# Patient Record
Sex: FEMALE | Race: WHITE | Employment: OTHER | URBAN - METROPOLITAN AREA
[De-identification: names, ages, dates, MRNs, and addresses within clinical notes are randomized per-mention and may not be internally consistent; named-entity substitution may affect disease eponyms.]

---

## 2017-01-16 ENCOUNTER — TELEPHONE (OUTPATIENT)
Dept: FAMILY MEDICINE CLINIC | Age: 42
End: 2017-01-16

## 2017-03-09 ENCOUNTER — TELEPHONE (OUTPATIENT)
Dept: ORTHOPEDIC SURGERY | Age: 42
End: 2017-03-09

## 2017-10-05 DIAGNOSIS — M25.473 ANKLE SWELLING, UNSPECIFIED LATERALITY: ICD-10-CM

## 2017-10-05 DIAGNOSIS — J44.0 CHRONIC OBSTRUCTIVE PULMONARY DISEASE WITH ACUTE LOWER RESPIRATORY INFECTION (HCC): ICD-10-CM

## 2017-10-05 DIAGNOSIS — M25.50 MULTIPLE JOINT PAIN: ICD-10-CM

## 2017-10-05 DIAGNOSIS — I10 ESSENTIAL HYPERTENSION: ICD-10-CM

## 2017-10-05 DIAGNOSIS — E78.5 DYSLIPIDEMIA: ICD-10-CM

## 2017-10-05 DIAGNOSIS — R53.82 CHRONIC FATIGUE: ICD-10-CM

## 2017-10-05 DIAGNOSIS — R12 HEARTBURN: ICD-10-CM

## 2017-10-05 DIAGNOSIS — E28.2 PCOS (POLYCYSTIC OVARIAN SYNDROME): ICD-10-CM

## 2017-10-05 DIAGNOSIS — R73.03 PREDIABETES: ICD-10-CM

## 2017-10-05 DIAGNOSIS — K21.9 GASTROESOPHAGEAL REFLUX DISEASE, ESOPHAGITIS PRESENCE NOT SPECIFIED: ICD-10-CM

## 2017-10-05 DIAGNOSIS — G47.33 OSA (OBSTRUCTIVE SLEEP APNEA): ICD-10-CM

## 2017-10-05 PROBLEM — F41.8 DEPRESSION WITH ANXIETY: Status: ACTIVE | Noted: 2017-10-05

## 2017-10-05 PROBLEM — E03.9 HYPOTHYROID: Status: ACTIVE | Noted: 2017-10-05

## 2017-10-05 PROBLEM — M19.90 OSTEOARTHRITIS: Status: ACTIVE | Noted: 2017-10-05

## 2017-10-05 PROBLEM — J44.9 COPD (CHRONIC OBSTRUCTIVE PULMONARY DISEASE) (HCC): Status: ACTIVE | Noted: 2017-10-05

## 2017-10-05 PROBLEM — K44.9 HIATAL HERNIA: Status: ACTIVE | Noted: 2017-10-05

## 2017-10-05 PROBLEM — J38.3 PARADOXICAL VOCAL CORD MOTION: Status: ACTIVE | Noted: 2017-10-05

## 2017-10-05 PROBLEM — L67.8 ABNORMAL FACIAL HAIR: Status: ACTIVE | Noted: 2017-10-05

## 2017-10-05 PROBLEM — R26.89 POOR BALANCE: Status: ACTIVE | Noted: 2017-10-05

## 2017-10-10 ENCOUNTER — APPOINTMENT (OUTPATIENT)
Dept: LAB | Facility: HOSPITAL | Age: 42
End: 2017-10-10

## 2017-10-11 ENCOUNTER — TELEPHONE (OUTPATIENT)
Dept: ORTHOPEDIC SURGERY | Age: 42
End: 2017-10-11

## 2017-11-21 ENCOUNTER — APPOINTMENT (OUTPATIENT)
Dept: LAB | Facility: HOSPITAL | Age: 42
End: 2017-11-21

## 2017-12-19 ENCOUNTER — CONSULT (OUTPATIENT)
Dept: BARIATRICS/WEIGHT MGMT | Facility: CLINIC | Age: 42
End: 2017-12-19

## 2017-12-19 ENCOUNTER — LAB (OUTPATIENT)
Dept: LAB | Facility: HOSPITAL | Age: 42
End: 2017-12-19

## 2017-12-19 VITALS
HEIGHT: 59 IN | DIASTOLIC BLOOD PRESSURE: 69 MMHG | SYSTOLIC BLOOD PRESSURE: 132 MMHG | BODY MASS INDEX: 59.07 KG/M2 | WEIGHT: 293 LBS | HEART RATE: 108 BPM | TEMPERATURE: 98.3 F | RESPIRATION RATE: 16 BRPM

## 2017-12-19 DIAGNOSIS — G47.33 OSA (OBSTRUCTIVE SLEEP APNEA): ICD-10-CM

## 2017-12-19 DIAGNOSIS — R73.03 PREDIABETES: ICD-10-CM

## 2017-12-19 DIAGNOSIS — E03.8 HYPOTHYROIDISM DUE TO HASHIMOTO'S THYROIDITIS: ICD-10-CM

## 2017-12-19 DIAGNOSIS — E66.01 MORBID OBESITY (HCC): ICD-10-CM

## 2017-12-19 DIAGNOSIS — E06.3 HYPOTHYROIDISM DUE TO HASHIMOTO'S THYROIDITIS: ICD-10-CM

## 2017-12-19 DIAGNOSIS — K44.9 HIATAL HERNIA: ICD-10-CM

## 2017-12-19 DIAGNOSIS — E78.5 DYSLIPIDEMIA: ICD-10-CM

## 2017-12-19 DIAGNOSIS — I10 ESSENTIAL HYPERTENSION: ICD-10-CM

## 2017-12-19 DIAGNOSIS — E66.01 MORBID OBESITY (HCC): Primary | ICD-10-CM

## 2017-12-19 DIAGNOSIS — K21.9 GASTROESOPHAGEAL REFLUX DISEASE, ESOPHAGITIS PRESENCE NOT SPECIFIED: ICD-10-CM

## 2017-12-19 DIAGNOSIS — M25.50 MULTIPLE JOINT PAIN: ICD-10-CM

## 2017-12-19 PROBLEM — D80.2 IGA DEFICIENCY (HCC): Status: ACTIVE | Noted: 2017-12-19

## 2017-12-19 PROBLEM — Q60.2: Status: ACTIVE | Noted: 2017-12-19

## 2017-12-19 PROBLEM — L03.90 CELLULITIS: Status: ACTIVE | Noted: 2017-12-19

## 2017-12-19 LAB
ALBUMIN SERPL-MCNC: 3.9 G/DL (ref 3.5–5.2)
ALBUMIN/GLOB SERPL: 0.9 G/DL
ALP SERPL-CCNC: 65 U/L (ref 39–117)
ALT SERPL W P-5'-P-CCNC: 25 U/L (ref 1–33)
ANION GAP SERPL CALCULATED.3IONS-SCNC: 15.3 MMOL/L
AST SERPL-CCNC: 18 U/L (ref 1–32)
BASOPHILS # BLD AUTO: 0.03 10*3/MM3 (ref 0–0.2)
BASOPHILS NFR BLD AUTO: 0.4 % (ref 0–1.5)
BILIRUB SERPL-MCNC: 0.2 MG/DL (ref 0.1–1.2)
BUN BLD-MCNC: 15 MG/DL (ref 6–20)
BUN/CREAT SERPL: 12.7 (ref 7–25)
CALCIUM SPEC-SCNC: 9.2 MG/DL (ref 8.6–10.5)
CHLORIDE SERPL-SCNC: 98 MMOL/L (ref 98–107)
CHOLEST SERPL-MCNC: 135 MG/DL (ref 0–200)
CO2 SERPL-SCNC: 24.7 MMOL/L (ref 22–29)
CREAT BLD-MCNC: 1.18 MG/DL (ref 0.57–1)
DEPRECATED RDW RBC AUTO: 54.5 FL (ref 37–54)
EOSINOPHIL # BLD AUTO: 0 10*3/MM3 (ref 0–0.7)
EOSINOPHIL NFR BLD AUTO: 0 % (ref 0.3–6.2)
ERYTHROCYTE [DISTWIDTH] IN BLOOD BY AUTOMATED COUNT: 16.4 % (ref 11.7–13)
GFR SERPL CREATININE-BSD FRML MDRD: 51 ML/MIN/1.73
GFR SERPL CREATININE-BSD FRML MDRD: 61 ML/MIN/1.73
GLOBULIN UR ELPH-MCNC: 4.2 GM/DL
GLUCOSE BLD-MCNC: 91 MG/DL (ref 65–99)
HBA1C MFR BLD: 5.8 % (ref 4.8–5.6)
HCT VFR BLD AUTO: 39.9 % (ref 35.6–45.5)
HDLC SERPL-MCNC: 35 MG/DL (ref 40–60)
HGB BLD-MCNC: 12.4 G/DL (ref 11.9–15.5)
IMM GRANULOCYTES # BLD: 0.02 10*3/MM3 (ref 0–0.03)
IMM GRANULOCYTES NFR BLD: 0.3 % (ref 0–0.5)
LDLC SERPL CALC-MCNC: 72 MG/DL (ref 0–100)
LDLC/HDLC SERPL: 2.05 {RATIO}
LYMPHOCYTES # BLD AUTO: 1.71 10*3/MM3 (ref 0.9–4.8)
LYMPHOCYTES NFR BLD AUTO: 22.8 % (ref 19.6–45.3)
MCH RBC QN AUTO: 28.1 PG (ref 26.9–32)
MCHC RBC AUTO-ENTMCNC: 31.1 G/DL (ref 32.4–36.3)
MCV RBC AUTO: 90.3 FL (ref 80.5–98.2)
MONOCYTES # BLD AUTO: 0.43 10*3/MM3 (ref 0.2–1.2)
MONOCYTES NFR BLD AUTO: 5.7 % (ref 5–12)
NEUTROPHILS # BLD AUTO: 5.31 10*3/MM3 (ref 1.9–8.1)
NEUTROPHILS NFR BLD AUTO: 70.8 % (ref 42.7–76)
PLATELET # BLD AUTO: 308 10*3/MM3 (ref 140–500)
PMV BLD AUTO: 10.4 FL (ref 6–12)
POTASSIUM BLD-SCNC: 4.2 MMOL/L (ref 3.5–5.2)
PROT SERPL-MCNC: 8.1 G/DL (ref 6–8.5)
RBC # BLD AUTO: 4.42 10*6/MM3 (ref 3.9–5.2)
SODIUM BLD-SCNC: 138 MMOL/L (ref 136–145)
TRIGL SERPL-MCNC: 141 MG/DL (ref 0–150)
TSH SERPL DL<=0.05 MIU/L-ACNC: 2.51 MIU/ML (ref 0.27–4.2)
VLDLC SERPL-MCNC: 28.2 MG/DL (ref 5–40)
WBC NRBC COR # BLD: 7.5 10*3/MM3 (ref 4.5–10.7)

## 2017-12-19 PROCEDURE — 99205 OFFICE O/P NEW HI 60 MIN: CPT | Performed by: NURSE PRACTITIONER

## 2017-12-19 PROCEDURE — 80061 LIPID PANEL: CPT

## 2017-12-19 PROCEDURE — 80053 COMPREHEN METABOLIC PANEL: CPT

## 2017-12-19 PROCEDURE — 36415 COLL VENOUS BLD VENIPUNCTURE: CPT

## 2017-12-19 PROCEDURE — 84443 ASSAY THYROID STIM HORMONE: CPT

## 2017-12-19 PROCEDURE — 85025 COMPLETE CBC W/AUTO DIFF WBC: CPT

## 2017-12-19 PROCEDURE — 94690 O2 UPTK REST INDIRECT: CPT | Performed by: NURSE PRACTITIONER

## 2017-12-19 PROCEDURE — 83036 HEMOGLOBIN GLYCOSYLATED A1C: CPT

## 2017-12-19 RX ORDER — DULOXETIN HYDROCHLORIDE 30 MG/1
30 CAPSULE, DELAYED RELEASE ORAL 2 TIMES DAILY
COMMUNITY

## 2017-12-19 NOTE — PROGRESS NOTES
[facility]ATR[facility] BARIATRIC SURGERY  [address]  [facility] [id number] [phone]  [address]  [facility] [address] [phone]  Dept: [phone]  12/19/2017      Kristin Jorge.  [id number]  [id number]  1975  female      Chief Complaint of weight gain; unable to maintain weight loss    History of Present Illness:   Kristin is a 42 y.o. female who presents today for evaluation, education and consultation regarding weight loss surgery. The patient is interested in the sleeve gastrectomy.      Diet History:Kristin has been overweight for at least 29 years, has been 35 pounds or more overweight for at least 20 years, has been 100 pounds or more overweight for 20 or more years and started dieting at age 22.  The most weight Kristin lost was 100 pounds on atkins and maintained the weight loss for 1 year. Kristin describes her eating habits as volume eater and snacker. Kristin Jorge has tried Atkins, [place], Weight Watchers and reduced calorie among others with success of losing up to 100 pounds, but in each instance regained the weight.   See dietician documentation for complete history.    Bariatric Surgery Evaluation: The patient is being seen for an initial visit for bariatric surgery evaluation.     Bariatric Co-morbidities:  sleep apnea, hypertension, dyslipidemia and GERD    Patient Active Problem List   Diagnosis   • Body mass index (BMI) of 70 or greater in adult   • Chronic fatigue   • Ankle swelling   • Essential hypertension   • Dyslipidemia   • COPD (chronic obstructive pulmonary disease)   • CHONG (obstructive sleep apnea)   • Heartburn   • Hiatal hernia   • GERD (gastroesophageal reflux disease)   • PCOS (polycystic ovarian syndrome)   • Multiple joint pain   • Osteoarthritis   • Poor balance   • Depression with anxiety   • Prediabetes   • Abnormal facial hair   • Hypothyroid   • Paradoxical vocal cord motion   • Kidney, aplastic   • IgA  deficiency   • Cellulitis       Past Medical History:   Diagnosis Date   • Anxiety and depression    • Asthma    • Blind     blind in right eye   • COPD (chronic obstructive pulmonary disease)    • GERD (gastroesophageal reflux disease)    • Hashimoto's thyroiditis    • Head injury    • Heart burn    • Hiatal hernia    • HTN (hypertension)    • Hyperlipidemia    • Hypertriglyceridemia    • Insomnia    • Joint pain    • OA (osteoarthritis)    • CHONG (obstructive sleep apnea)    • PCOS (polycystic ovarian syndrome)    • Prediabetes    • Umbilical hernia        Past Surgical History:   Procedure Laterality Date   • CARPAL TUNNEL RELEASE     • DENTAL PROCEDURE     • LAPAROSCOPIC APPENDECTOMY     • LAPAROSCOPIC CHOLECYSTECTOMY         Allergies   Allergen Reactions   • Bee Venom    • Ibuprofen    • Nsaids      Pt only has one kidney   • Other      All pepper         Current Outpatient Prescriptions:   •  albuterol (PROVENTIL HFA;VENTOLIN HFA) 108 (90 Base) MCG/ACT inhaler, Inhale 2 puffs Every 4 (Four) Hours As Needed for Wheezing., Disp: , Rfl:   •  amitriptyline (ELAVIL) 150 MG tablet, Take 150 mg by mouth Every Night., Disp: , Rfl:   •  amLODIPine (NORVASC) 5 MG tablet, Take 5 mg by mouth Daily., Disp: , Rfl:   •  ARIPiprazole (ABILIFY) 5 MG tablet, Take 5 mg by mouth Daily., Disp: , Rfl:   •  atorvastatin (LIPITOR) 10 MG tablet, Take 10 mg by mouth Daily., Disp: , Rfl:   •  azelastine (ASTELIN) 0.1 % nasal spray, 2 sprays into each nostril 2 (Two) Times a Day. Use in each nostril as directed, Disp: , Rfl:   •  busPIRone (BUSPAR) 10 MG tablet, Take 10 mg by mouth 3 (Three) Times a Day., Disp: , Rfl:   •  diclofenac (VOLTAREN) 1 % gel gel, Apply 4 g topically 4 (Four) Times a Day As Needed., Disp: , Rfl:   •  diphenhydrAMINE (BANOPHEN) 25 mg capsule, Take 25 mg by mouth Every 6 (Six) Hours As Needed for Itching., Disp: , Rfl:   •  docusate sodium (COLACE) 100 MG capsule, Take 100 mg by mouth 2 (Two) Times a Day., Disp:  , Rfl:   •  DULoxetine (CYMBALTA) 20 MG capsule, Take 20 mg by mouth Daily., Disp: , Rfl:   •  EPINEPHrine (EPIPEN 2-JOEY) 0.3 MG/0.3ML solution auto-injector injection, , Disp: , Rfl:   •  Ergocalciferol (VITAMIN D2 PO), Take  by mouth., Disp: , Rfl:   •  ferrous sulfate 325 (65 FE) MG tablet, Take 325 mg by mouth Daily With Breakfast., Disp: , Rfl:   •  fluticasone (VERAMYST) 27.5 MCG/SPRAY nasal spray, 2 sprays into each nostril Daily., Disp: , Rfl:   •  Fluticasone Furoate-Vilanterol (BREO ELLIPTA) 200-25 MCG/INH aerosol powder , Inhale., Disp: , Rfl:   •  furosemide (LASIX) 40 MG tablet, Take 40 mg by mouth 2 (Two) Times a Day., Disp: , Rfl:   •  gabapentin (NEURONTIN) 300 MG capsule, Take 300 mg by mouth 3 (Three) Times a Day., Disp: , Rfl:   •  hydrochlorothiazide (HYDRODIURIL) 25 MG tablet, Take 25 mg by mouth Daily., Disp: , Rfl:   •  ipratropium-albuterol (DUO-NEB) 0.5-2.5 mg/mL nebulizer, Take 3 mL by nebulization Every 4 (Four) Hours As Needed for Wheezing., Disp: , Rfl:   •  levothyroxine (SYNTHROID, LEVOTHROID) 300 MCG tablet, Take 300 mcg by mouth Daily., Disp: , Rfl:   •  lisinopril (PRINIVIL,ZESTRIL) 20 MG tablet, Take 20 mg by mouth Daily., Disp: , Rfl:   •  Loratadine (CLARITIN) 10 MG capsule, Take  by mouth., Disp: , Rfl:   •  medroxyPROGESTERone (PROVERA) 10 MG tablet, Take 10 mg by mouth Daily., Disp: , Rfl:   •  melatonin 5 MG tablet tablet, Take 5 mg by mouth., Disp: , Rfl:   •  metFORMIN (GLUCOPHAGE) 500 MG tablet, Take 500 mg by mouth 2 (Two) Times a Day With Meals., Disp: , Rfl:   •  montelukast (SINGULAIR) 10 MG tablet, Take 10 mg by mouth Every Night., Disp: , Rfl:   •  nadolol (CORGARD) 20 MG tablet, Take 20 mg by mouth Daily., Disp: , Rfl:   •  nystatin-triamcinolone (MYCOLOG II) 843037-9.1 UNIT/GM-% cream, Apply  topically 4 (Four) Times a Day., Disp: , Rfl:   •  Omega-3 Fatty Acids (FISH OIL) 1000 MG capsule capsule, Take  by mouth Daily With Breakfast., Disp: , Rfl:   •  omeprazole  (priLOSEC) 20 MG capsule, Take 20 mg by mouth Daily., Disp: , Rfl:   •  ondansetron (ZOFRAN) 4 MG tablet, Take 4 mg by mouth Every 8 (Eight) Hours As Needed for Nausea or Vomiting., Disp: , Rfl:   •  oxybutynin XL (DITROPAN-XL) 10 MG 24 hr tablet, Take 10 mg by mouth Daily., Disp: , Rfl:   •  polyethylene glycol (MIRALAX) packet, Take 17 g by mouth Daily., Disp: , Rfl:   •  potassium chloride (K-DUR,KLOR-CON) 20 MEQ CR tablet, Take 20 mEq by mouth 2 (Two) Times a Day., Disp: , Rfl:   •  Prenatal Vit-Fe Fumarate-FA (PRENATAL VITAMIN PO), Take  by mouth., Disp: , Rfl:   •  Probiotic Product (FLORAJEN3 PO), Take  by mouth., Disp: , Rfl:   •  raNITIdine (ZANTAC) 150 MG tablet, Take 150 mg by mouth 2 (Two) Times a Day., Disp: , Rfl:   •  rOPINIRole (REQUIP) 0.5 MG tablet, Take 0.5 mg by mouth Every Night. Take 1 hour before bedtime., Disp: , Rfl:   •  sodium chloride 0.9 % nebulizer solution 0.88 mL with albuterol (5 MG/ML) 0.5% nebulizer solution 0.6 mg, Take 10 mg/hr by nebulization Continuous., Disp: , Rfl:   •  topiramate (TOPAMAX) 100 MG tablet, Take 100 mg by mouth 2 (Two) Times a Day., Disp: , Rfl:   •  VITAMIN E PO, Take  by mouth., Disp: , Rfl:     Social History     Social History   • Marital status:      Spouse name: N/A   • Number of children: 0   • Years of education: N/A     Occupational History   • applied for disability      Social History Main Topics   • Smoking status: Never Smoker   • Smokeless tobacco: Never Used   • Alcohol use No   • Drug use: No   • Sexual activity: Defer     Other Topics Concern   • Not on file     Social History Narrative       Family History   Problem Relation Age of Onset   • Obesity Mother    • Hypertension Mother    • Stroke Mother    • Obesity Father    • Diabetes Father    • Hypertension Father    • Stroke Father    • Heart attack Father    • Coronary artery disease Father    • Hypertension Sister    • Obesity Maternal Grandmother    • Hypertension Maternal  Grandmother    • Heart attack Maternal Grandmother    • Coronary artery disease Maternal Grandmother    • Obesity Maternal Grandfather    • Hypertension Maternal Grandfather    • Heart attack Maternal Grandfather    • Coronary artery disease Maternal Grandfather    • Obesity Paternal Grandmother    • Hypertension Paternal Grandmother    • Heart attack Paternal Grandmother    • Coronary artery disease Paternal Grandmother          Review of Systems:  Review of Systems   All other systems reviewed and are negative.      Physical Exam:  Vital Signs:  Weight: (!) 182 kg (402 lb)   Body mass index is 81.19 kg/(m^2).  Temp: 98.3 °F (36.8 °C)   Heart Rate: 108   BP: 132/69     Physical Exam   Constitutional: She is oriented to person, place, and time. She appears well-developed and well-nourished.   HENT:   Head: Normocephalic and atraumatic.   Eyes: EOM are normal.   Cardiovascular: Normal rate, regular rhythm and normal heart sounds.    Pulmonary/Chest: Effort normal and breath sounds normal.   Abdominal: Soft. Bowel sounds are normal. She exhibits no distension. There is no tenderness.   Musculoskeletal: Normal range of motion.   Neurological: She is alert and oriented to person, place, and time.   Skin: Skin is warm and dry.     cellulitis   Psychiatric: She has a normal mood and affect. Her behavior is normal. Judgment and thought content normal.   Vitals reviewed.         Assessment:         Kristin Jorge is a 42 y.o. year old female with medically complicated severe obesity. Weight: (!) 182 kg (402 lb), Body mass index is 81.19 kg/(m^2). and weight related problems including sleep apnea, hypertension and dyslipidemia.    I explained in detail the procedures that we are performing.  All of those procedures can be performed laparoscopically but there is a chance to convert to open if any technical challenges or complications do occur.  Bariatric surgery is not cosmetic surgery but rather a tool to help a patient make  a life-long commitment lifestyle changes including diet, exercise, behavior changes, and taking supplemental vitamins and minerals.    Due to the patient's BMI and co-morbidities they are at a high risk for surgery and will obtain the following:  The patient has been advised that a letter of medical support and a history and physical must be obtained from her primary care physician. A psychological evaluation will be arranged for this patient. CBC, CMP, FLP, TSH and HgbA1C will be drawn. Kristin Jorge will obtain a pre-operative CXR and EKG. Kristin Jorge will obtain clearance from a cardiologist, pulmonary and nephrologist prior to surgery.     Kristin Jorge will be set up for a pre-operative diagnostic esophagogastroduodenoscopy with biopsy for evaluation. The risks and benefits of the procedure were discussed with the patient in detail and all questions were answered.  Possibility of perforation, bleeding, aspiration, anoxic brain injury, respiratory and/or cardiac arrest and death were discussed.   She received handouts regarding, all questions were answered and informed consent was obtained.     The risks, benefits, alternatives, and potential complications of all of the procedures were explained in detail including, but not limited to death, anesthesia and medication adverse effect/DVT, pulmonary embolism, trocar site/incisional hernia, wound infection, abdominal infection, bleeding, failure to lose weight or gain weight and change in body image, metabolic complications with calcium, thiamine, vitamin B12, folate, iron, and anemia.    The patient was advised to start a high protein, low fat and low carbohydrate diet. The patient was given individualized information by our dietician along with general group information and handouts.     The patient had the Basal Metabolic Rate test performed in our office today then I reviewed the results with them including changes to help increase metabolism and a recommended  daily caloric intake range- see scanned results.     The patient was given information regarding the YANI educational video. YANI is an internet based educational video which explains the surgical procedure and answers basic questions regarding the procedure. The patient was provided with instructions and a password to watch the video.    The patient was encouraged to start routine exercise including but not limited to 150 minutes per week. The patient received a resistance band along with a handout of exercises.     The consultation plan was reviewed with the patient.    The patient understands the surgical procedures and the different surgical options that are available.  She understands the lifestyle changes that would be required after surgery and has agreed to participate in a pre-operative and postoperative weight management program.  She also expressed understanding of possible risks, had several questions answered and desires to proceed.    I think she is a good candidate for this surgery, and is interested in a sleeve gastrectomy.    Encounter Diagnoses   Name Primary?   • Body mass index (BMI) of 70 or greater in adult Yes   • Essential hypertension    • Hiatal hernia    • CHONG (obstructive sleep apnea)    • Gastroesophageal reflux disease, esophagitis presence not specified    • Hypothyroidism due to Hashimoto's thyroiditis    • Multiple joint pain    • Dyslipidemia    • Prediabetes        Plan:    Patient will have evaluations and follow up with bariatric dieticians and a psychologist before undergoing a multidisciplinary review of her candidacy.  We also discussed the weight loss requirement and rationale, and other program requirements.      Sivan Ambrose, APRN  12/19/2017

## 2017-12-19 NOTE — PROGRESS NOTES
"Bariatric Nutrition Counseling Interview    Patient Name:  Kristin Jorge  YOB: 1975  Age:  42 y.o.  Sex:  female  MRN: [id number]  Date:  12/19/17    Procedure Considering:  Sleeve    Last Documented Height:    Ht Readings from Last 1 Encounters:   12/19/17 149.9 cm (59\")     Last Documented Weight:   Wt Readings from Last 1 Encounters:   12/19/17 (!) 182 kg (402 lb)      Body mass index is 81.19 kg/(m^2).    Highest Weight:  402 lb.  Goal Weight: 160 lb.    History:  Past Medical History:   Diagnosis Date   • Anxiety and depression    • Asthma    • Blind     blind in right eye   • COPD (chronic obstructive pulmonary disease)    • GERD (gastroesophageal reflux disease)    • Hashimoto's thyroiditis    • Head injury    • Heart burn    • Hiatal hernia    • HTN (hypertension)    • Hyperlipidemia    • Hypertriglyceridemia    • Insomnia    • Joint pain    • OA (osteoarthritis)    • CHONG (obstructive sleep apnea)    • PCOS (polycystic ovarian syndrome)    • Prediabetes    • Umbilical hernia      Past Surgical History:   Procedure Laterality Date   • CARPAL TUNNEL RELEASE     • DENTAL PROCEDURE     • LAPAROSCOPIC APPENDECTOMY     • LAPAROSCOPIC CHOLECYSTECTOMY       Family History   Problem Relation Age of Onset   • Obesity Mother    • Hypertension Mother    • Stroke Mother    • Obesity Father    • Diabetes Father    • Hypertension Father    • Stroke Father    • Heart attack Father    • Coronary artery disease Father    • Hypertension Sister    • Obesity Maternal Grandmother    • Hypertension Maternal Grandmother    • Heart attack Maternal Grandmother    • Coronary artery disease Maternal Grandmother    • Obesity Maternal Grandfather    • Hypertension Maternal Grandfather    • Heart attack Maternal Grandfather    • Coronary artery disease Maternal Grandfather    • Obesity Paternal Grandmother    • Hypertension Paternal Grandmother    • Heart attack Paternal Grandmother    • Coronary artery disease Paternal " Grandmother      Social History     Social History   • Marital status:      Spouse name: N/A   • Number of children: 0   • Years of education: N/A     Occupational History   • applied for disability      Social History Main Topics   • Smoking status: Never Smoker   • Smokeless tobacco: Never Used   • Alcohol use No   • Drug use: No   • Sexual activity: Defer     Other Topics Concern   • None     Social History Narrative     Additional Health Issues to Consider:  Hyperlipidemia, Hashimoto's Thyroiditis,HTN,COPD,Pre Diabetes oint pan, Asthma    Weight History:  Always been overweight    Previous Weight Loss Efforts:  Atkins, weight Watchers, Mindful eating with low calorie foods  Most Successful Weight Loss Effort:   Atkins  Eat three meals on most days?  No  Worst eating habit?  Snacking on high calorie foods    How often do you eat fast food? monthly    Do you exercise regularly? (at least 3 times each week)  No    Occupation:  Disabled    Personal Goal After Procedure:  Function normally, resolve health issues and reduce medications   Personal Support:      Assessment:  We reviewed program requirements for weight loss success following surgery. We discussed personal habits and lifestyle behaviors that may influence diet efforts.Progam materials, including a reduced calorie diet were provided, discussed and recommenced as the regimen to follow for pre and post diet planning. Kristin presents with significant health issues and apprehensions. She will need continued support and education. Kristin will make a fair candidate for weight loss surgery.    Electronically signed by:  Maria D Almanza RD  12/19/17 1:38 PM

## 2017-12-20 ENCOUNTER — TELEPHONE (OUTPATIENT)
Dept: BARIATRICS/WEIGHT MGMT | Facility: CLINIC | Age: 42
End: 2017-12-20

## 2018-01-11 DIAGNOSIS — E03.8 HYPOTHYROIDISM DUE TO HASHIMOTO'S THYROIDITIS: ICD-10-CM

## 2018-01-11 DIAGNOSIS — E78.5 DYSLIPIDEMIA: ICD-10-CM

## 2018-01-11 DIAGNOSIS — M25.50 MULTIPLE JOINT PAIN: ICD-10-CM

## 2018-01-11 DIAGNOSIS — I10 ESSENTIAL HYPERTENSION: ICD-10-CM

## 2018-01-11 DIAGNOSIS — R73.03 PREDIABETES: ICD-10-CM

## 2018-01-11 DIAGNOSIS — E06.3 HYPOTHYROIDISM DUE TO HASHIMOTO'S THYROIDITIS: ICD-10-CM

## 2018-01-11 DIAGNOSIS — G47.33 OSA (OBSTRUCTIVE SLEEP APNEA): ICD-10-CM

## 2018-01-11 DIAGNOSIS — K21.9 GASTROESOPHAGEAL REFLUX DISEASE, ESOPHAGITIS PRESENCE NOT SPECIFIED: ICD-10-CM

## 2018-01-11 DIAGNOSIS — K44.9 HIATAL HERNIA: ICD-10-CM

## 2018-01-23 ENCOUNTER — TELEPHONE (OUTPATIENT)
Dept: BARIATRICS/WEIGHT MGMT | Facility: CLINIC | Age: 43
End: 2018-01-23

## 2018-01-23 NOTE — TELEPHONE ENCOUNTER
Spoke with the patient.  She notes that she is going to have cardiac clearance next week with Dr. Wagner in [facility] and on 2/8/18 with Dr. Bryant (pulmonology) in [facility].

## 2018-04-17 ENCOUNTER — CONVERSION ENCOUNTER (OUTPATIENT)
Dept: MAMMOGRAPHY | Facility: HOSPITAL | Age: 43
End: 2018-04-17

## 2018-05-04 ENCOUNTER — PREP FOR SURGERY (OUTPATIENT)
Dept: OTHER | Facility: HOSPITAL | Age: 43
End: 2018-05-04

## 2018-05-04 RX ORDER — FAMOTIDINE 10 MG/ML
20 INJECTION, SOLUTION INTRAVENOUS ONCE
Status: CANCELLED | OUTPATIENT
Start: 2018-05-04 | End: 2018-05-04

## 2018-05-04 RX ORDER — CHLORHEXIDINE GLUCONATE 0.12 MG/ML
15 RINSE ORAL SEE ADMIN INSTRUCTIONS
Status: CANCELLED | OUTPATIENT
Start: 2018-05-04

## 2018-05-04 RX ORDER — CEFAZOLIN SODIUM IN 0.9 % NACL 3 G/100 ML
3 INTRAVENOUS SOLUTION, PIGGYBACK (ML) INTRAVENOUS
Status: CANCELLED | OUTPATIENT
Start: 2018-05-04

## 2018-05-04 RX ORDER — SCOLOPAMINE TRANSDERMAL SYSTEM 1 MG/1
1 PATCH, EXTENDED RELEASE TRANSDERMAL ONCE
Status: CANCELLED | OUTPATIENT
Start: 2018-05-04 | End: 2018-05-04

## 2018-05-04 RX ORDER — METOCLOPRAMIDE HYDROCHLORIDE 5 MG/ML
10 INJECTION INTRAMUSCULAR; INTRAVENOUS ONCE
Status: CANCELLED | OUTPATIENT
Start: 2018-05-04 | End: 2018-05-04

## 2018-05-04 RX ORDER — SODIUM CHLORIDE 0.9 % (FLUSH) 0.9 %
1-10 SYRINGE (ML) INJECTION AS NEEDED
Status: CANCELLED | OUTPATIENT
Start: 2018-05-04

## 2018-05-04 RX ORDER — ACETAMINOPHEN 160 MG/5ML
975 SOLUTION ORAL ONCE
Status: CANCELLED | OUTPATIENT
Start: 2018-05-04 | End: 2018-05-04

## 2018-05-04 RX ORDER — SODIUM CHLORIDE, SODIUM LACTATE, POTASSIUM CHLORIDE, CALCIUM CHLORIDE 600; 310; 30; 20 MG/100ML; MG/100ML; MG/100ML; MG/100ML
100 INJECTION, SOLUTION INTRAVENOUS CONTINUOUS
Status: CANCELLED | OUTPATIENT
Start: 2018-05-04

## 2018-07-18 ENCOUNTER — CONSULT (OUTPATIENT)
Dept: BARIATRICS/WEIGHT MGMT | Facility: CLINIC | Age: 43
End: 2018-07-18

## 2018-07-18 VITALS
DIASTOLIC BLOOD PRESSURE: 71 MMHG | TEMPERATURE: 97.3 F | HEART RATE: 92 BPM | WEIGHT: 293 LBS | BODY MASS INDEX: 59.07 KG/M2 | SYSTOLIC BLOOD PRESSURE: 149 MMHG | RESPIRATION RATE: 16 BRPM | HEIGHT: 59 IN

## 2018-07-18 DIAGNOSIS — I10 ESSENTIAL HYPERTENSION: ICD-10-CM

## 2018-07-18 DIAGNOSIS — K44.9 HIATAL HERNIA: ICD-10-CM

## 2018-07-18 DIAGNOSIS — J44.0 CHRONIC OBSTRUCTIVE PULMONARY DISEASE WITH ACUTE LOWER RESPIRATORY INFECTION (HCC): ICD-10-CM

## 2018-07-18 DIAGNOSIS — E28.2 PCOS (POLYCYSTIC OVARIAN SYNDROME): ICD-10-CM

## 2018-07-18 DIAGNOSIS — M19.90 OSTEOARTHRITIS, UNSPECIFIED OSTEOARTHRITIS TYPE, UNSPECIFIED SITE: ICD-10-CM

## 2018-07-18 DIAGNOSIS — M25.50 MULTIPLE JOINT PAIN: ICD-10-CM

## 2018-07-18 DIAGNOSIS — F41.8 DEPRESSION WITH ANXIETY: ICD-10-CM

## 2018-07-18 DIAGNOSIS — E78.5 DYSLIPIDEMIA: ICD-10-CM

## 2018-07-18 DIAGNOSIS — R53.82 CHRONIC FATIGUE: ICD-10-CM

## 2018-07-18 DIAGNOSIS — G47.33 OSA (OBSTRUCTIVE SLEEP APNEA): ICD-10-CM

## 2018-07-18 DIAGNOSIS — R73.03 PREDIABETES: ICD-10-CM

## 2018-07-18 DIAGNOSIS — K21.9 GASTROESOPHAGEAL REFLUX DISEASE WITHOUT ESOPHAGITIS: ICD-10-CM

## 2018-07-18 PROBLEM — R12 HEARTBURN: Status: RESOLVED | Noted: 2017-10-05 | Resolved: 2018-07-18

## 2018-07-18 PROCEDURE — 99215 OFFICE O/P EST HI 40 MIN: CPT | Performed by: SURGERY

## 2018-07-18 RX ORDER — FERROUS ASPARTO GLYCINATE, FERROUS FUMARATE, ASCORBIC ACID, FOLIC ACID, CYANOCOBALAMIN, ZINC, AND INTRINSIC FACTOR 25; 50; 60; 750; 250; 10; 12; 100 MG/1; MG/1; MG/1; UG/1; UG/1; UG/1; MG/1; MG/1
1 CAPSULE ORAL DAILY
COMMUNITY
End: 2019-05-03 | Stop reason: ALTCHOICE

## 2018-07-18 NOTE — PATIENT INSTRUCTIONS
Bariatric Manual    You were provided a manual specific to the procedure that you have chosen.  Please refer to that with any questions or call the office at [phone]

## 2018-07-18 NOTE — H&P
Bariatric Consult:  Referred by FRANCOIS Vazquez    Kristin Jorge is here today for consult on Consult (Gastric Sleeve consultation )      History of Present Illness:     Kristin Jorge is a 42 y.o. female with morbid obesity with co-morbidities including sleep apnea, diabetes, hypertension, osteoarthritis, back pain, knee pain, GERD, depression and urinary stress incontinence who presents for surgical consultation for the above procedure. Kristin has completed the initial intake visit and has been examined by our nurse practitioner, dietician, psychologist and underwent the extensive educational teaching process under the guidance of our bariatric coordinator and myself. Kristin also has seen the educational video YANI on the surgical procedure if available. Kristin attended today more educational teaching from our bariatric coordinator and myself. Kristin has had an extensive medical workup including a visit with their primary care physician, EKG, chest radiograph, blood work, EGD or UGI and possibly further testing. These have been reviewed by me and discussed with the patient. Kristin is now ready to proceed with surgery. Kristin presently denies nausea, vomiting, fever, chills, chest pain, shortness of air, melena, hematochezia, hemetemesis, dysuria, frequency, hematuria, jaundice or abdominal pain.       Past Medical History:   Diagnosis Date   • Anxiety and depression    • Asthma    • Blind     blind in right eye   • COPD (chronic obstructive pulmonary disease) (CMS/[facility])    • GERD (gastroesophageal reflux disease)    • Hashimoto's thyroiditis    • Head injury    • Heart burn    • Hiatal hernia    • HTN (hypertension)    • Hyperlipidemia    • Hypertriglyceridemia    • Insomnia    • Joint pain    • OA (osteoarthritis)    • CHONG (obstructive sleep apnea)    • PCOS (polycystic ovarian syndrome)    • Prediabetes    • Umbilical hernia        Encounter Diagnoses   Name Primary?   • Body mass index (BMI) of 70 or greater  in adult (CMS/[facility]) Yes   • Chronic fatigue    • Essential hypertension    • Dyslipidemia    • Chronic obstructive pulmonary disease with acute lower respiratory infection (CMS/HCC)    • CHONG (obstructive sleep apnea)    • Hiatal hernia    • Gastroesophageal reflux disease without esophagitis    • PCOS (polycystic ovarian syndrome)    • Multiple joint pain    • Osteoarthritis, unspecified osteoarthritis type, unspecified site    • Depression with anxiety    • Prediabetes        Past Surgical History:   Procedure Laterality Date   • CARPAL TUNNEL RELEASE     • DENTAL PROCEDURE     • LAPAROSCOPIC APPENDECTOMY     • LAPAROSCOPIC CHOLECYSTECTOMY         Patient Active Problem List   Diagnosis   • Body mass index (BMI) of 70 or greater in adult (CMS/[facility])   • Chronic fatigue   • Ankle swelling   • Essential hypertension   • Dyslipidemia   • COPD (chronic obstructive pulmonary disease) (CMS/[facility])   • CHONG (obstructive sleep apnea)   • Hiatal hernia   • GERD (gastroesophageal reflux disease)   • PCOS (polycystic ovarian syndrome)   • Multiple joint pain   • Osteoarthritis   • Poor balance   • Depression with anxiety   • Prediabetes   • Abnormal facial hair   • Hypothyroid   • Paradoxical vocal cord motion   • Kidney, aplastic   • IgA deficiency (CMS/[facility])   • Cellulitis       Allergies   Allergen Reactions   • Bee Venom    • Ibuprofen    • Nsaids      Pt only has one kidney   • Other      All pepper         Current Outpatient Prescriptions:   •  albuterol (PROVENTIL HFA;VENTOLIN HFA) 108 (90 Base) MCG/ACT inhaler, Inhale 2 puffs Every 4 (Four) Hours As Needed for Wheezing., Disp: , Rfl:   •  amitriptyline (ELAVIL) 150 MG tablet, Take 150 mg by mouth Every Night., Disp: , Rfl:   •  amLODIPine (NORVASC) 5 MG tablet, Take 5 mg by mouth Daily., Disp: , Rfl:   •  ARIPiprazole (ABILIFY) 5 MG tablet, Take 5 mg by mouth Daily., Disp: , Rfl:   •  atorvastatin (LIPITOR) 10 MG tablet, Take 10 mg by mouth Daily., Disp: , Rfl:   •   azelastine (ASTELIN) 0.1 % nasal spray, 2 sprays into each nostril 2 (Two) Times a Day. Use in each nostril as directed, Disp: , Rfl:   •  busPIRone (BUSPAR) 10 MG tablet, Take 15 mg by mouth 3 (Three) Times a Day., Disp: , Rfl:   •  diclofenac (VOLTAREN) 1 % gel gel, Apply 4 g topically 4 (Four) Times a Day As Needed., Disp: , Rfl:   •  diphenhydrAMINE (BANOPHEN) 25 mg capsule, Take 25 mg by mouth Every 6 (Six) Hours As Needed for Itching., Disp: , Rfl:   •  docusate sodium (COLACE) 100 MG capsule, Take 100 mg by mouth 2 (Two) Times a Day., Disp: , Rfl:   •  DULoxetine (CYMBALTA) 20 MG capsule, Take 30 mg by mouth Daily., Disp: , Rfl:   •  EPINEPHrine (EPIPEN 2-JOEY) 0.3 MG/0.3ML solution auto-injector injection, , Disp: , Rfl:   •  Ergocalciferol (VITAMIN D2 PO), Take  by mouth., Disp: , Rfl:   •  fluticasone (VERAMYST) 27.5 MCG/SPRAY nasal spray, 2 sprays into each nostril Daily., Disp: , Rfl:   •  Fluticasone Furoate-Vilanterol (BREO ELLIPTA) 200-25 MCG/INH aerosol powder , Inhale., Disp: , Rfl:   •  furosemide (LASIX) 40 MG tablet, Take 40 mg by mouth 2 (Two) Times a Day., Disp: , Rfl:   •  gabapentin (NEURONTIN) 300 MG capsule, Take 600 mg by mouth 3 (Three) Times a Day., Disp: , Rfl:   •  hydrochlorothiazide (HYDRODIURIL) 25 MG tablet, Take 25 mg by mouth Daily., Disp: , Rfl:   •  ipratropium-albuterol (DUO-NEB) 0.5-2.5 mg/mL nebulizer, Take 3 mL by nebulization Every 4 (Four) Hours As Needed for Wheezing., Disp: , Rfl:   •  Iron Combinations (CHROMAGEN) capsule, Take 1 capsule by mouth Daily., Disp: , Rfl:   •  levothyroxine (SYNTHROID, LEVOTHROID) 300 MCG tablet, Take 350 mcg by mouth Daily., Disp: , Rfl:   •  lisinopril (PRINIVIL,ZESTRIL) 20 MG tablet, Take 20 mg by mouth Daily., Disp: , Rfl:   •  Loratadine (CLARITIN) 10 MG capsule, Take  by mouth., Disp: , Rfl:   •  medroxyPROGESTERone (PROVERA) 10 MG tablet, Take 10 mg by mouth Daily., Disp: , Rfl:   •  melatonin 5 MG tablet tablet, Take 5 mg by mouth.,  Disp: , Rfl:   •  metFORMIN (GLUCOPHAGE) 500 MG tablet, Take 500 mg by mouth 2 (Two) Times a Day With Meals., Disp: , Rfl:   •  montelukast (SINGULAIR) 10 MG tablet, Take 10 mg by mouth Every Night., Disp: , Rfl:   •  nadolol (CORGARD) 20 MG tablet, Take 20 mg by mouth Daily., Disp: , Rfl:   •  nystatin-triamcinolone (MYCOLOG II) 505127-7.1 UNIT/GM-% cream, Apply  topically 4 (Four) Times a Day., Disp: , Rfl:   •  Omega-3 Fatty Acids (FISH OIL) 1000 MG capsule capsule, Take  by mouth Daily With Breakfast., Disp: , Rfl:   •  omeprazole (priLOSEC) 20 MG capsule, Take 20 mg by mouth Daily., Disp: , Rfl:   •  ondansetron (ZOFRAN) 4 MG tablet, Take 4 mg by mouth Every 8 (Eight) Hours As Needed for Nausea or Vomiting., Disp: , Rfl:   •  oxybutynin XL (DITROPAN-XL) 10 MG 24 hr tablet, Take 10 mg by mouth Daily., Disp: , Rfl:   •  polyethylene glycol (MIRALAX) packet, Take 17 g by mouth Daily., Disp: , Rfl:   •  potassium chloride (K-DUR,KLOR-CON) 20 MEQ CR tablet, Take 20 mEq by mouth 2 (Two) Times a Day., Disp: , Rfl:   •  Prenatal Vit-Fe Fumarate-FA (PRENATAL VITAMIN PO), Take  by mouth., Disp: , Rfl:   •  Probiotic Product (FLORAJEN3 PO), Take  by mouth., Disp: , Rfl:   •  raNITIdine (ZANTAC) 150 MG tablet, Take 150 mg by mouth 2 (Two) Times a Day., Disp: , Rfl:   •  rOPINIRole (REQUIP) 0.5 MG tablet, Take 0.5 mg by mouth Every Night. Take 1 hour before bedtime., Disp: , Rfl:   •  sodium chloride 0.9 % nebulizer solution 0.88 mL with albuterol (5 MG/ML) 0.5% nebulizer solution 0.6 mg, Take 10 mg/hr by nebulization Continuous., Disp: , Rfl:   •  topiramate (TOPAMAX) 100 MG tablet, Take 200 mg by mouth 2 (Two) Times a Day., Disp: , Rfl:   •  VITAMIN E PO, Take  by mouth., Disp: , Rfl:   •  folic acid-pyridoxine-cyanocobalamin (FOLBIC) 2.5-25-2 MG tablet tablet, Take 1 tablet by mouth Daily., Disp: 40 each, Rfl: 0    Social History     Social History   • Marital status:      Spouse name: N/A   • Number of children:  0   • Years of education: N/A     Occupational History   • applied for disability      Social History Main Topics   • Smoking status: Never Smoker   • Smokeless tobacco: Never Used   • Alcohol use No   • Drug use: No   • Sexual activity: Defer     Other Topics Concern   • Not on file     Social History Narrative   • No narrative on file       Family History   Problem Relation Age of Onset   • Obesity Mother    • Hypertension Mother    • Stroke Mother    • Obesity Father    • Diabetes Father    • Hypertension Father    • Stroke Father    • Heart attack Father    • Coronary artery disease Father    • Hypertension Sister    • Obesity Maternal Grandmother    • Hypertension Maternal Grandmother    • Heart attack Maternal Grandmother    • Coronary artery disease Maternal Grandmother    • Obesity Maternal Grandfather    • Hypertension Maternal Grandfather    • Heart attack Maternal Grandfather    • Coronary artery disease Maternal Grandfather    • Obesity Paternal Grandmother    • Hypertension Paternal Grandmother    • Heart attack Paternal Grandmother    • Coronary artery disease Paternal Grandmother        Review of Systems:  Review of Systems   Constitutional: Positive for fatigue.   Musculoskeletal: Positive for arthralgias, back pain and gait problem.   All other systems reviewed and are negative.        Physical Exam:    Vital Signs:  Weight: (!) 170 kg (375 lb 8 oz)   Body mass index is 75.8 kg/m².  Temp: 97.3 °F (36.3 °C)   Heart Rate: 92   BP: 149/71       Physical Exam   Constitutional: She is oriented to person, place, and time. She appears well-nourished.   HENT:   Head: Normocephalic and atraumatic.   Mouth/Throat: Oropharynx is clear and moist.   Eyes: Pupils are equal, round, and reactive to light. Conjunctivae and EOM are normal. No scleral icterus.   Neck: Normal range of motion. Neck supple. No thyromegaly present.   Cardiovascular: Normal rate and regular rhythm.    Pulmonary/Chest: Effort normal and  breath sounds normal.   Abdominal: Soft. Bowel sounds are normal. She exhibits no distension and no mass. There is no tenderness. There is no rebound and no guarding.   Musculoskeletal: Normal range of motion. She exhibits edema.   Lymphadenopathy:     She has no cervical adenopathy.   Neurological: She is alert and oriented to person, place, and time. No cranial nerve deficit. Coordination normal.   Skin: Skin is warm and dry. No erythema.   Psychiatric: She has a normal mood and affect. Her behavior is normal.   Vitals reviewed.        Assessment:    Kristin Jorge is a 42 y.o. year old female with medically complicated severe obesity with a BMI of Body mass index is 75.8 kg/m². and multiple co-morbidities listed in the encounter diagnosis.    I think she is an appropriate candidate for this surgery, and is ready to proceed.      Plan/Discussion/Summary:  Small to medium-sized hernia per outside EGD.  Patient does take PPI.  Helicobacter pylori negative    The patient has returned to the office for a surgical consultation and has requested to proceed with a laparoscopic gastric sleeve.  I have had the opportunity to obtain a history, examine the patient and review the patient's chart.    The patient understands that surgery is a tool and that weight loss is not guaranteed but only seen in the context of appropriate use, regular follow up, exercise and making appropriate food choices.     I personally discussed the potential complications of the laparoscopic gastric sleeve with this patient.  The patient is well aware of potential complications of the surgery that include but not limited to bleeding, infections, deep vein thrombosis, pulmonary embolism, pulmonary complications such as pneumonia, cardiac event, hernias, small bowel obstruction, damage to the spleen or other organs, bowel injury, disfiguring scars, failure to lose weight, need for additional surgery, conversion to an open procedure and death.  The  patient is also aware of complications which apply in particular to the gastric sleeve and can include but not limited to the leakage of gastric contents at the staple line, the development of an intra-abdominal abscess, gastroesophageal reflux disease, Moura's esophagus, ulcers, vitamin/mineral deficiencies, strictures, and the possibility of converting this procedure to a Tirso-en-Y gastric bypass. The patient also understands the possibility of requiring an acid reducer medication for the rest of their life.    The risks, benefits, potential complications and alternative therapies were discussed at great length as outlined in our extensive consent forms, online consent and educational teaching processes.    The patient has confirmed the participation in the programs extensive educational activities.    All questions and concerns were answered to patient's satisfaction.  The patient now wishes to proceed with surgery.    Patient has declined the pre-operative insertion of an IVC filter.     The patient has agreed to a postoperative course of anitcoagulant therapy.        I instructed patient to start on a H2 blocker or proton pump inhibitor if not already on one of these medications.    I explained in detail the procedures that we perform.  All of these procedures have a chance to convert to open if any technical challenges or complications do occur.  Bariatric surgery is not cosmetic surgery but rather a tool to help a patient make a life-long commitment lifestyle change including diet, exercise, behavior changes, and taking supplemental vitamins and minerals.    Problems after surgery may require more operations to correct them.    The risks, benefits, alternatives, and potential complications of all of the procedures were explained in detail including, but not limited to death, anesthesia and medication adverse effect, deep venous thrombosis, pulmonary embolism, trocar site/incisional hernia, wound infection,  abdominal infection, bleeding, failure to lose weight, gain weight, a change in body image, metabolic complications with vitamin deficiences and anemia.    Weight loss expectations were discussed with the patient in detail. The weight loss operations most commonly performed are the sleeve gastrectomy and the Tirso-en-Y gastric bypass. These operations result in weight losses up to approximately 25-35% of initial body weight 12 to 24 months after surgery with the gastric bypass usually the higher percent of weight loss but depends on patient using the tool.    For the gastric bypass and loop duodenal switch (FRANCISCO-S) the risks include but not limited to the following early complications:  Anastomotic leak/peritonitis, Tirso/Alimentary/biliopancreatic limb obstruction, severe & minor wound infection/seroma, and nausea/vomiting.  Late complications can include but are not limited to malnutrition, vitamin deficiencies, frequent loose stools,  stomal stenosis, marginal ulcer, bowel obstruction, intussusception, internal, and incisional hernia.    Regarding the gastric sleeve, there is less long-term outcome data and higher risk of dysphagia and reflux compared to a gastric bypass, as well as risk of internal visceral/organ injury, splenectomy, bleeding, infection, leak (which could require further intervention possible conversion to Tirso-en-Y gastric bypass), stenosis and possibility of regaining weight.    Kristin was counseled regarding diagnostic results, instructions for management, risk factor reductions, prognosis, patient and family education, impressions, risks and benefits of treatment options and importance of compliance with treatment. Total face to face time of the encounter was over 45 minutes and over 30 minutes was spent counseling.     Walt Report   As part of this patient's treatment plan I am prescribing controlled substances. The patient has been made aware of appropriate use of such medications, including  potential risk of somnolence, limited ability to drive and /or work safely, and potential for dependence or overdose. It has also been made clear that these medications are for use by this patient only, without concomitant use of alcohol or other substances unless prescribed.    Kristin has completed prescribing agreement detailing terms of continued prescribing of controlled substances, including monitoring BART reports, urine drug screening, and pill counts if necessary. Kristin is aware that inappropriate use will result in cessation of prescribing such medications.    BART report has been reviewed      History and physical exam exhibit continued safe and appropriate use of controlled substances.      Kristin understands the surgical procedures and the different surgical options that are available.  She understands the lifestyle changes that are required after surgery and has agreed to follow the guidelines outlined in the weight management program.  She also expressed understanding of the risks involved and had all of female questions answered and desires to proceed.      Daniel Neil MD  7/18/2018

## 2018-07-20 ENCOUNTER — APPOINTMENT (OUTPATIENT)
Dept: PREADMISSION TESTING | Facility: HOSPITAL | Age: 43
End: 2018-07-20

## 2018-07-20 VITALS
SYSTOLIC BLOOD PRESSURE: 122 MMHG | TEMPERATURE: 98.3 F | HEIGHT: 60 IN | RESPIRATION RATE: 20 BRPM | OXYGEN SATURATION: 100 % | HEART RATE: 72 BPM | DIASTOLIC BLOOD PRESSURE: 77 MMHG

## 2018-07-20 LAB
ALBUMIN SERPL-MCNC: 4.1 G/DL (ref 3.5–5.2)
ALBUMIN/GLOB SERPL: 1.1 G/DL
ALP SERPL-CCNC: 65 U/L (ref 39–117)
ALT SERPL W P-5'-P-CCNC: 40 U/L (ref 1–33)
ANION GAP SERPL CALCULATED.3IONS-SCNC: 11.8 MMOL/L
AST SERPL-CCNC: 16 U/L (ref 1–32)
BILIRUB SERPL-MCNC: 0.2 MG/DL (ref 0.1–1.2)
BUN BLD-MCNC: 12 MG/DL (ref 6–20)
BUN/CREAT SERPL: 14.3 (ref 7–25)
CALCIUM SPEC-SCNC: 9.3 MG/DL (ref 8.6–10.5)
CHLORIDE SERPL-SCNC: 104 MMOL/L (ref 98–107)
CO2 SERPL-SCNC: 24.2 MMOL/L (ref 22–29)
CREAT BLD-MCNC: 0.84 MG/DL (ref 0.57–1)
DEPRECATED RDW RBC AUTO: 51.9 FL (ref 37–54)
ERYTHROCYTE [DISTWIDTH] IN BLOOD BY AUTOMATED COUNT: 15.9 % (ref 11.7–13)
GFR SERPL CREATININE-BSD FRML MDRD: 75 ML/MIN/1.73
GFR SERPL CREATININE-BSD FRML MDRD: 91 ML/MIN/1.73
GLOBULIN UR ELPH-MCNC: 3.8 GM/DL
GLUCOSE BLD-MCNC: 102 MG/DL (ref 65–99)
HCT VFR BLD AUTO: 41.8 % (ref 35.6–45.5)
HGB BLD-MCNC: 12.9 G/DL (ref 11.9–15.5)
MCH RBC QN AUTO: 27.9 PG (ref 26.9–32)
MCHC RBC AUTO-ENTMCNC: 30.9 G/DL (ref 32.4–36.3)
MCV RBC AUTO: 90.5 FL (ref 80.5–98.2)
PLATELET # BLD AUTO: 272 10*3/MM3 (ref 140–500)
PMV BLD AUTO: 11 FL (ref 6–12)
POTASSIUM BLD-SCNC: 4.3 MMOL/L (ref 3.5–5.2)
PROT SERPL-MCNC: 7.9 G/DL (ref 6–8.5)
RBC # BLD AUTO: 4.62 10*6/MM3 (ref 3.9–5.2)
SODIUM BLD-SCNC: 140 MMOL/L (ref 136–145)
WBC NRBC COR # BLD: 7.52 10*3/MM3 (ref 4.5–10.7)

## 2018-07-20 PROCEDURE — 36415 COLL VENOUS BLD VENIPUNCTURE: CPT

## 2018-07-20 PROCEDURE — 80053 COMPREHEN METABOLIC PANEL: CPT | Performed by: SURGERY

## 2018-07-20 PROCEDURE — 85027 COMPLETE CBC AUTOMATED: CPT | Performed by: SURGERY

## 2018-07-20 RX ORDER — ALBUTEROL SULFATE 2.5 MG/3ML
2.5 SOLUTION RESPIRATORY (INHALATION) EVERY 4 HOURS PRN
COMMUNITY
End: 2018-08-09 | Stop reason: HOSPADM

## 2018-07-20 RX ORDER — CETIRIZINE HYDROCHLORIDE 10 MG/1
10 TABLET ORAL DAILY
COMMUNITY
End: 2021-06-12

## 2018-07-20 RX ORDER — CALCIUM CARBONATE 200(500)MG
1 TABLET,CHEWABLE ORAL DAILY
COMMUNITY
End: 2018-08-09 | Stop reason: HOSPADM

## 2018-07-20 RX ORDER — LOPERAMIDE HYDROCHLORIDE 2 MG/1
2 CAPSULE ORAL 4 TIMES DAILY PRN
COMMUNITY
End: 2018-12-11

## 2018-07-20 NOTE — DISCHARGE INSTRUCTIONS
Take the following medications the morning of surgery with a small sip of water:    NADOLOL    ARRIVE TO  OUTPT SURGERY AT 5:30 AM  General Instructions:   • You may have up to 20 oz of clear-artificially sweetened liquid (to include Powerade Zero, Water, Tea/Coffee with no cream or milk added).  Nothing red in color.  Drink must be completed 4 hours before the start of your surgery- nothing to drink within 4 hours of surgery.    • Patients who avoid smoking, chewing tobacco and alcohol for 4 weeks prior to surgery have a reduced risk of post-operative complications.  Quit smoking as many days before surgery as you can.  • Do not smoke, use chewing tobacco or drink alcohol the day of surgery.   • If applicable bring your C-PAP/ BI-PAP machine.  • Bring any papers given to you in the doctor’s office.  • Wear clean comfortable clothes and socks.  • Do not wear contact lenses or make-up.  Bring a case for your glasses.   • Bring crutches or walker if applicable.  • Remove all piercings.  Leave jewelry and any other valuables at home.  • Hair extensions with metal clips must be removed prior to surgery.  • The Pre-Admission Testing nurse will instruct you to bring medications if unable to obtain an accurate list in Pre-Admission Testing.          Preventing a Surgical Site Infection:  • For 2 to 3 days before surgery, avoid shaving with a razor because the razor can irritate skin and make it easier to develop an infection. Take a regular shower using a fresh bar of anti-bacterial soap (such as Dial) and clean washcloth followed by Hibiclens.  • The day before surgery take a regular shower using a fresh bar of anti-bacterial soap (such as Dial) and clean washcloth followed by Hibiclens.  Then utilize one of the cloths given to you in PAT.    • Any areas of open skin can increase the risk of a post-operative wound infection by allowing bacteria to enter and travel throughout the body.  Notify your surgeon if you have any  skin wounds / rashes even if it is not near the expected surgical site.  The area will need assessed to determine if surgery should be delayed until it is healed.  • The night prior to surgery sleep in a clean bed with clean clothing.  Do not allow pets to sleep with you.  • The morning of surgery take a shower, rinse with Hibiclens and then utilize the 2nd cloth given to you in PAT.  Dry with a clean towel and dress in clean clothing.  • Do not use any cologne, deodorant or powder morning of surgery.    • Ask your surgeon if you will be receiving antibiotics prior to surgery.  • Make sure you, your family, and all healthcare providers clean their hands with soap and water or an alcohol based hand  before caring for you or your wound.    Day of surgery:  Upon arrival, a Pre-op nurse and Anesthesiologist will review your health history, obtain vital signs, and answer questions you may have.  The only belongings needed at this time will be your home medications and if applicable your C-PAP/BI-PAP machine.  If you are staying overnight your family can leave the rest of your belongings in the car and bring them to your room later.  A Pre-op nurse will start an IV and you may receive medication in preparation for surgery, including something to help you relax.  Your family will be able to see you in the Pre-op area.  While you are in surgery your family should notify the waiting room  if they leave the waiting room area and provide a contact phone number.    Please be aware that surgery does come with discomfort.  We want to make every effort to control your discomfort so please discuss any uncontrolled symptoms with your nurse.   Your doctor will most likely have prescribed pain medications.      If you are going home after surgery you will receive individualized written care instructions before being discharged.  A responsible adult must drive you to and from the hospital on the day of your surgery  and stay with you for 24 hours.    If you are staying overnight following surgery, you will be transported to your hospital room following the recovery period.  [facility] has all private rooms.    You have received a list of surgical assistants for your reference.  If you have any questions please call Pre-Admission Testing at [phone].  Deductibles and co-payments are collected on the day of service. Please be prepared to pay the required co-pay, deductible or deposit on the day of service as defined by your plan.

## 2018-08-08 ENCOUNTER — ANESTHESIA EVENT (OUTPATIENT)
Dept: PERIOP | Facility: HOSPITAL | Age: 43
End: 2018-08-08

## 2018-08-08 ENCOUNTER — HOSPITAL ENCOUNTER (INPATIENT)
Facility: HOSPITAL | Age: 43
LOS: 1 days | Discharge: HOME | End: 2018-08-09
Attending: SURGERY | Admitting: SURGERY

## 2018-08-08 ENCOUNTER — ANESTHESIA (OUTPATIENT)
Dept: PERIOP | Facility: HOSPITAL | Age: 43
End: 2018-08-08

## 2018-08-08 DIAGNOSIS — G47.33 OSA (OBSTRUCTIVE SLEEP APNEA): ICD-10-CM

## 2018-08-08 DIAGNOSIS — J44.0 CHRONIC OBSTRUCTIVE PULMONARY DISEASE WITH ACUTE LOWER RESPIRATORY INFECTION (HCC): ICD-10-CM

## 2018-08-08 DIAGNOSIS — I10 ESSENTIAL HYPERTENSION: ICD-10-CM

## 2018-08-08 LAB
B-HCG UR QL: NEGATIVE
GLUCOSE BLDC GLUCOMTR-MCNC: 115 MG/DL (ref 70–130)
GLUCOSE BLDC GLUCOMTR-MCNC: 131 MG/DL (ref 70–130)
GLUCOSE BLDC GLUCOMTR-MCNC: 97 MG/DL (ref 70–130)
GLUCOSE BLDC GLUCOMTR-MCNC: 99 MG/DL (ref 70–130)
INTERNAL NEGATIVE CONTROL: NEGATIVE
INTERNAL POSITIVE CONTROL: POSITIVE
Lab: NORMAL

## 2018-08-08 PROCEDURE — 25010000002 HYDROMORPHONE PER 4 MG: Performed by: SURGERY

## 2018-08-08 PROCEDURE — 81025 URINE PREGNANCY TEST: CPT | Performed by: ANESTHESIOLOGY

## 2018-08-08 PROCEDURE — 43775 LAP SLEEVE GASTRECTOMY: CPT | Performed by: SURGERY

## 2018-08-08 PROCEDURE — 43775 LAP SLEEVE GASTRECTOMY: CPT | Performed by: NURSE PRACTITIONER

## 2018-08-08 PROCEDURE — 25010000002 FENTANYL CITRATE (PF) 100 MCG/2ML SOLUTION: Performed by: NURSE ANESTHETIST, CERTIFIED REGISTERED

## 2018-08-08 PROCEDURE — 94799 UNLISTED PULMONARY SVC/PX: CPT

## 2018-08-08 PROCEDURE — 25010000002 ONDANSETRON PER 1 MG: Performed by: SURGERY

## 2018-08-08 PROCEDURE — 25010000002 METOCLOPRAMIDE PER 10 MG: Performed by: SURGERY

## 2018-08-08 PROCEDURE — 82962 GLUCOSE BLOOD TEST: CPT

## 2018-08-08 PROCEDURE — 25010000002 HYDROMORPHONE PER 4 MG: Performed by: NURSE ANESTHETIST, CERTIFIED REGISTERED

## 2018-08-08 PROCEDURE — 25010000002 MIDAZOLAM PER 1 MG: Performed by: ANESTHESIOLOGY

## 2018-08-08 PROCEDURE — 25010000002 CEFAZOLIN PER 500 MG: Performed by: SURGERY

## 2018-08-08 PROCEDURE — 0BQT4ZZ REPAIR DIAPHRAGM, PERCUTANEOUS ENDOSCOPIC APPROACH: ICD-10-PCS | Performed by: SURGERY

## 2018-08-08 PROCEDURE — 25010000002 PROPOFOL 10 MG/ML EMULSION: Performed by: NURSE ANESTHETIST, CERTIFIED REGISTERED

## 2018-08-08 PROCEDURE — 88307 TISSUE EXAM BY PATHOLOGIST: CPT | Performed by: SURGERY

## 2018-08-08 PROCEDURE — 25010000002 ONDANSETRON PER 1 MG: Performed by: NURSE ANESTHETIST, CERTIFIED REGISTERED

## 2018-08-08 PROCEDURE — 25010000002 ENOXAPARIN PER 10 MG: Performed by: SURGERY

## 2018-08-08 PROCEDURE — 94640 AIRWAY INHALATION TREATMENT: CPT

## 2018-08-08 PROCEDURE — 0DB64Z3 EXCISION OF STOMACH, PERCUTANEOUS ENDOSCOPIC APPROACH, VERTICAL: ICD-10-PCS | Performed by: SURGERY

## 2018-08-08 DEVICE — PERI-STRIPS DRY WITH VERITAS COLLAGEN MATRIX (PSD-V) IS PREPARED FROM DEHYDRATED BOVINE PERICARDIUM PROCURED FROM CATTLE UNDER 30 MONTHS OF AGE IN THE UNITED STATES. ONE (1) TUBE OF PSD GEL (GEL) IS PROVIDED FOR EVERY TWO (2) POUCHES OF PSD-V. THE GEL IS USED TO CREATE A TEMPORARY BOND BETWEEN THE PSD-V BUTTRESS AND THE SURGICAL STAPLER JAWS UNTIL THE STAPLER IS POSITIONED AND FIRED.
Type: IMPLANTABLE DEVICE | Site: ABDOMEN | Status: FUNCTIONAL
Brand: PERI-STRIPS DRY WITH VERITAS COLLAGEN MATRIX

## 2018-08-08 DEVICE — SEALANT FIBRIN TISSEEL FZ 4ML: Type: IMPLANTABLE DEVICE | Site: ABDOMEN | Status: FUNCTIONAL

## 2018-08-08 RX ORDER — SODIUM CHLORIDE, SODIUM LACTATE, POTASSIUM CHLORIDE, CALCIUM CHLORIDE 600; 310; 30; 20 MG/100ML; MG/100ML; MG/100ML; MG/100ML
9 INJECTION, SOLUTION INTRAVENOUS CONTINUOUS
Status: DISCONTINUED | OUTPATIENT
Start: 2018-08-08 | End: 2018-08-08

## 2018-08-08 RX ORDER — ONDANSETRON 2 MG/ML
4 INJECTION INTRAMUSCULAR; INTRAVENOUS EVERY 4 HOURS PRN
Status: DISCONTINUED | OUTPATIENT
Start: 2018-08-08 | End: 2018-08-09 | Stop reason: HOSPADM

## 2018-08-08 RX ORDER — PROMETHAZINE HYDROCHLORIDE 25 MG/ML
12.5 INJECTION, SOLUTION INTRAMUSCULAR; INTRAVENOUS ONCE AS NEEDED
Status: DISCONTINUED | OUTPATIENT
Start: 2018-08-08 | End: 2018-08-08 | Stop reason: HOSPADM

## 2018-08-08 RX ORDER — MIDAZOLAM HYDROCHLORIDE 1 MG/ML
2 INJECTION INTRAMUSCULAR; INTRAVENOUS
Status: DISCONTINUED | OUTPATIENT
Start: 2018-08-08 | End: 2018-08-08 | Stop reason: HOSPADM

## 2018-08-08 RX ORDER — MORPHINE SULFATE 2 MG/ML
2 INJECTION, SOLUTION INTRAMUSCULAR; INTRAVENOUS
Status: DISCONTINUED | OUTPATIENT
Start: 2018-08-08 | End: 2018-08-09 | Stop reason: HOSPADM

## 2018-08-08 RX ORDER — AMLODIPINE BESYLATE 5 MG/1
5 TABLET ORAL DAILY
Status: DISCONTINUED | OUTPATIENT
Start: 2018-08-08 | End: 2018-08-09 | Stop reason: HOSPADM

## 2018-08-08 RX ORDER — DIPHENHYDRAMINE HYDROCHLORIDE 50 MG/ML
25 INJECTION INTRAMUSCULAR; INTRAVENOUS EVERY 4 HOURS PRN
Status: DISCONTINUED | OUTPATIENT
Start: 2018-08-08 | End: 2018-08-09 | Stop reason: HOSPADM

## 2018-08-08 RX ORDER — MAGNESIUM HYDROXIDE 1200 MG/15ML
LIQUID ORAL AS NEEDED
Status: DISCONTINUED | OUTPATIENT
Start: 2018-08-08 | End: 2018-08-08 | Stop reason: HOSPADM

## 2018-08-08 RX ORDER — CHLORHEXIDINE GLUCONATE 0.12 MG/ML
15 RINSE ORAL SEE ADMIN INSTRUCTIONS
Status: COMPLETED | OUTPATIENT
Start: 2018-08-08 | End: 2018-08-08

## 2018-08-08 RX ORDER — SCOLOPAMINE TRANSDERMAL SYSTEM 1 MG/1
1 PATCH, EXTENDED RELEASE TRANSDERMAL ONCE
Status: DISCONTINUED | OUTPATIENT
Start: 2018-08-08 | End: 2018-08-08

## 2018-08-08 RX ORDER — LIDOCAINE HYDROCHLORIDE 10 MG/ML
0.5 INJECTION, SOLUTION EPIDURAL; INFILTRATION; INTRACAUDAL; PERINEURAL ONCE AS NEEDED
Status: DISCONTINUED | OUTPATIENT
Start: 2018-08-08 | End: 2018-08-08 | Stop reason: HOSPADM

## 2018-08-08 RX ORDER — FAMOTIDINE 10 MG/ML
20 INJECTION, SOLUTION INTRAVENOUS EVERY 12 HOURS SCHEDULED
Status: DISCONTINUED | OUTPATIENT
Start: 2018-08-08 | End: 2018-08-09 | Stop reason: HOSPADM

## 2018-08-08 RX ORDER — SODIUM CHLORIDE 9 MG/ML
INJECTION, SOLUTION INTRAVENOUS AS NEEDED
Status: DISCONTINUED | OUTPATIENT
Start: 2018-08-08 | End: 2018-08-08 | Stop reason: HOSPADM

## 2018-08-08 RX ORDER — OXYCODONE AND ACETAMINOPHEN 7.5; 325 MG/1; MG/1
1 TABLET ORAL ONCE AS NEEDED
Status: DISCONTINUED | OUTPATIENT
Start: 2018-08-08 | End: 2018-08-08 | Stop reason: HOSPADM

## 2018-08-08 RX ORDER — HYDROMORPHONE HYDROCHLORIDE 1 MG/ML
0.5 INJECTION, SOLUTION INTRAMUSCULAR; INTRAVENOUS; SUBCUTANEOUS
Status: DISCONTINUED | OUTPATIENT
Start: 2018-08-08 | End: 2018-08-08 | Stop reason: HOSPADM

## 2018-08-08 RX ORDER — SODIUM CHLORIDE, SODIUM LACTATE, POTASSIUM CHLORIDE, CALCIUM CHLORIDE 600; 310; 30; 20 MG/100ML; MG/100ML; MG/100ML; MG/100ML
100 INJECTION, SOLUTION INTRAVENOUS CONTINUOUS
Status: DISCONTINUED | OUTPATIENT
Start: 2018-08-08 | End: 2018-08-08 | Stop reason: HOSPADM

## 2018-08-08 RX ORDER — ONDANSETRON 2 MG/ML
INJECTION INTRAMUSCULAR; INTRAVENOUS AS NEEDED
Status: DISCONTINUED | OUTPATIENT
Start: 2018-08-08 | End: 2018-08-08 | Stop reason: SURG

## 2018-08-08 RX ORDER — ROCURONIUM BROMIDE 10 MG/ML
INJECTION, SOLUTION INTRAVENOUS AS NEEDED
Status: DISCONTINUED | OUTPATIENT
Start: 2018-08-08 | End: 2018-08-08 | Stop reason: SURG

## 2018-08-08 RX ORDER — NITROGLYCERIN 0.4 MG/1
0.4 TABLET SUBLINGUAL
Status: DISCONTINUED | OUTPATIENT
Start: 2018-08-08 | End: 2018-08-09 | Stop reason: HOSPADM

## 2018-08-08 RX ORDER — ACETAMINOPHEN 650 MG/1
650 SUPPOSITORY RECTAL EVERY 4 HOURS PRN
Status: DISCONTINUED | OUTPATIENT
Start: 2018-08-08 | End: 2018-08-09 | Stop reason: HOSPADM

## 2018-08-08 RX ORDER — SODIUM CHLORIDE, SODIUM LACTATE, POTASSIUM CHLORIDE, CALCIUM CHLORIDE 600; 310; 30; 20 MG/100ML; MG/100ML; MG/100ML; MG/100ML
150 INJECTION, SOLUTION INTRAVENOUS CONTINUOUS
Status: DISCONTINUED | OUTPATIENT
Start: 2018-08-08 | End: 2018-08-09 | Stop reason: HOSPADM

## 2018-08-08 RX ORDER — CYANOCOBALAMIN 1000 UG/ML
1000 INJECTION, SOLUTION INTRAMUSCULAR; SUBCUTANEOUS ONCE
Status: COMPLETED | OUTPATIENT
Start: 2018-08-09 | End: 2018-08-09

## 2018-08-08 RX ORDER — FUROSEMIDE 40 MG/1
40 TABLET ORAL DAILY
Status: DISCONTINUED | OUTPATIENT
Start: 2018-08-08 | End: 2018-08-09 | Stop reason: HOSPADM

## 2018-08-08 RX ORDER — HYDROCODONE BITARTRATE AND ACETAMINOPHEN 7.5; 325 MG/1; MG/1
1 TABLET ORAL ONCE AS NEEDED
Status: DISCONTINUED | OUTPATIENT
Start: 2018-08-08 | End: 2018-08-08 | Stop reason: HOSPADM

## 2018-08-08 RX ORDER — NYSTATIN 100000 [USP'U]/G
POWDER TOPICAL EVERY 12 HOURS SCHEDULED
Status: DISCONTINUED | OUTPATIENT
Start: 2018-08-08 | End: 2018-08-09 | Stop reason: HOSPADM

## 2018-08-08 RX ORDER — EPHEDRINE SULFATE 50 MG/ML
5 INJECTION, SOLUTION INTRAVENOUS ONCE AS NEEDED
Status: DISCONTINUED | OUTPATIENT
Start: 2018-08-08 | End: 2018-08-08 | Stop reason: HOSPADM

## 2018-08-08 RX ORDER — LABETALOL HYDROCHLORIDE 5 MG/ML
5 INJECTION, SOLUTION INTRAVENOUS
Status: DISCONTINUED | OUTPATIENT
Start: 2018-08-08 | End: 2018-08-08 | Stop reason: HOSPADM

## 2018-08-08 RX ORDER — HYDROCHLOROTHIAZIDE 25 MG/1
25 TABLET ORAL DAILY
Status: DISCONTINUED | OUTPATIENT
Start: 2018-08-08 | End: 2018-08-09 | Stop reason: HOSPADM

## 2018-08-08 RX ORDER — SODIUM CHLORIDE 0.9 % (FLUSH) 0.9 %
1-10 SYRINGE (ML) INJECTION AS NEEDED
Status: DISCONTINUED | OUTPATIENT
Start: 2018-08-08 | End: 2018-08-08 | Stop reason: HOSPADM

## 2018-08-08 RX ORDER — FAMOTIDINE 10 MG/ML
20 INJECTION, SOLUTION INTRAVENOUS ONCE
Status: COMPLETED | OUTPATIENT
Start: 2018-08-08 | End: 2018-08-08

## 2018-08-08 RX ORDER — MIDAZOLAM HYDROCHLORIDE 1 MG/ML
1 INJECTION INTRAMUSCULAR; INTRAVENOUS
Status: DISCONTINUED | OUTPATIENT
Start: 2018-08-08 | End: 2018-08-08 | Stop reason: HOSPADM

## 2018-08-08 RX ORDER — DIPHENHYDRAMINE HYDROCHLORIDE 50 MG/ML
12.5 INJECTION INTRAMUSCULAR; INTRAVENOUS
Status: DISCONTINUED | OUTPATIENT
Start: 2018-08-08 | End: 2018-08-08 | Stop reason: HOSPADM

## 2018-08-08 RX ORDER — ONDANSETRON 4 MG/1
4 TABLET, FILM COATED ORAL EVERY 4 HOURS PRN
Status: DISCONTINUED | OUTPATIENT
Start: 2018-08-08 | End: 2018-08-09 | Stop reason: HOSPADM

## 2018-08-08 RX ORDER — ONDANSETRON 2 MG/ML
4 INJECTION INTRAMUSCULAR; INTRAVENOUS ONCE AS NEEDED
Status: DISCONTINUED | OUTPATIENT
Start: 2018-08-08 | End: 2018-08-08 | Stop reason: HOSPADM

## 2018-08-08 RX ORDER — ONDANSETRON 4 MG/1
4 TABLET, ORALLY DISINTEGRATING ORAL EVERY 4 HOURS PRN
Status: DISCONTINUED | OUTPATIENT
Start: 2018-08-08 | End: 2018-08-09 | Stop reason: HOSPADM

## 2018-08-08 RX ORDER — HYDROMORPHONE HYDROCHLORIDE 2 MG/1
2 TABLET ORAL EVERY 4 HOURS PRN
Status: DISCONTINUED | OUTPATIENT
Start: 2018-08-08 | End: 2018-08-09 | Stop reason: HOSPADM

## 2018-08-08 RX ORDER — PROPOFOL 10 MG/ML
VIAL (ML) INTRAVENOUS AS NEEDED
Status: DISCONTINUED | OUTPATIENT
Start: 2018-08-08 | End: 2018-08-08 | Stop reason: SURG

## 2018-08-08 RX ORDER — NALOXONE HCL 0.4 MG/ML
0.4 VIAL (ML) INJECTION
Status: DISCONTINUED | OUTPATIENT
Start: 2018-08-08 | End: 2018-08-09 | Stop reason: HOSPADM

## 2018-08-08 RX ORDER — PROMETHAZINE HYDROCHLORIDE 25 MG/1
12.5 TABLET ORAL ONCE AS NEEDED
Status: DISCONTINUED | OUTPATIENT
Start: 2018-08-08 | End: 2018-08-08 | Stop reason: HOSPADM

## 2018-08-08 RX ORDER — LISINOPRIL 20 MG/1
20 TABLET ORAL DAILY
Status: DISCONTINUED | OUTPATIENT
Start: 2018-08-08 | End: 2018-08-09 | Stop reason: HOSPADM

## 2018-08-08 RX ORDER — PROMETHAZINE HYDROCHLORIDE 25 MG/1
25 SUPPOSITORY RECTAL ONCE AS NEEDED
Status: DISCONTINUED | OUTPATIENT
Start: 2018-08-08 | End: 2018-08-08 | Stop reason: HOSPADM

## 2018-08-08 RX ORDER — ALBUTEROL SULFATE 2.5 MG/3ML
2.5 SOLUTION RESPIRATORY (INHALATION)
Status: DISCONTINUED | OUTPATIENT
Start: 2018-08-08 | End: 2018-08-09 | Stop reason: HOSPADM

## 2018-08-08 RX ORDER — FENTANYL CITRATE 50 UG/ML
50 INJECTION, SOLUTION INTRAMUSCULAR; INTRAVENOUS
Status: DISCONTINUED | OUTPATIENT
Start: 2018-08-08 | End: 2018-08-08 | Stop reason: HOSPADM

## 2018-08-08 RX ORDER — CLONIDINE HYDROCHLORIDE 0.1 MG/1
0.1 TABLET ORAL EVERY 6 HOURS PRN
Status: DISCONTINUED | OUTPATIENT
Start: 2018-08-08 | End: 2018-08-09 | Stop reason: HOSPADM

## 2018-08-08 RX ORDER — FENTANYL CITRATE 50 UG/ML
INJECTION, SOLUTION INTRAMUSCULAR; INTRAVENOUS AS NEEDED
Status: DISCONTINUED | OUTPATIENT
Start: 2018-08-08 | End: 2018-08-08 | Stop reason: SURG

## 2018-08-08 RX ORDER — LORAZEPAM 1 MG/1
1 TABLET ORAL EVERY 12 HOURS PRN
Status: DISCONTINUED | OUTPATIENT
Start: 2018-08-08 | End: 2018-08-09 | Stop reason: HOSPADM

## 2018-08-08 RX ORDER — METOCLOPRAMIDE HYDROCHLORIDE 5 MG/ML
10 INJECTION INTRAMUSCULAR; INTRAVENOUS ONCE
Status: COMPLETED | OUTPATIENT
Start: 2018-08-08 | End: 2018-08-08

## 2018-08-08 RX ORDER — HYDROMORPHONE HYDROCHLORIDE 1 MG/ML
0.5 INJECTION, SOLUTION INTRAMUSCULAR; INTRAVENOUS; SUBCUTANEOUS
Status: DISCONTINUED | OUTPATIENT
Start: 2018-08-08 | End: 2018-08-09 | Stop reason: HOSPADM

## 2018-08-08 RX ORDER — LEVOTHYROXINE SODIUM 0.15 MG/1
300 TABLET ORAL DAILY
Status: DISCONTINUED | OUTPATIENT
Start: 2018-08-08 | End: 2018-08-09 | Stop reason: HOSPADM

## 2018-08-08 RX ORDER — PROMETHAZINE HYDROCHLORIDE 25 MG/ML
12.5 INJECTION, SOLUTION INTRAMUSCULAR; INTRAVENOUS EVERY 4 HOURS PRN
Status: DISCONTINUED | OUTPATIENT
Start: 2018-08-08 | End: 2018-08-09 | Stop reason: HOSPADM

## 2018-08-08 RX ORDER — LABETALOL HYDROCHLORIDE 5 MG/ML
10 INJECTION, SOLUTION INTRAVENOUS
Status: DISCONTINUED | OUTPATIENT
Start: 2018-08-08 | End: 2018-08-09 | Stop reason: HOSPADM

## 2018-08-08 RX ORDER — ACETAMINOPHEN 160 MG/5ML
975 SOLUTION ORAL ONCE
Status: COMPLETED | OUTPATIENT
Start: 2018-08-08 | End: 2018-08-08

## 2018-08-08 RX ORDER — PROMETHAZINE HYDROCHLORIDE 25 MG/1
25 TABLET ORAL ONCE AS NEEDED
Status: DISCONTINUED | OUTPATIENT
Start: 2018-08-08 | End: 2018-08-08 | Stop reason: HOSPADM

## 2018-08-08 RX ORDER — ACETAMINOPHEN 160 MG/5ML
650 SOLUTION ORAL EVERY 4 HOURS PRN
Status: DISCONTINUED | OUTPATIENT
Start: 2018-08-08 | End: 2018-08-09 | Stop reason: HOSPADM

## 2018-08-08 RX ORDER — NALOXONE HCL 0.4 MG/ML
0.1 VIAL (ML) INJECTION
Status: DISCONTINUED | OUTPATIENT
Start: 2018-08-08 | End: 2018-08-09 | Stop reason: HOSPADM

## 2018-08-08 RX ORDER — ACETAMINOPHEN 325 MG/1
650 TABLET ORAL EVERY 4 HOURS PRN
Status: DISCONTINUED | OUTPATIENT
Start: 2018-08-08 | End: 2018-08-09 | Stop reason: HOSPADM

## 2018-08-08 RX ORDER — FLUMAZENIL 0.1 MG/ML
0.2 INJECTION INTRAVENOUS AS NEEDED
Status: DISCONTINUED | OUTPATIENT
Start: 2018-08-08 | End: 2018-08-08 | Stop reason: HOSPADM

## 2018-08-08 RX ORDER — METOCLOPRAMIDE HYDROCHLORIDE 5 MG/ML
10 INJECTION INTRAMUSCULAR; INTRAVENOUS EVERY 6 HOURS
Status: DISCONTINUED | OUTPATIENT
Start: 2018-08-08 | End: 2018-08-09 | Stop reason: HOSPADM

## 2018-08-08 RX ORDER — BUPIVACAINE HYDROCHLORIDE AND EPINEPHRINE 5; 5 MG/ML; UG/ML
INJECTION, SOLUTION PERINEURAL AS NEEDED
Status: DISCONTINUED | OUTPATIENT
Start: 2018-08-08 | End: 2018-08-08 | Stop reason: HOSPADM

## 2018-08-08 RX ORDER — NALOXONE HCL 0.4 MG/ML
0.2 VIAL (ML) INJECTION AS NEEDED
Status: DISCONTINUED | OUTPATIENT
Start: 2018-08-08 | End: 2018-08-08 | Stop reason: HOSPADM

## 2018-08-08 RX ORDER — LIDOCAINE HYDROCHLORIDE 20 MG/ML
INJECTION, SOLUTION INFILTRATION; PERINEURAL AS NEEDED
Status: DISCONTINUED | OUTPATIENT
Start: 2018-08-08 | End: 2018-08-08 | Stop reason: SURG

## 2018-08-08 RX ORDER — CEFAZOLIN SODIUM IN 0.9 % NACL 3 G/100 ML
3 INTRAVENOUS SOLUTION, PIGGYBACK (ML) INTRAVENOUS
Status: COMPLETED | OUTPATIENT
Start: 2018-08-08 | End: 2018-08-08

## 2018-08-08 RX ORDER — SCOLOPAMINE TRANSDERMAL SYSTEM 1 MG/1
1 PATCH, EXTENDED RELEASE TRANSDERMAL ONCE
Status: DISCONTINUED | OUTPATIENT
Start: 2018-08-08 | End: 2018-08-09

## 2018-08-08 RX ADMIN — FENTANYL CITRATE 50 MCG: 50 INJECTION INTRAMUSCULAR; INTRAVENOUS at 08:37

## 2018-08-08 RX ADMIN — FENTANYL CITRATE 50 MCG: 50 INJECTION, SOLUTION INTRAMUSCULAR; INTRAVENOUS at 08:11

## 2018-08-08 RX ADMIN — FAMOTIDINE 20 MG: 10 INJECTION, SOLUTION INTRAVENOUS at 16:55

## 2018-08-08 RX ADMIN — METOCLOPRAMIDE 10 MG: 5 INJECTION, SOLUTION INTRAMUSCULAR; INTRAVENOUS at 22:29

## 2018-08-08 RX ADMIN — NYSTATIN: 100000 POWDER TOPICAL at 13:48

## 2018-08-08 RX ADMIN — PROPOFOL 200 MG: 10 INJECTION, EMULSION INTRAVENOUS at 07:03

## 2018-08-08 RX ADMIN — ACETAMINOPHEN 975 MG: 160 SOLUTION ORAL at 06:27

## 2018-08-08 RX ADMIN — HYOSCYAMINE SULFATE 125 MCG: 0.12 TABLET ORAL at 22:29

## 2018-08-08 RX ADMIN — HYDROMORPHONE HYDROCHLORIDE 0.5 MG: 1 INJECTION, SOLUTION INTRAMUSCULAR; INTRAVENOUS; SUBCUTANEOUS at 16:55

## 2018-08-08 RX ADMIN — SUGAMMADEX 340 MG: 100 INJECTION, SOLUTION INTRAVENOUS at 07:54

## 2018-08-08 RX ADMIN — ONDANSETRON 4 MG: 2 INJECTION INTRAMUSCULAR; INTRAVENOUS at 12:19

## 2018-08-08 RX ADMIN — ROCURONIUM BROMIDE 40 MG: 10 INJECTION INTRAVENOUS at 07:03

## 2018-08-08 RX ADMIN — METOCLOPRAMIDE 10 MG: 5 INJECTION, SOLUTION INTRAMUSCULAR; INTRAVENOUS at 16:55

## 2018-08-08 RX ADMIN — SODIUM CHLORIDE, POTASSIUM CHLORIDE, SODIUM LACTATE AND CALCIUM CHLORIDE 500 ML: 600; 310; 30; 20 INJECTION, SOLUTION INTRAVENOUS at 06:26

## 2018-08-08 RX ADMIN — ONDANSETRON 4 MG: 2 INJECTION INTRAMUSCULAR; INTRAVENOUS at 07:51

## 2018-08-08 RX ADMIN — CHLORHEXIDINE GLUCONATE 15 ML: 1.2 RINSE ORAL at 06:30

## 2018-08-08 RX ADMIN — HYDROCODONE BITARTRATE AND ACETAMINOPHEN 15 ML: 7.5; 325 SOLUTION ORAL at 20:17

## 2018-08-08 RX ADMIN — FENTANYL CITRATE 50 MCG: 50 INJECTION, SOLUTION INTRAMUSCULAR; INTRAVENOUS at 08:00

## 2018-08-08 RX ADMIN — FENTANYL CITRATE 50 MCG: 50 INJECTION, SOLUTION INTRAMUSCULAR; INTRAVENOUS at 07:03

## 2018-08-08 RX ADMIN — SODIUM CHLORIDE, POTASSIUM CHLORIDE, SODIUM LACTATE AND CALCIUM CHLORIDE: 600; 310; 30; 20 INJECTION, SOLUTION INTRAVENOUS at 06:55

## 2018-08-08 RX ADMIN — FENTANYL CITRATE 50 MCG: 50 INJECTION, SOLUTION INTRAMUSCULAR; INTRAVENOUS at 07:56

## 2018-08-08 RX ADMIN — CEFAZOLIN SODIUM 3 G: 10 INJECTION, POWDER, FOR SOLUTION INTRAVENOUS at 06:43

## 2018-08-08 RX ADMIN — LIDOCAINE HYDROCHLORIDE 100 MG: 20 INJECTION, SOLUTION INFILTRATION; PERINEURAL at 07:03

## 2018-08-08 RX ADMIN — HYOSCYAMINE SULFATE 125 MCG: 0.12 TABLET ORAL at 19:09

## 2018-08-08 RX ADMIN — FAMOTIDINE 20 MG: 10 INJECTION, SOLUTION INTRAVENOUS at 20:01

## 2018-08-08 RX ADMIN — MIDAZOLAM 1 MG: 1 INJECTION INTRAMUSCULAR; INTRAVENOUS at 06:38

## 2018-08-08 RX ADMIN — SODIUM CHLORIDE, POTASSIUM CHLORIDE, SODIUM LACTATE AND CALCIUM CHLORIDE 150 ML/HR: 600; 310; 30; 20 INJECTION, SOLUTION INTRAVENOUS at 19:59

## 2018-08-08 RX ADMIN — ENOXAPARIN SODIUM 40 MG: 40 INJECTION SUBCUTANEOUS at 06:42

## 2018-08-08 RX ADMIN — SCOPALAMINE 1 PATCH: 1 PATCH, EXTENDED RELEASE TRANSDERMAL at 06:33

## 2018-08-08 RX ADMIN — ONDANSETRON 4 MG: 2 INJECTION INTRAMUSCULAR; INTRAVENOUS at 22:42

## 2018-08-08 RX ADMIN — ALBUTEROL SULFATE 2.5 MG: 2.5 SOLUTION RESPIRATORY (INHALATION) at 19:24

## 2018-08-08 RX ADMIN — ALBUTEROL SULFATE 2.5 MG: 2.5 SOLUTION RESPIRATORY (INHALATION) at 11:45

## 2018-08-08 RX ADMIN — FAMOTIDINE 20 MG: 10 INJECTION INTRAVENOUS at 06:38

## 2018-08-08 RX ADMIN — HYDROCODONE BITARTRATE AND ACETAMINOPHEN 15 ML: 7.5; 325 SOLUTION ORAL at 12:19

## 2018-08-08 RX ADMIN — NYSTATIN: 100000 POWDER TOPICAL at 20:01

## 2018-08-08 RX ADMIN — LEVOTHYROXINE SODIUM 300 MCG: 150 TABLET ORAL at 10:36

## 2018-08-08 RX ADMIN — FENTANYL CITRATE 50 MCG: 50 INJECTION, SOLUTION INTRAMUSCULAR; INTRAVENOUS at 08:05

## 2018-08-08 RX ADMIN — ONDANSETRON 4 MG: 2 INJECTION INTRAMUSCULAR; INTRAVENOUS at 16:55

## 2018-08-08 RX ADMIN — FENTANYL CITRATE 50 MCG: 50 INJECTION INTRAMUSCULAR; INTRAVENOUS at 09:01

## 2018-08-08 RX ADMIN — SODIUM CHLORIDE, POTASSIUM CHLORIDE, SODIUM LACTATE AND CALCIUM CHLORIDE: 600; 310; 30; 20 INJECTION, SOLUTION INTRAVENOUS at 07:51

## 2018-08-08 RX ADMIN — SCOPALAMINE 1 PATCH: 1 PATCH, EXTENDED RELEASE TRANSDERMAL at 06:20

## 2018-08-08 RX ADMIN — METOCLOPRAMIDE 10 MG: 5 INJECTION, SOLUTION INTRAMUSCULAR; INTRAVENOUS at 06:44

## 2018-08-08 RX ADMIN — METOCLOPRAMIDE 10 MG: 5 INJECTION, SOLUTION INTRAMUSCULAR; INTRAVENOUS at 10:36

## 2018-08-08 RX ADMIN — HYDROMORPHONE HYDROCHLORIDE 0.5 MG: 1 INJECTION, SOLUTION INTRAMUSCULAR; INTRAVENOUS; SUBCUTANEOUS at 08:39

## 2018-08-08 RX ADMIN — HYOSCYAMINE SULFATE 125 MCG: 0.12 TABLET ORAL at 13:47

## 2018-08-08 NOTE — ANESTHESIA PROCEDURE NOTES
Airway  Urgency: elective    Date/Time: 8/8/2018 7:05 AM  Airway not difficult    General Information and Staff    Patient location during procedure: OR  CRNA: ELHAM BERNSTEIN    Indications and Patient Condition  Indications for airway management: airway protection    Preoxygenated: yes  Mask difficulty assessment: 1 - vent by mask    Final Airway Details  Final airway type: endotracheal airway      Successful airway: ETT  Cuffed: yes   Successful intubation technique: direct laryngoscopy  Facilitating devices/methods: intubating stylet  Endotracheal tube insertion site: oral  Blade: Andrea  Blade size: #3  ETT size: 7.0 mm  Cormack-Lehane Classification: grade IIa - partial view of glottis  Placement verified by: chest auscultation and capnometry   Measured from: lips  ETT to lips (cm): 21  Number of attempts at approach: 1    Additional Comments  Pre 02, sivi,, easy bvm, dlx1, grade 2a view, intubation with Eschmann  x 1 attempt, +etc02, +bebs, appears atraumatic, teeth unchanged

## 2018-08-08 NOTE — ANESTHESIA POSTPROCEDURE EVALUATION
Patient: Kristin Jorge    Procedure Summary     Date:  08/08/18 Room / Location:   [facility]  /  [facility]    Anesthesia Start:  0655 Anesthesia Stop:  0811    Procedure:  GASTRIC SLEEVE LAPAROSCOPIC WITH LYSIS OF ADHESIONS AND HIATAL HERNIA REPAIR (N/A Abdomen) Diagnosis:       BMI 70 and over, adult (CMS/HCC)      (BMI 70 and over, adult [Z68.45])    Surgeon:  Daniel Neil Jr., MD Provider:  Lianna Smyth MD    Anesthesia Type:  general ASA Status:  3          Anesthesia Type: general  Last vitals  BP   93/45 (08/08/18 0845)   Temp   36.2 °C (97.2 °F) (08/08/18 0809)   Pulse   80 (08/08/18 0845)   Resp   20 (08/08/18 0845)     SpO2   95 % (08/08/18 0845)     Post Anesthesia Care and Evaluation    Patient location during evaluation: bedside  Patient participation: complete - patient participated  Level of consciousness: awake and alert  Pain management: adequate  Airway patency: patent  Anesthetic complications: No anesthetic complications    Cardiovascular status: acceptable  Respiratory status: acceptable  Hydration status: acceptable    Comments: BP 93/45 (BP Location: Right arm, Patient Position: Lying)   Pulse 80   Temp 36.2 °C (97.2 °F) (Temporal Artery )   Resp 20   LMP 07/02/2018   SpO2 95%

## 2018-08-08 NOTE — ANESTHESIA PREPROCEDURE EVALUATION
Anesthesia Evaluation     Patient summary reviewed and Nursing notes reviewed   history of anesthetic complications: PONV               Airway   Mallampati: I  TM distance: >3 FB  Neck ROM: full  Large neck circumference and Possible difficult intubation  Dental          Pulmonary - normal exam   (+) COPD, asthma, sleep apnea,   Cardiovascular - normal exam    (+) hypertension, hyperlipidemia,       Neuro/Psych  (+) headaches, psychiatric history (PTSD) Anxiety and Depression,     GI/Hepatic/Renal/Endo    (+) morbid obesity (super morbid obesity), hiatal hernia, GERD,  renal disease (one kidney), hypothyroidism,     Musculoskeletal     Abdominal   (+) obese,    Substance History      OB/GYN          Other   (+) arthritis     ROS/Med Hx Other: H/o paradoxical vocal cord motion                  Anesthesia Plan    ASA 3     general     intravenous induction   Anesthetic plan and risks discussed with patient.

## 2018-08-09 VITALS
TEMPERATURE: 98.5 F | HEART RATE: 99 BPM | DIASTOLIC BLOOD PRESSURE: 74 MMHG | SYSTOLIC BLOOD PRESSURE: 146 MMHG | HEIGHT: 60 IN | RESPIRATION RATE: 16 BRPM | OXYGEN SATURATION: 95 %

## 2018-08-09 LAB
ALBUMIN SERPL-MCNC: 3.3 G/DL (ref 3.5–5.2)
ALBUMIN/GLOB SERPL: 0.8 G/DL
ALP SERPL-CCNC: 80 U/L (ref 39–117)
ALT SERPL W P-5'-P-CCNC: 211 U/L (ref 1–33)
ANION GAP SERPL CALCULATED.3IONS-SCNC: 16.3 MMOL/L
AST SERPL-CCNC: 91 U/L (ref 1–32)
BASOPHILS # BLD AUTO: 0.02 10*3/MM3 (ref 0–0.2)
BASOPHILS NFR BLD AUTO: 0.2 % (ref 0–1.5)
BILIRUB SERPL-MCNC: 0.4 MG/DL (ref 0.1–1.2)
BUN BLD-MCNC: 11 MG/DL (ref 6–20)
BUN/CREAT SERPL: 13.1 (ref 7–25)
CALCIUM SPEC-SCNC: 8.5 MG/DL (ref 8.6–10.5)
CHLORIDE SERPL-SCNC: 102 MMOL/L (ref 98–107)
CO2 SERPL-SCNC: 16.7 MMOL/L (ref 22–29)
CREAT BLD-MCNC: 0.84 MG/DL (ref 0.57–1)
CYTO UR: NORMAL
DEPRECATED RDW RBC AUTO: 53 FL (ref 37–54)
EOSINOPHIL # BLD AUTO: 0 10*3/MM3 (ref 0–0.7)
EOSINOPHIL NFR BLD AUTO: 0 % (ref 0.3–6.2)
ERYTHROCYTE [DISTWIDTH] IN BLOOD BY AUTOMATED COUNT: 15.9 % (ref 11.7–13)
GFR SERPL CREATININE-BSD FRML MDRD: 75 ML/MIN/1.73
GFR SERPL CREATININE-BSD FRML MDRD: 91 ML/MIN/1.73
GLOBULIN UR ELPH-MCNC: 4 GM/DL
GLUCOSE BLD-MCNC: 120 MG/DL (ref 65–99)
GLUCOSE BLDC GLUCOMTR-MCNC: 101 MG/DL (ref 70–130)
GLUCOSE BLDC GLUCOMTR-MCNC: 110 MG/DL (ref 70–130)
HCT VFR BLD AUTO: 39.6 % (ref 35.6–45.5)
HGB BLD-MCNC: 12.4 G/DL (ref 11.9–15.5)
IMM GRANULOCYTES # BLD: 0.05 10*3/MM3 (ref 0–0.03)
IMM GRANULOCYTES NFR BLD: 0.5 % (ref 0–0.5)
LAB AP CASE REPORT: NORMAL
LYMPHOCYTES # BLD AUTO: 0.98 10*3/MM3 (ref 0.9–4.8)
LYMPHOCYTES NFR BLD AUTO: 9.3 % (ref 19.6–45.3)
MAGNESIUM SERPL-MCNC: 2.4 MG/DL (ref 1.6–2.6)
MCH RBC QN AUTO: 28.6 PG (ref 26.9–32)
MCHC RBC AUTO-ENTMCNC: 31.3 G/DL (ref 32.4–36.3)
MCV RBC AUTO: 91.5 FL (ref 80.5–98.2)
MONOCYTES # BLD AUTO: 0.79 10*3/MM3 (ref 0.2–1.2)
MONOCYTES NFR BLD AUTO: 7.5 % (ref 5–12)
NEUTROPHILS # BLD AUTO: 8.74 10*3/MM3 (ref 1.9–8.1)
NEUTROPHILS NFR BLD AUTO: 83 % (ref 42.7–76)
PATH REPORT.FINAL DX SPEC: NORMAL
PATH REPORT.GROSS SPEC: NORMAL
PHOSPHATE SERPL-MCNC: 1.9 MG/DL (ref 2.5–4.5)
PLATELET # BLD AUTO: 201 10*3/MM3 (ref 140–500)
PMV BLD AUTO: 10.5 FL (ref 6–12)
POTASSIUM BLD-SCNC: 4.6 MMOL/L (ref 3.5–5.2)
PROT SERPL-MCNC: 7.3 G/DL (ref 6–8.5)
RBC # BLD AUTO: 4.33 10*6/MM3 (ref 3.9–5.2)
SODIUM BLD-SCNC: 135 MMOL/L (ref 136–145)
WBC NRBC COR # BLD: 10.53 10*3/MM3 (ref 4.5–10.7)

## 2018-08-09 PROCEDURE — 80053 COMPREHEN METABOLIC PANEL: CPT | Performed by: SURGERY

## 2018-08-09 PROCEDURE — 85025 COMPLETE CBC W/AUTO DIFF WBC: CPT | Performed by: SURGERY

## 2018-08-09 PROCEDURE — 25010000002 CYANOCOBALAMIN PER 1000 MCG: Performed by: SURGERY

## 2018-08-09 PROCEDURE — 82962 GLUCOSE BLOOD TEST: CPT

## 2018-08-09 PROCEDURE — 94799 UNLISTED PULMONARY SVC/PX: CPT

## 2018-08-09 PROCEDURE — 84100 ASSAY OF PHOSPHORUS: CPT | Performed by: SURGERY

## 2018-08-09 PROCEDURE — 83735 ASSAY OF MAGNESIUM: CPT | Performed by: SURGERY

## 2018-08-09 PROCEDURE — 25010000002 METOCLOPRAMIDE PER 10 MG: Performed by: SURGERY

## 2018-08-09 PROCEDURE — 25010000002 ENOXAPARIN PER 10 MG: Performed by: SURGERY

## 2018-08-09 PROCEDURE — 25010000002 THIAMINE PER 100 MG: Performed by: SURGERY

## 2018-08-09 PROCEDURE — 25010000002 ONDANSETRON PER 1 MG: Performed by: SURGERY

## 2018-08-09 RX ADMIN — LISINOPRIL 20 MG: 20 TABLET ORAL at 08:31

## 2018-08-09 RX ADMIN — ALBUTEROL SULFATE 2.5 MG: 2.5 SOLUTION RESPIRATORY (INHALATION) at 07:37

## 2018-08-09 RX ADMIN — AMLODIPINE BESYLATE 5 MG: 5 TABLET ORAL at 08:31

## 2018-08-09 RX ADMIN — FUROSEMIDE 40 MG: 40 TABLET ORAL at 08:32

## 2018-08-09 RX ADMIN — NYSTATIN: 100000 POWDER TOPICAL at 08:31

## 2018-08-09 RX ADMIN — HYDROCODONE BITARTRATE AND ACETAMINOPHEN 15 ML: 7.5; 325 SOLUTION ORAL at 00:38

## 2018-08-09 RX ADMIN — ONDANSETRON 4 MG: 2 INJECTION INTRAMUSCULAR; INTRAVENOUS at 07:21

## 2018-08-09 RX ADMIN — HYOSCYAMINE SULFATE 125 MCG: 0.12 TABLET ORAL at 08:31

## 2018-08-09 RX ADMIN — ALBUTEROL SULFATE 2.5 MG: 2.5 SOLUTION RESPIRATORY (INHALATION) at 12:04

## 2018-08-09 RX ADMIN — LEVOTHYROXINE SODIUM 300 MCG: 150 TABLET ORAL at 08:31

## 2018-08-09 RX ADMIN — HYDROCHLOROTHIAZIDE 25 MG: 25 TABLET ORAL at 08:31

## 2018-08-09 RX ADMIN — FOLIC ACID 250 ML/HR: 5 INJECTION, SOLUTION INTRAMUSCULAR; INTRAVENOUS; SUBCUTANEOUS at 00:38

## 2018-08-09 RX ADMIN — SODIUM CHLORIDE, POTASSIUM CHLORIDE, SODIUM LACTATE AND CALCIUM CHLORIDE 150 ML/HR: 600; 310; 30; 20 INJECTION, SOLUTION INTRAVENOUS at 08:32

## 2018-08-09 RX ADMIN — ENOXAPARIN SODIUM 40 MG: 40 INJECTION SUBCUTANEOUS at 08:31

## 2018-08-09 RX ADMIN — ONDANSETRON 4 MG: 2 INJECTION INTRAMUSCULAR; INTRAVENOUS at 03:20

## 2018-08-09 RX ADMIN — METOCLOPRAMIDE 10 MG: 5 INJECTION, SOLUTION INTRAMUSCULAR; INTRAVENOUS at 05:22

## 2018-08-09 RX ADMIN — FAMOTIDINE 20 MG: 10 INJECTION, SOLUTION INTRAVENOUS at 08:32

## 2018-08-09 RX ADMIN — HYOSCYAMINE SULFATE 125 MCG: 0.12 TABLET ORAL at 11:25

## 2018-08-09 RX ADMIN — HYDROCODONE BITARTRATE AND ACETAMINOPHEN 15 ML: 7.5; 325 SOLUTION ORAL at 11:25

## 2018-08-09 RX ADMIN — CYANOCOBALAMIN 1000 MCG: 1000 INJECTION, SOLUTION INTRAMUSCULAR; SUBCUTANEOUS at 08:31

## 2018-08-09 RX ADMIN — HYDROCODONE BITARTRATE AND ACETAMINOPHEN 15 ML: 7.5; 325 SOLUTION ORAL at 07:21

## 2018-08-09 RX ADMIN — METOCLOPRAMIDE 10 MG: 5 INJECTION, SOLUTION INTRAMUSCULAR; INTRAVENOUS at 11:54

## 2018-08-10 ENCOUNTER — TELEPHONE (OUTPATIENT)
Dept: BARIATRICS/WEIGHT MGMT | Facility: CLINIC | Age: 43
End: 2018-08-10

## 2018-08-10 RX ORDER — ONDANSETRON 4 MG/1
4 TABLET, FILM COATED ORAL EVERY 8 HOURS PRN
Qty: 25 TABLET | Refills: 0 | Status: SHIPPED | OUTPATIENT
Start: 2018-08-10 | End: 2018-09-13

## 2018-08-10 RX ORDER — METOCLOPRAMIDE 10 MG/1
10 TABLET ORAL
Qty: 120 TABLET | Refills: 0 | Status: SHIPPED | OUTPATIENT
Start: 2018-08-10 | End: 2018-09-13

## 2018-08-10 NOTE — TELEPHONE ENCOUNTER
THANH for patient to let her know [facility] Told her to call us back if she needed anything else.         Patient called in today asking if we could sent anti nausea medication to her pharmacy.     She had GS surgery 8/8/18.    Please advise.

## 2018-08-16 ENCOUNTER — OFFICE VISIT (OUTPATIENT)
Dept: BARIATRICS/WEIGHT MGMT | Facility: CLINIC | Age: 43
End: 2018-08-16

## 2018-08-16 VITALS
HEIGHT: 60 IN | TEMPERATURE: 98.2 F | WEIGHT: 293 LBS | RESPIRATION RATE: 16 BRPM | BODY MASS INDEX: 57.52 KG/M2 | SYSTOLIC BLOOD PRESSURE: 138 MMHG | DIASTOLIC BLOOD PRESSURE: 86 MMHG

## 2018-08-16 DIAGNOSIS — R11.0 NAUSEA: ICD-10-CM

## 2018-08-16 DIAGNOSIS — I10 ESSENTIAL HYPERTENSION: ICD-10-CM

## 2018-08-16 DIAGNOSIS — E66.01 MORBID OBESITY WITH BMI OF 60.0-69.9, ADULT (HCC): Primary | ICD-10-CM

## 2018-08-16 DIAGNOSIS — E03.8 HYPOTHYROIDISM DUE TO HASHIMOTO'S THYROIDITIS: ICD-10-CM

## 2018-08-16 DIAGNOSIS — R73.03 PREDIABETES: ICD-10-CM

## 2018-08-16 DIAGNOSIS — G47.33 OSA (OBSTRUCTIVE SLEEP APNEA): ICD-10-CM

## 2018-08-16 DIAGNOSIS — K21.9 GASTROESOPHAGEAL REFLUX DISEASE WITHOUT ESOPHAGITIS: ICD-10-CM

## 2018-08-16 DIAGNOSIS — Z71.3 DIETARY COUNSELING: ICD-10-CM

## 2018-08-16 DIAGNOSIS — E06.3 HYPOTHYROIDISM DUE TO HASHIMOTO'S THYROIDITIS: ICD-10-CM

## 2018-08-16 PROBLEM — K44.9 HIATAL HERNIA: Status: RESOLVED | Noted: 2017-10-05 | Resolved: 2018-08-16

## 2018-08-16 PROCEDURE — 99024 POSTOP FOLLOW-UP VISIT: CPT | Performed by: NURSE PRACTITIONER

## 2018-08-16 NOTE — PROGRESS NOTES
[facility] BARIATRIC SURGERY  [address]  [facility] [id number] [phone]  [address]  [facility] [id number] [phone]  Dept: [phone]  8/16/2018      Kristin Jorge.  [id number]  [id number]  1975  female      Chief Complaint   Patient presents with   • Post-op     1 week post op sleeve       BH Post-Op Bariatric Surgery:   Kristin Jorge is status post laparopscopic Laparoscopic Sleeve/HH procedure, performed on 8/8/18.     HPI:   Today's weight is (!) 159 kg (351 lb 8 oz) pounds, today's BMI is Body mass index is 68.65 kg/m²., she has a  loss of 24 pounds since the last visit and her weight loss since surgery is 24 pounds. The patient reports a decreased portion size and loss of appetite.  Kristin Jorge denies nausea (improved w/ meds), vomiting, dysphagia, or heartburn. The patient c/o post-op pain that is improving. she is doing well with protein and water intake so far. Taking their vitamins, walking and using IS. Denies fevers, chills, chest pain or shortness of air.      Diet and Exercise: Diet history reviewed and discussed with the patient. Weight loss/gains to date discussed with the patient. No carbonated beverage consumption and exercising regularly- walking frequently.   Supplements: multivitamins, B-12, calcium, iron, B-1 and Vitamin D.     Review of Systems   Constitutional: Positive for fatigue.   Gastrointestinal: Positive for nausea (improved).   All other systems reviewed and are negative.      Patient Active Problem List   Diagnosis   • Chronic fatigue   • Ankle swelling   • Essential hypertension   • Dyslipidemia   • COPD (chronic obstructive pulmonary disease) (CMS/HCC)   • CHONG (obstructive sleep apnea)   • GERD (gastroesophageal reflux disease)   • PCOS (polycystic ovarian syndrome)   • Multiple joint pain   • Osteoarthritis   • Poor balance   • Depression with anxiety   • Prediabetes   • Abnormal facial  hair   • Hypothyroid   • Paradoxical vocal cord motion   • Kidney, aplastic   • IgA deficiency (CMS/HCC)   • Cellulitis   • Vitamin D deficiency   • Morbid obesity with BMI of 60.0-69.9, adult (CMS/HCC)   • Dietary counseling   • Nausea       The following portions of the patient's history were reviewed and updated as appropriate: allergies, current medications, past family history, past medical history, past social history, past surgical history and problem list.    Vitals:    08/16/18 1022   BP: 138/86   Resp: 16   Temp: 98.2 °F (36.8 °C)       Physical Exam   Constitutional: She is oriented to person, place, and time. She appears well-developed and well-nourished.   HENT:   Head: Normocephalic and atraumatic.   Eyes: EOM are normal.   Cardiovascular: Normal rate, regular rhythm and normal heart sounds.    Pulmonary/Chest: Effort normal and breath sounds normal.   Abdominal: Soft. Bowel sounds are normal. She exhibits no distension. There is tenderness (post op).   Musculoskeletal: Normal range of motion.   Neurological: She is alert and oriented to person, place, and time.   Skin: Skin is warm and dry.   Incisions healing well   Psychiatric: She has a normal mood and affect. Her behavior is normal. Judgment and thought content normal.   Vitals reviewed.      Assessment:   Post-op, the patient is doing well.     Encounter Diagnoses   Name Primary?   • Morbid obesity with BMI of 60.0-69.9, adult (CMS/HCC) Yes   • Dietary counseling    • Nausea    • Essential hypertension    • CHONG (obstructive sleep apnea)    • Gastroesophageal reflux disease without esophagitis    • Hypothyroidism due to Hashimoto's thyroiditis    • Prediabetes        Plan:   Reviewed with patient the importance of following the manual for diet progression. Increase activity as tolerated. Continue increasing daily intake of protein and water. Discussed other protein options- continue with zofran PRN and reglan. Discussed spreading her medications  out throughout the day since she hasn't been taking everything very well- she is doing lovenox.  Return to work: the patient is to return to 3 weeks from their surgery date with no restrictions unless they develop medical problems in which we will see them back in the office. They received a note in our office today with their return to work date.  Activity restrictions: no lifting, pushing or pulling over 25lbs for 3 weeks.   Recommended patient be sure to get at least 70 grams of protein per day. Discussed with the patient the recommended amount of water per day to intake. Reviewed vitamin requirements. Be sure to do routine exercise and increase activity as tolerated. No asa, nsaids or steroids for 8 weeks. Patient may use miralax as needed if necessary.     Instructions / Recommendations: dietary counseling recommended, recommended a daily protein intake of  grams, vitamin supplement(s) recommended, recommended exercising at least 150 minutes per week, behavior modifications recommended and instructed to call the office for concerns, questions, or problems.     The patient was instructed to follow up at one month follow up appt.     The patient was counseled regarding post op bariatric manual

## 2018-08-24 ENCOUNTER — TELEPHONE (OUTPATIENT)
Dept: BARIATRICS/WEIGHT MGMT | Facility: CLINIC | Age: 43
End: 2018-08-24

## 2018-08-24 NOTE — TELEPHONE ENCOUNTER
Patient called regarding concern about her large incision scab coming off and having fluids come from the wound.       Hyacinth advised her that she can place a bandage over it if she would like to and to wash it carefully. Patient was advised to call us back if she has a fever or the fluids have a foul odor.

## 2018-08-30 ENCOUNTER — OFFICE VISIT CONVERTED (OUTPATIENT)
Dept: GASTROENTEROLOGY | Facility: CLINIC | Age: 43
End: 2018-08-30
Attending: NURSE PRACTITIONER

## 2018-09-13 ENCOUNTER — OFFICE VISIT (OUTPATIENT)
Dept: BARIATRICS/WEIGHT MGMT | Facility: CLINIC | Age: 43
End: 2018-09-13

## 2018-09-13 VITALS
RESPIRATION RATE: 16 BRPM | HEIGHT: 60 IN | HEART RATE: 67 BPM | WEIGHT: 293 LBS | TEMPERATURE: 98.4 F | BODY MASS INDEX: 57.52 KG/M2 | SYSTOLIC BLOOD PRESSURE: 130 MMHG | DIASTOLIC BLOOD PRESSURE: 82 MMHG

## 2018-09-13 DIAGNOSIS — M25.50 MULTIPLE JOINT PAIN: ICD-10-CM

## 2018-09-13 DIAGNOSIS — G47.33 OSA (OBSTRUCTIVE SLEEP APNEA): ICD-10-CM

## 2018-09-13 DIAGNOSIS — E06.3 HYPOTHYROIDISM DUE TO HASHIMOTO'S THYROIDITIS: ICD-10-CM

## 2018-09-13 DIAGNOSIS — K21.9 GASTROESOPHAGEAL REFLUX DISEASE WITHOUT ESOPHAGITIS: ICD-10-CM

## 2018-09-13 DIAGNOSIS — E03.8 HYPOTHYROIDISM DUE TO HASHIMOTO'S THYROIDITIS: ICD-10-CM

## 2018-09-13 DIAGNOSIS — E28.2 PCOS (POLYCYSTIC OVARIAN SYNDROME): ICD-10-CM

## 2018-09-13 DIAGNOSIS — R73.03 PREDIABETES: ICD-10-CM

## 2018-09-13 DIAGNOSIS — E66.01 MORBID OBESITY WITH BMI OF 60.0-69.9, ADULT (HCC): Primary | ICD-10-CM

## 2018-09-13 DIAGNOSIS — Z71.3 DIETARY COUNSELING: ICD-10-CM

## 2018-09-13 DIAGNOSIS — I10 ESSENTIAL HYPERTENSION: ICD-10-CM

## 2018-09-13 DIAGNOSIS — E78.5 DYSLIPIDEMIA: ICD-10-CM

## 2018-09-13 DIAGNOSIS — E55.9 VITAMIN D DEFICIENCY: ICD-10-CM

## 2018-09-13 PROBLEM — R11.0 NAUSEA: Status: RESOLVED | Noted: 2018-08-16 | Resolved: 2018-09-13

## 2018-09-13 PROCEDURE — 99024 POSTOP FOLLOW-UP VISIT: CPT | Performed by: NURSE PRACTITIONER

## 2018-09-13 NOTE — PROGRESS NOTES
[facility] BARIATRIC SURGERY  [address]  [facility] [address] [phone]  [address]  [facility] [address] [phone]  Dept: [phone]  9/13/2018      Kristin Jorge.  [id number]  [id number]  1975  female      Chief Complaint   Patient presents with   • Post-op     1 month sleeve follow up       BH Post-Op Bariatric Surgery:   Kristin Jorge is status post Laparoscopic Sleeve/HH procedure, performed on 8/8/18     HPI:   Today's weight is (!) 155 kg (341 lb) pounds, today's BMI is Body mass index is 66.6 kg/m²., she has a  loss of 10.5 pounds since the last visit and her weight loss since surgery is 34 pounds. The patient reports a decreased portion size and loss of appetite.      Kristin Jorge denies nausea, vomiting, dysphagia, abdominal pain or heartburn.     Diet and Exercise: Diet history reviewed and discussed with the patient. Weight loss/gains to date discussed with the patient. The patient states they are eating 60+ grams of protein per day. She reports eating 3 meals per day, a typical portion size of 1/2 cup, eating 2 snacks per day, drinking 5+ or more 8-oz. glasses of water per day, no carbonated beverage consumption and exercising regularly- walking daily.     Supplements: Bariat[employer] MVI with iron (says sometimes they come back up because she is taking so many meds).     Review of Systems   All other systems reviewed and are negative.      Patient Active Problem List   Diagnosis   • Chronic fatigue   • Ankle swelling   • Essential hypertension   • Dyslipidemia   • COPD (chronic obstructive pulmonary disease) (CMS/HCC)   • CHONG (obstructive sleep apnea)   • GERD (gastroesophageal reflux disease)   • PCOS (polycystic ovarian syndrome)   • Multiple joint pain   • Osteoarthritis   • Poor balance   • Depression with anxiety   • Prediabetes   • Abnormal facial hair   • Hypothyroid   • Paradoxical vocal cord  motion   • Kidney, aplastic   • IgA deficiency (CMS/[facility])   • Cellulitis   • Vitamin D deficiency   • Morbid obesity with BMI of 60.0-69.9, adult (CMS/[facility])   • Dietary counseling       Past Medical History:   Diagnosis Date   • Abnormal exam of both ears     STATES EARS DON'T DRAIN ,  TOO MUCH EAR WAX   • ADHD    • Anxiety and depression    • Asthma    • Blind     blind in right eye   • Carpal tunnel syndrome     LT   • CFS (chronic fatigue syndrome)    • Chronic bronchitis (CMS/[facility])    • COPD (chronic obstructive pulmonary disease) (CMS/[facility])    • Edema     LEGS AND FEET   • GERD (gastroesophageal reflux disease)    • Hashimoto's thyroiditis    • Head injury    • Heart burn    • Hiatal hernia    • History of Clostridium difficile colitis 2017   • History of concussion 10/19/2014     WITH HEAD INJURY   • History of MRSA infection 2017    IN [facility]    • HTN (hypertension)    • Hyperlipidemia    • Hypertriglyceridemia    • Hypoglycemia    • IBS (irritable bowel syndrome)    • IgA deficiency (CMS/[facility])    • Incontinence of urine    • Insomnia    • Insulin resistance    • Joint pain    • Kidney disease     ONLY HAS ONE KIDNEY RIGHT   • Laryngopharyngeal reflux (LPR)    • Lymphedema     LEGS AND FEET   • Migraine    • OA (osteoarthritis)     WEAK ANKLES   • OCD (obsessive compulsive disorder)    • CHONG (obstructive sleep apnea)     has bi-pap   • Paradoxical vocal cord motion disorder    • PCOS (polycystic ovarian syndrome)    • Photophobia of both eyes    • Polydipsia    • Polyuria    • PONV (postoperative nausea and vomiting)    • Prediabetes    • PTSD (post-traumatic stress disorder)    • Restless legs syndrome (RLS)    • Seasonal allergies    • Umbilical hernia    • Vitiligo     HANDS ,  LEGS, GROIN AREA   • Water retention        The following portions of the patient's history were reviewed and updated as appropriate: allergies, current medications, past family history, past medical history, past social  history, past surgical history and problem list.    Vitals:    09/13/18 1329   BP: 130/82   Pulse: 67   Resp: 16   Temp: 98.4 °F (36.9 °C)       Physical Exam   Constitutional: She is oriented to person, place, and time. She appears well-developed and well-nourished.   HENT:   Head: Normocephalic and atraumatic.   Eyes: EOM are normal.   Cardiovascular: Normal rate, regular rhythm and normal heart sounds.    Pulmonary/Chest: Effort normal and breath sounds normal.   Abdominal: Soft. Bowel sounds are normal. She exhibits no distension. There is no tenderness.   Musculoskeletal: Normal range of motion.   Neurological: She is alert and oriented to person, place, and time.   Skin: Skin is warm and dry.   Psychiatric: She has a normal mood and affect. Her behavior is normal. Judgment and thought content normal.   Vitals reviewed.      Assessment:   Post-op, the patient is doing well.     Encounter Diagnoses   Name Primary?   • Morbid obesity with BMI of 60.0-69.9, adult (CMS/[facility]) Yes   • Dietary counseling    • Essential hypertension    • CHONG (obstructive sleep apnea)    • Gastroesophageal reflux disease without esophagitis    • Hypothyroidism due to Hashimoto's thyroiditis    • PCOS (polycystic ovarian syndrome)    • Multiple joint pain    • Prediabetes    • Dyslipidemia    • Vitamin D deficiency         Plan:     Encouraged patient to be sure to get plenty of lean protein per day through small frequent meals all with a protein source. Continue with diet advancement per the manual. Increase activity as tolerated. Reviewed goal weight and time frame- 12 mo goal 250#  Activity restrictions: none.   Recommended patient be sure to get at least 70 grams of protein per day by eating small, frequent meals all with high lean protein choices. Be sure to limit/cut back on daily carbohydrate intake. Discussed with the patient the recommended amount of water per day to intake- half of body weight in ounces. Reviewed vitamin  requirements. Be sure to do routine exercise, 150 minutes per week minimum, including both cardio and strength training.     Instructions / Recommendations: dietary counseling recommended, recommended a daily protein intake of  grams, vitamin supplement(s) recommended, recommended exercising at least 150 minutes per week, behavior modifications recommended and instructed to call the office for concerns, questions, or problems.     The patient was instructed to follow up in 2 months.     The patient was counseled regarding.

## 2018-12-11 ENCOUNTER — OFFICE VISIT (OUTPATIENT)
Dept: BARIATRICS/WEIGHT MGMT | Facility: CLINIC | Age: 43
End: 2018-12-11

## 2018-12-11 VITALS
DIASTOLIC BLOOD PRESSURE: 95 MMHG | BODY MASS INDEX: 57.52 KG/M2 | TEMPERATURE: 97.8 F | HEART RATE: 65 BPM | WEIGHT: 293 LBS | RESPIRATION RATE: 18 BRPM | HEIGHT: 60 IN | SYSTOLIC BLOOD PRESSURE: 152 MMHG

## 2018-12-11 DIAGNOSIS — R73.03 PREDIABETES: ICD-10-CM

## 2018-12-11 DIAGNOSIS — R53.82 CHRONIC FATIGUE: ICD-10-CM

## 2018-12-11 DIAGNOSIS — Z71.3 DIETARY COUNSELING: ICD-10-CM

## 2018-12-11 DIAGNOSIS — E66.01 MORBID OBESITY WITH BMI OF 60.0-69.9, ADULT (HCC): Primary | ICD-10-CM

## 2018-12-11 DIAGNOSIS — K21.9 GASTROESOPHAGEAL REFLUX DISEASE WITHOUT ESOPHAGITIS: ICD-10-CM

## 2018-12-11 PROCEDURE — 99213 OFFICE O/P EST LOW 20 MIN: CPT | Performed by: NURSE PRACTITIONER

## 2018-12-11 NOTE — PROGRESS NOTES
[facility] BARIATRIC SURGERY  [address]  [facility] [id number] [phone]  [address]  [facility] [address] [phone]  Dept: [phone]  12/11/2018      Kristin Jorge.  [id number]  [id number]  1975  female      Chief Complaint   Patient presents with   • Follow-up     3 month sleeve follow up        Post-Op Bariatric Surgery:   Kristin Jorge is status post Laparoscopic Sleeve/HH procedure, performed on 8/8/18     HPI:   Today's weight is (!) 147 kg (323 lb) pounds, today's BMI is Body mass index is 63.08 kg/m²., she has a  loss of 18 pounds since the last visit and her weight loss since surgery is 79 pounds. The patient reports a decreased portion size and loss of appetite.      Kristin Jorge denies nausea, vomiting, dysphagia, abdominal pain or heartburn.     Diet and Exercise: Diet history reviewed and discussed with the patient. Weight loss/gains to date discussed with the patient. The patient states they are eating 70 grams of protein per day. She reports eating 3 meals per day, a typical portion size of 1/2 cup, eating 2 snacks per day, drinking 5-6 or more 8-oz. glasses of water per day, no carbonated beverage consumption and exercising regularly- walking and cycling daily    Supplements: BA MTV with iron and vitamin D weekly.     Review of Systems   Constitutional: Positive for appetite change and fatigue. Negative for unexpected weight change.        Hair loss   HENT: Negative.    Eyes: Negative.    Respiratory: Negative.    Cardiovascular: Negative.  Negative for leg swelling.   Gastrointestinal: Negative for abdominal distention, abdominal pain, constipation, diarrhea, nausea and vomiting.        Regurgitation, reflux   Genitourinary: Negative for difficulty urinating, frequency and urgency.   Musculoskeletal: Negative for back pain.   Skin: Negative.    Psychiatric/Behavioral: Negative.    All other systems  reviewed and are negative.      Patient Active Problem List   Diagnosis   • Chronic fatigue   • Ankle swelling   • Essential hypertension   • Dyslipidemia   • COPD (chronic obstructive pulmonary disease) (CMS/HCC)   • CHONG (obstructive sleep apnea)   • GERD (gastroesophageal reflux disease)   • PCOS (polycystic ovarian syndrome)   • Multiple joint pain   • Osteoarthritis   • Poor balance   • Depression with anxiety   • Prediabetes   • Abnormal facial hair   • Hypothyroid   • Paradoxical vocal cord motion   • Kidney, aplastic   • IgA deficiency (CMS/HCC)   • Cellulitis   • Vitamin D deficiency   • Morbid obesity with BMI of 60.0-69.9, adult (CMS/HCC)   • Dietary counseling       Past Medical History:   Diagnosis Date   • Abnormal exam of both ears     STATES EARS DON'T DRAIN ,  TOO MUCH EAR WAX   • ADHD    • Anxiety and depression    • Asthma    • Blind     blind in right eye   • Carpal tunnel syndrome     LT   • CFS (chronic fatigue syndrome)    • Chronic bronchitis (CMS/HCC)    • COPD (chronic obstructive pulmonary disease) (CMS/HCC)    • Edema     LEGS AND FEET   • GERD (gastroesophageal reflux disease)    • Hashimoto's thyroiditis    • Head injury    • Heart burn    • Hiatal hernia    • History of Clostridium difficile colitis 2017   • History of concussion 10/19/2014     WITH HEAD INJURY   • History of MRSA infection 2017    IN [facility]    • HTN (hypertension)    • Hyperlipidemia    • Hypertriglyceridemia    • Hypoglycemia    • IBS (irritable bowel syndrome)    • IgA deficiency (CMS/HCC)    • Incontinence of urine    • Insomnia    • Insulin resistance    • Joint pain    • Kidney disease     ONLY HAS ONE KIDNEY RIGHT   • Laryngopharyngeal reflux (LPR)    • Lymphedema     LEGS AND FEET   • Migraine    • OA (osteoarthritis)     WEAK ANKLES   • OCD (obsessive compulsive disorder)    • CHONG (obstructive sleep apnea)     has bi-pap   • Paradoxical vocal cord motion disorder    • PCOS (polycystic ovarian  syndrome)    • Photophobia of both eyes    • Polydipsia    • Polyuria    • PONV (postoperative nausea and vomiting)    • Prediabetes    • PTSD (post-traumatic stress disorder)    • Restless legs syndrome (RLS)    • Seasonal allergies    • Umbilical hernia    • Vitiligo     HANDS ,  LEGS, GROIN AREA   • Water retention        The following portions of the patient's history were reviewed and updated as appropriate: allergies, current medications, past family history, past medical history, past social history, past surgical history and problem list.    Vitals:    12/11/18 1146   BP: 152/95   Pulse: 65   Resp: 18   Temp: 97.8 °F (36.6 °C)       Physical Exam   Constitutional: She appears well-developed and well-nourished.   Neck: No thyromegaly present.   Cardiovascular: Normal rate, regular rhythm and normal heart sounds.   Pulmonary/Chest: Effort normal and breath sounds normal. No respiratory distress. She has no wheezes.   Abdominal: Soft. Bowel sounds are normal. She exhibits no distension. There is no tenderness. There is no guarding. No hernia.   Musculoskeletal: She exhibits no edema or tenderness.   Neurological: She is alert.   Skin: Skin is warm and dry. No rash noted. No erythema.   Psychiatric: She has a normal mood and affect. Her behavior is normal.   Nursing note and vitals reviewed.        Assessment:   Post-op, the patient is doing well.     Encounter Diagnoses   Name Primary?   • Morbid obesity with BMI of 60.0-69.9, adult (CMS/[facility]) Yes   • Gastroesophageal reflux disease without esophagitis    • Chronic fatigue    • Dietary counseling    • Prediabetes        Plan:     Encouraged patient to be sure to get plenty of lean protein per day through small frequent meals all with a protein source.   Activity restrictions: none.   Recommended patient be sure to get at least 70 grams of protein per day by eating small, frequent meals all with high lean protein choices. Be sure to limit/cut back on daily  carbohydrate intake. Discussed with the patient the recommended amount of water per day to intake- half of body weight in ounces. Reviewed vitamin requirements. Be sure to do routine exercise, 150 minutes per week minimum, including both cardio and strength training.     Instructions / Recommendations: dietary counseling recommended, recommended a daily protein intake of  grams, vitamin supplement(s) recommended, recommended exercising at least 150 minutes per week, behavior modifications recommended and instructed to call the office for concerns, questions, or problems.     The patient was instructed to follow up in 3 months .     Total time spent face to face was 20 minutes and 15 minutes was spent counseling.

## 2019-02-06 ENCOUNTER — HOSPITAL ENCOUNTER (OUTPATIENT)
Dept: OTHER | Facility: HOSPITAL | Age: 44
Discharge: HOME | End: 2019-02-06
Attending: NURSE PRACTITIONER

## 2019-02-06 LAB — T4 FREE SERPL-MCNC: 2.5 NG/DL (ref 0.9–1.8)

## 2019-02-08 LAB — T3FREE SERPL-MCNC: 7.5 PG/ML (ref 2–4.4)

## 2019-02-10 LAB — T3REVERSE SERPL-MCNC: 49 NG/DL (ref 9.2–24.1)

## 2019-04-17 ENCOUNTER — HOSPITAL ENCOUNTER (OUTPATIENT)
Dept: NEUROLOGY | Facility: HOSPITAL | Age: 44
Discharge: HOME | End: 2019-04-17
Attending: PSYCHIATRY & NEUROLOGY

## 2019-04-17 ENCOUNTER — CONVERSION ENCOUNTER (OUTPATIENT)
Dept: PODIATRY | Facility: CLINIC | Age: 44
End: 2019-04-17

## 2019-04-17 ENCOUNTER — OFFICE VISIT CONVERTED (OUTPATIENT)
Dept: PODIATRY | Facility: CLINIC | Age: 44
End: 2019-04-17
Attending: PODIATRIST

## 2019-05-03 ENCOUNTER — OFFICE VISIT (OUTPATIENT)
Dept: BARIATRICS/WEIGHT MGMT | Facility: CLINIC | Age: 44
End: 2019-05-03

## 2019-05-03 VITALS
TEMPERATURE: 98 F | HEART RATE: 102 BPM | RESPIRATION RATE: 18 BRPM | HEIGHT: 60 IN | WEIGHT: 293 LBS | DIASTOLIC BLOOD PRESSURE: 81 MMHG | SYSTOLIC BLOOD PRESSURE: 148 MMHG | BODY MASS INDEX: 57.52 KG/M2

## 2019-05-03 DIAGNOSIS — E66.01 MORBID OBESITY WITH BMI OF 60.0-69.9, ADULT (HCC): Primary | ICD-10-CM

## 2019-05-03 DIAGNOSIS — Z71.82 EXERCISE COUNSELING: ICD-10-CM

## 2019-05-03 DIAGNOSIS — Z71.3 DIETARY COUNSELING: ICD-10-CM

## 2019-05-03 PROCEDURE — 99213 OFFICE O/P EST LOW 20 MIN: CPT | Performed by: NURSE PRACTITIONER

## 2019-05-03 RX ORDER — FLUCONAZOLE 150 MG/1
TABLET ORAL
COMMUNITY
Start: 2019-02-01 | End: 2020-03-13

## 2019-05-03 RX ORDER — FLUTICASONE PROPIONATE 50 MCG
2 SPRAY, SUSPENSION (ML) NASAL
COMMUNITY
End: 2020-03-13

## 2019-05-03 RX ORDER — IPRATROPIUM BROMIDE AND ALBUTEROL SULFATE 2.5; .5 MG/3ML; MG/3ML
3 SOLUTION RESPIRATORY (INHALATION) 4 TIMES DAILY PRN
COMMUNITY

## 2019-05-03 RX ORDER — FERROUS SULFATE 325(65) MG
325 TABLET ORAL
COMMUNITY

## 2019-05-03 RX ORDER — LIOTHYRONINE SODIUM 25 UG/1
TABLET ORAL
COMMUNITY
Start: 2019-02-01 | End: 2020-03-13

## 2019-05-03 NOTE — PROGRESS NOTES
[facility] BARIATRIC SURGERY  [address]  [facility] [address] [phone]  [address]  [place] [facility] [address] [phone]  Dept: [phone]  5/3/2019      Kristin Jorge.  [id number]  [id number]  1975  female      Chief Complaint   Patient presents with   • Follow-up     6 month sleeve       BH Post-Op Bariatric Surgery:   Kristin Jorge is status post Laparoscopic Sleeve procedure, performed on 8/8/18     HPI:   Today's weight is (!) 140 kg (309 lb) pounds, today's BMI is Body mass index is 60.35 kg/m²., she has a  loss of 14 pounds since the last visit and her weight loss since surgery is 93 pounds. The patient reports a decreased portion size and loss of appetite.      Kristin Jorge denies nausea, vomiting, dysphagia, abdominal pain or heartburn.     Diet and Exercise: Diet history reviewed and discussed with the patient. Weight loss/gains to date discussed with the patient. The patient states they are eating around 50 grams of protein per day. She reports eating 2-3 meals per day, a typical portion size of 1 cup, eating 2 snacks per day, drinking 5+ or more 8-oz. glasses of water per day, no carbonated beverage consumption and exercising regularly- going to the gym and using treadmill 2-3 times a week.     Patient trying to focus on protein but admits to eating out regularly and fast food but usually eats the meat without the bun or salad with chicken.    Supplements: vitamin patch and d rx.     Review of Systems   Constitutional: Positive for fatigue.   Musculoskeletal: Positive for gait problem (uses walker).   All other systems reviewed and are negative.      Patient Active Problem List   Diagnosis   • Chronic fatigue   • Ankle swelling   • Essential hypertension   • Dyslipidemia   • COPD (chronic obstructive pulmonary disease) (CMS/HCC)   • CHONG (obstructive sleep apnea)   • GERD (gastroesophageal reflux disease)   • PCOS  (polycystic ovarian syndrome)   • Multiple joint pain   • Osteoarthritis   • Poor balance   • Depression with anxiety   • Prediabetes   • Abnormal facial hair   • Hypothyroid   • Paradoxical vocal cord motion   • Kidney, aplastic   • IgA deficiency (CMS/HCC)   • Cellulitis   • Vitamin D deficiency   • Morbid obesity with BMI of 60.0-69.9, adult (CMS/HCC)   • Dietary counseling   • Exercise counseling       Past Medical History:   Diagnosis Date   • Abnormal exam of both ears     STATES EARS DON'T DRAIN ,  TOO MUCH EAR WAX   • ADHD    • Anxiety and depression    • Asthma    • Blind     blind in right eye   • Carpal tunnel syndrome     LT   • CFS (chronic fatigue syndrome)    • Chronic bronchitis (CMS/HCC)    • COPD (chronic obstructive pulmonary disease) (CMS/HCC)    • Edema     LEGS AND FEET   • GERD (gastroesophageal reflux disease)    • Hashimoto's thyroiditis    • Head injury    • Heart burn    • Hiatal hernia    • History of Clostridium difficile colitis 2017   • History of concussion 10/19/2014     WITH HEAD INJURY   • History of MRSA infection 2017    IN [facility]    • HTN (hypertension)    • Hyperlipidemia    • Hypertriglyceridemia    • Hypoglycemia    • IBS (irritable bowel syndrome)    • IgA deficiency (CMS/HCC)    • Incontinence of urine    • Insomnia    • Insulin resistance    • Joint pain    • Kidney disease     ONLY HAS ONE KIDNEY RIGHT   • Laryngopharyngeal reflux (LPR)    • Lymphedema     LEGS AND FEET   • Migraine    • OA (osteoarthritis)     WEAK ANKLES   • OCD (obsessive compulsive disorder)    • CHONG (obstructive sleep apnea)     has bi-pap   • Paradoxical vocal cord motion disorder    • PCOS (polycystic ovarian syndrome)    • Photophobia of both eyes    • Polydipsia    • Polyuria    • PONV (postoperative nausea and vomiting)    • Prediabetes    • PTSD (post-traumatic stress disorder)    • Restless legs syndrome (RLS)    • Seasonal allergies    • Umbilical hernia    • Vitiligo      HANDS ,  LEGS, GROIN AREA   • Water retention        The following portions of the patient's history were reviewed and updated as appropriate: allergies, current medications, past family history, past medical history, past social history, past surgical history and problem list.    Vitals:    05/03/19 1138   BP: 148/81   Pulse: 102   Resp: 18   Temp: 98 °F (36.7 °C)       Physical Exam   Constitutional: She is oriented to person, place, and time. She appears well-developed and well-nourished.   HENT:   Head: Normocephalic and atraumatic.   Eyes: EOM are normal.   Cardiovascular: Normal rate, regular rhythm and normal heart sounds.   Pulmonary/Chest: Effort normal and breath sounds normal.   Abdominal: Soft. Bowel sounds are normal. She exhibits no distension. There is no tenderness.   Musculoskeletal: Normal range of motion.   Uses walker   Neurological: She is alert and oriented to person, place, and time.   Skin: Skin is warm and dry.   Psychiatric: She has a normal mood and affect. Her behavior is normal. Judgment and thought content normal.   Vitals reviewed.      Assessment:   Post-op, the patient is doing well.     Encounter Diagnoses   Name Primary?   • Morbid obesity with BMI of 60.0-69.9, adult (CMS/HCC) Yes   • Dietary counseling    • Exercise counseling        Plan:     Encouraged patient to be sure to get plenty of lean protein per day through small frequent meals all with a protein source. Keep working on protein intake. Be sure to get plenty of water. Keep following up with MD regarding lymphedema. Discussed exercise. Will continue with vitamins until we re-check her levels.   Activity restrictions: none.   Recommended patient be sure to get at least 70 grams of protein per day by eating small, frequent meals all with high lean protein choices. Be sure to limit/cut back on daily carbohydrate intake. Discussed with the patient the recommended amount of water per day to intake- half of body weight in  marquitances. Reviewed vitamin requirements. Be sure to do routine exercise, 150 minutes per week minimum, including both cardio and strength training.     Instructions / Recommendations: dietary counseling recommended, recommended a daily protein intake of  grams, vitamin supplement(s) recommended, recommended exercising at least 150 minutes per week, behavior modifications recommended and instructed to call the office for concerns, questions, or problems.     The patient was instructed to follow up in 3 months.     The patient was counseled regarding. Total time spent face to face was 20 minutes and 15 minutes was spent counseling.

## 2019-05-07 ENCOUNTER — CONVERSION ENCOUNTER (OUTPATIENT)
Dept: ORTHOPEDIC SURGERY | Facility: CLINIC | Age: 44
End: 2019-05-07

## 2019-05-07 ENCOUNTER — OFFICE VISIT CONVERTED (OUTPATIENT)
Dept: ORTHOPEDIC SURGERY | Facility: CLINIC | Age: 44
End: 2019-05-07
Attending: ORTHOPAEDIC SURGERY

## 2019-05-07 NOTE — PROGRESS NOTES
Bariatric Nutrition Assessment Follow-Up    Patient Name:  Kristin Jorge  YOB: 1975  Age:  43 y.o.  Sex:  female  MRN: [id number]  Date:  05/07/19    Procedure Performed:  Sleeve  Date of Surgery: 8/8/2018      Last Documented Weight:   Wt Readings from Last 1 Encounters:   05/03/19 (!) 140 kg (309 lb)     Highest Weight: Over 400 lbs   Lowest Weight: 309 lb  Goal Weight: 180 lb    Present Diet: High-protein, low-carbohydrate      Stated Problem Areas/Concerns: Kristin states that she has multiple health issues. She states that she had a lymphedema flare-up about 2 months ago and she regained weight to 338 pounds. The lymphedema has improved and she has restarted her weight loss. She states that she tries to eat 60-80 grams protein per day.     Assessment/Recommendations: Offered encouragement for Kristin's successful weight loss efforts since her gastric sleeve procedure. Obtained her usual meal pattern and food preferences. Encouraged tracking her food intake and choosing nutrient-dense foods. Discussed her weight loss goals, strategies, and behavior modification tips. Discussed healthy snack options and simple meal tips. Discussed  gram protein, low-carbohydrate weight loss diet guidelines. Provided written education materials for reference. Kristin voiced understanding of diet guidelines. Her fiance attended nutrition counseling appointment and appears very supportive and encouraging. Kristin appears motivated to achieve her weight loss goals.     Goals/Plan: Follow  gram protein, low-carbohydrate diet guidelines     Follow-Up: As needed       Electronically signed by:  Pili Toledo RD  05/07/19 3:16 PM

## 2019-06-05 ENCOUNTER — HOSPITAL ENCOUNTER (OUTPATIENT)
Dept: OTHER | Facility: HOSPITAL | Age: 44
Discharge: HOME | End: 2019-06-05
Attending: INTERNAL MEDICINE

## 2019-06-07 LAB — T3FREE SERPL-MCNC: 1.4 PG/ML (ref 2–4.4)

## 2019-07-23 ENCOUNTER — HOSPITAL ENCOUNTER (OUTPATIENT)
Dept: OTHER | Facility: HOSPITAL | Age: 44
Discharge: HOME | End: 2019-07-23
Attending: NURSE PRACTITIONER

## 2019-08-09 ENCOUNTER — OFFICE VISIT (OUTPATIENT)
Dept: BARIATRICS/WEIGHT MGMT | Facility: CLINIC | Age: 44
End: 2019-08-09

## 2019-08-09 VITALS
SYSTOLIC BLOOD PRESSURE: 130 MMHG | BODY MASS INDEX: 57.52 KG/M2 | HEIGHT: 60 IN | WEIGHT: 293 LBS | DIASTOLIC BLOOD PRESSURE: 84 MMHG | TEMPERATURE: 97.3 F | RESPIRATION RATE: 16 BRPM | HEART RATE: 86 BPM

## 2019-08-09 DIAGNOSIS — K21.9 GASTROESOPHAGEAL REFLUX DISEASE WITHOUT ESOPHAGITIS: ICD-10-CM

## 2019-08-09 DIAGNOSIS — Z71.3 DIETARY COUNSELING: ICD-10-CM

## 2019-08-09 DIAGNOSIS — E78.5 DYSLIPIDEMIA: ICD-10-CM

## 2019-08-09 DIAGNOSIS — I10 ESSENTIAL HYPERTENSION: ICD-10-CM

## 2019-08-09 DIAGNOSIS — E03.8 HYPOTHYROIDISM DUE TO HASHIMOTO'S THYROIDITIS: ICD-10-CM

## 2019-08-09 DIAGNOSIS — E44.1 MILD PROTEIN-CALORIE MALNUTRITION (HCC): ICD-10-CM

## 2019-08-09 DIAGNOSIS — G47.33 OSA (OBSTRUCTIVE SLEEP APNEA): ICD-10-CM

## 2019-08-09 DIAGNOSIS — E55.9 VITAMIN D DEFICIENCY: ICD-10-CM

## 2019-08-09 DIAGNOSIS — Z98.84 S/P LAPAROSCOPIC SLEEVE GASTRECTOMY: ICD-10-CM

## 2019-08-09 DIAGNOSIS — R73.03 PREDIABETES: ICD-10-CM

## 2019-08-09 DIAGNOSIS — E06.3 HYPOTHYROIDISM DUE TO HASHIMOTO'S THYROIDITIS: ICD-10-CM

## 2019-08-09 DIAGNOSIS — E61.1 IRON DEFICIENCY: ICD-10-CM

## 2019-08-09 DIAGNOSIS — E66.01 MORBID OBESITY WITH BMI OF 50.0-59.9, ADULT (HCC): Primary | ICD-10-CM

## 2019-08-09 PROBLEM — R21 SKIN RASH: Status: ACTIVE | Noted: 2019-08-09

## 2019-08-09 PROCEDURE — 99213 OFFICE O/P EST LOW 20 MIN: CPT | Performed by: NURSE PRACTITIONER

## 2019-08-09 RX ORDER — FLUTICASONE PROPIONATE 0.05 MG/G
OINTMENT TOPICAL
COMMUNITY
End: 2021-06-12

## 2019-08-09 NOTE — PROGRESS NOTES
[facility] BARIATRIC SURGERY  [address]  [facility] [address] [phone]  [address] [id number] [phone]  Dept: [phone]  8/9/2019      Kristin LOPES.  [id number]  [id number]  1975  female      Chief Complaint   Patient presents with   • Follow-up     SLEEVE ONE LYR         Post-Op Bariatric Surgery:   Kristin LOPES is status post Laparoscopic Sleeve procedure, performed on 8/8/18     HPI:   Today's weight is (!) 137 kg (301 lb 8 oz) pounds, today's BMI is Body mass index is 58.88 kg/m²., she has a loss of 8 pounds since the last visit and her weight loss since surgery is 101 pounds. The patient reports a decreased portion size and loss of appetite.      Kristin LOPES denies N/v/d/regurg/reflux/pain. Patient will have heartburn/regurgitation if she eats too fast. Patient c/o having issues with sores in her folds- has been using cream she was given in the past from wound care.     Diet and Exercise: Diet history reviewed and discussed with the patient. Weight loss/gains to date discussed with the patient. The patient states they are eating 70+ grams of protein per day. She reports eating 3 meals per day, a typical portion size of 1 cup, eating 2-3 snacks per day, drinking 5+ or more 8-oz. glasses of water per day, no carbonated beverage consumption and exercising regularly- walking 4-5 times a week but limited and still uses a walker for going very far.     Supplements: vitamin patch.     Review of Systems   Constitutional:        Alopecia   Gastrointestinal:        Heartburn   Skin: Positive for wound (skin fold).   All other systems reviewed and are negative.      Patient Active Problem List   Diagnosis   • Chronic fatigue   • Ankle swelling   • Essential hypertension   • Dyslipidemia   • COPD (chronic obstructive pulmonary disease) (CMS/HCC)   • CHONG (obstructive sleep apnea)   • GERD (gastroesophageal  reflux disease)   • PCOS (polycystic ovarian syndrome)   • Multiple joint pain   • Osteoarthritis   • Poor balance   • Depression with anxiety   • Prediabetes   • Abnormal facial hair   • Hypothyroid   • Paradoxical vocal cord motion   • Kidney, aplastic   • IgA deficiency (CMS/HCC)   • Cellulitis   • Vitamin D deficiency   • Dietary counseling   • Morbid obesity with BMI of 50.0-59.9, adult (CMS/HCC)   • S/P laparoscopic sleeve gastrectomy   • Skin rash       Past Medical History:   Diagnosis Date   • Abnormal exam of both ears     STATES EARS DON'T DRAIN ,  TOO MUCH EAR WAX   • ADHD    • Anxiety and depression    • Asthma    • Blind     blind in right eye   • Carpal tunnel syndrome     LT   • CFS (chronic fatigue syndrome)    • Chronic bronchitis (CMS/HCC)    • COPD (chronic obstructive pulmonary disease) (CMS/HCC)    • Edema     LEGS AND FEET   • GERD (gastroesophageal reflux disease)    • Hashimoto's thyroiditis    • Head injury    • Heart burn    • Hiatal hernia    • History of Clostridium difficile colitis 2017   • History of concussion 10/19/2014     WITH HEAD INJURY   • History of MRSA infection 2017    IN [facility]    • HTN (hypertension)    • Hyperlipidemia    • Hypertriglyceridemia    • Hypoglycemia    • IBS (irritable bowel syndrome)    • IgA deficiency (CMS/HCC)    • Incontinence of urine    • Insomnia    • Insulin resistance    • Joint pain    • Kidney disease     ONLY HAS ONE KIDNEY RIGHT   • Laryngopharyngeal reflux (LPR)    • Lymphedema     LEGS AND FEET   • Migraine    • OA (osteoarthritis)     WEAK ANKLES   • OCD (obsessive compulsive disorder)    • CHONG (obstructive sleep apnea)     has bi-pap   • Paradoxical vocal cord motion disorder    • PCOS (polycystic ovarian syndrome)    • Photophobia of both eyes    • Polydipsia    • Polyuria    • PONV (postoperative nausea and vomiting)    • Prediabetes    • PTSD (post-traumatic stress disorder)    • Restless legs syndrome (RLS)    •  Seasonal allergies    • Umbilical hernia    • Vitiligo     HANDS ,  LEGS, GROIN AREA   • Water retention        The following portions of the patient's history were reviewed and updated as appropriate: allergies, current medications, past family history, past medical history, past social history, past surgical history and problem list.    Vitals:    08/09/19 1324   BP: 130/84   Pulse: 86   Resp: 16   Temp: 97.3 °F (36.3 °C)       Physical Exam   Constitutional: She is oriented to person, place, and time. She appears well-developed and well-nourished.   Uses walker   HENT:   Head: Normocephalic and atraumatic.   Eyes: EOM are normal.   Cardiovascular: Normal rate, regular rhythm and normal heart sounds.   Pulmonary/Chest: Effort normal and breath sounds normal.   Abdominal: Soft. Bowel sounds are normal. She exhibits no distension. There is no tenderness.   Musculoskeletal: Normal range of motion.   Neurological: She is alert and oriented to person, place, and time.   Skin: Skin is warm and dry. Rash noted.   Psychiatric: She has a normal mood and affect. Her behavior is normal. Judgment and thought content normal.   Vitals reviewed.      Assessment:   Post-op, the patient is doing well.     Encounter Diagnoses   Name Primary?   • Morbid obesity with BMI of 50.0-59.9, adult (CMS/HCC) Yes   • Dietary counseling    • S/P laparoscopic sleeve gastrectomy    • Prediabetes    • Essential hypertension    • CHONG (obstructive sleep apnea)    • Gastroesophageal reflux disease without esophagitis    • Vitamin D deficiency    • Hypothyroidism due to Hashimoto's thyroiditis    • Dyslipidemia    • Mild protein-calorie malnutrition (CMS/HCC)     • Iron deficiency         Plan:     Encouraged patient to be sure to get plenty of lean protein per day through small frequent meals all with a protein source. Discussed eating out a schedule/routine and making sure she eats slow and takes time while eating/avoid over eating. Continue with  PPI. Monitor total carbohydrates. Increase exercise as able. Ordered annual labs. Recommended she follow up with dietician. Keep skin clean and dry and follow up with PCP.  Activity restrictions: none.   Recommended patient be sure to get at least 70 grams of protein per day by eating small, frequent meals all with high lean protein choices. Be sure to limit/cut back on daily carbohydrate intake. Discussed with the patient the recommended amount of water per day to intake- half of body weight in ounces. Reviewed vitamin requirements. Be sure to do routine exercise, 150 minutes per week minimum, including both cardio and strength training.     Instructions / Recommendations: dietary counseling recommended, recommended a daily protein intake of  grams, vitamin supplement(s) recommended, recommended exercising at least 150 minutes per week, behavior modifications recommended and instructed to call the office for concerns, questions, or problems.     The patient was instructed to follow up 3-6 months.     The patient was counseled regarding. Total time spent face to face was 20 minutes and 15 minutes was spent counseling.

## 2019-08-14 ENCOUNTER — HOSPITAL ENCOUNTER (OUTPATIENT)
Dept: OTHER | Facility: HOSPITAL | Age: 44
Discharge: HOME | End: 2019-08-14
Attending: NURSE PRACTITIONER

## 2019-08-14 LAB — PREALB SERPL-MCNC: 18.4 MG/DL (ref 20–40)

## 2019-08-17 LAB — CONV VITAMIN B-1 (THIAMINE): 133.9 NMOL/L (ref 66.5–200)

## 2019-08-18 LAB — METHYLMALONATE SERPL-SCNC: 99 NMOL/L (ref 0–378)

## 2019-08-26 ENCOUNTER — TELEPHONE (OUTPATIENT)
Dept: BARIATRICS/WEIGHT MGMT | Facility: CLINIC | Age: 44
End: 2019-08-26

## 2019-08-26 NOTE — TELEPHONE ENCOUNTER
Spoke with pt regarding labs and to follow up with PCP regarding her Vitamin D. Pt gave a verbal understanding        ----- Message from FRANCOIS Castro sent at 8/22/2019  7:36 AM EDT -----  Patient on rx vitamin d so have her follow up with her PCP

## 2019-09-13 ENCOUNTER — HOSPITAL ENCOUNTER (OUTPATIENT)
Dept: OTHER | Facility: HOSPITAL | Age: 44
Discharge: HOME | End: 2019-09-13
Attending: NURSE PRACTITIONER

## 2020-01-24 ENCOUNTER — HOSPITAL ENCOUNTER (OUTPATIENT)
Dept: GENERAL RADIOLOGY | Facility: HOSPITAL | Age: 45
Discharge: HOME | End: 2020-01-24
Attending: NURSE PRACTITIONER

## 2020-03-05 ENCOUNTER — OFFICE VISIT CONVERTED (OUTPATIENT)
Dept: ORTHOPEDIC SURGERY | Facility: CLINIC | Age: 45
End: 2020-03-05
Attending: ORTHOPAEDIC SURGERY

## 2020-03-05 ENCOUNTER — HOSPITAL ENCOUNTER (OUTPATIENT)
Dept: GENERAL RADIOLOGY | Facility: HOSPITAL | Age: 45
Discharge: HOME | End: 2020-03-05
Attending: NURSE PRACTITIONER

## 2020-03-05 LAB
25(OH)D3 SERPL-MCNC: 26.6 NG/ML (ref 30–100)
ALBUMIN SERPL-MCNC: 3.7 G/DL (ref 3.5–5)
ALBUMIN/GLOB SERPL: 1.1 {RATIO} (ref 1.4–2.6)
ALP SERPL-CCNC: 76 U/L (ref 42–98)
ALT SERPL-CCNC: 14 U/L (ref 10–40)
ANION GAP SERPL CALC-SCNC: 16 MMOL/L (ref 8–19)
AST SERPL-CCNC: 11 U/L (ref 15–50)
BASOPHILS # BLD AUTO: 0 10*3/UL (ref 0–0.2)
BASOPHILS NFR BLD AUTO: 0 % (ref 0–3)
BILIRUB SERPL-MCNC: 0.24 MG/DL (ref 0.2–1.3)
BNP SERPL-MCNC: 84 PG/ML (ref 0–450)
BUN SERPL-MCNC: 12 MG/DL (ref 5–25)
BUN/CREAT SERPL: 14 {RATIO} (ref 6–20)
CALCIUM SERPL-MCNC: 9.1 MG/DL (ref 8.7–10.4)
CHLORIDE SERPL-SCNC: 106 MMOL/L (ref 99–111)
CHOLEST SERPL-MCNC: 160 MG/DL (ref 107–200)
CHOLEST/HDLC SERPL: 3 {RATIO} (ref 3–6)
CK SERPL-CCNC: 31 U/L (ref 35–230)
CONV ABS IMM GRAN: 0.01 10*3/UL (ref 0–0.2)
CONV CO2: 22 MMOL/L (ref 22–32)
CONV IMMATURE GRAN: 0.1 % (ref 0–1.8)
CONV TOTAL PROTEIN: 7 G/DL (ref 6.3–8.2)
CREAT UR-MCNC: 0.84 MG/DL (ref 0.5–0.9)
CRP SERPL HS-MCNC: 0.48 MG/DL (ref 0–0.5)
DEPRECATED RDW RBC AUTO: 40.2 FL (ref 36.4–46.3)
EOSINOPHIL # BLD AUTO: 0 % (ref 0–7)
EOSINOPHIL # BLD AUTO: 0 10*3/UL (ref 0–0.7)
ERYTHROCYTE [DISTWIDTH] IN BLOOD BY AUTOMATED COUNT: 12.9 % (ref 11.7–14.4)
EST. AVERAGE GLUCOSE BLD GHB EST-MCNC: 114 MG/DL
GFR SERPLBLD BASED ON 1.73 SQ M-ARVRAT: >60 ML/MIN/{1.73_M2}
GLOBULIN UR ELPH-MCNC: 3.3 G/DL (ref 2–3.5)
GLUCOSE SERPL-MCNC: 74 MG/DL (ref 65–99)
HBA1C MFR BLD: 5.6 % (ref 3.5–5.7)
HCT VFR BLD AUTO: 43.4 % (ref 37–47)
HDLC SERPL-MCNC: 53 MG/DL (ref 40–60)
HGB BLD-MCNC: 13.6 G/DL (ref 12–16)
IRON SATN MFR SERPL: 26 % (ref 20–55)
IRON SERPL-MCNC: 91 UG/DL (ref 60–170)
LDLC SERPL CALC-MCNC: 78 MG/DL (ref 70–100)
LYMPHOCYTES # BLD AUTO: 2.69 10*3/UL (ref 1–5)
LYMPHOCYTES NFR BLD AUTO: 39.6 % (ref 20–45)
MCH RBC QN AUTO: 27 PG (ref 27–31)
MCHC RBC AUTO-ENTMCNC: 31.3 G/DL (ref 33–37)
MCV RBC AUTO: 86.3 FL (ref 81–99)
MONOCYTES # BLD AUTO: 0.28 10*3/UL (ref 0.2–1.2)
MONOCYTES NFR BLD AUTO: 4.1 % (ref 3–10)
NEUTROPHILS # BLD AUTO: 3.81 10*3/UL (ref 2–8)
NEUTROPHILS NFR BLD AUTO: 56.2 % (ref 30–85)
NRBC CBCN: 0 % (ref 0–0.7)
OSMOLALITY SERPL CALC.SUM OF ELEC: 288 MOSM/KG (ref 273–304)
PLATELET # BLD AUTO: 280 10*3/UL (ref 130–400)
PMV BLD AUTO: 10.6 FL (ref 9.4–12.3)
POTASSIUM SERPL-SCNC: 3.7 MMOL/L (ref 3.5–5.3)
RBC # BLD AUTO: 5.03 10*6/UL (ref 4.2–5.4)
SODIUM SERPL-SCNC: 140 MMOL/L (ref 135–147)
T4 FREE SERPL-MCNC: 0.5 NG/DL (ref 0.9–1.8)
TIBC SERPL-MCNC: 355 UG/DL (ref 245–450)
TRANSFERRIN SERPL-MCNC: 248 MG/DL (ref 250–380)
TRIGL SERPL-MCNC: 147 MG/DL (ref 40–150)
TSH SERPL-ACNC: 1.52 M[IU]/L (ref 0.27–4.2)
VIT B12 SERPL-MCNC: 584 PG/ML (ref 211–911)
VLDLC SERPL-MCNC: 29 MG/DL (ref 5–37)
WBC # BLD AUTO: 6.79 10*3/UL (ref 4.8–10.8)

## 2020-03-13 ENCOUNTER — OFFICE VISIT (OUTPATIENT)
Dept: BARIATRICS/WEIGHT MGMT | Facility: CLINIC | Age: 45
End: 2020-03-13

## 2020-03-13 ENCOUNTER — LAB (OUTPATIENT)
Dept: LAB | Facility: HOSPITAL | Age: 45
End: 2020-03-13

## 2020-03-13 VITALS
BODY MASS INDEX: 57.52 KG/M2 | HEIGHT: 60 IN | HEART RATE: 71 BPM | SYSTOLIC BLOOD PRESSURE: 162 MMHG | TEMPERATURE: 97.5 F | DIASTOLIC BLOOD PRESSURE: 87 MMHG | RESPIRATION RATE: 18 BRPM | WEIGHT: 293 LBS

## 2020-03-13 DIAGNOSIS — E28.2 PCOS (POLYCYSTIC OVARIAN SYNDROME): ICD-10-CM

## 2020-03-13 DIAGNOSIS — Z98.84 S/P LAPAROSCOPIC SLEEVE GASTRECTOMY: ICD-10-CM

## 2020-03-13 DIAGNOSIS — I10 ESSENTIAL HYPERTENSION: ICD-10-CM

## 2020-03-13 DIAGNOSIS — R53.82 CHRONIC FATIGUE: ICD-10-CM

## 2020-03-13 DIAGNOSIS — R73.03 PREDIABETES: ICD-10-CM

## 2020-03-13 DIAGNOSIS — E66.01 MORBID OBESITY WITH BMI OF 50.0-59.9, ADULT (HCC): Primary | ICD-10-CM

## 2020-03-13 DIAGNOSIS — G47.33 OSA (OBSTRUCTIVE SLEEP APNEA): ICD-10-CM

## 2020-03-13 DIAGNOSIS — E66.01 MORBID OBESITY WITH BMI OF 50.0-59.9, ADULT (HCC): ICD-10-CM

## 2020-03-13 PROBLEM — E05.80 HYPERTHYROIDISM WITH HASHIMOTO DISEASE: Status: ACTIVE | Noted: 2020-03-13

## 2020-03-13 PROBLEM — G62.9 NEUROPATHY: Status: ACTIVE | Noted: 2020-03-13

## 2020-03-13 PROBLEM — K59.01 CONSTIPATION DUE TO SLOW TRANSIT: Status: ACTIVE | Noted: 2020-03-13

## 2020-03-13 PROBLEM — L80 VITILIGO: Status: ACTIVE | Noted: 2020-03-13

## 2020-03-13 PROBLEM — M50.30 DEGENERATIVE DISC DISEASE, CERVICAL: Status: ACTIVE | Noted: 2020-03-13

## 2020-03-13 PROBLEM — E53.8 B12 DEFICIENCY: Status: ACTIVE | Noted: 2020-03-13

## 2020-03-13 PROBLEM — L30.4 INTERTRIGO: Status: ACTIVE | Noted: 2020-03-13

## 2020-03-13 PROBLEM — E06.3 HYPERTHYROIDISM WITH HASHIMOTO DISEASE: Status: ACTIVE | Noted: 2020-03-13

## 2020-03-13 PROBLEM — M79.3 PANNICULITIS: Status: ACTIVE | Noted: 2020-03-13

## 2020-03-13 LAB
25(OH)D3 SERPL-MCNC: 28.8 NG/ML (ref 30–100)
PTH-INTACT SERPL-MCNC: 70.8 PG/ML (ref 15–65)

## 2020-03-13 PROCEDURE — 84425 ASSAY OF VITAMIN B-1: CPT

## 2020-03-13 PROCEDURE — 36415 COLL VENOUS BLD VENIPUNCTURE: CPT

## 2020-03-13 PROCEDURE — 82306 VITAMIN D 25 HYDROXY: CPT

## 2020-03-13 PROCEDURE — 99213 OFFICE O/P EST LOW 20 MIN: CPT | Performed by: NURSE PRACTITIONER

## 2020-03-13 PROCEDURE — 83970 ASSAY OF PARATHORMONE: CPT

## 2020-03-13 PROCEDURE — 83921 ORGANIC ACID SINGLE QUANT: CPT

## 2020-03-13 RX ORDER — ERYTHROMYCIN 5 MG/G
OINTMENT OPHTHALMIC
COMMUNITY
Start: 2020-01-02 | End: 2021-06-12

## 2020-03-13 RX ORDER — CALCIUM CARBONATE 750 MG/1
750 TABLET, CHEWABLE ORAL DAILY
COMMUNITY
End: 2021-06-12

## 2020-03-13 RX ORDER — LEVOTHYROXINE SODIUM 0.2 MG/1
1 TABLET ORAL DAILY
COMMUNITY
Start: 2020-01-03 | End: 2020-09-14

## 2020-03-13 RX ORDER — ESOMEPRAZOLE MAGNESIUM 40 MG/1
1 CAPSULE, DELAYED RELEASE ORAL DAILY
COMMUNITY
Start: 2020-01-31 | End: 2020-09-14

## 2020-03-13 RX ORDER — CHLORHEXIDINE GLUCONATE 0.12 MG/ML
RINSE ORAL
COMMUNITY
Start: 2020-02-12 | End: 2020-03-13

## 2020-03-13 RX ORDER — BLOOD-GLUCOSE METER
KIT MISCELLANEOUS
COMMUNITY
Start: 2020-01-10

## 2020-03-13 RX ORDER — HYALURONATE SODIUM 10 MG/ML
SYRINGE (ML) INTRAARTICULAR WEEKLY
COMMUNITY
End: 2020-09-14

## 2020-03-13 RX ORDER — DOCUSATE SODIUM 100 MG/1
CAPSULE, LIQUID FILLED ORAL
COMMUNITY
Start: 2020-02-29 | End: 2021-06-12

## 2020-03-13 RX ORDER — ACETAMINOPHEN 500 MG
500 TABLET ORAL EVERY 6 HOURS PRN
COMMUNITY

## 2020-03-13 RX ORDER — GUAIFENESIN 600 MG/1
1200 TABLET, EXTENDED RELEASE ORAL 2 TIMES DAILY
COMMUNITY
End: 2021-06-12

## 2020-03-13 RX ORDER — CIPROFLOXACIN 0.5 MG/.25ML
SOLUTION/ DROPS AURICULAR (OTIC)
COMMUNITY
Start: 2020-02-04 | End: 2020-03-13

## 2020-03-15 ENCOUNTER — HOSPITAL ENCOUNTER (OUTPATIENT)
Dept: SLEEP MEDICINE | Facility: HOSPITAL | Age: 45
Discharge: HOME | End: 2020-03-15
Attending: INTERNAL MEDICINE

## 2020-03-17 LAB
Lab: NORMAL
METHYLMALONATE SERPL-SCNC: 189 NMOL/L (ref 0–378)

## 2020-03-17 NOTE — PROGRESS NOTES
Kristin LOPES.  [id number]  [id number]  1975  female    Bariatric Nutrition Assessment Follow-Up    Procedure Performed:  Sleeve  Date of Surgery: 8/8/18    Surgery wt: 402  Current: 303  Lowest: 294    Present Diet:  Low sly (760), low protein 54g, moderate carb 60g, moderate fat 37g     Stated Problem Areas/Concerns:  Wt regain post surgery    Assessment/Recommendations:  Food recall: special k protein cereal with skim milk; L yogurt; D: chili cheese hot dog; S: sugar-free pudding and cheese stick. Drinks at least 64 oz water. She does patch MD. She states that her budget is tight for food. She reports that she has a swallow study that recommended she drink thick liquids with her food to help prevent choking.    Goals/Plan:  Provided nutrition toolkit of lean protein options, healthy carb choices, 1200 sly meal plan, breakfast, lunch, dinner, snack ideas. Provided demonstration and handout of resistance band exercises. Provided handout on hypoglycemia. Provided handouts for eating healthy on a budget. Noted high PTH and low D - will follow up with patient to assess Ca intake and with NP for recs.    Follow-Up:  Patient is to follow up as needed.      Electronically signed by:  Carolann Silverman RD  08/29/19 11:51 AM

## 2020-03-18 ENCOUNTER — OUTSIDE FACILITY SERVICE (OUTPATIENT)
Dept: SLEEP MEDICINE | Facility: HOSPITAL | Age: 45
End: 2020-03-18

## 2020-03-18 LAB — VIT B1 BLD-SCNC: 196.8 NMOL/L (ref 66.5–200)

## 2020-03-18 PROCEDURE — 95810 POLYSOM 6/> YRS 4/> PARAM: CPT | Performed by: INTERNAL MEDICINE

## 2020-03-23 ENCOUNTER — TELEPHONE (OUTPATIENT)
Dept: BARIATRICS/WEIGHT MGMT | Facility: CLINIC | Age: 45
End: 2020-03-23

## 2020-03-23 RX ORDER — ERGOCALCIFEROL 1.25 MG/1
50000 CAPSULE ORAL
Qty: 12 CAPSULE | Refills: 0 | Status: SHIPPED | OUTPATIENT
Start: 2020-03-23 | End: 2020-06-09

## 2020-03-23 NOTE — TELEPHONE ENCOUNTER
Spoke to pt regarding lab results. Pt states she no longer takes the prescription vitamin because it does not work for. Pt states she's been taking a vitamin d chewable, she started a month ago. Instructed pt to make sure that she's getting in a minimum of 70 g of protein a day. Pt gave a verbal understanding.           ----- Message from FRANCOIS Silva sent at 3/23/2020 11:36 AM EDT -----  Sending vitamin D replacement to the patient's pharmacy.  Her elevated PTH is likely related to her low vitamin D.  Technically her calcium was low but as her albumin was also low there is a formula to get the correct calcium level based on low albumin and her calcium was normal.  That being said make sure that she is getting a minimum of 70 g of protein a day as this may be affecting her overall albumin level.  Otherwise these look good

## 2020-04-10 ENCOUNTER — OFFICE VISIT CONVERTED (OUTPATIENT)
Dept: ORTHOPEDIC SURGERY | Facility: CLINIC | Age: 45
End: 2020-04-10
Attending: PHYSICIAN ASSISTANT

## 2020-04-13 ENCOUNTER — TELEPHONE CONVERTED (OUTPATIENT)
Dept: UROLOGY | Facility: CLINIC | Age: 45
End: 2020-04-13
Attending: UROLOGY

## 2020-04-14 ENCOUNTER — OFFICE VISIT CONVERTED (OUTPATIENT)
Dept: ORTHOPEDIC SURGERY | Facility: CLINIC | Age: 45
End: 2020-04-14
Attending: PHYSICIAN ASSISTANT

## 2020-04-15 ENCOUNTER — TELEMEDICINE CONVERTED (OUTPATIENT)
Dept: GASTROENTEROLOGY | Facility: CLINIC | Age: 45
End: 2020-04-15
Attending: NURSE PRACTITIONER

## 2020-04-16 ENCOUNTER — OFFICE VISIT CONVERTED (OUTPATIENT)
Dept: ORTHOPEDIC SURGERY | Facility: CLINIC | Age: 45
End: 2020-04-16
Attending: PHYSICIAN ASSISTANT

## 2020-04-21 ENCOUNTER — OFFICE VISIT CONVERTED (OUTPATIENT)
Dept: ORTHOPEDIC SURGERY | Facility: CLINIC | Age: 45
End: 2020-04-21
Attending: PHYSICIAN ASSISTANT

## 2020-04-28 ENCOUNTER — OFFICE VISIT CONVERTED (OUTPATIENT)
Dept: ORTHOPEDIC SURGERY | Facility: CLINIC | Age: 45
End: 2020-04-28
Attending: PHYSICIAN ASSISTANT

## 2020-05-07 ENCOUNTER — OFFICE VISIT CONVERTED (OUTPATIENT)
Dept: ORTHOPEDIC SURGERY | Facility: CLINIC | Age: 45
End: 2020-05-07
Attending: PHYSICIAN ASSISTANT

## 2020-05-12 ENCOUNTER — OFFICE VISIT CONVERTED (OUTPATIENT)
Dept: ORTHOPEDIC SURGERY | Facility: CLINIC | Age: 45
End: 2020-05-12
Attending: PHYSICIAN ASSISTANT

## 2020-06-02 ENCOUNTER — OFFICE VISIT CONVERTED (OUTPATIENT)
Dept: ORTHOPEDIC SURGERY | Facility: CLINIC | Age: 45
End: 2020-06-02
Attending: ORTHOPAEDIC SURGERY

## 2020-06-09 ENCOUNTER — HOSPITAL ENCOUNTER (OUTPATIENT)
Dept: OTHER | Facility: HOSPITAL | Age: 45
Discharge: HOME | End: 2020-06-09
Attending: INTERNAL MEDICINE

## 2020-06-10 LAB
T4 FREE SERPL-MCNC: 1.4 NG/DL (ref 0.9–1.8)
TSH SERPL-ACNC: 0.06 M[IU]/L (ref 0.27–4.2)

## 2020-06-11 LAB — T3FREE SERPL-MCNC: 3.3 PG/ML (ref 2–4.4)

## 2020-06-23 ENCOUNTER — OFFICE VISIT CONVERTED (OUTPATIENT)
Dept: ORTHOPEDIC SURGERY | Facility: CLINIC | Age: 45
End: 2020-06-23
Attending: ORTHOPAEDIC SURGERY

## 2020-07-16 ENCOUNTER — HOSPITAL ENCOUNTER (OUTPATIENT)
Dept: OTHER | Facility: HOSPITAL | Age: 45
Discharge: HOME | End: 2020-07-16
Attending: NURSE PRACTITIONER

## 2020-07-18 LAB
CONV IMMUNOGLOBULIN G (IGG): 1398 MG/DL (ref 586–1602)
CONV IMMUNOGLOBULIN M (IGM): 271 MG/DL (ref 26–217)
IGA SERPL-MCNC: <5 MG/DL (ref 87–352)

## 2020-07-20 LAB — IGE SERPL-ACNC: <2 K[IU]/ML (ref 0–24)

## 2020-07-30 ENCOUNTER — OFFICE VISIT CONVERTED (OUTPATIENT)
Dept: NEUROLOGY | Facility: CLINIC | Age: 45
End: 2020-07-30
Attending: PSYCHIATRY & NEUROLOGY

## 2020-09-10 ENCOUNTER — HOSPITAL ENCOUNTER (OUTPATIENT)
Dept: OTHER | Facility: HOSPITAL | Age: 45
Discharge: HOME | End: 2020-09-10
Attending: OBSTETRICS & GYNECOLOGY

## 2020-09-11 ENCOUNTER — HOSPITAL ENCOUNTER (OUTPATIENT)
Dept: OTHER | Facility: HOSPITAL | Age: 45
Discharge: HOME | End: 2020-09-11
Attending: INTERNAL MEDICINE

## 2020-09-12 LAB — T3FREE SERPL-MCNC: 2.7 PG/ML (ref 2–4.4)

## 2020-09-14 ENCOUNTER — OFFICE VISIT (OUTPATIENT)
Dept: BARIATRICS/WEIGHT MGMT | Facility: CLINIC | Age: 45
End: 2020-09-14

## 2020-09-14 VITALS
WEIGHT: 293 LBS | HEIGHT: 60 IN | RESPIRATION RATE: 18 BRPM | TEMPERATURE: 98 F | HEART RATE: 78 BPM | BODY MASS INDEX: 57.52 KG/M2 | SYSTOLIC BLOOD PRESSURE: 144 MMHG | DIASTOLIC BLOOD PRESSURE: 85 MMHG

## 2020-09-14 DIAGNOSIS — E05.80 HYPERTHYROIDISM WITH HASHIMOTO DISEASE: ICD-10-CM

## 2020-09-14 DIAGNOSIS — Z71.3 DIETARY COUNSELING: ICD-10-CM

## 2020-09-14 DIAGNOSIS — F41.8 DEPRESSION WITH ANXIETY: ICD-10-CM

## 2020-09-14 DIAGNOSIS — Z98.84 S/P LAPAROSCOPIC SLEEVE GASTRECTOMY: ICD-10-CM

## 2020-09-14 DIAGNOSIS — E28.2 PCOS (POLYCYSTIC OVARIAN SYNDROME): ICD-10-CM

## 2020-09-14 DIAGNOSIS — E06.3 HYPERTHYROIDISM WITH HASHIMOTO DISEASE: ICD-10-CM

## 2020-09-14 DIAGNOSIS — R73.03 PREDIABETES: ICD-10-CM

## 2020-09-14 DIAGNOSIS — I10 ESSENTIAL HYPERTENSION: ICD-10-CM

## 2020-09-14 DIAGNOSIS — R53.82 CHRONIC FATIGUE: ICD-10-CM

## 2020-09-14 DIAGNOSIS — G47.33 OSA (OBSTRUCTIVE SLEEP APNEA): ICD-10-CM

## 2020-09-14 PROCEDURE — 99214 OFFICE O/P EST MOD 30 MIN: CPT | Performed by: NURSE PRACTITIONER

## 2020-09-14 RX ORDER — POTASSIUM CHLORIDE 750 MG/1
2 CAPSULE, EXTENDED RELEASE ORAL DAILY
COMMUNITY
Start: 2020-06-22

## 2020-09-14 RX ORDER — LINACLOTIDE 145 UG/1
1 CAPSULE, GELATIN COATED ORAL DAILY
COMMUNITY
Start: 2020-06-18

## 2020-09-14 RX ORDER — LEVOTHYROXINE SODIUM 0.2 MG/1
3.5 TABLET ORAL DAILY
COMMUNITY
End: 2021-05-05

## 2020-09-14 RX ORDER — FAMOTIDINE 40 MG/1
40 TABLET, FILM COATED ORAL 2 TIMES DAILY
Qty: 60 TABLET | Refills: 2 | Status: SHIPPED | OUTPATIENT
Start: 2020-09-14 | End: 2021-05-03 | Stop reason: SDUPTHER

## 2020-09-14 RX ORDER — RIZATRIPTAN BENZOATE 10 MG/1
1 TABLET ORAL AS NEEDED
COMMUNITY
Start: 2020-06-26

## 2020-09-14 RX ORDER — ERENUMAB-AOOE 140 MG/ML
1 INJECTION, SOLUTION SUBCUTANEOUS
COMMUNITY
Start: 2020-08-21 | End: 2021-06-12

## 2020-09-14 NOTE — PROGRESS NOTES
[facility] BARIATRIC [facility] BARIATRIC SURGERY  [address] [facility] [address]  [facility] [address]  [phone]  [address] [facility] [address]  [facility] [address]  [phone]  Dept: [phone]  9/14/2020      Kristin LOPES.  [id number]  [id number]  1975  female      Chief Complaint   Patient presents with   • Follow-up     2 year sleeve       BH Post-Op Bariatric Surgery:   Kristin LOPES is status post Laparoscopic Sleeve/HH procedure, performed on 8/8/18     HPI:   Today's weight is (!) 159 kg (351 lb 8 oz) pounds, today's BMI is Body mass index is 68.65 kg/m²., she has a  gain of 49 pounds since the last visit and her weight loss since surgery is 24 pounds. The patient reports a decreased portion size and loss of appetite.       Diet and Exercise: Diet history reviewed and discussed with the patient. Weight loss/gains to date discussed with the patient. The patient states they are eating 80 grams of protein per day. She reports eating 3 meals per day, a typical portion size of 1-2 cup, eating 1 snacks per day, drinking 5-6 or more 8-oz. glasses of water per day, no carbonated beverage consumption and exercising regularly    She reports that she became less active at the beginning of the pandemic, she is having knee trouble, turned her ankle last week. She is in the process of getting new knee and ankle braces. She reports that finances have impacted her diet. She still does not drink liquid calories.     Diet recall: has an eggwhich in the morning or a fritata for breakfast, she may snack on a greek yogurt or cottage cheese and fruit. She may have a sandwich but tries to do wheat bread. If he  is up she will have a real lunch. She may occasionally have a lean cuisine but tries to avoid pasta. Her  does all of the cooking because she is visually impaired. She reports that her  may make dinner, although it's pretty late in the evenings- may do pork chops or  chicken. May end up doing another frozen meals.     She reports that her sleep machine broke about two years ago. Her sleep has been limited since that happened but she can't get a new sleep study without a covid test.     She reports that her  is struggling with depression and hasn't been able to get into therapy with covid and refuses medications.     Supplements:  PatchMD    Review of Systems   Constitutional: Positive for appetite change. Negative for fatigue and unexpected weight change.   HENT: Negative.    Eyes: Negative.    Respiratory: Negative.    Cardiovascular: Negative.  Negative for leg swelling.   Gastrointestinal: Negative for abdominal distention, abdominal pain, constipation, diarrhea, nausea and vomiting.   Genitourinary: Negative for difficulty urinating, frequency and urgency.   Musculoskeletal: Negative for back pain.   Skin: Negative.    Psychiatric/Behavioral: Negative.    All other systems reviewed and are negative.      Patient Active Problem List   Diagnosis   • Chronic fatigue   • Ankle swelling   • Essential hypertension   • Dyslipidemia   • COPD (chronic obstructive pulmonary disease) (CMS/HCC)   • CHONG (obstructive sleep apnea)   • GERD (gastroesophageal reflux disease)   • PCOS (polycystic ovarian syndrome)   • Multiple joint pain   • Osteoarthritis   • Poor balance   • Depression with anxiety   • Prediabetes   • Abnormal facial hair   • Hypothyroid   • Paradoxical vocal cord motion   • Kidney, aplastic   • IgA deficiency (CMS/HCC)   • Cellulitis   • Vitamin D deficiency   • Dietary counseling   • Body mass index (BMI) of 60.0-69.9 in adult (CMS/HCC)   • S/P laparoscopic sleeve gastrectomy   • Skin rash   • Hyperthyroidism with Hashimoto disease   • Vitiligo   • B12 deficiency   • Degenerative disc disease, cervical   • Neuropathy   • Constipation due to slow transit   • Panniculitis   • Intertrigo       Past Medical History:   Diagnosis Date   • Abnormal exam of both ears      STATES EARS DON'T DRAIN ,  TOO MUCH EAR WAX   • ADHD    • Anxiety and depression    • Asthma    • Blind     blind in right eye   • Carpal tunnel syndrome     LT   • CFS (chronic fatigue syndrome)    • Chronic bronchitis (CMS/HCC)    • COPD (chronic obstructive pulmonary disease) (CMS/HCC)    • Edema     LEGS AND FEET   • GERD (gastroesophageal reflux disease)    • Hashimoto's thyroiditis    • Head injury    • Heart burn    • Hiatal hernia    • History of Clostridium difficile colitis 2017   • History of concussion 10/19/2014     WITH HEAD INJURY   • History of MRSA infection 2017    IN [facility]    • HTN (hypertension)    • Hyperlipidemia    • Hypertriglyceridemia    • Hypoglycemia    • IBS (irritable bowel syndrome)    • IgA deficiency (CMS/HCC)    • Incontinence of urine    • Insomnia    • Insulin resistance    • Joint pain    • Kidney disease     ONLY HAS ONE KIDNEY RIGHT   • Laryngopharyngeal reflux (LPR)    • Lymphedema     LEGS AND FEET   • Migraine    • OA (osteoarthritis)     WEAK ANKLES   • OCD (obsessive compulsive disorder)    • CHONG (obstructive sleep apnea)     has bi-pap   • Paradoxical vocal cord motion disorder    • PCOS (polycystic ovarian syndrome)    • Photophobia of both eyes    • Polydipsia    • Polyuria    • PONV (postoperative nausea and vomiting)    • Prediabetes    • PTSD (post-traumatic stress disorder)    • Restless legs syndrome (RLS)    • Seasonal allergies    • Umbilical hernia    • Vitiligo     HANDS ,  LEGS, GROIN AREA   • Water retention        The following portions of the patient's history were reviewed and updated as appropriate: allergies, current medications, past family history, past medical history, past social history, past surgical history and problem list.    Vitals:    09/14/20 1350   BP: 144/85   Pulse: 78   Resp: 18   Temp: 98 °F (36.7 °C)       Physical Exam  Vitals signs and nursing note reviewed.   Constitutional:       Appearance: She is well-developed.    Neck:      Thyroid: No thyromegaly.   Cardiovascular:      Rate and Rhythm: Normal rate and regular rhythm.      Heart sounds: Normal heart sounds.   Pulmonary:      Effort: Pulmonary effort is normal. No respiratory distress.      Breath sounds: Normal breath sounds. No wheezing.   Abdominal:      General: Bowel sounds are normal. There is no distension.      Palpations: Abdomen is soft.      Tenderness: There is no abdominal tenderness. There is no guarding.      Hernia: No hernia is present.   Musculoskeletal:         General: No tenderness.   Skin:     General: Skin is warm and dry.      Findings: No erythema or rash.   Neurological:      Mental Status: She is alert.   Psychiatric:         Behavior: Behavior normal.         Assessment:   Post-op, the patient is doing well.     Encounter Diagnoses   Name Primary?   • Body mass index (BMI) of 60.0-69.9 in adult (CMS/HCC) Yes   • Essential hypertension    • CHONG (obstructive sleep apnea)    • Hyperthyroidism with Hashimoto disease    • PCOS (polycystic ovarian syndrome)    • Prediabetes    • Chronic fatigue    • Dietary counseling    • S/P laparoscopic sleeve gastrectomy    • Depression with anxiety        Plan:   We discussed in detail patient's feelings about her progress and sliding back into old habits that she has had over the last several months.  We discussed the need to get away from heavily processed foods.  She is doing a good job getting protein at every meal but her snacks and nighttime eating include a lot of high carbohydrate foods.  Outside of that because she is not able to stand or see well to do cooking and meal prep she is eating a lot of prepackaged high sodium high fat foods.  Overall her diet is high protein but is also high fat and high sodium and is moderate carb some days and high carb other days.  She is limited on exercise due to knee pain and limited in ability to cook and prep for herself due to poor vision.  Did discuss sticking to  tuna and picking up a rotisserie chicken as well as raw veggies and freezer bags of veggies to increase her daily fiber intake and still do her best to focus on whole foods that are little bit less processed.  We did discuss using resistance bands to work at home and I will email her a list of YouTube videos that she can use using the resistance band we gave her at intake.  Check her annual set of labs today and will call patient with results  Encouraged patient to be sure to get plenty of lean protein per day through small frequent meals all with a protein source.   Activity restrictions: none.   Recommended patient be sure to get at least 70 grams of protein per day by eating small, frequent meals all with high lean protein choices. Be sure to limit/cut back on daily carbohydrate intake. Discussed with the patient the recommended amount of water per day to intake- half of body weight in ounces. Reviewed vitamin requirements. Be sure to do routine exercise, 150 minutes per week minimum, including both cardio and strength training.     Instructions / Recommendations: dietary counseling recommended, recommended a daily protein intake of  grams, vitamin supplement(s) recommended, recommended exercising at least 150 minutes per week, behavior modifications recommended and instructed to call the office for concerns, questions, or problems.     The patient was instructed to follow up in 3 months .     . Total time spent face to face was 40 minutes and 30 minutes was spent counseling.

## 2020-09-15 ENCOUNTER — OFFICE VISIT CONVERTED (OUTPATIENT)
Dept: ORTHOPEDIC SURGERY | Facility: CLINIC | Age: 45
End: 2020-09-15
Attending: ORTHOPAEDIC SURGERY

## 2020-09-15 ENCOUNTER — CONVERSION ENCOUNTER (OUTPATIENT)
Dept: ORTHOPEDIC SURGERY | Facility: CLINIC | Age: 45
End: 2020-09-15

## 2020-09-23 ENCOUNTER — HOSPITAL ENCOUNTER (OUTPATIENT)
Dept: OTHER | Facility: HOSPITAL | Age: 45
Setting detail: RECURRING SERIES
Discharge: HOME | End: 2020-10-27
Attending: ORTHOPAEDIC SURGERY

## 2020-09-24 ENCOUNTER — OFFICE VISIT CONVERTED (OUTPATIENT)
Dept: ORTHOPEDIC SURGERY | Facility: CLINIC | Age: 45
End: 2020-09-24
Attending: PHYSICIAN ASSISTANT

## 2020-11-20 ENCOUNTER — HOSPITAL ENCOUNTER (OUTPATIENT)
Dept: OTHER | Facility: HOSPITAL | Age: 45
Discharge: HOME | End: 2020-11-20
Attending: INTERNAL MEDICINE

## 2020-11-22 LAB — T3FREE SERPL-MCNC: 1.7 PG/ML (ref 2–4.4)

## 2020-11-30 ENCOUNTER — OFFICE VISIT CONVERTED (OUTPATIENT)
Dept: NEUROLOGY | Facility: CLINIC | Age: 45
End: 2020-11-30
Attending: PSYCHIATRY & NEUROLOGY

## 2021-01-05 ENCOUNTER — OFFICE VISIT CONVERTED (OUTPATIENT)
Dept: ORTHOPEDIC SURGERY | Facility: CLINIC | Age: 46
End: 2021-01-05
Attending: PHYSICIAN ASSISTANT

## 2021-01-05 ENCOUNTER — CONVERSION ENCOUNTER (OUTPATIENT)
Dept: ORTHOPEDIC SURGERY | Facility: CLINIC | Age: 46
End: 2021-01-05

## 2021-01-22 ENCOUNTER — OFFICE VISIT CONVERTED (OUTPATIENT)
Dept: NEUROLOGY | Facility: CLINIC | Age: 46
End: 2021-01-22
Attending: NURSE PRACTITIONER

## 2021-01-26 ENCOUNTER — OFFICE VISIT CONVERTED (OUTPATIENT)
Dept: ORTHOPEDIC SURGERY | Facility: CLINIC | Age: 46
End: 2021-01-26
Attending: PHYSICIAN ASSISTANT

## 2021-01-27 ENCOUNTER — HOSPITAL ENCOUNTER (OUTPATIENT)
Dept: OTHER | Facility: HOSPITAL | Age: 46
Discharge: HOME | End: 2021-01-27
Attending: INTERNAL MEDICINE

## 2021-01-28 LAB — T3FREE SERPL-MCNC: 4.7 PG/ML (ref 2–4.4)

## 2021-02-02 ENCOUNTER — CONVERSION ENCOUNTER (OUTPATIENT)
Dept: ORTHOPEDIC SURGERY | Facility: CLINIC | Age: 46
End: 2021-02-02

## 2021-02-02 ENCOUNTER — OFFICE VISIT CONVERTED (OUTPATIENT)
Dept: ORTHOPEDIC SURGERY | Facility: CLINIC | Age: 46
End: 2021-02-02
Attending: PHYSICIAN ASSISTANT

## 2021-02-09 ENCOUNTER — CONVERSION ENCOUNTER (OUTPATIENT)
Dept: ORTHOPEDIC SURGERY | Facility: CLINIC | Age: 46
End: 2021-02-09

## 2021-02-09 ENCOUNTER — OFFICE VISIT CONVERTED (OUTPATIENT)
Dept: ORTHOPEDIC SURGERY | Facility: CLINIC | Age: 46
End: 2021-02-09
Attending: PHYSICIAN ASSISTANT

## 2021-02-23 ENCOUNTER — CONVERSION ENCOUNTER (OUTPATIENT)
Dept: ORTHOPEDIC SURGERY | Facility: CLINIC | Age: 46
End: 2021-02-23

## 2021-02-23 ENCOUNTER — OFFICE VISIT CONVERTED (OUTPATIENT)
Dept: ORTHOPEDIC SURGERY | Facility: CLINIC | Age: 46
End: 2021-02-23
Attending: PHYSICIAN ASSISTANT

## 2021-03-02 ENCOUNTER — OFFICE VISIT CONVERTED (OUTPATIENT)
Dept: ORTHOPEDIC SURGERY | Facility: CLINIC | Age: 46
End: 2021-03-02
Attending: PHYSICIAN ASSISTANT

## 2021-03-05 ENCOUNTER — OFFICE VISIT CONVERTED (OUTPATIENT)
Dept: NEUROLOGY | Facility: CLINIC | Age: 46
End: 2021-03-05
Attending: NURSE PRACTITIONER

## 2021-03-18 ENCOUNTER — OFFICE VISIT CONVERTED (OUTPATIENT)
Dept: ORTHOPEDIC SURGERY | Facility: CLINIC | Age: 46
End: 2021-03-18
Attending: ORTHOPAEDIC SURGERY

## 2021-04-13 ENCOUNTER — OFFICE VISIT CONVERTED (OUTPATIENT)
Dept: ORTHOPEDIC SURGERY | Facility: CLINIC | Age: 46
End: 2021-04-13
Attending: ORTHOPAEDIC SURGERY

## 2021-04-13 ENCOUNTER — CONVERSION ENCOUNTER (OUTPATIENT)
Dept: ORTHOPEDIC SURGERY | Facility: CLINIC | Age: 46
End: 2021-04-13

## 2021-04-30 ENCOUNTER — HOSPITAL ENCOUNTER (OUTPATIENT)
Dept: OTHER | Facility: HOSPITAL | Age: 46
Discharge: HOME | End: 2021-04-30
Attending: INTERNAL MEDICINE

## 2021-04-30 LAB
T4 FREE SERPL-MCNC: 1.7 NG/DL (ref 0.9–1.8)
TSH SERPL-ACNC: 0.22 M[IU]/L (ref 0.27–4.2)

## 2021-05-01 LAB — T3FREE SERPL-MCNC: 3.4 PG/ML (ref 2–4.4)

## 2021-05-04 RX ORDER — FAMOTIDINE 40 MG/1
40 TABLET, FILM COATED ORAL 2 TIMES DAILY
Qty: 60 TABLET | Refills: 2 | Status: SHIPPED | OUTPATIENT
Start: 2021-05-04

## 2021-05-05 LAB — T3REVERSE SERPL-MCNC: 25.5 NG/DL (ref 9.2–24.1)

## 2021-05-06 ENCOUNTER — CONVERSION ENCOUNTER (OUTPATIENT)
Dept: NEUROLOGY | Facility: CLINIC | Age: 46
End: 2021-05-06

## 2021-05-06 ENCOUNTER — OFFICE VISIT CONVERTED (OUTPATIENT)
Dept: NEUROLOGY | Facility: CLINIC | Age: 46
End: 2021-05-06
Attending: NURSE PRACTITIONER

## 2021-05-07 ENCOUNTER — HOSPITAL ENCOUNTER (OUTPATIENT)
Dept: SLEEP MEDICINE | Facility: HOSPITAL | Age: 46
Discharge: HOME | End: 2021-05-07
Attending: INTERNAL MEDICINE
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
## 2021-05-10 NOTE — PROCEDURES
Procedure Note      Patient Name: Kristin Aldrich   Patient ID: [id number]   Sex: Female   YOB: 1975    Primary Care Provider: Kaylin PARRISH   Referring Provider: Kaylin PARRISH    Visit Date: 2021    Provider: FRANCOIS Vazquez   Location: [facility]   Location Address: [address] [place] [address]  [id number]   Location Phone: [phone]          Procedure: INJECTION OF BOTOX FOR CHRONIC MIGRAINES  : 1975   Date of Procedure: 2021   Informed consent was obtained prior to the procedure. Two 100 unit vials of Botox were reconsitituted using 2 mL of 0.9% perservative free normal saline resulting in 5 units of Botox per 0.1 mL. Each area was cleaned and wiped with an alcohol swab prior to injection. The following injections were made following the approved Botox injection paradigm for treatment of chronic migraine: 10 units into nakul corrugators divided into two sites, 5 units into the procerus, 20 units into the frontalis divided in 4 sites, 40 units in the temporalis divided in 6 sites (3 sites on each side), 30 units into the occipitalis divided in 6 sites (3 sites on each side), 20 units on into the cervical paraspinals divided in 4 sites (2 sites on each side) and 30 units into the trapezius divided in 6 sites (3 sites on each side). The patient tolerated the procedure well without complications. Blood loss was minimal. The remaining 45 units of Botox weree disposed of following the procedure.           Assessment  · Intractable chronic migraine without aura and without status migrainosus     346./G43.719      Plan  · Orders  o 155.00 - Botox Injection, 155 Units [facility]. (-07) - 346./G43.719 - 2021   Botox 100units x2 vials Lot: [id number] Exp: 2023  o Chemodenervation of muscle innervated by facial nerve for chronic migraine (21298) - 346.71/G43.719 - 2021   Botox 100units x 2 vials Lot: [id number]  Exp: 11/2023  · Medications  o Medications have been Reconciled  o Transition of Care or Provider Policy            Electronically Signed by: Elham Burton APRN -Author on March 5, 2021 08:47:27 AM

## 2021-05-12 ENCOUNTER — OFFICE VISIT CONVERTED (OUTPATIENT)
Dept: NEUROLOGY | Facility: CLINIC | Age: 46
End: 2021-05-12
Attending: PSYCHIATRY & NEUROLOGY

## 2021-05-12 NOTE — PROGRESS NOTES
Progress Note      Patient Name: Kristin Aldrich   Patient ID: [id number]   Sex: Female   YOB: 1975    Primary Care Provider: Kaylin PARRISH   Referring Provider: Kaylin PARRISH    Visit Date: April 10, 2020    Provider: Nat Vance PA-C   Location: [facility]   Location Address: [address] [place] [address]  [id number]   Location Phone: [phone]          Chief Complaint  · Left knee pain      History Of Present Illness  Kristin Aldrich is a 44 year old /White female who presents today to [facility].      She is here for left knee Euflexxa injection #1/3. She also states her left knee offloader brace no longer fits, since her 150 lb + weight loss.       Past Medical History  Allergies; Anxiety; Arthritis; Asthma; Bipolar disorder; Broken Bones; C. difficile diarrhea; Carpal tunnel syndrome; Chronic bronchitis; Chronic Obstructive Pulmonary Disease; COPD (chronic obstructive pulmonary disease); Depression; Forgetfulness; Gall stones; Head injury; Hemorrhoids; Hiatal hernia; Hyperlipemia; Hyperlipidemia; Hypertension; IgA deficiency; Incontinence; Insulin resistance; Irritable bowel syndrome; Limb Swelling; Lymphedema; Migraine headache; MRSA infection; Neurologic disorder; Polydipsia; Polyuria; Primary osteoarthritis of left knee; Psychiatric Care; PTSD (post-traumatic stress disorder); Reflux; Restless leg syndrome; Ringing in ears; Seasonal allergies; Shortness of Breath; Sinus trouble; Skin Disease; Sleep apnea; Thyroid disorder         Past Surgical History  Appendectomy; Carpal Tunnel Release; Cholecystecomy; Colonoscopy; Gallbladder; Gastric Sleeve; Hernia         Medication List  albuterol sulfate oral; amitriptyline 150 mg oral tablet; Breo Ellipta 100-25 mcg/dose inhalation blister with device; buspirone 15 mg oral tablet; duloxetine 30 mg oral capsule,delayed release(DR/EC); EpiPen 0.3 mg/0.3 mL injection auto-injector; liothyronine 25 mcg  oral tablet; metformin oral; nadolol 20 mg oral tablet; topiramate 200 mg oral tablet; Ultram 50 mg oral tablet; Vitamin D2 50,000 unit oral capsule         Allergy List  NO KNOWN DRUG ALLERGIES; NSAIDS; Pepper; Peppers         Family Medical History  Stroke; Heart Disease; Diabetes, unspecified type; Diabetes; Alcoholism in family; Blood Diseases; Family history of certain chronic disabling diseases; arthritis; Osteoporosis; Family history of Arthritis         Social History  Alcohol; Alcohol Use (Never); .; lives with other; lives with spouse; Recreational Drug Use (Never); Tobacco (Never); Unemployed.         Review of Systems  · Constitutional  o Denies  o : fever, chills, weight loss  · Cardiovascular  o Denies  o : chest pain, shortness of breath  · Gastrointestinal  o Denies  o : liver disease, heartburn, nausea, blood in stools  · Genitourinary  o Denies  o : painful urination, blood in urine  · Integument  o Denies  o : rash, itching  · Neurologic  o Denies  o : headache, weakness, loss of consciousness  · Musculoskeletal  o Admits  o : painful, swollen joints  · Psychiatric  o Denies  o : drug/alcohol addiction, anxiety, depression      Vitals  Date Time BP Position Site L\R Cuff Size HR RR TEMP (F) WT  HT  BMI kg/m2 BSA m2 O2 Sat HC       04/10/2020 01:43 PM         305lbs 0oz 5'   59.57 2.42           Physical Examination  · Constitutional  o Appearance  o : well developed, well-nourished, no obvious deformities present  · Head and Face  o Head  o :   § Inspection  § : normocephalic  o Face  o :   § Inspection  § : no facial lesions  · Eyes  o Conjunctivae  o : conjunctivae normal  o Sclerae  o : sclerae white  · Ears, Nose, Mouth and Throat  o Ears  o :   § External Ears  § : appearance within normal limits  § Hearing  § : intact  o Nose  o :   § External Nose  § : appearance normal  · Neck  o Inspection/Palpation  o : normal appearance  o Range of Motion  o : full range of  motion  · Respiratory  o Respiratory Effort  o : breathing unlabored  o Inspection of Chest  o : normal appearance  o Auscultation of Lungs  o : no audible wheezing or rales  · Cardiovascular  o Heart  o : regular rate  · Gastrointestinal  o Abdominal Examination  o : soft and non-tender  · Skin and Subcutaneous Tissue  o General Inspection  o : intact, no rashes  · Psychiatric  o General  o : Alert and oriented x3  o Judgement and Insight  o : judgment and insight intact  o Mood and Affect  o : mood normal, affect appropriate  · Extremities  o Extremities  o : BILATERAL KNEE: Ambulating with a walker. Antalgic gait. Pain with movement. Range of motion 0-115. Tender joint lines. Thigh to calf mismatch bilaterally. Minimal muscle mass. + Instability varus/valgus stress. Positive crepitus. Neurovascularly intact. Sensation grossly intact. Pulses normal. Calf supple; non-tender.   · Injection Note/Aspiration Note  o Site  o : left knee  o Procedure  o : Procedure: After educating the patient, patient gave consent for procedure. After using Chloraprep, the joint space was injected. The patient tolerated the procedure well.  o Medication  o : Euflexxa, 20 mg          Assessment  · Primary osteoarthritis of right knee     715.16/M17.11  · Primary osteoarthritis of left knee     715.16/M17.12  · Left knee pain, unspecified chronicity     719.46/M25.562      Plan  · Orders  o Euflexxa per dose () - 715.16/M17.12 - 04/10/2020   Lot [id number] Exp 12 16 2020 [place] Administered by JIN DELEON PA-C  o Knee Intra-articular Injection without US Guidance [facility] (39396) - 715.16/M17.12 - 04/10/2020   Administered by JIN DELEON PA-C  · Medications  o Medications have been Reconciled  o Transition of Care or Provider Policy  · Instructions  o Reviewed the patient's Past Medical, Social, and Family history as well as the ROS at today's visit, no changes.  o Call or return if worsening symptoms.  o Obtain bilateral knee x-rays, standing  at follow up. Ordered offloader braces bilateral knee.   o Electronically Identified Patient Education Materials Provided Electronically            Electronically Signed by: Nat Vance PA-C -Author on April 10, 2020 02:52:01 PM

## 2021-05-12 NOTE — PROGRESS NOTES
"   Quick Note      Patient Name: Kristin Aldrich   Patient ID: [id number]   Sex: Female   YOB: 1975    Primary Care Provider: Kaylin PARRISH   Referring Provider: Kaylin PARRISH    Visit Date: April 15, 2020    Provider: FRANCOIS Franklin   Location: [facility]   Location Address: [address] [place] [address], [address] [place] [address]  [id number]   Location Phone: [phone]          History Of Present Illness  TELEHEALTH VISIT  Chief Complaint: GERD   Kristin Aldrich is a 44 year old /White female who is presenting for evaluation via telehealth telephone visit. Verbal consent obtained before beginning visit.   Provider spent 15 minutes with the patient during telehealth visit.   The following staff were present during this visit: Heidi Grier MA, FRANCOIS Franklin   Past Medical History/Overview of Patient Symptoms     She presents for evaluation of GERD.  Patient has an extensive medical problem list and an extensive medication list.  She reports undergoing sleeve gastrectomy in 2018 per Dr. Neil.  Since that time she has experienced worsening heartburn.  She states that her insurance would not cover a PPI.  She was prescribed liquid Carafate per primary care provider and is taking this 4 times daily.  She reports some improvement in reflux symptoms but no resolution.      Previous EGD per Dr. Sawyer 04/13/2017 - medium-size hiatal hernia, antral gastritis, BX based. Biopsies negative for H. pylori and intestinal metaplasia.    She admits constipation - taking miralax daily.  Reports having a bowel movement about every 3rd day.  Reports that she's doing \"dirty bombs\" - miralax, milk of magnesium and coffee as need for constipation. Denies rectal bleeding.             Vitals  Date Time BP Position Site L\R Cuff Size HR RR TEMP (F) WT  HT  BMI kg/m2 BSA m2 O2 Sat HC       04/15/2020 01:52 PM         294lbs 16oz 5'   57.61 2.38   "             Assessment  · GERD (gastroesophageal reflux disease)     530.81/K21.9  · Chronic idiopathic constipation     564.00/K59.04    Problems Reconciled  Plan  · Orders  o Physician Telephone Evaluation, 11-20 minutes (19668) - - 04/15/2020  o Consent for Esophagogastrodudodenoscopy (EGD) - Possible risk/complications, benefits, and alternatives to surgical or invasive procedure have been explained to patient and/or legal guardian. -Patient has been evaluated and can tolerate anesthesia and/or sedation. Risk, benefits, and alternatives to anesthesia and sedation have been explained to patient and/or legal guardian. (46941) - - 04/15/2020  · Medications  o Linzess 145 mcg oral capsule   SIG: take 1 capsule by oral route daily   DISP: (30) capsules with 3 refills  Prescribed on 04/15/2020     o Medications have been Reconciled  · Instructions  o PLAN: Proceed with procedure. Patient understands risks and benefits and is willing to proceed. Understands the risks include, but are not limited to, bleeding and/or perforation.  o Information given on current diagnoses.            Electronically Signed by: FRANCOIS Franklin -Author on April 29, 2020 01:04:17 PM

## 2021-05-12 NOTE — PROGRESS NOTES
Quick Note      Patient Name: Kristin Aldrich   Patient ID: [id number]   Sex: Female   YOB: 1975    Primary Care Provider: Kaylin PARRISH   Referring Provider: Kaylin PARRISH    Visit Date: April 13, 2020    Provider: Inga Schmitz MD   Location: [facility]   Location Address: [address] [place] [address]  [id number]   Location Phone: [phone]          History Of Present Illness  The patient is a 41 year old /White female , who presents for follow up from [facility] for an urological evaluation for urinary incontinence which the patient associates with no known event.   Urge Incontinence:   The patient states she has had recurrent episodes with and without urgency in the past years. She describes leakage of small amounts, moderate amounts, and large amounts of urine. The patients symptoms improved on oxybutynin ER 5mg. The patient uses 1 pads a day. She also reports dysuria, frequency, and foul smelling urine, itching. She denies needing to strain to void, an intermittent stream, and incomplete emptying.   Stress Incontinence:   She does have leaking with coughing, sneezing, and laughing. This has been occuring for years. This leaking has been getting worse.   The patient notes aggravating factors are coughing and laughing There no identified alleviating factors. Her past medical history is notable for a history of C. difficile diarrhea, Chronic bronchitis, Chronic Obstructive Pulmonary Disease, IgA deficiency, Incontinence, Insulin resistance, Irritable Bowel Syndrome, and MRSA infection.   She denies a history of recent vaginal delivery and hysterectomy. The patient has not been previously evaluated for this condition.   Previous Treatment:   She has been previously treated with antibiotics.      She also complains of infections very often but only had one this year.        She appears to have an infection today.        Her CT scan from 2017 was  reviewed.  Her right kidney is severely atrophic and is essentially not visible.  Her left kidney is normal.  It is not the source of her infections.       She had been on oxybutynin ER 5mg and it worked well but she has not been on it recently.   TELEHEALTH VISIT  Chief Complaint: Overactive bladder and urgency   Kristin Aldrich is a 44 year old /White female who is presenting for evaluation via telehealth visit. Verbal consent obtained before beginning visit.   Provider spent 15 minutes with the patient during telehealth visit.   The following staff were present during this visit: Mai Santiago and Inga Schmitz MD       Physical Examination  · Constitutional  o Appearance  o : well-nourished, well developed, alert, in no acute distress, poor hygiene, large body habitus   · Head and Face  o Head  o :   § Inspection  § : atraumatic, normocephalic  o Face  o :   § Inspection  § : no facial lesions  · Eyes  o Sclerae  o : sclerae white  · Ears, Nose, Mouth and Throat  o Ears  o :   § External Ears  § : appearance within normal limits, no lesions present  o Nose  o :   § External Nose  § : appearance normal  · Neck  o Inspection/Palpation  o : normal appearance, no masses or tenderness, trachea midline  · Respiratory  o Respiratory Effort  o : Breathing is unlabored without accessory muscle use  o Inspection of Chest  o : normal appearance, no retractions  · Skin and Subcutaneous Tissue  o General Inspection  o : No rashes, lesions or areas of discoloration present. Skin turgor is normal.  o General Palpation  o : No abnormalities, masses or tenderness on palpation.  · Neurologic  o Mental Status Examination  o :   § Orientation  § : grossly oriented to person, place and time  § Speech/Language  § : communication ability within normal limits  o Gait and Station  o : normal gait, able to stand without difficulty  · Psychiatric  o Judgement and Insight  o : judgment and insight intact, judgement for  everyday activities and social situations within normal limits, insight intact  o Mood and Affect  o : mood normal, affect appropriate          Assessment  · Cystitis     595.9/N30.90  · Urge Incontinence     788.31/N39.41  · Urinary Tract Infection     599.0/N39.0  · Continuous leakage of urine     788.37/N39.45  · Atrophic kidney, acquired     587/N26.1    Problems Reconciled  Plan  · Orders  o Physician Telephone Evaluation, 11-20 minutes (73433) - 788.31/N39.41, 599.0/N39.0, 587/N26.1, 595.9/N30.90 - 04/13/2020  · Medications  o oxybutynin chloride 5 mg oral tablet extended release 24hr   SIG: take 1 tablet (5 mg) by oral route once daily for 30 days   DISP: (30) tablets with 11 refills  Prescribed on 04/13/2020     o Medications have been Reconciled  o Transition of Care or Provider Policy  · Instructions  o PLAN:  o Diagnosis of urge incontinence was discussed with patient. She was counseled on treatments and behavorial modifications to prevent and reduce urgency. She did well with oxybutynin ER and I sent in a script for that.   o She was instructed on things to help prevent infections.   o FOLLOW-UP:  o In 12 months  o Plan Of Care:   o Chronic conditions reviewed and taken into consideration for today's treatment plan.  o Patient instructed to seek medical attention urgently for new or worsening symptoms.  o Patient was educated/instructed on their diagnosis, treatment and medications prior to discharge from the clinic today.  o Discussed Covid-19 precautions including, but not limited to, social distancing, avoid touching your face, and hand washing.   · Referrals  o ID: [id number] Date: 05/02/2017 Type: Inbound  Specialty: Urology  o ID: [id number] Date: 05/02/2017 Type: Inbound  Specialty: Urology            Electronically Signed by: Inga Schmitz MD -Author on April 13, 2020 12:51:53 PM

## 2021-05-12 NOTE — PROGRESS NOTES
Progress Note      Patient Name: Kristin Aldrich   Patient ID: [id number]   Sex: Female   YOB: 1975    Primary Care Provider: Kaylin PARRISH   Referring Provider: Kaylin PARRISH    Visit Date: April 16, 2020    Provider: Nat Vance PA-C   Location: [facility]   Location Address: [address] [place] [address]  [id number]   Location Phone: [phone]          Chief Complaint  · Bilateral carpal tunnel syndrome      History Of Present Illness  Kristin Aldrich is a 44 year old /White female who presents today to [facility].      She is here for evaluation of bilateral hand numbness/tingling. She is s/p left CTR and excision of ganglion cyst of first MCP joint 2015. She states cyst has recurred and her left CT symptoms have also recurred. She is LHD. Her braces have worn out.       Past Medical History  Allergies; Anxiety; Arthritis; Asthma; Bipolar disorder; Broken Bones; C. difficile diarrhea; Carpal tunnel syndrome; Chronic bronchitis; Chronic Obstructive Pulmonary Disease; COPD (chronic obstructive pulmonary disease); DDD (degenerative disc disease), cervical; Depression; Forgetfulness; Gall stones; Head injury; Hemorrhoids; Hiatal hernia; Hyperlipemia; Hyperlipidemia; Hypertension; IgA deficiency; Incontinence; Insulin resistance; Irritable bowel syndrome; Limb Swelling; Lymphedema; Migraine headache; MRSA infection; Neurologic disorder; Neuropathy; Polydipsia; Polyuria; Primary osteoarthritis of left knee; Psychiatric Care; PTSD (post-traumatic stress disorder); Reflux; Restless leg syndrome; Ringing in ears; Seasonal allergies; Shortness of Breath; Sinus trouble; Skin Disease; Sleep apnea; Slow transit constipation; Thyroid disorder; Vitamin B deficiency         Past Surgical History  Appendectomy; Carpal Tunnel Release; Cholecystecomy; Colonoscopy; Gallbladder; Gastric Sleeve; Hernia         Medication List  albuterol sulfate oral; amitriptyline  150 mg oral tablet; amlodipine 5 mg oral tablet; aripiprazole 5 mg oral tablet; atorvastatin 10 mg oral tablet; Breo Ellipta 100-25 mcg/dose inhalation blister with device; buspirone 15 mg oral tablet; cetirizine 10 mg oral tablet; chlorhexidine gluconate 0.12 % mucous membrane mouthwash; docusate sodium 100 mg oral capsule; duloxetine 30 mg oral capsule,delayed release(DR/EC); EpiPen 0.3 mg/0.3 mL injection auto-injector; Flonase Allergy Relief 50 mcg/actuation nasal spray,suspension; furosemide 40 mg oral tablet; Linzess 145 mcg oral capsule; liothyronine 25 mcg oral tablet; nadolol 20 mg oral tablet; Nyamyc 100,000 unit/gram topical powder; oxybutynin chloride 5 mg oral tablet extended release 24hr; potassium chloride 10 mEq oral tablet,ER particles/crystals; Singulair 10 mg oral tablet; sucralfate 100 mg/mL oral suspension; topiramate 200 mg oral tablet; Ultram 50 mg oral tablet; Unithroid 200 mcg oral tablet         Allergy List  Bee Stings; NSAIDS; Peppers       Allergies Reconciled  Family Medical History  Stroke; Heart Disease; Diabetes, unspecified type; Diabetes; Alcoholism in family; Blood Diseases; Family history of certain chronic disabling diseases; arthritis; Osteoporosis; Family history of Arthritis         Social History  Alcohol (Never); .; lives with other; lives with spouse; Recreational Drug Use (Never); Tobacco (Never); Unemployed.         Review of Systems  · Constitutional  o Denies  o : fever, chills, weight loss  · Cardiovascular  o Denies  o : chest pain, shortness of breath  · Gastrointestinal  o Denies  o : liver disease, heartburn, nausea, blood in stools  · Genitourinary  o Denies  o : painful urination, blood in urine  · Integument  o Denies  o : rash, itching  · Neurologic  o Denies  o : headache, weakness, loss of consciousness  · Musculoskeletal  o Admits  o : painful, swollen joints  · Psychiatric  o Denies  o : drug/alcohol addiction, anxiety, depression      Vitals  Date  Time BP Position Site L\R Cuff Size HR RR TEMP (F) WT  HT  BMI kg/m2 BSA m2 O2 Sat HC       04/16/2020 02:29 PM      90 - R   300lbs 0oz 5'   58.59 2.4 97 %          Physical Examination  · Constitutional  o Appearance  o : well developed, well-nourished, no obvious deformities present  · Head and Face  o Head  o :   § Inspection  § : normocephalic  o Face  o :   § Inspection  § : no facial lesions  · Eyes  o Conjunctivae  o : conjunctivae normal  o Sclerae  o : sclerae white  · Ears, Nose, Mouth and Throat  o Ears  o :   § External Ears  § : appearance within normal limits  § Hearing  § : intact  o Nose  o :   § External Nose  § : appearance normal  · Neck  o Inspection/Palpation  o : normal appearance  o Range of Motion  o : full range of motion  · Respiratory  o Respiratory Effort  o : breathing unlabored  o Inspection of Chest  o : normal appearance  o Auscultation of Lungs  o : no audible wheezing or rales  · Cardiovascular  o Heart  o : regular rate  · Gastrointestinal  o Abdominal Examination  o : soft and non-tender  · Skin and Subcutaneous Tissue  o General Inspection  o : intact, no rashes  · Psychiatric  o General  o : Alert and oriented x3  o Judgement and Insight  o : judgment and insight intact  o Mood and Affect  o : mood normal, affect appropriate  · Right Hand  o Inspection  o : Mild thenar atrophy. Full digit and wrist ROM. + Tinel's at carpal tunnel and median nerve compression test. Brisk capillary refill.   · Left Hand  o Inspection  o : Mild thenar atrophy. Well healed scar at base of palm and over palmar aspect of first MCP joint. Full digit and wrist ROM. + Tinel's at carpal tunnel and median nerve compression test. Brisk capillary refill.           Assessment  · Bilateral carpal tunnel syndrome     354.0/G56.03      Plan  · Instructions  o Reviewed the patient's Past Medical, Social, and Family history as well as the ROS at today's visit, no changes.  o Call or return if worsening  symptoms.  o Bilateral carpal tunnel braces. Follow up in 6 weeks. HEP and transdermal cream prescribed. Will order EMG bilateral UEs.             Electronically Signed by: Nat Vance PA-C -Author on April 16, 2020 02:59:11 PM

## 2021-05-12 NOTE — PROGRESS NOTES
Progress Note      Patient Name: Kristin Aldrich   Patient ID: [id number]   Sex: Female   YOB: 1975    Primary Care Provider: Kaylin PARRISH   Referring Provider: Kaylin PARRISH    Visit Date: April 28, 2020    Provider: Nat Vance PA-C   Location: [facility]   Location Address: [address] [place] [address]  [id number]   Location Phone: [phone]          Chief Complaint  · Follow up right knee pain      History Of Present Illness  Kristin Aldrich is a 44 year old /White female who presents today to [facility].      She is here for Euflexxa #1/3 right knee.       Past Medical History  Allergies; Anxiety; Arthritis; Asthma; Bipolar disorder; Broken Bones; C. difficile diarrhea; Carpal tunnel syndrome; Chronic bronchitis; Chronic Obstructive Pulmonary Disease; COPD (chronic obstructive pulmonary disease); DDD (degenerative disc disease), cervical; Depression; Forgetfulness; Gall stones; Head injury; Hemorrhoids; Hiatal hernia; Hyperlipemia; Hyperlipidemia; Hypertension; IgA deficiency; Incontinence; Insulin resistance; Irritable bowel syndrome; Limb Swelling; Lymphedema; Migraine headache; MRSA infection; Neurologic disorder; Neuropathy; Polydipsia; Polyuria; Primary osteoarthritis of left knee; Psychiatric Care; PTSD (post-traumatic stress disorder); Reflux; Restless leg syndrome; Ringing in ears; Seasonal allergies; Shortness of Breath; Sinus trouble; Skin Disease; Sleep apnea; Slow transit constipation; Thyroid disorder; Vitamin B deficiency         Past Surgical History  Appendectomy; Carpal Tunnel Release; Cholecystecomy; Colonoscopy; Gallbladder; Gastric Sleeve; Hernia         Medication List  albuterol sulfate oral; amitriptyline 150 mg oral tablet; amlodipine 5 mg oral tablet; aripiprazole 5 mg oral tablet; atorvastatin 10 mg oral tablet; Breo Ellipta 100-25 mcg/dose inhalation blister with device; buspirone 15 mg oral tablet; cetirizine 10 mg  oral tablet; chlorhexidine gluconate 0.12 % mucous membrane mouthwash; docusate sodium 100 mg oral capsule; duloxetine 30 mg oral capsule,delayed release(DR/EC); EpiPen 0.3 mg/0.3 mL injection auto-injector; Flonase Allergy Relief 50 mcg/actuation nasal spray,suspension; furosemide 40 mg oral tablet; Linzess 145 mcg oral capsule; liothyronine 25 mcg oral tablet; nadolol 20 mg oral tablet; Nyamyc 100,000 unit/gram topical powder; oxybutynin chloride 5 mg oral tablet extended release 24hr; potassium chloride 10 mEq oral tablet,ER particles/crystals; Singulair 10 mg oral tablet; sucralfate 100 mg/mL oral suspension; topiramate 200 mg oral tablet; Ultram 50 mg oral tablet; Unithroid 200 mcg oral tablet         Allergy List  Bee Stings; NSAIDS; Peppers       Allergies Reconciled  Family Medical History  Stroke; Heart Disease; Diabetes, unspecified type; Diabetes; Alcoholism in family; Blood Diseases; Family history of certain chronic disabling diseases; arthritis; Osteoporosis; Family history of Arthritis         Social History  Alcohol (Never); .; lives with other; lives with spouse; Recreational Drug Use (Never); Tobacco (Never); Unemployed.         Review of Systems  · Constitutional  o Denies  o : fever, chills, weight loss  · Cardiovascular  o Denies  o : chest pain, shortness of breath  · Gastrointestinal  o Denies  o : liver disease, heartburn, nausea, blood in stools  · Genitourinary  o Denies  o : painful urination, blood in urine  · Integument  o Denies  o : rash, itching  · Neurologic  o Denies  o : headache, weakness, loss of consciousness  · Musculoskeletal  o Admits  o : painful, swollen joints  · Psychiatric  o Denies  o : drug/alcohol addiction, anxiety, depression      Vitals  Date Time BP Position Site L\R Cuff Size HR RR TEMP (F) WT  HT  BMI kg/m2 BSA m2 O2 Sat HC       04/28/2020 02:21 PM      101 - R   301lbs 0oz 5'   58.78 2.4 98 %          Physical  Examination  · Constitutional  o Appearance  o : well developed, well-nourished, no obvious deformities present  · Head and Face  o Head  o :   § Inspection  § : normocephalic  o Face  o :   § Inspection  § : no facial lesions  · Eyes  o Conjunctivae  o : conjunctivae normal  o Sclerae  o : sclerae white  · Ears, Nose, Mouth and Throat  o Ears  o :   § External Ears  § : appearance within normal limits  § Hearing  § : intact  o Nose  o :   § External Nose  § : appearance normal  · Neck  o Inspection/Palpation  o : normal appearance  o Range of Motion  o : full range of motion  · Respiratory  o Respiratory Effort  o : breathing unlabored  o Inspection of Chest  o : normal appearance  o Auscultation of Lungs  o : no audible wheezing or rales  · Cardiovascular  o Heart  o : regular rate  · Gastrointestinal  o Abdominal Examination  o : soft and non-tender  · Skin and Subcutaneous Tissue  o General Inspection  o : intact, no rashes  · Psychiatric  o General  o : Alert and oriented x3  o Judgement and Insight  o : judgment and insight intact  o Mood and Affect  o : mood normal, affect appropriate  · Injection Note/Aspiration Note  o Site  o : right knee  o Procedure  o : Procedure: After educating the patient, patient gave consent for procedure. After using Chloraprep, the joint space was injected. The patient tolerated the procedure well.   o Medication  o : Euflexxa, 20 mg   · Imaging  o Imaging  o : BILATERAL KNEE: Ambulating with a walker. Antalgic gait. Pain with movement. Range of motion 0-115. Tender joint lines. Thigh to calf mismatch bilaterally. Minimal muscle mass. + Instability varus/valgus stress. Positive crepitus. Neurovascularly intact. Sensation grossly intact. Pulses normal. Calf supple; non-tender.           Assessment  · Primary osteoarthritis of right knee     715.16/M17.11      Plan  · Orders  o Euflexxa per dose () - 715.16/M17.11 - 04/28/2020   Lot [id number] exp 12 16 2020 Martita Vance  PA  o Knee Intra-articular Injection without US Guidance [facility] (31747) - 715.16/M17.11 - 04/28/2020   Nat ALONSO  · Medications  o Medications have been Reconciled  o Transition of Care or Provider Policy  · Instructions  o Reviewed the patient's Past Medical, Social, and Family history as well as the ROS at today's visit, no changes.  o Call or return if worsening symptoms.  o Follow up in 1 week.  o Electronically Identified Patient Education Materials Provided Electronically            Electronically Signed by: JOY DeniseC -Author on April 28, 2020 02:40:05 PM

## 2021-05-12 NOTE — PROGRESS NOTES
Progress Note      Patient Name: Kristin Aldrich   Patient ID: [id number]   Sex: Female   YOB: 1975    Primary Care Provider: Kaylin PARRISH   Referring Provider: Kaylin PARRISH    Visit Date: April 14, 2020    Provider: Nat Vance PA-C   Location: [facility]   Location Address: [address] [place] [address]  [id number]   Location Phone: [phone]          Chief Complaint  · Follow up bilateral knee pain      History Of Present Illness  Kristin Aldrich is a 44 year old /White female who presents today to [facility].      She is here for Euflexxa injection #2/3 of left knee. She complains of bilateral knee pain.       Past Medical History  Allergies; Anxiety; Arthritis; Asthma; Bipolar disorder; Broken Bones; C. difficile diarrhea; Carpal tunnel syndrome; Chronic bronchitis; Chronic Obstructive Pulmonary Disease; COPD (chronic obstructive pulmonary disease); DDD (degenerative disc disease), cervical; Depression; Forgetfulness; Gall stones; Head injury; Hemorrhoids; Hiatal hernia; Hyperlipemia; Hyperlipidemia; Hypertension; IgA deficiency; Incontinence; Insulin resistance; Irritable bowel syndrome; Limb Swelling; Lymphedema; Migraine headache; MRSA infection; Neurologic disorder; Neuropathy; Polydipsia; Polyuria; Primary osteoarthritis of left knee; Psychiatric Care; PTSD (post-traumatic stress disorder); Reflux; Restless leg syndrome; Ringing in ears; Seasonal allergies; Shortness of Breath; Sinus trouble; Skin Disease; Sleep apnea; Slow transit constipation; Thyroid disorder; Vitamin B deficiency         Past Surgical History  Appendectomy; Carpal Tunnel Release; Cholecystecomy; Colonoscopy; Gallbladder; Gastric Sleeve; Hernia         Medication List  albuterol sulfate oral; amitriptyline 150 mg oral tablet; amlodipine 5 mg oral tablet; aripiprazole 5 mg oral tablet; atorvastatin 10 mg oral tablet; Breo Ellipta 100-25 mcg/dose inhalation blister with  device; buspirone 15 mg oral tablet; cetirizine 10 mg oral tablet; chlorhexidine gluconate 0.12 % mucous membrane mouthwash; docusate sodium 100 mg oral capsule; duloxetine 30 mg oral capsule,delayed release(DR/EC); EpiPen 0.3 mg/0.3 mL injection auto-injector; Flonase Allergy Relief 50 mcg/actuation nasal spray,suspension; furosemide 40 mg oral tablet; Linzess 145 mcg oral capsule; liothyronine 25 mcg oral tablet; nadolol 20 mg oral tablet; Nyamyc 100,000 unit/gram topical powder; oxybutynin chloride 5 mg oral tablet extended release 24hr; potassium chloride 10 mEq oral tablet,ER particles/crystals; Singulair 10 mg oral tablet; sucralfate 100 mg/mL oral suspension; topiramate 200 mg oral tablet; Ultram 50 mg oral tablet; Unithroid 200 mcg oral tablet         Allergy List  Bee Stings; NSAIDS; Peppers       Allergies Reconciled  Family Medical History  Stroke; Heart Disease; Diabetes, unspecified type; Diabetes; Alcoholism in family; Blood Diseases; Family history of certain chronic disabling diseases; arthritis; Osteoporosis; Family history of Arthritis         Social History  Alcohol (Never); .; lives with other; lives with spouse; Recreational Drug Use (Never); Tobacco (Never); Unemployed.         Review of Systems  · Constitutional  o Denies  o : fever, chills, weight loss  · Cardiovascular  o Denies  o : chest pain, shortness of breath  · Gastrointestinal  o Denies  o : liver disease, heartburn, nausea, blood in stools  · Genitourinary  o Denies  o : painful urination, blood in urine  · Integument  o Denies  o : rash, itching  · Neurologic  o Denies  o : headache, weakness, loss of consciousness  · Musculoskeletal  o Admits  o : painful, swollen joints  · Psychiatric  o Denies  o : drug/alcohol addiction, anxiety, depression      Vitals  Date Time BP Position Site L\R Cuff Size HR RR TEMP (F) WT  HT  BMI kg/m2 BSA m2 O2 Sat        04/14/2020 02:39 PM      89 - R   310lbs 0oz 5'   60.54 2.44 94 %           Physical Examination  · Constitutional  o Appearance  o : well developed, well-nourished, no obvious deformities present  · Head and Face  o Head  o :   § Inspection  § : normocephalic  o Face  o :   § Inspection  § : no facial lesions  · Eyes  o Conjunctivae  o : conjunctivae normal  o Sclerae  o : sclerae white  · Ears, Nose, Mouth and Throat  o Ears  o :   § External Ears  § : appearance within normal limits  § Hearing  § : intact  o Nose  o :   § External Nose  § : appearance normal  · Neck  o Inspection/Palpation  o : normal appearance  o Range of Motion  o : full range of motion  · Respiratory  o Respiratory Effort  o : breathing unlabored  o Inspection of Chest  o : normal appearance  o Auscultation of Lungs  o : no audible wheezing or rales  · Cardiovascular  o Heart  o : regular rate  · Gastrointestinal  o Abdominal Examination  o : soft and non-tender  · Skin and Subcutaneous Tissue  o General Inspection  o : intact, no rashes  · Psychiatric  o General  o : Alert and oriented x3  o Judgement and Insight  o : judgment and insight intact  o Mood and Affect  o : mood normal, affect appropriate  · Extremities  o Extremities  o : BILATERAL KNEE: Ambulating with a walker. Antalgic gait. Pain with movement. Range of motion 0-115. Tender joint lines. Thigh to calf mismatch bilaterally. Minimal muscle mass. + Instability varus/valgus stress. Positive crepitus. Neurovascularly intact. Sensation grossly intact. Pulses normal. Calf supple; non-tender.   · Injection Note/Aspiration Note  o Site  o : left knee   o Procedure  o : Procedure: After educating the patient, patient gave consent for procedure. After using Chloraprep, the joint space was injected. The patient tolerated the procedure well.   o Medication  o : Euflexxa, 20 mg   · In Office Procedures  o View  o : AP/LATERAL  o Site  o : bilateral, knee  o Indication  o : bilateral knee pain  o Study  o : X-rays ordered, taken in the office, and reviewed  today.  o Xray  o : moderate right knee degenerative changes, moderate to severe joint degenerative changes left knee  o Comparative Data  o : Comparative Data found and reviewed today           Assessment  · Primary osteoarthritis of right knee     715.16/M17.11  · Primary osteoarthritis of left knee     715.16/M17.12  · Pain: Knee     719.46/M25.569      Plan  · Orders  o Euflexxa per dose () - 715.16/M17.12 - 04/14/2020   Lot [id number] exp 12 16 2020 Martita ALONSO  o Knee Intra-articular Injection without US Guidance [facility] ([id number]) - 715.16/M17.12 - 04/14/2020   Nat ALONSO  o Knee (Left) [facility] ([id number]-GL) - 719.46/M25.569 - 04/14/2020  o Knee (Right) [facility] ([id number]-XF) - 719.46/M25.569 - 04/14/2020  · Medications  o Medications have been Reconciled  o Transition of Care or Provider Policy  · Instructions  o Reviewed the patient's Past Medical, Social, and Family history as well as the ROS at today's visit, no changes.  o Call or return if worsening symptoms.  o Follow up in 1 week.  o Electronically Identified Patient Education Materials Provided Electronically            Electronically Signed by: JOY DeniseC -Author on April 28, 2020 02:38:21 PM

## 2021-05-12 NOTE — PROGRESS NOTES
Progress Note      Patient Name: Kristin Aldrich   Patient ID: [id number]   Sex: Female   YOB: 1975    Primary Care Provider: Kaylin PARRISH   Referring Provider: Kaylin PARRISH    Visit Date: April 21, 2020    Provider: Nat Vance PA-C   Location: [facility]   Location Address: [address] [place] [address]  [id number]   Location Phone: [phone]          Chief Complaint  · Follow up left knee pain      History Of Present Illness  Kristin Aldrich is a 44 year old /White female who presents today to [facility].      She is here for left knee Euflexxa injection #3/3.       Past Medical History  Allergies; Anxiety; Arthritis; Asthma; Bipolar disorder; Broken Bones; C. difficile diarrhea; Carpal tunnel syndrome; Chronic bronchitis; Chronic Obstructive Pulmonary Disease; COPD (chronic obstructive pulmonary disease); DDD (degenerative disc disease), cervical; Depression; Forgetfulness; Gall stones; Head injury; Hemorrhoids; Hiatal hernia; Hyperlipemia; Hyperlipidemia; Hypertension; IgA deficiency; Incontinence; Insulin resistance; Irritable bowel syndrome; Limb Swelling; Lymphedema; Migraine headache; MRSA infection; Neurologic disorder; Neuropathy; Polydipsia; Polyuria; Primary osteoarthritis of left knee; Psychiatric Care; PTSD (post-traumatic stress disorder); Reflux; Restless leg syndrome; Ringing in ears; Seasonal allergies; Shortness of Breath; Sinus trouble; Skin Disease; Sleep apnea; Slow transit constipation; Thyroid disorder; Vitamin B deficiency         Past Surgical History  Appendectomy; Carpal Tunnel Release; Cholecystecomy; Colonoscopy; Gallbladder; Gastric Sleeve; Hernia         Medication List  albuterol sulfate oral; amitriptyline 150 mg oral tablet; amlodipine 5 mg oral tablet; aripiprazole 5 mg oral tablet; atorvastatin 10 mg oral tablet; Breo Ellipta 100-25 mcg/dose inhalation blister with device; buspirone 15 mg oral tablet;  cetirizine 10 mg oral tablet; chlorhexidine gluconate 0.12 % mucous membrane mouthwash; docusate sodium 100 mg oral capsule; duloxetine 30 mg oral capsule,delayed release(DR/EC); EpiPen 0.3 mg/0.3 mL injection auto-injector; Flonase Allergy Relief 50 mcg/actuation nasal spray,suspension; furosemide 40 mg oral tablet; Linzess 145 mcg oral capsule; liothyronine 25 mcg oral tablet; nadolol 20 mg oral tablet; Nyamyc 100,000 unit/gram topical powder; oxybutynin chloride 5 mg oral tablet extended release 24hr; potassium chloride 10 mEq oral tablet,ER particles/crystals; Singulair 10 mg oral tablet; sucralfate 100 mg/mL oral suspension; topiramate 200 mg oral tablet; Ultram 50 mg oral tablet; Unithroid 200 mcg oral tablet         Allergy List  Bee Stings; NSAIDS; Peppers       Allergies Reconciled  Family Medical History  Stroke; Heart Disease; Diabetes, unspecified type; Diabetes; Alcoholism in family; Blood Diseases; Family history of certain chronic disabling diseases; arthritis; Osteoporosis; Family history of Arthritis         Social History  Alcohol (Never); .; lives with other; lives with spouse; Recreational Drug Use (Never); Tobacco (Never); Unemployed.         Review of Systems  · Constitutional  o Denies  o : fever, chills, weight loss  · Cardiovascular  o Denies  o : chest pain, shortness of breath  · Gastrointestinal  o Denies  o : liver disease, heartburn, nausea, blood in stools  · Genitourinary  o Denies  o : painful urination, blood in urine  · Integument  o Denies  o : rash, itching  · Neurologic  o Denies  o : headache, weakness, loss of consciousness  · Musculoskeletal  o Admits  o : painful, swollen joints  · Psychiatric  o Denies  o : drug/alcohol addiction, anxiety, depression      Vitals  Date Time BP Position Site L\R Cuff Size HR RR TEMP (F) WT  HT  BMI kg/m2 BSA m2 O2 Sat        04/21/2020 02:09 PM      96 - R   301lbs 0oz 5'   58.78 2.4 99 %          Physical  Examination  · Constitutional  o Appearance  o : well developed, well-nourished, no obvious deformities present  · Head and Face  o Head  o :   § Inspection  § : normocephalic  o Face  o :   § Inspection  § : no facial lesions  · Eyes  o Conjunctivae  o : conjunctivae normal  o Sclerae  o : sclerae white  · Ears, Nose, Mouth and Throat  o Ears  o :   § External Ears  § : appearance within normal limits  § Hearing  § : intact  o Nose  o :   § External Nose  § : appearance normal  · Neck  o Inspection/Palpation  o : normal appearance  o Range of Motion  o : full range of motion  · Respiratory  o Respiratory Effort  o : breathing unlabored  o Inspection of Chest  o : normal appearance  o Auscultation of Lungs  o : no audible wheezing or rales  · Cardiovascular  o Heart  o : regular rate  · Gastrointestinal  o Abdominal Examination  o : soft and non-tender  · Skin and Subcutaneous Tissue  o General Inspection  o : intact, no rashes  · Psychiatric  o General  o : Alert and oriented x3  o Judgement and Insight  o : judgment and insight intact  o Mood and Affect  o : mood normal, affect appropriate  · Left Knee  o Inspection  o : BILATERAL KNEE: Ambulating with a walker. Antalgic gait. Pain with movement. Range of motion 0-115. Tender joint lines. Thigh to calf mismatch bilaterally. Minimal muscle mass. + Instability varus/valgus stress. Positive crepitus. Neurovascularly intact. Sensation grossly intact. Pulses normal. Calf supple; non-tender.   · Injection Note/Aspiration Note  o Site  o : left knee   o Procedure  o : Procedure: After educating the patient, patient gave consent for procedure. After using Chloraprep, the joint space was injected. The patient tolerated the procedure well.   o Medication  o : Euflexxa, 20 mg           Assessment  · Primary osteoarthritis of left knee     715.16/M17.12  · Pain: Knee     719.46/M25.569      Plan  · Orders  o Euflexxa per dose () - 715.16/M17.12 - 04/21/2020   Lot [id number]  manufactured by [employer] 12 2020  o Knee Intra-articular Injection without US Guidance [facility] (00119) - 715.16/M17.12 - 04/21/2020   Administered by Nat ALONSO  · Medications  o Medications have been Reconciled  o Transition of Care or Provider Policy  · Instructions  o Reviewed the patient's Past Medical, Social, and Family history as well as the ROS at today's visit, no changes.  o Call or return if worsening symptoms.  o Follow Up PRN.            Electronically Signed by: GAVIN Denise-C -Author on April 21, 2020 02:31:00 PM

## 2021-05-13 NOTE — PROGRESS NOTES
"   Progress Note      Patient Name: Kristin Aldrich   Patient ID: [id number]   Sex: Female   YOB: 1975    Primary Care Provider: Kaylin PARRISH   Referring Provider: Kaylin PARRISH    Visit Date: September 24, 2020    Provider: Nat Vance PA-C   Location: [facility]   Location Address: [address] [place] [address]  [id number]   Location Phone: [phone]          Chief Complaint  · left knee pain  · right knee pain      History Of Present Illness  Kristin Aldrich is a 45 year old /White female who presents today to [facility].      R. She is in the process of getting bilateral knee offloader braces. She states her knee pain progressed when Euflexxa wore off.\">She is here for bilateral knee pain L>R. She is in the process of getting bilateral knee offloader braces. She states her knee pain progressed when Euflexxa wore off.       Past Medical History  Allergies; Anxiety; Arthritis; Asthma; Bipolar disorder; Bladder Disorder; Broken Bones; C. difficile diarrhea; Carpal tunnel syndrome; Chronic bronchitis; Chronic Obstructive Pulmonary Disease; COPD (chronic obstructive pulmonary disease); DDD (degenerative disc disease), cervical; Depression; Forgetfulness; Gall stones; Head injury; Hemorrhoids; Hiatal hernia; Hyperlipemia; Hyperlipidemia; Hypertension; IgA deficiency; Incontinence; Insulin resistance; Irritable bowel syndrome; Limb Swelling; Lymphedema; Migraine headache; MRSA infection; Neurologic disorder; Neuropathy; Polydipsia; Polyuria; Primary osteoarthritis of left knee; Psychiatric Care; PTSD (post-traumatic stress disorder); Reflux; Restless leg syndrome; Ringing in ears; Seasonal allergies; Shortness of Breath; Sinus trouble; Skin Disease; Sleep apnea; Slow transit constipation; Thyroid disorder; Vitamin B deficiency         Past Surgical History  Appendectomy; Carpal Tunnel Release; Cholecystecomy; Colonoscopy; Gallbladder; Gastric " Sleeve; Hernia         Medication List  albuterol sulfate oral; amitriptyline 150 mg oral tablet; amlodipine 5 mg oral tablet; aripiprazole 5 mg oral tablet; atorvastatin 10 mg oral tablet; Breo Ellipta 100-25 mcg/dose inhalation blister with device; buspirone 15 mg oral tablet; cetirizine 10 mg oral tablet; chlorhexidine gluconate 0.12 % mucous membrane mouthwash; docusate sodium 100 mg oral capsule; duloxetine 30 mg oral capsule,delayed release(DR/EC); EpiPen 0.3 mg/0.3 mL injection auto-injector; Flonase Allergy Relief 50 mcg/actuation nasal spray,suspension; furosemide 40 mg oral tablet; Linzess 145 mcg oral capsule; liothyronine 25 mcg oral tablet; Nyamyc 100,000 unit/gram topical powder; oxybutynin chloride 5 mg oral tablet extended release 24hr; potassium chloride 10 mEq oral tablet,ER particles/crystals; Singulair 10 mg oral tablet; sucralfate 100 mg/mL oral suspension; Unithroid 200 mcg oral tablet         Allergy List  Bee Stings; NSAIDS; Peppers       Allergies Reconciled  Family Medical History  Stroke; Heart Disease; Diabetes, unspecified type; Diabetes; Alcoholism in family; Blood Diseases; Family history of certain chronic disabling diseases; arthritis; Osteoporosis; Family history of Arthritis         Social History  Alcohol (Never); Alcohol Use (Never); lives with spouse; .; Recreational Drug Use (Never); Retired.; Tobacco (Never); Unemployed.         Review of Systems  · Constitutional  o Denies  o : fever, chills, weight loss  · Cardiovascular  o Denies  o : chest pain, shortness of breath  · Gastrointestinal  o Denies  o : liver disease, heartburn, nausea, blood in stools  · Genitourinary  o Denies  o : painful urination, blood in urine  · Integument  o Denies  o : rash, itching  · Neurologic  o Denies  o : headache, weakness, loss of consciousness  · Musculoskeletal  o Admits  o : painful, swollen joints  · Psychiatric  o Denies  o : drug/alcohol addiction, anxiety,  depression      Vitals  Date Time BP Position Site L\R Cuff Size HR RR TEMP (F) WT  HT  BMI kg/m2 BSA m2 O2 Sat HC       09/24/2020 01:46 PM      71 - R   349lbs 16oz 5'   68.35 2.59 97 %          Physical Examination  · Constitutional  o Appearance  o : well developed, well-nourished, no obvious deformities present  · Head and Face  o Head  o :   § Inspection  § : normocephalic  o Face  o :   § Inspection  § : no facial lesions  · Eyes  o Conjunctivae  o : conjunctivae normal  o Sclerae  o : sclerae white  · Ears, Nose, Mouth and Throat  o Ears  o :   § External Ears  § : appearance within normal limits  § Hearing  § : intact  o Nose  o :   § External Nose  § : appearance normal  · Neck  o Inspection/Palpation  o : normal appearance  o Range of Motion  o : full range of motion  · Respiratory  o Respiratory Effort  o : breathing unlabored  o Inspection of Chest  o : normal appearance  o Auscultation of Lungs  o : no audible wheezing or rales  · Cardiovascular  o Heart  o : regular rate  · Gastrointestinal  o Abdominal Examination  o : soft and non-tender  · Skin and Subcutaneous Tissue  o General Inspection  o : intact, no rashes  · Psychiatric  o General  o : Alert and oriented x3  o Judgement and Insight  o : judgment and insight intact  o Mood and Affect  o : mood normal, affect appropriate  · Extremities  o Extremities  o : BILATERAL KNEES: Ambulating with a walker. Antalgic gait. Pain with movement. Range of motion 0-115. Tender joint lines. Thigh to calf mismatch bilaterally. Minimal muscle mass. + Instability varus/valgus stress. Positive crepitus. Neurovascularly intact. Sensation grossly intact. Pulses normal. Calf supple; non-tender  · Injection Note/Aspiration Note  o Site  o : right knee and left knee  o Procedure  o : Procedure: After educating the patient, patient gave consent for procedure. After using Chloraprep, the joint space was injected. The patient tolerated the procedure well.    o Medication  o : 80 mg of DepoMedrol with 9cc of 1% Lidocaine for right knee and 80 mg of DepoMedrol with 9cc of 1% Lidocaine for left knee  · In Office Procedures  o View  o : LAT/SUNRISE/STANDING   o Site  o : right, knee and left knee  o Indication  o : bilateral knee pain  o Study  o : X-rays ordered, taken in the office, and reviewed today.  o Xray  o : severe left knee OA, moderated right knee OA  o Comparative Data  o : No comparative data found          Assessment  · Primary osteoarthritis of right knee     715.16/M17.11  · Primary osteoarthritis of left knee     715.16/M17.12  · Pain in both knees, unspecified chronicity       Pain in right knee     719.46/M25.561  Pain in left knee     719.46/M25.562      Plan  · Orders  o 2 - Depo-Medrol injection 80mg () - - 09/24/2020   Lot [id number] Exp 07 21 [employer] Administered by Nat GUERRA  o 2 - Knee Intra-articular Injection without US [facility] ([id number]) - - 09/24/2020   Lot [id number] Exp 08 01 21 [employer] Administered by Nat GUERRA  o Knee (Left) [facility] ([id number]) - 719.46/M25.562 - 09/24/2020  o Knee (Right) [facility] ([id number]) - 719.46/M25.561 - 09/24/2020  · Medications  o Medications have been Reconciled  o Transition of Care or Provider Policy  · Instructions  o Reviewed the patient's Past Medical, Social, and Family history as well as the ROS at today's visit, no changes.  o Call or return if worsening symptoms.  o Bilateral knee injunctions. Follow up for gel injection.   o Electronically Identified Patient Education Materials Provided Electronically            Electronically Signed by: JOY DeniseC -Author on September 24, 2020 03:05:18 PM

## 2021-05-13 NOTE — PROGRESS NOTES
Progress Note      Patient Name: Kristin Aldrich   Patient ID: [id number]   Sex: Female   YOB: 1975    Primary Care Provider: Kaylin PARRISH   Referring Provider: Kaylin PARRISH    Visit Date: November 30, 2020    Provider: Ky Noguera MD   Location: [facility]   Location Address: [address] [place] [address]  [id number]   Location Phone: [phone]          Chief Complaint     F/u visit for migraines.       History Of Present Illness  Kristin Aldrich is a 45 year old /White female who presents today to [facility] today referred from Kaylin PARRISH.      45-year-old woman here for follow for headaches.  She states that after she took Aimovig 140 mg in July it lasted for about 1 to 2 months and then the headaches came back.  She is getting 3-4 headaches a week at this time and they can last an hour from 3 hours the whole day.  It is the same headache that she had in the past.  She has failed amitriptyline and topiramate in the past given to her by another neurologist.  She states that she is supposed to see a sleep apnea doctor but she has not seen them at this time.  She states that she is really not under a lot of stress.  She is had his headaches on a daily basis but that is mild to moderate since 2014.       Past Medical History  Allergies; Anxiety; Arthritis; Asthma; Bipolar disorder; Bladder Disorder; Broken Bones; C. difficile diarrhea; Carpal tunnel syndrome; Chronic bronchitis; Chronic Obstructive Pulmonary Disease; COPD (chronic obstructive pulmonary disease); DDD (degenerative disc disease), cervical; Depression; Forgetfulness; Gall stones; Head injury; Hemorrhoids; Hiatal hernia; Hyperlipemia; Hyperlipidemia; Hypertension; IgA deficiency; Incontinence; Insulin resistance; Irritable bowel syndrome; Limb Swelling; Lymphedema; Migraine headache; MRSA infection; Neurologic disorder; Neuropathy; Polydipsia;  Polyuria; Primary osteoarthritis of left knee; Psychiatric Care; PTSD (post-traumatic stress disorder); Reflux; Restless leg syndrome; Ringing in ears; Seasonal allergies; Shortness of Breath; Sinus trouble; Skin Disease; Sleep apnea; Slow transit constipation; Thyroid disorder; Vitamin B deficiency         Past Surgical History  Appendectomy; Carpal Tunnel Release; Cholecystecomy; Colonoscopy; Gallbladder; Gastric Sleeve; Hernia         Medication List  albuterol sulfate oral; amitriptyline 150 mg oral tablet; amlodipine 5 mg oral tablet; aripiprazole 5 mg oral tablet; atorvastatin 10 mg oral tablet; Breo Ellipta 100-25 mcg/dose inhalation blister with device; buspirone 15 mg oral tablet; cetirizine 10 mg oral tablet; chlorhexidine gluconate 0.12 % mucous membrane mouthwash; cyanocobalamin (vitamin B-12) oral; docusate sodium 100 mg oral capsule; duloxetine 30 mg oral capsule,delayed release(DR/EC); EpiPen 0.3 mg/0.3 mL injection auto-injector; Flonase Allergy Relief 50 mcg/actuation nasal spray,suspension; furosemide 40 mg oral tablet; Linzess 145 mcg oral capsule; liothyronine 25 mcg oral tablet; Nyamyc 100,000 unit/gram topical powder; oxybutynin chloride 5 mg oral tablet extended release 24hr; potassium chloride 10 mEq oral tablet,ER particles/crystals; Singulair 10 mg oral tablet; sucralfate 100 mg/mL oral suspension; Unithroid 200 mcg oral tablet         Allergy List  Bee Stings; NSAIDS; Peppers         Family Medical History  Stroke; Heart Disease; Diabetes, unspecified type; Diabetes; Alcoholism in family; Blood Diseases; Family history of certain chronic disabling diseases; arthritis; Osteoporosis; Family history of Arthritis         Social History  Alcohol (Never); lives with spouse; .; Recreational Drug Use (Never); Retired.; Tobacco (Never); Unemployed.         Review of Systems  · Constitutional  o Admits  o : chills, fatigue  o Denies  o : excessive sweating, fever, sycope/passing out, weight  gain, weight loss  · Eyes  o Denies  o : changes in vision, blurry vision, double vision  · HENT  o Admits  o : ringing in the ears, ear aches, seasonal allergies  o Denies  o : loss of hearing, sore throat, nasal congestion, sinus pain, nose bleeds  · Cardiovascular  o Denies  o : blood clots, swollen legs, anemia, easy burising or bleeding, transfusions  · Respiratory  o Admits  o : shortness of breath, COPD  o Denies  o : dry cough, productive cough, pneumonia  · Gastrointestinal  o Denies  o : difficulty swallowing, reflux  · Genitourinary  o Denies  o : incontinence  · Neurologic  o Admits  o : headache, loss of balance, difficulty with sleep, numbness/tingling/paresthesia   o Denies  o : seizure, stroke, tremor, falls, dizziness/vertigo, difficulty with coordination, difficulty with dexterity, weakness  · Musculoskeletal  o Admits  o : neck stiffness/pain, muscle aches, joint pain, pain radiating in arm, pain radiating in leg  o Denies  o : swollen lymph nodes, weakness, spasms, sciatica, low back pain  · Endocrine  o Admits  o : thyroid disorder  o Denies  o : diabetes  · Psychiatric  o Admits  o : anxiety, depression      Vitals  Date Time BP Position Site L\R Cuff Size HR RR TEMP (F) WT  HT  BMI kg/m2 BSA m2 O2 Sat FR L/min FiO2 HC       11/30/2020 03:22 PM        96.7           11/30/2020 03:31 /82 Sitting    86 - R   350lbs 16oz 5'   68.55 2.6             Physical Examination     She is alert, fluent, phasic, follows commands well.  Optic disks are normal bilaterally.  Heart is regular rhythm normal in rate.  She is losing a Rollator walker.  She injured her left foot.  There is a brace on her left foot.           Assessment  · Migraine headache without aura     346.10/G43.009  I discussed with her that she has chronic migraine without aura and chronic tension headaches that is being made worse by lack of sleep and other stressors in her life. I discussed with her that Aimovig is not effective for  her. She is to continue taking it until she starts getting Botox injections. I will set her up to see Elham PARRISH to administer Botox injections. I explained to her the areas where the Botox injections is going to be given to her.    15 minutes was spent for this low complexity visit more than half the time was spent face with the patient for examination, counseling, planning and recommendations.      Plan  · Medications  o Medications have been Reconciled  o Transition of Care or Provider Policy  · Instructions  o Encouraged to follow-up with Primary Care Provider for preventative care.            Electronically Signed by: Ky Noguera MD -Author on November 30, 2020 04:24:21 PM

## 2021-05-13 NOTE — PROGRESS NOTES
"   Progress Note      Patient Name: Kristin Aldrich   Patient ID: [id number]   Sex: Female   YOB: 1975    Primary Care Provider: Kaylin PARRISH   Referring Provider: Kaylin PARRISH    Visit Date: June 2, 2020    Provider: Jocelyn Bloom MD   Location: [facility]   Location Address: [address] [place] [address]  [id number]   Location Phone: [phone]          Chief Complaint  · Bilateral Carpal Tunnel Syndrome      History Of Present Illness  Kristin Aldrich is a 44 year old /White female who presents today to [facility].      right. Patient also has complaints of left thumb triggering. Patient states decreased  strength. Patient has been bracing with minimal relief. Patient has a history of left CTR and excision of ganglion cyst of first MCP joint 2015.     \">She is here for evaluation of bilateral hand numbness/tingling, left > right. Patient also has complaints of left thumb triggering. Patient states decreased  strength. Patient has been bracing with minimal relief. Patient has a history of left CTR and excision of ganglion cyst of first MCP joint 2015.            Past Medical History  Allergies; Anxiety; Arthritis; Asthma; Bipolar disorder; Broken Bones; C. difficile diarrhea; Carpal tunnel syndrome; Chronic bronchitis; Chronic Obstructive Pulmonary Disease; COPD (chronic obstructive pulmonary disease); DDD (degenerative disc disease), cervical; Depression; Forgetfulness; Gall stones; Head injury; Hemorrhoids; Hiatal hernia; Hyperlipemia; Hyperlipidemia; Hypertension; IgA deficiency; Incontinence; Insulin resistance; Irritable bowel syndrome; Limb Swelling; Lymphedema; Migraine headache; MRSA infection; Neurologic disorder; Neuropathy; Polydipsia; Polyuria; Primary osteoarthritis of left knee; Psychiatric Care; PTSD (post-traumatic stress disorder); Reflux; Restless leg syndrome; Ringing in ears; Seasonal allergies; Shortness of Breath; Sinus " trouble; Skin Disease; Sleep apnea; Slow transit constipation; Thyroid disorder; Vitamin B deficiency         Past Surgical History  Appendectomy; Carpal Tunnel Release; Cholecystecomy; Colonoscopy; Gallbladder; Gastric Sleeve; Hernia         Medication List  albuterol sulfate oral; amitriptyline 150 mg oral tablet; amlodipine 5 mg oral tablet; aripiprazole 5 mg oral tablet; atorvastatin 10 mg oral tablet; Breo Ellipta 100-25 mcg/dose inhalation blister with device; buspirone 15 mg oral tablet; cetirizine 10 mg oral tablet; chlorhexidine gluconate 0.12 % mucous membrane mouthwash; docusate sodium 100 mg oral capsule; duloxetine 30 mg oral capsule,delayed release(DR/EC); EpiPen 0.3 mg/0.3 mL injection auto-injector; Flonase Allergy Relief 50 mcg/actuation nasal spray,suspension; furosemide 40 mg oral tablet; Linzess 145 mcg oral capsule; liothyronine 25 mcg oral tablet; nadolol 20 mg oral tablet; Nyamyc 100,000 unit/gram topical powder; oxybutynin chloride 5 mg oral tablet extended release 24hr; potassium chloride 10 mEq oral tablet,ER particles/crystals; Singulair 10 mg oral tablet; sucralfate 100 mg/mL oral suspension; topiramate 200 mg oral tablet; Ultram 50 mg oral tablet; Unithroid 200 mcg oral tablet         Allergy List  Bee Stings; NSAIDS; Peppers         Family Medical History  Stroke; Heart Disease; Diabetes, unspecified type; Diabetes; Alcoholism in family; Blood Diseases; Family history of certain chronic disabling diseases; arthritis; Osteoporosis; Family history of Arthritis         Social History  Alcohol (Never); .; lives with other; lives with spouse; Recreational Drug Use (Never); Tobacco (Never); Unemployed.         Review of Systems  · Constitutional  o Denies  o : fever, chills, weight loss  · Cardiovascular  o Denies  o : chest pain, shortness of breath  · Gastrointestinal  o Denies  o : liver disease, heartburn, nausea, blood in stools  · Genitourinary  o Denies  o : painful urination,  blood in urine  · Integument  o Denies  o : rash, itching  · Neurologic  o Admits  o : weakness  o Denies  o : headache, loss of consciousness  · Musculoskeletal  o Denies  o : painful, swollen joints  · Psychiatric  o Denies  o : drug/alcohol addiction, anxiety, depression      Vitals  Date Time BP Position Site L\R Cuff Size HR RR TEMP (F) WT  HT  BMI kg/m2 BSA m2 O2 Sat HC       06/02/2020 02:51 PM         300lbs 0oz 5'   58.59 2.4           Physical Examination  · Constitutional  o Appearance  o : well developed, well-nourished, no obvious deformities present  · Head and Face  o Head  o :   § Inspection  § : normocephalic  o Face  o :   § Inspection  § : no facial lesions  · Eyes  o Conjunctivae  o : conjunctivae normal  o Sclerae  o : sclerae white  · Ears, Nose, Mouth and Throat  o Ears  o :   § External Ears  § : appearance within normal limits  § Hearing  § : intact  o Nose  o :   § External Nose  § : appearance normal  · Neck  o Inspection/Palpation  o : normal appearance  o Range of Motion  o : full range of motion  · Respiratory  o Respiratory Effort  o : breathing unlabored  o Inspection of Chest  o : normal appearance  o Auscultation of Lungs  o : no audible wheezing or rales  · Cardiovascular  o Heart  o : regular rate  · Gastrointestinal  o Abdominal Examination  o : soft and non-tender  · Skin and Subcutaneous Tissue  o General Inspection  o : intact, no rashes  · Psychiatric  o General  o : Alert and oriented x3  o Judgement and Insight  o : judgment and insight intact  o Mood and Affect  o : mood normal, affect appropriate  · Extremities  o Extremities  o : BILATERAL HAND/WRIST: Full digit and wrist range of motion. Full fist. Tender A1 pulley of thumb, left. Decreased sensation. Pulses normal.           Assessment  · Bilateral carpal tunnel syndrome     354.0/G56.03      Plan  · Medications  o Medications have been Reconciled  o Transition of Care or Provider Policy  · Instructions  o Reviewed the  patient's Past Medical, Social, and Family history as well as the ROS at today's visit, no changes.  o Call or return if worsening symptoms.  o The above service was scribed by Rachel Sheldon on my behalf and I attest to the accuracy of the note. mc  o The plan is an EMG/NCS to check for carpal tunnel syndrome. Follow-up after EMG/NCS.            Electronically Signed by: Aby Sheldon - , Other -Author on June 4, 2020 09:25:49 AM  Electronically Co-signed by: Jocelyn Bloom MD -Reviewer on June 4, 2020 11:14:14 AM

## 2021-05-13 NOTE — PROGRESS NOTES
Progress Note      Patient Name: Kristin Aldrich   Patient ID: [id number]   Sex: Female   YOB: 1975    Primary Care Provider: Kaylin PARRISH   Referring Provider: Kaylin PARRISH    Visit Date: June 23, 2020    Provider: Jocelyn Bloom MD   Location: [facility]   Location Address: [address] [place] [address]  [id number]   Location Phone: [phone]          Chief Complaint  · Bilateral Hand Numbness - EMG Results      History Of Present Illness  Kristin Aldrich is a 44 year old /White female who presents today to [facility].      Patient presents today for follow-up of bilateral CTS symptoms and follow-up from the EMG. Patient reports she continues to drop items and has numbness and tingling still. Her recent EMG did not reveal CTS. Patient has been trying bracing without much relief.       Past Medical History  Allergies; Anxiety; Arthritis; Asthma; Bipolar disorder; Broken Bones; C. difficile diarrhea; Carpal tunnel syndrome; Chronic bronchitis; Chronic Obstructive Pulmonary Disease; COPD (chronic obstructive pulmonary disease); DDD (degenerative disc disease), cervical; Depression; Forgetfulness; Gall stones; Head injury; Hemorrhoids; Hiatal hernia; Hyperlipemia; Hyperlipidemia; Hypertension; IgA deficiency; Incontinence; Insulin resistance; Irritable bowel syndrome; Limb Swelling; Lymphedema; Migraine headache; MRSA infection; Neurologic disorder; Neuropathy; Polydipsia; Polyuria; Primary osteoarthritis of left knee; Psychiatric Care; PTSD (post-traumatic stress disorder); Reflux; Restless leg syndrome; Ringing in ears; Seasonal allergies; Shortness of Breath; Sinus trouble; Skin Disease; Sleep apnea; Slow transit constipation; Thyroid disorder; Vitamin B deficiency         Past Surgical History  Appendectomy; Carpal Tunnel Release; Cholecystecomy; Colonoscopy; Gallbladder; Gastric Sleeve; Hernia         Medication List  albuterol sulfate oral;  amitriptyline 150 mg oral tablet; amlodipine 5 mg oral tablet; aripiprazole 5 mg oral tablet; atorvastatin 10 mg oral tablet; Breo Ellipta 100-25 mcg/dose inhalation blister with device; buspirone 15 mg oral tablet; cetirizine 10 mg oral tablet; chlorhexidine gluconate 0.12 % mucous membrane mouthwash; docusate sodium 100 mg oral capsule; duloxetine 30 mg oral capsule,delayed release(DR/EC); EpiPen 0.3 mg/0.3 mL injection auto-injector; Flonase Allergy Relief 50 mcg/actuation nasal spray,suspension; furosemide 40 mg oral tablet; Linzess 145 mcg oral capsule; liothyronine 25 mcg oral tablet; nadolol 20 mg oral tablet; Nyamyc 100,000 unit/gram topical powder; oxybutynin chloride 5 mg oral tablet extended release 24hr; potassium chloride 10 mEq oral tablet,ER particles/crystals; Singulair 10 mg oral tablet; sucralfate 100 mg/mL oral suspension; topiramate 200 mg oral tablet; Ultram 50 mg oral tablet; Unithroid 200 mcg oral tablet         Allergy List  Bee Stings; NSAIDS; Peppers       Allergies Reconciled  Family Medical History  Stroke; Heart Disease; Diabetes, unspecified type; Diabetes; Alcoholism in family; Blood Diseases; Family history of certain chronic disabling diseases; arthritis; Osteoporosis; Family history of Arthritis         Social History  Alcohol (Never); .; lives with other; lives with spouse; Recreational Drug Use (Never); Tobacco (Never); Unemployed.         Review of Systems  · Constitutional  o Denies  o : fever, chills, weight loss  · Cardiovascular  o Denies  o : chest pain, shortness of breath  · Gastrointestinal  o Denies  o : liver disease, heartburn, nausea, blood in stools  · Genitourinary  o Denies  o : painful urination, blood in urine  · Integument  o Denies  o : rash, itching  · Neurologic  o Denies  o : headache, weakness, loss of consciousness  · Musculoskeletal  o Denies  o : painful, swollen joints  · Psychiatric  o Denies  o : drug/alcohol addiction, anxiety,  depression      Vitals  Date Time BP Position Site L\R Cuff Size HR RR TEMP (F) WT  HT  BMI kg/m2 BSA m2 O2 Sat HC       06/23/2020 01:30 PM      77 - R   300lbs 0oz 5'   58.59 2.4 99 %          Physical Examination  · Constitutional  o Appearance  o : well developed, well-nourished, no obvious deformities present  · Head and Face  o Head  o :   § Inspection  § : normocephalic  o Face  o :   § Inspection  § : no facial lesions  · Eyes  o Conjunctivae  o : conjunctivae normal  o Sclerae  o : sclerae white  · Ears, Nose, Mouth and Throat  o Ears  o :   § External Ears  § : appearance within normal limits  § Hearing  § : intact  o Nose  o :   § External Nose  § : appearance normal  · Neck  o Inspection/Palpation  o : normal appearance  o Range of Motion  o : full range of motion  · Respiratory  o Respiratory Effort  o : breathing unlabored  o Inspection of Chest  o : normal appearance  o Auscultation of Lungs  o : no audible wheezing or rales  · Cardiovascular  o Heart  o : regular rate  · Gastrointestinal  o Abdominal Examination  o : soft and non-tender  · Skin and Subcutaneous Tissue  o General Inspection  o : intact, no rashes  · Psychiatric  o General  o : Alert and oriented x3  o Judgement and Insight  o : judgment and insight intact  o Mood and Affect  o : mood normal, affect appropriate  · Extremities  o Extremities  o : intact finger and wrist ROM of the bilateral hands, skin intact, tender A1 pulley of thumb, left. Decreased sensation. Pulses normal.  · Imaging  o Imaging  o : EMG revealed a normal study          Assessment  · Arthralgia of both hands       Pain in joints of right hand     719.44/M25.541  Pain in joints of left hand     719.44/M25.542  · Numbness of bilateral hands     782.0/R20.0      Plan  · Medications  o Medications have been Reconciled  o Transition of Care or Provider Policy  · Instructions  o Reviewed the patient's Past Medical, Social, and Family history as well as the ROS at today's  visit, no changes.  o Call or return if worsening symptoms.  o The above service was scribed by Hazel Guerra on my behalf and I attest to the accuracy of the note.   o Plan to continue with braces and hand therapy. She will follow-up as needed.             Electronically Signed by: Hazel Guerra-, Other -Author on June 25, 2020 11:23:06 AM  Electronically Co-signed by: oJcelyn Bloom MD -Reviewer on June 25, 2020 12:14:56 PM

## 2021-05-13 NOTE — PROGRESS NOTES
Progress Note      Patient Name: Kristin Aldrich   Patient ID: [id number]   Sex: Female   YOB: 1975    Primary Care Provider: Kaylin PARRISH   Referring Provider: Kaylin PARRISH    Visit Date: September 15, 2020    Provider: Jocelyn Bloom MD   Location: [facility]   Location Address: [address] [place] [address]  [id number]   Location Phone: [phone]          Chief Complaint  · Bilateral Hand Numbness      History Of Present Illness  Kristin Aldrich is a 45 year old /White female who presents today to [facility].      Patient presents today with a follow-up of bilateral hand numbness. Previous visit on 6/23/2020, patient would drop items and has numbness and tingling still. From her last visit she had an EMG that did not reveal CTS. Patient also previously tried bracing without much relief. Patient presents today with a walker for ambulation assistance. Patient switched doctors and went to see Dr. Noguera and re-did her EMG and was told she did have carpal tunnel. Patient states she has been wearing her braces at night and she is still experiencing pain at night and the pain is increasing. Patient had done carpal tunnel release 6 years ago and they removed a ganglion cyst on left hand. Patient states that her left hand is worse than her right. Patient states the main source of pain is on her palm where her old incision was.             Past Medical History  Allergies; Anxiety; Arthritis; Asthma; Bipolar disorder; Bladder Disorder; Broken Bones; C. difficile diarrhea; Carpal tunnel syndrome; Chronic bronchitis; Chronic Obstructive Pulmonary Disease; COPD (chronic obstructive pulmonary disease); DDD (degenerative disc disease), cervical; Depression; Forgetfulness; Gall stones; Head injury; Hemorrhoids; Hiatal hernia; Hyperlipemia; Hyperlipidemia; Hypertension; IgA deficiency; Incontinence; Insulin resistance; Irritable bowel syndrome; Limb  Swelling; Lymphedema; Migraine headache; MRSA infection; Neurologic disorder; Neuropathy; Polydipsia; Polyuria; Primary osteoarthritis of left knee; Psychiatric Care; PTSD (post-traumatic stress disorder); Reflux; Restless leg syndrome; Ringing in ears; Seasonal allergies; Shortness of Breath; Sinus trouble; Skin Disease; Sleep apnea; Slow transit constipation; Thyroid disorder; Vitamin B deficiency         Past Surgical History  Appendectomy; Carpal Tunnel Release; Cholecystecomy; Colonoscopy; Gallbladder; Gastric Sleeve; Hernia         Medication List  albuterol sulfate oral; amitriptyline 150 mg oral tablet; amlodipine 5 mg oral tablet; aripiprazole 5 mg oral tablet; atorvastatin 10 mg oral tablet; Breo Ellipta 100-25 mcg/dose inhalation blister with device; buspirone 15 mg oral tablet; cetirizine 10 mg oral tablet; chlorhexidine gluconate 0.12 % mucous membrane mouthwash; docusate sodium 100 mg oral capsule; duloxetine 30 mg oral capsule,delayed release(DR/EC); EpiPen 0.3 mg/0.3 mL injection auto-injector; Flonase Allergy Relief 50 mcg/actuation nasal spray,suspension; furosemide 40 mg oral tablet; Linzess 145 mcg oral capsule; liothyronine 25 mcg oral tablet; nadolol 20 mg oral tablet; Nyamyc 100,000 unit/gram topical powder; oxybutynin chloride 5 mg oral tablet extended release 24hr; potassium chloride 10 mEq oral tablet,ER particles/crystals; Singulair 10 mg oral tablet; sucralfate 100 mg/mL oral suspension; topiramate 200 mg oral tablet; Unithroid 200 mcg oral tablet         Allergy List  Bee Stings; NSAIDS; Peppers       Allergies Reconciled  Family Medical History  Stroke; Heart Disease; Diabetes, unspecified type; Diabetes; Alcoholism in family; Blood Diseases; Family history of certain chronic disabling diseases; arthritis; Osteoporosis; Family history of Arthritis         Social History  Alcohol (Never); Alcohol Use (Never); lives with spouse; .; Recreational Drug Use (Never); Retired.; Tobacco  (Never); Unemployed.         Review of Systems  · Constitutional  o Denies  o : fever, chills, weight loss  · Cardiovascular  o Denies  o : chest pain, shortness of breath  · Gastrointestinal  o Denies  o : liver disease, heartburn, nausea, blood in stools  · Genitourinary  o Denies  o : painful urination, blood in urine  · Integument  o Denies  o : rash, itching  · Neurologic  o Denies  o : headache, weakness, loss of consciousness  · Musculoskeletal  o Denies  o : painful, swollen joints  · Psychiatric  o Denies  o : drug/alcohol addiction, anxiety, depression      Vitals  Date Time BP Position Site L\R Cuff Size HR RR TEMP (F) WT  HT  BMI kg/m2 BSA m2 O2 Sat HC       09/15/2020 01:10 PM      79 - R   349lbs 16oz 5'   68.35 2.59 96 %          Physical Examination  · Constitutional  o Appearance  o : well developed, well-nourished, no obvious deformities present  · Head and Face  o Head  o :   § Inspection  § : normocephalic  o Face  o :   § Inspection  § : no facial lesions  · Eyes  o Conjunctivae  o : conjunctivae normal  o Sclerae  o : sclerae white  · Ears, Nose, Mouth and Throat  o Ears  o :   § External Ears  § : appearance within normal limits  § Hearing  § : intact  o Nose  o :   § External Nose  § : appearance normal  · Neck  o Inspection/Palpation  o : normal appearance  o Range of Motion  o : full range of motion  · Respiratory  o Respiratory Effort  o : breathing unlabored  o Inspection of Chest  o : normal appearance  o Auscultation of Lungs  o : no audible wheezing or rales  · Cardiovascular  o Heart  o : regular rate  · Gastrointestinal  o Abdominal Examination  o : soft and non-tender  · Skin and Subcutaneous Tissue  o General Inspection  o : intact, no rashes  · Psychiatric  o General  o : Alert and oriented x3  o Judgement and Insight  o : judgment and insight intact  o Mood and Affect  o : mood normal, affect appropriate  · Extremities  o Extremities  o : Bilateral hands: Sensation grossly  intact. Neurovascular intact. Pulses normal. No swelling, skin discoloration or atrophy. Tender A1 pulley of thumb, left. Decreased sensation. Patient able to make a fist and wiggle fingers. Tenderness to palpation of palm.          Assessment  · History of Carpal Tunnel Syndrome on left     354.0/G56.00  · Arthralgia of both hands       Pain in joints of right hand     719.44/M25.541  Pain in joints of left hand     719.44/M25.542  · Hand/Palm pain, left     729.5/M79.642      Plan  · Medications  o Medications have been Reconciled  o Transition of Care or Provider Policy  · Instructions  o Dr. Bloom saw and examined the patient and agrees with plan.   o X-rays reviewed by Dr. Bloom.  o Reviewed the patient's Past Medical, Social, and Family history as well as the ROS at today's visit, no changes.  o Call or return if worsening symptoms.  o Follow Up in 1 month.  o This note was transcribed by Michell Kaur.   o Discussed diagnosis and treatment options with the patient. Patient referred to a hand specialist. Patient will follow-up 1 month.            Electronically Signed by: Michell Kaur-, Other -Author on September 17, 2020 10:45:15 AM  Electronically Co-signed by: Jocelyn Bloom MD -Reviewer on September 17, 2020 12:37:52 PM

## 2021-05-13 NOTE — PROGRESS NOTES
Progress Note      Patient Name: Kristin Aldrich   Patient ID: [id number]   Sex: Female   YOB: 1975    Primary Care Provider: Kaylin PARRISH   Referring Provider: Kaylin PARRISH    Visit Date: May 7, 2020    Provider: Nat Vance PA-C   Location: [facility]   Location Address: [address] [place] [address]  [id number]   Location Phone: [phone]          Chief Complaint  · Bilateral Knee Pain      History Of Present Illness  Kristin Aldrich is a 44 year old /White female who presents today to [facility].      She is here for right knee Euflexxa #2/3.       Past Medical History  Allergies; Anxiety; Arthritis; Asthma; Bipolar disorder; Broken Bones; C. difficile diarrhea; Carpal tunnel syndrome; Chronic bronchitis; Chronic Obstructive Pulmonary Disease; COPD (chronic obstructive pulmonary disease); DDD (degenerative disc disease), cervical; Depression; Forgetfulness; Gall stones; Head injury; Hemorrhoids; Hiatal hernia; Hyperlipemia; Hyperlipidemia; Hypertension; IgA deficiency; Incontinence; Insulin resistance; Irritable bowel syndrome; Limb Swelling; Lymphedema; Migraine headache; MRSA infection; Neurologic disorder; Neuropathy; Polydipsia; Polyuria; Primary osteoarthritis of left knee; Psychiatric Care; PTSD (post-traumatic stress disorder); Reflux; Restless leg syndrome; Ringing in ears; Seasonal allergies; Shortness of Breath; Sinus trouble; Skin Disease; Sleep apnea; Slow transit constipation; Thyroid disorder; Vitamin B deficiency         Past Surgical History  Appendectomy; Carpal Tunnel Release; Cholecystecomy; Colonoscopy; Gallbladder; Gastric Sleeve; Hernia         Medication List  albuterol sulfate oral; amitriptyline 150 mg oral tablet; amlodipine 5 mg oral tablet; aripiprazole 5 mg oral tablet; atorvastatin 10 mg oral tablet; Breo Ellipta 100-25 mcg/dose inhalation blister with device; buspirone 15 mg oral tablet; cetirizine 10 mg oral  tablet; chlorhexidine gluconate 0.12 % mucous membrane mouthwash; docusate sodium 100 mg oral capsule; duloxetine 30 mg oral capsule,delayed release(DR/EC); EpiPen 0.3 mg/0.3 mL injection auto-injector; Flonase Allergy Relief 50 mcg/actuation nasal spray,suspension; furosemide 40 mg oral tablet; Linzess 145 mcg oral capsule; liothyronine 25 mcg oral tablet; nadolol 20 mg oral tablet; Nyamyc 100,000 unit/gram topical powder; oxybutynin chloride 5 mg oral tablet extended release 24hr; potassium chloride 10 mEq oral tablet,ER particles/crystals; Singulair 10 mg oral tablet; sucralfate 100 mg/mL oral suspension; topiramate 200 mg oral tablet; Ultram 50 mg oral tablet; Unithroid 200 mcg oral tablet         Allergy List  Bee Stings; NSAIDS; Peppers       Allergies Reconciled  Family Medical History  Stroke; Heart Disease; Diabetes, unspecified type; Diabetes; Alcoholism in family; Blood Diseases; Family history of certain chronic disabling diseases; arthritis; Osteoporosis; Family history of Arthritis         Social History  Alcohol (Never); .; lives with other; lives with spouse; Recreational Drug Use (Never); Tobacco (Never); Unemployed.         Review of Systems  · Constitutional  o Denies  o : fever, chills, weight loss  · Cardiovascular  o Denies  o : chest pain, shortness of breath  · Gastrointestinal  o Denies  o : liver disease, heartburn, nausea, blood in stools  · Genitourinary  o Denies  o : painful urination, blood in urine  · Integument  o Denies  o : rash, itching  · Neurologic  o Denies  o : headache, weakness, loss of consciousness  · Musculoskeletal  o Admits  o : painful, swollen joints  · Psychiatric  o Denies  o : drug/alcohol addiction, anxiety, depression      Vitals  Date Time BP Position Site L\R Cuff Size HR RR TEMP (F) WT  HT  BMI kg/m2 BSA m2 O2 Sat        05/07/2020 10:20 AM      87 - R   301lbs 0oz 5'   58.78 2.4 97 %          Physical Examination  · Constitutional  o Appearance  o :  well developed, well-nourished, no obvious deformities present  · Head and Face  o Head  o :   § Inspection  § : normocephalic  o Face  o :   § Inspection  § : no facial lesions  · Eyes  o Conjunctivae  o : conjunctivae normal  o Sclerae  o : sclerae white  · Ears, Nose, Mouth and Throat  o Ears  o :   § External Ears  § : appearance within normal limits  § Hearing  § : intact  o Nose  o :   § External Nose  § : appearance normal  · Neck  o Inspection/Palpation  o : normal appearance  o Range of Motion  o : full range of motion  · Respiratory  o Respiratory Effort  o : breathing unlabored  o Inspection of Chest  o : normal appearance  o Auscultation of Lungs  o : no audible wheezing or rales  · Cardiovascular  o Heart  o : regular rate  · Gastrointestinal  o Abdominal Examination  o : soft and non-tender  · Skin and Subcutaneous Tissue  o General Inspection  o : intact, no rashes  · Psychiatric  o General  o : Alert and oriented x3  o Judgement and Insight  o : judgment and insight intact  o Mood and Affect  o : mood normal, affect appropriate  · Extremities  o Extremities  o : BILATERAL KNEE: Ambulating with a walker. Antalgic gait. Pain with movement. Range of motion 0-115. Tender joint lines. Thigh to calf mismatch bilaterally. Minimal muscle mass. + Instability varus/valgus stress. Positive crepitus. Neurovascularly intact. Sensation grossly intact. Pulses normal. Calf supple; non-tender.   · Injection Note/Aspiration Note  o Site  o : right knee  o Procedure  o : Procedure: After educating the patient, patient gave consent for procedure. After using Chloraprep, the joint space was injected. The patient tolerated the procedure well.   o Medication  o : Euflexxa, 20 mg               Assessment  · Primary osteoarthritis of right knee     715.16/M17.11      Plan  · Orders  o Euflexxa per dose () - 715.16/M17.11 - 05/07/2020   Lot [id number] Exp 12 16 2020 Martita Administered by CONRADO quick Knee Intra-articular  Injection without US Guidance [facility] (32006) - 715.16/M17.11 - 05/07/2020  · Medications  o Medications have been Reconciled  o Transition of Care or Provider Policy  · Instructions  o Reviewed the patient's Past Medical, Social, and Family history as well as the ROS at today's visit, no changes.  o Call or return if worsening symptoms.  o Follow up in 1 week.  o Electronically Identified Patient Education Materials Provided Electronically            Electronically Signed by: Nat Vance PA-C -Author on May 7, 2020 11:39:49 AM  Electronically Co-signed by: Jocelyn Bloom MD -Reviewer on May 8, 2020 09:46:27 AM

## 2021-05-14 VITALS — OXYGEN SATURATION: 98 % | HEART RATE: 92 BPM | WEIGHT: 293 LBS | BODY MASS INDEX: 57.52 KG/M2 | HEIGHT: 60 IN

## 2021-05-14 VITALS
HEART RATE: 86 BPM | HEIGHT: 60 IN | BODY MASS INDEX: 57.52 KG/M2 | TEMPERATURE: 96.7 F | WEIGHT: 293 LBS | DIASTOLIC BLOOD PRESSURE: 82 MMHG | SYSTOLIC BLOOD PRESSURE: 154 MMHG

## 2021-05-14 VITALS — HEART RATE: 92 BPM | WEIGHT: 293 LBS | HEIGHT: 60 IN | OXYGEN SATURATION: 96 % | BODY MASS INDEX: 57.52 KG/M2

## 2021-05-14 VITALS — BODY MASS INDEX: 57.52 KG/M2 | HEART RATE: 73 BPM | WEIGHT: 293 LBS | OXYGEN SATURATION: 99 % | HEIGHT: 60 IN

## 2021-05-14 VITALS — WEIGHT: 293 LBS | BODY MASS INDEX: 57.52 KG/M2 | HEART RATE: 93 BPM | OXYGEN SATURATION: 94 % | HEIGHT: 60 IN

## 2021-05-14 VITALS — OXYGEN SATURATION: 94 % | HEIGHT: 60 IN | WEIGHT: 293 LBS | HEART RATE: 102 BPM | BODY MASS INDEX: 57.52 KG/M2

## 2021-05-14 VITALS — BODY MASS INDEX: 57.52 KG/M2 | WEIGHT: 293 LBS | HEART RATE: 84 BPM | OXYGEN SATURATION: 99 % | HEIGHT: 60 IN

## 2021-05-14 VITALS — BODY MASS INDEX: 57.52 KG/M2 | WEIGHT: 293 LBS | OXYGEN SATURATION: 96 % | HEIGHT: 60 IN | HEART RATE: 79 BPM

## 2021-05-14 VITALS — BODY MASS INDEX: 57.52 KG/M2 | OXYGEN SATURATION: 97 % | WEIGHT: 293 LBS | HEIGHT: 60 IN | HEART RATE: 71 BPM

## 2021-05-14 VITALS
WEIGHT: 293 LBS | DIASTOLIC BLOOD PRESSURE: 74 MMHG | HEIGHT: 60 IN | SYSTOLIC BLOOD PRESSURE: 130 MMHG | TEMPERATURE: 95.8 F | BODY MASS INDEX: 57.52 KG/M2 | HEART RATE: 80 BPM

## 2021-05-14 VITALS — OXYGEN SATURATION: 99 % | WEIGHT: 293 LBS | BODY MASS INDEX: 57.52 KG/M2 | HEIGHT: 60 IN | HEART RATE: 73 BPM

## 2021-05-14 VITALS — BODY MASS INDEX: 57.52 KG/M2 | HEART RATE: 81 BPM | OXYGEN SATURATION: 96 % | HEIGHT: 60 IN | WEIGHT: 293 LBS

## 2021-05-14 NOTE — PROGRESS NOTES
Progress Note      Patient Name: Kristin Aldrich   Patient ID: [id number]   Sex: Female   YOB: 1975    Primary Care Provider: Kaylin PARRISH   Referring Provider: Kaylin PARRISH    Visit Date: February 2, 2021    Provider: Nat Vance PA-C   Location: [facility]   Location Address: [address] [place] [address]  [id number]   Location Phone: [phone]          Chief Complaint  · Follow up left knee pain      History Of Present Illness  Kristin Aldrich is a 45 year old /White female who presents today to [facility].      She is here for left knee Euflexxa #3/3.       Past Medical History  Allergies; Anxiety; Arthritis; Asthma; Bipolar disorder; Bladder Disorder; Broken Bones; C. difficile diarrhea; Carpal tunnel syndrome; Chronic bronchitis; Chronic Obstructive Pulmonary Disease; COPD (chronic obstructive pulmonary disease); DDD (degenerative disc disease), cervical; Depression; Forgetfulness; Gall stones; Head injury; Hemorrhoids; Hiatal hernia; Hyperlipemia; Hyperlipidemia; Hypertension; IgA deficiency; Incontinence; Insulin resistance; Intractable chronic migraine without aura and without status migrainosus; Irritable bowel syndrome; Limb Swelling; Lymphedema; Migraine headache; MRSA infection; Neurologic disorder; Neuropathy; Polydipsia; Polyuria; Primary osteoarthritis of left knee; Psychiatric Care; PTSD (post-traumatic stress disorder); Reflux; Restless leg syndrome; Ringing in ears; Seasonal allergies; Shortness of Breath; Sinus trouble; Skin Disease; Sleep apnea; Slow transit constipation; Thyroid disorder; Vitamin B deficiency         Past Surgical History  Appendectomy; Carpal Tunnel Release; Cholecystecomy; Colonoscopy; Gallbladder; Gastric Sleeve; Hernia         Medication List  albuterol sulfate oral; amitriptyline 150 mg oral tablet; amlodipine 5 mg oral tablet; aripiprazole 5 mg oral tablet; atorvastatin 10 mg oral tablet; Breo  Ellipta 100-25 mcg/dose inhalation blister with device; buspirone 15 mg oral tablet; cetirizine 10 mg oral tablet; chlorhexidine gluconate 0.12 % mucous membrane mouthwash; cyanocobalamin (vitamin B-12) oral; docusate sodium 100 mg oral capsule; duloxetine 30 mg oral capsule,delayed release(DR/EC); EpiPen 0.3 mg/0.3 mL injection auto-injector; Flonase Allergy Relief 50 mcg/actuation nasal spray,suspension; furosemide 40 mg oral tablet; Linzess 145 mcg oral capsule; liothyronine 25 mcg oral tablet; Nyamyc 100,000 unit/gram topical powder; oxybutynin chloride 5 mg oral tablet extended release 24hr; potassium chloride 10 mEq oral tablet,ER particles/crystals; Singulair 10 mg oral tablet; sucralfate 100 mg/mL oral suspension; Unithroid 200 mcg oral tablet         Allergy List  Bee Stings; NSAIDS; Peppers       Allergies Reconciled  Family Medical History  Stroke; Heart Disease; Diabetes, unspecified type; Diabetes; Alcoholism in family; Blood Diseases; Family history of certain chronic disabling diseases; arthritis; Osteoporosis; Family history of Arthritis         Social History  Alcohol (Never); lives with spouse; .; Recreational Drug Use (Never); Retired.; Tobacco (Never); Unemployed.         Review of Systems  · Constitutional  o Denies  o : fever, chills, weight loss  · Cardiovascular  o Denies  o : chest pain, shortness of breath  · Gastrointestinal  o Denies  o : liver disease, heartburn, nausea, blood in stools  · Genitourinary  o Denies  o : painful urination, blood in urine  · Integument  o Denies  o : rash, itching  · Neurologic  o Denies  o : headache, weakness, loss of consciousness  · Musculoskeletal  o Admits  o : painful, swollen joints  · Psychiatric  o Denies  o : drug/alcohol addiction, anxiety, depression      Vitals  Date Time BP Position Site L\R Cuff Size HR RR TEMP (F) WT  HT  BMI kg/m2 BSA m2 O2 Sat FR L/min FiO2 HC       02/02/2021 10:03 AM      93 - R   352lbs 16oz 5'   68.94 2.6 94 %             Physical Examination  · Constitutional  o Appearance  o : well developed, well-nourished, no obvious deformities present  · Head and Face  o Head  o :   § Inspection  § : normocephalic  o Face  o :   § Inspection  § : no facial lesions  · Eyes  o Conjunctivae  o : conjunctivae normal  o Sclerae  o : sclerae white  · Ears, Nose, Mouth and Throat  o Ears  o :   § External Ears  § : appearance within normal limits  § Hearing  § : intact  o Nose  o :   § External Nose  § : appearance normal  · Neck  o Inspection/Palpation  o : normal appearance  o Range of Motion  o : full range of motion  · Respiratory  o Respiratory Effort  o : breathing unlabored  o Inspection of Chest  o : normal appearance  o Auscultation of Lungs  o : no audible wheezing or rales  · Cardiovascular  o Heart  o : regular rate  · Gastrointestinal  o Abdominal Examination  o : soft and non-tender  · Skin and Subcutaneous Tissue  o General Inspection  o : intact, no rashes  · Psychiatric  o General  o : Alert and oriented x3  o Judgement and Insight  o : judgment and insight intact  o Mood and Affect  o : mood normal, affect appropriate  · Left Knee  o Inspection  o : Ambulating with a walker. Antalgic gait. Pain with movement. Range of motion 0-115. Tender joint lines. Thigh to calf mismatch bilaterally. Minimal muscle mass. + Instability varus/valgus stress. Positive crepitus. Neurovascularly intact. Sensation grossly intact. Pulses normal. Calf supple; non-tender.   · Injection Note/Aspiration Note  o Site  o : left knee   o Procedure  o : Procedure: After educating the patient, patient gave consent for procedure. After using Chloraprep, the joint space was injected. The patient tolerated the procedure well.   o Medication  o : Euflexxa, 20 mg           Assessment  · Primary osteoarthritis of left knee     715.16/M17.12      Plan  · Orders  o Euflexxa per dose () - 715.16/M17.12 - 02/02/2021   Lot [id number] exp 08 17 2021 Martita Johns  Wood PA  o Knee Intra-articular Injection without US Guidance [facility] ([id number]) - 715.16/M17.12 - 02/02/2021   Nat ALONSO  · Medications  o Medications have been Reconciled  o Transition of Care or Provider Policy  · Instructions  o Reviewed the patient's Past Medical, Social, and Family history as well as the ROS at today's visit, no changes.  o Call or return if worsening symptoms.  o Follow up as needed.  o Electronically Identified Patient Education Materials Provided Electronically            Electronically Signed by: GAVIN Denise-C -Author on February 2, 2021 10:27:04 AM  Electronically Co-signed by: Jocelyn Bloom MD -Reviewer on February 3, 2021 06:54:42 AM

## 2021-05-14 NOTE — PROGRESS NOTES
Progress Note      Patient Name: Kristin Aldrich   Patient ID: [id number]   Sex: Female   YOB: 1975    Primary Care Provider: Kaylin PARRISH   Referring Provider: Kaylin PARRISH    Visit Date: February 9, 2021    Provider: Nat Vance PA-C   Location: [facility]   Location Address: [address] [place] [address]  [id number]   Location Phone: [phone]          Chief Complaint  · right knee pain      History Of Present Illness  Kristin Aldrich is a 45 year old /White female who presents today to [facility].      She is here for #1/3 Euflexxa injection for right knee.       Past Medical History  Allergies; Anxiety; Arthritis; Asthma; Bipolar disorder; Bladder Disorder; Broken Bones; C. difficile diarrhea; Carpal tunnel syndrome; Chronic bronchitis; Chronic Obstructive Pulmonary Disease; COPD (chronic obstructive pulmonary disease); DDD (degenerative disc disease), cervical; Depression; Forgetfulness; Gall stones; Head injury; Hemorrhoids; Hiatal hernia; Hyperlipemia; Hyperlipidemia; Hypertension; IgA deficiency; Incontinence; Insulin resistance; Intractable chronic migraine without aura and without status migrainosus; Irritable bowel syndrome; Limb Swelling; Lymphedema; Migraine headache; MRSA infection; Neurologic disorder; Neuropathy; Polydipsia; Polyuria; Primary osteoarthritis of left knee; Psychiatric Care; PTSD (post-traumatic stress disorder); Reflux; Restless leg syndrome; Ringing in ears; Seasonal allergies; Shortness of Breath; Sinus trouble; Skin Disease; Sleep apnea; Slow transit constipation; Thyroid disorder; Vitamin B deficiency         Past Surgical History  Appendectomy; Carpal Tunnel Release; Cholecystecomy; Colonoscopy; Gallbladder; Gastric Sleeve; Hernia         Medication List  albuterol sulfate oral; amitriptyline 150 mg oral tablet; amlodipine 5 mg oral tablet; aripiprazole 5 mg oral tablet; atorvastatin 10 mg oral tablet;  Breo Ellipta 100-25 mcg/dose inhalation blister with device; buspirone 15 mg oral tablet; cetirizine 10 mg oral tablet; chlorhexidine gluconate 0.12 % mucous membrane mouthwash; cyanocobalamin (vitamin B-12) oral; docusate sodium 100 mg oral capsule; duloxetine 30 mg oral capsule,delayed release(DR/EC); EpiPen 0.3 mg/0.3 mL injection auto-injector; Flonase Allergy Relief 50 mcg/actuation nasal spray,suspension; furosemide 40 mg oral tablet; Linzess 145 mcg oral capsule; liothyronine 25 mcg oral tablet; Nyamyc 100,000 unit/gram topical powder; oxybutynin chloride 5 mg oral tablet extended release 24hr; potassium chloride 10 mEq oral tablet,ER particles/crystals; Singulair 10 mg oral tablet; sucralfate 100 mg/mL oral suspension; Unithroid 200 mcg oral tablet         Allergy List  Bee Stings; NSAIDS; Peppers       Allergies Reconciled  Family Medical History  Stroke; Heart Disease; Diabetes, unspecified type; Diabetes; Alcoholism in family; Blood Diseases; Family history of certain chronic disabling diseases; arthritis; Osteoporosis; Family history of Arthritis         Social History  Alcohol (Never); lives with spouse; .; Recreational Drug Use (Never); Retired.; Tobacco (Never); Unemployed.         Review of Systems  · Constitutional  o Denies  o : fever, chills, weight loss  · Cardiovascular  o Denies  o : chest pain, shortness of breath  · Gastrointestinal  o Denies  o : liver disease, heartburn, nausea, blood in stools  · Genitourinary  o Denies  o : painful urination, blood in urine  · Integument  o Denies  o : rash, itching  · Neurologic  o Denies  o : headache, weakness, loss of consciousness  · Musculoskeletal  o Admits  o : painful, swollen joints  · Psychiatric  o Denies  o : drug/alcohol addiction, anxiety, depression      Vitals  Date Time BP Position Site L\R Cuff Size HR RR TEMP (F) WT  HT  BMI kg/m2 BSA m2 O2 Sat FR L/min FiO2 HC       02/09/2021 10:17 AM      102 - R   337lbs 0oz 5'   65.82 2.54  94 %            Physical Examination  · Constitutional  o Appearance  o : well developed, well-nourished, no obvious deformities present  · Head and Face  o Head  o :   § Inspection  § : normocephalic  o Face  o :   § Inspection  § : no facial lesions  · Eyes  o Conjunctivae  o : conjunctivae normal  o Sclerae  o : sclerae white  · Ears, Nose, Mouth and Throat  o Ears  o :   § External Ears  § : appearance within normal limits  § Hearing  § : intact  o Nose  o :   § External Nose  § : appearance normal  · Neck  o Inspection/Palpation  o : normal appearance  o Range of Motion  o : full range of motion  · Respiratory  o Respiratory Effort  o : breathing unlabored  o Inspection of Chest  o : normal appearance  o Auscultation of Lungs  o : no audible wheezing or rales  · Cardiovascular  o Heart  o : regular rate  · Gastrointestinal  o Abdominal Examination  o : soft and non-tender  · Skin and Subcutaneous Tissue  o General Inspection  o : intact, no rashes  · Psychiatric  o General  o : Alert and oriented x3  o Judgement and Insight  o : judgment and insight intact  o Mood and Affect  o : mood normal, affect appropriate  · Right Knee  o Inspection  o : Ambulating with a walker. Antalgic gait. Pain with movement. Range of motion 0-110. Tender joint lines. Thigh to calf mismatch bilaterally. Minimal muscle mass. + Instability varus/valgus stress. Positive crepitus. Neurovascularly intact. Sensation grossly intact. Pulses normal. Calf supple; non-tender.   · Injection Note/Aspiration Note  o Site  o : right knee  o Procedure  o : Procedure: After educating the patient, patient gave consent for procedure. After using Chloraprep, the joint space was injected. The patient tolerated the procedure well.   o Medication  o : Euflexxa, 20 mg           Assessment  · Primary osteoarthritis of right knee     715.16/M17.11  · Right knee pain, unspecified chronicity     719.46/M25.561      Plan  · Orders  o Euflexxa per dose () -  715.16/M17.11 - 02/09/2021   Lot [id number] Exp 08 17 2021 [employer] Administered by Nat GUERRA  o Knee Intra-articular Injection without US Guidance [facility] ([id number]) - 715.16/M17.11 - 02/09/2021   Administered by Nat GUERRA  · Medications  o Medications have been Reconciled  o Transition of Care or Provider Policy  · Instructions  o Reviewed the patient's Past Medical, Social, and Family history as well as the ROS at today's visit, no changes.  o Call or return if worsening symptoms.  o Follow up in 1 week.            Electronically Signed by: GAVIN Denise-CHARLIE -Author on February 9, 2021 12:38:48 PM  Electronically Co-signed by: Jocelyn Bloom MD -Reviewer on February 9, 2021 05:59:55 PM

## 2021-05-14 NOTE — PROGRESS NOTES
Progress Note      Patient Name: Kristin Aldrich   Patient ID: [id number]   Sex: Female   YOB: 1975    Primary Care Provider: Kaylin PARRISH   Referring Provider: Kaylin PARRISH    Visit Date: March 18, 2021    Provider: Jocelyn Bloom MD   Location: [facility]   Location Address: [address] [place] [address]  [id number]   Location Phone: [phone]          Chief Complaint  · Bilateral Hand Pain      History Of Present Illness  Kristin Aldrich is a 45 year old /White female who presents today to [facility]. Patient is here for evaluation of bilateral hands. She went to hand therapy and didnt see too much relief. She has an EMG from June that shows no obvious carpal tunnel. Her shooting pain is up her forearm.       Past Medical History  Allergies; Anxiety; Arthritis; Asthma; Bipolar disorder; Bladder disorder; Broken Bones; C. difficile diarrhea; Carpal tunnel syndrome; Chronic bronchitis; Chronic Obstructive Pulmonary Disease; COPD (chronic obstructive pulmonary disease); DDD (degenerative disc disease), cervical; Depression; Forgetfulness; Gall stones; Head injury; Hemorrhoids; Hiatal hernia; Hyperlipemia; Hyperlipidemia; Hypertension; IgA deficiency; Incontinence; Insulin resistance; Intractable chronic migraine without aura and without status migrainosus; Irritable bowel syndrome; Limb Swelling; Lymphedema; Migraine headache; MRSA infection; Neurologic disorder; Neuropathy; Polydipsia; Polyuria; Primary osteoarthritis of left knee; Psychiatric Care; PTSD (post-traumatic stress disorder); Reflux; Restless leg syndrome; Ringing in ears; Seasonal allergies; Shortness of Breath; Sinus trouble; Skin Disease; Sleep apnea; Slow transit constipation; Thyroid disorder; Vitamin B deficiency         Past Surgical History  Appendectomy; Carpal Tunnel Release; Cholecystecomy; Colonoscopy; Gallbladder; Gastric Sleeve; Hernia         Medication  List  albuterol sulfate oral; amitriptyline 150 mg oral tablet; amlodipine 5 mg oral tablet; aripiprazole 5 mg oral tablet; atorvastatin 10 mg oral tablet; Botox 100 unit injection recon soln; Breo Ellipta 100-25 mcg/dose inhalation blister with device; buspirone 15 mg oral tablet; cetirizine 10 mg oral tablet; chlorhexidine gluconate 0.12 % mucous membrane mouthwash; cyanocobalamin (vitamin B-12) oral; docusate sodium 100 mg oral capsule; duloxetine 30 mg oral capsule,delayed release(DR/EC); EpiPen 0.3 mg/0.3 mL injection auto-injector; Flonase Allergy Relief 50 mcg/actuation nasal spray,suspension; furosemide 40 mg oral tablet; Linzess 145 mcg oral capsule; liothyronine 25 mcg oral tablet; Nyamyc 100,000 unit/gram topical powder; oxybutynin chloride 5 mg oral tablet extended release 24hr; potassium chloride 10 mEq oral tablet,ER particles/crystals; Singulair 10 mg oral tablet; sucralfate 100 mg/mL oral suspension; Unithroid 200 mcg oral tablet; Voltaren 1 % topical gel         Allergy List  Bee Stings; NSAIDS; Peppers         Family Medical History  Stroke; Heart Disease; Diabetes, unspecified type; Diabetes; Alcoholism in family; Blood Diseases; Family history of certain chronic disabling diseases; arthritis; Osteoporosis; Family history of Arthritis         Social History  Alcohol (Never); lives with spouse; .; Recreational Drug Use (Never); Retired.; Tobacco (Never); Unemployed.         Review of Systems  · Constitutional  o Denies  o : fever, chills, weight loss  · Cardiovascular  o Denies  o : chest pain, shortness of breath  · Gastrointestinal  o Denies  o : liver disease, heartburn, nausea, blood in stools  · Genitourinary  o Denies  o : painful urination, blood in urine  · Integument  o Denies  o : rash, itching  · Neurologic  o Denies  o : headache, weakness, loss of consciousness  · Musculoskeletal  o Denies  o : painful, swollen joints  · Psychiatric  o Denies  o : drug/alcohol addiction,  anxiety, depression      Vitals  Date Time BP Position Site L\R Cuff Size HR RR TEMP (F) WT  HT  BMI kg/m2 BSA m2 O2 Sat FR L/min FiO2 HC       03/18/2021 02:54 PM      92 - R   342lbs 0oz 5'   66.79 2.56 96 %            Physical Examination  · Constitutional  o Appearance  o : well developed, well-nourished, no obvious deformities present  · Head and Face  o Head  o :   § Inspection  § : normocephalic  o Face  o :   § Inspection  § : no facial lesions  · Eyes  o Conjunctivae  o : conjunctivae normal  o Sclerae  o : sclerae white  · Ears, Nose, Mouth and Throat  o Ears  o :   § External Ears  § : appearance within normal limits  § Hearing  § : intact  o Nose  o :   § External Nose  § : appearance normal  · Neck  o Inspection/Palpation  o : normal appearance  o Range of Motion  o : full range of motion  · Respiratory  o Respiratory Effort  o : breathing unlabored  o Inspection of Chest  o : normal appearance  o Auscultation of Lungs  o : no audible wheezing or rales  · Cardiovascular  o Heart  o : regular rate  · Gastrointestinal  o Abdominal Examination  o : soft and non-tender  · Skin and Subcutaneous Tissue  o General Inspection  o : intact, no rashes  · Psychiatric  o General  o : Alert and oriented x3  o Judgement and Insight  o : judgment and insight intact  o Mood and Affect  o : mood normal, affect appropriate  · Extremities  o Extremities  o : BILATERAL HANDS: Sensation intact. Pulse is normal. Affect is pleasant. Previous incision are well-healed. She has good finger range of motion, just has this burning shooting pain.           Assessment  · Arthralgia of both hands       Pain in joints of right hand     719.44/M25.541  Pain in joints of left hand     719.44/M25.542      Plan  · Medications  o Medications have been Reconciled  o Transition of Care or Provider Policy  · Instructions  o Reviewed the patient's Past Medical, Social, and Family history as well as the ROS at today's visit, no changes.  o Call  or return if worsening symptoms.  o This note is transcribed by Steph Espinoza /nuria  o Going to set her up to see a neurologist to see if they can help with the cause of this.             Electronically Signed by: Steph Espinoza, -Author on March 22, 2021 09:12:17 AM  Electronically Co-signed by: Jocelyn Bloom MD -Reviewer on March 22, 2021 07:36:54 PM

## 2021-05-14 NOTE — PROGRESS NOTES
Progress Note      Patient Name: Kristin Aldrich   Patient ID: [id number]   Sex: Female   YOB: 1975    Primary Care Provider: Kaylin PARRISH   Referring Provider: Kaylin PARRISH    Visit Date: April 13, 2021    Provider: Jocelyn Bloom MD   Location: [facility]   Location Address: [address] [place] [address]  [id number]   Location Phone: [phone]          Chief Complaint  · Bilateral Knee Osteoarthritis      History Of Present Illness  Kristin Aldrich is a 45 year old /White female who presents today to [facility].      Patient presents today for a follow-up of bilateral knee pain. She has a history of bilateral knee osteoarthritis that she has been treating conservatively. She has recently received a course of Euflexxa injections bilaterally. She states injections were doing well for her however in the last week she has been having episodes of locking in her knees. She is unable to unlock the knees herself as she has trouble bending over and pulling them. She has to have someone help unlock her knees for her. Locking episodes do cause her severe pain. She is unable to get onto the floor as she won't be able to get back up. She is using a walker for ambulation assistance. She is unable to take NSAIDs because she has 1 kidney.       Past Medical History  Allergies; Anxiety; Arthritis; Asthma; Bipolar disorder; Bladder disorder; Broken Bones; C. difficile diarrhea; Carpal tunnel syndrome; Chronic bronchitis; Chronic Obstructive Pulmonary Disease; COPD (chronic obstructive pulmonary disease); DDD (degenerative disc disease), cervical; Depression; Forgetfulness; Gall stones; Head injury; Hemorrhoids; Hiatal hernia; Hyperlipemia; Hyperlipidemia; Hypertension; IgA deficiency; Incontinence; Insulin resistance; Intractable chronic migraine without aura and without status migrainosus; Irritable bowel syndrome; Limb Swelling; Lymphedema; Migraine headache;  MRSA infection; Neurologic disorder; Neuropathy; Polydipsia; Polyuria; Primary osteoarthritis of left knee; Psychiatric Care; PTSD (post-traumatic stress disorder); Reflux; Restless leg syndrome; Ringing in ears; Seasonal allergies; Shortness of Breath; Sinus trouble; Skin Disease; Sleep apnea; Slow transit constipation; Thyroid disorder; Vitamin B deficiency         Past Surgical History  Appendectomy; Carpal Tunnel Release; Cholecystecomy; Colonoscopy; Gallbladder; Gastric Sleeve; Hernia         Medication List  albuterol sulfate oral; amitriptyline 150 mg oral tablet; amlodipine 5 mg oral tablet; aripiprazole 5 mg oral tablet; atorvastatin 10 mg oral tablet; Botox 100 unit injection recon soln; Breo Ellipta 100-25 mcg/dose inhalation blister with device; buspirone 15 mg oral tablet; cetirizine 10 mg oral tablet; chlorhexidine gluconate 0.12 % mucous membrane mouthwash; cyanocobalamin (vitamin B-12) oral; docusate sodium 100 mg oral capsule; duloxetine 30 mg oral capsule,delayed release(DR/EC); EpiPen 0.3 mg/0.3 mL injection auto-injector; Flonase Allergy Relief 50 mcg/actuation nasal spray,suspension; furosemide 40 mg oral tablet; Linzess 145 mcg oral capsule; liothyronine 25 mcg oral tablet; Nyamyc 100,000 unit/gram topical powder; oxybutynin chloride 5 mg oral tablet extended release 24hr; potassium chloride 10 mEq oral tablet,ER particles/crystals; Singulair 10 mg oral tablet; sucralfate 100 mg/mL oral suspension; Unithroid 200 mcg oral tablet; Voltaren 1 % topical gel         Allergy List  Bee Stings; NSAIDS; Peppers       Allergies Reconciled  Family Medical History  Stroke; Heart Disease; Diabetes, unspecified type; Diabetes; Alcoholism in family; Blood Diseases; Family history of certain chronic disabling diseases; arthritis; Osteoporosis; Family history of Arthritis         Social History  Alcohol (Never); lives with spouse; .; Recreational Drug Use (Never); Retired.; Tobacco (Never); Unemployed.          Review of Systems  · Constitutional  o Denies  o : fever, chills, weight loss  · Cardiovascular  o Denies  o : chest pain, shortness of breath  · Gastrointestinal  o Denies  o : liver disease, heartburn, nausea, blood in stools  · Genitourinary  o Denies  o : painful urination, blood in urine  · Integument  o Denies  o : rash, itching  · Neurologic  o Denies  o : headache, weakness, loss of consciousness  · Musculoskeletal  o Denies  o : painful, swollen joints  · Psychiatric  o Denies  o : drug/alcohol addiction, anxiety, depression      Vitals  Date Time BP Position Site L\R Cuff Size HR RR TEMP (F) WT  HT  BMI kg/m2 BSA m2 O2 Sat FR L/min FiO2 HC       04/13/2021 03:06 PM      84 - R   342lbs 0oz 5'   66.79 2.56 99 %            Physical Examination  · Constitutional  o Appearance  o : well developed, well-nourished, no obvious deformities present  · Head and Face  o Head  o :   § Inspection  § : normocephalic  o Face  o :   § Inspection  § : no facial lesions  · Eyes  o Conjunctivae  o : conjunctivae normal  o Sclerae  o : sclerae white  · Ears, Nose, Mouth and Throat  o Ears  o :   § External Ears  § : appearance within normal limits  § Hearing  § : intact  o Nose  o :   § External Nose  § : appearance normal  · Neck  o Inspection/Palpation  o : normal appearance  o Range of Motion  o : full range of motion  · Respiratory  o Respiratory Effort  o : breathing unlabored  o Inspection of Chest  o : normal appearance  o Auscultation of Lungs  o : no audible wheezing or rales  · Cardiovascular  o Heart  o : regular rate  · Gastrointestinal  o Abdominal Examination  o : soft and non-tender  · Skin and Subcutaneous Tissue  o General Inspection  o : intact, no rashes  · Psychiatric  o General  o : Alert and oriented x3  o Judgement and Insight  o : judgment and insight intact  o Mood and Affect  o : mood normal, affect appropriate  · Extremities  o Extremities  o : BILATERAL KNEES: Ambulation with a walker.  Antalgic gait. Full extension. Flexion to 110. Tender medial joint line. Tender lateral joint line. Minimal muscle mass. Instability with varus/valgus stress. Positive crepitus. Thigh to calf mismatch bilaterally. Swelling of the left knee compared to the right knee. Sensation grossly intact. Neurovascular intact. Calf supple, non-tender.           Assessment  · Primary osteoarthritis of right knee     715.16/M17.11  · Primary osteoarthritis of left knee     715.16/M17.12  · Patellofemoral syndrome of both knees       Patellofemoral disorders, right knee     719.46/M22.2X1  Patellofemoral disorders, left knee     719.46/M22.2X2      Plan  · Medications  o Medications have been Reconciled  o Transition of Care or Provider Policy  · Instructions  o Dr. Bloom saw and examined the patient and agrees with plan.   o Reviewed the patient's Past Medical, Social, and Family history as well as the ROS at today's visit, no changes.  o Call or return if worsening symptoms.  o Follow Up PRN.  o Discussed treatment plans with the patient. Patient opted for a course of physical therapy to help strengthen her knees.   o The above service was scribed by Michell Kaur on my behalf and I attest to the accuracy of the note. mc             Electronically Signed by: Michell Kaur-, Other -Author on April 15, 2021 11:40:28 AM  Electronically Co-signed by: Jocelyn Bloom MD -Reviewer on April 15, 2021 10:55:05 PM

## 2021-05-14 NOTE — PROGRESS NOTES
Progress Note      Patient Name: Kristin Aldrich   Patient ID: [id number]   Sex: Female   YOB: 1975    Primary Care Provider: Kaylin PARRISH   Referring Provider: Kaylin PARRSIH    Visit Date: January 26, 2021    Provider: Nat Vance PA-C   Location: [facility]   Location Address: [address] [place] [address]  [id number]   Location Phone: [phone]          Chief Complaint  · left knee pain       History Of Present Illness  Kristin Aldrich is a 45 year old /White female who presents today to [facility].      She is here for #2/3 Euflexxa injection of left knee. She states some improvement in pain with first injection.       Past Medical History  Allergies; Anxiety; Arthritis; Asthma; Bipolar disorder; Bladder Disorder; Broken Bones; C. difficile diarrhea; Carpal tunnel syndrome; Chronic bronchitis; Chronic Obstructive Pulmonary Disease; COPD (chronic obstructive pulmonary disease); DDD (degenerative disc disease), cervical; Depression; Forgetfulness; Gall stones; Head injury; Hemorrhoids; Hiatal hernia; Hyperlipemia; Hyperlipidemia; Hypertension; IgA deficiency; Incontinence; Insulin resistance; Intractable chronic migraine without aura and without status migrainosus; Irritable bowel syndrome; Limb Swelling; Lymphedema; Migraine headache; MRSA infection; Neurologic disorder; Neuropathy; Polydipsia; Polyuria; Primary osteoarthritis of left knee; Psychiatric Care; PTSD (post-traumatic stress disorder); Reflux; Restless leg syndrome; Ringing in ears; Seasonal allergies; Shortness of Breath; Sinus trouble; Skin Disease; Sleep apnea; Slow transit constipation; Thyroid disorder; Vitamin B deficiency         Past Surgical History  Appendectomy; Carpal Tunnel Release; Cholecystecomy; Colonoscopy; Gallbladder; Gastric Sleeve; Hernia         Medication List  albuterol sulfate oral; amitriptyline 150 mg oral tablet; amlodipine 5 mg oral tablet;  aripiprazole 5 mg oral tablet; atorvastatin 10 mg oral tablet; Breo Ellipta 100-25 mcg/dose inhalation blister with device; buspirone 15 mg oral tablet; cetirizine 10 mg oral tablet; chlorhexidine gluconate 0.12 % mucous membrane mouthwash; cyanocobalamin (vitamin B-12) oral; docusate sodium 100 mg oral capsule; duloxetine 30 mg oral capsule,delayed release(DR/EC); EpiPen 0.3 mg/0.3 mL injection auto-injector; Flonase Allergy Relief 50 mcg/actuation nasal spray,suspension; furosemide 40 mg oral tablet; Linzess 145 mcg oral capsule; liothyronine 25 mcg oral tablet; Nyamyc 100,000 unit/gram topical powder; oxybutynin chloride 5 mg oral tablet extended release 24hr; potassium chloride 10 mEq oral tablet,ER particles/crystals; Singulair 10 mg oral tablet; sucralfate 100 mg/mL oral suspension; Unithroid 200 mcg oral tablet         Allergy List  Bee Stings; NSAIDS; Peppers       Allergies Reconciled  Family Medical History  Stroke; Heart Disease; Diabetes, unspecified type; Diabetes; Alcoholism in family; Blood Diseases; Family history of certain chronic disabling diseases; arthritis; Osteoporosis; Family history of Arthritis         Social History  Alcohol (Never); lives with spouse; .; Recreational Drug Use (Never); Retired.; Tobacco (Never); Unemployed.         Review of Systems  · Constitutional  o Denies  o : fever, chills, weight loss  · Cardiovascular  o Denies  o : chest pain, shortness of breath  · Gastrointestinal  o Denies  o : liver disease, heartburn, nausea, blood in stools  · Genitourinary  o Denies  o : painful urination, blood in urine  · Integument  o Denies  o : rash, itching  · Neurologic  o Denies  o : headache, weakness, loss of consciousness  · Musculoskeletal  o Admits  o : painful, swollen joints  · Psychiatric  o Denies  o : drug/alcohol addiction, anxiety, depression      Vitals  Date Time BP Position Site L\R Cuff Size HR RR TEMP (F) WT  HT  BMI kg/m2 BSA m2 O2 Sat FR L/min FiO2         01/26/2021 09:58 AM      92 - R   351lbs 16oz 5'   68.74 2.6 98 %            Physical Examination  · Constitutional  o Appearance  o : well developed, well-nourished, no obvious deformities present  · Head and Face  o Head  o :   § Inspection  § : normocephalic  o Face  o :   § Inspection  § : no facial lesions  · Eyes  o Conjunctivae  o : conjunctivae normal  o Sclerae  o : sclerae white  · Ears, Nose, Mouth and Throat  o Ears  o :   § External Ears  § : appearance within normal limits  § Hearing  § : intact  o Nose  o :   § External Nose  § : appearance normal  · Neck  o Inspection/Palpation  o : normal appearance  o Range of Motion  o : full range of motion  · Respiratory  o Respiratory Effort  o : breathing unlabored  o Inspection of Chest  o : normal appearance  o Auscultation of Lungs  o : no audible wheezing or rales  · Cardiovascular  o Heart  o : regular rate  · Gastrointestinal  o Abdominal Examination  o : soft and non-tender  · Skin and Subcutaneous Tissue  o General Inspection  o : intact, no rashes  · Psychiatric  o General  o : Alert and oriented x3  o Judgement and Insight  o : judgment and insight intact  o Mood and Affect  o : mood normal, affect appropriate  · Extremities  o Extremities  o : BILATERAL KNEE: Ambulating with a walker. Antalgic gait. Pain with movement. Range of motion 0-115. Tender joint lines. Thigh to calf mismatch bilaterally. Minimal muscle mass. + Instability varus/valgus stress. Positive crepitus. Neurovascularly intact. Sensation grossly intact. Pulses normal. Calf supple; non-tender.   · Injection Note/Aspiration Note  o Site  o : left knee  o Procedure  o : Procedure: After educating the patient, patient gave consent for procedure. After using Chloraprep, the joint space was injected. The patient tolerated the procedure well.  o Medication  o : Euflexxa, 20 mg           Assessment  · Primary osteoarthritis of left knee     715.16/M17.12  · Left knee pain, unspecified  chronicity     719.46/M25.562      Plan  · Orders  o Euflexxa per dose () - 715.16/M17.12 - 01/26/2021   LOT [id number] EXP 08 17 2021 [employer] Administered by Nat GUERRA  o Knee Intra-articular Injection without US Guidance [facility] ([id number]) - 715.16/M17.12 - 01/26/2021  · Medications  o Medications have been Reconciled  o Transition of Care or Provider Policy  · Instructions  o Reviewed the patient's Past Medical, Social, and Family history as well as the ROS at today's visit, no changes.  o Call or return if worsening symptoms.  o Follow up in 1 week.            Electronically Signed by: GAVIN Denise-CHARLIE -Author on January 26, 2021 11:02:51 AM  Electronically Co-signed by: Jocelyn Bloom MD -Reviewer on January 26, 2021 09:50:46 PM

## 2021-05-14 NOTE — PROGRESS NOTES
Progress Note      Patient Name: Kristin Aldrich   Patient ID: [id number]   Sex: Female   YOB: 1975    Primary Care Provider: Kaylin PARRISH   Referring Provider: Kaylin PARRISH    Visit Date: January 5, 2021    Provider: Nat Vance PA-C   Location: [facility]   Location Address: [address] [place] [address]  [id number]   Location Phone: [phone]          Chief Complaint  · Follow up left knee pain      History Of Present Illness  Kristin Aldrich is a 45 year old /White female who presents today to [facility].      She is here for left knee Euflexxa #1/3 injection. She states she fell 2 weeks ago when her left knee gave out and has resultant right knee bruising. She has advanced left knee OA.       Past Medical History  Allergies; Anxiety; Arthritis; Asthma; Bipolar disorder; Bladder Disorder; Broken Bones; C. difficile diarrhea; Carpal tunnel syndrome; Chronic bronchitis; Chronic Obstructive Pulmonary Disease; COPD (chronic obstructive pulmonary disease); DDD (degenerative disc disease), cervical; Depression; Forgetfulness; Gall stones; Head injury; Hemorrhoids; Hiatal hernia; Hyperlipemia; Hyperlipidemia; Hypertension; IgA deficiency; Incontinence; Insulin resistance; Irritable bowel syndrome; Limb Swelling; Lymphedema; Migraine headache; MRSA infection; Neurologic disorder; Neuropathy; Polydipsia; Polyuria; Primary osteoarthritis of left knee; Psychiatric Care; PTSD (post-traumatic stress disorder); Reflux; Restless leg syndrome; Ringing in ears; Seasonal allergies; Shortness of Breath; Sinus trouble; Skin Disease; Sleep apnea; Slow transit constipation; Thyroid disorder; Vitamin B deficiency         Past Surgical History  Appendectomy; Carpal Tunnel Release; Cholecystecomy; Colonoscopy; Gallbladder; Gastric Sleeve; Hernia         Medication List  albuterol sulfate oral; amitriptyline 150 mg oral tablet; amlodipine 5 mg oral tablet;  aripiprazole 5 mg oral tablet; atorvastatin 10 mg oral tablet; Breo Ellipta 100-25 mcg/dose inhalation blister with device; buspirone 15 mg oral tablet; cetirizine 10 mg oral tablet; chlorhexidine gluconate 0.12 % mucous membrane mouthwash; cyanocobalamin (vitamin B-12) oral; docusate sodium 100 mg oral capsule; duloxetine 30 mg oral capsule,delayed release(DR/EC); EpiPen 0.3 mg/0.3 mL injection auto-injector; Flonase Allergy Relief 50 mcg/actuation nasal spray,suspension; furosemide 40 mg oral tablet; Linzess 145 mcg oral capsule; liothyronine 25 mcg oral tablet; Nyamyc 100,000 unit/gram topical powder; oxybutynin chloride 5 mg oral tablet extended release 24hr; potassium chloride 10 mEq oral tablet,ER particles/crystals; Singulair 10 mg oral tablet; sucralfate 100 mg/mL oral suspension; Unithroid 200 mcg oral tablet         Allergy List  Bee Stings; NSAIDS; Peppers       Allergies Reconciled  Family Medical History  Stroke; Heart Disease; Diabetes, unspecified type; Diabetes; Alcoholism in family; Blood Diseases; Family history of certain chronic disabling diseases; arthritis; Osteoporosis; Family history of Arthritis         Social History  Alcohol (Never); lives with spouse; .; Recreational Drug Use (Never); Retired.; Tobacco (Never); Unemployed.         Review of Systems  · Constitutional  o Denies  o : fever, chills, weight loss  · Cardiovascular  o Denies  o : chest pain, shortness of breath  · Gastrointestinal  o Denies  o : liver disease, heartburn, nausea, blood in stools  · Genitourinary  o Denies  o : painful urination, blood in urine  · Integument  o Denies  o : rash, itching  · Neurologic  o Denies  o : headache, weakness, loss of consciousness  · Musculoskeletal  o Admits  o : painful, swollen joints  · Psychiatric  o Denies  o : drug/alcohol addiction, anxiety, depression      Vitals  Date Time BP Position Site L\R Cuff Size HR RR TEMP (F) WT  HT  BMI kg/m2 BSA m2 O2 Sat FR L/min FiO2         01/05/2021 10:01 AM      81 - R   350lbs 16oz 5'   68.55 2.6 96 %            Physical Examination  · Constitutional  o Appearance  o : well developed, well-nourished, no obvious deformities present  · Head and Face  o Head  o :   § Inspection  § : normocephalic  o Face  o :   § Inspection  § : no facial lesions  · Eyes  o Conjunctivae  o : conjunctivae normal  o Sclerae  o : sclerae white  · Ears, Nose, Mouth and Throat  o Ears  o :   § External Ears  § : appearance within normal limits  § Hearing  § : intact  o Nose  o :   § External Nose  § : appearance normal  · Neck  o Inspection/Palpation  o : normal appearance  o Range of Motion  o : full range of motion  · Respiratory  o Respiratory Effort  o : breathing unlabored  o Inspection of Chest  o : normal appearance  o Auscultation of Lungs  o : no audible wheezing or rales  · Cardiovascular  o Heart  o : regular rate  · Gastrointestinal  o Abdominal Examination  o : soft and non-tender  · Skin and Subcutaneous Tissue  o General Inspection  o : intact, no rashes  · Psychiatric  o General  o : Alert and oriented x3  o Judgement and Insight  o : judgment and insight intact  o Mood and Affect  o : mood normal, affect appropriate  · Left Knee  o Inspection  o : BILATERAL KNEE: Ambulating with a walker. Antalgic gait. Pain with movement. Range of motion 0-115. Tender joint lines. Thigh to calf mismatch bilaterally. Minimal muscle mass. + Instability varus/valgus stress. Positive crepitus. Neurovascularly intact. Sensation grossly intact. Pulses normal. Calf supple; non-tender.           Assessment  · Primary osteoarthritis of left knee     715.16/M17.12      Plan  · Orders  o Euflexxa per dose (Patient supplies med) () - 715.16/M17.12 - 01/05/2021   Lot [id number] exp 08 17 2021 Martita ALONSO  o Knee Intra-articular Injection without US Guidance [facility] ([id number]) - 715.16/M17.12 - 01/05/2021   Nat ALONSO  · Medications  o Medications have been  Reconciled  o Transition of Care or Provider Policy  · Instructions  o Reviewed the patient's Past Medical, Social, and Family history as well as the ROS at today's visit, no changes.  o Call or return if worsening symptoms.  o Follow up in 1 week.  o Electronically Identified Patient Education Materials Provided Electronically            Electronically Signed by: GAVIN Denise-CHARLIE -Author on January 5, 2021 10:21:11 AM  Electronically Co-signed by: Jocelyn Bloom MD -Reviewer on January 5, 2021 07:02:18 PM

## 2021-05-14 NOTE — PROGRESS NOTES
Progress Note      Patient Name: Kristin Aldrich   Patient ID: [id number]   Sex: Female   YOB: 1975    Primary Care Provider: Kaylin PARRISH   Referring Provider: Kaylin PARRISH    Visit Date: March 2, 2021    Provider: Nat Vance PA-C   Location: [facility]   Location Address: [address] [place] [address]  [id number]   Location Phone: [phone]          Chief Complaint  · Follow up right knee pain  · Follow up left knee pain      History Of Present Illness  Kristin Aldrich is a 45 year old /White female who presents today to [facility].      She is here for right knee Euflexxa #3/3. She also complains of left knee pain and swelling.       Past Medical History  Allergies; Anxiety; Arthritis; Asthma; Bipolar disorder; Bladder disorder; Broken Bones; C. difficile diarrhea; Carpal tunnel syndrome; Chronic bronchitis; Chronic Obstructive Pulmonary Disease; COPD (chronic obstructive pulmonary disease); DDD (degenerative disc disease), cervical; Depression; Forgetfulness; Gall stones; Head injury; Hemorrhoids; Hiatal hernia; Hyperlipemia; Hyperlipidemia; Hypertension; IgA deficiency; Incontinence; Insulin resistance; Intractable chronic migraine without aura and without status migrainosus; Irritable bowel syndrome; Limb Swelling; Lymphedema; Migraine headache; MRSA infection; Neurologic disorder; Neuropathy; Polydipsia; Polyuria; Primary osteoarthritis of left knee; Psychiatric Care; PTSD (post-traumatic stress disorder); Reflux; Restless leg syndrome; Ringing in ears; Seasonal allergies; Shortness of Breath; Sinus trouble; Skin Disease; Sleep apnea; Slow transit constipation; Thyroid disorder; Vitamin B deficiency         Past Surgical History  Appendectomy; Carpal Tunnel Release; Cholecystecomy; Colonoscopy; Gallbladder; Gastric Sleeve; Hernia         Medication List  albuterol sulfate oral; amitriptyline 150 mg oral tablet; amlodipine 5 mg oral  tablet; aripiprazole 5 mg oral tablet; atorvastatin 10 mg oral tablet; Breo Ellipta 100-25 mcg/dose inhalation blister with device; buspirone 15 mg oral tablet; cetirizine 10 mg oral tablet; chlorhexidine gluconate 0.12 % mucous membrane mouthwash; cyanocobalamin (vitamin B-12) oral; docusate sodium 100 mg oral capsule; duloxetine 30 mg oral capsule,delayed release(DR/EC); EpiPen 0.3 mg/0.3 mL injection auto-injector; Flonase Allergy Relief 50 mcg/actuation nasal spray,suspension; furosemide 40 mg oral tablet; Linzess 145 mcg oral capsule; liothyronine 25 mcg oral tablet; Nyamyc 100,000 unit/gram topical powder; oxybutynin chloride 5 mg oral tablet extended release 24hr; potassium chloride 10 mEq oral tablet,ER particles/crystals; Singulair 10 mg oral tablet; sucralfate 100 mg/mL oral suspension; Unithroid 200 mcg oral tablet         Allergy List  Bee Stings; NSAIDS; Peppers       Allergies Reconciled  Family Medical History  Stroke; Heart Disease; Diabetes, unspecified type; Diabetes; Alcoholism in family; Blood Diseases; Family history of certain chronic disabling diseases; arthritis; Osteoporosis; Family history of Arthritis         Social History  Alcohol (Never); lives with spouse; .; Recreational Drug Use (Never); Retired.; Tobacco (Never); Unemployed.         Review of Systems  · Constitutional  o Denies  o : fever, chills, weight loss  · Cardiovascular  o Denies  o : chest pain, shortness of breath  · Gastrointestinal  o Denies  o : liver disease, heartburn, nausea, blood in stools  · Genitourinary  o Denies  o : painful urination, blood in urine  · Integument  o Denies  o : rash, itching  · Neurologic  o Denies  o : headache, weakness, loss of consciousness  · Musculoskeletal  o Admits  o : painful, swollen joints  · Psychiatric  o Denies  o : drug/alcohol addiction, anxiety, depression      Vitals  Date Time BP Position Site L\R Cuff Size HR RR TEMP (F) WT  HT  BMI kg/m2 BSA m2 O2 Sat FR L/min FiO2         03/02/2021 03:56 PM      73 - R   342lbs 0oz 5'   66.79 2.56 99 %            Physical Examination  · Constitutional  o Appearance  o : well developed, well-nourished, no obvious deformities present  · Head and Face  o Head  o :   § Inspection  § : normocephalic  o Face  o :   § Inspection  § : no facial lesions  · Eyes  o Conjunctivae  o : conjunctivae normal  o Sclerae  o : sclerae white  · Ears, Nose, Mouth and Throat  o Ears  o :   § External Ears  § : appearance within normal limits  § Hearing  § : intact  o Nose  o :   § External Nose  § : appearance normal  · Neck  o Inspection/Palpation  o : normal appearance  o Range of Motion  o : full range of motion  · Respiratory  o Respiratory Effort  o : breathing unlabored  o Inspection of Chest  o : normal appearance  o Auscultation of Lungs  o : no audible wheezing or rales  · Cardiovascular  o Heart  o : regular rate  · Gastrointestinal  o Abdominal Examination  o : soft and non-tender  · Skin and Subcutaneous Tissue  o General Inspection  o : intact, no rashes  · Psychiatric  o General  o : Alert and oriented x3  o Judgement and Insight  o : judgment and insight intact  o Mood and Affect  o : mood normal, affect appropriate  · Extremities  o Extremities  o : BILATERAL KNEES: Ambulating with a walker. Antalgic gait. Pain with movement. Range of motion 0-110. Tender joint lines. Thigh to calf mismatch bilaterally. Minimal muscle mass. + Instability varus/valgus stress. Positive crepitus. Neurovascularly intact. Sensation grossly intact. Pulses normal. Calf supple; non-tender.   · Injection Note/Aspiration Note  o Site  o : right knee IM  o Procedure  o : Procedure: After educating the patient, patient gave consent for procedure. After using Chloraprep, the joint space was injected. The patient tolerated the procedure well.  o Medication  o : 2ml's of 4 mg Dexamethasone/Euflexxa 20 mg          Assessment  · Primary osteoarthritis of right  knee     715.16/M17.11  · Primary osteoarthritis of left knee     715.16/M17.12  · Pain: Knee     719.46/M25.569      Plan  · Orders  o Euflexxa per dose (Patient supplies med) () - 715.16/M17.11 - 03/02/2021   Lot [id number] manufactured by [employer] 08 2021  o 2.00 - Dexamethasone Injection 8mg (-6) - 715.16/M17.11 - 03/02/2021   Lot [id number] manufactured by [employer] 12 2021  o IM - Injection Fee [facility] (91114) - 715.16/M17.11 - 03/02/2021   Administered by JESU Mehta  o Knee Intra-articular Injection without US Guidance [facility] ([id number]) - 715.16/M17.11 - 03/02/2021   Administered by Nat ALONSO  · Medications  o Medications have been Reconciled  o Transition of Care or Provider Policy  · Instructions  o Reviewed the patient's Past Medical, Social, and Family history as well as the ROS at today's visit, no changes.  o Call or return if worsening symptoms.  o Right knee Eulfexxa injection. IM injection given. Follow up 6 weeks.   o Electronically Identified Patient Education Materials Provided Electronically            Electronically Signed by: JOY DeniseC -Author on March 2, 2021 04:42:22 PM

## 2021-05-14 NOTE — PROGRESS NOTES
Progress Note      Patient Name: Kristin Aldrich   Patient ID: [id number]   Sex: Female   YOB: 1975    Primary Care Provider: Kaylin PARRISH   Referring Provider: Kaylin PARRISH    Visit Date: January 22, 2021    Provider: FRANCOIS Vazquez   Location: [facility]   Location Address: [address] [place] [address]  [id number]   Location Phone: [phone]          Chief Complaint  · Migraines      History Of Present Illness  Kristin Aldrich is a 45 year old /White female who presents today to [facility] today referred from Kaylin PARRISH for follow up. Has had migraines for over 6 years. Headaches typically located in occipital region then radiates retro-orbitally. Endorses photophobia, phonophobia and nausea with headaches. Headaches last all day to a few days. In the last 30 days, reports greater than 16 headache days.      Previous prophylactic migraine medications: topiramate, amitriptyline, Norvasc, aimovig    Previous abortive migraine medications: Maxalt, Ibuprofen, Excedrin       Past Medical History  Allergies; Anxiety; Arthritis; Asthma; Bipolar disorder; Bladder Disorder; Broken Bones; C. difficile diarrhea; Carpal tunnel syndrome; Chronic bronchitis; Chronic Obstructive Pulmonary Disease; COPD (chronic obstructive pulmonary disease); DDD (degenerative disc disease), cervical; Depression; Forgetfulness; Gall stones; Head injury; Hemorrhoids; Hiatal hernia; Hyperlipemia; Hyperlipidemia; Hypertension; IgA deficiency; Incontinence; Insulin resistance; Irritable bowel syndrome; Limb Swelling; Lymphedema; Migraine headache; MRSA infection; Neurologic disorder; Neuropathy; Polydipsia; Polyuria; Primary osteoarthritis of left knee; Psychiatric Care; PTSD (post-traumatic stress disorder); Reflux; Restless leg syndrome; Ringing in ears; Seasonal allergies; Shortness of Breath; Sinus trouble; Skin Disease; Sleep apnea;  Slow transit constipation; Thyroid disorder; Vitamin B deficiency         Past Surgical History  Appendectomy; Carpal Tunnel Release; Cholecystecomy; Colonoscopy; Gallbladder; Gastric Sleeve; Hernia         Medication List  albuterol sulfate oral; amitriptyline 150 mg oral tablet; amlodipine 5 mg oral tablet; aripiprazole 5 mg oral tablet; atorvastatin 10 mg oral tablet; Breo Ellipta 100-25 mcg/dose inhalation blister with device; buspirone 15 mg oral tablet; cetirizine 10 mg oral tablet; chlorhexidine gluconate 0.12 % mucous membrane mouthwash; cyanocobalamin (vitamin B-12) oral; docusate sodium 100 mg oral capsule; duloxetine 30 mg oral capsule,delayed release(DR/EC); EpiPen 0.3 mg/0.3 mL injection auto-injector; Flonase Allergy Relief 50 mcg/actuation nasal spray,suspension; furosemide 40 mg oral tablet; Linzess 145 mcg oral capsule; liothyronine 25 mcg oral tablet; Nyamyc 100,000 unit/gram topical powder; oxybutynin chloride 5 mg oral tablet extended release 24hr; potassium chloride 10 mEq oral tablet,ER particles/crystals; Singulair 10 mg oral tablet; sucralfate 100 mg/mL oral suspension; Unithroid 200 mcg oral tablet         Allergy List  Bee Stings; NSAIDS; Peppers         Family Medical History  Stroke; Heart Disease; Diabetes, unspecified type; Diabetes; Alcoholism in family; Blood Diseases; Family history of certain chronic disabling diseases; arthritis; Osteoporosis; Family history of Arthritis         Social History  Alcohol (Never); lives with spouse; .; Recreational Drug Use (Never); Retired.; Tobacco (Never); Unemployed.         Review of Systems  · Constitutional  o Denies  o : chills, excessive sweating, fatigue, fever, sycope/passing out, weight gain, weight loss  · Eyes  o Denies  o : changes in vision, blurry vision, double vision  · HENT  o Denies  o : loss of hearing, ringing in the ears, ear aches, sore throat, nasal congestion, sinus pain, nose bleeds, seasonal  allergies  · Cardiovascular  o Denies  o : blood clots, swollen legs, anemia, easy burising or bleeding, transfusions  · Respiratory  o Denies  o : shortness of breath, dry cough, productive cough, pneumonia, COPD  · Gastrointestinal  o Denies  o : difficulty swallowing, reflux  · Genitourinary  o Denies  o : incontinence  · Neurologic  o Admits  o : headache  o Denies  o : seizure, stroke, tremor, loss of balance, falls, dizziness/vertigo, difficulty with sleep, numbness/tingling/paresthesia , difficulty with coordination, difficulty with dexterity, weakness  · Musculoskeletal  o Denies  o : neck stiffness/pain, swollen lymph nodes, muscle aches, joint pain, weakness, spasms, sciatica, pain radiating in arm, pain radiating in leg, low back pain  · Endocrine  o Denies  o : diabetes, thyroid disorder  · Psychiatric  o Denies  o : anxiety, depression      Vitals  Date Time BP Position Site L\R Cuff Size HR RR TEMP (F) WT  HT  BMI kg/m2 BSA m2 O2 Sat FR L/min FiO2 HC       01/22/2021 08:07 /74 Sitting    80 - R  95.8 351lbs 16oz 5'   68.74 2.6             Physical Examination  · Constitutional  o Appearance  o : well-nourished, well developed, large body habitus   · Eyes  o Pupils and Irises  o : Pupils equal, round, and reactive to light and accommodation bilaterally  · Respiratory  o Auscultation of Lungs  o : Lungs were clear to ascultation bilaterally. No wheezes, rhonchi or rales were appreciated.  · Cardiovascular  o Heart  o :   § Auscultation of Heart  § : Regular rate and rhythm, no murmurs, gallops or rubs were appreciated.  o Peripheral Vascular System  o :   § Extremities  § : No peripheral edema was appreciated  · Musculoskeletal  o General  o : Normal bulk and normal tone throughout. 5/5 motor strength throughout and symmetric. Normal gait and station.  · Neurologic  o Mental Status Examination  o :   § Orientation  § : Alert and oriented to person, place, and time,  § Speech/Language  § : Intact  naming, comprehension, and repetition. No dysarthria.  § Memory  § : Immediate recall is 3/3. Recall at 5 minutes is 3/3.   § Attention  § : Attention span is intact to serial 7 examination and finger tapping.   § Fund of Knowledge  § : Adequate fund of knowledge  o Cranial Nerves  o : Pupils are equal, round and reactive to light. Extraocular movements are intact. Visual fields are full. Fundoscopic examination reveals sharp disc bilaterally. Sensation in the V1-V3 distribution is intact and symmetric. Muscles of mastication are strong and symmetric. Muscles of facial expression are strong and symmetric. Hearing is intact. Palatal raise is intact and symmetric. Uvula is midline. Shoulder shrug is strong. Tongue protrudes in the midline.  o Motor Examination  o :   § RUE Strength  § : strength normal  § LUE Strength  § : strength normal  § RLE Strength  § : strength normal  § LLE Strength  § : strength normal  o Reflexes  o : 2+ reflexes throughout and symmetric. Negative Diaz. Negative Babinski.   o Sensation  o : Intact sensation to light touch, pinprick, vibration and proprioception throughout.  o Gait and Station  o :   § Gait Screening  § : Normal, narrow based gait, with a normal arm swing.  o Coordination  o : Intact finger to nose and heel to shin. Rapid alternating movements are intact in the upper and lower extremities.          Assessment  · Intractable chronic migraine without aura and without status migrainosus     346.71/G43.719  Will start Botox for preventative therapy of migraine. Continue Maxalt for abortive therapy.       Plan  · Medications  o Medications have been Reconciled  o Transition of Care or Provider Policy  · Instructions  o Encouraged to follow-up with Primary Care Provider for preventative care.  o Call or Return if symptoms persist.  o Will schedule for Botox.   · Disposition  o Call or Return if symptoms worsen or persist.  · Associate Tasks  o Task ID [id number] General Task:  Botox            Electronically Signed by: Elham Burton APRN -Author on January 22, 2021 08:34:51 AM

## 2021-05-14 NOTE — PROGRESS NOTES
Progress Note      Patient Name: Kristin Aldrich   Patient ID: [id number]   Sex: Female   YOB: 1975    Primary Care Provider: Kaylin PARRISH   Referring Provider: Kaylin PARRISH    Visit Date: February 23, 2021    Provider: Nat Vance PA-C   Location: [facility]   Location Address: [address] [place] [address]  [id number]   Location Phone: [phone]          Chief Complaint  · right knee pain      History Of Present Illness  Kristin Aldrich is a 45 year old /White female who presents today to [facility].      She is here for #2/3 Euflexxa injection right knee.     She also complains of pain over her tailbone. She has fallen multiple times and at least once landed on her buttocks.       Past Medical History  Allergies; Anxiety; Arthritis; Asthma; Bipolar disorder; Bladder Disorder; Broken Bones; C. difficile diarrhea; Carpal tunnel syndrome; Chronic bronchitis; Chronic Obstructive Pulmonary Disease; COPD (chronic obstructive pulmonary disease); DDD (degenerative disc disease), cervical; Depression; Forgetfulness; Gall stones; Head injury; Hemorrhoids; Hiatal hernia; Hyperlipemia; Hyperlipidemia; Hypertension; IgA deficiency; Incontinence; Insulin resistance; Intractable chronic migraine without aura and without status migrainosus; Irritable bowel syndrome; Limb Swelling; Lymphedema; Migraine headache; MRSA infection; Neurologic disorder; Neuropathy; Polydipsia; Polyuria; Primary osteoarthritis of left knee; Psychiatric Care; PTSD (post-traumatic stress disorder); Reflux; Restless leg syndrome; Ringing in ears; Seasonal allergies; Shortness of Breath; Sinus trouble; Skin Disease; Sleep apnea; Slow transit constipation; Thyroid disorder; Vitamin B deficiency         Past Surgical History  Appendectomy; Carpal Tunnel Release; Cholecystecomy; Colonoscopy; Gallbladder; Gastric Sleeve; Hernia         Medication List  albuterol sulfate oral;  amitriptyline 150 mg oral tablet; amlodipine 5 mg oral tablet; aripiprazole 5 mg oral tablet; atorvastatin 10 mg oral tablet; Breo Ellipta 100-25 mcg/dose inhalation blister with device; buspirone 15 mg oral tablet; cetirizine 10 mg oral tablet; chlorhexidine gluconate 0.12 % mucous membrane mouthwash; cyanocobalamin (vitamin B-12) oral; docusate sodium 100 mg oral capsule; duloxetine 30 mg oral capsule,delayed release(DR/EC); EpiPen 0.3 mg/0.3 mL injection auto-injector; Flonase Allergy Relief 50 mcg/actuation nasal spray,suspension; furosemide 40 mg oral tablet; Linzess 145 mcg oral capsule; liothyronine 25 mcg oral tablet; Nyamyc 100,000 unit/gram topical powder; oxybutynin chloride 5 mg oral tablet extended release 24hr; potassium chloride 10 mEq oral tablet,ER particles/crystals; Singulair 10 mg oral tablet; sucralfate 100 mg/mL oral suspension; Unithroid 200 mcg oral tablet         Allergy List  Bee Stings; NSAIDS; Peppers       Allergies Reconciled  Family Medical History  Stroke; Heart Disease; Diabetes, unspecified type; Diabetes; Alcoholism in family; Blood Diseases; Family history of certain chronic disabling diseases; arthritis; Osteoporosis; Family history of Arthritis         Social History  Alcohol (Never); lives with spouse; .; Recreational Drug Use (Never); Retired.; Tobacco (Never); Unemployed.         Review of Systems  · Constitutional  o Denies  o : fever, chills, weight loss  · Cardiovascular  o Denies  o : chest pain, shortness of breath  · Gastrointestinal  o Denies  o : liver disease, heartburn, nausea, blood in stools  · Genitourinary  o Denies  o : painful urination, blood in urine  · Integument  o Denies  o : rash, itching  · Neurologic  o Denies  o : headache, weakness, loss of consciousness  · Musculoskeletal  o Admits  o : painful, swollen joints  · Psychiatric  o Denies  o : drug/alcohol addiction, anxiety, depression      Vitals  Date Time BP Position Site L\R Cuff Size HR RR  TEMP (F) WT  HT  BMI kg/m2 BSA m2 O2 Sat FR L/min FiO2 HC       02/23/2021 10:07 AM      73 - R   337lbs 0oz 5'   65.82 2.54 99 %            Physical Examination  · Constitutional  o Appearance  o : well developed, well-nourished, no obvious deformities present  · Head and Face  o Head  o :   § Inspection  § : normocephalic  o Face  o :   § Inspection  § : no facial lesions  · Eyes  o Conjunctivae  o : conjunctivae normal  o Sclerae  o : sclerae white  · Ears, Nose, Mouth and Throat  o Ears  o :   § External Ears  § : appearance within normal limits  § Hearing  § : intact  o Nose  o :   § External Nose  § : appearance normal  · Neck  o Inspection/Palpation  o : normal appearance  o Range of Motion  o : full range of motion  · Respiratory  o Respiratory Effort  o : breathing unlabored  o Inspection of Chest  o : normal appearance  o Auscultation of Lungs  o : no audible wheezing or rales  · Cardiovascular  o Heart  o : regular rate  · Gastrointestinal  o Abdominal Examination  o : soft and non-tender  · Skin and Subcutaneous Tissue  o General Inspection  o : intact, no rashes  · Psychiatric  o General  o : Alert and oriented x3  o Judgement and Insight  o : judgment and insight intact  o Mood and Affect  o : mood normal, affect appropriate  · Right Knee  o Inspection  o : Ambulating with a walker. Antalgic gait. Pain with movement. Range of motion 0-110. Tender joint lines. Thigh to calf mismatch bilaterally. Minimal muscle mass. + Instability varus/valgus stress. Positive crepitus. Neurovascularly intact. Sensation grossly intact. Pulses normal. Calf supple; non-tender.   · Injection Note/Aspiration Note  o Site  o : right knee  o Procedure  o : Procedure: After educating the patient, patient gave consent for procedure. After using Chloraprep, the joint space was injected. The patient tolerated the procedure well.  o Medication  o : Euflexxa, 20 mg          Assessment  · Primary osteoarthritis of right  knee     715.16/M17.11  · Pain: Knee     719.46/M25.569  · Coccyx contusion     922.32/S30.0XXA      Plan  · Orders  o Euflexxa per dose () - - 02/23/2021   Lot [id number] Exp 08 17 2021 [employer] Administered by Nat ALONSO  · Medications  o Medications have been Reconciled  o Transition of Care or Provider Policy  · Instructions  o Reviewed the patient's Past Medical, Social, and Family history as well as the ROS at today's visit, no changes.  o Call or return if worsening symptoms.  o Follow up in 1 week.  o I provided her with order for donut seat cushion for comfort. She will follow up with primary care.   o Electronically Identified Patient Education Materials Provided Electronically            Electronically Signed by: GAVIN Denise-C -Author on February 23, 2021 10:39:37 AM  Electronically Co-signed by: Jocelyn Bloom MD -Reviewer on February 24, 2021 07:45:40 AM

## 2021-05-15 VITALS — HEART RATE: 80 BPM | HEIGHT: 60 IN | OXYGEN SATURATION: 99 % | BODY MASS INDEX: 57.52 KG/M2 | WEIGHT: 293 LBS

## 2021-05-15 VITALS — OXYGEN SATURATION: 98 % | HEART RATE: 81 BPM | HEIGHT: 60 IN | WEIGHT: 293 LBS | BODY MASS INDEX: 57.52 KG/M2

## 2021-05-15 VITALS
DIASTOLIC BLOOD PRESSURE: 57 MMHG | HEIGHT: 60 IN | HEART RATE: 113 BPM | WEIGHT: 293 LBS | TEMPERATURE: 97.3 F | SYSTOLIC BLOOD PRESSURE: 120 MMHG | BODY MASS INDEX: 57.52 KG/M2

## 2021-05-15 VITALS — OXYGEN SATURATION: 97 % | WEIGHT: 293 LBS | HEART RATE: 87 BPM | BODY MASS INDEX: 57.52 KG/M2 | HEIGHT: 60 IN

## 2021-05-15 VITALS — OXYGEN SATURATION: 98 % | HEIGHT: 60 IN | BODY MASS INDEX: 57.52 KG/M2 | WEIGHT: 293 LBS | HEART RATE: 101 BPM

## 2021-05-15 VITALS — WEIGHT: 293 LBS | HEIGHT: 60 IN | HEART RATE: 77 BPM | OXYGEN SATURATION: 99 % | BODY MASS INDEX: 57.52 KG/M2

## 2021-05-15 VITALS — OXYGEN SATURATION: 97 % | WEIGHT: 293 LBS | HEART RATE: 90 BPM | BODY MASS INDEX: 57.52 KG/M2 | HEIGHT: 60 IN

## 2021-05-15 VITALS — OXYGEN SATURATION: 99 % | HEIGHT: 60 IN | BODY MASS INDEX: 57.52 KG/M2 | WEIGHT: 293 LBS | HEART RATE: 96 BPM

## 2021-05-15 VITALS — WEIGHT: 293 LBS | BODY MASS INDEX: 57.52 KG/M2 | HEIGHT: 60 IN

## 2021-05-15 VITALS
SYSTOLIC BLOOD PRESSURE: 150 MMHG | BODY MASS INDEX: 57.52 KG/M2 | DIASTOLIC BLOOD PRESSURE: 97 MMHG | HEART RATE: 65 BPM | HEIGHT: 60 IN | OXYGEN SATURATION: 100 % | WEIGHT: 293 LBS

## 2021-05-15 VITALS — BODY MASS INDEX: 57.52 KG/M2 | WEIGHT: 293 LBS | HEIGHT: 60 IN

## 2021-05-15 VITALS — BODY MASS INDEX: 57.52 KG/M2 | HEART RATE: 77 BPM | HEIGHT: 60 IN | WEIGHT: 293 LBS | OXYGEN SATURATION: 99 %

## 2021-05-15 VITALS — WEIGHT: 293 LBS | OXYGEN SATURATION: 94 % | HEART RATE: 89 BPM | BODY MASS INDEX: 57.52 KG/M2 | HEIGHT: 60 IN

## 2021-05-16 VITALS
SYSTOLIC BLOOD PRESSURE: 126 MMHG | HEIGHT: 60 IN | DIASTOLIC BLOOD PRESSURE: 57 MMHG | BODY MASS INDEX: 57.52 KG/M2 | WEIGHT: 293 LBS

## 2021-05-17 ENCOUNTER — CONVERSION ENCOUNTER (OUTPATIENT)
Dept: SURGERY | Facility: CLINIC | Age: 46
End: 2021-05-17

## 2021-05-17 ENCOUNTER — OFFICE VISIT CONVERTED (OUTPATIENT)
Dept: UROLOGY | Facility: CLINIC | Age: 46
End: 2021-05-17
Attending: UROLOGY

## 2021-05-17 LAB
BILIRUB UR QL STRIP: NEGATIVE
COLOR UR: YELLOW
CONV CLARITY OF URINE: CLEAR
CONV PROTEIN IN URINE BY AUTOMATED TEST STRIP: NEGATIVE
CONV UROBILINOGEN IN URINE BY AUTOMATED TEST STRIP: 0.2
GLUCOSE UR QL: NEGATIVE
HGB UR QL STRIP: NEGATIVE
KETONES UR QL STRIP: NEGATIVE
LEUKOCYTE ESTERASE UR QL STRIP: NEGATIVE
NITRITE UR QL STRIP: NEGATIVE
PH UR STRIP.AUTO: 6.5 [PH]
SP GR UR: 1.01

## 2021-05-22 ENCOUNTER — TRANSCRIBE ORDERS (OUTPATIENT)
Dept: ADMINISTRATIVE | Facility: HOSPITAL | Age: 46
End: 2021-05-22

## 2021-05-22 DIAGNOSIS — Z12.39 SCREENING BREAST EXAMINATION: Primary | ICD-10-CM

## 2021-05-28 ENCOUNTER — OFFICE VISIT CONVERTED (OUTPATIENT)
Dept: NEUROLOGY | Facility: CLINIC | Age: 46
End: 2021-05-28
Attending: NURSE PRACTITIONER

## 2021-06-03 NOTE — CONSULTS
Patient: ISH ALDRICH     Acct: [id number]     Report: #[id number]  MR #:  [id number]     DOS: 05/10/2021 0605     : 1975  DICTATING: Moe Burris  ***Signed***  --------------------------------------------------------------------------------------------------------------------                              [facility]                          [place], [place]  [id number]           __________________________________________________________________________         Patient Name:                   Attending Physician:    Ish Aldrich M.D.         Patient Visit # MR #            Admit Date  Disch Date     Location    [id number]    [id number]      2021                 SLEEP- -         Date of Birth    1975    __________________________________________________________________________    0814 - SLEEP OFFICE VISIT         DATE OF SERVICE:  2021         REFERRING PHYSICIAN:    FRANCOIS Ledezma         CHIEF COMPLAINT:    Establish care for sleep apnea.         HISTORY OF PRESENT ILLNESS:    This is a 45-year-old female patient with longstanding history of obstructive    sleep apnea. She is here today to establish care. She stated that she was    diagnosed with sleep apnea initially in  in [place]. Her original    sleep test was not available but she actually had a polysomnography at    [facility] in  which we obtained and reviewed today. apnea-hypopnea index    was 54 events per hour and respiratory disturbance index was 68 events per    hour. She later had a titration test in [place] on 2015 during which    optimal pressure was not reached and therefore, the patient was placed on    auto BiPAP with EPAP 10, IPAP max 20 and PS max 4.         She stated that she was using her machine regularly up until 2 years ago when    her BiPAP . Apparently, she did a sleep  study in our office here on    03/15/2020 which was negative for sleep apnea at that time but this could    have been related to the fact that she was using her machine just prior to    the study.         She currently complains of loud snoring, excessive fatigue and sleepiness,    frequent awakening, waking up with headache, dry mouth and shore throat,    grinding her teeth and frequent leg jerks and episodes of sleep paralysis.    Those symptoms are similar to prior symptoms of sleep apnea before she was    initially placed on PAP therapy.         She underwent gastric sleep surgery in 2018 and lost about 150 pounds but    then gained about 50 pounds back after the COVID pandemic. She does not    exercise regularly and does not have a special diet. She uses the walker on    ambulation. Her weight was 297 pounds when she had the sleep study at    [facility] in 2013 which showed severe sleep apnea and her current weight is    over 300 pounds.         She goes to bed at 11 p.m. and sometimes later. She falls asleep within an    hour. She wakes up at 8 a.m. She feels exhausted upon awakening. She does    wake up many times at night including awakening to urinate.         REVIEW OF SYSTEMS:    CONSTITUTIONAL: Fatigue. Poor appetite.    CARDIOVASCULAR: She reported swelling in her legs but denies irregular    heartbeats or chest pain.    RESPIRATORY: She denies shortness of breath at rest, cough, or wheezing.    GASTROINTESTINAL: She reported heartburn and diarrhea.    EMNT: She denies nasal congestion but reported postnasal drip intermittently    and pain in her mouth.    MUSCULOSKELETAL: She reported pain in her joints, swelling, redness, and    stiffness in her back and numbness.    ENDOCRINE: She reported excessive thirst, cold or warm intolerance.    urinary: she denies blood in the urine or dysuria but reported frequent    urination.    NEURO/PSYCH: She reported dizziness, headache, anxiety, and depression.     LYMPHATICS: She reported easily bruising but denies enlarged lymph nodes.    SKIN: She denies rash.         PAST MEDICAL HISTORY:    1.  Arthritis.    2.  Hypertension.    3.  Hypothyroidism.    4.  Depression.    5.  Irritable bowel syndrome.         PAST SURGICAL HISTORY:    1.  Appendectomy in 2006.    2.  Cholecystectomy in 2005.    3.  Gastric sleeve surgery in 2018.         MEDICATIONS:    1.  Lasix.    2.  Cetirizine.    3.  Levothyroxine.    4.  Duloxetine.    5.  Amlodipine.    6.  Atorvastatin.    7.  Aripiprazole.    8.  Buspirone.    9.  Montelukast.    10. Ventolin.    11. Insulin.    12. Breo Ellipta.    13. Azelastine.    14. Oxybutynin.    15. Iron supplement.    16. Vitamin D.         ALLERGIES:    She has allergies to bees, molds, grass, and tree. No allergy to medications.         SOCIAL HISTORY:    She never smoked. She does not drink alcohol. She denies illicit drug use.    She drinks tea intermittently.         FAMILY HISTORY:    Positive for diabetes, heart disease, hypertension, stroke, sleep apnea,    COPD, and cancer.         PHYSICAL EXAMINATION:    VITAL SIGNS: Blood pressure is 125/103, heart rate 105, SpO2 98%, BMI is 66,    and weight is 341 pounds, and height is 5 feet.    CONSTITUTIONAL: Not in any acute distress.    EYES: Injected conjunctivae. Extraocular movements are intact. Pupils are    equal and reactive to light.    EMNT: She has a Freidman's and Mallampati score of III and narrow distance    between the posterior pharyngeal pillars,    NECK: No thyromegaly. Trachea in the midline.    LUNGS: Clear to auscultation bilaterally. No crackles or wheezing. Nonlabored    breathing.    HEART: Regular rhythm and rate, no audible murmur.    musculoskeletal: No cyanosis, clubbing, or edema. Warm extremities, well    perfused.    ABDOMEN: Obese, soft, no tenderness. Positive bowel sounds.    NEURO/PSYCH: Conscious, alert, and oriented. She has appropriate mood and    affect. Gait is  normal.    INTEGUMENTARY: No rash or ecchymosis.    LYMPHATICS: No palpable cervical or supraclavicular lymph nodes.         ASSESSMENT:    1.  Obstructive sleep apnea, severe.    2.  Super morbid obesity, BMI is 66.    3.  Hypertension.    4.  Diabetes mellitus type 2.    5.  Hypersomnia, unspecified, secondary to untreated sleep apnea.         RECOMMENDATIONS:    1.  Initiate auto BiPAP with EPAP 10, IPAP max 20, and PS 0-4.    2.  Discussed the result of her prior sleep study with the patient and the        pathophysiology of sleep apnea and the importance of PAP therapy in the        setting of hypersomnolence and comorbidity hypertension.    3.  Continue current blood pressure medications.    4.  Discussed the importance of compliance with PAP therapy in the setting        her comorbidities.    5.  Counseled her for weight loss and encouraged her to exercise regularly        and cut down on carbohydrates; actually losing weight may decrease the        severity of sleep apnea and obviate the need for PAP therapy.         To be electronically signed in [facility]    [id number] NOE BURRIS M.D.         RJ:daniela    D:  05/07/2021 11:15    T:  05/10/2021 05:37    #[id number]         CC: SLEEP LAB         Until signed, this is an unconfirmed preliminary report that may contain    errors and is subject to change.         Electronically signed by Noe Burris  05/21/2021 08:45     Disclaimer: Converted hospital document may not contain table formatting or lab diagrams. Please see [employer] [facility] for authenticated document.

## 2021-06-05 NOTE — PROGRESS NOTES
Progress Note      Patient Name: Kristin Aldrich   Patient ID: [id number]   Sex: Female   YOB: 1975    Primary Care Provider: Kaylin PARRISH   Referring Provider: Kaylin PARRISH    Visit Date: May 17, 2021    Provider: Inga Schmitz MD   Location: [facility]   Location Address: [address] [place] [address]  [id number]   Location Phone: [phone]          Chief Complaint  · pt is here for urological concerns      History Of Present Illness  The patient is a 41 year old /White female , who presents for follow up from Maria Luz Vazquez , for an urological evaluation for urinary incontinence which the patient associates with no known event.   Urge Incontinence:   The patient states she has had recurrent episodes with and without urgency in the past years. She describes leakage of small amounts, moderate amounts, and large amounts of urine. The patients symptoms improved on oxybutynin ER 5mg. The patient uses 1 pads a day. She also reports dysuria, frequency, and foul smelling urine, itching. She denies needing to strain to void, an intermittent stream, and incomplete emptying.   Stress Incontinence:   She does have leaking with coughing, sneezing, and laughing. This has been occuring for years. This leaking has been getting worse.   The patient notes aggravating factors are coughing and laughing There no identified alleviating factors. Her past medical history is notable for a history of C. difficile diarrhea, Chronic bronchitis, Chronic Obstructive Pulmonary Disease, IgA deficiency, Incontinence, Insulin resistance, Irritable Bowel Syndrome, and MRSA infection.   She denies a history of recent vaginal delivery and hysterectomy. The patient has not been previously evaluated for this condition.   Previous Treatment:   She has been previously treated with antibiotics.      She also complains of infections very often but only had one this year.        She  appears to have an infection today.        Her CT scan from 2017 was reviewed.  Her right kidney is severely atrophic and/or congenitally absent and is essentially not visible.  Her left kidney is normal.  It is not the source of her infections.       She had been on oxybutynin ER 5mg and it worked well but she has not been on it recently.       Past Medical History  Allergies; Anxiety; Arthritis; Asthma; Bipolar disorder; Bladder disorder; Broken Bones; C. difficile diarrhea; Carpal tunnel syndrome; Chronic bronchitis; Chronic Obstructive Pulmonary Disease; DDD (degenerative disc disease), cervical; Depression; Forgetfulness; Gall stones; Head injury; Hemorrhoids; Hiatal hernia; Hyperlipidemia; Hypertension; IgA deficiency; Incontinence; Insulin resistance; Intractable chronic migraine without aura and without status migrainosus; Irritable bowel syndrome; Limb Swelling; Lymphedema; Migraine headache; MRSA infection; Neurologic disorder; Neuropathy; Polydipsia; Polyuria; Primary osteoarthritis of left knee; Psychiatric Care; PTSD (post-traumatic stress disorder); Reflux; Restless leg syndrome; Ringing in ears; Seasonal allergies; Shortness of Breath; Sinus trouble; Skin Disease; Sleep apnea; Slow transit constipation; Thyroid disorder; Vitamin B deficiency         Past Surgical History  Appendectomy; Carpal Tunnel Release; Cholecystecomy; Colonoscopy; Gallbladder; Gastric Sleeve; Hernia         Medication List  albuterol sulfate oral; amlodipine 5 mg oral tablet; aripiprazole 5 mg oral tablet; atorvastatin 10 mg oral tablet; azelastine 0.05 % ophthalmic (eye) drops; azelastine 0.15 % (205.5 mcg) nasal spray,non-aerosol; Botox 100 unit injection recon soln; Breo Ellipta 100-25 mcg/dose inhalation blister with device; buspirone 15 mg oral tablet; Calcium 500 500 mg calcium (1,250 mg) oral tablet; cetirizine 10 mg oral tablet; chlorhexidine gluconate 0.12 % mucous membrane mouthwash; cyanocobalamin (vitamin B-12) oral;  docusate sodium 100 mg oral capsule; duloxetine 30 mg oral capsule,delayed release(DR/EC); EpiPen 0.3 mg/0.3 mL injection auto-injector; ferrous sulfate 325 mg (65 mg iron) oral tablet; Flonase Allergy Relief 50 mcg/actuation nasal spray,suspension; furosemide 40 mg oral tablet; Linzess 145 mcg oral capsule; liothyronine 25 mcg oral tablet; Nyamyc 100,000 unit/gram topical powder; oxybutynin chloride 5 mg oral tablet extended release 24hr; Ozempic 0.25 mg or 0.5 mg(2 mg/1.5 mL) subcutaneous pen injector; potassium chloride 10 mEq oral tablet,ER particles/crystals; rizatriptan 10 mg oral tablet; Singulair 10 mg oral tablet; Tirosint-Sol 200 mcg/mL oral solution; Tylenol Extra Strength 500 mg oral tablet; Vitamin D3 50 mcg (2,000 unit) oral capsule; Voltaren 1 % topical gel         Allergy List  Adhesives; Bee Stings; NSAIDS; Peppers       Allergies Reconciled  Family Medical History  Stroke; Heart Disease; Diabetes, unspecified type; Diabetes; Alcoholism in family; Blood Diseases; Family history of certain chronic disabling diseases; arthritis; Osteoporosis; Family history of Arthritis         Social History  Alcohol (Never); lives with spouse; .; Recreational Drug Use (Never); Retired.; Tobacco (Never); Unemployed.         Review of Systems  · Constitutional  o Denies  o : chills, fever  · Gastrointestinal  o Denies  o : nausea, vomiting      Vitals  Date Time BP Position Site L\R Cuff Size HR RR TEMP (F) WT  HT  BMI kg/m2 BSA m2 O2 Sat FR L/min FiO2        05/17/2021 02:49 /56 Sitting       341lbs 0oz 5'   66.6 2.56             Physical Examination  · Constitutional  o Appearance  o : well-nourished, well developed, alert, in no acute distress, poor hygiene, large body habitus   · Head and Face  o Head  o :   § Inspection  § : atraumatic, normocephalic  o Face  o :   § Inspection  § : no facial lesions  · Eyes  o Sclerae  o : sclerae white  · Ears, Nose, Mouth and Throat  o Ears  o :   § External  Ears  § : appearance within normal limits, no lesions present  o Nose  o :   § External Nose  § : appearance normal  · Neck  o Inspection/Palpation  o : normal appearance, no masses or tenderness, trachea midline  · Respiratory  o Respiratory Effort  o : Breathing is unlabored without accessory muscle use  o Inspection of Chest  o : normal appearance, no retractions  · Skin and Subcutaneous Tissue  o General Inspection  o : No rashes, lesions or areas of discoloration present. Skin turgor is normal.  o General Palpation  o : No abnormalities, masses or tenderness on palpation.  · Neurologic  o Mental Status Examination  o :   § Orientation  § : grossly oriented to person, place and time  § Speech/Language  § : communication ability within normal limits  o Gait and Station  o : normal gait, able to stand without difficulty  · Psychiatric  o Judgement and Insight  o : judgment and insight intact, judgement for everyday activities and social situations within normal limits, insight intact  o Mood and Affect  o : mood normal, affect appropriate          Results  · In-Office Procedures  o Lab procedure  § Automated dipstick urinalysis with microscopy (87406)   § Color Ur: Yellow   § Clarity Ur: Clear   § Glucose Ur Ql Strip: Negative   § Bilirub Ur Ql Strip: Negative   § Ketones Ur Ql Strip: Negative   § Sp Gr Ur Qn: 1.015   § Hgb Ur Ql Strip: Negative   § pH Ur-LsCnc: 6.5   § Prot Ur Ql Strip: Negative   § Urobilinogen Ur Strip-mCnc: 0.2   § Nitrite Ur Ql Strip: Negative   § WBC Est Ur Ql Strip: Negative       Assessment  · Cystitis     595.9/N30.90  · Urge Incontinence     788.31/N39.41  · Urinary Tract Infection     599.0/N39.0  · Continuous leakage of urine     788.37/N39.45  · Atrophic kidney, acquired     587/N26.1      Plan  · Medications  o oxybutynin chloride 5 mg oral tablet extended release 24hr   SIG: take 1 tablet (5 mg) by oral route once daily for 30 days   DISP: (30) Tablet with 11 refills  Refilled on  05/17/2021     o Medications have been Reconciled  o Transition of Care or Provider Policy  · Instructions  o PLAN:  o Diagnosis of urge incontinence was discussed with patient. She was counseled on treatments and behavorial modifications to prevent and reduce urgency. She did well with oxybutynin ER and I sent in a script for that.   o She was instructed on things to help prevent infections.   o FOLLOW-UP:  o In 12 months  o Electronically Identified Patient Education Materials Provided Electronically  · Referrals  o ID: [id number] Date: 05/02/2017 Type: Inbound  Specialty: Urology  o ID: [id number] Date: 05/02/2017 Type: Inbound  Specialty: Urology            Electronically Signed by: Inga Schmitz MD -Author on May 17, 2021 03:23:52 PM

## 2021-06-05 NOTE — PROGRESS NOTES
Progress Note      Patient Name: Kristin Aldrich   Patient ID: [id number]   Sex: Female   YOB: 1975    Primary Care Provider: Kaylin PARRISH   Referring Provider: Kaylin PARRISH    Visit Date: May 6, 2021    Provider: FRANCOIS Vazquez   Location: [facility]   Location Address: [address] [place] [address]  [id number]   Location Phone: [phone]          Chief Complaint     botox f/u       History Of Present Illness  Kristin Aldrich is a 45 year old /White female who presents today to [facility] today referred from Kaylin PARRISH for follow up. States she's had some very good improvement in headache frequency with Botox. Down to about 5 headache days per month. Maxalt is effective abortive therapy. Is very pleased with the way she has done with Botox.      Previous prophylactic migraine medications: topiramate, amitriptyline, Norvasc, aimovig    Previous abortive migraine medications: Maxalt, Ibuprofen, Excedrin       Past Medical History  Allergies; Anxiety; Arthritis; Asthma; Bipolar disorder; Bladder disorder; Broken Bones; C. difficile diarrhea; Carpal tunnel syndrome; Chronic bronchitis; Chronic Obstructive Pulmonary Disease; COPD (chronic obstructive pulmonary disease); DDD (degenerative disc disease), cervical; Depression; Forgetfulness; Gall stones; Head injury; Hemorrhoids; Hiatal hernia; Hyperlipemia; Hyperlipidemia; Hypertension; IgA deficiency; Incontinence; Insulin resistance; Intractable chronic migraine without aura and without status migrainosus; Irritable bowel syndrome; Limb Swelling; Lymphedema; Migraine headache; MRSA infection; Neurologic disorder; Neuropathy; Polydipsia; Polyuria; Primary osteoarthritis of left knee; Psychiatric Care; PTSD (post-traumatic stress disorder); Reflux; Restless leg syndrome; Ringing in ears; Seasonal allergies; Shortness of Breath; Sinus trouble; Skin Disease;  Sleep apnea; Slow transit constipation; Thyroid disorder; Vitamin B deficiency         Past Surgical History  Appendectomy; Carpal Tunnel Release; Cholecystecomy; Colonoscopy; Gallbladder; Gastric Sleeve; Hernia         Medication List  albuterol sulfate oral; amitriptyline 150 mg oral tablet; amlodipine 5 mg oral tablet; aripiprazole 5 mg oral tablet; atorvastatin 10 mg oral tablet; Botox 100 unit injection recon soln; Breo Ellipta 100-25 mcg/dose inhalation blister with device; buspirone 15 mg oral tablet; cetirizine 10 mg oral tablet; chlorhexidine gluconate 0.12 % mucous membrane mouthwash; cyanocobalamin (vitamin B-12) oral; docusate sodium 100 mg oral capsule; duloxetine 30 mg oral capsule,delayed release(DR/EC); EpiPen 0.3 mg/0.3 mL injection auto-injector; Flonase Allergy Relief 50 mcg/actuation nasal spray,suspension; furosemide 40 mg oral tablet; Linzess 145 mcg oral capsule; liothyronine 25 mcg oral tablet; Nyamyc 100,000 unit/gram topical powder; oxybutynin chloride 5 mg oral tablet extended release 24hr; Ozempic 0.25 mg or 0.5 mg(2 mg/1.5 mL) subcutaneous pen injector; potassium chloride 10 mEq oral tablet,ER particles/crystals; Singulair 10 mg oral tablet; sucralfate 100 mg/mL oral suspension; Unithroid 200 mcg oral tablet; Voltaren 1 % topical gel         Allergy List  Bee Stings; NSAIDS; Peppers         Family Medical History  Stroke; Heart Disease; Diabetes, unspecified type; Diabetes; Alcoholism in family; Blood Diseases; Family history of certain chronic disabling diseases; arthritis; Osteoporosis; Family history of Arthritis         Social History  Alcohol (Never); lives with spouse; .; Recreational Drug Use (Never); Retired.; Tobacco (Never); Unemployed.         Review of Systems  · Constitutional  o Denies  o : chills, excessive sweating, fatigue, fever, sycope/passing out, weight gain, weight loss  · Eyes  o Denies  o : changes in vision, blurry vision, double  vision  · HENT  o Denies  o : loss of hearing, ringing in the ears, ear aches, sore throat, nasal congestion, sinus pain, nose bleeds, seasonal allergies  · Cardiovascular  o Denies  o : blood clots, swollen legs, anemia, easy burising or bleeding, transfusions  · Respiratory  o Denies  o : shortness of breath, dry cough, productive cough, pneumonia, COPD  · Gastrointestinal  o Denies  o : difficulty swallowing, reflux  · Genitourinary  o Denies  o : incontinence  · Neurologic  o Admits  o : headache  o Denies  o : seizure, stroke, tremor, loss of balance, falls, dizziness/vertigo, difficulty with sleep, numbness/tingling/paresthesia , difficulty with coordination, difficulty with dexterity, weakness  · Musculoskeletal  o Denies  o : neck stiffness/pain, swollen lymph nodes, muscle aches, joint pain, weakness, spasms, sciatica, pain radiating in arm, pain radiating in leg, low back pain  · Endocrine  o Denies  o : diabetes, thyroid disorder  · Psychiatric  o Denies  o : anxiety, depression      Vitals  Date Time BP Position Site L\R Cuff Size HR RR TEMP (F) WT  HT  BMI kg/m2 BSA m2 O2 Sat FR L/min FiO2 HC       05/06/2021 02:05 /92 Sitting    87 - R   342lbs 0oz 5'   66.79 2.56             Physical Examination  · Constitutional  o Appearance  o : well-nourished, well developed, large body habitus   · Eyes  o Pupils and Irises  o : Pupils equal, round, and reactive to light and accommodation bilaterally  · Respiratory  o Auscultation of Lungs  o : Lungs were clear to ascultation bilaterally. No wheezes, rhonchi or rales were appreciated.  · Cardiovascular  o Heart  o :   § Auscultation of Heart  § : Regular rate and rhythm, no murmurs, gallops or rubs were appreciated.  o Peripheral Vascular System  o :   § Extremities  § : No peripheral edema was appreciated  · Musculoskeletal  o General  o : Normal bulk and normal tone throughout. 5/5 motor strength throughout and symmetric. Normal gait and  station.  · Neurologic  o Mental Status Examination  o :   § Orientation  § : Alert and oriented to person, place, and time,  § Speech/Language  § : Intact naming, comprehension, and repetition. No dysarthria.  § Memory  § : Immediate recall is 3/3. Recall at 5 minutes is 3/3.   § Attention  § : Attention span is intact to serial 7 examination and finger tapping.   § Fund of Knowledge  § : Adequate fund of knowledge  o Cranial Nerves  o : Pupils are equal, round and reactive to light. Extraocular movements are intact. Visual fields are full. Fundoscopic examination reveals sharp disc bilaterally. Sensation in the V1-V3 distribution is intact and symmetric. Muscles of mastication are strong and symmetric. Muscles of facial expression are strong and symmetric. Hearing is intact. Palatal raise is intact and symmetric. Uvula is midline. Shoulder shrug is strong. Tongue protrudes in the midline.  o Motor Examination  o :   § RUE Strength  § : strength normal  § LUE Strength  § : strength normal  § RLE Strength  § : strength normal  § LLE Strength  § : strength normal  o Reflexes  o : 2+ reflexes throughout and symmetric. Negative Diaz. Negative Babinski.   o Sensation  o : Intact sensation to light touch, pinprick, vibration and proprioception throughout.  o Gait and Station  o :   § Gait Screening  § : Normal, narrow based gait, with a normal arm swing.  o Coordination  o : Intact finger to nose and heel to shin. Rapid alternating movements are intact in the upper and lower extremities.          Assessment  · Intractable chronic migraine without aura and without status migrainosus     346.71/G43.719  Will continue Botox for preventative therapy of migraine. Continue Maxalt for abortive therapy.      Plan  · Medications  o Medications have been Reconciled  o Transition of Care or Provider Policy  · Instructions  o schedule for Botox  · Disposition  o Call or Return if symptoms worsen or  persist.            Electronically Signed by: FRANCOIS Vazquez -Author on May 6, 2021 02:17:08 PM

## 2021-06-05 NOTE — H&P
"   History and Physical      Patient Name: Kristin Aldrich   Patient ID: [id number]   Sex: Female   YOB: 1975    Primary Care Provider: Kaylin PARRISH   Referring Provider: Kaylin PARRISH    Visit Date: May 12, 2021    Provider: Ky Noguera MD   Location: [facility]   Location Address: [address] [place] [address]  [id number]   Location Phone: [phone]          Chief Complaint     R\">BUE numbness tingling pain and weakness L>R       History Of Present Illness  Krisitn Aldrich is a 45 year old /White female who presents today to [facility] today referred from Kaylin PARRISH.      45-year-old woman evaluated for bilateral hand numbness and tingling.  Is been ongoing for the last 2 years.  She had carpal tunnel surgery to the left hand about 5 to 6 years ago and this feels the same way.  She never had carpal tunnel surgery to the right hand.  She states that he gets numb and tingly several times at night.  Wrist brace has been helping her but it does not help significantly during the daytime.  She drops things.  She states that in the daytime he gets numb and tingly for a couple of minutes or longer.  It happens when she is sewing and doing activities of daily living.  It involves the thumb, index and middle finger predominantly.  Sometimes ring finger.  She has to shake her hand.  She is here today for nerve conduction study.  She is also complaining of wrist pain and pain going up her arm.  She is using a Rollator walker.       Past Medical History  Allergies; Anxiety; Arthritis; Asthma; Bipolar disorder; Bladder disorder; Broken Bones; C. difficile diarrhea; Carpal tunnel syndrome; Chronic bronchitis; Chronic Obstructive Pulmonary Disease; COPD (chronic obstructive pulmonary disease); DDD (degenerative disc disease), cervical; Depression; Forgetfulness; Gall stones; Head injury; Hemorrhoids; Hiatal hernia; " Hyperlipemia; Hyperlipidemia; Hypertension; IgA deficiency; Incontinence; Insulin resistance; Intractable chronic migraine without aura and without status migrainosus; Irritable bowel syndrome; Limb Swelling; Lymphedema; Migraine headache; MRSA infection; Neurologic disorder; Neuropathy; Polydipsia; Polyuria; Primary osteoarthritis of left knee; Psychiatric Care; PTSD (post-traumatic stress disorder); Reflux; Restless leg syndrome; Ringing in ears; Seasonal allergies; Shortness of Breath; Sinus trouble; Skin Disease; Sleep apnea; Slow transit constipation; Thyroid disorder; Vitamin B deficiency         Past Surgical History  Appendectomy; Carpal Tunnel Release; Cholecystecomy; Colonoscopy; Gallbladder; Gastric Sleeve; Hernia         Medication List  albuterol sulfate oral; amitriptyline 150 mg oral tablet; amlodipine 5 mg oral tablet; aripiprazole 5 mg oral tablet; atorvastatin 10 mg oral tablet; Botox 100 unit injection recon soln; Breo Ellipta 100-25 mcg/dose inhalation blister with device; buspirone 15 mg oral tablet; cetirizine 10 mg oral tablet; chlorhexidine gluconate 0.12 % mucous membrane mouthwash; cyanocobalamin (vitamin B-12) oral; docusate sodium 100 mg oral capsule; duloxetine 30 mg oral capsule,delayed release(DR/EC); EpiPen 0.3 mg/0.3 mL injection auto-injector; Flonase Allergy Relief 50 mcg/actuation nasal spray,suspension; furosemide 40 mg oral tablet; Linzess 145 mcg oral capsule; liothyronine 25 mcg oral tablet; Nyamyc 100,000 unit/gram topical powder; oxybutynin chloride 5 mg oral tablet extended release 24hr; Ozempic 0.25 mg or 0.5 mg(2 mg/1.5 mL) subcutaneous pen injector; potassium chloride 10 mEq oral tablet,ER particles/crystals; Singulair 10 mg oral tablet; sucralfate 100 mg/mL oral suspension; Unithroid 200 mcg oral tablet; Voltaren 1 % topical gel         Allergy List  Bee Stings; NSAIDS; Peppers         Family Medical History  Stroke; Heart Disease; Diabetes, unspecified type; Diabetes;  Alcoholism in family; Blood Diseases; Family history of certain chronic disabling diseases; arthritis; Osteoporosis; Family history of Arthritis         Social History  Alcohol (Never); lives with spouse; .; Recreational Drug Use (Never); Retired.; Tobacco (Never); Unemployed.         Review of Systems  · Constitutional  o Denies  o : chills, excessive sweating, fatigue, fever, sycope/passing out, weight gain, weight loss  · Eyes  o Denies  o : changes in vision, blurry vision, double vision  · HENT  o Denies  o : loss of hearing, ringing in the ears, ear aches, sore throat, nasal congestion, sinus pain, nose bleeds, seasonal allergies  · Cardiovascular  o Denies  o : blood clots, swollen legs, anemia, easy burising or bleeding, transfusions  · Respiratory  o Denies  o : shortness of breath, dry cough, productive cough, pneumonia, COPD  · Gastrointestinal  o Denies  o : difficulty swallowing, reflux  · Genitourinary  o Denies  o : incontinence  · Neurologic  o Admits  o : loss of balance, falls, difficulty with sleep, numbness/tingling/paresthesia , weakness  o Denies  o : headache, seizure, stroke, tremor, dizziness/vertigo, difficulty with coordination, difficulty with dexterity  · Musculoskeletal  o Admits  o : neck stiffness/pain, muscle aches, weakness, sciatica, pain radiating in arm, pain radiating in leg, low back pain  o Denies  o : swollen lymph nodes, joint pain, spasms  · Endocrine  o Admits  o : diabetes, thyroid disorder  · Psychiatric  o Admits  o : anxiety, depression      Vitals  Date Time BP Position Site L\R Cuff Size HR RR TEMP (F) WT  HT  BMI kg/m2 BSA m2 O2 Sat FR L/min FiO2 HC       05/12/2021 08:19 /75 Sitting    100 - R 18  339lbs 0oz 5'   66.21 2.55             Physical Examination     She is alert, fluent, phasic, follows commands well.  There is no weakness of the upper extremities and with muscle testing.  There is no weakness of intrinsic hand muscles.  Phalen sign is  positive.  Pressure at the wrist does not produce tingling in her hands.           Assessment  · Carpal tunnel syndrome     354.0/G56.00  Nerve conduction studies abnormal shows electrophysiologic evidence for mild bilateral carpal tunnel syndrome. Clinically she has carpal tunnel syndrome. She is to follow-up with orthopedic surgery to discuss regarding carpal tunnel surgery versus steroid injections.    Total time spent with patient and coordinating patient care was 15 minutes.      Plan  · Orders  o Nerve conduction studies; 5-6 studies (59959) - 354.0/G56.00 - 05/12/2021  · Medications  o Medications have been Reconciled  o Transition of Care or Provider Policy  · Instructions  o Encouraged to follow-up with Primary Care Provider for preventative care.            Electronically Signed by: Ky Noguera MD -Author on May 12, 2021 08:48:41 AM

## 2021-06-05 NOTE — PROCEDURES
Procedure Note      Patient Name: Kristin Aldrich   Patient ID: [id number]   Sex: Female   YOB: 1975    Primary Care Provider: Kaylin PARRISH   Referring Provider: Kaylin PARRISH    Visit Date: May 28, 2021    Provider: FRANCOIS Vazquez   Location: [facility]   Location Address: [address] [place] [address]  [id number]   Location Phone: [phone]          Procedure: INJECTION OF BOTOX FOR CHRONIC MIGRAINES  : 1975   Date of Procedure: 2021   Informed consent was obtained prior to the procedure. Two 100 unit vials of Botox were reconsitituted using 2 mL of 0.9% perservative free normal saline resulting in 5 units of Botox per 0.1 mL. Each area was cleaned and wiped with an alcohol swab prior to injection. The following injections were made following the approved Botox injection paradigm for treatment of chronic migraine: 10 units into nakul corrugators divided into two sites, 5 units into the procerus, 20 units into the frontalis divided in 4 sites, 40 units in the temporalis divided in 6 sites (3 sites on each side), 30 units into the occipitalis divided in 6 sites (3 sites on each side), 20 units on into the cervical paraspinals divided in 4 sites (2 sites on each side) and 30 units into the trapezius divided in 6 sites (3 sites on each side). The patient tolerated the procedure well without complications. Blood loss was minimal. The remaining 45 units of Botox weree disposed of following the procedure.           Assessment  · Intractable chronic migraine without aura and without status migrainosus     346./G43.719      Plan  · Orders  o 155.00 - Botox Injection, 155 Units [facility]. (-01) - 346./G43.719 - 2021   Botox 100unit X2 Lot: [id number] Exp: 10/2023  o Chemodenervation of muscle innervated by facial nerve for chronic migraine (07411) - 346./G43.719 - 2021   Botox 100 unit X2 Lot: [id number] Exp:  10/2023  · Disposition  o Call or Return if symptoms worsen or persist.            Electronically Signed by: FRANCOIS Vazquez -Author on May 28, 2021 11:09:20 AM

## 2021-06-12 ENCOUNTER — ANESTHESIA (OUTPATIENT)
Dept: PERIOP | Facility: HOSPITAL | Age: 46
End: 2021-06-12

## 2021-06-12 ENCOUNTER — ANESTHESIA EVENT (OUTPATIENT)
Dept: PERIOP | Facility: HOSPITAL | Age: 46
End: 2021-06-12

## 2021-06-12 ENCOUNTER — PREP FOR SURGERY (OUTPATIENT)
Dept: OTHER | Facility: HOSPITAL | Age: 46
End: 2021-06-12

## 2021-06-12 ENCOUNTER — HOSPITAL ENCOUNTER (INPATIENT)
Facility: HOSPITAL | Age: 46
LOS: 7 days | Discharge: ANOTHER HOSPITAL | End: 2021-06-19
Attending: EMERGENCY MEDICINE | Admitting: INTERNAL MEDICINE

## 2021-06-12 ENCOUNTER — APPOINTMENT (OUTPATIENT)
Dept: CT IMAGING | Facility: HOSPITAL | Age: 46
End: 2021-06-12

## 2021-06-12 DIAGNOSIS — K46.0 INCARCERATED HERNIA: Primary | ICD-10-CM

## 2021-06-12 DIAGNOSIS — Z78.9 DECREASED ACTIVITIES OF DAILY LIVING (ADL): ICD-10-CM

## 2021-06-12 DIAGNOSIS — R26.2 DIFFICULTY WALKING: ICD-10-CM

## 2021-06-12 LAB
ALBUMIN SERPL-MCNC: 4.7 G/DL (ref 3.5–5.2)
ALBUMIN/GLOB SERPL: 1.3 G/DL
ALP SERPL-CCNC: 85 U/L (ref 39–117)
ALT SERPL W P-5'-P-CCNC: 22 U/L (ref 1–33)
ANION GAP SERPL CALCULATED.3IONS-SCNC: 13.1 MMOL/L (ref 5–15)
AST SERPL-CCNC: 17 U/L (ref 1–32)
BASE EXCESS BLDV CALC-SCNC: -2.7 MMOL/L (ref -2–2)
BASOPHILS # BLD AUTO: 0.03 10*3/MM3 (ref 0–0.2)
BASOPHILS NFR BLD AUTO: 0.4 % (ref 0–1.5)
BDY SITE: ABNORMAL
BILIRUB SERPL-MCNC: 0.5 MG/DL (ref 0–1.2)
BILIRUB UR QL STRIP: NEGATIVE
BUN SERPL-MCNC: 8 MG/DL (ref 6–20)
BUN/CREAT SERPL: 9.6 (ref 7–25)
CALCIUM SPEC-SCNC: 9.3 MG/DL (ref 8.6–10.5)
CHLORIDE SERPL-SCNC: 103 MMOL/L (ref 98–107)
CLARITY UR: CLEAR
CO2 SERPL-SCNC: 22.9 MMOL/L (ref 22–29)
COHGB MFR BLD: 2.1 % (ref 0–1.5)
COLOR UR: YELLOW
CREAT SERPL-MCNC: 0.83 MG/DL (ref 0.57–1)
DEPRECATED RDW RBC AUTO: 44.4 FL (ref 37–54)
EOSINOPHIL # BLD AUTO: 0 10*3/MM3 (ref 0–0.4)
EOSINOPHIL NFR BLD AUTO: 0 % (ref 0.3–6.2)
ERYTHROCYTE [DISTWIDTH] IN BLOOD BY AUTOMATED COUNT: 14.6 % (ref 12.3–15.4)
FHHB: 5.4 % (ref 0–5)
GFR SERPL CREATININE-BSD FRML MDRD: 75 ML/MIN/1.73
GLOBULIN UR ELPH-MCNC: 3.7 GM/DL
GLUCOSE BLDC GLUCOMTR-MCNC: 127 MG/DL (ref 70–130)
GLUCOSE SERPL-MCNC: 90 MG/DL (ref 65–99)
GLUCOSE UR STRIP-MCNC: NEGATIVE MG/DL
HCG INTACT+B SERPL-ACNC: 0.93 MIU/ML
HCO3 BLDV-SCNC: 19.3 MMOL/L (ref 22–26)
HCT VFR BLD AUTO: 48.8 % (ref 34–46.6)
HGB BLD-MCNC: 15.7 G/DL (ref 12–15.9)
HGB BLDA-MCNC: 16 G/DL (ref 11.7–14.6)
HGB UR QL STRIP.AUTO: NEGATIVE
HOLD SPECIMEN: NORMAL
HOLD SPECIMEN: NORMAL
IMM GRANULOCYTES # BLD AUTO: 0.01 10*3/MM3 (ref 0–0.05)
IMM GRANULOCYTES NFR BLD AUTO: 0.1 % (ref 0–0.5)
KETONES UR QL STRIP: ABNORMAL
LEUKOCYTE ESTERASE UR QL STRIP.AUTO: NEGATIVE
LIPASE SERPL-CCNC: 38 U/L (ref 13–60)
LYMPHOCYTES # BLD AUTO: 2.39 10*3/MM3 (ref 0.7–3.1)
LYMPHOCYTES NFR BLD AUTO: 30 % (ref 19.6–45.3)
MCH RBC QN AUTO: 27 PG (ref 26.6–33)
MCHC RBC AUTO-ENTMCNC: 32.2 G/DL (ref 31.5–35.7)
MCV RBC AUTO: 83.8 FL (ref 79–97)
METHGB BLD QL: 0.2 % (ref 0–1.5)
MONOCYTES # BLD AUTO: 0.42 10*3/MM3 (ref 0.1–0.9)
MONOCYTES NFR BLD AUTO: 5.3 % (ref 5–12)
NEUTROPHILS NFR BLD AUTO: 5.11 10*3/MM3 (ref 1.7–7)
NEUTROPHILS NFR BLD AUTO: 64.2 % (ref 42.7–76)
NITRITE UR QL STRIP: NEGATIVE
NOTE: ABNORMAL
NRBC BLD AUTO-RTO: 0 /100 WBC (ref 0–0.2)
OXYHGB MFR BLDV: 92.3 % (ref 94–99)
PCO2 BLDV: 27.2 MM HG (ref 41–51)
PH BLDV: 7.47 PH UNITS (ref 7.31–7.41)
PH UR STRIP.AUTO: 5.5 [PH] (ref 5–8)
PLATELET # BLD AUTO: 212 10*3/MM3 (ref 140–450)
PMV BLD AUTO: 11.5 FL (ref 6–12)
PO2 BLDV: 67.3 MM HG (ref 35–42)
POTASSIUM SERPL-SCNC: 3.9 MMOL/L (ref 3.5–5.2)
PROT SERPL-MCNC: 8.4 G/DL (ref 6–8.5)
PROT UR QL STRIP: NEGATIVE
RBC # BLD AUTO: 5.82 10*6/MM3 (ref 3.77–5.28)
RBC MORPH BLD: NORMAL
SAO2 % BLDCOV: 94.5 % (ref 95–99)
SMALL PLATELETS BLD QL SMEAR: ADEQUATE
SODIUM SERPL-SCNC: 139 MMOL/L (ref 136–145)
SP GR UR STRIP: 1.01 (ref 1–1.03)
UROBILINOGEN UR QL STRIP: ABNORMAL
WBC # BLD AUTO: 7.96 10*3/MM3 (ref 3.4–10.8)
WBC MORPH BLD: NORMAL
WHOLE BLOOD HOLD SPECIMEN: NORMAL

## 2021-06-12 PROCEDURE — 25010000002 ONDANSETRON PER 1 MG: Performed by: NURSE ANESTHETIST, CERTIFIED REGISTERED

## 2021-06-12 PROCEDURE — 25010000002 ONDANSETRON PER 1 MG: Performed by: EMERGENCY MEDICINE

## 2021-06-12 PROCEDURE — 25010000002 MIDAZOLAM PER 1 MG: Performed by: NURSE ANESTHETIST, CERTIFIED REGISTERED

## 2021-06-12 PROCEDURE — 99284 EMERGENCY DEPT VISIT MOD MDM: CPT

## 2021-06-12 PROCEDURE — C1765 ADHESION BARRIER: HCPCS | Performed by: SURGERY

## 2021-06-12 PROCEDURE — 82820 HEMOGLOBIN-OXYGEN AFFINITY: CPT | Performed by: EMERGENCY MEDICINE

## 2021-06-12 PROCEDURE — 82805 BLOOD GASES W/O2 SATURATION: CPT | Performed by: EMERGENCY MEDICINE

## 2021-06-12 PROCEDURE — 94799 UNLISTED PULMONARY SVC/PX: CPT

## 2021-06-12 PROCEDURE — 25010000002 PROPOFOL 10 MG/ML EMULSION: Performed by: NURSE ANESTHETIST, CERTIFIED REGISTERED

## 2021-06-12 PROCEDURE — 99221 1ST HOSP IP/OBS SF/LOW 40: CPT | Performed by: SURGERY

## 2021-06-12 PROCEDURE — 0WQF0ZZ REPAIR ABDOMINAL WALL, OPEN APPROACH: ICD-10-PCS | Performed by: SURGERY

## 2021-06-12 PROCEDURE — 80053 COMPREHEN METABOLIC PANEL: CPT | Performed by: EMERGENCY MEDICINE

## 2021-06-12 PROCEDURE — 81003 URINALYSIS AUTO W/O SCOPE: CPT | Performed by: EMERGENCY MEDICINE

## 2021-06-12 PROCEDURE — 74176 CT ABD & PELVIS W/O CONTRAST: CPT

## 2021-06-12 PROCEDURE — 3E0M05Z INTRODUCTION OF ADHESION BARRIER INTO PERITONEAL CAVITY, OPEN APPROACH: ICD-10-PCS | Performed by: SURGERY

## 2021-06-12 PROCEDURE — 84702 CHORIONIC GONADOTROPIN TEST: CPT | Performed by: EMERGENCY MEDICINE

## 2021-06-12 PROCEDURE — 85025 COMPLETE CBC W/AUTO DIFF WBC: CPT | Performed by: EMERGENCY MEDICINE

## 2021-06-12 PROCEDURE — 25010000002 HYDROMORPHONE 1 MG/ML SOLUTION: Performed by: NURSE ANESTHETIST, CERTIFIED REGISTERED

## 2021-06-12 PROCEDURE — 25010000002 ONDANSETRON PER 1 MG: Performed by: SURGERY

## 2021-06-12 PROCEDURE — 25010000002 ENOXAPARIN PER 10 MG: Performed by: SURGERY

## 2021-06-12 PROCEDURE — 49561 PR REPAIR INCISIONAL HERNIA,STRANG: CPT | Performed by: SURGERY

## 2021-06-12 PROCEDURE — 49561 PR REPAIR INCISIONAL HERNIA,STRANG: CPT | Performed by: SPECIALIST/TECHNOLOGIST, OTHER

## 2021-06-12 PROCEDURE — 25010000002 MORPHINE PER 10 MG: Performed by: SURGERY

## 2021-06-12 PROCEDURE — 83690 ASSAY OF LIPASE: CPT | Performed by: EMERGENCY MEDICINE

## 2021-06-12 PROCEDURE — 25010000002 HYDROMORPHONE 1 MG/ML SOLUTION: Performed by: EMERGENCY MEDICINE

## 2021-06-12 PROCEDURE — 94640 AIRWAY INHALATION TREATMENT: CPT

## 2021-06-12 PROCEDURE — 85007 BL SMEAR W/DIFF WBC COUNT: CPT | Performed by: EMERGENCY MEDICINE

## 2021-06-12 PROCEDURE — 25010000002 FENTANYL CITRATE (PF) 50 MCG/ML SOLUTION: Performed by: NURSE ANESTHETIST, CERTIFIED REGISTERED

## 2021-06-12 PROCEDURE — 25010000002 DEXAMETHASONE PER 1 MG: Performed by: NURSE ANESTHETIST, CERTIFIED REGISTERED

## 2021-06-12 PROCEDURE — 82962 GLUCOSE BLOOD TEST: CPT

## 2021-06-12 RX ORDER — ARIPIPRAZOLE 5 MG/1
5 TABLET ORAL DAILY
Status: DISCONTINUED | OUTPATIENT
Start: 2021-06-12 | End: 2021-06-20 | Stop reason: HOSPADM

## 2021-06-12 RX ORDER — NICOTINE POLACRILEX 4 MG
15 LOZENGE BUCCAL
Status: DISCONTINUED | OUTPATIENT
Start: 2021-06-12 | End: 2021-06-20 | Stop reason: HOSPADM

## 2021-06-12 RX ORDER — POTASSIUM CHLORIDE 750 MG/1
20 CAPSULE, EXTENDED RELEASE ORAL DAILY
Status: DISCONTINUED | OUTPATIENT
Start: 2021-06-12 | End: 2021-06-20 | Stop reason: HOSPADM

## 2021-06-12 RX ORDER — ALUMINA, MAGNESIA, AND SIMETHICONE 2400; 2400; 240 MG/30ML; MG/30ML; MG/30ML
15 SUSPENSION ORAL EVERY 6 HOURS PRN
Status: DISCONTINUED | OUTPATIENT
Start: 2021-06-12 | End: 2021-06-20 | Stop reason: HOSPADM

## 2021-06-12 RX ORDER — AMLODIPINE BESYLATE 5 MG/1
5 TABLET ORAL DAILY
Status: DISCONTINUED | OUTPATIENT
Start: 2021-06-12 | End: 2021-06-20 | Stop reason: HOSPADM

## 2021-06-12 RX ORDER — LABETALOL HYDROCHLORIDE 5 MG/ML
INJECTION, SOLUTION INTRAVENOUS AS NEEDED
Status: DISCONTINUED | OUTPATIENT
Start: 2021-06-12 | End: 2021-06-12 | Stop reason: SURG

## 2021-06-12 RX ORDER — MONTELUKAST SODIUM 10 MG/1
10 TABLET ORAL NIGHTLY
Status: DISCONTINUED | OUTPATIENT
Start: 2021-06-12 | End: 2021-06-20 | Stop reason: HOSPADM

## 2021-06-12 RX ORDER — MEPERIDINE HYDROCHLORIDE 25 MG/ML
12.5 INJECTION INTRAMUSCULAR; INTRAVENOUS; SUBCUTANEOUS
Status: DISCONTINUED | OUTPATIENT
Start: 2021-06-12 | End: 2021-06-12 | Stop reason: HOSPADM

## 2021-06-12 RX ORDER — SODIUM CHLORIDE, SODIUM LACTATE, POTASSIUM CHLORIDE, CALCIUM CHLORIDE 600; 310; 30; 20 MG/100ML; MG/100ML; MG/100ML; MG/100ML
INJECTION, SOLUTION INTRAVENOUS CONTINUOUS PRN
Status: DISCONTINUED | OUTPATIENT
Start: 2021-06-12 | End: 2021-06-12 | Stop reason: SURG

## 2021-06-12 RX ORDER — ONDANSETRON 2 MG/ML
4 INJECTION INTRAMUSCULAR; INTRAVENOUS EVERY 6 HOURS PRN
Status: DISCONTINUED | OUTPATIENT
Start: 2021-06-12 | End: 2021-06-20 | Stop reason: HOSPADM

## 2021-06-12 RX ORDER — OXYCODONE HYDROCHLORIDE 5 MG/1
5 TABLET ORAL
Status: DISCONTINUED | OUTPATIENT
Start: 2021-06-12 | End: 2021-06-12 | Stop reason: HOSPADM

## 2021-06-12 RX ORDER — DEXTROSE MONOHYDRATE 100 MG/ML
25 INJECTION, SOLUTION INTRAVENOUS
Status: DISCONTINUED | OUTPATIENT
Start: 2021-06-12 | End: 2021-06-20 | Stop reason: HOSPADM

## 2021-06-12 RX ORDER — SODIUM CHLORIDE, SODIUM LACTATE, POTASSIUM CHLORIDE, CALCIUM CHLORIDE 600; 310; 30; 20 MG/100ML; MG/100ML; MG/100ML; MG/100ML
9 INJECTION, SOLUTION INTRAVENOUS CONTINUOUS PRN
Status: DISCONTINUED | OUTPATIENT
Start: 2021-06-12 | End: 2021-06-12

## 2021-06-12 RX ORDER — ARFORMOTEROL TARTRATE 15 UG/2ML
15 SOLUTION RESPIRATORY (INHALATION)
Status: DISCONTINUED | OUTPATIENT
Start: 2021-06-12 | End: 2021-06-20 | Stop reason: HOSPADM

## 2021-06-12 RX ORDER — MIDAZOLAM HYDROCHLORIDE 1 MG/ML
INJECTION INTRAMUSCULAR; INTRAVENOUS AS NEEDED
Status: DISCONTINUED | OUTPATIENT
Start: 2021-06-12 | End: 2021-06-12 | Stop reason: SURG

## 2021-06-12 RX ORDER — ONDANSETRON 2 MG/ML
INJECTION INTRAMUSCULAR; INTRAVENOUS AS NEEDED
Status: DISCONTINUED | OUTPATIENT
Start: 2021-06-12 | End: 2021-06-12 | Stop reason: SURG

## 2021-06-12 RX ORDER — SODIUM CHLORIDE 0.9 % (FLUSH) 0.9 %
10 SYRINGE (ML) INJECTION AS NEEDED
Status: DISCONTINUED | OUTPATIENT
Start: 2021-06-12 | End: 2021-06-20 | Stop reason: HOSPADM

## 2021-06-12 RX ORDER — FAMOTIDINE 20 MG/1
40 TABLET, FILM COATED ORAL DAILY
Status: DISCONTINUED | OUTPATIENT
Start: 2021-06-12 | End: 2021-06-14

## 2021-06-12 RX ORDER — ACETAMINOPHEN 325 MG/1
650 TABLET ORAL EVERY 4 HOURS PRN
Status: DISCONTINUED | OUTPATIENT
Start: 2021-06-12 | End: 2021-06-20 | Stop reason: HOSPADM

## 2021-06-12 RX ORDER — FLUTICASONE PROPIONATE 50 MCG
1 SPRAY, SUSPENSION (ML) NASAL DAILY
Status: DISCONTINUED | OUTPATIENT
Start: 2021-06-12 | End: 2021-06-20 | Stop reason: HOSPADM

## 2021-06-12 RX ORDER — PNV NO.153/FA/OM3/DHA/EPA/FISH 400-35-25
2 TABLET,CHEWABLE ORAL DAILY
COMMUNITY

## 2021-06-12 RX ORDER — AZELASTINE HYDROCHLORIDE 0.5 MG/ML
1 SOLUTION/ DROPS OPHTHALMIC 2 TIMES DAILY
COMMUNITY

## 2021-06-12 RX ORDER — PROPOFOL 10 MG/ML
VIAL (ML) INTRAVENOUS AS NEEDED
Status: DISCONTINUED | OUTPATIENT
Start: 2021-06-12 | End: 2021-06-12 | Stop reason: SURG

## 2021-06-12 RX ORDER — ONDANSETRON 2 MG/ML
4 INJECTION INTRAMUSCULAR; INTRAVENOUS ONCE AS NEEDED
Status: DISCONTINUED | OUTPATIENT
Start: 2021-06-12 | End: 2021-06-12 | Stop reason: HOSPADM

## 2021-06-12 RX ORDER — MAGNESIUM HYDROXIDE 1200 MG/15ML
LIQUID ORAL AS NEEDED
Status: DISCONTINUED | OUTPATIENT
Start: 2021-06-12 | End: 2021-06-12 | Stop reason: HOSPADM

## 2021-06-12 RX ORDER — FENTANYL CITRATE 50 UG/ML
INJECTION, SOLUTION INTRAMUSCULAR; INTRAVENOUS AS NEEDED
Status: DISCONTINUED | OUTPATIENT
Start: 2021-06-12 | End: 2021-06-12 | Stop reason: SURG

## 2021-06-12 RX ORDER — BUDESONIDE 0.5 MG/2ML
0.5 INHALANT ORAL
Status: DISCONTINUED | OUTPATIENT
Start: 2021-06-12 | End: 2021-06-20 | Stop reason: HOSPADM

## 2021-06-12 RX ORDER — ONDANSETRON 2 MG/ML
4 INJECTION INTRAMUSCULAR; INTRAVENOUS EVERY 4 HOURS PRN
Status: DISCONTINUED | OUTPATIENT
Start: 2021-06-12 | End: 2021-06-12

## 2021-06-12 RX ORDER — BUSPIRONE HYDROCHLORIDE 15 MG/1
15 TABLET ORAL 3 TIMES DAILY
Status: DISCONTINUED | OUTPATIENT
Start: 2021-06-12 | End: 2021-06-20 | Stop reason: HOSPADM

## 2021-06-12 RX ORDER — BUPIVACAINE HYDROCHLORIDE 2.5 MG/ML
INJECTION, SOLUTION EPIDURAL; INFILTRATION; INTRACAUDAL AS NEEDED
Status: DISCONTINUED | OUTPATIENT
Start: 2021-06-12 | End: 2021-06-12 | Stop reason: HOSPADM

## 2021-06-12 RX ORDER — CEFAZOLIN SODIUM IN 0.9 % NACL 3 G/100 ML
3 INTRAVENOUS SOLUTION, PIGGYBACK (ML) INTRAVENOUS ONCE
Status: COMPLETED | OUTPATIENT
Start: 2021-06-12 | End: 2021-06-12

## 2021-06-12 RX ORDER — MORPHINE SULFATE 2 MG/ML
2 INJECTION, SOLUTION INTRAMUSCULAR; INTRAVENOUS
Status: DISPENSED | OUTPATIENT
Start: 2021-06-12 | End: 2021-06-19

## 2021-06-12 RX ORDER — NALOXONE HCL 0.4 MG/ML
0.4 VIAL (ML) INJECTION
Status: DISCONTINUED | OUTPATIENT
Start: 2021-06-12 | End: 2021-06-12

## 2021-06-12 RX ORDER — AMOXICILLIN 250 MG
2 CAPSULE ORAL 2 TIMES DAILY
Status: DISCONTINUED | OUTPATIENT
Start: 2021-06-12 | End: 2021-06-20 | Stop reason: HOSPADM

## 2021-06-12 RX ORDER — BUDESONIDE AND FORMOTEROL FUMARATE DIHYDRATE 80; 4.5 UG/1; UG/1
2 AEROSOL RESPIRATORY (INHALATION)
Status: DISCONTINUED | OUTPATIENT
Start: 2021-06-12 | End: 2021-06-12

## 2021-06-12 RX ORDER — POLYETHYLENE GLYCOL 3350 17 G/17G
17 POWDER, FOR SOLUTION ORAL DAILY PRN
Status: DISCONTINUED | OUTPATIENT
Start: 2021-06-12 | End: 2021-06-20 | Stop reason: HOSPADM

## 2021-06-12 RX ORDER — OXYBUTYNIN CHLORIDE 5 MG/1
5 TABLET, EXTENDED RELEASE ORAL DAILY
COMMUNITY

## 2021-06-12 RX ORDER — CETIRIZINE HYDROCHLORIDE 10 MG/1
10 TABLET ORAL DAILY
COMMUNITY

## 2021-06-12 RX ORDER — CHLORHEXIDINE GLUCONATE 0.12 MG/ML
15 RINSE ORAL 2 TIMES DAILY
COMMUNITY

## 2021-06-12 RX ORDER — BISACODYL 5 MG/1
5 TABLET, DELAYED RELEASE ORAL DAILY PRN
Status: DISCONTINUED | OUTPATIENT
Start: 2021-06-12 | End: 2021-06-20 | Stop reason: HOSPADM

## 2021-06-12 RX ORDER — TEMAZEPAM 15 MG/1
15 CAPSULE ORAL NIGHTLY PRN
Status: ACTIVE | OUTPATIENT
Start: 2021-06-12 | End: 2021-06-19

## 2021-06-12 RX ORDER — ONABOTULINUMTOXINA 100 [USP'U]/1
1 INJECTION, POWDER, LYOPHILIZED, FOR SOLUTION INTRADERMAL; INTRAMUSCULAR
COMMUNITY
Start: 2021-05-28

## 2021-06-12 RX ORDER — BISACODYL 10 MG
10 SUPPOSITORY, RECTAL RECTAL DAILY PRN
Status: DISCONTINUED | OUTPATIENT
Start: 2021-06-12 | End: 2021-06-20 | Stop reason: HOSPADM

## 2021-06-12 RX ORDER — MORPHINE SULFATE 2 MG/ML
2 INJECTION, SOLUTION INTRAMUSCULAR; INTRAVENOUS
Status: DISPENSED | OUTPATIENT
Start: 2021-06-12 | End: 2021-06-15

## 2021-06-12 RX ORDER — ROCURONIUM BROMIDE 10 MG/ML
INJECTION, SOLUTION INTRAVENOUS AS NEEDED
Status: DISCONTINUED | OUTPATIENT
Start: 2021-06-12 | End: 2021-06-12 | Stop reason: SURG

## 2021-06-12 RX ORDER — HYDROCODONE BITARTRATE AND ACETAMINOPHEN 5; 325 MG/1; MG/1
1 TABLET ORAL EVERY 4 HOURS PRN
Status: ACTIVE | OUTPATIENT
Start: 2021-06-12 | End: 2021-06-19

## 2021-06-12 RX ORDER — FLUTICASONE PROPIONATE 50 MCG
1 SPRAY, SUSPENSION (ML) NASAL DAILY
COMMUNITY

## 2021-06-12 RX ORDER — LIDOCAINE HYDROCHLORIDE 20 MG/ML
INJECTION, SOLUTION INFILTRATION; PERINEURAL AS NEEDED
Status: DISCONTINUED | OUTPATIENT
Start: 2021-06-12 | End: 2021-06-12 | Stop reason: SURG

## 2021-06-12 RX ORDER — IPRATROPIUM BROMIDE AND ALBUTEROL SULFATE 2.5; .5 MG/3ML; MG/3ML
3 SOLUTION RESPIRATORY (INHALATION) EVERY 4 HOURS PRN
Status: DISCONTINUED | OUTPATIENT
Start: 2021-06-12 | End: 2021-06-20 | Stop reason: HOSPADM

## 2021-06-12 RX ORDER — SODIUM CHLORIDE 0.9 % (FLUSH) 0.9 %
10 SYRINGE (ML) INJECTION EVERY 12 HOURS SCHEDULED
Status: DISCONTINUED | OUTPATIENT
Start: 2021-06-12 | End: 2021-06-20 | Stop reason: HOSPADM

## 2021-06-12 RX ORDER — ONDANSETRON 2 MG/ML
4 INJECTION INTRAMUSCULAR; INTRAVENOUS ONCE
Status: COMPLETED | OUTPATIENT
Start: 2021-06-12 | End: 2021-06-12

## 2021-06-12 RX ORDER — OXYCODONE AND ACETAMINOPHEN 7.5; 325 MG/1; MG/1
1 TABLET ORAL EVERY 4 HOURS PRN
Status: DISPENSED | OUTPATIENT
Start: 2021-06-12 | End: 2021-06-19

## 2021-06-12 RX ORDER — DEXAMETHASONE SODIUM PHOSPHATE 4 MG/ML
INJECTION, SOLUTION INTRA-ARTICULAR; INTRALESIONAL; INTRAMUSCULAR; INTRAVENOUS; SOFT TISSUE AS NEEDED
Status: DISCONTINUED | OUTPATIENT
Start: 2021-06-12 | End: 2021-06-12 | Stop reason: SURG

## 2021-06-12 RX ORDER — NYSTATIN 100000 [USP'U]/G
POWDER TOPICAL 4 TIMES DAILY
COMMUNITY

## 2021-06-12 RX ORDER — FUROSEMIDE 40 MG/1
40 TABLET ORAL DAILY
Status: DISCONTINUED | OUTPATIENT
Start: 2021-06-12 | End: 2021-06-20 | Stop reason: HOSPADM

## 2021-06-12 RX ORDER — NALOXONE HCL 0.4 MG/ML
0.4 VIAL (ML) INJECTION
Status: DISCONTINUED | OUTPATIENT
Start: 2021-06-12 | End: 2021-06-20 | Stop reason: HOSPADM

## 2021-06-12 RX ORDER — CETIRIZINE HYDROCHLORIDE 10 MG/1
10 TABLET ORAL DAILY
Status: DISCONTINUED | OUTPATIENT
Start: 2021-06-12 | End: 2021-06-20 | Stop reason: HOSPADM

## 2021-06-12 RX ORDER — DULOXETIN HYDROCHLORIDE 30 MG/1
30 CAPSULE, DELAYED RELEASE ORAL 2 TIMES DAILY
Status: DISCONTINUED | OUTPATIENT
Start: 2021-06-12 | End: 2021-06-20 | Stop reason: HOSPADM

## 2021-06-12 RX ORDER — SCOLOPAMINE TRANSDERMAL SYSTEM 1 MG/1
1 PATCH, EXTENDED RELEASE TRANSDERMAL CONTINUOUS
Status: DISPENSED | OUTPATIENT
Start: 2021-06-12 | End: 2021-06-13

## 2021-06-12 RX ORDER — EPINEPHRINE 0.3 MG/.3ML
INJECTION SUBCUTANEOUS
COMMUNITY

## 2021-06-12 RX ORDER — ALPRAZOLAM 0.25 MG/1
0.25 TABLET ORAL EVERY 6 HOURS PRN
Status: ACTIVE | OUTPATIENT
Start: 2021-06-12 | End: 2021-06-19

## 2021-06-12 RX ORDER — SODIUM CHLORIDE 0.9 % (FLUSH) 0.9 %
10 SYRINGE (ML) INJECTION EVERY 12 HOURS SCHEDULED
Status: DISCONTINUED | OUTPATIENT
Start: 2021-06-12 | End: 2021-06-12

## 2021-06-12 RX ORDER — FUROSEMIDE 40 MG/1
40 TABLET ORAL DAILY
COMMUNITY

## 2021-06-12 RX ORDER — SODIUM CHLORIDE 0.9 % (FLUSH) 0.9 %
10 SYRINGE (ML) INJECTION AS NEEDED
Status: DISCONTINUED | OUTPATIENT
Start: 2021-06-12 | End: 2021-06-12

## 2021-06-12 RX ADMIN — SUGAMMADEX 200 MG: 100 INJECTION, SOLUTION INTRAVENOUS at 16:08

## 2021-06-12 RX ADMIN — DULOXETINE HYDROCHLORIDE 30 MG: 30 CAPSULE, DELAYED RELEASE ORAL at 20:17

## 2021-06-12 RX ADMIN — LIDOCAINE HYDROCHLORIDE 50 MG: 20 INJECTION, SOLUTION INFILTRATION; PERINEURAL at 15:13

## 2021-06-12 RX ADMIN — ONDANSETRON 4 MG: 2 INJECTION INTRAMUSCULAR; INTRAVENOUS at 20:29

## 2021-06-12 RX ADMIN — MORPHINE SULFATE 2 MG: 2 INJECTION, SOLUTION INTRAMUSCULAR; INTRAVENOUS at 20:18

## 2021-06-12 RX ADMIN — ROCURONIUM BROMIDE 35 MG: 10 INJECTION INTRAVENOUS at 15:20

## 2021-06-12 RX ADMIN — AMLODIPINE BESYLATE 5 MG: 5 TABLET ORAL at 20:18

## 2021-06-12 RX ADMIN — HYDROMORPHONE HYDROCHLORIDE 1 MG: 1 INJECTION, SOLUTION INTRAMUSCULAR; INTRAVENOUS; SUBCUTANEOUS at 09:03

## 2021-06-12 RX ADMIN — ONDANSETRON 4 MG: 2 INJECTION INTRAMUSCULAR; INTRAVENOUS at 10:38

## 2021-06-12 RX ADMIN — HYDROMORPHONE HYDROCHLORIDE 0.5 MG: 1 INJECTION, SOLUTION INTRAMUSCULAR; INTRAVENOUS; SUBCUTANEOUS at 16:32

## 2021-06-12 RX ADMIN — CETIRIZINE HYDROCHLORIDE 10 MG: 10 TABLET, FILM COATED ORAL at 20:16

## 2021-06-12 RX ADMIN — DOCUSATE SODIUM 50MG AND SENNOSIDES 8.6MG 2 TABLET: 8.6; 5 TABLET, FILM COATED ORAL at 20:18

## 2021-06-12 RX ADMIN — LABETALOL 20 MG/4 ML (5 MG/ML) INTRAVENOUS SYRINGE 5 MG: at 15:42

## 2021-06-12 RX ADMIN — SODIUM CHLORIDE, POTASSIUM CHLORIDE, SODIUM LACTATE AND CALCIUM CHLORIDE: 600; 310; 30; 20 INJECTION, SOLUTION INTRAVENOUS at 15:06

## 2021-06-12 RX ADMIN — MAGNESIUM HYDROXIDE 30 ML: 2400 SUSPENSION ORAL at 20:18

## 2021-06-12 RX ADMIN — OXYCODONE HYDROCHLORIDE 5 MG: 5 TABLET ORAL at 17:11

## 2021-06-12 RX ADMIN — SODIUM CHLORIDE, PRESERVATIVE FREE 10 ML: 5 INJECTION INTRAVENOUS at 09:05

## 2021-06-12 RX ADMIN — ARIPIPRAZOLE 5 MG: 5 TABLET ORAL at 20:17

## 2021-06-12 RX ADMIN — POTASSIUM CHLORIDE 20 MEQ: 10 CAPSULE, COATED, EXTENDED RELEASE ORAL at 20:18

## 2021-06-12 RX ADMIN — ONDANSETRON 4 MG: 2 INJECTION INTRAMUSCULAR; INTRAVENOUS at 09:03

## 2021-06-12 RX ADMIN — HYDROMORPHONE HYDROCHLORIDE 1 MG: 1 INJECTION, SOLUTION INTRAMUSCULAR; INTRAVENOUS; SUBCUTANEOUS at 12:43

## 2021-06-12 RX ADMIN — ARFORMOTEROL TARTRATE 15 MCG: 15 SOLUTION RESPIRATORY (INHALATION) at 21:38

## 2021-06-12 RX ADMIN — FENTANYL CITRATE 100 MCG: 50 INJECTION INTRAMUSCULAR; INTRAVENOUS at 15:13

## 2021-06-12 RX ADMIN — ONDANSETRON 4 MG: 2 INJECTION INTRAMUSCULAR; INTRAVENOUS at 16:06

## 2021-06-12 RX ADMIN — MIDAZOLAM HYDROCHLORIDE 2 MG: 1 INJECTION, SOLUTION INTRAMUSCULAR; INTRAVENOUS at 15:11

## 2021-06-12 RX ADMIN — HYDROMORPHONE HYDROCHLORIDE 0.5 MG: 1 INJECTION, SOLUTION INTRAMUSCULAR; INTRAVENOUS; SUBCUTANEOUS at 17:25

## 2021-06-12 RX ADMIN — FUROSEMIDE 40 MG: 40 TABLET ORAL at 20:17

## 2021-06-12 RX ADMIN — MORPHINE SULFATE 2 MG: 2 INJECTION, SOLUTION INTRAMUSCULAR; INTRAVENOUS at 22:26

## 2021-06-12 RX ADMIN — FLUTICASONE PROPIONATE 1 SPRAY: 50 SPRAY, METERED NASAL at 20:18

## 2021-06-12 RX ADMIN — ONDANSETRON 4 MG: 2 INJECTION INTRAMUSCULAR; INTRAVENOUS at 17:14

## 2021-06-12 RX ADMIN — ROCURONIUM BROMIDE 5 MG: 10 INJECTION INTRAVENOUS at 15:13

## 2021-06-12 RX ADMIN — CEFAZOLIN 3 G: 1 INJECTION, POWDER, FOR SOLUTION INTRAMUSCULAR; INTRAVENOUS; PARENTERAL at 15:11

## 2021-06-12 RX ADMIN — FAMOTIDINE 40 MG: 20 TABLET ORAL at 20:18

## 2021-06-12 RX ADMIN — PROPOFOL 200 MG: 10 INJECTION, EMULSION INTRAVENOUS at 15:13

## 2021-06-12 RX ADMIN — SODIUM CHLORIDE, PRESERVATIVE FREE 10 ML: 5 INJECTION INTRAVENOUS at 20:19

## 2021-06-12 RX ADMIN — BUDESONIDE 0.5 MG: 0.5 INHALANT ORAL at 21:38

## 2021-06-12 RX ADMIN — HYDROMORPHONE HYDROCHLORIDE 0.5 MG: 1 INJECTION, SOLUTION INTRAMUSCULAR; INTRAVENOUS; SUBCUTANEOUS at 16:50

## 2021-06-12 RX ADMIN — SCOPALAMINE 1 PATCH: 1 PATCH, EXTENDED RELEASE TRANSDERMAL at 15:03

## 2021-06-12 RX ADMIN — ENOXAPARIN SODIUM 40 MG: 40 INJECTION SUBCUTANEOUS at 18:00

## 2021-06-12 RX ADMIN — HYDROMORPHONE HYDROCHLORIDE 0.5 MG: 1 INJECTION, SOLUTION INTRAMUSCULAR; INTRAVENOUS; SUBCUTANEOUS at 17:06

## 2021-06-12 RX ADMIN — DEXAMETHASONE SODIUM PHOSPHATE 4 MG: 4 INJECTION INTRA-ARTICULAR; INTRALESIONAL; INTRAMUSCULAR; INTRAVENOUS; SOFT TISSUE at 15:23

## 2021-06-12 RX ADMIN — BUSPIRONE HYDROCHLORIDE 15 MG: 15 TABLET ORAL at 20:18

## 2021-06-12 RX ADMIN — MONTELUKAST SODIUM 10 MG: 10 TABLET, FILM COATED ORAL at 20:17

## 2021-06-12 NOTE — ED PROVIDER NOTES
Time: 0837  Arrived by: Private vehicle  Chief Complaint: Abdominal pain  History provided by: Patient  History is limited by: N/A    History of Present Illness:  Patient is a 45 y.o. year old female that presents to the emergency department with constant cramping RLQ abd pain, that radiates to her LLQ and back, and began last night. Pt also c/o urinary frequency, nausea, and loose stools, but denies vomiting. Pt had a gastric sleeve in August of 2018.    Similar Symptoms Previously: None stated.  Recently seen: No.      Patient Care Team  Primary Care Provider: Maria Luz Vazquez PA-C.    Past Medical History:    Allergies   Allergen Reactions   • Bee Venom Swelling     At  site of sting   • Wasp Venom Swelling     At sting site   • Ibuprofen Other (See Comments)     Was told not to take   • Nsaids Other (See Comments)     Pt only has one kidney, was told not to take   • Other Other (See Comments)     All pepper, irritation of throat     Past Medical History:   Diagnosis Date   • Abnormal exam of both ears     STATES EARS DON'T DRAIN ,  TOO MUCH EAR WAX   • ADHD    • Anxiety and depression    • Asthma    • Blind     blind in right eye   • Carpal tunnel syndrome     LT   • CFS (chronic fatigue syndrome)    • Chronic bronchitis (CMS/HCC)    • COPD (chronic obstructive pulmonary disease) (CMS/HCC)    • Edema     LEGS AND FEET   • GERD (gastroesophageal reflux disease)    • Hashimoto's thyroiditis    • Head injury    • Heart burn    • Hiatal hernia    • History of Clostridium difficile colitis 2017   • History of concussion 10/19/2014     WITH HEAD INJURY   • History of MRSA infection 2017    IN [facility]    • HTN (hypertension)    • Hyperlipidemia    • Hypertriglyceridemia    • Hypoglycemia    • IBS (irritable bowel syndrome)    • IgA deficiency (CMS/HCC)    • Incontinence of urine    • Insomnia    • Insulin resistance    • Joint pain    • Kidney disease     ONLY HAS ONE KIDNEY RIGHT   • Laryngopharyngeal  reflux (LPR)    • Lymphedema     LEGS AND FEET   • Migraine    • OA (osteoarthritis)     WEAK ANKLES   • OCD (obsessive compulsive disorder)    • CHONG (obstructive sleep apnea)     has bi-pap   • Paradoxical vocal cord motion disorder    • PCOS (polycystic ovarian syndrome)    • Photophobia of both eyes    • Polydipsia    • Polyuria    • PONV (postoperative nausea and vomiting)    • Prediabetes    • PTSD (post-traumatic stress disorder)    • Restless legs syndrome (RLS)    • Seasonal allergies    • Umbilical hernia    • Vitiligo     HANDS ,  LEGS, GROIN AREA   • Water retention      Past Surgical History:   Procedure Laterality Date   • CARPAL TUNNEL RELEASE Left 2015   • COLONOSCOPY     • ENDOSCOPY     • GASTRIC SLEEVE LAPAROSCOPIC N/A 8/8/2018    Procedure: GASTRIC SLEEVE LAPAROSCOPIC WITH LYSIS OF ADHESIONS AND HIATAL HERNIA REPAIR;  Surgeon: Daniel Neil Jr., MD;  Location: [facility] OR [facility];  Service: Bariatric   • LAPAROSCOPIC APPENDECTOMY  2003   • LAPAROSCOPIC CHOLECYSTECTOMY     • TEETH EXTRACTION  2015   • WISDOM TOOTH EXTRACTION       Family History   Problem Relation Age of Onset   • Obesity Mother    • Hypertension Mother    • Stroke Mother    • Obesity Father    • Diabetes Father    • Hypertension Father    • Stroke Father    • Heart attack Father    • Coronary artery disease Father    • Hypertension Sister    • Obesity Maternal Grandmother    • Hypertension Maternal Grandmother    • Heart attack Maternal Grandmother    • Coronary artery disease Maternal Grandmother    • Obesity Maternal Grandfather    • Hypertension Maternal Grandfather    • Heart attack Maternal Grandfather    • Coronary artery disease Maternal Grandfather    • Obesity Paternal Grandmother    • Hypertension Paternal Grandmother    • Heart attack Paternal Grandmother    • Coronary artery disease Paternal Grandmother    • Malig Hyperthermia Neg Hx        Home Medications:  Prior to Admission medications    Medication Sig Start Date  End Date Taking? Authorizing Provider   acetaminophen (TYLENOL) 500 MG tablet Take 500 mg by mouth Every 6 (Six) Hours As Needed for Mild Pain .    Mera Arellano MD   Aimovig 140 MG/ML prefilled syringe Inject 1 mL under the skin into the appropriate area as directed Every 30 (Thirty) Days. 8/21/20   Mera Arellano MD   albuterol (PROVENTIL HFA;VENTOLIN HFA) 108 (90 Base) MCG/ACT inhaler Inhale 2 puffs Every 4 (Four) Hours As Needed for Wheezing.    Mera Arellano MD   amLODIPine (NORVASC) 5 MG tablet Take 5 mg by mouth Daily.    Mera Arellano MD   ARIPiprazole (ABILIFY) 5 MG tablet Take 5 mg by mouth Daily.    Mera Arellano MD   atorvastatin (LIPITOR) 10 MG tablet Take 10 mg by mouth Daily.    Mera Arellano MD   azelastine (ASTELIN) 0.1 % nasal spray 2 sprays into each nostril 2 (Two) Times a Day. Use in each nostril as directed    Mera Arellano MD   Blood Glucose Monitoring Suppl (FREESTYLE LITE) device USE AS DIRECTED TO TEST BLOOD GLUCOSE 1/10/20   Mera Arellano MD   busPIRone (BUSPAR) 15 MG tablet Take 15 mg by mouth As Needed.    Mera Arellano MD   calcium carbonate EX (TUMS EX) 750 MG chewable tablet Chew 750 mg Daily.    Mera Arellano MD   cetirizine (zyrTEC) 10 MG tablet Take 10 mg by mouth Daily.    Mera Arellano MD   Continuous Blood Gluc  (Dexcom G6 ) device USE TO CHECK BLOOD SUGAR TWICE DAILY AS NEEDED 3/26/21   Mera Arellano MD   diclofenac (VOLTAREN) 1 % gel gel Apply 4 g topically to the appropriate area as directed 4 (Four) Times a Day As Needed.    Mera Arellano MD    MG capsule TK ONE C PO BID 2/29/20   Mera Arellano MD   DULoxetine (CYMBALTA) 30 MG capsule Take 30 mg by mouth 2 (Two) Times a Day.    Mera Arellano MD   erythromycin (ROMYCIN) 5 MG/GM ophthalmic ointment APPLY 0.5 INCH TO INNER ASPECT OF THE LOWER LID OF AFFECTED EYE EVERY 4 HOURS WHILE AWAKE  1/2/20   Mera Arellano MD   famotidine (Pepcid) 40 MG tablet Take 1 tablet by mouth 2 (Two) Times a Day. 5/4/21   Inga Moore APRN   ferrous sulfate 325 (65 FE) MG tablet Take 325 mg by mouth Daily With Breakfast.    Mera Arellano MD   fluticasone (CUTIVATE) 0.005 % ointment Apply  topically to the appropriate area as directed.    Mera Arellano MD   Fluticasone Furoate-Vilanterol (BREO ELLIPTA) 200-25 MCG/INH aerosol powder  Inhale 1 puff Daily.    Mera Arellano MD   guaiFENesin (MUCINEX) 600 MG 12 hr tablet Take 1,200 mg by mouth 2 (Two) Times a Day.    Mera Arellano MD   ipratropium-albuterol (DUO-NEB) 0.5-2.5 mg/3 ml nebulizer Inhale 3 mL.    Mera Arellano MD   Linzess 145 MCG capsule capsule Take 1 capsule by mouth Daily. 6/18/20   Mera Arellano MD   liothyronine (CYTOMEL) 25 MCG tablet Take 25 mcg by mouth Daily. 2/19/21   eMra Arellano MD   lisinopril (PRINIVIL,ZESTRIL) 20 MG tablet Take 20 mg by mouth Daily.    Mera Arellano MD   montelukast (SINGULAIR) 10 MG tablet Take 10 mg by mouth Every Night.    Mera Arellano MD   nadolol (CORGARD) 20 MG tablet Take 20 mg by mouth Daily.    Mera Arellano MD   Nutritional Supplements (GLUCOSE MANAGEMENT) tablet Take  by mouth.    Mera Arellano MD   polyethylene glycol (MIRALAX) packet Take 17 g by mouth Daily.    Mera Arellano MD   potassium chloride (MICRO-K) 10 MEQ CR capsule Take 1 capsule by mouth Daily. 6/22/20   Mera Arellano MD   rizatriptan (MAXALT) 10 MG tablet Take 1 tablet by mouth As Needed. 6/26/20   Mera Arellano MD   Semaglutide,0.25 or 0.5MG/DOS, (Ozempic, 0.25 or 0.5 MG/DOSE,) 2 MG/1.5ML solution pen-injector Inject 0.5 mg under the skin into the appropriate area as directed 1 (One) Time Per Week. 0.25X 4 WEEKS AND 0.5X 4 WEEKS    Eileen, MD Mera   Tirosint- MCG/ML solution TAKE 4ML BY MOUTH DAILY 2/23/21    "Provider, Historical, MD        Social History:   Smoking: Never smoker. Alcohol: None. Illicit Drug Use: None. Recent travel: No.    Record Review:  I have reviewed the patient's records in [facility].    Review of Systems  Review of Systems   Constitutional: Negative for chills and fever.   HENT: Negative for nosebleeds.    Eyes: Negative for redness.   Respiratory: Negative for cough and shortness of breath.    Cardiovascular: Negative for chest pain.   Gastrointestinal: Positive for abdominal pain (constant cramping RLQ pain that radiates to her LLQ and back), diarrhea (loose stools) and nausea. Negative for vomiting.   Genitourinary: Positive for frequency. Negative for dysuria.   Musculoskeletal: Negative for back pain and neck pain.   Skin: Negative for rash.   Neurological: Negative for seizures and syncope.        Physical Exam  /82   Pulse 63   Temp 98 °F (36.7 °C) (Oral)   Resp 18   Ht 152.4 cm (60\")   Wt (!) 154 kg (338 lb 10 oz)   SpO2 99%   Breastfeeding No   BMI 66.13 kg/m²     Physical Exam  Vitals and nursing note reviewed.   Constitutional:       General: She is not in acute distress.     Appearance: She is obese.   HENT:      Head: Normocephalic and atraumatic.      Nose: Nose normal.      Mouth/Throat:      Mouth: Mucous membranes are moist.   Eyes:      General: No scleral icterus.  Cardiovascular:      Rate and Rhythm: Normal rate and regular rhythm.      Heart sounds: Normal heart sounds. No murmur heard.     Pulmonary:      Effort: No respiratory distress.      Breath sounds: Normal breath sounds.   Abdominal:      Palpations: Abdomen is soft.      Tenderness: There is abdominal tenderness in the right lower quadrant.      Hernia: No hernia is present.   Musculoskeletal:         General: No tenderness. Normal range of motion.      Cervical back: Normal range of motion and neck supple. No tenderness.      Right lower leg: No edema.      Left lower leg: No edema.   Skin:     General: " Skin is warm and dry.   Neurological:      General: No focal deficit present.      Mental Status: She is alert. Mental status is at baseline.      Sensory: No sensory deficit.      Motor: No weakness.   Psychiatric:         Behavior: Behavior normal.                Medications in the Emergency Department:  Medications   sodium chloride 0.9 % flush 10 mL ( Intravenous MAR Hold 6/12/21 1506)   Scopolamine (TRANSDERM-SCOP) 1.5 MG/3DAYS patch 1 patch (1 patch Transdermal Medication Applied 6/12/21 1503)   HYDROmorphone (DILAUDID) injection 1 mg (1 mg Intravenous Given 6/12/21 0903)   ondansetron (ZOFRAN) injection 4 mg (4 mg Intravenous Given 6/12/21 0903)   ondansetron (ZOFRAN) injection 4 mg (4 mg Intravenous Given 6/12/21 1038)   HYDROmorphone (DILAUDID) injection 1 mg (1 mg Intravenous Given 6/12/21 1243)   ceFAZolin in Sodium Chloride (ANCEF) IVPB solution 3 g (3 g Intravenous Given 6/12/21 1511)        Labs  Lab Results (last 24 hours)     Procedure Component Value Units Date/Time    CBC & Differential [[id number]]  (Abnormal) Collected: 06/12/21 0810    Specimen: Blood Updated: 06/12/21 0839    Narrative:      The following orders were created for panel order CBC & Differential.  Procedure                               Abnormality         Status                     ---------                               -----------         ------                     Scan Slide[[id number]]                                       Final result               CBC Auto Differential[[id number]]        Abnormal            Final result                 Please view results for these tests on the individual orders.    Comprehensive Metabolic Panel [[id number]] Collected: 06/12/21 0810    Specimen: Blood Updated: 06/12/21 0858     Glucose 90 mg/dL      BUN 8 mg/dL      Creatinine 0.83 mg/dL      Sodium 139 mmol/L      Potassium 3.9 mmol/L      Chloride 103 mmol/L      CO2 22.9 mmol/L      Calcium 9.3 mg/dL      Total Protein 8.4 g/dL      Albumin  4.70 g/dL      ALT (SGPT) 22 U/L      AST (SGOT) 17 U/L      Alkaline Phosphatase 85 U/L      Total Bilirubin 0.5 mg/dL      eGFR Non African Amer 75 mL/min/1.73      Globulin 3.7 gm/dL      A/G Ratio 1.3 g/dL      BUN/Creatinine Ratio 9.6     Anion Gap 13.1 mmol/L     Narrative:      GFR Normal >60  Chronic Kidney Disease <60  Kidney Failure <15      Lipase [[id number]]  (Normal) Collected: 06/12/21 0810    Specimen: Blood Updated: 06/12/21 0858     Lipase 38 U/L     hCG, Quantitative, Pregnancy [[id number]] Collected: 06/12/21 0810    Specimen: Blood Updated: 06/12/21 1004     HCG Quantitative 0.93 mIU/mL     Narrative:      HCG Ranges by Gestational Age    Females - non-pregnant premenopausal   </= 1mIU/mL HCG  Females - postmenopausal               </= 7mIU/mL HCG    3 Weeks       5.4   -      72 mIU/mL  4 Weeks      10.2   -     708 mIU/mL  5 Weeks       217   -   8,245 mIU/mL  6 Weeks       152   -  32,177 mIU/mL  7 Weeks     4,059   - 153,767 mIU/mL  8 Weeks    31,366   - 149,094 mIU/mL  9 Weeks    59,109   - 135,901 mIU/mL  10 Weeks   44,186   - 170,409 mIU/mL  12 Weeks   27,107   - 201,615 mIU/mL  14 Weeks   24,302   -  93,646 mIU/mL  15 Weeks   12,540   -  69,747 mIU/mL  16 Weeks    8,904   -  55,332 mIU/mL  17 Weeks    8,240   -  51,793 mIU/mL  18 Weeks    9,649   -  55,271 mIU/mL    Results may be falsely decreased if patient taking Biotin.      CBC Auto Differential [[id number]]  (Abnormal) Collected: 06/12/21 0810    Specimen: Blood Updated: 06/12/21 0839     WBC 7.96 10*3/mm3      RBC 5.82 10*6/mm3      Hemoglobin 15.7 g/dL      Hematocrit 48.8 %      MCV 83.8 fL      MCH 27.0 pg      MCHC 32.2 g/dL      RDW 14.6 %      RDW-SD 44.4 fl      MPV 11.5 fL      Platelets 212 10*3/mm3      Neutrophil % 64.2 %      Lymphocyte % 30.0 %      Monocyte % 5.3 %      Eosinophil % 0.0 %      Basophil % 0.4 %      Immature Grans % 0.1 %      Neutrophils, Absolute 5.11 10*3/mm3      Lymphocytes, Absolute 2.39 10*3/mm3       Monocytes, Absolute 0.42 10*3/mm3      Eosinophils, Absolute 0.00 10*3/mm3      Basophils, Absolute 0.03 10*3/mm3      Immature Grans, Absolute 0.01 10*3/mm3      nRBC 0.0 /100 WBC     Scan Slide [[id number]] Collected: 06/12/21 0810    Specimen: Blood Updated: 06/12/21 0839     RBC Morphology Normal     WBC Morphology Normal     Platelet Estimate Adequate    Urinalysis With Microscopic If Indicated (No Culture) - Urine, Clean Catch [[id number]]  (Abnormal) Collected: 06/12/21 0901    Specimen: Urine, Clean Catch Updated: 06/12/21 0926     Color, UA Yellow     Appearance, UA Clear     pH, UA 5.5     Specific Gravity, UA 1.015     Glucose, UA Negative     Ketones, UA Trace     Bilirubin, UA Negative     Blood, UA Negative     Protein, UA Negative     Leuk Esterase, UA Negative     Nitrite, UA Negative     Urobilinogen, UA 0.2 E.U./dL    Narrative:      Urine microscopic not indicated.    Blood Gas, Venous -With Co-Ox Panel: Yes [[id number]]  (Abnormal) Collected: 06/12/21 0907    Specimen: Venous Blood from Arm, Right Updated: 06/12/21 0916     pH, Venous 7.468 pH Units      pCO2, Venous 27.2 mm Hg      pO2, Venous 67.3 mm Hg      HCO3, Venous 19.3 mmol/L      Base Excess, Venous -2.7 mmol/L      O2 Saturation, Venous 94.5 %      Hemoglobin, Blood Gas 16.0 g/dL      Carboxyhemoglobin 2.1 %      Methemoglobin 0.20 %      Oxyhemoglobin 92.3 %      FHHB 5.4 %      Note --     Site Venous           Imaging:  CT Abdomen Pelvis Without Contrast    Result Date: 6/12/2021  Narrative: PROCEDURE: CT ABDOMEN PELVIS WO CONTRAST  COMPARISON: [facility], CT, ABD PEL W/O CONTRAST, 3/11/2017, 1:53.  [facility], CT, ABD PEL W/O CONTRAST, 10/15/2020, 11:02.  INDICATIONS: past hx cholecystectomy and appendendectomy. RLQ pain. Post menopausal.  TECHNIQUE: CT images were created without intravenous contrast.   PROTOCOL:   Standard imaging protocol performed    RADIATION:      Automated exposure control was  utilized to minimize radiation dose.  FINDINGS:  The heart size is normal.  There is no pericardial effusion.  The lung bases are clear.  There is mild hepatomegaly.  There is decreased density of the hepatic parenchyma likely related to underlying hepatic steatosis.  There is no focal liver lesion identified within the limitations of noncontrast technique.  The gallbladder is surgically absent.  There is no intrahepatic or extrahepatic biliary ductal dilatation.  The spleen is normal in size.  There are multiple adjacent hypertrophied splenules.  The adrenal glands and pancreas appear within normal limits for noncontrast technique.  The left kidney is normal in size.  There is no hydronephrosis.  The urinary bladder is underdistended which limits evaluation.  The uterus is present and anteverted.  The left ovary is visualized and normal.  The right ovary is not well seen.  There are postsurgical changes of prior gastric sleeve procedure.  There are no abnormally dilated loops of small bowel to suggest small bowel obstruction or small bowel inflammation.  The terminal ileum appears normal.  There are post appendectomy changes within the right lower quadrant.  There is a left paramidline ventral hernia containing a short segment of transverse colon.  There is some nonspecific localized colonic wall thickening with stool present within the short segment.  There is a small amount of surrounding mesenteric edema within the hernia sac which appears new from the prior examination.  There is a transition between the hernia sac and the remainder of the distal transverse colon with relative distal collapse.  There is no free fluid or free air.  The aorta is normal in caliber without evidence of aneurysm formation.  There is no lymphadenopathy within the abdomen or pelvis.  There are no acute osseous findings.  CONCLUSION:  1. Left paramidline ventral abdominal hernia containing a short segment of mid transverse colon.  There  is some mild colonic wall thickening with surrounding mild inflammatory stranding and relative caliber change between the stool filled loop of transverse colon within the hernia sac and the collapsed distal transverse colon.  Differential considerations would include focal colitis involving the segment of colon within the hernia sac, early strangulation involving the hernia or an element of partial obstruction related to the hernia.  Correlate clinically for reducibility. 2. Mild hepatomegaly with hepatic steatosis. 3. Postsurgical changes of prior gastric sleeve procedure.      SOCRATES STEPHEN MD       Electronically Signed and Approved By: SOCRATES STEPHEN MD on 6/12/2021 at 11:31               Procedures/EKGs:  Procedures    Progress                            Medical Decision Making:  MDM  Number of Diagnoses or Management Options     Amount and/or Complexity of Data Reviewed  Clinical lab tests: reviewed and ordered  Tests in the radiology section of CPT®: ordered and reviewed         45-year-old female diabetic obese presents for evaluation of diffuse lower abdominal pain.  She is found on CAT scan to have a likely incarcerated hernia with a possible small bowel obstruction.  Multiple attempts were made to reduce but the hernia was firm and not reducible.  General surgery was nice enough to come into the emergency department and attempted reduction but were unsuccessful as well.  Their plan will be likely operating room and have asked that medicine admit this otherwise a medically complex patient.    Final diagnoses:   Incarcerated hernia        Disposition:  ED Disposition     ED Disposition Condition Comment    Decision to Admit  Level of Care: Med/Surg [1]   Diagnosis: Incarcerated hernia [324515]   Admitting Physician: KAY HERNANDEZ [[id number]]   Attending Physician: KAY HERNANDEZ [[id number]]   Isolate for COVID?: No [0]   Certification: I Certify That Inpatient Hospital Services Are Medically Necessary For Greater  Than 2 Midnights            Documentation assistance provided by Daniel Cade DO acting as scribe for No att. providers found. Information recorded by the scribe was done at my direction and has been verified and validated by me.     Emily Lyons  06/12/21 1230       Daniel Cade DO  06/12/21 8338

## 2021-06-12 NOTE — ED NOTES
Patient requested that her , Rock be called and updated on plan of care. He can be reached at [phone]. This RN updated patient's  on plan of care.      Meseret Martel, RN  06/12/21 4939

## 2021-06-12 NOTE — OP NOTE
VENTRAL/INCISIONAL HERNIA REPAIR  Procedure Report    Patient Name:  Kristin Aldrich  YOB: 1975    Date of Surgery:  6/12/2021     Pre-op Diagnosis:   Incarcerated hernia [K46.0]    Pre-Op Diagnosis Codes:     * Incarcerated hernia [K46.0]       Post-op Diagnosis:   Post-Op Diagnosis Codes:     * Incarcerated hernia [K46.0]      Procedure/CPT® Codes:      Procedure(s):  VENTRAL/INCISIONAL HERNIA REPAIR    Staff:  Surgeon(s):  Vinay Cabrera MD    Assistant: Jose Bonilla CSA    Anesthesia: General    Estimated Blood Loss: 5 mL    Complications: None    Drains: None    Packing: None    Implants:    Implant Name Type Inv. Item Serial No.  Lot No. LRB No. Used Action   SEPRA FILM ADHESION BARRIER     [id number] N/A 1 Implanted       Specimen:          None     Indications: Patient is a 45-year-old morbidly obese female with an incarcerated ventral hernia.  See my preoperative consultation for details.     Findings:  Approximately 5 cm fascial defect with incarcerated but viable loop of transverse colon.     Description of Procedure: Patient was taken to the operating placed supine on the operative table.  Timeout was performed.  General anesthesia was administered.  Abdomen was prepped and draped in sterile fashion.  A knife was used to make an incision in the midline over the area where the bulge was located.  The incision was deepened into the subcutaneous tissue and a hernia sac was identified.  Hernia sac was released from the subcutaneous tissue.  The area was identified where the hernia sac was coming through the abdominal wall.  The fascial defect was opened and additional about 1 cm to release pressure on the neck of the hernia sac and then the hernia sac was opened and circumferentially dissected and released from the fascial defect.  The hernia sac contained a loop of transverse colon.  All herniated colon was normal-appearing.  The herniated contents were reduced into the  abdominal cavity.  Any adhesions to the undersurface of the abdominal wall near the hernia defect were released.  Piece of Seprafilm was placed over the abdominal contents where the fascial defect was located.  The hernia defect was then closed with figure-of-eight #1 Ethibond sutures placed in an interrupted fashion.  Adequate hemostasis.  Sponge needle and instrument counts were correct.  The skin was closed with staples.  Appropriate dressings were placed.  Patient was awake from anesthesia and transported to the recovery area in stable condition.    Assistant: Jose Bonilla CSA  was responsible for performing the following activities: Retraction, Suction, Irrigation, Suturing, Closing, Placing Dressing and his skilled assistance was necessary for the success of this case.    Vinay Cabrera MD     Date: 6/12/2021  Time: 16:58 EDT

## 2021-06-12 NOTE — ANESTHESIA POSTPROCEDURE EVALUATION
Patient: Kristin Aldrich    Procedure Summary     Date: 06/12/21 Room / Location: [facility] OR 05 / [facility] MAIN OR    Anesthesia Start: 1506 Anesthesia Stop: 1622    Procedure: VENTRAL/INCISIONAL HERNIA REPAIR (N/A Abdomen) Diagnosis:       Incarcerated hernia      (Incarcerated hernia [K46.0])    Surgeons: Vinay Cabrera MD Provider: Tomeka Ledesma MD    Anesthesia Type: general ASA Status: 4 - Emergent          Anesthesia Type: general    Vitals  Vitals Value Taken Time   /108 06/12/21 1708   Temp 36.2 °C (97.1 °F) 06/12/21 1629   Pulse 68 06/12/21 1711   Resp 16 06/12/21 1629   SpO2 91 % 06/12/21 1711   Vitals shown include unvalidated device data.        Post Anesthesia Care and Evaluation    Patient location during evaluation: bedside  Patient participation: complete - patient participated  Level of consciousness: awake  Pain score: 0  Pain management: adequate  Airway patency: patent  Anesthetic complications: No anesthetic complications  PONV Status: none  Cardiovascular status: acceptable and stable  Respiratory status: acceptable and room air  Hydration status: acceptable

## 2021-06-13 PROBLEM — E11.9 TYPE 2 DIABETES MELLITUS (HCC): Status: ACTIVE | Noted: 2021-06-13

## 2021-06-13 LAB
GLUCOSE BLDC GLUCOMTR-MCNC: 112 MG/DL (ref 70–130)
GLUCOSE BLDC GLUCOMTR-MCNC: 132 MG/DL (ref 70–130)
GLUCOSE BLDC GLUCOMTR-MCNC: 136 MG/DL (ref 70–130)

## 2021-06-13 PROCEDURE — 82962 GLUCOSE BLOOD TEST: CPT

## 2021-06-13 PROCEDURE — 25010000002 PROMETHAZINE PER 50 MG: Performed by: INTERNAL MEDICINE

## 2021-06-13 PROCEDURE — 99024 POSTOP FOLLOW-UP VISIT: CPT | Performed by: SURGERY

## 2021-06-13 PROCEDURE — 25010000002 MORPHINE PER 10 MG: Performed by: SURGERY

## 2021-06-13 PROCEDURE — 94799 UNLISTED PULMONARY SVC/PX: CPT

## 2021-06-13 PROCEDURE — 25010000002 ONDANSETRON PER 1 MG: Performed by: SURGERY

## 2021-06-13 PROCEDURE — 25010000002 ENOXAPARIN PER 10 MG: Performed by: SURGERY

## 2021-06-13 RX ORDER — PROMETHAZINE HYDROCHLORIDE 12.5 MG/1
12.5 SUPPOSITORY RECTAL EVERY 4 HOURS PRN
Status: DISCONTINUED | OUTPATIENT
Start: 2021-06-13 | End: 2021-06-20 | Stop reason: HOSPADM

## 2021-06-13 RX ORDER — SODIUM CHLORIDE, SODIUM LACTATE, POTASSIUM CHLORIDE, CALCIUM CHLORIDE 600; 310; 30; 20 MG/100ML; MG/100ML; MG/100ML; MG/100ML
160 INJECTION, SOLUTION INTRAVENOUS CONTINUOUS
Status: DISCONTINUED | OUTPATIENT
Start: 2021-06-13 | End: 2021-06-20 | Stop reason: HOSPADM

## 2021-06-13 RX ORDER — PROMETHAZINE HYDROCHLORIDE 12.5 MG/1
6.25 SUPPOSITORY RECTAL EVERY 4 HOURS PRN
Status: DISCONTINUED | OUTPATIENT
Start: 2021-06-13 | End: 2021-06-13 | Stop reason: DRUGHIGH

## 2021-06-13 RX ADMIN — PROMETHAZINE HYDROCHLORIDE 12.5 MG: 25 INJECTION INTRAMUSCULAR; INTRAVENOUS at 15:25

## 2021-06-13 RX ADMIN — DOCUSATE SODIUM 50MG AND SENNOSIDES 8.6MG 2 TABLET: 8.6; 5 TABLET, FILM COATED ORAL at 08:13

## 2021-06-13 RX ADMIN — AMLODIPINE BESYLATE 5 MG: 5 TABLET ORAL at 08:14

## 2021-06-13 RX ADMIN — ARIPIPRAZOLE 5 MG: 5 TABLET ORAL at 08:14

## 2021-06-13 RX ADMIN — ARFORMOTEROL TARTRATE 15 MCG: 15 SOLUTION RESPIRATORY (INHALATION) at 10:27

## 2021-06-13 RX ADMIN — DULOXETINE HYDROCHLORIDE 30 MG: 30 CAPSULE, DELAYED RELEASE ORAL at 08:13

## 2021-06-13 RX ADMIN — FUROSEMIDE 40 MG: 40 TABLET ORAL at 08:14

## 2021-06-13 RX ADMIN — MORPHINE SULFATE 2 MG: 2 INJECTION, SOLUTION INTRAMUSCULAR; INTRAVENOUS at 11:31

## 2021-06-13 RX ADMIN — DULOXETINE HYDROCHLORIDE 30 MG: 30 CAPSULE, DELAYED RELEASE ORAL at 20:27

## 2021-06-13 RX ADMIN — FLUTICASONE PROPIONATE 1 SPRAY: 50 SPRAY, METERED NASAL at 08:14

## 2021-06-13 RX ADMIN — MAGNESIUM HYDROXIDE 10 ML: 2400 SUSPENSION ORAL at 14:32

## 2021-06-13 RX ADMIN — MONTELUKAST SODIUM 10 MG: 10 TABLET, FILM COATED ORAL at 20:28

## 2021-06-13 RX ADMIN — MORPHINE SULFATE 2 MG: 2 INJECTION, SOLUTION INTRAMUSCULAR; INTRAVENOUS at 05:41

## 2021-06-13 RX ADMIN — MORPHINE SULFATE 2 MG: 2 INJECTION, SOLUTION INTRAMUSCULAR; INTRAVENOUS at 19:54

## 2021-06-13 RX ADMIN — ARFORMOTEROL TARTRATE 15 MCG: 15 SOLUTION RESPIRATORY (INHALATION) at 21:18

## 2021-06-13 RX ADMIN — BUDESONIDE 0.5 MG: 0.5 INHALANT ORAL at 10:27

## 2021-06-13 RX ADMIN — ENOXAPARIN SODIUM 40 MG: 40 INJECTION SUBCUTANEOUS at 08:16

## 2021-06-13 RX ADMIN — POTASSIUM CHLORIDE 20 MEQ: 10 CAPSULE, COATED, EXTENDED RELEASE ORAL at 08:14

## 2021-06-13 RX ADMIN — MORPHINE SULFATE 2 MG: 2 INJECTION, SOLUTION INTRAMUSCULAR; INTRAVENOUS at 22:53

## 2021-06-13 RX ADMIN — BUSPIRONE HYDROCHLORIDE 15 MG: 15 TABLET ORAL at 20:28

## 2021-06-13 RX ADMIN — BUSPIRONE HYDROCHLORIDE 15 MG: 15 TABLET ORAL at 16:12

## 2021-06-13 RX ADMIN — SODIUM CHLORIDE, PRESERVATIVE FREE 10 ML: 5 INJECTION INTRAVENOUS at 08:16

## 2021-06-13 RX ADMIN — DOCUSATE SODIUM 50MG AND SENNOSIDES 8.6MG 2 TABLET: 8.6; 5 TABLET, FILM COATED ORAL at 20:27

## 2021-06-13 RX ADMIN — FAMOTIDINE 40 MG: 20 TABLET ORAL at 08:14

## 2021-06-13 RX ADMIN — ONDANSETRON 4 MG: 2 INJECTION INTRAMUSCULAR; INTRAVENOUS at 11:31

## 2021-06-13 RX ADMIN — BUDESONIDE 0.5 MG: 0.5 INHALANT ORAL at 21:18

## 2021-06-13 RX ADMIN — SODIUM CHLORIDE, POTASSIUM CHLORIDE, SODIUM LACTATE AND CALCIUM CHLORIDE 125 ML/HR: 600; 310; 30; 20 INJECTION, SOLUTION INTRAVENOUS at 14:32

## 2021-06-13 RX ADMIN — BUSPIRONE HYDROCHLORIDE 15 MG: 15 TABLET ORAL at 08:14

## 2021-06-13 RX ADMIN — SODIUM CHLORIDE, PRESERVATIVE FREE 10 ML: 5 INJECTION INTRAVENOUS at 20:31

## 2021-06-13 RX ADMIN — ONDANSETRON 4 MG: 2 INJECTION INTRAMUSCULAR; INTRAVENOUS at 05:41

## 2021-06-13 RX ADMIN — ONDANSETRON 4 MG: 2 INJECTION INTRAMUSCULAR; INTRAVENOUS at 22:53

## 2021-06-13 RX ADMIN — CETIRIZINE HYDROCHLORIDE 10 MG: 10 TABLET, FILM COATED ORAL at 08:14

## 2021-06-13 RX ADMIN — MORPHINE SULFATE 2 MG: 2 INJECTION, SOLUTION INTRAMUSCULAR; INTRAVENOUS at 01:03

## 2021-06-13 NOTE — PLAN OF CARE
Problem: Adult Inpatient Plan of Care  Goal: Plan of Care Review  Outcome: Ongoing, Progressing   Goal Outcome Evaluation:      VSS. Pt c/o pain and some nausea throughout the night. Controlled with PRNs. Fall precautions in place, call light within reach.   Problem: Adult Inpatient Plan of Care  Goal: Absence of Hospital-Acquired Illness or Injury  Intervention: Identify and Manage Fall Risk  Recent Flowsheet Documentation  Taken 6/13/2021 0605 by Jane Ellis RN  Safety Promotion/Fall Prevention:   activity supervised   assistive device/personal items within reach   clutter free environment maintained   room organization consistent   safety round/check completed   toileting scheduled  Taken 6/13/2021 0400 by Jane Ellis RN  Safety Promotion/Fall Prevention:   activity supervised   assistive device/personal items within reach   clutter free environment maintained   room organization consistent   safety round/check completed   toileting scheduled  Taken 6/13/2021 0200 by Jane Ellis RN  Safety Promotion/Fall Prevention:   activity supervised   assistive device/personal items within reach   clutter free environment maintained   room organization consistent   safety round/check completed   toileting scheduled  Taken 6/13/2021 0000 by Jane Ellis RN  Safety Promotion/Fall Prevention:   activity supervised   assistive device/personal items within reach   clutter free environment maintained   room organization consistent   safety round/check completed   toileting scheduled  Taken 6/12/2021 2256 by Jane Ellis RN  Safety Promotion/Fall Prevention:   activity supervised   assistive device/personal items within reach   clutter free environment maintained   room organization consistent   safety round/check completed   toileting scheduled  Taken 6/12/2021 2017 by Jane Ellis RN  Safety Promotion/Fall Prevention:   activity supervised   assistive device/personal items within reach   clutter free environment  maintained   room organization consistent   safety round/check completed   toileting scheduled

## 2021-06-13 NOTE — H&P
[facility]   HISTORY AND PHYSICAL    Patient Name: Kristin Aldrich  : 1975  MRN: [id number]  Primary Care Physician:  Kaylin Gaines APRN  Date of admission: 2021    Subjective   Subjective     Chief Complaint:   Abdominal pain    HPI:    Kristin Aldrich is a 45 y.o. female   presents to the emergency department with constant cramping RLQ abd pain, that radiates to her LLQ and back, and began last night. Pt also c/o  nausea, and loose stools, but denies vomiting. Pt had a gastric sleeve in 2018.  Work-up in the ER revealed some incarcerated ventral hernia.  Dr. Cabrera surgeon on-call was consulted.  He saw patient and recommended admitting the medical service.  I initially saw patient in ER, she was taken to the OR for incarcerated hernia and she returned to floor now.  She is awake alert and oriented, started on clear liquid after laparoscopic surgery.  Doing very well.  No chest pain, some abdominal discomfort.  No nausea vomiting    Review of System  Patient denies any chest pain shortness of breath.  Abdominal pain as above.  Some nausea, no vomiting.  Diarrhea initially.  No fever chills.      Personal History     Past Medical History:   Diagnosis Date   • Abnormal exam of both ears     STATES EARS DON'T DRAIN ,  TOO MUCH EAR WAX   • ADHD    • Anxiety and depression    • Asthma    • Blind     blind in right eye   • Carpal tunnel syndrome     LT   • CFS (chronic fatigue syndrome)    • Chronic bronchitis (CMS/HCC)    • COPD (chronic obstructive pulmonary disease) (CMS/HCC)    • Diabetes mellitus (CMS/HCC)     recently diagnosed, insulin resistant   • Edema     LEGS AND FEET   • GERD (gastroesophageal reflux disease)    • Hashimoto's thyroiditis    • Head injury    • Heart burn    • Hiatal hernia    • History of Clostridium difficile colitis 2017   • History of concussion 10/19/2014     WITH HEAD INJURY   • History of MRSA infection 2017    IN [facility],  [facility]    • HTN  (hypertension)    • Hyperlipidemia    • Hypertriglyceridemia    • Hypoglycemia    • IBS (irritable bowel syndrome)    • IgA deficiency (CMS/HCC)    • Incontinence of urine    • Insomnia    • Insulin resistance    • Joint pain    • Kidney disease     ONLY HAS ONE KIDNEY RIGHT   • Laryngopharyngeal reflux (LPR)    • Lymphedema     LEGS AND FEET   • Migraine    • OA (osteoarthritis)     WEAK ANKLES   • OCD (obsessive compulsive disorder)    • CHONG (obstructive sleep apnea)     has bi-pap   • Paradoxical vocal cord motion disorder    • PCOS (polycystic ovarian syndrome)    • Photophobia of both eyes    • Polydipsia    • Polyuria    • PONV (postoperative nausea and vomiting)    • Prediabetes    • PTSD (post-traumatic stress disorder)    • Restless legs syndrome (RLS)    • Seasonal allergies    • Umbilical hernia    • Vitiligo     HANDS ,  LEGS, GROIN AREA   • Water retention        Past Surgical History:   Procedure Laterality Date   • ABDOMINAL SURGERY      gastric sleeve 8/10/2018   • APPENDECTOMY      2004   • CARPAL TUNNEL RELEASE Left 2015   • CHOLECYSTECTOMY     • COLONOSCOPY     • ENDOSCOPY     • GASTRIC SLEEVE LAPAROSCOPIC N/A 8/8/2018    Procedure: GASTRIC SLEEVE LAPAROSCOPIC WITH LYSIS OF ADHESIONS AND HIATAL HERNIA REPAIR;  Surgeon: Daniel Neil Jr., MD;  Location: [facility] OR [facility];  Service: Bariatric   • GASTRIC SLEEVE LAPAROSCOPIC  2018   • HERNIA REPAIR      hiatal hernia repair 8/10/18 & ventral incisional hernia repair 6/12/21    • LAPAROSCOPIC APPENDECTOMY  2003   • LAPAROSCOPIC CHOLECYSTECTOMY     • SKIN BIOPSY      breast tissue biopsy   • TEETH EXTRACTION  2015   • WISDOM TOOTH EXTRACTION         Family History: family history includes Coronary artery disease in her father, maternal grandfather, maternal grandmother, and paternal grandmother; Diabetes in her father; Heart attack in her father, maternal grandfather, maternal grandmother, and paternal grandmother; Hypertension in her father,  maternal grandfather, maternal grandmother, mother, paternal grandmother, and sister; Obesity in her father, maternal grandfather, maternal grandmother, mother, and paternal grandmother; Stroke in her father and mother. Otherwise pertinent FHx was reviewed and not pertinent to current issue.    Social History:  reports that she has never smoked. She has never used smokeless tobacco. She reports that she does not drink alcohol and does not use drugs.    Home Medications:  ARIPiprazole, Artificial Tear, Cholecalciferol, DULoxetine, Dexcom G6 , EPINEPHrine, Fluticasone Furoate-Vilanterol, FreeStyle Lite, Glucose Management, Levothyroxine Sodium, NON FORMULARY, Semaglutide(0.25 or 0.5MG/DOS), acetaminophen, albuterol sulfate HFA, amLODIPine, atorvastatin, azelastine, busPIRone, cetirizine, chlorhexidine, diclofenac, famotidine, ferrous sulfate, fluticasone, furosemide, ipratropium-albuterol, linaclotide, liothyronine, montelukast, nystatin, onabotulinumtoxina, oxybutynin XL, potassium chloride, and rizatriptan      Allergies:  Allergies   Allergen Reactions   • Bee Venom Swelling     At  site of sting   • Wasp Venom Swelling     At sting site   • Ibuprofen Other (See Comments)     Was told not to take   • Nsaids Other (See Comments)     Pt only has one kidney, was told not to take   • Other Other (See Comments)     All pepper, irritation of throat       Objective   Objective     Vitals:   Temp:  [97.1 °F (36.2 °C)-98.3 °F (36.8 °C)] 97.9 °F (36.6 °C)  Heart Rate:  [59-96] 84  Resp:  [13-18] 13  BP: ()/() 155/83  Flow (L/min):  [2-6] 2  FiO2 (%):  [1 %] 1 %  Physical Exam   Middle-aged female morbidly obese.  Mild distress.  HEENT unremarkable mucosa moist.  Neck supple.  Heart regular.  Lungs diminished breath sounds at bases.  Abdomen morbidly obese tender in the midabdomen and upper abdomen more so on the right lower quadrant and right upper quadrant area.  Bowel sounds present.  Extremities trace  of edema in lower extremities.  Neurologically awake alert and oriented      Result Review    Result Review:  I have personally reviewed the results from the time of this admission to 6/12/2021 20:34 EDT and agree with these findings:  [x]  Laboratory  []  Microbiology  [x]  Radiology  []  EKG/Telemetry   []  Cardiology/Vascular   []  Pathology  []  Old records  []  Other:    CBC:    WBC   Date Value Ref Range Status   06/12/2021 7.96 3.40 - 10.80 10*3/mm3 Final     RBC   Date Value Ref Range Status   06/12/2021 5.82 (H) 3.77 - 5.28 10*6/mm3 Final     Hemoglobin   Date Value Ref Range Status   06/12/2021 15.7 12.0 - 15.9 g/dL Final     Hematocrit   Date Value Ref Range Status   06/12/2021 48.8 (H) 34.0 - 46.6 % Final     MCV   Date Value Ref Range Status   06/12/2021 83.8 79.0 - 97.0 fL Final     MCH   Date Value Ref Range Status   06/12/2021 27.0 26.6 - 33.0 pg Final     MCHC   Date Value Ref Range Status   06/12/2021 32.2 31.5 - 35.7 g/dL Final     RDW   Date Value Ref Range Status   06/12/2021 14.6 12.3 - 15.4 % Final     RDW-SD   Date Value Ref Range Status   06/12/2021 44.4 37.0 - 54.0 fl Final     MPV   Date Value Ref Range Status   06/12/2021 11.5 6.0 - 12.0 fL Final     Platelets   Date Value Ref Range Status   06/12/2021 212 140 - 450 10*3/mm3 Final     Neutrophil %   Date Value Ref Range Status   06/12/2021 64.2 42.7 - 76.0 % Final     Lymphocyte %   Date Value Ref Range Status   06/12/2021 30.0 19.6 - 45.3 % Final     Monocyte %   Date Value Ref Range Status   06/12/2021 5.3 5.0 - 12.0 % Final     Eosinophil %   Date Value Ref Range Status   06/12/2021 0.0 (L) 0.3 - 6.2 % Final     Basophil %   Date Value Ref Range Status   06/12/2021 0.4 0.0 - 1.5 % Final     Immature Grans %   Date Value Ref Range Status   06/12/2021 0.1 0.0 - 0.5 % Final     Neutrophils, Absolute   Date Value Ref Range Status   06/12/2021 5.11 1.70 - 7.00 10*3/mm3 Final     Lymphocytes, Absolute   Date Value Ref Range Status    06/12/2021 2.39 0.70 - 3.10 10*3/mm3 Final     Monocytes, Absolute   Date Value Ref Range Status   06/12/2021 0.42 0.10 - 0.90 10*3/mm3 Final     Eosinophils, Absolute   Date Value Ref Range Status   06/12/2021 0.00 0.00 - 0.40 10*3/mm3 Final     Basophils, Absolute   Date Value Ref Range Status   06/12/2021 0.03 0.00 - 0.20 10*3/mm3 Final     Immature Grans, Absolute   Date Value Ref Range Status   06/12/2021 0.01 0.00 - 0.05 10*3/mm3 Final     nRBC   Date Value Ref Range Status   06/12/2021 0.0 0.0 - 0.2 /100 WBC Final        BMP:    Lab Results   Component Value Date    GLUCOSE 90 06/12/2021    BUN 8 06/12/2021    CREATININE 0.83 06/12/2021    EGFRIFNONA 75 06/12/2021    EGFRIFAFRI 91 08/09/2018    BCR 9.6 06/12/2021    K 3.9 06/12/2021    CO2 22.9 06/12/2021    CALCIUM 9.3 06/12/2021    ALBUMIN 4.70 06/12/2021    LABIL2 1.0 (L) 10/15/2020    AST 17 06/12/2021    ALT 22 06/12/2021        No radiology results for the last day           Most notable findings include:   Incarcerated hernia    Assessment/Plan   Assessment / Plan       Current Diagnosis:  Active Hospital Problems    Diagnosis    • **Incarcerated hernia      Added automatically from request for surgery [id number]       Plan:   Patient admitted to medical service.  Surgical team saw patient and took patient to the OR for exploratory lap and reduction of hernia.  Patient post surgery, awake alert and oriented.  Cleared for diet.  Will restart essential home meds.  Monitor sugars.  Gentle hydration.  Check labs in a.m..  Appreciate Dr. Cabrera's help.  Further management will based on clinical course, lab results and consultant input      DVT prophylaxis:  Medical and mechanical DVT prophylaxis orders are present.    GI Prophylaxis:    PPI    CODE STATUS:    Level Of Support Discussed With: Patient  Code Status: CPR  Medical Interventions (Level of Support Prior to Arrest): Full    Admission Status:  I believe this patient meets 45 status.      Part of this  note is an electronic transcription of spoken language to printed text. The electronic translation/transcription may permit erroneous, or at times, nonsensical words or phrases to be inadvertently transcribed; I have reviewed the note for such errors however some may still exist..    Electronically signed by Andrea Sawyer MD, 06/12/21, 8:34 PM EDT.    Total time spent with in evaluation and management:

## 2021-06-13 NOTE — PROGRESS NOTES
[facility]     Progress Note    Patient Name: Kristin Aldrich  : 1975  MRN: [id number]  Primary Care Physician:  Kaylin Gaines APRN  Date of admission: 2021    Subjective   Subjective     Vomited last night.  Burping this morning.  No flatus or BM.    Objective   Objective     Vitals:   Temp:  [97.1 °F (36.2 °C)-99.1 °F (37.3 °C)] 97.1 °F (36.2 °C)  Heart Rate:  [] 86  Resp:  [12-20] 16  BP: ()/() 157/97  Flow (L/min):  [2-6] 2  FiO2 (%):  [1 %] 1 %  Physical Exam     Constitutional: alert, no acute distress  HENT:  NCAT  Eyes:  sclerae clear, conjunctivae clear, EOMI  Neck:  normal appearance, no masses, trachea midline  Respiratory:  breathing not labored, respiratory effort appears normal  Abdomen:  Soft, nondistended, approp incis tenderness  Musculoskeletal: moving all extremities symmetrically and purposefully  Neurologic:  no obvious motor or sensory deficits, cerebellar function without any obvious abnormalities, alert & oriented x 3, speech clear    []  Laboratory  []  Microbiology  []  Radiology  []  EKG/Telemetry   []  Cardiology/Vascular   []  Pathology  []  Old records      Assessment/Plan   Assessment / Plan     Assessment:   s/p open repair incarcerated ventral incisional hernia   multiple pre-existing medical problems    Plan:    Clear liquid diet  Start maintenance IVF until has sufficient oral intake  Appreciate management of medical issues by Dr. Sawyer       Electronically signed by Vinay Cabrera MD, 21, 1:24 PM EDT.

## 2021-06-13 NOTE — PLAN OF CARE
Goal Outcome Evaluation:      Pt complaint of nausea and vomited twice this shift. Zofran given but no relief. Dr. Cabrera & Dr. Sawyer notified. New order of Phenergan put in and pt found relief. Ambulated from bed to chair with assist x2. Will continue to monitor.

## 2021-06-13 NOTE — PROGRESS NOTES
[facility]     Progress Note    Patient Name: Kristin Aldrich  : 1975  MRN: [id number]  Primary Care Physician:  Kaylin Gaines APRN  Date of admission: 2021      Subjective   Brief summary.  Admitted with abdominal pain post exploratory laparotomy      HPI:    Patient post exploratory laparotomy, postop day #1  Surgery done for incarcerated hernia.  Feeling better.  Still have abdominal pain.  Nausea vomiting reported last night    Review of Systems   No fever chills.  Abdominal pain.  No BM yet.  Nausea and vomiting x1      Objective     Vitals:   Temp:  [97.1 °F (36.2 °C)-99.1 °F (37.3 °C)] 97.1 °F (36.2 °C)  Heart Rate:  [] 86  Resp:  [12-20] 16  BP: ()/() 157/97  Flow (L/min):  [2-6] 2  FiO2 (%):  [1 %] 1 %    Physical Exam    Middle-aged female morbidly obese not in acute distress.  Heart is regular.  Lungs are clear.  Abdomen morbidly obese and soft mild tenderness in the epigastric area.  No guarding no rebound.  Extremities trace of edema    Result Review    Result Review:  I have personally reviewed the results from the time of this admission to 2021 11:53 EDT and agree with these findings:  [x]  Laboratory  []  Microbiology  []  Radiology  []  EKG/Telemetry   []  Cardiology/Vascular   []  Pathology  []  Old records  []  Other:           Assessment / Plan       Active Hospital Problems:  Active Hospital Problems    Diagnosis    • **Incarcerated hernia      Added automatically from request for surgery [id number]     • Type 2 diabetes mellitus (CMS/HCC)    • Body mass index (BMI) of 60.0-69.9 in adult (CMS/HCC)    • HTN (hypertension)    • COPD (chronic obstructive pulmonary disease) (CMS/HCC)      Plan:   Somewhat better.  Tolerating liquids.  Still had some nausea vomiting.  We will see recommendations from surgical team  Continue with current diet.  Home meds restarted.  Monitor sugars.  Increase activity.  Check labs in a.m.       DVT prophylaxis:  Medical and  mechanical DVT prophylaxis orders are present.    CODE STATUS:   Level Of Support Discussed With: Patient  Code Status: CPR  Medical Interventions (Level of Support Prior to Arrest): Full      Part of this note is an electronic transcription of spoken language to printed text. The electronic translation/transcription may permit erroneous, or at times, nonsensical words or phrases to be inadvertently transcribed; I have reviewed the note for such errors however some may still exist..      Electronically signed by Andrea Sawyer MD, 06/13/21, 11:46 AM EDT.

## 2021-06-14 ENCOUNTER — APPOINTMENT (OUTPATIENT)
Dept: GENERAL RADIOLOGY | Facility: HOSPITAL | Age: 46
End: 2021-06-14

## 2021-06-14 PROBLEM — Z98.890 S/P REPAIR OF VENTRAL HERNIA: Status: ACTIVE | Noted: 2021-06-14

## 2021-06-14 PROBLEM — Z87.19 S/P REPAIR OF VENTRAL HERNIA: Status: ACTIVE | Noted: 2021-06-14

## 2021-06-14 LAB
ANION GAP SERPL CALCULATED.3IONS-SCNC: 11 MMOL/L (ref 5–15)
BASOPHILS # BLD AUTO: 0.04 10*3/MM3 (ref 0–0.2)
BASOPHILS NFR BLD AUTO: 0.3 % (ref 0–1.5)
BUN SERPL-MCNC: 9 MG/DL (ref 6–20)
BUN/CREAT SERPL: 10.5 (ref 7–25)
CALCIUM SPEC-SCNC: 8.9 MG/DL (ref 8.6–10.5)
CHLORIDE SERPL-SCNC: 99 MMOL/L (ref 98–107)
CO2 SERPL-SCNC: 29 MMOL/L (ref 22–29)
CREAT SERPL-MCNC: 0.86 MG/DL (ref 0.57–1)
DEPRECATED RDW RBC AUTO: 45.6 FL (ref 37–54)
EOSINOPHIL # BLD AUTO: 0 10*3/MM3 (ref 0–0.4)
EOSINOPHIL NFR BLD AUTO: 0 % (ref 0.3–6.2)
ERYTHROCYTE [DISTWIDTH] IN BLOOD BY AUTOMATED COUNT: 14.6 % (ref 12.3–15.4)
GFR SERPL CREATININE-BSD FRML MDRD: 72 ML/MIN/1.73
GLUCOSE BLDC GLUCOMTR-MCNC: 161 MG/DL (ref 70–130)
GLUCOSE SERPL-MCNC: 145 MG/DL (ref 65–99)
HCT VFR BLD AUTO: 48 % (ref 34–46.6)
HGB BLD-MCNC: 15.2 G/DL (ref 12–15.9)
IMM GRANULOCYTES # BLD AUTO: 0.07 10*3/MM3 (ref 0–0.05)
IMM GRANULOCYTES NFR BLD AUTO: 0.5 % (ref 0–0.5)
LYMPHOCYTES # BLD AUTO: 0.99 10*3/MM3 (ref 0.7–3.1)
LYMPHOCYTES NFR BLD AUTO: 6.5 % (ref 19.6–45.3)
MAGNESIUM SERPL-MCNC: 2.6 MG/DL (ref 1.6–2.6)
MCH RBC QN AUTO: 27 PG (ref 26.6–33)
MCHC RBC AUTO-ENTMCNC: 31.7 G/DL (ref 31.5–35.7)
MCV RBC AUTO: 85.4 FL (ref 79–97)
MONOCYTES # BLD AUTO: 0.86 10*3/MM3 (ref 0.1–0.9)
MONOCYTES NFR BLD AUTO: 5.7 % (ref 5–12)
NEUTROPHILS NFR BLD AUTO: 13.23 10*3/MM3 (ref 1.7–7)
NEUTROPHILS NFR BLD AUTO: 87 % (ref 42.7–76)
NRBC BLD AUTO-RTO: 0 /100 WBC (ref 0–0.2)
PHOSPHATE SERPL-MCNC: 2.5 MG/DL (ref 2.5–4.5)
PLATELET # BLD AUTO: 304 10*3/MM3 (ref 140–450)
PMV BLD AUTO: 11.6 FL (ref 6–12)
POTASSIUM SERPL-SCNC: 4.1 MMOL/L (ref 3.5–5.2)
PROCALCITONIN SERPL-MCNC: 0.16 NG/ML (ref 0–0.25)
RBC # BLD AUTO: 5.62 10*6/MM3 (ref 3.77–5.28)
SODIUM SERPL-SCNC: 139 MMOL/L (ref 136–145)
WBC # BLD AUTO: 15.19 10*3/MM3 (ref 3.4–10.8)

## 2021-06-14 PROCEDURE — 82962 GLUCOSE BLOOD TEST: CPT

## 2021-06-14 PROCEDURE — 25010000002 ONDANSETRON PER 1 MG: Performed by: SURGERY

## 2021-06-14 PROCEDURE — 25010000002 ENOXAPARIN PER 10 MG: Performed by: SURGERY

## 2021-06-14 PROCEDURE — 94799 UNLISTED PULMONARY SVC/PX: CPT

## 2021-06-14 PROCEDURE — 25010000002 HYDRALAZINE PER 20 MG: Performed by: INTERNAL MEDICINE

## 2021-06-14 PROCEDURE — 85025 COMPLETE CBC W/AUTO DIFF WBC: CPT | Performed by: INTERNAL MEDICINE

## 2021-06-14 PROCEDURE — 74019 RADEX ABDOMEN 2 VIEWS: CPT

## 2021-06-14 PROCEDURE — 80048 BASIC METABOLIC PNL TOTAL CA: CPT | Performed by: SURGERY

## 2021-06-14 PROCEDURE — 25010000002 MORPHINE PER 10 MG: Performed by: SURGERY

## 2021-06-14 PROCEDURE — 84100 ASSAY OF PHOSPHORUS: CPT | Performed by: SURGERY

## 2021-06-14 PROCEDURE — 25010000002 PROMETHAZINE PER 50 MG: Performed by: INTERNAL MEDICINE

## 2021-06-14 PROCEDURE — 83735 ASSAY OF MAGNESIUM: CPT | Performed by: SURGERY

## 2021-06-14 PROCEDURE — 99024 POSTOP FOLLOW-UP VISIT: CPT | Performed by: SURGERY

## 2021-06-14 PROCEDURE — 84145 PROCALCITONIN (PCT): CPT | Performed by: INTERNAL MEDICINE

## 2021-06-14 PROCEDURE — 94760 N-INVAS EAR/PLS OXIMETRY 1: CPT

## 2021-06-14 RX ORDER — FAMOTIDINE 10 MG/ML
20 INJECTION, SOLUTION INTRAVENOUS EVERY 12 HOURS SCHEDULED
Status: DISCONTINUED | OUTPATIENT
Start: 2021-06-14 | End: 2021-06-20 | Stop reason: HOSPADM

## 2021-06-14 RX ORDER — CLONIDINE 0.1 MG/24H
1 PATCH, EXTENDED RELEASE TRANSDERMAL WEEKLY
Status: DISCONTINUED | OUTPATIENT
Start: 2021-06-14 | End: 2021-06-17

## 2021-06-14 RX ORDER — CLONIDINE HYDROCHLORIDE 0.1 MG/1
0.1 TABLET ORAL ONCE
Status: DISCONTINUED | OUTPATIENT
Start: 2021-06-14 | End: 2021-06-14

## 2021-06-14 RX ORDER — PROCHLORPERAZINE EDISYLATE 5 MG/ML
5 INJECTION INTRAMUSCULAR; INTRAVENOUS EVERY 6 HOURS PRN
Status: DISCONTINUED | OUTPATIENT
Start: 2021-06-14 | End: 2021-06-20 | Stop reason: HOSPADM

## 2021-06-14 RX ORDER — HYDRALAZINE HYDROCHLORIDE 20 MG/ML
10 INJECTION INTRAMUSCULAR; INTRAVENOUS EVERY 4 HOURS PRN
Status: DISCONTINUED | OUTPATIENT
Start: 2021-06-14 | End: 2021-06-17

## 2021-06-14 RX ADMIN — BUDESONIDE 0.5 MG: 0.5 INHALANT ORAL at 21:17

## 2021-06-14 RX ADMIN — MORPHINE SULFATE 2 MG: 2 INJECTION, SOLUTION INTRAMUSCULAR; INTRAVENOUS at 01:07

## 2021-06-14 RX ADMIN — FLUTICASONE PROPIONATE 1 SPRAY: 50 SPRAY, METERED NASAL at 09:11

## 2021-06-14 RX ADMIN — CLONIDINE 1 PATCH: 0.1 PATCH TRANSDERMAL at 14:28

## 2021-06-14 RX ADMIN — HYDRALAZINE HYDROCHLORIDE 10 MG: 20 INJECTION INTRAMUSCULAR; INTRAVENOUS at 20:06

## 2021-06-14 RX ADMIN — ARFORMOTEROL TARTRATE 15 MCG: 15 SOLUTION RESPIRATORY (INHALATION) at 21:17

## 2021-06-14 RX ADMIN — ARFORMOTEROL TARTRATE 15 MCG: 15 SOLUTION RESPIRATORY (INHALATION) at 06:30

## 2021-06-14 RX ADMIN — IPRATROPIUM BROMIDE AND ALBUTEROL SULFATE 3 ML: .5; 2.5 SOLUTION RESPIRATORY (INHALATION) at 06:30

## 2021-06-14 RX ADMIN — FAMOTIDINE 20 MG: 10 INJECTION INTRAVENOUS at 14:29

## 2021-06-14 RX ADMIN — FAMOTIDINE 20 MG: 10 INJECTION INTRAVENOUS at 20:07

## 2021-06-14 RX ADMIN — PROMETHAZINE HYDROCHLORIDE 12.5 MG: 25 INJECTION INTRAMUSCULAR; INTRAVENOUS at 03:12

## 2021-06-14 RX ADMIN — ONDANSETRON 4 MG: 2 INJECTION INTRAMUSCULAR; INTRAVENOUS at 07:29

## 2021-06-14 RX ADMIN — MORPHINE SULFATE 2 MG: 2 INJECTION, SOLUTION INTRAMUSCULAR; INTRAVENOUS at 21:38

## 2021-06-14 RX ADMIN — FAMOTIDINE 40 MG: 20 TABLET ORAL at 09:11

## 2021-06-14 RX ADMIN — PROMETHAZINE HYDROCHLORIDE 12.5 MG: 25 INJECTION INTRAMUSCULAR; INTRAVENOUS at 13:20

## 2021-06-14 RX ADMIN — SODIUM CHLORIDE, PRESERVATIVE FREE 10 ML: 5 INJECTION INTRAVENOUS at 20:07

## 2021-06-14 RX ADMIN — MORPHINE SULFATE 2 MG: 2 INJECTION, SOLUTION INTRAMUSCULAR; INTRAVENOUS at 13:27

## 2021-06-14 RX ADMIN — POTASSIUM CHLORIDE 20 MEQ: 10 CAPSULE, COATED, EXTENDED RELEASE ORAL at 09:11

## 2021-06-14 RX ADMIN — ENOXAPARIN SODIUM 40 MG: 40 INJECTION SUBCUTANEOUS at 09:11

## 2021-06-14 RX ADMIN — FUROSEMIDE 40 MG: 40 TABLET ORAL at 09:11

## 2021-06-14 RX ADMIN — SODIUM CHLORIDE, PRESERVATIVE FREE 10 ML: 5 INJECTION INTRAVENOUS at 09:12

## 2021-06-14 RX ADMIN — BUDESONIDE 0.5 MG: 0.5 INHALANT ORAL at 06:30

## 2021-06-14 RX ADMIN — MORPHINE SULFATE 2 MG: 2 INJECTION, SOLUTION INTRAMUSCULAR; INTRAVENOUS at 05:20

## 2021-06-14 NOTE — PROGRESS NOTES
[facility]     Progress Note    Patient Name: Kritsin Aldrich  : 1975  MRN: [id number]  Primary Care Physician:  Kaylin Gaines APRN  Date of admission: 2021      Subjective   Brief summary.  Patient admitted with abdominal pain, incarcerated hernia, post repair, postop day #2      HPI:    Follow-up on abdominal pain and hernia repair.  Postop day #2.  Patient having vomiting, abdominal discomfort, progressively getting worse.  No fever chills.  No diarrhea.  Blood pressure high.    Review of Systems   No fever chills.  Nausea and vomiting.  Abdominal pain.  No chest pain or shortness of breath      Objective     Vitals:   Temp:  [97.4 °F (36.3 °C)-98.6 °F (37 °C)] 98 °F (36.7 °C)  Heart Rate:  [74-99] 99  Resp:  [16-19] 18  BP: (154-183)/(65-95) 183/91    Physical Exam    Tired and fatigued looking patient.  Morbidly obese.  Dry mucosa.  Neck supple.  Heart regular.  Lungs diminished breath sounds.  Abdomen morbidly obese and tender.  Extremities edema 1+.  Neurologically awake alert and oriented    Result Review    Result Review:  I have personally reviewed the results from the time of this admission to 2021 13:50 EDT and agree with these findings:  []  Laboratory  []  Microbiology  []  Radiology  []  EKG/Telemetry   []  Cardiology/Vascular   []  Pathology  []  Old records  []  Other:           Assessment / Plan       Active Hospital Problems:  Active Hospital Problems    Diagnosis    • **Incarcerated hernia      Added automatically from request for surgery [id number]     • S/P repair of ventral hernia    • Type 2 diabetes mellitus (CMS/HCC)    • Body mass index (BMI) of 60.0-69.9 in adult (CMS/HCC)    • HTN (hypertension)    • COPD (chronic obstructive pulmonary disease) (CMS/HCC)      Plan:   Patient developing more GI symptoms.  Nausea and vomiting and not under control.  General surgical team has seen patient.  Plan for NG tube.  N.p.o. hold all meds.  We will start Catapres patch  for uncontrolled hypertension.  Hydralazine as needed IV.  We will also change to IV Pepcid twice daily.  Check stat lactic acid and pro-Ezra.  Monitor labs.  Discussed with Ann Block/FRANCOIS for surgical team       DVT prophylaxis:  Medical and mechanical DVT prophylaxis orders are present.    CODE STATUS:   Level Of Support Discussed With: Patient  Code Status: CPR  Medical Interventions (Level of Support Prior to Arrest): Full      Part of this note is an electronic transcription of spoken language to printed text. The electronic translation/transcription may permit erroneous, or at times, nonsensical words or phrases to be inadvertently transcribed; I have reviewed the note for such errors however some may still exist..      Electronically signed by Andrea Sawyer MD, 06/14/21, 1:50 PM EDT.

## 2021-06-14 NOTE — PLAN OF CARE
Goal Outcome Evaluation:           Progress: declining  Outcome Summary: PT HAD SEVERAL EPISODES OF VOMITING TODAY, NG TUBE PLACED, LABS DRAWN Maria D Ambriz RN

## 2021-06-14 NOTE — PROGRESS NOTES
[facility]     Progress Note    Patient Name: Kristin Aldrich  : 1975  MRN: [id number]    Date of admission: 2021    Subjective   Subjective     Patient Reports walked in the hallway with assistance for the first time today since surgery. No flatus/BM.  Reports frequent belching and some nausea.        Objective   Objective     Vitals:   Temp:  [97.1 °F (36.2 °C)-98.6 °F (37 °C)] 98.6 °F (37 °C)  Heart Rate:  [74-92] 92  Resp:  [14-19] 18  BP: (154-183)/(65-97) 179/95  Physical Exam    Constitutional: Awake, alert   Eyes: PERRLA    Neck: Supple, trachea midline   Respiratory: Respirations even and unlabored    Cardiovascular: RRR   Gastrointestinal: Soft, approp TTP, incision with dsg intact   Psychiatric: Appropriate affect, cooperative   Neurologic: Oriented x 3, speech clear   Skin: No rashes     Result Review    Result Review:  I have personally reviewed the results from the time of this admission to 2021 10:14 EDT and agree with these findings:  []  Laboratory  []  Microbiology  []  Radiology  []  EKG/Telemetry   []  Cardiology/Vascular   []  Pathology  []  Old records  []  Other:  Most notable findings include:     Assessment/Plan   Assessment / Plan   Diagnosis   s/p open repair incarcerated ventral incisional hernia     Active Hospital Problems:  Active Hospital Problems    Diagnosis    • **Incarcerated hernia      Added automatically from request for surgery [id number]     • S/P repair of ventral hernia    • Type 2 diabetes mellitus (CMS/[facility])    • Body mass index (BMI) of 60.0-69.9 in adult (CMS/[facility])    • HTN (hypertension)    • COPD (chronic obstructive pulmonary disease) (CMS/[facility])        Plan:   Cont to ambulate and be OOB with assistance  Cont clear liq diet until patient has better GI function     DVT prophylaxis:  Medical and mechanical DVT prophylaxis orders are present.      Addendum   After lunch today patient became very nauseated no relief via antiemetics, patient reports  still no flatus or BM,  had  nursing staff place NG tube to low wall suction large volume returned with placement of NG tube.  Will check abdominal x-ray in a.m.    Addendum  Pt seen and examined.  Agree w/ above.  Ileus would not be unexpected.  NG tube in place.  Ambulate as much as able.  CPM.

## 2021-06-15 ENCOUNTER — APPOINTMENT (OUTPATIENT)
Dept: GENERAL RADIOLOGY | Facility: HOSPITAL | Age: 46
End: 2021-06-15

## 2021-06-15 LAB
ALBUMIN SERPL-MCNC: 3.4 G/DL (ref 3.5–5.2)
ALBUMIN/GLOB SERPL: 1.1 G/DL
ALP SERPL-CCNC: 71 U/L (ref 39–117)
ALT SERPL W P-5'-P-CCNC: 16 U/L (ref 1–33)
ANION GAP SERPL CALCULATED.3IONS-SCNC: 10.4 MMOL/L (ref 5–15)
AST SERPL-CCNC: 11 U/L (ref 1–32)
BASOPHILS # BLD AUTO: 0.06 10*3/MM3 (ref 0–0.2)
BASOPHILS NFR BLD AUTO: 0.4 % (ref 0–1.5)
BILIRUB SERPL-MCNC: 0.5 MG/DL (ref 0–1.2)
BUN SERPL-MCNC: 12 MG/DL (ref 6–20)
BUN/CREAT SERPL: 14.8 (ref 7–25)
CALCIUM SPEC-SCNC: 8.6 MG/DL (ref 8.6–10.5)
CHLORIDE SERPL-SCNC: 100 MMOL/L (ref 98–107)
CO2 SERPL-SCNC: 27.6 MMOL/L (ref 22–29)
CREAT SERPL-MCNC: 0.81 MG/DL (ref 0.57–1)
D-LACTATE SERPL-SCNC: 1 MMOL/L (ref 0.5–2)
DEPRECATED RDW RBC AUTO: 45.3 FL (ref 37–54)
EOSINOPHIL # BLD AUTO: 0 10*3/MM3 (ref 0–0.4)
EOSINOPHIL NFR BLD AUTO: 0 % (ref 0.3–6.2)
ERYTHROCYTE [DISTWIDTH] IN BLOOD BY AUTOMATED COUNT: 14.8 % (ref 12.3–15.4)
GFR SERPL CREATININE-BSD FRML MDRD: 77 ML/MIN/1.73
GLOBULIN UR ELPH-MCNC: 3.2 GM/DL
GLUCOSE BLDC GLUCOMTR-MCNC: 115 MG/DL (ref 70–130)
GLUCOSE BLDC GLUCOMTR-MCNC: 118 MG/DL (ref 70–130)
GLUCOSE BLDC GLUCOMTR-MCNC: 89 MG/DL (ref 70–130)
GLUCOSE BLDC GLUCOMTR-MCNC: 91 MG/DL (ref 70–130)
GLUCOSE SERPL-MCNC: 121 MG/DL (ref 65–99)
HCT VFR BLD AUTO: 43.6 % (ref 34–46.6)
HGB BLD-MCNC: 14 G/DL (ref 12–15.9)
IMM GRANULOCYTES # BLD AUTO: 0.07 10*3/MM3 (ref 0–0.05)
IMM GRANULOCYTES NFR BLD AUTO: 0.5 % (ref 0–0.5)
LYMPHOCYTES # BLD AUTO: 1.65 10*3/MM3 (ref 0.7–3.1)
LYMPHOCYTES NFR BLD AUTO: 11.9 % (ref 19.6–45.3)
MAGNESIUM SERPL-MCNC: 2.5 MG/DL (ref 1.6–2.6)
MCH RBC QN AUTO: 27 PG (ref 26.6–33)
MCHC RBC AUTO-ENTMCNC: 32.1 G/DL (ref 31.5–35.7)
MCV RBC AUTO: 84.2 FL (ref 79–97)
MONOCYTES # BLD AUTO: 1.17 10*3/MM3 (ref 0.1–0.9)
MONOCYTES NFR BLD AUTO: 8.4 % (ref 5–12)
NEUTROPHILS NFR BLD AUTO: 10.95 10*3/MM3 (ref 1.7–7)
NEUTROPHILS NFR BLD AUTO: 78.8 % (ref 42.7–76)
NRBC BLD AUTO-RTO: 0 /100 WBC (ref 0–0.2)
PHOSPHATE SERPL-MCNC: 2.1 MG/DL (ref 2.5–4.5)
PLATELET # BLD AUTO: 296 10*3/MM3 (ref 140–450)
PMV BLD AUTO: 10.9 FL (ref 6–12)
POTASSIUM SERPL-SCNC: 3.8 MMOL/L (ref 3.5–5.2)
PROT SERPL-MCNC: 6.6 G/DL (ref 6–8.5)
RBC # BLD AUTO: 5.18 10*6/MM3 (ref 3.77–5.28)
SODIUM SERPL-SCNC: 138 MMOL/L (ref 136–145)
WBC # BLD AUTO: 13.9 10*3/MM3 (ref 3.4–10.8)

## 2021-06-15 PROCEDURE — 25010000002 PROCHLORPERAZINE 10 MG/2ML SOLUTION: Performed by: NURSE PRACTITIONER

## 2021-06-15 PROCEDURE — 83735 ASSAY OF MAGNESIUM: CPT | Performed by: SURGERY

## 2021-06-15 PROCEDURE — 97161 PT EVAL LOW COMPLEX 20 MIN: CPT

## 2021-06-15 PROCEDURE — 94799 UNLISTED PULMONARY SVC/PX: CPT

## 2021-06-15 PROCEDURE — 83605 ASSAY OF LACTIC ACID: CPT | Performed by: INTERNAL MEDICINE

## 2021-06-15 PROCEDURE — 97165 OT EVAL LOW COMPLEX 30 MIN: CPT

## 2021-06-15 PROCEDURE — 99024 POSTOP FOLLOW-UP VISIT: CPT | Performed by: SURGERY

## 2021-06-15 PROCEDURE — 82962 GLUCOSE BLOOD TEST: CPT

## 2021-06-15 PROCEDURE — 25010000002 ENOXAPARIN PER 10 MG: Performed by: SURGERY

## 2021-06-15 PROCEDURE — 74019 RADEX ABDOMEN 2 VIEWS: CPT

## 2021-06-15 PROCEDURE — 80053 COMPREHEN METABOLIC PANEL: CPT | Performed by: INTERNAL MEDICINE

## 2021-06-15 PROCEDURE — 84100 ASSAY OF PHOSPHORUS: CPT | Performed by: SURGERY

## 2021-06-15 PROCEDURE — 25010000002 MORPHINE PER 10 MG: Performed by: SURGERY

## 2021-06-15 PROCEDURE — 85025 COMPLETE CBC W/AUTO DIFF WBC: CPT | Performed by: INTERNAL MEDICINE

## 2021-06-15 PROCEDURE — 25010000002 ONDANSETRON PER 1 MG: Performed by: SURGERY

## 2021-06-15 RX ADMIN — MORPHINE SULFATE 2 MG: 2 INJECTION, SOLUTION INTRAMUSCULAR; INTRAVENOUS at 02:21

## 2021-06-15 RX ADMIN — PHENOL 2 SPRAY: 1.5 LIQUID ORAL at 04:19

## 2021-06-15 RX ADMIN — SODIUM CHLORIDE, POTASSIUM CHLORIDE, SODIUM LACTATE AND CALCIUM CHLORIDE 160 ML/HR: 600; 310; 30; 20 INJECTION, SOLUTION INTRAVENOUS at 17:28

## 2021-06-15 RX ADMIN — MORPHINE SULFATE 2 MG: 2 INJECTION, SOLUTION INTRAMUSCULAR; INTRAVENOUS at 22:50

## 2021-06-15 RX ADMIN — ONDANSETRON 4 MG: 2 INJECTION INTRAMUSCULAR; INTRAVENOUS at 17:25

## 2021-06-15 RX ADMIN — BUDESONIDE 0.5 MG: 0.5 INHALANT ORAL at 08:45

## 2021-06-15 RX ADMIN — SODIUM CHLORIDE, POTASSIUM CHLORIDE, SODIUM LACTATE AND CALCIUM CHLORIDE 160 ML/HR: 600; 310; 30; 20 INJECTION, SOLUTION INTRAVENOUS at 23:21

## 2021-06-15 RX ADMIN — MORPHINE SULFATE 2 MG: 2 INJECTION, SOLUTION INTRAMUSCULAR; INTRAVENOUS at 12:43

## 2021-06-15 RX ADMIN — SODIUM CHLORIDE, PRESERVATIVE FREE 10 ML: 5 INJECTION INTRAVENOUS at 09:07

## 2021-06-15 RX ADMIN — ONDANSETRON 4 MG: 2 INJECTION INTRAMUSCULAR; INTRAVENOUS at 09:03

## 2021-06-15 RX ADMIN — FAMOTIDINE 20 MG: 10 INJECTION INTRAVENOUS at 09:10

## 2021-06-15 RX ADMIN — PROCHLORPERAZINE EDISYLATE 5 MG: 5 INJECTION INTRAMUSCULAR; INTRAVENOUS at 23:21

## 2021-06-15 RX ADMIN — MORPHINE SULFATE 2 MG: 2 INJECTION, SOLUTION INTRAMUSCULAR; INTRAVENOUS at 17:25

## 2021-06-15 RX ADMIN — PROCHLORPERAZINE EDISYLATE 5 MG: 5 INJECTION INTRAMUSCULAR; INTRAVENOUS at 14:29

## 2021-06-15 RX ADMIN — SODIUM CHLORIDE, POTASSIUM CHLORIDE, SODIUM LACTATE AND CALCIUM CHLORIDE 160 ML/HR: 600; 310; 30; 20 INJECTION, SOLUTION INTRAVENOUS at 06:17

## 2021-06-15 RX ADMIN — MORPHINE SULFATE 2 MG: 2 INJECTION, SOLUTION INTRAMUSCULAR; INTRAVENOUS at 20:06

## 2021-06-15 RX ADMIN — FAMOTIDINE 20 MG: 10 INJECTION INTRAVENOUS at 20:06

## 2021-06-15 RX ADMIN — MORPHINE SULFATE 2 MG: 2 INJECTION, SOLUTION INTRAMUSCULAR; INTRAVENOUS at 06:16

## 2021-06-15 RX ADMIN — ONDANSETRON 4 MG: 2 INJECTION INTRAMUSCULAR; INTRAVENOUS at 02:16

## 2021-06-15 RX ADMIN — ENOXAPARIN SODIUM 40 MG: 40 INJECTION SUBCUTANEOUS at 09:08

## 2021-06-15 RX ADMIN — ARFORMOTEROL TARTRATE 15 MCG: 15 SOLUTION RESPIRATORY (INHALATION) at 08:45

## 2021-06-15 RX ADMIN — PHENOL 2 SPRAY: 1.5 LIQUID ORAL at 09:08

## 2021-06-15 RX ADMIN — MORPHINE SULFATE 2 MG: 2 INJECTION, SOLUTION INTRAMUSCULAR; INTRAVENOUS at 09:08

## 2021-06-15 RX ADMIN — PROCHLORPERAZINE EDISYLATE 5 MG: 5 INJECTION INTRAMUSCULAR; INTRAVENOUS at 06:17

## 2021-06-15 NOTE — PLAN OF CARE
Goal Outcome Evaluation:              Outcome Summary: Pt nausea lessened but still requiring multiple prn nausea/pain medications and pt is refusing to get out of the bed as per Dr. Cabrera's order. Pt educated on necessity of getting out of bed and verbalized understanding but still will not get out of bed to walk the halls. Pt still npo. Pt still has NG to low wall suction.  Carmela Javier RN

## 2021-06-15 NOTE — PROGRESS NOTES
[facility]     Progress Note    Patient Name: Kristin Aldrich  : 1975  MRN: [id number]    Date of admission: 2021    Subjective   Subjective     Patient says still no flatus.  Reports she only walked in hallway once yesterday.  Abd xray today shows post-op ileus.  Still has NG tube in place.  No nausea.  No flatus or BM.  VSS.  Afebrile.  WBC down to 13 from 15.       Objective   Objective     Vitals:   Temp:  [97.9 °F (36.6 °C)-98.1 °F (36.7 °C)] 98.1 °F (36.7 °C)  Heart Rate:  [] 95  Resp:  [16-20] 16  BP: (149-183)/() 152/86  Physical Exam    Constitutional: Awake, alert   Eyes: PERRLA    Neck: Supple, trachea midline   Respiratory: respirations even and unlabored   Cardiovascular: RRR   Gastrointestinal: Soft, appropriately tender to palpation, midline incision intact  with dressing.    Psychiatric: Appropriate affect, cooperative   Neurologic: Oriented x 3, speech clear   Skin: No rashes     Result Review    Result Review:  I have personally reviewed the results from the time of this admission to 6/15/2021 09:00 EDT and agree with these findings:  [x]  Laboratory  []  Microbiology  [x]  Radiology  []  EKG/Telemetry   []  Cardiology/Vascular   []  Pathology  []  Old records  []  Other:      Assessment/Plan   Assessment / Plan     Diagnosis   S/p repair of ventral hernia  Postoperative ileus    Active Hospital Problems:  Active Hospital Problems    Diagnosis    • **Incarcerated hernia      Added automatically from request for surgery [id number]     • S/P repair of ventral hernia    • Type 2 diabetes mellitus (CMS/HCC)    • Body mass index (BMI) of 60.0-69.9 in adult (CMS/HCC)    • HTN (hypertension)    • COPD (chronic obstructive pulmonary disease) (CMS/HCC)        Plan:    Cont NG tube  Up in chair and ambulate as much as possible  PT eval and tx        DVT prophylaxis:  Medical and mechanical DVT prophylaxis orders are present.    Addendum  Patient seen and examined.  Agree w/ above.   Emphasized need to be OOB more.  Instructed nurse that it is mandatory for patient to walk this evening.

## 2021-06-15 NOTE — PLAN OF CARE
Goal Outcome Evaluation:  Plan of Care Reviewed With: patient        Progress: no change  Outcome Summary: Patient presents with the following functional limitations impacting highest level of independence and safety with daily occupations: decreased independence with ADLs, decreased indepedence with functional mobility/transfers, impaired activity tolerance/functional endurance, and impaired balance to support ADLs.

## 2021-06-15 NOTE — THERAPY EVALUATION
Patient Name: Kristin Aldrich  : 1975    MRN: [id number]                              Today's Date: 6/15/2021       Admit Date: 2021    Visit Dx:     ICD-10-CM ICD-9-CM   1. Incarcerated hernia  K46.0 552.9   2. Difficulty walking  R26.2 719.7   3. Decreased activities of daily living (ADL)  Z78.9 V49.89     Patient Active Problem List   Diagnosis   • Chronic fatigue   • Ankle swelling   • HTN (hypertension)   • Dyslipidemia   • COPD (chronic obstructive pulmonary disease) (CMS/HCC)   • CHONG (obstructive sleep apnea)   • GERD (gastroesophageal reflux disease)   • PCOS (polycystic ovarian syndrome)   • Multiple joint pain   • Osteoarthritis   • Poor balance   • Depression with anxiety   • Prediabetes   • Abnormal facial hair   • Hypothyroid   • Paradoxical vocal cord motion   • Kidney, aplastic   • IgA deficiency (CMS/HCC)   • Cellulitis   • Vitamin D deficiency   • Dietary counseling   • Body mass index (BMI) of 60.0-69.9 in adult (CMS/[facility])   • S/P laparoscopic sleeve gastrectomy   • Skin rash   • Hyperthyroidism with Hashimoto disease   • Vitiligo   • B12 deficiency   • Degenerative disc disease, cervical   • Neuropathy   • Constipation due to slow transit   • Panniculitis   • Intertrigo   • Incarcerated hernia   • Type 2 diabetes mellitus (CMS/[facility])   • S/P repair of ventral hernia     Past Medical History:   Diagnosis Date   • Abnormal exam of both ears     STATES EARS DON'T DRAIN ,  TOO MUCH EAR WAX   • ADHD    • Anxiety and depression    • Asthma    • Blind     blind in right eye   • Carpal tunnel syndrome     LT   • CFS (chronic fatigue syndrome)    • Chronic bronchitis (CMS/[facility])    • COPD (chronic obstructive pulmonary disease) (CMS/[facility])    • Diabetes mellitus (CMS/[facility])     recently diagnosed, insulin resistant   • Edema     LEGS AND FEET   • GERD (gastroesophageal reflux disease)    • Hashimoto's thyroiditis    • Head injury    • Heart burn    • Hiatal hernia    • History of Clostridium difficile  colitis 2017   • History of concussion 10/19/2014     WITH HEAD INJURY   • History of MRSA infection 2017    IN [facility]    • HTN (hypertension)    • Hyperlipidemia    • Hypertriglyceridemia    • Hypoglycemia    • IBS (irritable bowel syndrome)    • IgA deficiency (CMS/HCC)    • Incontinence of urine    • Insomnia    • Insulin resistance    • Joint pain    • Kidney disease     ONLY HAS ONE KIDNEY RIGHT   • Laryngopharyngeal reflux (LPR)    • Lymphedema     LEGS AND FEET   • Migraine    • OA (osteoarthritis)     WEAK ANKLES   • OCD (obsessive compulsive disorder)    • CHONG (obstructive sleep apnea)     has bi-pap   • Paradoxical vocal cord motion disorder    • PCOS (polycystic ovarian syndrome)    • Photophobia of both eyes    • Polydipsia    • Polyuria    • PONV (postoperative nausea and vomiting)    • Prediabetes    • PTSD (post-traumatic stress disorder)    • Restless legs syndrome (RLS)    • S/P repair of ventral hernia 6/14/2021   • Seasonal allergies    • Umbilical hernia    • Vitiligo     HANDS ,  LEGS, GROIN AREA   • Water retention      Past Surgical History:   Procedure Laterality Date   • ABDOMINAL SURGERY      gastric sleeve 8/10/2018   • APPENDECTOMY      2004   • CARPAL TUNNEL RELEASE Left 2015   • CHOLECYSTECTOMY     • COLONOSCOPY     • ENDOSCOPY     • GASTRIC SLEEVE LAPAROSCOPIC N/A 8/8/2018    Procedure: GASTRIC SLEEVE LAPAROSCOPIC WITH LYSIS OF ADHESIONS AND HIATAL HERNIA REPAIR;  Surgeon: Daniel Neil Jr., MD;  Location: [facility] [place] [facility] [place];  Service: Bariatric   • GASTRIC SLEEVE LAPAROSCOPIC  2018   • HERNIA REPAIR      hiatal hernia repair 8/10/18 & ventral incisional hernia repair 6/12/21    • LAPAROSCOPIC APPENDECTOMY  2003   • LAPAROSCOPIC CHOLECYSTECTOMY     • SKIN BIOPSY      breast tissue biopsy   • TEETH EXTRACTION  2015   • VENTRAL/INCISIONAL HERNIA REPAIR N/A 6/12/2021    Procedure: VENTRAL/INCISIONAL HERNIA REPAIR;  Surgeon: Vinay Cabrera MD;  [facility]: [facility];   Service: General;  Laterality: N/A;   • WISDOM TOOTH EXTRACTION       General Information     [facility] Name 06/15/21 1529          OT Time and Intention    Document Type  evaluation  -     Mode of Treatment  individual therapy;occupational therapy  -[facility] Name 06/15/21 1529          General Information    Patient Profile Reviewed  yes  -     Prior Level of Function  -- assist from  for all ADLs; sponge bath, has shower chair when completing shower; has BSC; utilized cane inside home and rollator outside home; dependent IADLs  -     Existing Precautions/Restrictions  other (see comments)  -     Barriers to Rehab  none identified  -[facility] Name 06/15/21 1529          Occupational Profile    Reason for Services/Referral (Occupational Profile)  Patient was admitted with hernia repair on 6/13/2021. She was referred for OT evaluation secondary to change in functional status with ADLs, functional mobility, and/or transfers.  -[facility] Name 06/15/21 1529          Living Environment    Lives With  spouse  -[facility] Name 06/15/21 1529          Home Main Entrance    Number of Stairs, Main Entrance  four  -[facility] Name 06/15/21 1529          Stairs Within Home, Primary    Stair Railings, Within Home, Primary  none  -[facility] Name 06/15/21 1529          Cognition    Orientation Status (Cognition)  oriented x 4  -       User Key  (r) = Recorded By, (t) = Taken By, (c) = Cosigned By    Initials Name Provider Type     Ninfa Larsen, OT Occupational Therapist          Mobility/ADL's     Row Name 06/15/21 1531          Bed Mobility    Bed Mobility  bed mobility (all) activities  -     All Activities, [place] (Bed Mobility)  minimum assist (75% patient effort)  -     Assistive Device (Bed Mobility)  bed rails  -     Row Name 06/15/21 1531          Transfers    Transfers  sit-stand transfer  -     Sit-Stand [place] (Transfers)  minimum assist (75% patient effort)  -     Row Name  06/15/21 1531          Sit-Stand Transfer    Assistive Device (Sit-Stand Transfers)  walker, front-wheeled  -KP     Row Name 06/15/21 1531          Activities of Daily Living    BADL Assessment/Intervention  bathing;upper body dressing;lower body dressing;grooming;toileting  -KP     Row Name 06/15/21 1531          Bathing Assessment/Intervention    [place] Level (Bathing)  bathing skills;maximum assist (25% patient effort)  -KP     Row Name 06/15/21 1531          Upper Body Dressing Assessment/Training    [place] Level (Upper Body Dressing)  upper body dressing skills;minimum assist (75% patient effort)  -KP     Row Name 06/15/21 1531          Lower Body Dressing Assessment/Training    [place] Level (Lower Body Dressing)  lower body dressing skills;maximum assist (25% patient effort)  -KP     Row Name 06/15/21 1531          Grooming Assessment/Training    [place] Level (Grooming)  grooming skills;minimum assist (75% patient effort)  -KP     Row Name 06/15/21 1531          Toileting Assessment/Training    [place] Level (Toileting)  toileting skills;maximum assist (25% patient effort)  -       User Key  (r) = Recorded By, (t) = Taken By, (c) = Cosigned By    Initials Name Provider Type     Ninfa Larsen OT Occupational Therapist        Obj/Interventions     Row Name 06/15/21 1532          Sensory Assessment (Somatosensory)    Sensory Assessment (Somatosensory)  UE sensation intact  -KP     Row Name 06/15/21 1532          Vision Assessment/Intervention    Visual Impairment/Limitations  WFL  -KP     Row Name 06/15/21 1532          Range of Motion Comprehensive    General Range of Motion  bilateral upper extremity ROM L  -KP     Row Name 06/15/21 1532          Strength Comprehensive (MMT)    Comment, General Manual Muscle Testing (MMT) Assessment  4/5 MMT in bilateral upper extremities  -KP     Row Name 06/15/21 1532          Motor Skills    Motor Skills  coordination;functional endurance   -KP     Coordination  WFL  -KP     Functional Endurance  poor plus  -KP     Row Name 06/15/21 1532          Balance    Balance Assessment  sitting static balance  -KP     Static Sitting Balance  WFL  -KP       User Key  (r) = Recorded By, (t) = Taken By, (c) = Cosigned By    Initials Name Provider Type    Ninfa Morocho OT Occupational Therapist        Goals/Plan     Row Name 06/15/21 1534          Bed Mobility Goal 1 (OT)    Activity/Assistive Device (Bed Mobility Goal 1, OT)  bed mobility activities, all  -KP     [place] Level/Cues Needed (Bed Mobility Goal 1, OT)  standby assist  -KP     Time Frame (Bed Mobility Goal 1, OT)  long term goal (LTG);10 days  -     Row Name 06/15/21 1534          Transfer Goal 1 (OT)    Activity/Assistive Device (Transfer Goal 1, OT)  transfers, all  -KP     [place] Level/Cues Needed (Transfer Goal 1, OT)  modified independence  -KP     Time Frame (Transfer Goal 1, OT)  long term goal (LTG);10 days  -[facility] Name 06/15/21 1534          Bathing Goal 1 (OT)    Activity/Device (Bathing Goal 1, OT)  bathing skills, all  -KP     [place] Level/Cues Needed (Bathing Goal 1, OT)  minimum assist (75% or more patient effort)  -KP     Time Frame (Bathing Goal 1, OT)  long term goal (LTG);10 days  -[facility] Name 06/15/21 1534          Dressing Goal 1 (OT)    Activity/Device (Dressing Goal 1, OT)  dressing skills, all  -KP     [place]/Cues Needed (Dressing Goal 1, OT)  minimum assist (75% or more patient effort)  -     Time Frame (Dressing Goal 1, OT)  long term goal (LTG);10 days  -[facility] Name 06/15/21 1534          Toileting Goal 1 (OT)    Activity/Device (Toileting Goal 1, OT)  toileting skills, all  -KP     [place] Level/Cues Needed (Toileting Goal 1, OT)  minimum assist (75% or more patient effort)  -     Time Frame (Toileting Goal 1, OT)  long term goal (LTG);10 days  -[facility] Name 06/15/21 1534          Grooming Goal 1 (OT)    Activity/Device  (Grooming Goal 1, OT)  grooming skills, all  -     [place] (Grooming Goal 1, OT)  standby assist  -     Time Frame (Grooming Goal 1, OT)  long term goal (LTG);10 days  -[facility] Name 06/15/21 1534          Therapy Assessment/Plan (OT)    Planned Therapy Interventions (OT)  activity tolerance training;BADL retraining;functional balance retraining;occupation/activity based interventions;ROM/therapeutic exercise;patient/caregiver education/training;transfer/mobility retraining  -       User Key  (r) = Recorded By, (t) = Taken By, (c) = Cosigned By    Initials Name Provider Type     Ninfa Larsen, OT Occupational Therapist        Clinical Impression     [facility] Name 06/15/21 1533          Pain Assessment    Additional Documentation  Pain Scale: FACES Pre/Post-Treatment (Group)  -KP     Row Name 06/15/21 1533          Pain Scale: FACES Pre/Post-Treatment    Pain: FACES Scale, Pretreatment  0-->no hurt  -     Posttreatment Pain Rating  0-->no hurt  -KP     Row Name 06/15/21 1533          Plan of Care Review    Plan of Care Reviewed With  patient  -     Progress  no change  -     Outcome Summary  Patient presents with the following functional limitations impacting highest level of independence and safety with daily occupations: decreased independence with ADLs, decreased indepedence with functional mobility/transfers, impaired activity tolerance/functional endurance, and impaired balance to support ADLs.  -[facility] Name 06/15/21 1533          Therapy Assessment/Plan (OT)    Patient/Family Therapy Goal Statement (OT)  Patient wants to return home with her .  -     Rehab Potential (OT)  fair, will monitor progress closely  -     Criteria for Skilled Therapeutic Interventions Met (OT)  yes;meets criteria;skilled treatment is necessary  -     Therapy Frequency (OT)  5 times/wk  -[facility] Name 06/15/21 1533          Therapy Plan Review/Discharge Plan (OT)    Anticipated Discharge Disposition  (OT)  inpatient rehabilitation facility;sub acute care setting  -KP       User Key  (r) = Recorded By, (t) = Taken By, (c) = Cosigned By    Initials Name Provider Type    Ninfa Morocho, HUGH Occupational Therapist        Outcome Measures     Row Name 06/15/21 1535          How much help from another is currently needed...    Putting on and taking off regular lower body clothing?  2  -KP     Bathing (including washing, rinsing, and drying)  2  -KP     Toileting (which includes using toilet bed pan or urinal)  2  -KP     Putting on and taking off regular upper body clothing  2  -KP     Taking care of personal grooming (such as brushing teeth)  2  -KP     Eating meals  3  -KP     AM-PAC 6 Clicks Score (OT)  13  -KP     Row Name 06/15/21 1000 06/15/21 0730       How much help from another person do you currently need...    Turning from your back to your side while in flat bed without using bedrails?  3  -DP  2  -AH    Moving from lying on back to sitting on the side of a flat bed without bedrails?  3  -DP  2  -AH    Moving to and from a bed to a chair (including a wheelchair)?  3  -DP  3  -AH    Standing up from a chair using your arms (e.g., wheelchair, bedside chair)?  3  -DP  3  -AH    Climbing 3-5 steps with a railing?  2  -DP  2  -AH    To walk in hospital room?  3  -DP  3  -AH    AM-PAC 6 Clicks Score (PT)  17  -DP  15  -AH    Row Name 06/15/21 1535 06/15/21 1000       Functional Assessment    Outcome Measure Options  AM-PAC 6 Clicks Daily Activity (OT);Optimal Instrument  -KP  AM-PAC 6 Clicks Basic Mobility (PT)  -DP    Row Name 06/15/21 1535          Optimal Instrument    Optimal Instrument  Optimal - 3  -KP     Bending/Stooping  4  -KP     Standing  2  -KP     Reaching  2  -KP     From the list, choose the 3 activities you would most like to be able to do without any difficulty  Reaching;Bending/stooping;Standing  -KP     Total Score Optimal - 3  8  -KP       User Key  (r) = Recorded By, (t) = Taken By, (c) =  Cosigned By    Initials Name Provider Type     Carmela Javier, RN Registered Nurse    Ninfa Morocho OT Occupational Therapist    Haven Barlow, PT Physical Therapist        Occupational Therapy Education                 Title: PT OT SLP Therapies (Done)     Topic: Occupational Therapy (Done)     Point: ADL training (Done)     Description:   Instruct learner(s) on proper safety adaptation and remediation techniques during self care or transfers.   Instruct in proper use of assistive devices.              Learning Progress Summary           Patient Acceptance, E, VU by  at 6/15/2021 1536                   Point: Home exercise program (Done)     Description:   Instruct learner(s) on appropriate technique for monitoring, assisting and/or progressing therapeutic exercises/activities.              Learning Progress Summary           Patient Acceptance, E, VU by  at 6/15/2021 1536                   Point: Precautions (Done)     Description:   Instruct learner(s) on prescribed precautions during self-care and functional transfers.              Learning Progress Summary           Patient Acceptance, E, VU by  at 6/15/2021 1536                   Point: Body mechanics (Done)     Description:   Instruct learner(s) on proper positioning and spine alignment during self-care, functional mobility activities and/or exercises.              Learning Progress Summary           Patient Acceptance, E, VU by  at 6/15/2021 1536                               User Key     Initials Effective Dates Name Provider Type Discipline     03/31/21 -  Ninfa Larsen OT Occupational Therapist OT              OT Recommendation and Plan  Planned Therapy Interventions (OT): activity tolerance training, BADL retraining, functional balance retraining, occupation/activity based interventions, ROM/therapeutic exercise, patient/caregiver education/training, transfer/mobility retraining  Therapy Frequency (OT): 5 times/wk  Plan of  Care Review  Plan of Care Reviewed With: patient  Progress: no change  Outcome Summary: Patient presents with the following functional limitations impacting highest level of independence and safety with daily occupations: decreased independence with ADLs, decreased indepedence with functional mobility/transfers, impaired activity tolerance/functional endurance, and impaired balance to support ADLs.     Time Calculation:   Time Calculation- OT     Row Name 06/15/21 1536             Time Calculation- OT    OT Received On  06/15/21  -      OT Goal Re-Cert Due Date  06/24/21  -         Untimed Charges    OT Eval/Re-eval Minutes  34  -KP         Total Minutes    Untimed Charges Total Minutes  34  -KP       Total Minutes  34  -KP        User Key  (r) = Recorded By, (t) = Taken By, (c) = Cosigned By    Initials Name Provider Type    Ninfa Morocho OT Occupational Therapist        Therapy Charges for Today     Code Description Service Date Service Provider Modifiers Qty    96143368585 HC OT EVAL LOW COMPLEXITY 3 6/15/2021 Ninfa Larsen OT GO 1               Ninfa Larsen OT  6/15/2021

## 2021-06-15 NOTE — PLAN OF CARE
Problem: Adult Inpatient Plan of Care  Goal: Plan of Care Review  Outcome: Ongoing, Progressing   Goal Outcome Evaluation:         Pts BP elevated at beginning of shift, PRN hydralazine given x1; effective. 500mL out of NG. Pain controlled with PRNs. Pt having trouble urinating; straight cath x1. Call light within reach.

## 2021-06-15 NOTE — THERAPY EVALUATION
Acute Care - Physical Therapy Initial Evaluation  SHILOH Thompson     Patient Name: Kristin Aldrich  : 1975  MRN: [id number]  Today's Date: 6/15/2021   Onset of Illness/Injury or Date of Surgery: 21       PT Assessment (last 12 hours)      PT Evaluation and Treatment     Row Name 06/15/21 1000          Physical Therapy Time and Intention    Subjective Information  complains of;pain  -DP     Document Type  evaluation  -DP     Mode of Treatment  individual therapy  -DP     Patient Effort  good  -DP     Row Name 06/15/21 1000          General Information    Patient Profile Reviewed  yes  -DP     Onset of Illness/Injury or Date of Surgery  21  -DP     Referring Physician  Andrea Sawyer  -DP     Patient Observations  alert;cooperative;agree to therapy  -DP     Prior Level of Function  dependent:;all household mobility;ADL's  -DP     Equipment Currently Used at Home  walker, rolling;cane, quad uses can in apartment, rollator in community  -DP     Existing Precautions/Restrictions  other (see comments) S/P hernia repair- no twisitng  -DP     Benefits Reviewed  patient:  -DP     Barriers to Rehab  none identified  -DP     Row Name 06/15/21 1000          Living Environment    Current Living Arrangements  home/apartment/condo  -DP     Home Accessibility  stairs to enter home 4  -DP     Lives With  spouse  -DP     Row Name 06/15/21 1000          Home Main Entrance    Number of Stairs, Main Entrance  four  -DP     Row Name 06/15/21 1000          Home Use of Assistive/Adaptive Equipment    Equipment Currently Used at Home  cane, quad;walker, rolling  -DP     Row Name 06/15/21 1000          Pain    Additional Documentation  Pain Scale: FACES Pre/Post-Treatment (Group)  -DP     Row Name 06/15/21 1000          Pain Scale: Word Pre/Post-Treatment    Pain: Word Scale, Pretreatment  4 - moderate pain  -DP     Posttreatment Pain Rating  4 - moderate pain  -DP     Row Name 06/15/21 1000          Range of Motion (ROM)     Range of Motion  ROM is WFL  -DP     Row Name 06/15/21 1000          Bed Mobility    Bed Mobility  bed mobility (all) activities  -DP     All Activities, [place] (Bed Mobility)  minimum assist (75% patient effort)  -DP     Assistive Device (Bed Mobility)  bed rails  -DP     Row Name 06/15/21 1000          Transfers    Transfers  sit-stand transfer  -DP     Sit-Stand [place] (Transfers)  minimum assist (75% patient effort)  -DP     Row Name 06/15/21 1000          Sit-Stand Transfer    Assistive Device (Sit-Stand Transfers)  walker, front-wheeled  -DP     Row Name 06/15/21 1000          Gait/Stairs (Locomotion)    Gait/Stairs Locomotion  gait/ambulation independence  -DP     [place] Level (Gait)  minimum assist (75% patient effort)  -DP     Assistive Device (Gait)  walker, front-wheeled  -DP     Distance in Feet (Gait)  3  -DP     Pattern (Gait)  step-to  -DP     Row Name 06/15/21 1000          Balance    Balance Assessment  standing dynamic balance  -DP     Dynamic Standing Balance  -- fair + with rolling walker  -DP     Row Name             Wound 06/12/21 1550 midline abdomen Incision    Wound - Properties Group Placement Date: 06/12/21  -SC Placement Time: 1550  -SC Present on Hospital Admission: N  -SC Orientation: midline  -SC Location: abdomen  -SC Primary Wound Type: Incision  -SC    Retired Wound - Properties Group Date first assessed: 06/12/21  -SC Time first assessed: 1550  -SC Present on Hospital Admission: N  -SC Location: abdomen  -SC Primary Wound Type: Incision  -SC    Row Name 06/15/21 1000          Plan of Care Review    Plan of Care Reviewed With  patient  -DP     Outcome Summary  Patient is a 45 year old female that presetns with strength and mobility impairments that limits her ability to safely ambulate and perform all transfers. She would benefit from skilled PT to adress the noted impairments.  -DP     Row Name 06/15/21 1000          Physical Therapy Goals    Bed Mobility Goal  Selection (PT)  bed mobility, PT goal 1  -DP     Transfer Goal Selection (PT)  transfer, PT goal 1  -DP     Gait Training Goal Selection (PT)  gait training, PT goal 1  -DP     Row Name 06/15/21 1000          Bed Mobility Goal 1 (PT)    Activity/Assistive Device (Bed Mobility Goal 1, PT)  bed mobility activities, all  -DP     [place] Level/Cues Needed (Bed Mobility Goal 1, PT)  standby assist  -DP     Time Frame (Bed Mobility Goal 1, PT)  10 days  -DP     Row Name 06/15/21 1000          Transfer Goal 1 (PT)    Activity/Assistive Device (Transfer Goal 1, PT)  sit-to-stand/stand-to-sit  -DP     [place] Level/Cues Needed (Transfer Goal 1, PT)  independent  -DP     Time Frame (Transfer Goal 1, PT)  10 days  -DP     Row Name 06/15/21 1000          Gait Training Goal 1 (PT)    Activity/Assistive Device (Gait Training Goal 1, PT)  gait (walking locomotion);assistive device use;walker, rolling  -DP     [place] Level (Gait Training Goal 1, PT)  standby assist  -DP     Distance (Gait Training Goal 1, PT)  150  -DP     Time Frame (Gait Training Goal 1, PT)  10 days  -DP     Row Name 06/15/21 1000          Therapy Assessment/Plan (PT)    PT Diagnosis (PT)  difficulty walking  -DP     Rehab Potential (PT)  good, to achieve stated therapy goals  -DP     Criteria for Skilled Interventions Met (PT)  yes  -DP     Predicted Duration of Therapy Intervention (PT)  10 days  -DP     Problem List (PT)  balance;strength;mobility  -DP     Activity Limitations Related to Problem List (PT)  unable to ambulate safely;unable to transfer safely  -DP     Row Name 06/15/21 1000          PT Evaluation Complexity    History, PT Evaluation Complexity  no personal factors and/or comorbidities  -DP     Examination of Body Systems (PT Eval Complexity)  total of 3 or more elements  -DP     Clinical Presentation (PT Evaluation Complexity)  stable  -DP     Clinical Decision Making (PT Evaluation Complexity)  low complexity  -DP     Overall  Complexity (PT Evaluation Complexity)  low complexity  -DP     Row Name 06/15/21 1000          Therapy Plan Review/Discharge Plan (PT)    Therapy Plan Review (PT)  evaluation/treatment results reviewed  -DP       User Key  (r) = Recorded By, (t) = Taken By, (c) = Cosigned By    Initials Name Provider Type    Deepthi Cueva, RN Registered Nurse    Haven Barlow PT Physical Therapist        Physical Therapy Education                 Title: PT OT SLP Therapies (Done)     Topic: Physical Therapy (Done)     Point: Mobility training (Done)     Learning Progress Summary           Patient Acceptance, E,TB, VU by DP at 6/15/2021 1024                   Point: Precautions (Done)     Learning Progress Summary           Patient Acceptance, E,TB, VU by DP at 6/15/2021 1024                               User Key     Initials Effective Dates Name Provider Type Discipline    DP 06/03/21 -  Haven Blair PT Physical Therapist PT              PT Recommendation and Plan  Anticipated Discharge Disposition (PT): inpatient rehabilitation facility, skilled nursing facility, home with assist, home with home health  Planned Therapy Interventions (PT): bed mobility training, gait training, balance training, strengthening, transfer training  Therapy Frequency (PT): daily  Plan of Care Reviewed With: patient  Outcome Summary: Patient is a 45 year old female that presetns with strength and mobility impairments that limits her ability to safely ambulate and perform all transfers. She would benefit from skilled PT to adress the noted impairments.  Outcome Measures     Row Name 06/15/21 1000             How much help from another person do you currently need...    Turning from your back to your side while in flat bed without using bedrails?  3  -DP      Moving from lying on back to sitting on the side of a flat bed without bedrails?  3  -DP      Moving to and from a bed to a chair (including a wheelchair)?  3  -DP      Standing up  from a chair using your arms (e.g., wheelchair, bedside chair)?  3  -DP      Climbing 3-5 steps with a railing?  2  -DP      To walk in hospital room?  3  -DP      AM-PAC 6 Clicks Score (PT)  17  -DP         Functional Assessment    Outcome Measure Options  AM-PAC 6 Clicks Basic Mobility (PT)  -DP        User Key  (r) = Recorded By, (t) = Taken By, (c) = Cosigned By    Initials Name Provider Type    Haven Barlow, PT Physical Therapist           Time Calculation:   PT Charges     Row Name 06/15/21 1025             Time Calculation    PT Received On  06/15/21  -DP      PT Goal Re-Cert Due Date  06/24/21  -DP         Untimed Charges    PT Eval/Re-eval Minutes  45  -DP         Total Minutes    Untimed Charges Total Minutes  45  -DP       Total Minutes  45  -DP        User Key  (r) = Recorded By, (t) = Taken By, (c) = Cosigned By    Initials Name Provider Type    Haven Barlow, PT Physical Therapist        Therapy Charges for Today     Code Description Service Date Service Provider Modifiers Qty    77586304625 HC PT EVAL LOW COMPLEXITY 3 6/15/2021 Haven Blair, PT GP 1          PT G-Codes  Outcome Measure Options: AM-PAC 6 Clicks Basic Mobility (PT)  AM-PAC 6 Clicks Score (PT): 17    Haven Blair PT  6/15/2021

## 2021-06-16 LAB
ANION GAP SERPL CALCULATED.3IONS-SCNC: 9.1 MMOL/L (ref 5–15)
BUN SERPL-MCNC: 12 MG/DL (ref 6–20)
BUN/CREAT SERPL: 16.9 (ref 7–25)
CALCIUM SPEC-SCNC: 8.5 MG/DL (ref 8.6–10.5)
CHLORIDE SERPL-SCNC: 107 MMOL/L (ref 98–107)
CO2 SERPL-SCNC: 24.9 MMOL/L (ref 22–29)
CREAT SERPL-MCNC: 0.71 MG/DL (ref 0.57–1)
GFR SERPL CREATININE-BSD FRML MDRD: 90 ML/MIN/1.73
GLUCOSE BLDC GLUCOMTR-MCNC: 88 MG/DL (ref 70–130)
GLUCOSE BLDC GLUCOMTR-MCNC: 92 MG/DL (ref 70–130)
GLUCOSE SERPL-MCNC: 94 MG/DL (ref 65–99)
MAGNESIUM SERPL-MCNC: 2.3 MG/DL (ref 1.6–2.6)
PHOSPHATE SERPL-MCNC: 2.8 MG/DL (ref 2.5–4.5)
POTASSIUM SERPL-SCNC: 3.9 MMOL/L (ref 3.5–5.2)
SODIUM SERPL-SCNC: 141 MMOL/L (ref 136–145)

## 2021-06-16 PROCEDURE — 94799 UNLISTED PULMONARY SVC/PX: CPT

## 2021-06-16 PROCEDURE — 97116 GAIT TRAINING THERAPY: CPT

## 2021-06-16 PROCEDURE — 82962 GLUCOSE BLOOD TEST: CPT

## 2021-06-16 PROCEDURE — 25010000002 MORPHINE PER 10 MG: Performed by: SURGERY

## 2021-06-16 PROCEDURE — 25010000002 ONDANSETRON PER 1 MG: Performed by: SURGERY

## 2021-06-16 PROCEDURE — 25010000002 ENOXAPARIN PER 10 MG: Performed by: SURGERY

## 2021-06-16 PROCEDURE — 80048 BASIC METABOLIC PNL TOTAL CA: CPT | Performed by: SURGERY

## 2021-06-16 PROCEDURE — 25010000002 HYDRALAZINE PER 20 MG: Performed by: INTERNAL MEDICINE

## 2021-06-16 PROCEDURE — 84100 ASSAY OF PHOSPHORUS: CPT | Performed by: SURGERY

## 2021-06-16 PROCEDURE — 25010000002 ENOXAPARIN PER 10 MG: Performed by: INTERNAL MEDICINE

## 2021-06-16 PROCEDURE — 83735 ASSAY OF MAGNESIUM: CPT | Performed by: SURGERY

## 2021-06-16 PROCEDURE — 99024 POSTOP FOLLOW-UP VISIT: CPT | Performed by: SURGERY

## 2021-06-16 PROCEDURE — 97530 THERAPEUTIC ACTIVITIES: CPT

## 2021-06-16 PROCEDURE — 25010000002 PROCHLORPERAZINE 10 MG/2ML SOLUTION: Performed by: NURSE PRACTITIONER

## 2021-06-16 RX ADMIN — MAGNESIUM HYDROXIDE 30 ML: 2400 SUSPENSION ORAL at 23:17

## 2021-06-16 RX ADMIN — ONDANSETRON 4 MG: 2 INJECTION INTRAMUSCULAR; INTRAVENOUS at 18:41

## 2021-06-16 RX ADMIN — MORPHINE SULFATE 2 MG: 2 INJECTION, SOLUTION INTRAMUSCULAR; INTRAVENOUS at 07:48

## 2021-06-16 RX ADMIN — ENOXAPARIN SODIUM 60 MG: 60 INJECTION SUBCUTANEOUS at 22:44

## 2021-06-16 RX ADMIN — HYDRALAZINE HYDROCHLORIDE 10 MG: 20 INJECTION INTRAMUSCULAR; INTRAVENOUS at 10:46

## 2021-06-16 RX ADMIN — IPRATROPIUM BROMIDE AND ALBUTEROL SULFATE 3 ML: .5; 2.5 SOLUTION RESPIRATORY (INHALATION) at 11:18

## 2021-06-16 RX ADMIN — PHENOL 2 SPRAY: 1.5 LIQUID ORAL at 12:00

## 2021-06-16 RX ADMIN — FAMOTIDINE 20 MG: 10 INJECTION INTRAVENOUS at 22:45

## 2021-06-16 RX ADMIN — MORPHINE SULFATE 2 MG: 2 INJECTION, SOLUTION INTRAMUSCULAR; INTRAVENOUS at 04:03

## 2021-06-16 RX ADMIN — PROCHLORPERAZINE EDISYLATE 5 MG: 5 INJECTION INTRAMUSCULAR; INTRAVENOUS at 05:39

## 2021-06-16 RX ADMIN — ENOXAPARIN SODIUM 40 MG: 40 INJECTION SUBCUTANEOUS at 10:39

## 2021-06-16 RX ADMIN — BUDESONIDE 0.5 MG: 0.5 INHALANT ORAL at 11:18

## 2021-06-16 RX ADMIN — MORPHINE SULFATE 2 MG: 2 INJECTION, SOLUTION INTRAMUSCULAR; INTRAVENOUS at 21:47

## 2021-06-16 RX ADMIN — SODIUM CHLORIDE, PRESERVATIVE FREE 10 ML: 5 INJECTION INTRAVENOUS at 10:39

## 2021-06-16 RX ADMIN — MORPHINE SULFATE 2 MG: 2 INJECTION, SOLUTION INTRAMUSCULAR; INTRAVENOUS at 01:55

## 2021-06-16 RX ADMIN — MORPHINE SULFATE 2 MG: 2 INJECTION, SOLUTION INTRAMUSCULAR; INTRAVENOUS at 12:47

## 2021-06-16 RX ADMIN — SODIUM CHLORIDE, POTASSIUM CHLORIDE, SODIUM LACTATE AND CALCIUM CHLORIDE 160 ML/HR: 600; 310; 30; 20 INJECTION, SOLUTION INTRAVENOUS at 11:59

## 2021-06-16 RX ADMIN — MORPHINE SULFATE 2 MG: 2 INJECTION, SOLUTION INTRAMUSCULAR; INTRAVENOUS at 18:41

## 2021-06-16 RX ADMIN — ARFORMOTEROL TARTRATE 15 MCG: 15 SOLUTION RESPIRATORY (INHALATION) at 11:18

## 2021-06-16 RX ADMIN — ONDANSETRON 4 MG: 2 INJECTION INTRAMUSCULAR; INTRAVENOUS at 12:47

## 2021-06-16 RX ADMIN — FAMOTIDINE 20 MG: 10 INJECTION INTRAVENOUS at 10:39

## 2021-06-16 RX ADMIN — ONDANSETRON 4 MG: 2 INJECTION INTRAMUSCULAR; INTRAVENOUS at 01:55

## 2021-06-16 NOTE — PROGRESS NOTES
[facility]     Progress Note    Patient Name: Kristin Aldrich  : 1975  MRN: [id number]  Primary Care Physician:  Kaylin Gaines APRN  Date of admission: 2021      Subjective   Brief summary.  Admitted with incarcerated hernia, post surgery surgery      HPI:    Follow-up on abdominal pain.  Patient nauseated.  NG in place.  Tired and fatigued looking.  No fever    Review of Systems   Denies any chest pain.  Complains of abdominal pain.  Nausea.  Blood pressure slightly up      Objective     Vitals:   Temp:  [98 °F (36.7 °C)-98.7 °F (37.1 °C)] 98.3 °F (36.8 °C)  Heart Rate:  [79-99] 99  Resp:  [16-21] 20  BP: (159-187)/(74-94) 174/86    Physical Exam    Middle-aged female morbidly obese.  Tired and fatigued.  Heart is regular lungs diminished breath sounds.  .  Abdomen morbidly obese and tender.  Bowel sounds present.  Extremities with edema.  Neuro awake alert and oriented    Result Review    Result Review:  I have personally reviewed the results from the time of this admission to 2021 19:33 EDT and agree with these findings:  [x]  Laboratory  []  Microbiology  []  Radiology  []  EKG/Telemetry   []  Cardiology/Vascular   []  Pathology  []  Old records  []  Other:           Assessment / Plan       Active Hospital Problems:  Active Hospital Problems    Diagnosis    • **Incarcerated hernia      Added automatically from request for surgery [id number]     • S/P repair of ventral hernia    • Type 2 diabetes mellitus (CMS/HCC)    • Body mass index (BMI) of 60.0-69.9 in adult (CMS/HCC)    • HTN (hypertension)    • COPD (chronic obstructive pulmonary disease) (CMS/HCC)      Plan:   Slow improvement.  Continue to have NG  .  Increase activity.  X-rays with ileus  .  Continue IV meds.  We will see surgical input.  Discussed with RN       DVT prophylaxis:  Medical and mechanical DVT prophylaxis orders are present.    CODE STATUS:   Level Of Support Discussed With: Patient  Code Status: CPR  Medical  Interventions (Level of Support Prior to Arrest): Full      Part of this note is an electronic transcription of spoken language to printed text. The electronic translation/transcription may permit erroneous, or at times, nonsensical words or phrases to be inadvertently transcribed; I have reviewed the note for such errors however some may still exist..      Electronically signed by Andrea Sawyer MD, 06/16/21, 7:33 PM EDT.

## 2021-06-16 NOTE — THERAPY TREATMENT NOTE
Acute Care - Physical Therapy Treatment Note   Thompson     Patient Name: Kristin Aldrich  : 1975  MRN: [id number]  Today's Date: 2021   Onset of Illness/Injury or Date of Surgery: 21       PT Assessment (last 12 hours)      PT Evaluation and Treatment     Row Name 21 1300          Physical Therapy Time and Intention    Subjective Information  nausea/vomiting  -CS     Document Type  therapy note (daily note)  -CS     Mode of Treatment  individual therapy  -CS     Patient Effort  good  -CS     Symptoms Noted During/After Treatment  nausea  -CS     Row Name 21 1300          Bed Mobility    Bed Mobility  supine-sit  -CS     All Activities, [place] (Bed Mobility)  minimum assist (75% patient effort)  -CS     Supine-Sit [place] (Bed Mobility)  minimum assist (75% patient effort)  -CS     Assistive Device (Bed Mobility)  head of bed elevated  -CS     Row Name 21 1300          Transfers    Transfers  sit-stand transfer;stand-sit transfer  -     Sit-Stand [place] (Transfers)  minimum assist (75% patient effort)  -CS     Stand-Sit [place] (Transfers)  minimum assist (75% patient effort)  -CS     Row Name 21 1300          Sit-Stand Transfer    Assistive Device (Sit-Stand Transfers)  walker, front-wheeled bariatric FWW  -CS     Row Name 21 1300          Stand-Sit Transfer    Assistive Device (Stand-Sit Transfers)  walker, front-wheeled bariatric  -CS     Row Name 21 1300          Gait/Stairs (Locomotion)    Gait/Stairs Locomotion  gait/ambulation assistive device  -CS     [place] Level (Gait)  minimum assist (75% patient effort)  -CS     Assistive Device (Gait)  walker, front-wheeled  -     Distance in Feet (Gait)  30  -     Row Name 21 1300          Balance    Balance Assessment  sitting dynamic balance;sit to stand dynamic balance;standing dynamic balance  -     Dynamic Sitting Balance  WFL  -CS     Sit to Stand Dynamic Balance   mild impairment  -CS     Dynamic Standing Balance  WFL  -CS     Row Name             Wound 06/12/21 1550 midline abdomen Incision    Wound - Properties Group Placement Date: 06/12/21  -SC Placement Time: 1550  -SC Present on Hospital Admission: N  -SC Orientation: midline  -SC Location: abdomen  -SC Primary Wound Type: Incision  -SC    Retired Wound - Properties Group Date first assessed: 06/12/21  -SC Time first assessed: 1550  -SC Present on Hospital Admission: N  -SC Location: abdomen  -SC Primary Wound Type: Incision  -SC    Row Name 06/16/21 1300          Progress Summary (PT)    Progress Toward Functional Goals (PT)  progress toward functional goals as expected  -CS     Daily Progress Summary (PT)  Patient presents with impairments in functional mobility. She is recommended to continue skilled PT to address these.  -CS       User Key  (r) = Recorded By, (t) = Taken By, (c) = Cosigned By    Initials Name Provider Type    SC Deepthi Perea, RN Registered Nurse    Peggy Parks, MALLORY Physical Therapist        Physical Therapy Education                 Title: PT OT SLP Therapies (Done)     Topic: Physical Therapy (Done)     Point: Mobility training (Done)     Learning Progress Summary           Patient Acceptance, E,TB, VU by DP at 6/15/2021 1024                   Point: Precautions (Done)     Learning Progress Summary           Patient Acceptance, E,TB, VU by DP at 6/15/2021 1024                               User Key     Initials Effective Dates Name Provider Type Discipline    DP 06/03/21 -  Haven Blair PT Physical Therapist PT              PT Recommendation and Plan     Progress Summary (PT)  Progress Toward Functional Goals (PT): progress toward functional goals as expected  Daily Progress Summary (PT): Patient presents with impairments in functional mobility. She is recommended to continue skilled PT to address these.  Outcome Measures     Row Name 06/15/21 1000             How much help from  another person do you currently need...    Turning from your back to your side while in flat bed without using bedrails?  3  -DP      Moving from lying on back to sitting on the side of a flat bed without bedrails?  3  -DP      Moving to and from a bed to a chair (including a wheelchair)?  3  -DP      Standing up from a chair using your arms (e.g., wheelchair, bedside chair)?  3  -DP      Climbing 3-5 steps with a railing?  2  -DP      To walk in hospital room?  3  -DP      AM-PAC 6 Clicks Score (PT)  17  -DP         Functional Assessment    Outcome Measure Options  AM-PAC 6 Clicks Basic Mobility (PT)  -DP        User Key  (r) = Recorded By, (t) = Taken By, (c) = Cosigned By    Initials Name Provider Type    Haven Barlow, PT Physical Therapist           Time Calculation:   PT Charges     Row Name 06/16/21 1325             Time Calculation    PT Received On  06/16/21  -CS      PT Goal Re-Cert Due Date  06/24/21  -CS         Timed Charges    84835 - Gait Training Minutes   16  -CS      02024 - PT Therapeutic Activity Minutes  8  -CS         Total Minutes    Timed Charges Total Minutes  24  -CS       Total Minutes  24  -CS        User Key  (r) = Recorded By, (t) = Taken By, (c) = Cosigned By    Initials Name Provider Type    CS Peggy Leon, PT Physical Therapist        Therapy Charges for Today     Code Description Service Date Service Provider Modifiers Qty    09375062037 HC GAIT TRAINING EA 15 MIN 6/16/2021 Peggy Leon, PT GP 1    58229525701 HC PT THERAPEUTIC ACT EA 15 MIN 6/16/2021 Peggy Leon, PT GP 1          PT G-Codes  Outcome Measure Options: AM-PAC 6 Clicks Daily Activity (OT), Optimal Instrument  AM-PAC 6 Clicks Score (PT): 17  AM-PAC 6 Clicks Score (OT): 13    Peggy Leon PT  6/16/2021

## 2021-06-16 NOTE — PLAN OF CARE
Goal Outcome Evaluation:           Progress: improving  Outcome Summary: Pt nausea is lessening but still requiring pain/nausea medications less than yesterday. Pt encouraged to be out of bed. Pt up to the chair twice for multiple hours today and walked to nurses station once as well. Pt is still npo and has NG to low wall suction.    Carmela Javier RN

## 2021-06-17 ENCOUNTER — APPOINTMENT (OUTPATIENT)
Dept: GENERAL RADIOLOGY | Facility: HOSPITAL | Age: 46
End: 2021-06-17

## 2021-06-17 PROBLEM — K91.89 POSTOPERATIVE ILEUS (HCC): Status: ACTIVE | Noted: 2021-06-17

## 2021-06-17 PROBLEM — K56.7 POSTOPERATIVE ILEUS (HCC): Status: ACTIVE | Noted: 2021-06-17

## 2021-06-17 LAB
ANION GAP SERPL CALCULATED.3IONS-SCNC: 11.6 MMOL/L (ref 5–15)
BUN SERPL-MCNC: 9 MG/DL (ref 6–20)
BUN/CREAT SERPL: 14.8 (ref 7–25)
CALCIUM SPEC-SCNC: 8.3 MG/DL (ref 8.6–10.5)
CHLORIDE SERPL-SCNC: 100 MMOL/L (ref 98–107)
CO2 SERPL-SCNC: 24.4 MMOL/L (ref 22–29)
CREAT SERPL-MCNC: 0.61 MG/DL (ref 0.57–1)
DEPRECATED RDW RBC AUTO: 46.9 FL (ref 37–54)
ERYTHROCYTE [DISTWIDTH] IN BLOOD BY AUTOMATED COUNT: 14.8 % (ref 12.3–15.4)
GFR SERPL CREATININE-BSD FRML MDRD: 107 ML/MIN/1.73
GLUCOSE BLDC GLUCOMTR-MCNC: 84 MG/DL (ref 70–130)
GLUCOSE BLDC GLUCOMTR-MCNC: 88 MG/DL (ref 70–130)
GLUCOSE BLDC GLUCOMTR-MCNC: 92 MG/DL (ref 70–130)
GLUCOSE BLDC GLUCOMTR-MCNC: 94 MG/DL (ref 70–130)
GLUCOSE SERPL-MCNC: 98 MG/DL (ref 65–99)
HCT VFR BLD AUTO: 45.2 % (ref 34–46.6)
HGB BLD-MCNC: 14.2 G/DL (ref 12–15.9)
MAGNESIUM SERPL-MCNC: 2.1 MG/DL (ref 1.6–2.6)
MCH RBC QN AUTO: 27.3 PG (ref 26.6–33)
MCHC RBC AUTO-ENTMCNC: 31.4 G/DL (ref 31.5–35.7)
MCV RBC AUTO: 86.9 FL (ref 79–97)
PHOSPHATE SERPL-MCNC: 3 MG/DL (ref 2.5–4.5)
PLATELET # BLD AUTO: 330 10*3/MM3 (ref 140–450)
PMV BLD AUTO: 10.9 FL (ref 6–12)
POTASSIUM SERPL-SCNC: 3.7 MMOL/L (ref 3.5–5.2)
RBC # BLD AUTO: 5.2 10*6/MM3 (ref 3.77–5.28)
SODIUM SERPL-SCNC: 136 MMOL/L (ref 136–145)
WBC # BLD AUTO: 13.04 10*3/MM3 (ref 3.4–10.8)

## 2021-06-17 PROCEDURE — 84100 ASSAY OF PHOSPHORUS: CPT | Performed by: SURGERY

## 2021-06-17 PROCEDURE — 82962 GLUCOSE BLOOD TEST: CPT

## 2021-06-17 PROCEDURE — 85027 COMPLETE CBC AUTOMATED: CPT | Performed by: INTERNAL MEDICINE

## 2021-06-17 PROCEDURE — 99024 POSTOP FOLLOW-UP VISIT: CPT | Performed by: SURGERY

## 2021-06-17 PROCEDURE — 25010000002 ONDANSETRON PER 1 MG: Performed by: SURGERY

## 2021-06-17 PROCEDURE — 83735 ASSAY OF MAGNESIUM: CPT | Performed by: SURGERY

## 2021-06-17 PROCEDURE — 25010000002 HYDRALAZINE PER 20 MG: Performed by: INTERNAL MEDICINE

## 2021-06-17 PROCEDURE — 94799 UNLISTED PULMONARY SVC/PX: CPT

## 2021-06-17 PROCEDURE — 74019 RADEX ABDOMEN 2 VIEWS: CPT

## 2021-06-17 PROCEDURE — 25010000002 PROCHLORPERAZINE 10 MG/2ML SOLUTION: Performed by: NURSE PRACTITIONER

## 2021-06-17 PROCEDURE — 80048 BASIC METABOLIC PNL TOTAL CA: CPT | Performed by: SURGERY

## 2021-06-17 PROCEDURE — 25010000002 ENOXAPARIN PER 10 MG: Performed by: INTERNAL MEDICINE

## 2021-06-17 PROCEDURE — 74019 RADEX ABDOMEN 2 VIEWS: CPT | Performed by: RADIOLOGY

## 2021-06-17 PROCEDURE — 25010000002 MORPHINE PER 10 MG: Performed by: SURGERY

## 2021-06-17 RX ORDER — HYDRALAZINE HYDROCHLORIDE 20 MG/ML
20 INJECTION INTRAMUSCULAR; INTRAVENOUS EVERY 4 HOURS PRN
Status: DISCONTINUED | OUTPATIENT
Start: 2021-06-17 | End: 2021-06-20 | Stop reason: HOSPADM

## 2021-06-17 RX ORDER — CLONIDINE 0.1 MG/24H
2 PATCH, EXTENDED RELEASE TRANSDERMAL WEEKLY
Status: DISCONTINUED | OUTPATIENT
Start: 2021-06-21 | End: 2021-06-20 | Stop reason: HOSPADM

## 2021-06-17 RX ADMIN — SODIUM CHLORIDE, POTASSIUM CHLORIDE, SODIUM LACTATE AND CALCIUM CHLORIDE 160 ML/HR: 600; 310; 30; 20 INJECTION, SOLUTION INTRAVENOUS at 09:41

## 2021-06-17 RX ADMIN — ONDANSETRON 4 MG: 2 INJECTION INTRAMUSCULAR; INTRAVENOUS at 13:10

## 2021-06-17 RX ADMIN — MORPHINE SULFATE 2 MG: 2 INJECTION, SOLUTION INTRAMUSCULAR; INTRAVENOUS at 13:11

## 2021-06-17 RX ADMIN — MORPHINE SULFATE 2 MG: 2 INJECTION, SOLUTION INTRAMUSCULAR; INTRAVENOUS at 03:28

## 2021-06-17 RX ADMIN — PROCHLORPERAZINE EDISYLATE 5 MG: 5 INJECTION INTRAMUSCULAR; INTRAVENOUS at 05:59

## 2021-06-17 RX ADMIN — FAMOTIDINE 20 MG: 10 INJECTION INTRAVENOUS at 20:37

## 2021-06-17 RX ADMIN — MORPHINE SULFATE 2 MG: 2 INJECTION, SOLUTION INTRAMUSCULAR; INTRAVENOUS at 09:35

## 2021-06-17 RX ADMIN — HYDRALAZINE HYDROCHLORIDE 10 MG: 20 INJECTION INTRAMUSCULAR; INTRAVENOUS at 18:03

## 2021-06-17 RX ADMIN — ENOXAPARIN SODIUM 60 MG: 60 INJECTION SUBCUTANEOUS at 10:40

## 2021-06-17 RX ADMIN — SODIUM CHLORIDE, POTASSIUM CHLORIDE, SODIUM LACTATE AND CALCIUM CHLORIDE 160 ML/HR: 600; 310; 30; 20 INJECTION, SOLUTION INTRAVENOUS at 23:10

## 2021-06-17 RX ADMIN — FAMOTIDINE 20 MG: 10 INJECTION INTRAVENOUS at 09:35

## 2021-06-17 RX ADMIN — SODIUM CHLORIDE, POTASSIUM CHLORIDE, SODIUM LACTATE AND CALCIUM CHLORIDE 160 ML/HR: 600; 310; 30; 20 INJECTION, SOLUTION INTRAVENOUS at 01:37

## 2021-06-17 RX ADMIN — MORPHINE SULFATE 2 MG: 2 INJECTION, SOLUTION INTRAMUSCULAR; INTRAVENOUS at 16:31

## 2021-06-17 RX ADMIN — SODIUM CHLORIDE, PRESERVATIVE FREE 10 ML: 5 INJECTION INTRAVENOUS at 09:36

## 2021-06-17 RX ADMIN — MORPHINE SULFATE 2 MG: 2 INJECTION, SOLUTION INTRAMUSCULAR; INTRAVENOUS at 00:41

## 2021-06-17 RX ADMIN — HYDRALAZINE HYDROCHLORIDE 10 MG: 20 INJECTION INTRAMUSCULAR; INTRAVENOUS at 03:44

## 2021-06-17 RX ADMIN — ARFORMOTEROL TARTRATE 15 MCG: 15 SOLUTION RESPIRATORY (INHALATION) at 20:58

## 2021-06-17 RX ADMIN — ENOXAPARIN SODIUM 60 MG: 60 INJECTION SUBCUTANEOUS at 20:37

## 2021-06-17 RX ADMIN — ONDANSETRON 4 MG: 2 INJECTION INTRAMUSCULAR; INTRAVENOUS at 20:38

## 2021-06-17 RX ADMIN — SODIUM CHLORIDE, POTASSIUM CHLORIDE, SODIUM LACTATE AND CALCIUM CHLORIDE 160 ML/HR: 600; 310; 30; 20 INJECTION, SOLUTION INTRAVENOUS at 16:31

## 2021-06-17 RX ADMIN — HYDRALAZINE HYDROCHLORIDE 10 MG: 20 INJECTION INTRAMUSCULAR; INTRAVENOUS at 09:34

## 2021-06-17 RX ADMIN — BUDESONIDE 0.5 MG: 0.5 INHALANT ORAL at 20:58

## 2021-06-17 RX ADMIN — ONDANSETRON 4 MG: 2 INJECTION INTRAMUSCULAR; INTRAVENOUS at 00:41

## 2021-06-17 RX ADMIN — SODIUM CHLORIDE, PRESERVATIVE FREE 10 ML: 5 INJECTION INTRAVENOUS at 21:27

## 2021-06-17 RX ADMIN — MORPHINE SULFATE 2 MG: 2 INJECTION, SOLUTION INTRAMUSCULAR; INTRAVENOUS at 20:38

## 2021-06-17 RX ADMIN — FLUTICASONE PROPIONATE 1 SPRAY: 50 SPRAY, METERED NASAL at 09:34

## 2021-06-17 NOTE — PROGRESS NOTES
[facility]     Progress Note    Patient Name: Kristin Aldrich  : 1975  MRN: [id number]    Date of admission: 2021    Subjective   Subjective     Passed gas today.      Objective   Objective     Vitals:   Temp:  [98 °F (36.7 °C)-98.7 °F (37.1 °C)] 98.1 °F (36.7 °C)  Heart Rate:  [79-99] 89  Resp:  [16-21] 20  BP: (159-187)/(79-94) 180/82  Physical Exam    Constitutional: Awake, alert   Eyes: PERRLA    Neck: Supple, trachea midline   Respiratory: respirations even and unlabored   Cardiovascular: RRR   Gastrointestinal: Soft, appropriately tender,    Psychiatric: Appropriate affect, cooperative   Neurologic: Oriented x 3, speech clear     Result Review    Result Review:  I have personally reviewed the results from the time of this admission to 2021 22:33 EDT and agree with these findings:    [x]  Laboratory  []  Microbiology  []  Radiology  []  EKG/Telemetry   []  Cardiology/Vascular   []  Pathology  []  Old records  []  Other:      Assessment/Plan   Assessment / Plan     Diagnosis   S/p repair of ventral hernia  Postoperative ileus    Active Hospital Problems:  Active Hospital Problems    Diagnosis    • **Incarcerated hernia      Added automatically from request for surgery [id number]     • S/P repair of ventral hernia    • Type 2 diabetes mellitus (CMS/HCC)    • Body mass index (BMI) of 60.0-69.9 in adult (CMS/HCC)    • HTN (hypertension)    • COPD (chronic obstructive pulmonary disease) (CMS/HCC)        Plan:    Clamp NG tube  Clear liq diet  Ambulate more as able

## 2021-06-17 NOTE — PROGRESS NOTES
[facility]     Progress Note    Patient Name: Kristin Aldrich  : 1975  MRN: [id number]  Primary Care Physician:  Kaylin Gaines APRN  Date of admission: 2021      Subjective   Brief summary.  Follow-up on abdominal pain      HPI:  Patient still tired fatigue,  Continues to have abdominal pain  Continues to have nausea and vomiting.  Passing gas.  No BM yet.    Review of Systems   Fatigue.  No fever chills.,  Nausea.  Abdominal pain.      Objective     Vitals:   Temp:  [97.7 °F (36.5 °C)-98.4 °F (36.9 °C)] 98.1 °F (36.7 °C)  Heart Rate:  [] 88  Resp:  [16-20] 20  BP: (142-184)/(63-98) 175/90    Physical Exam    Middle-aged female morbidly obese.  Tired and fatigued.  Heart is regular lungs diminished breath sounds.  .  Abdomen morbidly obese and tender.  Bowel sounds present.  Extremities with edema.  Neuro awake alert and oriented    Result Review      Result Review:  I have personally reviewed the results from the time of this admission to 2021 18:23 EDT and agree with these findings:  [x]  Laboratory  []  Microbiology  []  Radiology  []  EKG/Telemetry   []  Cardiology/Vascular   []  Pathology  []  Old records  []  Other:           Assessment / Plan       Active Hospital Problems:  Active Hospital Problems    Diagnosis    • **Incarcerated hernia      Added automatically from request for surgery [id number]     • Postoperative ileus (CMS/[facility])    • S/P repair of ventral hernia    • Type 2 diabetes mellitus (CMS/HCC)    • Body mass index (BMI) of 60.0-69.9 in adult (CMS/HCC)    • HTN (hypertension)    • COPD (chronic obstructive pulmonary disease) (CMS/[facility])      Plan:   Continues to have abdominal pain and discomfort.  Postsurgical management by Dr. Cabrera.  Appreciate Dr. Cabrera's help.  Blood pressure slightly high.  Will increase clonidine patch.  Check labs.       DVT prophylaxis:  Medical and mechanical DVT prophylaxis orders are present.    CODE STATUS:   Level Of Support  Discussed With: Patient  Code Status: CPR  Medical Interventions (Level of Support Prior to Arrest): Full      Part of this note is an electronic transcription of spoken language to printed text. The electronic translation/transcription may permit erroneous, or at times, nonsensical words or phrases to be inadvertently transcribed; I have reviewed the note for such errors however some may still exist..      Electronically signed by Andrea Sawyer MD, 06/17/21, 6:20 PM EDT.

## 2021-06-17 NOTE — PROGRESS NOTES
[facility]     Progress Note    Patient Name: Kristin Aldrich  : 1975  MRN: [id number]  Primary Care Physician:  Kaylin Gaines APRN  Date of admission: 2021    Subjective   Subjective     Vomited twice around the NG tube.  Abdominal x-ray was done and showed adequate addition of the NG tube.  Tube was to continuous suction so I had a change to low intermittent suction.  Patient's only complaint is nausea.    Objective   Objective     Vitals:   Temp:  [97.7 °F (36.5 °C)-98.4 °F (36.9 °C)] 98.1 °F (36.7 °C)  Heart Rate:  [] 88  Resp:  [16-20] 20  BP: (142-184)/(63-98) 175/90  Physical Exam     Constitutional: alert, no acute distress  HENT:  NCAT, NG tube in place  Respiratory:  breathing not labored, respiratory effort appears normal  Abdomen:  Soft, nondistended, approp incis tenderness, obese  Neurologic:  no obvious motor or sensory deficits, cerebellar function without any obvious abnormalities, alert & oriented x 3, speech clear    []  Laboratory  []  Microbiology  []  Radiology  []  EKG/Telemetry   []  Cardiology/Vascular   []  Pathology  []  Old records      Assessment/Plan   Assessment / Plan     Assessment:   s/p open repair incarcerated ventral incisional hernia   Ileus vs. SBO  multiple pre-existing medical problems    Plan:    Continue NG tube  CT a/p with contrast - will order to be done tomorrow  Appreciate management of medical issues by Dr. Sawyer       Electronically signed by Vinay Cabrera MD, 21, 1:24 PM EDT.

## 2021-06-17 NOTE — PLAN OF CARE
Goal Outcome Evaluation:  Plan of Care Reviewed With: patient        Progress: no change PATIENT HAD X1 EMESIS 75CC  , MEDICATED X1 FOR B/P NG UNCLAMPED DUE TO NAUSEA.

## 2021-06-18 ENCOUNTER — APPOINTMENT (OUTPATIENT)
Dept: CT IMAGING | Facility: HOSPITAL | Age: 46
End: 2021-06-18

## 2021-06-18 PROBLEM — K56.7 POSTOPERATIVE ILEUS (HCC): Status: RESOLVED | Noted: 2021-06-17 | Resolved: 2021-06-18

## 2021-06-18 PROBLEM — K91.89 POSTOPERATIVE ILEUS (HCC): Status: RESOLVED | Noted: 2021-06-17 | Resolved: 2021-06-18

## 2021-06-18 LAB
ANION GAP SERPL CALCULATED.3IONS-SCNC: 11.6 MMOL/L (ref 5–15)
BUN SERPL-MCNC: 9 MG/DL (ref 6–20)
BUN/CREAT SERPL: 14.5 (ref 7–25)
CALCIUM SPEC-SCNC: 8.3 MG/DL (ref 8.6–10.5)
CHLORIDE SERPL-SCNC: 103 MMOL/L (ref 98–107)
CO2 SERPL-SCNC: 22.4 MMOL/L (ref 22–29)
CREAT SERPL-MCNC: 0.62 MG/DL (ref 0.57–1)
GFR SERPL CREATININE-BSD FRML MDRD: 105 ML/MIN/1.73
GLUCOSE BLDC GLUCOMTR-MCNC: 101 MG/DL (ref 70–130)
GLUCOSE BLDC GLUCOMTR-MCNC: 78 MG/DL (ref 70–130)
GLUCOSE BLDC GLUCOMTR-MCNC: 82 MG/DL (ref 70–130)
GLUCOSE BLDC GLUCOMTR-MCNC: 88 MG/DL (ref 70–130)
GLUCOSE SERPL-MCNC: 83 MG/DL (ref 65–99)
POTASSIUM SERPL-SCNC: 3.6 MMOL/L (ref 3.5–5.2)
SODIUM SERPL-SCNC: 137 MMOL/L (ref 136–145)
T4 FREE SERPL-MCNC: 0.74 NG/DL (ref 0.93–1.7)
TSH SERPL DL<=0.05 MIU/L-ACNC: 45.89 UIU/ML (ref 0.27–4.2)

## 2021-06-18 PROCEDURE — 99024 POSTOP FOLLOW-UP VISIT: CPT | Performed by: SURGERY

## 2021-06-18 PROCEDURE — 94799 UNLISTED PULMONARY SVC/PX: CPT

## 2021-06-18 PROCEDURE — 25010000002 HYDRALAZINE PER 20 MG: Performed by: INTERNAL MEDICINE

## 2021-06-18 PROCEDURE — 25010000002 ENOXAPARIN PER 10 MG: Performed by: INTERNAL MEDICINE

## 2021-06-18 PROCEDURE — 25010000002 ONDANSETRON PER 1 MG: Performed by: SURGERY

## 2021-06-18 PROCEDURE — 84443 ASSAY THYROID STIM HORMONE: CPT | Performed by: INTERNAL MEDICINE

## 2021-06-18 PROCEDURE — 82962 GLUCOSE BLOOD TEST: CPT

## 2021-06-18 PROCEDURE — 25010000002 MORPHINE PER 10 MG: Performed by: SURGERY

## 2021-06-18 PROCEDURE — 0 IOHEXOL 12 MG/ML SOLUTION: Performed by: SURGERY

## 2021-06-18 PROCEDURE — 74160 CT ABDOMEN W/CONTRAST: CPT

## 2021-06-18 PROCEDURE — 80048 BASIC METABOLIC PNL TOTAL CA: CPT | Performed by: INTERNAL MEDICINE

## 2021-06-18 PROCEDURE — 0 IOPAMIDOL PER 1 ML: Performed by: INTERNAL MEDICINE

## 2021-06-18 PROCEDURE — 84439 ASSAY OF FREE THYROXINE: CPT | Performed by: INTERNAL MEDICINE

## 2021-06-18 RX ORDER — LEVOTHYROXINE SODIUM ANHYDROUS 100 UG/5ML
150 INJECTION, POWDER, LYOPHILIZED, FOR SOLUTION INTRAVENOUS
Status: DISCONTINUED | OUTPATIENT
Start: 2021-06-18 | End: 2021-06-18

## 2021-06-18 RX ORDER — LEVOTHYROXINE SODIUM ANHYDROUS 200 UG/5ML
150 INJECTION, POWDER, LYOPHILIZED, FOR SOLUTION INTRAVENOUS 2 TIMES DAILY
Status: DISCONTINUED | OUTPATIENT
Start: 2021-06-18 | End: 2021-06-20 | Stop reason: HOSPADM

## 2021-06-18 RX ADMIN — MORPHINE SULFATE 2 MG: 2 INJECTION, SOLUTION INTRAMUSCULAR; INTRAVENOUS at 18:48

## 2021-06-18 RX ADMIN — IPRATROPIUM BROMIDE AND ALBUTEROL SULFATE 3 ML: .5; 2.5 SOLUTION RESPIRATORY (INHALATION) at 10:06

## 2021-06-18 RX ADMIN — MORPHINE SULFATE 2 MG: 2 INJECTION, SOLUTION INTRAMUSCULAR; INTRAVENOUS at 07:54

## 2021-06-18 RX ADMIN — ONDANSETRON 4 MG: 2 INJECTION INTRAMUSCULAR; INTRAVENOUS at 22:43

## 2021-06-18 RX ADMIN — IOPAMIDOL 100 ML: 755 INJECTION, SOLUTION INTRAVENOUS at 14:36

## 2021-06-18 RX ADMIN — HYDRALAZINE HYDROCHLORIDE 20 MG: 20 INJECTION INTRAMUSCULAR; INTRAVENOUS at 20:16

## 2021-06-18 RX ADMIN — MORPHINE SULFATE 2 MG: 2 INJECTION, SOLUTION INTRAMUSCULAR; INTRAVENOUS at 00:28

## 2021-06-18 RX ADMIN — MORPHINE SULFATE 2 MG: 2 INJECTION, SOLUTION INTRAMUSCULAR; INTRAVENOUS at 03:08

## 2021-06-18 RX ADMIN — BUDESONIDE 0.5 MG: 0.5 INHALANT ORAL at 10:07

## 2021-06-18 RX ADMIN — ENOXAPARIN SODIUM 60 MG: 60 INJECTION SUBCUTANEOUS at 20:16

## 2021-06-18 RX ADMIN — SODIUM CHLORIDE, POTASSIUM CHLORIDE, SODIUM LACTATE AND CALCIUM CHLORIDE 160 ML/HR: 600; 310; 30; 20 INJECTION, SOLUTION INTRAVENOUS at 19:15

## 2021-06-18 RX ADMIN — FAMOTIDINE 20 MG: 10 INJECTION INTRAVENOUS at 09:45

## 2021-06-18 RX ADMIN — HYDRALAZINE HYDROCHLORIDE 20 MG: 20 INJECTION INTRAMUSCULAR; INTRAVENOUS at 09:45

## 2021-06-18 RX ADMIN — SODIUM CHLORIDE, PRESERVATIVE FREE 10 ML: 5 INJECTION INTRAVENOUS at 20:17

## 2021-06-18 RX ADMIN — MORPHINE SULFATE 2 MG: 2 INJECTION, SOLUTION INTRAMUSCULAR; INTRAVENOUS at 22:43

## 2021-06-18 RX ADMIN — ONDANSETRON 4 MG: 2 INJECTION INTRAMUSCULAR; INTRAVENOUS at 02:29

## 2021-06-18 RX ADMIN — BUDESONIDE 0.5 MG: 0.5 INHALANT ORAL at 20:32

## 2021-06-18 RX ADMIN — LEVOTHYROXINE SODIUM ANHYDROUS 150 MCG: 200 INJECTION, POWDER, LYOPHILIZED, FOR SOLUTION INTRAVENOUS at 16:29

## 2021-06-18 RX ADMIN — ARFORMOTEROL TARTRATE 15 MCG: 15 SOLUTION RESPIRATORY (INHALATION) at 10:07

## 2021-06-18 RX ADMIN — ONDANSETRON 4 MG: 2 INJECTION INTRAMUSCULAR; INTRAVENOUS at 14:58

## 2021-06-18 RX ADMIN — IOHEXOL 500 ML: 12 SOLUTION ORAL at 09:56

## 2021-06-18 RX ADMIN — ARFORMOTEROL TARTRATE 15 MCG: 15 SOLUTION RESPIRATORY (INHALATION) at 20:32

## 2021-06-18 RX ADMIN — FAMOTIDINE 20 MG: 10 INJECTION INTRAVENOUS at 20:16

## 2021-06-18 RX ADMIN — ONDANSETRON 4 MG: 2 INJECTION INTRAMUSCULAR; INTRAVENOUS at 07:54

## 2021-06-18 RX ADMIN — MORPHINE SULFATE 2 MG: 2 INJECTION, SOLUTION INTRAMUSCULAR; INTRAVENOUS at 09:56

## 2021-06-18 RX ADMIN — MORPHINE SULFATE 2 MG: 2 INJECTION, SOLUTION INTRAMUSCULAR; INTRAVENOUS at 16:28

## 2021-06-18 RX ADMIN — SODIUM CHLORIDE, POTASSIUM CHLORIDE, SODIUM LACTATE AND CALCIUM CHLORIDE 160 ML/HR: 600; 310; 30; 20 INJECTION, SOLUTION INTRAVENOUS at 05:37

## 2021-06-18 RX ADMIN — SODIUM CHLORIDE, PRESERVATIVE FREE 10 ML: 5 INJECTION INTRAVENOUS at 09:00

## 2021-06-18 RX ADMIN — MORPHINE SULFATE 2 MG: 2 INJECTION, SOLUTION INTRAMUSCULAR; INTRAVENOUS at 12:39

## 2021-06-18 NOTE — DISCHARGE SUMMARY
[facility]         DISCHARGE SUMMARY    Patient Name: Kristin Aldrich  : 1975  MRN: [id number]    Date of Admission: 2021  Date of Discharge:  21 (awaiting bed availability)  Primary Care Physician: Kaylin Gaines APRN    Consults     Date and Time Order Name Status Description    2021  2:44 PM Inpatient General Surgery Consult      2021  1:41 PM IP General Consult (Use specialty-specific consult if known) Completed     2021 12:18 PM Surgery (on-call MD unless specified) Completed           Surgical Procedures Since Admission:  Procedure(s):  VENTRAL/INCISIONAL HERNIA REPAIR  Surgeon:  Vinay Cabrera MD  Status:  6 Days Post-Op  -------------------      Presenting Problem:   Incarcerated hernia [K46.0]  Incarcerated ventral hernia [K43.6]    Active and Resolved Hospital Problems:  Active Hospital Problems    Diagnosis POA   • **Incarcerated hernia [K46.0] Yes   • S/P repair of ventral hernia [Z98.890, Z87.19] Not Applicable   • Type 2 diabetes mellitus (CMS/HCC) [E11.9] Yes   • Body mass index (BMI) of 60.0-69.9 in adult (CMS/[facility]) [Z68.44] Not Applicable   • HTN (hypertension) [I10] Yes   • COPD (chronic obstructive pulmonary disease) (CMS/HCC) [J44.9] Yes   • Hypothyroid [E03.9] Yes      Resolved Hospital Problems    Diagnosis POA   • Postoperative ileus (CMS/HCC) [K91.89, K56.7] No         Hospital Course     Hospital Course:  Kristin Aldrich is a 45 y.o. female admitted to the hospital on 2021 after she presented to the emergency room with abdominal pain and CT scan was done and showed a ventral hernia containing transverse colon.  Her clinical situation was consistent with incarcerated ventral hernia.  Surgical history includes laparoscopic gastric sleeve.  She was taken to the operating room urgently.  Herniated transverse colon was reduced.  There was several small fascial defects and all visualized colon and small bowel was without any evidence  of ischemia.  The hernia was repaired without mesh given the patient's morbid obesity with a plan to temporize the situation and hopefully have the patient lose a significant amount of weight and then undergo a definitive type of hernia repair at a later time.  Hernia was repaired with interrupted Ethibond sutures.  Postoperatively the patient had vomiting and abdominal pain that was beyond what would be expected in normal postoperative recovery.  Ultimately this led to her being evaluated with a CT scan of the abdomen today and unfortunately it shows herniation of small bowel, likely at the area where the hernia was repaired when she came to the emergency room.  Given the patient's morbid obesity, I decided to contact the [facility].  I spoke to Dr. Gabriel and explained the situation with him and ultimately he accepted transfer for the patient for further surgical management.  On the day the transfer was accepted, there were no signs of symptoms concerning for intestinal ischemia but the hernia is incarcerated and the situation is not going to improve without the patient undergoing another surgery.  Transfer is accepted and patient will be transfer as soon as a bed is available.      Day of Discharge     Vital Signs:  Temp:  [97.9 °F (36.6 °C)-99.3 °F (37.4 °C)] 98.7 °F (37.1 °C)  Heart Rate:  [] 105  Resp:  [15-22] 20  BP: (143-186)/(66-89) 172/75  Output by Drain (mL) 06/17/21 0701 - 06/17/21 1900 06/17/21 1901 - 06/18/21 0700 06/18/21 0701 - 06/18/21 1739 Range Total   NG/OG Tube Nasogastric 16 Fr Right nostril  1300  1300       Pertinent  and/or Most Recent Results     LAB RESULTS:      Lab 06/17/21  1855 06/15/21  0332 06/14/21  1358 06/14/21  0610 06/12/21  0810   WBC 13.04* 13.90*  --  15.19* 7.96   HEMOGLOBIN 14.2 14.0  --  15.2 15.7   HEMATOCRIT 45.2 43.6  --  48.0* 48.8*   PLATELETS 330 296  --  304 212   NEUTROS ABS  --  10.95*  --  13.23* 5.11   IMMATURE GRANS (ABS)  --  0.07*  --  0.07*  0.01   LYMPHS ABS  --  1.65  --  0.99 2.39   MONOS ABS  --  1.17*  --  0.86 0.42   EOS ABS  --  0.00  --  0.00 0.00   MCV 86.9 84.2  --  85.4 83.8   PROCALCITONIN  --   --  0.16  --   --    LACTATE  --  1.0  --   --   --          Lab 06/18/21  1610 06/18/21  0526 06/17/21  0424 06/16/21  0439 06/15/21  0332 06/14/21  0610   SODIUM  --  137 136 141 138 139   POTASSIUM  --  3.6 3.7 3.9 3.8 4.1   CHLORIDE  --  103 100 107 100 99   CO2  --  22.4 24.4 24.9 27.6 29.0   ANION GAP  --  11.6 11.6 9.1 10.4 11.0   BUN  --  9 9 12 12 9   CREATININE  --  0.62 0.61 0.71 0.81 0.86   GLUCOSE  --  83 98 94 121* 145*   CALCIUM  --  8.3* 8.3* 8.5* 8.6 8.9   MAGNESIUM  --   --  2.1 2.3 2.5 2.6   PHOSPHORUS  --   --  3.0 2.8 2.1* 2.5   TSH 45.890*  --   --   --   --   --          Lab 06/15/21  0332 06/12/21  0810   TOTAL PROTEIN 6.6 8.4   ALBUMIN 3.40* 4.70   GLOBULIN 3.2 3.7   ALT (SGPT) 16 22   AST (SGOT) 11 17   BILIRUBIN 0.5 0.5   ALK PHOS 71 85   LIPASE  --  38               Lab 06/12/21  0907   CARBOXYHEMOGLOBIN 2.1*     Brief Urine Lab Results  (Last result in the past 365 days)      Color   Clarity   Blood   Leuk Est   Nitrite   Protein   CREAT   Urine HCG        06/12/21 0901 Yellow Clear Negative Negative Negative Negative             Microbiology Results (last 10 days)     ** No results found for the last 240 hours. **         Imaging Results (Last 72 Hours)     Procedure Component Value Units Date/Time    CT Abdomen With Contrast [[id number]] Collected: 06/18/21 1456     Updated: 06/18/21 1506    Narrative:      PROCEDURE: CT ABDOMEN W CONTRAST     COMPARISON: [facility], CT, CT ABDOMEN PELVIS WO CONTRAST, 6/12/2021, 11:02.     INDICATIONS: s/p open repair incar ventral hernia 6/12, n/v, evaluate for SBO vs. ileus     TECHNIQUE: After obtaining the patient's consent, CT images were created with non-ionic intravenous   contrast material.       PROTOCOL:   Standard imaging protocol performed      RADIATION:   DLP:  2257mGy*cm    Automated exposure control was utilized to minimize radiation dose.   CONTRAST: 100cc Isovue 370 I.V.     FINDINGS:   There bands of linear atelectasis or scarring within the bilateral lower lobes.  There is fatty   infiltration of the liver.  Patient is status post cholecystectomy.  Pancreas and spleen are within   normal limits.  Bilateral adrenal glands appear normal.  There is a new nasogastric tube in place   within the stomach.  Patient appears to be status post gastric sleeve procedure.  There are   fluid-filled distended loops of small bowel within the mid abdomen.  There is a large ventral   hernia which contains several loops of small bowel.  Bowel within the hernia sac appears to be   somewhat dilated as well.  Findings of dilated small bowel could be related to postoperative ileus   or potentially partial small bowel obstruction.  The entire hernia is not included within the field   of view.  There is no abdominal adenopathy or free intraperitoneal fluid.  There are surgical skin   staples overlying the midline.         CONCLUSION:   1. Fluid-filled distended loops of small bowel within the mid abdomen.  There is a large ventral   hernia which appears similar to the prior exam.  This hernia and its contents are incompletely   imaged.  The dilated loops of small bowel could be related to postoperative ileus or partial small   bowel obstruction.  Ventral hernia is also likely recurrent given the clinical history of recent   hernia repair.  2. Hepatic steatosis            MARCELO LOUIS MD         Electronically Signed and Approved By: MARCELO LOUIS MD on 6/18/2021 at 14:56                     XR Abdomen Flat & Upright [[id number]] Collected: 06/17/21 0841     Updated: 06/17/21 0851    Narrative:      PROCEDURE: XR ABDOMEN FLAT AND UPRIGHT     COMPARISON: [facility], CT, CT ABDOMEN PELVIS WO CONTRAST, 6/12/2021, 11:02.  [facility], CR, XR ABDOMEN FLAT AND UPRIGHT,  6/15/2021, 7:36.     INDICATIONS: ileus vs. sbo s/p surgery.  also, check for correct position of ng tube     FINDINGS:   There is a nasogastric tube the side ports in the mid stomach and the tip projects over the distal   stomach.  Previous cholecystectomy.  The exam is very limited by body habitus.  There is   redemonstration of dilated small bowel loops measuring up to 6 centimeters.  This appears to be   larger than prior exam previously measuring about 5 centimeters.  These bowel loops are in the left   abdomen.  Upright view is limited but there are fluid levels present within these bowel loops.  No   definite free air is seen.  Calcification projects inferior to the right 12th rib.  Surgical   staples are again seen.  Bone     CONCLUSION:   1. Nasogastric tube terminates in the distal stomach.  2. Redemonstration of dilated small bowel loops in the left abdomen measuring up to about 6   centimeters slightly larger than previous examination from 6/15/2021.  There are associated   air-fluid levels that are new compared to 6/15/2021.  Ileus and bowel obstruction in the   differential.            EMMANUEL QUINTANA MD         Electronically Signed and Approved By: EMMANUEL QUINTANA MD on 6/17/2021 at 8:41                             Discharge Details        Discharge Medications      Continue These Medications      Instructions Start Date   acetaminophen 500 MG tablet  Commonly known as: TYLENOL   500 mg, Oral, Every 6 Hours PRN      albuterol sulfate  (90 Base) MCG/ACT inhaler  Commonly known as: PROVENTIL HFA;VENTOLIN HFA;PROAIR HFA   2 puffs, Inhalation, Every 4 Hours PRN      amLODIPine 5 MG tablet  Commonly known as: NORVASC   5 mg, Oral, Daily      ARIPiprazole 5 MG tablet  Commonly known as: ABILIFY   5 mg, Oral, Daily      ARTIFICIAL TEAR OP   1 drop, Ophthalmic      atorvastatin 20 MG tablet  Commonly known as: LIPITOR   20 mg, Oral, Nightly      azelastine 0.05 % ophthalmic solution  Commonly  known as: OPTIVAR   1 drop, 2 Times Daily      azelastine 0.1 % nasal spray  Commonly known as: ASTELIN   2 sprays, Nasal, 2 Times Daily, Use in each nostril as directed       Botox 100 units reconstituted solution injection  Generic drug: onabotulinumtoxina   1 each, Intradermal, Every 3 Months      Breo Ellipta 200-25 MCG/INH inhaler  Generic drug: Fluticasone Furoate-Vilanterol   1 puff, Inhalation, Daily      busPIRone 15 MG tablet  Commonly known as: BUSPAR   15 mg, Oral, 3 Times Daily      cetirizine 10 MG tablet  Commonly known as: zyrTEC   10 mg, Oral, Daily      chlorhexidine 0.12 % solution  Commonly known as: PERIDEX   15 mL, Mouth/Throat, 2 Times Daily      [employer] Vitamin D3 25 MCG (1000 UT) chewable tablet  Generic drug: Cholecalciferol   2 tablets, Oral, Daily      Dexcom G6  device   2 times daily      diclofenac 1 % gel gel  Commonly known as: VOLTAREN   4 g, Topical, 4 Times Daily PRN      DULoxetine 30 MG capsule  Commonly known as: CYMBALTA   30 mg, Oral, 2 Times Daily      EpiPen 2-Logan 0.3 MG/0.3ML solution auto-injector injection  Generic drug: EPINEPHrine   No dose, route, or frequency recorded.      famotidine 40 MG tablet  Commonly known as: Pepcid   40 mg, Oral, 2 Times Daily      ferrous sulfate 325 (65 FE) MG tablet   325 mg, Oral, 3 Times Daily With Meals      fluticasone 50 MCG/ACT nasal spray  Commonly known as: FLONASE   1 spray, Nasal, Daily      FreeStyle Lite device   USE AS DIRECTED TO TEST BLOOD GLUCOSE      furosemide 40 MG tablet  Commonly known as: LASIX   40 mg, Oral, Daily      Glucose Management tablet   Oral      ipratropium-albuterol 0.5-2.5 mg/3 ml nebulizer  Commonly known as: DUO-NEB   3 mL, Inhalation, 4 Times Daily PRN      Linzess 145 MCG capsule capsule  Generic drug: linaclotide   1 capsule, Oral, Daily      liothyronine 25 MCG tablet  Commonly known as: CYTOMEL   25 mcg, Oral, 2 times daily      montelukast 10 MG tablet  Commonly known as: SINGULAIR   10 mg,  Oral, Nightly      NON FORMULARY   Vitamin patch       nystatin 851190 UNIT/GM powder  Generic drug: nystatin   Topical, 4 Times Daily      oxybutynin XL 5 MG 24 hr tablet  Commonly known as: DITROPAN-XL   5 mg, Oral, Daily      Ozempic (0.25 or 0.5 MG/DOSE) 2 MG/1.5ML solution pen-injector  Generic drug: Semaglutide(0.25 or 0.5MG/DOS)   0.5 mg, Subcutaneous, Weekly, 0.25X 4 WEEKS AND 0.5X 4 WEEKS      potassium chloride 10 MEQ CR capsule  Commonly known as: MICRO-K   2 capsules, Oral, Daily      rizatriptan 10 MG tablet  Commonly known as: MAXALT   1 tablet, Oral, As Needed      TIROSINT-SOL PO   3 mL, Oral, Daily - RT, Patient states 200 mcg per ml, patient takes 3 ml.             Allergies   Allergen Reactions   • Bee Venom Swelling     At  site of sting   • Wasp Venom Swelling     At sting site   • Ibuprofen Other (See Comments)     Was told not to take   • Nsaids Other (See Comments)     Pt only has one kidney, was told not to take   • Other Other (See Comments)     All pepper, irritation of throat         Discharge Disposition:  Transfer to Another Facility ([facility])  Accepting Physician:  Dr. Gabriel

## 2021-06-18 NOTE — PLAN OF CARE
Problem: Adult Inpatient Plan of Care  Goal: Plan of Care Review  Outcome: Ongoing, Progressing  Goal: Patient-Specific Goal (Individualized)  Outcome: Ongoing, Progressing  Goal: Absence of Hospital-Acquired Illness or Injury  Outcome: Ongoing, Progressing  Intervention: Prevent Skin Injury  Recent Flowsheet Documentation  Taken 6/18/2021 1400 by Zaria Arizmendi RN  Skin Protection: tubing/devices free from skin contact  Goal: Optimal Comfort and Wellbeing  Outcome: Ongoing, Progressing  Intervention: Provide Person-Centered Care  Recent Flowsheet Documentation  Taken 6/18/2021 1400 by Zaria Arizmendi RN  Trust Relationship/Rapport: care explained  Goal: Readiness for Transition of Care  Outcome: Ongoing, Progressing     Problem: Fall Injury Risk  Goal: Absence of Fall and Fall-Related Injury  Outcome: Ongoing, Progressing     Problem: Obstructive Sleep Apnea Risk or Actual (Comorbidity Management)  Goal: Unobstructed Breathing During Sleep  Outcome: Ongoing, Progressing   Goal Outcome Evaluation:      Patient went to CT today. Patient has required frequent pain medication and nausea medication, see emar for administrations. 16 Fr Mosquera placed per MD order. Patient awaiting for transfer to .

## 2021-06-18 NOTE — PROGRESS NOTES
[facility]     Progress Note    Patient Name: Kristin Aldrich  : 1975  MRN: [id number]  Primary Care Physician:  Kaylin Gaines APRN  Date of admission: 2021      Subjective   Brief summary.  Patient admitted with incarcerated hernia post surgery      HPI:  Continue to have nausea vomiting abdominal pain.  Patient has NG tube.  Not moving much at all.  No BM.  Blood pressure improved    Review of Systems   No fever chills.  Nausea and vomiting.  Abdominal discomfort.  Not passing any gas or BM.        Objective     Vitals:   Temp:  [97.9 °F (36.6 °C)-99.3 °F (37.4 °C)] 98.1 °F (36.7 °C)  Heart Rate:  [] 102  Resp:  [15-22] 16  BP: (143-186)/(66-90) 143/66    Physical Exam    Middle-aged female morbidly obese.  Tired and fatigued.  Heart is regular lungs diminished breath sounds.  Abdomen morbidly obese and tender.  Surgical site healing well  Bowel sounds present.  Extremities with edema.  Neuro awake alert and oriented       Result Review      Result Review:  I have personally reviewed the results from the time of this admission to 2021 13:27 EDT and agree with these findings:  [x]  Laboratory  []  Microbiology  []  Radiology  []  EKG/Telemetry   []  Cardiology/Vascular   []  Pathology  []  Old records  []  Other:           Assessment / Plan       Active Hospital Problems:  Principal Problem:    Incarcerated hernia  Active Problems:    HTN (hypertension)    COPD (chronic obstructive pulmonary disease) (CMS/[facility])    Hypothyroid    Body mass index (BMI) of 60.0-69.9 in adult (CMS/[facility])    Type 2 diabetes mellitus (CMS/HCC)    S/P repair of ventral hernia    Postoperative ileus (CMS/HCC)    Plan:   Continues to have problem.  Not having success with abdominal symptoms.  Patient going for CT.  Discussed with surgeon.  N.p.o. status being maintained.  We will start IV Synthroid.  Also check thyroid level.  May need to start TPN if continues to remain n.p.o..  Blood pressure  improving.  Check labs.    Addendum to the existing note..    Patient CT scan has shown recurrent hernia and incarcerated.  Surgeon Dr. Harmon called me.  Notified that patient had morbid obesity and difficult to fix her problem.  Further patient needs to be transferred to [place] for special surgery.  Surgical team has accepted patient.  Possible transfer tomorrow.  Dr. Cabrera was kind enough to work on transfer and has informed me that he will take over the transfer  orders tomorrow.         DVT prophylaxis:  Medical and mechanical DVT prophylaxis orders are present.    CODE STATUS:   Level Of Support Discussed With: Patient  Code Status: CPR  Medical Interventions (Level of Support Prior to Arrest): Full      Part of this note is an electronic transcription of spoken language to printed text. The electronic translation/transcription may permit erroneous, or at times, nonsensical words or phrases to be inadvertently transcribed; I have reviewed the note for such errors however some may still exist..      Electronically signed by Andrea Sawyer MD, 06/18/21, 1:21 PM EDT.

## 2021-06-18 NOTE — PLAN OF CARE
Goal Outcome Evaluation: PATIENT REQUESTED PAIN MEDICATIONS FREQUENTLY, ALONG WITH NAUSEA MEDICATION, TREATED PER MAR. PATIENT HAD DIFFICULTY RESTING DUE TO DISCOMFORT, BUT AFTER INTERVENTION PATIENT FINALLY DID GET SOME REST. PATIENT AMBULATED WELL TO BEDSIDE COMMODE. YVONNE RN

## 2021-06-18 NOTE — PROGRESS NOTES
[facility]     Progress Note    Patient Name: Kristin Aldrich  : 1975  MRN: [id number]  Primary Care Physician:  Kaylin Gaines APRN  Date of admission: 2021    Subjective   Subjective   Passing gas.  Still feels nauseated.  Overall says abd pain improved.  Has been OOB very few times since surgery      Objective   Objective     Vitals:   Temp:  [97.9 °F (36.6 °C)-99.3 °F (37.4 °C)] 99.1 °F (37.3 °C)  Heart Rate:  [] 95  Resp:  [15-22] 15  BP: (155-186)/(71-90) 172/71  Physical Exam     Constitutional: alert, no acute distress  Respiratory:  breathing not labored, respiratory effort appears normal  Cardiovascular:  heart regular rate and rhythm  Abdomen:  soft, obese, nondistended, approp incis tenderness  Neurologic:  no obvious motor or sensory deficits, alert & oriented x 3, speech clear    [x]  Laboratory  []  Microbiology  []  Radiology  []  EKG/Telemetry   []  Cardiology/Vascular   []  Pathology  []  Old records      Assessment/Plan   Assessment / Plan     Assessment:   s/p open repair incarcerated ventral incisional hernia   Ileus vs. SBO  multiple pre-existing medical problems    Plan:    Continue NG tube  CT a/p with contrast today  Appreciate management of medical issues by Dr. Sawyer     Electronically signed by Vinay Cabrera MD, 21, 11:00 AM EDT.      Addendum:  CT abdomen was done and unfortunately shows herniation of small bowel in the small bowel is dilated.  Given the patient's significant obesity, I am unable to determine if the hernia is reducible and also my attempt at trying to reduce the hernia is limited secondary patient having significant pain when applying deep pressure on her abdominal wall.  Given the patient's morbid obesity (BMI ~67), I decided to contact the [facility] and I spoke to Dr. Gabriel on the surgery service and he agreed to accept transfer of the patient.  Patient does not exhibit any signs at this time concerning for  intestinal ischemia nor were there any imaging findings concerning for intestinal ischemia.  Transfer will take place as soon as a bed is available.

## 2021-06-18 NOTE — CONSULTS
Nutrition Services    Patient Name: Kristin Aldrich  YOB: 1975  MRN: [id number]  Admission date: 6/12/2021    CLINICAL NUTRITION ASSESSMENT      Reason for Assessment  NPO/Clear liquid     H&P:    Past Medical History:   Diagnosis Date   • Abnormal exam of both ears     STATES EARS DON'T DRAIN ,  TOO MUCH EAR WAX   • ADHD    • Anxiety and depression    • Asthma    • Blind     blind in right eye   • Carpal tunnel syndrome     LT   • CFS (chronic fatigue syndrome)    • Chronic bronchitis (CMS/[facility])    • COPD (chronic obstructive pulmonary disease) (CMS/[facility])    • Diabetes mellitus (CMS/[facility])     recently diagnosed, insulin resistant   • Edema     LEGS AND FEET   • GERD (gastroesophageal reflux disease)    • Hashimoto's thyroiditis    • Head injury    • Heart burn    • Hiatal hernia    • History of Clostridium difficile colitis 2017   • History of concussion 10/19/2014     WITH HEAD INJURY   • History of MRSA infection 2017    IN [facility]    • HTN (hypertension)    • Hyperlipidemia    • Hypertriglyceridemia    • Hypoglycemia    • IBS (irritable bowel syndrome)    • IgA deficiency (CMS/[facility])    • Incontinence of urine    • Insomnia    • Insulin resistance    • Joint pain    • Kidney disease     ONLY HAS ONE KIDNEY RIGHT   • Laryngopharyngeal reflux (LPR)    • Lymphedema     LEGS AND FEET   • Migraine    • OA (osteoarthritis)     WEAK ANKLES   • OCD (obsessive compulsive disorder)    • CHONG (obstructive sleep apnea)     has bi-pap   • Paradoxical vocal cord motion disorder    • PCOS (polycystic ovarian syndrome)    • Photophobia of both eyes    • Polydipsia    • Polyuria    • PONV (postoperative nausea and vomiting)    • Prediabetes    • PTSD (post-traumatic stress disorder)    • Restless legs syndrome (RLS)    • S/P repair of ventral hernia 6/14/2021   • Seasonal allergies    • Umbilical hernia    • Vitiligo     HANDS ,  LEGS, GROIN AREA   • Water retention         Current Problems:   Active  "Hospital Problems    Diagnosis    • **Incarcerated hernia      Added automatically from request for surgery [id number]     • Postoperative ileus (CMS/[facility])    • S/P repair of ventral hernia    • Type 2 diabetes mellitus (CMS/[facility])    • Body mass index (BMI) of 60.0-69.9 in adult (CMS/[facility])    • HTN (hypertension)    • COPD (chronic obstructive pulmonary disease) (CMS/[facility])         Nutrition/Diet History         Narrative     Patient S/P incarcerated ventral hernia repair.  Post-op ileus vs. SBO.  NG to intermittent suction.  Patient vomiting per RN documentation 6/17/21.  NPO/clear liquids with inadequate energy intake x 6 days.     Factors Affecting   Nutritional Intake Ileus vs. SBO     Anthropometrics        Current Height, Weight Height: 152.4 cm (60\")  Weight: (!) 154 kg (338 lb 10 oz)        Admit Height, Weight Flowsheet Rows      First Filed Value   Admission Height  152.4 cm (60\") Documented at 06/12/2021 0803   Admission Weight  (!) 154 kg (338 lb 10 oz) Documented at 06/12/2021 0803             Ideal Body Weight (IBW) Ideal Body Weight (IBW) (kg): 45.86   % Ideal Body Weight % Ideal Body Weight: 334.93    Weight Hx  Wt Readings from Last 30 Encounters:   06/12/21 0803 (!) 154 kg (338 lb 10 oz)   05/17/21 0000 (!) 155 kg (340 lb 15.9 oz)   05/12/21 0000 (!) 154 kg (338 lb 15.9 oz)   05/06/21 0000 (!) 155 kg (341 lb 15.9 oz)   04/13/21 0000 (!) 155 kg (341 lb 15.9 oz)   03/18/21 0000 (!) 155 kg (341 lb 15.9 oz)   03/02/21 0000 (!) 155 kg (341 lb 15.9 oz)   02/23/21 0000 (!) 153 kg (336 lb 15.9 oz)   02/09/21 0000 (!) 153 kg (336 lb 15.9 oz)   02/02/21 0000 (!) 160 kg (352 lb 15.9 oz)   01/26/21 0000 (!) 160 kg (351 lb 15.9 oz)   01/22/21 0000 (!) 160 kg (351 lb 15.9 oz)   01/05/21 0000 (!) 159 kg (350 lb 15.9 oz)   11/30/20 0000 (!) 159 kg (350 lb 15.9 oz)   09/24/20 0000 (!) 159 kg (349 lb 15.9 oz)   09/15/20 0000 (!) 159 kg (349 lb 15.9 oz)   09/14/20 1350 (!) 159 kg (351 lb 8 oz)   07/30/20 0000 (!) 158 kg (348 " "lb 15.9 oz)   06/23/20 0000 136 kg (300 lb)   06/02/20 0000 136 kg (300 lb)   05/12/20 0000 (!) 137 kg (301 lb)   05/07/20 0000 (!) 137 kg (301 lb)   04/28/20 0000 (!) 137 kg (301 lb)   04/21/20 0000 (!) 137 kg (301 lb)   04/16/20 0000 136 kg (300 lb)   04/15/20 0000 134 kg (295 lb)   04/14/20 0000 (!) 141 kg (310 lb)   04/10/20 0000 (!) 138 kg (305 lb)   03/13/20 1349 (!) 137 kg (303 lb)   03/05/20 0000 (!) 138 kg (305 lb)        BMI kg/m2 Body mass index is 66.13 kg/m².       Estimated/Assessed Needs       Energy Requirements    Height for Calculation  Height: 152.4 cm (60\")   Weight for Calculation IBW 45.5 kg   Method for Estimation  30 kcal/kg   EST Needs (kcal/day) 1365 kcal        Protein Requirements    Weight for Calculation IBW 45.5 kg   EST Protein Needs (g/kg) 1.2-1.3 g/kg   EST Daily Needs (g/day) 55-59 g pro        Fluid Requirements     Estimated Needs (mL/day) 1365 ml fluid      Labs/Medications         Pertinent Labs Reviewed.   Results from last 7 days   Lab Units 06/18/21  0526 06/17/21  0424 06/16/21  0439 06/15/21  0332 06/12/21  0810   SODIUM mmol/L 137 136 141 138 139   POTASSIUM mmol/L 3.6 3.7 3.9 3.8 3.9   CHLORIDE mmol/L 103 100 107 100 103   CO2 mmol/L 22.4 24.4 24.9 27.6 22.9   BUN mg/dL 9 9 12 12 8   CREATININE mg/dL 0.62 0.61 0.71 0.81 0.83   CALCIUM mg/dL 8.3* 8.3* 8.5* 8.6 9.3   BILIRUBIN mg/dL  --   --   --  0.5 0.5   ALK PHOS U/L  --   --   --  71 85   ALT (SGPT) U/L  --   --   --  16 22   AST (SGOT) U/L  --   --   --  11 17   GLUCOSE mg/dL 83 98 94 121* 90     Results from last 7 days   Lab Units 06/17/21  1855 06/17/21  0424 06/16/21  0439 06/15/21  0332   MAGNESIUM mg/dL  --  2.1 2.3 2.5   PHOSPHORUS mg/dL  --  3.0 2.8 2.1*   HEMOGLOBIN g/dL 14.2  --   --  14.0   HEMATOCRIT % 45.2  --   --  43.6     No results found for: COVID19  Lab Results   Component Value Date    HGBA1C 5.6 03/05/2020         Pertinent Medications Reviewed.         Current Nutrition Orders & Evaluation of " Intake       Oral Nutrition     Current PO Diet NPO Diet NPO Except: Ice chips   Supplement    PO Evaluation     % PO Intake      Clinical Course    Nutrition Course Details      Nutrition Diagnosis         Nutrition Dx Problem 1 Inadequate energy Intake related to Inability to consume sufficient energy as evidenced by NPO status, SBO vs ileus..       Nutrition Intervention        RD Action Recommend to start TPN.  Recommend D15%/AA5% @ 50 ml/hr with 250ml 20% Intralipid daily.     Monitor/Evaluation        Monitor Per protocol, I&O, PO intake, Pertinent labs, Weight, GI status     Electronically signed by:  Thalia Molina RD  06/18/21 10:39 EDT

## 2021-06-19 VITALS
HEART RATE: 88 BPM | RESPIRATION RATE: 18 BRPM | WEIGHT: 293 LBS | TEMPERATURE: 98.2 F | BODY MASS INDEX: 57.52 KG/M2 | OXYGEN SATURATION: 98 % | DIASTOLIC BLOOD PRESSURE: 74 MMHG | HEIGHT: 60 IN | SYSTOLIC BLOOD PRESSURE: 144 MMHG

## 2021-06-19 LAB
ANION GAP SERPL CALCULATED.3IONS-SCNC: 12.9 MMOL/L (ref 5–15)
BUN SERPL-MCNC: 7 MG/DL (ref 6–20)
BUN/CREAT SERPL: 11.5 (ref 7–25)
CALCIUM SPEC-SCNC: 8 MG/DL (ref 8.6–10.5)
CHLORIDE SERPL-SCNC: 102 MMOL/L (ref 98–107)
CO2 SERPL-SCNC: 22.1 MMOL/L (ref 22–29)
CREAT SERPL-MCNC: 0.61 MG/DL (ref 0.57–1)
DEPRECATED RDW RBC AUTO: 45.4 FL (ref 37–54)
ERYTHROCYTE [DISTWIDTH] IN BLOOD BY AUTOMATED COUNT: 14.7 % (ref 12.3–15.4)
GFR SERPL CREATININE-BSD FRML MDRD: 107 ML/MIN/1.73
GLUCOSE BLDC GLUCOMTR-MCNC: 74 MG/DL (ref 70–130)
GLUCOSE BLDC GLUCOMTR-MCNC: 81 MG/DL (ref 70–130)
GLUCOSE SERPL-MCNC: 87 MG/DL (ref 65–99)
HCT VFR BLD AUTO: 40.5 % (ref 34–46.6)
HGB BLD-MCNC: 12.9 G/DL (ref 12–15.9)
MAGNESIUM SERPL-MCNC: 2.2 MG/DL (ref 1.6–2.6)
MCH RBC QN AUTO: 27 PG (ref 26.6–33)
MCHC RBC AUTO-ENTMCNC: 31.9 G/DL (ref 31.5–35.7)
MCV RBC AUTO: 84.7 FL (ref 79–97)
PHOSPHATE SERPL-MCNC: 2.9 MG/DL (ref 2.5–4.5)
PLATELET # BLD AUTO: 305 10*3/MM3 (ref 140–450)
PMV BLD AUTO: 11.2 FL (ref 6–12)
POTASSIUM SERPL-SCNC: 3.8 MMOL/L (ref 3.5–5.2)
RBC # BLD AUTO: 4.78 10*6/MM3 (ref 3.77–5.28)
SODIUM SERPL-SCNC: 137 MMOL/L (ref 136–145)
WBC # BLD AUTO: 12.79 10*3/MM3 (ref 3.4–10.8)

## 2021-06-19 PROCEDURE — 25010000002 ENOXAPARIN PER 10 MG: Performed by: INTERNAL MEDICINE

## 2021-06-19 PROCEDURE — 85027 COMPLETE CBC AUTOMATED: CPT | Performed by: SURGERY

## 2021-06-19 PROCEDURE — 80048 BASIC METABOLIC PNL TOTAL CA: CPT | Performed by: SURGERY

## 2021-06-19 PROCEDURE — 82962 GLUCOSE BLOOD TEST: CPT

## 2021-06-19 PROCEDURE — 99024 POSTOP FOLLOW-UP VISIT: CPT | Performed by: SURGERY

## 2021-06-19 PROCEDURE — 94799 UNLISTED PULMONARY SVC/PX: CPT

## 2021-06-19 PROCEDURE — 25010000002 MORPHINE PER 10 MG: Performed by: SURGERY

## 2021-06-19 PROCEDURE — 84100 ASSAY OF PHOSPHORUS: CPT | Performed by: SURGERY

## 2021-06-19 PROCEDURE — 83735 ASSAY OF MAGNESIUM: CPT | Performed by: SURGERY

## 2021-06-19 PROCEDURE — 25010000002 ONDANSETRON PER 1 MG: Performed by: SURGERY

## 2021-06-19 RX ORDER — MORPHINE SULFATE 2 MG/ML
2 INJECTION, SOLUTION INTRAMUSCULAR; INTRAVENOUS
Status: DISCONTINUED | OUTPATIENT
Start: 2021-06-19 | End: 2021-06-20 | Stop reason: HOSPADM

## 2021-06-19 RX ADMIN — ENOXAPARIN SODIUM 60 MG: 60 INJECTION SUBCUTANEOUS at 22:09

## 2021-06-19 RX ADMIN — MORPHINE SULFATE 2 MG: 2 INJECTION, SOLUTION INTRAMUSCULAR; INTRAVENOUS at 14:04

## 2021-06-19 RX ADMIN — ENOXAPARIN SODIUM 60 MG: 60 INJECTION SUBCUTANEOUS at 08:25

## 2021-06-19 RX ADMIN — MORPHINE SULFATE 2 MG: 2 INJECTION, SOLUTION INTRAMUSCULAR; INTRAVENOUS at 20:09

## 2021-06-19 RX ADMIN — MORPHINE SULFATE 2 MG: 2 INJECTION, SOLUTION INTRAMUSCULAR; INTRAVENOUS at 01:44

## 2021-06-19 RX ADMIN — FAMOTIDINE 20 MG: 10 INJECTION INTRAVENOUS at 22:09

## 2021-06-19 RX ADMIN — SODIUM CHLORIDE, POTASSIUM CHLORIDE, SODIUM LACTATE AND CALCIUM CHLORIDE 160 ML/HR: 600; 310; 30; 20 INJECTION, SOLUTION INTRAVENOUS at 19:24

## 2021-06-19 RX ADMIN — MORPHINE SULFATE 2 MG: 2 INJECTION, SOLUTION INTRAMUSCULAR; INTRAVENOUS at 08:22

## 2021-06-19 RX ADMIN — ONDANSETRON 4 MG: 2 INJECTION INTRAMUSCULAR; INTRAVENOUS at 10:14

## 2021-06-19 RX ADMIN — SODIUM CHLORIDE, POTASSIUM CHLORIDE, SODIUM LACTATE AND CALCIUM CHLORIDE 160 ML/HR: 600; 310; 30; 20 INJECTION, SOLUTION INTRAVENOUS at 01:12

## 2021-06-19 RX ADMIN — MORPHINE SULFATE 2 MG: 2 INJECTION, SOLUTION INTRAMUSCULAR; INTRAVENOUS at 16:34

## 2021-06-19 RX ADMIN — SODIUM CHLORIDE, PRESERVATIVE FREE 10 ML: 5 INJECTION INTRAVENOUS at 22:20

## 2021-06-19 RX ADMIN — MORPHINE SULFATE 2 MG: 2 INJECTION, SOLUTION INTRAMUSCULAR; INTRAVENOUS at 10:41

## 2021-06-19 RX ADMIN — MORPHINE SULFATE 2 MG: 2 INJECTION, SOLUTION INTRAMUSCULAR; INTRAVENOUS at 06:43

## 2021-06-19 RX ADMIN — LEVOTHYROXINE SODIUM ANHYDROUS 150 MCG: 200 INJECTION, POWDER, LYOPHILIZED, FOR SOLUTION INTRAVENOUS at 22:10

## 2021-06-19 RX ADMIN — ONDANSETRON 4 MG: 2 INJECTION INTRAMUSCULAR; INTRAVENOUS at 17:01

## 2021-06-19 RX ADMIN — MORPHINE SULFATE 2 MG: 2 INJECTION, SOLUTION INTRAMUSCULAR; INTRAVENOUS at 04:05

## 2021-06-19 RX ADMIN — ONDANSETRON 4 MG: 2 INJECTION INTRAMUSCULAR; INTRAVENOUS at 04:06

## 2021-06-19 RX ADMIN — SODIUM CHLORIDE, POTASSIUM CHLORIDE, SODIUM LACTATE AND CALCIUM CHLORIDE 160 ML/HR: 600; 310; 30; 20 INJECTION, SOLUTION INTRAVENOUS at 06:48

## 2021-06-19 NOTE — DISCHARGE INSTR - ACTIVITY
Patient able to stand and transfer to bedside commode from bed as of 6/18.  Mosquera catheter in place as to not cause further strain.

## 2021-06-19 NOTE — PLAN OF CARE
Goal Outcome Evaluation:              Outcome Summary: Nausea and pain continue, getting more severe intermttently.  Mosquera catheter in place.  Pt transferring to  this evening.  Fely Mancini RN

## 2021-06-19 NOTE — DISCHARGE SUMMARY
[facility]         DISCHARGE SUMMARY    Patient Name: Kristin Aldrich  : 1975  MRN: [id number]    Date of Admission: 2021  Date of Discharge: 2021  Primary Care Physician: Kaylin Gaines APRN    Consults     Date and Time Order Name Status Description    2021  2:44 PM Inpatient General Surgery Consult      2021  1:41 PM IP General Consult (Use specialty-specific consult if known) Completed     2021 12:18 PM Surgery (on-call MD unless specified) Completed           Presenting Problem:   Incarcerated hernia [K46.0]  Incarcerated ventral hernia [K43.6]    Active and Resolved Hospital Problems:  Active Hospital Problems    Diagnosis POA   • **Incarcerated hernia [K46.0] Yes   • S/P repair of ventral hernia [Z98.890, Z87.19] Not Applicable   • Type 2 diabetes mellitus (CMS/HCC) [E11.9] Yes   • Body mass index (BMI) of 60.0-69.9 in adult (CMS/HCC) [Z68.44] Not Applicable   • HTN (hypertension) [I10] Yes   • COPD (chronic obstructive pulmonary disease) (CMS/HCC) [J44.9] Yes   • Hypothyroid [E03.9] Yes      Resolved Hospital Problems    Diagnosis POA   • Postoperative ileus (CMS/HCC) [K91.89, K56.7] No         Hospital Course     Hospital Course:  Kristin Aldrich is a 45 y.o. female was admitted with incarcerated hernia.She had surgery by Dr. Cabrera.  She continued to have nausea ,vomiting, abdominal pain.  She has an NG tube.  No bowel movements.  CT abdomen was done and shows herniation of small bowel with dilatation.  Because of her significant obesity unable to reduce the hernia.  Dr. Cabrera contacted [facility] and spoke to Dr. Gabriel who agreed to accept the patient in transfer.  She will be discharged today 2021 and transferred to [facility].        DISCHARGE Follow Up Recommendations for labs and diagnostics: Follow-up with PMD after discharge from [facility].      Day of Discharge     Vital Signs:  Temp:  [97.7 °F  (36.5 °C)-98.5 °F (36.9 °C)] 97.7 °F (36.5 °C)  Heart Rate:  [] 88  Resp:  [16-22] 16  BP: (152-178)/(79-96) 155/79  Physical Exam:  Constitutional: Awake, alert   Eyes: PERRLA, sclerae anicteric, no conjunctival injection   HENT: NCAT, mucous membranes moist.  NG tube in place   Neck: Supple, no thyromegaly, no lymphadenopathy, trachea midline   Respiratory: Clear to auscultation bilaterally, nonlabored respirations    Cardiovascular: RRR, no murmurs, rubs, or gallops, palpable pedal pulses bilaterally   Gastrointestinal: Positive bowel sounds, soft, some tenderness, nondistended   Musculoskeletal: No bilateral ankle edema, no clubbing or cyanosis to extremities   Psychiatric: Appropriate affect, cooperative   Neurologic: Oriented x 3, strength symmetric in all extremities, Cranial Nerves grossly intact to confrontation, speech clear   Skin: No rashes       Pertinent  and/or Most Recent Results     LAB RESULTS:      Lab 06/19/21  0402 06/17/21  1855 06/15/21  0332 06/14/21  1358 06/14/21  0610   WBC 12.79* 13.04* 13.90*  --  15.19*   HEMOGLOBIN 12.9 14.2 14.0  --  15.2   HEMATOCRIT 40.5 45.2 43.6  --  48.0*   PLATELETS 305 330 296  --  304   NEUTROS ABS  --   --  10.95*  --  13.23*   IMMATURE GRANS (ABS)  --   --  0.07*  --  0.07*   LYMPHS ABS  --   --  1.65  --  0.99   MONOS ABS  --   --  1.17*  --  0.86   EOS ABS  --   --  0.00  --  0.00   MCV 84.7 86.9 84.2  --  85.4   PROCALCITONIN  --   --   --  0.16  --    LACTATE  --   --  1.0  --   --          Lab 06/19/21  0402 06/18/21  1610 06/18/21  0526 06/17/21  0424 06/16/21  0439 06/15/21  0332 06/14/21  0610   SODIUM 137  --  137 136 141 138 139   POTASSIUM 3.8  --  3.6 3.7 3.9 3.8 4.1   CHLORIDE 102  --  103 100 107 100 99   CO2 22.1  --  22.4 24.4 24.9 27.6 29.0   ANION GAP 12.9  --  11.6 11.6 9.1 10.4 11.0   BUN 7  --  9 9 12 12 9   CREATININE 0.61  --  0.62 0.61 0.71 0.81 0.86   GLUCOSE 87  --  83 98 94 121* 145*   CALCIUM 8.0*  --  8.3* 8.3* 8.5* 8.6 8.9    MAGNESIUM 2.2  --   --  2.1 2.3 2.5 2.6   PHOSPHORUS 2.9  --   --  3.0 2.8 2.1* 2.5   TSH  --  45.890*  --   --   --   --   --          Lab 06/15/21  0332   TOTAL PROTEIN 6.6   ALBUMIN 3.40*   GLOBULIN 3.2   ALT (SGPT) 16   AST (SGOT) 11   BILIRUBIN 0.5   ALK PHOS 71                     Brief Urine Lab Results  (Last result in the past 365 days)      Color   Clarity   Blood   Leuk Est   Nitrite   Protein   CREAT   Urine HCG        06/12/21 0901 Yellow Clear Negative Negative Negative Negative             Microbiology Results (last 10 days)     ** No results found for the last 240 hours. **                         Labs Pending at Discharge: NICOLLE pending lab[unfilled]      Discharge Details        Discharge Medications      Continue These Medications      Instructions Start Date   acetaminophen 500 MG tablet  Commonly known as: TYLENOL   500 mg, Oral, Every 6 Hours PRN      albuterol sulfate  (90 Base) MCG/ACT inhaler  Commonly known as: PROVENTIL HFA;VENTOLIN HFA;PROAIR HFA   2 puffs, Inhalation, Every 4 Hours PRN      amLODIPine 5 MG tablet  Commonly known as: NORVASC   5 mg, Oral, Daily      ARIPiprazole 5 MG tablet  Commonly known as: ABILIFY   5 mg, Oral, Daily      ARTIFICIAL TEAR OP   1 drop, Ophthalmic      atorvastatin 20 MG tablet  Commonly known as: LIPITOR   20 mg, Oral, Nightly      azelastine 0.05 % ophthalmic solution  Commonly known as: OPTIVAR   1 drop, 2 Times Daily      azelastine 0.1 % nasal spray  Commonly known as: ASTELIN   2 sprays, Nasal, 2 Times Daily, Use in each nostril as directed       Botox 100 units reconstituted solution injection  Generic drug: onabotulinumtoxina   1 each, Intradermal, Every 3 Months      Breo Ellipta 200-25 MCG/INH inhaler  Generic drug: Fluticasone Furoate-Vilanterol   1 puff, Inhalation, Daily      busPIRone 15 MG tablet  Commonly known as: BUSPAR   15 mg, Oral, 3 Times Daily      cetirizine 10 MG tablet  Commonly known as: zyrTEC   10 mg, Oral, Daily       chlorhexidine 0.12 % solution  Commonly known as: PERIDEX   15 mL, Mouth/Throat, 2 Times Daily      [employer] Vitamin D3 25 MCG (1000 UT) chewable tablet  Generic drug: Cholecalciferol   2 tablets, Oral, Daily      Dexcom G6  device   2 times daily      diclofenac 1 % gel gel  Commonly known as: VOLTAREN   4 g, Topical, 4 Times Daily PRN      DULoxetine 30 MG capsule  Commonly known as: CYMBALTA   30 mg, Oral, 2 Times Daily      EpiPen 2-Logan 0.3 MG/0.3ML solution auto-injector injection  Generic drug: EPINEPHrine   No dose, route, or frequency recorded.      famotidine 40 MG tablet  Commonly known as: Pepcid   40 mg, Oral, 2 Times Daily      ferrous sulfate 325 (65 FE) MG tablet   325 mg, Oral, 3 Times Daily With Meals      fluticasone 50 MCG/ACT nasal spray  Commonly known as: FLONASE   1 spray, Nasal, Daily      FreeStyle Lite device   USE AS DIRECTED TO TEST BLOOD GLUCOSE      furosemide 40 MG tablet  Commonly known as: LASIX   40 mg, Oral, Daily      Glucose Management tablet   Oral      ipratropium-albuterol 0.5-2.5 mg/3 ml nebulizer  Commonly known as: DUO-NEB   3 mL, Inhalation, 4 Times Daily PRN      Linzess 145 MCG capsule capsule  Generic drug: linaclotide   1 capsule, Oral, Daily      liothyronine 25 MCG tablet  Commonly known as: CYTOMEL   25 mcg, Oral, 2 times daily      montelukast 10 MG tablet  Commonly known as: SINGULAIR   10 mg, Oral, Nightly      NON FORMULARY   Vitamin patch       nystatin 112015 UNIT/GM powder  Generic drug: nystatin   Topical, 4 Times Daily      oxybutynin XL 5 MG 24 hr tablet  Commonly known as: DITROPAN-XL   5 mg, Oral, Daily      Ozempic (0.25 or 0.5 MG/DOSE) 2 MG/1.5ML solution pen-injector  Generic drug: Semaglutide(0.25 or 0.5MG/DOS)   0.5 mg, Subcutaneous, Weekly, 0.25X 4 WEEKS AND 0.5X 4 WEEKS      potassium chloride 10 MEQ CR capsule  Commonly known as: MICRO-K   2 capsules, Oral, Daily      rizatriptan 10 MG tablet  Commonly known as: MAXALT   1 tablet, Oral, As  Needed      TIROSINT-SOL PO   3 mL, Oral, Daily - RT, Patient states 200 mcg per ml, patient takes 3 ml.             Allergies   Allergen Reactions   • Bee Venom Swelling     At  site of sting   • Wasp Venom Swelling     At sting site   • Ibuprofen Other (See Comments)     Was told not to take   • Nsaids Other (See Comments)     Pt only has one kidney, was told not to take   • Other Other (See Comments)     All pepper, irritation of throat         Discharge Disposition: Transfer to [facility]  Transfer to Another Facility    Diet: N.p.o. on NG suction  Hospital:  Diet Order   Procedures   • NPO Diet NPO Except: Ice chips         Discharge Activity: Transfer to [facility].  Bedrest        CODE STATUS:  Code Status and Medical Interventions:   Ordered at: 06/12/21 6972     Level Of Support Discussed With:    Patient     Code Status:    CPR     Medical Interventions (Level of Support Prior to Arrest):    Full         Future Appointments   Date Time Provider Department Center   7/29/2021  1:15 PM Elham Burton APRN [facility] JACI ETWN [facility]   8/13/2021  2:30 PM Moe Burris MD [facility] SLEEP CT [facility]   9/7/2021  3:15 PM [facility] ANTONIO 1  VITOR MAMMO [facility]   9/14/2021  1:30 PM Inga Moore APRN [facility] SRG None       [unfilled]      Electronically signed by Arnel Alarcon MD, 06/19/21, 3:47 PM EDT.

## 2021-06-19 NOTE — PROGRESS NOTES
[facility]     Progress Note    Patient Name: Kristin Aldrich  : 1975  MRN: [id number]  Primary Care Physician:  Kaylin Gaines APRN  Date of admission: 2021    Subjective   Subjective     No new events.  Pain about the same.  Awaiting transfer.    Objective   Objective     Medications:    Current Facility-Administered Medications:   •  acetaminophen (TYLENOL) tablet 650 mg, 650 mg, Oral, Q4H PRN, Vinay Cabrera MD  •  ALPRAZolam (XANAX) tablet 0.25 mg, 0.25 mg, Oral, Q6H PRN, Vinay Cabrera MD  •  aluminum-magnesium hydroxide-simethicone (MAALOX MAX) 400-400-40 MG/5ML suspension 15 mL, 15 mL, Oral, Q6H PRN, Vinay Cabrera MD  •  amLODIPine (NORVASC) tablet 5 mg, 5 mg, Oral, Daily, Vinay Cabrera MD, Stopped at 21  •  arformoterol (BROVANA) nebulizer solution 15 mcg, 15 mcg, Nebulization, BID - RT, Vinay Cabrera MD, 15 mcg at 21  •  ARIPiprazole (ABILIFY) tablet 5 mg, 5 mg, Oral, Daily, Vinay Cabrera MD, Stopped at 21  •  sennosides-docusate (PERICOLACE) 8.6-50 MG per tablet 2 tablet, 2 tablet, Oral, BID, Stopped at 21 **AND** polyethylene glycol (MIRALAX) packet 17 g, 17 g, Oral, Daily PRN **AND** bisacodyl (DULCOLAX) EC tablet 5 mg, 5 mg, Oral, Daily PRN **AND** bisacodyl (DULCOLAX) suppository 10 mg, 10 mg, Rectal, Daily PRN, Vinay Cabrera MD  •  budesonide (PULMICORT) nebulizer solution 0.5 mg, 0.5 mg, Nebulization, BID - RT, Vinay Cabrera MD, 0.5 mg at 21  •  busPIRone (BUSPAR) tablet 15 mg, 15 mg, Oral, TID, Vinay Cabrera MD, Stopped at 21 08  •  cetirizine (zyrTEC) tablet 10 mg, 10 mg, Oral, Daily, Vinay Cabrera MD, Stopped at 21  •  [START ON 2021] cloNIDine (CATAPRES-TTS) 0.1 MG/24HR patch 2 patch, 2 patch, Transdermal, Weekly, Andrea Sawyer MD  •  dextrose (GLUTOSE) oral gel 15 g, 15 g, Oral, Q15 Min PRN, Andrea Sawyer MD  •  dextrose 10 % infusion, 25 g, Intravenous, Q15 Min PRN,  Andrea Sawyer MD  •  DULoxetine (CYMBALTA) DR capsule 30 mg, 30 mg, Oral, BID, Vinay Cabrera MD, Stopped at 06/18/21 0813  •  enoxaparin (LOVENOX) syringe 60 mg, 60 mg, Subcutaneous, Q12H, Andrea Sawyer MD, 60 mg at 06/19/21 0825  •  famotidine (PEPCID) injection 20 mg, 20 mg, Intravenous, Q12H, Andrea Sawyer MD, 20 mg at 06/18/21 2016  •  fluticasone (FLONASE) 50 MCG/ACT nasal spray 1 spray, 1 spray, Nasal, Daily, Vinay Cabrera MD, Stopped at 06/18/21 1048  •  furosemide (LASIX) tablet 40 mg, 40 mg, Oral, Daily, Vinay Cabrera MD, Stopped at 06/18/21 1048  •  glucagon (human recombinant) (GLUCAGEN DIAGNOSTIC) injection 1 mg, 1 mg, Subcutaneous, Q15 Min PRN, Andrea Sawyer MD  •  hydrALAZINE (APRESOLINE) injection 20 mg, 20 mg, Intravenous, Q4H PRN, Florencio Peters MD, 20 mg at 06/18/21 2016  •  HYDROcodone-acetaminophen (NORCO) 5-325 MG per tablet 1 tablet, 1 tablet, Oral, Q4H PRN, Vinay Cabrera MD  •  ipratropium-albuterol (DUO-NEB) nebulizer solution 3 mL, 3 mL, Nebulization, Q4H PRN, Vinay Cabrera MD, 3 mL at 06/18/21 1006  •  lactated ringers infusion, 160 mL/hr, Intravenous, Continuous, Vinay Cabrera MD, Last Rate: 160 mL/hr at 06/19/21 0648, 160 mL/hr at 06/19/21 0648  •  Levothyroxine Sodium (SYNTHROID) 200  mcg, 150 mcg, Intravenous, BID, Andrea Sawyer MD, 150 mcg at 06/18/21 1629  •  montelukast (SINGULAIR) tablet 10 mg, 10 mg, Oral, Nightly, Vinay Cabrera MD, 10 mg at 06/13/21 2028  •  morphine injection 2 mg, 2 mg, Intravenous, Q1H PRN, Vinay Cabrera MD, 2 mg at 06/19/21 0822  •  [DISCONTINUED] HYDROmorphone (DILAUDID) injection 0.5 mg, 0.5 mg, Intravenous, Q2H PRN **AND** naloxone (NARCAN) injection 0.4 mg, 0.4 mg, Intravenous, Q5 Min PRN, Vinay Cabrera MD  •  ondansetron (ZOFRAN) injection 4 mg, 4 mg, Intravenous, Q6H PRN, Vinay Cabrera MD, 4 mg at 06/19/21 1014  •  oxyCODONE-acetaminophen (PERCOCET) 7.5-325 MG per tablet 1 tablet, 1 tablet, Oral, Q4H PRN, Vinay Cabrera,  MD  •  phenol (CHLORASEPTIC) 1.4 % liquid 2 spray, 2 spray, Mouth/Throat, Q2H PRN, Vinay Cabrera MD, 2 spray at 06/16/21 1200  •  potassium chloride (MICRO-K) CR capsule 20 mEq, 20 mEq, Oral, Daily, Vinay Cabrera MD, Stopped at 06/18/21 0818  •  prochlorperazine (COMPAZINE) injection 5 mg, 5 mg, Intravenous, Q6H PRN, Ann Block APRN, 5 mg at 06/17/21 0559  •  promethazine (PHENERGAN) suppository 12.5 mg, 12.5 mg, Rectal, Q4H PRN, Andrea Sawyer MD  •  sodium chloride 0.9 % flush 10 mL, 10 mL, Intravenous, PRN, Vinay Cabrera MD, 10 mL at 06/12/21 0905  •  sodium chloride 0.9 % flush 10 mL, 10 mL, Intravenous, Q12H, Vinay Cabrera MD, 10 mL at 06/18/21 2017  •  sodium chloride 0.9 % flush 10 mL, 10 mL, Intravenous, PRN, Vinay Cabrera MD  •  temazepam (RESTORIL) capsule 15 mg, 15 mg, Oral, Nightly PRN, Vinay Cabrera MD    Vitals:   Temp:  [98.1 °F (36.7 °C)-98.7 °F (37.1 °C)] 98.2 °F (36.8 °C)  Heart Rate:  [] 84  Resp:  [16-22] 18  BP: (143-178)/(66-96) 157/88  Physical Exam     Constitutional: alert, no acute distress  HENT:  NCAT, no visible deformities or lesions  Eyes:  sclerae clear, conjunctivae clear, EOMI  Neck:  normal appearance, no masses, trachea midline  Respiratory:  breathing not labored, respiratory effort appears normal  Cardiovascular:  heart regular rate and rhythm  Abdomen:  soft, nontender, nondistended    Skin and subcutaneous tissue:  no visible concerning rashes or lesions, no jaundice  Musculoskeletal: moving all extremities symmetrically and purposefully  Neurologic:  no obvious motor or sensory deficits, cerebellar function without any obvious abnormalities, alert & oriented x 3, speech clear  Psychiatric:  judgment and insight intact, mood normal, affect appropriate, cooperative    Result Review     LABS:  Lab Results (last 24 hours)     Procedure Component Value Units Date/Time    Basic Metabolic Panel [[id number]]  (Abnormal) Collected: 06/19/21 4499     Specimen: Blood Updated: 06/19/21 0513     Glucose 87 mg/dL      BUN 7 mg/dL      Creatinine 0.61 mg/dL      Sodium 137 mmol/L      Potassium 3.8 mmol/L      Chloride 102 mmol/L      CO2 22.1 mmol/L      Calcium 8.0 mg/dL      eGFR Non African Amer 107 mL/min/1.73      BUN/Creatinine Ratio 11.5     Anion Gap 12.9 mmol/L     Narrative:      GFR Normal >60  Chronic Kidney Disease <60  Kidney Failure <15      Phosphorus [[id number]]  (Normal) Collected: 06/19/21 0402    Specimen: Blood Updated: 06/19/21 0513     Phosphorus 2.9 mg/dL     Magnesium [[id number]]  (Normal) Collected: 06/19/21 0402    Specimen: Blood Updated: 06/19/21 0513     Magnesium 2.2 mg/dL     CBC (No Diff) [[id number]]  (Abnormal) Collected: 06/19/21 0402    Specimen: Blood Updated: 06/19/21 0452     WBC 12.79 10*3/mm3      RBC 4.78 10*6/mm3      Hemoglobin 12.9 g/dL      Hematocrit 40.5 %      MCV 84.7 fL      MCH 27.0 pg      MCHC 31.9 g/dL      RDW 14.7 %      RDW-SD 45.4 fl      MPV 11.2 fL      Platelets 305 10*3/mm3     POC Glucose Once [[id number]]  (Normal) Collected: 06/18/21 2245    Specimen: Blood Updated: 06/18/21 2307     Glucose 88 mg/dL      Comment: Serial Number: [id number]:  [id number]       POC Glucose Once [[id number]]  (Normal) Collected: 06/18/21 1914    Specimen: Blood Updated: 06/18/21 1915     Glucose 101 mg/dL      Comment: Serial Number: [id number]:  [id number]       T4, Free [[id number]]  (Abnormal) Collected: 06/18/21 1610    Specimen: Blood Updated: 06/18/21 1708     Free T4 0.74 ng/dL     Narrative:      Results may be falsely increased if patient taking Biotin.      TSH [[id number]]  (Abnormal) Collected: 06/18/21 1610    Specimen: Blood Updated: 06/18/21 1707     TSH 45.890 uIU/mL           IMAGING:  Imaging Results (Last 24 Hours)     Procedure Component Value Units Date/Time    CT Abdomen With Contrast [[id number]] Collected: 06/18/21 1456     Updated: 06/18/21 1506    Narrative:      PROCEDURE: CT ABDOMEN W  CONTRAST     COMPARISON: [facility], CT, CT ABDOMEN PELVIS WO CONTRAST, 6/12/2021, 11:02.     INDICATIONS: s/p open repair incar ventral hernia 6/12, n/v, evaluate for SBO vs. ileus     TECHNIQUE: After obtaining the patient's consent, CT images were created with non-ionic intravenous   contrast material.       PROTOCOL:   Standard imaging protocol performed      RADIATION:   DLP: 2257mGy*cm    Automated exposure control was utilized to minimize radiation dose.   CONTRAST: 100cc Isovue 370 I.V.     FINDINGS:   There bands of linear atelectasis or scarring within the bilateral lower lobes.  There is fatty   infiltration of the liver.  Patient is status post cholecystectomy.  Pancreas and spleen are within   normal limits.  Bilateral adrenal glands appear normal.  There is a new nasogastric tube in place   within the stomach.  Patient appears to be status post gastric sleeve procedure.  There are   fluid-filled distended loops of small bowel within the mid abdomen.  There is a large ventral   hernia which contains several loops of small bowel.  Bowel within the hernia sac appears to be   somewhat dilated as well.  Findings of dilated small bowel could be related to postoperative ileus   or potentially partial small bowel obstruction.  The entire hernia is not included within the field   of view.  There is no abdominal adenopathy or free intraperitoneal fluid.  There are surgical skin   staples overlying the midline.         CONCLUSION:   1. Fluid-filled distended loops of small bowel within the mid abdomen.  There is a large ventral   hernia which appears similar to the prior exam.  This hernia and its contents are incompletely   imaged.  The dilated loops of small bowel could be related to postoperative ileus or partial small   bowel obstruction.  Ventral hernia is also likely recurrent given the clinical history of recent   hernia repair.  2. Hepatic steatosis            MARCELO LOUIS MD          Electronically Signed and Approved By: MARCELO LOUIS MD on 6/18/2021 at 14:56                           []  Laboratory  []  Microbiology  []  Radiology  []  EKG/Telemetry   []  Cardiology/Vascular   []  Pathology  []  Old records      Assessment/Plan   Assessment / Plan     Assessment:   Ventral hernia    Plan:    See plan addendum in my progress note from yesterday.  Bed opening at  expected today.  Transfer process already in place and pt will transfer once bed available.       Electronically signed by Vinay Cabrera MD, 06/19/21, 10:38 AM EDT.

## 2021-06-19 NOTE — PLAN OF CARE
Goal Outcome Evaluation: PATIENT COMPLIANT WITH CARE. PATIENT REPORTED PAIN IN ABDOMEN ALONG WITH NAUSEA, TREATED PER MAR. YVONNE RN

## 2021-07-12 ENCOUNTER — TELEPHONE (OUTPATIENT)
Dept: BARIATRICS/WEIGHT MGMT | Facility: CLINIC | Age: 46
End: 2021-07-12

## 2021-07-12 NOTE — TELEPHONE ENCOUNTER
Patient called to inform Dr. Neil she is moving out of [place] and will be transferring her care to a bariatric surgeon in closer to her.

## 2021-07-15 VITALS
DIASTOLIC BLOOD PRESSURE: 75 MMHG | WEIGHT: 293 LBS | SYSTOLIC BLOOD PRESSURE: 112 MMHG | HEIGHT: 60 IN | BODY MASS INDEX: 57.52 KG/M2 | HEART RATE: 100 BPM | RESPIRATION RATE: 18 BRPM

## 2021-07-15 VITALS
SYSTOLIC BLOOD PRESSURE: 150 MMHG | WEIGHT: 293 LBS | BODY MASS INDEX: 57.52 KG/M2 | DIASTOLIC BLOOD PRESSURE: 56 MMHG | HEIGHT: 60 IN

## 2021-07-15 VITALS
DIASTOLIC BLOOD PRESSURE: 92 MMHG | HEIGHT: 60 IN | HEART RATE: 87 BPM | BODY MASS INDEX: 57.52 KG/M2 | WEIGHT: 293 LBS | SYSTOLIC BLOOD PRESSURE: 148 MMHG

## 2021-07-29 ENCOUNTER — HOSPITAL ENCOUNTER (INPATIENT)
Age: 46
LOS: 5 days | Discharge: HOME | DRG: 908 | End: 2021-08-04
Attending: EMERGENCY MEDICINE | Admitting: INTERNAL MEDICINE
Payer: MEDICARE

## 2021-07-29 ENCOUNTER — APPOINTMENT (OUTPATIENT)
Dept: CT IMAGING | Age: 46
DRG: 908 | End: 2021-07-29
Payer: MEDICARE

## 2021-07-29 DIAGNOSIS — T81.49XA ABDOMINAL WALL ABSCESS AT SITE OF SURGICAL WOUND: ICD-10-CM

## 2021-07-29 DIAGNOSIS — T81.42XA INFECTION OF DEEP INCISIONAL SURGICAL SITE AFTER PROCEDURE, INITIAL ENCOUNTER: Primary | ICD-10-CM

## 2021-07-29 DIAGNOSIS — L03.311 ABDOMINAL WALL CELLULITIS: ICD-10-CM

## 2021-07-29 LAB
A/G RATIO: 0.8 (ref 1.1–2.2)
ALBUMIN SERPL-MCNC: 3.5 G/DL (ref 3.4–5)
ALP BLD-CCNC: 60 U/L (ref 40–129)
ALT SERPL-CCNC: 8 U/L (ref 10–40)
ANION GAP SERPL CALCULATED.3IONS-SCNC: 14 MMOL/L (ref 3–16)
AST SERPL-CCNC: 10 U/L (ref 15–37)
BASOPHILS ABSOLUTE: 0 K/UL (ref 0–0.2)
BASOPHILS RELATIVE PERCENT: 0.3 %
BILIRUB SERPL-MCNC: 0.5 MG/DL (ref 0–1)
BILIRUBIN URINE: NEGATIVE
BLOOD, URINE: NEGATIVE
BUN BLDV-MCNC: 4 MG/DL (ref 7–20)
CALCIUM SERPL-MCNC: 9.2 MG/DL (ref 8.3–10.6)
CHLORIDE BLD-SCNC: 99 MMOL/L (ref 99–110)
CLARITY: CLEAR
CO2: 22 MMOL/L (ref 21–32)
COLOR: YELLOW
CREAT SERPL-MCNC: 0.8 MG/DL (ref 0.6–1.1)
EOSINOPHILS ABSOLUTE: 0 K/UL (ref 0–0.6)
EOSINOPHILS RELATIVE PERCENT: 0.1 %
GFR AFRICAN AMERICAN: >60
GFR NON-AFRICAN AMERICAN: >60
GLOBULIN: 4.6 G/DL
GLUCOSE BLD-MCNC: 84 MG/DL (ref 70–99)
GLUCOSE URINE: NEGATIVE MG/DL
HCG QUALITATIVE: NEGATIVE
HCT VFR BLD CALC: 36.4 % (ref 36–48)
HEMOGLOBIN: 11.9 G/DL (ref 12–16)
KETONES, URINE: NEGATIVE MG/DL
LACTIC ACID: 1.1 MMOL/L (ref 0.4–2)
LEUKOCYTE ESTERASE, URINE: NEGATIVE
LYMPHOCYTES ABSOLUTE: 1.1 K/UL (ref 1–5.1)
LYMPHOCYTES RELATIVE PERCENT: 13.6 %
MAGNESIUM: 2 MG/DL (ref 1.8–2.4)
MCH RBC QN AUTO: 26.4 PG (ref 26–34)
MCHC RBC AUTO-ENTMCNC: 32.8 G/DL (ref 31–36)
MCV RBC AUTO: 80.5 FL (ref 80–100)
MICROSCOPIC EXAMINATION: NORMAL
MONOCYTES ABSOLUTE: 0.6 K/UL (ref 0–1.3)
MONOCYTES RELATIVE PERCENT: 7.2 %
NEUTROPHILS ABSOLUTE: 6.1 K/UL (ref 1.7–7.7)
NEUTROPHILS RELATIVE PERCENT: 78.8 %
NITRITE, URINE: NEGATIVE
PDW BLD-RTO: 15.1 % (ref 12.4–15.4)
PH UA: 6.5 (ref 5–8)
PLATELET # BLD: 369 K/UL (ref 135–450)
PMV BLD AUTO: 7.9 FL (ref 5–10.5)
POTASSIUM REFLEX MAGNESIUM: 3.3 MMOL/L (ref 3.5–5.1)
PROTEIN UA: NEGATIVE MG/DL
RBC # BLD: 4.52 M/UL (ref 4–5.2)
SODIUM BLD-SCNC: 135 MMOL/L (ref 136–145)
SPECIFIC GRAVITY UA: 1.01 (ref 1–1.03)
TOTAL PROTEIN: 8.1 G/DL (ref 6.4–8.2)
URINE TYPE: NORMAL
UROBILINOGEN, URINE: 0.2 E.U./DL
WBC # BLD: 7.7 K/UL (ref 4–11)

## 2021-07-29 PROCEDURE — 81003 URINALYSIS AUTO W/O SCOPE: CPT

## 2021-07-29 PROCEDURE — 80053 COMPREHEN METABOLIC PANEL: CPT

## 2021-07-29 PROCEDURE — 6360000002 HC RX W HCPCS: Performed by: PHYSICIAN ASSISTANT

## 2021-07-29 PROCEDURE — 85025 COMPLETE CBC W/AUTO DIFF WBC: CPT

## 2021-07-29 PROCEDURE — 83605 ASSAY OF LACTIC ACID: CPT

## 2021-07-29 PROCEDURE — 99285 EMERGENCY DEPT VISIT HI MDM: CPT

## 2021-07-29 PROCEDURE — 2580000003 HC RX 258: Performed by: PHYSICIAN ASSISTANT

## 2021-07-29 PROCEDURE — 96367 TX/PROPH/DG ADDL SEQ IV INF: CPT

## 2021-07-29 PROCEDURE — 74177 CT ABD & PELVIS W/CONTRAST: CPT

## 2021-07-29 PROCEDURE — 96366 THER/PROPH/DIAG IV INF ADDON: CPT

## 2021-07-29 PROCEDURE — 6370000000 HC RX 637 (ALT 250 FOR IP): Performed by: PHYSICIAN ASSISTANT

## 2021-07-29 PROCEDURE — 83735 ASSAY OF MAGNESIUM: CPT

## 2021-07-29 PROCEDURE — 6360000004 HC RX CONTRAST MEDICATION: Performed by: PHYSICIAN ASSISTANT

## 2021-07-29 PROCEDURE — 84703 CHORIONIC GONADOTROPIN ASSAY: CPT

## 2021-07-29 PROCEDURE — 96365 THER/PROPH/DIAG IV INF INIT: CPT

## 2021-07-29 RX ORDER — AZELASTINE HYDROCHLORIDE 0.5 MG/ML
1 SOLUTION/ DROPS OPHTHALMIC 2 TIMES DAILY
COMMUNITY
End: 2021-11-08 | Stop reason: SDUPTHER

## 2021-07-29 RX ORDER — OXYBUTYNIN CHLORIDE 5 MG/1
5 TABLET, EXTENDED RELEASE ORAL DAILY
COMMUNITY
End: 2021-11-08 | Stop reason: SDUPTHER

## 2021-07-29 RX ORDER — AZELASTINE HYDROCHLORIDE 137 UG/1
SPRAY, METERED NASAL
COMMUNITY
End: 2021-09-19

## 2021-07-29 RX ORDER — LANOLIN ALCOHOL/MO/W.PET/CERES
65 CREAM (GRAM) TOPICAL
COMMUNITY
End: 2022-07-22 | Stop reason: CLARIF

## 2021-07-29 RX ORDER — 0.9 % SODIUM CHLORIDE 0.9 %
1000 INTRAVENOUS SOLUTION INTRAVENOUS ONCE
Status: COMPLETED | OUTPATIENT
Start: 2021-07-29 | End: 2021-07-29

## 2021-07-29 RX ORDER — LEVOTHYROXINE SODIUM 0.2 MG/1
200 TABLET ORAL DAILY
COMMUNITY
End: 2021-11-18 | Stop reason: SDUPTHER

## 2021-07-29 RX ORDER — RIZATRIPTAN BENZOATE 10 MG/1
10 TABLET ORAL
COMMUNITY
End: 2021-09-19

## 2021-07-29 RX ORDER — FAMOTIDINE 40 MG/1
40 TABLET, FILM COATED ORAL 2 TIMES DAILY
COMMUNITY
End: 2022-07-22 | Stop reason: CLARIF

## 2021-07-29 RX ORDER — HYALURONATE SODIUM 10 MG/ML
20 SYRINGE (ML) INTRAARTICULAR WEEKLY
COMMUNITY
End: 2021-09-19

## 2021-07-29 RX ORDER — ACETAMINOPHEN 500 MG
1000 TABLET ORAL ONCE
Status: COMPLETED | OUTPATIENT
Start: 2021-07-29 | End: 2021-07-29

## 2021-07-29 RX ORDER — OYSTER SHELL CALCIUM WITH VITAMIN D 500; 200 MG/1; [IU]/1
1 TABLET, FILM COATED ORAL DAILY
COMMUNITY
End: 2022-07-22 | Stop reason: CLARIF

## 2021-07-29 RX ORDER — DULOXETIN HYDROCHLORIDE 30 MG/1
30 CAPSULE, DELAYED RELEASE ORAL DAILY
COMMUNITY
End: 2021-11-08 | Stop reason: SDUPTHER

## 2021-07-29 RX ORDER — ACETAMINOPHEN 500 MG
500 TABLET ORAL EVERY 6 HOURS PRN
COMMUNITY

## 2021-07-29 RX ORDER — LIOTHYRONINE SODIUM 25 UG/1
25 TABLET ORAL DAILY
COMMUNITY
End: 2021-11-08 | Stop reason: SDUPTHER

## 2021-07-29 RX ADMIN — IOPAMIDOL 75 ML: 755 INJECTION, SOLUTION INTRAVENOUS at 17:20

## 2021-07-29 RX ADMIN — CEFEPIME HYDROCHLORIDE 2000 MG: 2 INJECTION, POWDER, FOR SOLUTION INTRAVENOUS at 18:18

## 2021-07-29 RX ADMIN — SODIUM CHLORIDE 1000 ML: 9 INJECTION, SOLUTION INTRAVENOUS at 21:52

## 2021-07-29 RX ADMIN — VANCOMYCIN HYDROCHLORIDE 2000 MG: 1 INJECTION, POWDER, LYOPHILIZED, FOR SOLUTION INTRAVENOUS at 19:01

## 2021-07-29 RX ADMIN — ACETAMINOPHEN 1000 MG: 500 TABLET ORAL at 21:51

## 2021-07-29 RX ADMIN — SODIUM CHLORIDE 1000 ML: 9 INJECTION, SOLUTION INTRAVENOUS at 16:39

## 2021-07-29 ASSESSMENT — ENCOUNTER SYMPTOMS
NAUSEA: 0
VOMITING: 0
COUGH: 0
SHORTNESS OF BREATH: 0
BACK PAIN: 0
ABDOMINAL PAIN: 1
COLOR CHANGE: 1
EYES NEGATIVE: 1

## 2021-07-29 ASSESSMENT — PAIN DESCRIPTION - ORIENTATION: ORIENTATION: MID;LOWER

## 2021-07-29 ASSESSMENT — PAIN DESCRIPTION - DESCRIPTORS: DESCRIPTORS: SHARP

## 2021-07-29 ASSESSMENT — PAIN SCALES - GENERAL
PAINLEVEL_OUTOF10: 5
PAINLEVEL_OUTOF10: 6
PAINLEVEL_OUTOF10: 3
PAINLEVEL_OUTOF10: 5
PAINLEVEL_OUTOF10: 6

## 2021-07-29 ASSESSMENT — PAIN DESCRIPTION - PAIN TYPE: TYPE: ACUTE PAIN

## 2021-07-29 ASSESSMENT — PAIN DESCRIPTION - FREQUENCY: FREQUENCY: CONTINUOUS

## 2021-07-29 ASSESSMENT — PAIN DESCRIPTION - LOCATION: LOCATION: ABDOMEN

## 2021-07-29 NOTE — ED PROVIDER NOTES
[facility]  ED  EMERGENCY DEPARTMENT ENCOUNTER        Pt Name: Martha Castro  MRN: [id number]  [place] 1975  Date of evaluation: 7/29/2021  Provider: Elvin Murphy PA-C  PCP: No primary care provider on file. ED Attending: Maggi Romero MD      This patient was seen by the attending provider     History provided by the patient    CHIEF COMPLAINT:     Chief Complaint   Patient presents with    Abdominal Pain     patient with two hernia surgeries in June, started noticing some belly button drainage on Friday and on Monday seen at Urgent Care and started on Bactrim. Patient states spouse thinks it is getting worse. HISTORY OF PRESENT ILLNESS:      Martha Castro is a 39 y.o. female who arrives to the ED by private vehicle with her . Patient is morbidly obese. She had remote gastric sleeve surgery in 2018. She states she is lost about 150 pounds since that surgery. On 6/12 patient underwent ventral hernia repair due to an incarcerated ventral hernia at [facility]. She reports she was transferred from there on 6/20 to the [facility]. She was ultimately released from the hospital on 6/29. Patient has since moved to the [place] area and is now experiencing drainage towards the inferior aspect of her healing vertical incision site at the umbilicus. She has developed erythema to the lower abdominal wall. Patient states she was seen at urgent care on Monday, 7/26. She was started on Bactrim and given direction on how to clean her surgical site. She reports symptoms have only gotten worse. She states her  looked at the abdominal wall today and was very concerned so brought her here. Patient attributes some of her symptoms to the fact that her \"14 pound cat\" jumps on her when she is sleeping.   Patient does not have a plan at this time to follow-up with her surgeon in [place] given the fact that she is now moved to [place]. She denies fevers, chills, nausea or vomiting. No identifiable exacerbating or alleviating factors to symptoms. Nursing Notes were reviewed     REVIEW OF SYSTEMS:     Review of Systems   Constitutional: Negative for appetite change, chills and fever. HENT: Negative. Eyes: Negative. Respiratory: Negative for cough and shortness of breath. Cardiovascular: Negative for chest pain. Gastrointestinal: Positive for abdominal pain. Negative for nausea and vomiting. Genitourinary: Negative for dysuria. Musculoskeletal: Negative for back pain and neck pain. Skin: Positive for color change (Erythema abdominal wall) and wound. Neurological: Negative for weakness and headaches. All other systems reviewed and are negative. Except as noted above in the ROS, all other systems were reviewed and negative. PAST MEDICAL HISTORY:     Past Medical History:   Diagnosis Date    Anemia     Arthritis     Asthma     Chronic bronchitis (HCC)     Chronic kidney disease     COPD (chronic obstructive pulmonary disease) ([facility] [address]     Depression     Hypertension     Pneumonia     Sleep apnea     Thyroid disease     Vitiligo          SURGICAL HISTORY:      Past Surgical History:   Procedure Laterality Date    APPENDECTOMY  july 2002    CHOLECYSTECTOMY  2001    HAND SURGERY Left     CTR    MOUTH SURGERY      5 teeth removed         CURRENT MEDICATIONS:       Previous Medications    ACETAMINOPHEN (TYLENOL) 500 MG TABLET    Take 500 mg by mouth every 6 hours as needed for Pain    ADHESIVE TAPE (PAPER TAPE 1\"[id number]) TAPE    Apply to affected areas.     ALBUTEROL (PROVENTIL HFA;VENTOLIN HFA) 108 (90 BASE) MCG/ACT INHALER    Inhale 2 puffs into the lungs every 6 hours as needed for Wheezing    AMITRIPTYLINE (ELAVIL) 10 MG TABLET    Take 75 mg by mouth nightly    AMITRIPTYLINE (ELAVIL) 75 MG TABLET        AMLODIPINE (NORVASC) 5 MG TABLET    Take 1 tablet by mouth daily    ARIPIPRAZOLE (ABILIFY) 5 MG TABLET    Take 1 tablet by mouth daily    ARTIFICIAL TEARS (ARTIFICIAL TEARS) OINT    as needed    ATORVASTATIN (LIPITOR) 10 MG TABLET    Take 1 tablet by mouth daily    AZELASTINE (OPTIVAR) 0.05 % OPHTHALMIC SOLUTION    1 drop 2 times daily    AZELASTINE  MCG/SPRAY SOLN    by Nasal route    BUSPIRONE (BUSPAR) 10 MG TABLET    Take 1 tablet by mouth 3 times daily    CALCIUM CARBONATE (CALCIUM 600) 600 MG TABS TABLET    Take 1 tablet by mouth daily    CALCIUM-VITAMIN D (OSCAL-500) 500-200 MG-UNIT PER TABLET    Take 1 tablet by mouth daily    CETIRIZINE (ZYRTEC) 10 MG TABLET    Take 10 mg by mouth daily. DEXTROMETHORPHAN-GUAIFENESIN (MUCINEX DM)  MG PER SR TABLET    Take 1 tablet by mouth every 12 hours as needed. DICLOFEN-RANITIDINE-CAPSAICIN -0.025 MG-MG-% THPK    by Combination route    DULOXETINE (CYMBALTA) 30 MG EXTENDED RELEASE CAPSULE    Take 30 mg by mouth daily    EMOLLIENT (EUCERIN) LOTION    Apply topically as needed.     EPINEPHRINE 0.1 MG/ML INJECTION    Inject 0.3 mg into the muscle as needed    ESCITALOPRAM (LEXAPRO) 10 MG TABLET    Take 1 tablet by mouth daily    ESCITALOPRAM (LEXAPRO) 20 MG TABLET    Take 1 tablet by mouth daily    FAMOTIDINE (PEPCID) 40 MG TABLET    Take 40 mg by mouth 2 times daily    FERROUS SULFATE (FE TABS 325) 325 (65 FE) MG EC TABLET    Take 65 mg by mouth 3 times daily (with meals)    FLUTICASONE (FLONASE ALLERGY RELIEF) 50 MCG/ACT NASAL SPRAY    2 sprays by Nasal route 2 times daily     FLUTICASONE FUROATE-VILANTEROL (BREO ELLIPTA) 200-25 MCG/INH AEPB INHALER    Inhale into the lungs daily    FLUTICASONE-SALMETEROL (ADVAIR) 100-50 MCG/DOSE DISKUS INHALER    Inhale 1 puff into the lungs every 12 hours    FREESTYLE LANCETS MISC    1 each by Does not apply route daily    FUROSEMIDE (LASIX) 40 MG TABLET    Take 1 tablet by mouth daily    GABAPENTIN (NEURONTIN) 600 MG TABLET    Take 1 tablet by mouth daily    GLUCOSAMINE-CHONDROITIN 500-400 MG TABLET    Take 1 tablet by mouth 3 times daily    GLUCOSE MONITORING KIT (FREESTYLE) MONITORING KIT    1 kit by Does not apply route daily as needed    GNP FIBER THERAPY 500 MG TABS    Take 500 mg by mouth 2 times daily    HYDROCHLOROTHIAZIDE (HYDRODIURIL) 25 MG TABLET    Take 1 tablet by mouth daily    LEVOTHYROXINE (SYNTHROID) 125 MCG TABLET    Take 2 tablets by mouth Daily    LEVOTHYROXINE (SYNTHROID) 200 MCG TABLET    Take 200 mcg by mouth Daily    LIOTHYRONINE (CYTOMEL) 25 MCG TABLET    Take 25 mcg by mouth daily    LISINOPRIL (PRINIVIL;ZESTRIL) 20 MG TABLET    Take 1 tablet by mouth daily    MONTELUKAST (SINGULAIR) 10 MG TABLET    Take 10 mg by mouth nightly. MULTIPLE VITAMINS-MINERALS (THERAPEUTIC MULTIVITAMIN-MINERALS) TABLET    Take 1 tablet by mouth daily    NEOMYCIN-BACITRACIN-POLYMYXIN (NEOSPORIN) 5-400-5000 OINTMENT    Apply topically 4 times daily Apply topically 4 times daily. NUTRITIONAL SUPPLEMENTS (GLUCOSE MANAGEMENT) TABS    Take by mouth    NYSTATIN POWD    by Does not apply route    NYSTATIN-TRIAMCINOLONE (MYCOLOG) 915735-7.1 UNIT/GM-% OINTMENT        OMEPRAZOLE (PRILOSEC) 20 MG CAPSULE    Take 1 capsule by mouth 2 times daily    OXYBUTYNIN (DITROPAN-XL) 5 MG EXTENDED RELEASE TABLET    Take 5 mg by mouth daily    PETROLATUM (PETROLEUM JELLY) OINT    Apply to affected areas bid.     POLYETHYLENE GLYCOL 3350 (MIRALAX PO)    Take by mouth 2 times daily    POTASSIUM CHLORIDE (KLOR-CON) 20 MEQ PACKET    Take 20 mEq by mouth daily    PROMETHAZINE (PHENERGAN) 25 MG TABLET    Take 25 mg by mouth every 6 hours as needed for Nausea    RIZATRIPTAN (MAXALT) 10 MG TABLET    Take 10 mg by mouth once as needed for Migraine May repeat in 2 hours if needed    ROPINIROLE (REQUIP) 0.5 MG TABLET    Take 2 tablets by mouth nightly    SERTRALINE (ZOLOFT) 100 MG TABLET    Take 1 tablet by mouth 2 times daily    SERTRALINE HCL (ZOLOFT PO)    Take 200 mg by mouth Indications: at hs     SILICONE-VITAMIN E (REXASIL PATCH & VITAMIN E LIQ EX)    Apply topically    SODIUM HYALURONATE (EUFLEXXA) 20 MG/2ML SOSY INJECTION    Inject 20 mg into the articular space once a week    VITAMIN B-12 (CYANOCOBALAMIN) 1000 MCG TABLET    Take 1 tablet by mouth daily    VITAMIN D (ERGOCALCIFEROL) 12221 UNITS CAPS CAPSULE    Take 1 capsule by mouth once a week    VITAMIN E 400 UNIT CAPSULE    Take 1 capsule by mouth 2 times daily    WOUND DRESSINGS (ADAPTIC NON-ADHERING DRESSING) PADS    Apply to affected areas of skin. ALLERGIES:    Bee venom, Ibuprofen, and Nsaids    FAMILY HISTORY:       Family History   Problem Relation Age of Onset    Hypertension Mother     Asthma Father     Diabetes Father     Emphysema Father     Hypertension Father     Diabetes Paternal Aunt     Diabetes Paternal Grandmother     Other Sister         problems with pregnancy. SOCIAL HISTORY:       Social History     Socioeconomic History    Marital status:      Spouse name: Not on file    Number of children: Not on file    Years of education: Not on file    Highest education level: Not on file   Occupational History    Not on file   Tobacco Use    Smoking status: Passive Smoke Exposure - Never Smoker    Smokeless tobacco: Never Used   Substance and Sexual Activity    Alcohol use: No     Alcohol/week: 0.0 standard drinks    Drug use: No    Sexual activity: Not on file   Other Topics Concern    Not on file   Social History Narrative    Not on file     Social Determinants of Health     Financial Resource Strain:     Difficulty of Paying Living Expenses:    Food Insecurity:     Worried About Running Out of Food in the Last Year:     [address] in the Last Year:    Transportation Needs:     Lack of Transportation (Medical):      Lack of Transportation (Non-Medical):    Physical Activity:     Days of Exercise per Week:     Minutes of Exercise per Session:    Stress:     Feeling of Stress :    Social Connections:     Frequency of Communication with Friends and Family:     Frequency of Social Gatherings with Friends and Family:     Attends Je[employer] witness Services:     Active Member of Clubs or Organizations:     Attends Club or Organization Meetings:     Marital Status:    Intimate Partner Violence:     Fear of Current or Ex-Partner:     Emotionally Abused:     Physically Abused:     Sexually Abused:        SCREENINGS:             PHYSICAL EXAM:       ED Triage Vitals [07/29/21 1520]   BP Temp Temp Source Pulse Resp SpO2 Height Weight   124/86 98 °F (36.7 °C) Oral 94 16 98 % 5' (1.524 m) (!) 350 lb (158.8 kg)     Body mass index is 68.35 kg/m². Physical Exam    CONSTITUTIONAL: Patient morbidly obese and exam limited due to body habitus. Awake and alert. Cooperative. Well-developed. Well-nourished. Non-toxic. No acute distress. HENT: Normocephalic. Atraumatic. External ears normal, without discharge. No nasal discharge. Oropharynx clear. Mucous membranes moist.  EYES: Conjunctiva non-injected. No scleral icterus. PERRL. EOM's grossly intact. NECK: Supple. Normal ROM. CARDIOVASCULAR: RRR. No Murmer. Intact distal pulses. PULMONARY/CHEST WALL: Effort normal. No tachypnea. Lungs clear to ausculation. ABDOMEN: Obese. Healing midline, vertical abdominal incision. Purulent drainage and what appears to be wound dehiscence at the umbilicus. Erythema to the lower abdominal wall. There is tenderness and induration to palpate the lower abdominal wall. No guarding. /ANORECTAL: Not assessed  MUSKULOSKELETAL: Normal ROM. No acute deformities. No edema. No tenderness to palpate. SKIN: Warm and dry. No rash. NEUROLOGICAL: Alert and oriented x 3. GCS 15. CN II-XII grossly intact. Strength is 5/5 in all extremities and sensation is intact. Normal gait.    PSYCHIATRIC: Normal affect      Abdomen:              DIAGNOSTICRESULTS:     LABS:    Results for orders placed or performed during the hospital encounter of 07/29/21 CBC Auto Differential   Result Value Ref Range    WBC 7.7 4.0 - 11.0 K/uL    RBC 4.52 4.00 - 5.20 M/uL    Hemoglobin 11.9 (L) 12.0 - 16.0 g/dL    Hematocrit 36.4 36.0 - 48.0 %    MCV 80.5 80.0 - 100.0 fL    MCH 26.4 26.0 - 34.0 pg    MCHC 32.8 31.0 - 36.0 g/dL    RDW 15.1 12.4 - 15.4 %    Platelets 898 258 - 851 K/uL    MPV 7.9 5.0 - 10.5 fL    Neutrophils % 78.8 %    Lymphocytes % 13.6 %    Monocytes % 7.2 %    Eosinophils % 0.1 %    Basophils % 0.3 %    Neutrophils Absolute 6.1 1.7 - 7.7 K/uL    Lymphocytes Absolute 1.1 1.0 - 5.1 K/uL    Monocytes Absolute 0.6 0.0 - 1.3 K/uL    Eosinophils Absolute 0.0 0.0 - 0.6 K/uL    Basophils Absolute 0.0 0.0 - 0.2 K/uL   Comprehensive Metabolic Panel w/ Reflex to MG   Result Value Ref Range    Sodium 135 (L) 136 - 145 mmol/L    Potassium reflex Magnesium 3.3 (L) 3.5 - 5.1 mmol/L    Chloride 99 99 - 110 mmol/L    CO2 22 21 - 32 mmol/L    Anion Gap 14 3 - 16    Glucose 84 70 - 99 mg/dL    BUN 4 (L) 7 - 20 mg/dL    CREATININE 0.8 0.6 - 1.1 mg/dL    GFR Non-African American >60 >60    GFR African American >60 >60    Calcium 9.2 8.3 - 10.6 mg/dL    Total Protein 8.1 6.4 - 8.2 g/dL    Albumin 3.5 3.4 - 5.0 g/dL    Albumin/Globulin Ratio 0.8 (L) 1.1 - 2.2    Total Bilirubin 0.5 0.0 - 1.0 mg/dL    Alkaline Phosphatase 60 40 - 129 U/L    ALT 8 (L) 10 - 40 U/L    AST 10 (L) 15 - 37 U/L    Globulin 4.6 g/dL   HCG Qualitative, Serum   Result Value Ref Range    hCG Qual Negative Detects HCG level >10 MIU/mL   Urinalysis, reflex to microscopic   Result Value Ref Range    Color, UA Yellow Straw/Yellow    Clarity, UA Clear Clear    Glucose, Ur Negative Negative mg/dL    Bilirubin Urine Negative Negative    Ketones, Urine Negative Negative mg/dL    Specific Gravity, UA 1.010 1.005 - 1.030    Blood, Urine Negative Negative    pH, UA 6.5 5.0 - 8.0    Protein, UA Negative Negative mg/dL    Urobilinogen, Urine 0.2 <2.0 E.U./dL    Nitrite, Urine Negative Negative    Leukocyte Esterase, Urine Negative Negative    Microscopic Examination Not Indicated     Urine Type NotGiven    Lactic acid, plasma   Result Value Ref Range    Lactic Acid 1.1 0.4 - 2.0 mmol/L   Magnesium   Result Value Ref Range    Magnesium 2.00 1.80 - 2.40 mg/dL         RADIOLOGY:  All x-ray studies areviewed/reviewed by me. Formal interpretations per the radiologist are as follows:    CT ABDOMEN PELVIS W IV CONTRAST Additional Contrast? None    Result Date: 7/29/2021  EXAMINATION: CT OF THE ABDOMEN AND PELVIS WITH CONTRAST 7/29/2021 5:01 pm TECHNIQUE: CT of the abdomen and pelvis was performed with the administration of intravenous contrast. Multiplanar reformatted images are provided for review. Dose modulation, iterative reconstruction, and/or weight based adjustment of the mA/kV was utilized to reduce the radiation dose to as low as reasonably achievable. COMPARISON: 08/15/2014. HISTORY: ORDERING SYSTEM PROVIDED HISTORY: abd pain and drainage from abdominal wound 6 weeks s/p ventral hernia repair TECHNOLOGIST PROVIDED HISTORY: Reason for exam:->abd pain and drainage from abdominal wound 6 weeks s/p ventral hernia repair Additional Contrast?->None Decision Support Exception - unselect if not a suspected or confirmed emergency medical condition->Emergency Medical Condition (MA) Reason for Exam: abd pain and drainage from abdominal wound 6 weeks s/p ventral hernia repair Acuity: Acute Type of Exam: Initial FINDINGS: Lower Chest: No acute infiltrate at the lung bases. Organs: Mild hepatic steatosis with no focal abnormality. Status post cholecystectomy with no biliary dilatation. The spleen, pancreas and adrenal glands are unremarkable. The right kidney is not clearly visualized with probable remnant tissue in the right renal bed. The left kidney is unremarkable with no mass or significant hydronephrosis. GI/Bowel: No pericolonic inflammatory changes. Previous appendectomy. No small bowel distension.   Status post sleeve gastrectomy. The stomach and duodenal sweep are unremarkable. Small hiatal hernia. Pelvis: No pelvic mass or free pelvic fluid. The uterus and adnexal structures are unremarkable. Mild distention of the urinary bladder. Peritoneum/Retroperitoneum: The abdominal aorta is normal in caliber with mild calcified plaque. No significant retroperitoneal adenopathy. Bones/Soft Tissues: Large fluid collection with marginal enhancement in the anterior abdominal wall consistent with an abscess. The collection measures 10.9 x 15.3 x 18.6 cm. No gas within the collection. Mild infiltration of the adjacent subcutaneous fat and overlying skin thickening extending into the pannus. No other focal subcutaneous collection. No recurrent abdominal wall hernia. No acute osseous findings. 1. Large anterior abdominal wall fluid collection most consistent with an abscess. Mild infiltration of the adjacent subcutaneous fat and overlying skin thickening. 2. No acute findings within the abdomen or pelvis.        PROCEDURES:   N/A    CRITICAL CARE TIME:       None      CONSULTS:  IP CONSULT TO GENERAL SURGERY      EMERGENCY DEPARTMENT COURSE and DIFFERENTIAL DIAGNOSIS/MDM:   Vitals:    Vitals:    07/29/21 1520 07/29/21 1800 07/29/21 1801 07/29/21 1852   BP: 124/86  115/63 (!) 119/56   Pulse: 94 92  95   Resp: 16 16  16   Temp: 98 °F (36.7 °C)      TempSrc: Oral      SpO2: 98% 100%  100%   Weight: (!) 350 lb (158.8 kg)      Height: 5' (1.524 m)          Patient was given the following medications:  Medications   vancomycin (VANCOCIN) 2,000 mg in dextrose 5 % 500 mL IVPB (2,000 mg Intravenous New Bag 7/29/21 1901)   0.9 % sodium chloride bolus (0 mLs Intravenous Stopped 7/29/21 1759)   iopamidol (ISOVUE-370) 76 % injection 75 mL (75 mLs Intravenous Given 7/29/21 1720)   cefepime (MAXIPIME) 2000 mg IVPB minibag (0 mg Intravenous Stopped 7/29/21 1852)         Patient was evaluated by both myself and Monica Menon MD.   Old records were reviewed. Patient is about 6 weeks postop ventral hernia repair. She was hospitalized at 2 different hospitals in [place], most recently at the [facility] in [place] from 6/20 through 6/29. She arrives with progressive redness to her lower abdominal wall and has noted drainage from the umbilicus. She was started on Bactrim by urgent care 3 days ago but has only gotten worse. She denies fevers, nausea or vomiting. She still has a good appetite. CBC reveals normal white count of 7.7 with H&H 11.9 and 36.4  Lactate 1.1  CMP reveals mild hypokalemia at 3.3 but metabolic panel otherwise normal including magnesium  hCG negative  Urinalysis normal  CT abdomen and pelvis with IV contrast shows large anterior abdominal wall fluid collection most consistent with an abscess. Mild infiltration of the adjacent subcutaneous fat and overlying skin thickening. No acute findings within the abdomen or pelvis. Based on this I ordered vancomycin and cefepime. She is given 1 L normal saline IV while here in the ED. Because she is recently postop, I consulted her surgery group at [facility]. I spoke with Dr. Jose Carlos Zhao. He agreed to accept this patient in transfer though requested I transfer the patient through the emergency department. I spoke with the ED attending, Dr. Ki Nelson who has agreed to accept the patient as well. We will arrange transport from [facility] to the [facility] in [place]. While in the ED, patient has remained stable. I spoke with Dr. Jose Carlos Zhao (general surgery at [facility]) and Dr. Jcarlos Asencio (ED attending at [facility]). We thoroughly discussed the history, physical exam, laboratory and imaging studies, as well as, emergency department course. Based upon that discussion, we've decided to admit Dipesh Rosa for further observation and evaluation of Gilma Cruz's postop infection involving ventral hernia repair.   As I have deemed necessary from their history, physical and studies, I have considered and evaluated Nelly Ortiz for the following diagnoses:  ACUTE APPENDICITIS, CHOLECYSTITIS, DIVERTICULITIS, PANCREATITIS, PYELONEPHRITIS, BOWEL OBSTRUCTION, INCARCERATED HERNIA, ISCHEMIC GUT, GI BLEED, PERFORATED BOWEL or ULCER. FINAL IMPRESSION:      1. Infection of deep incisional surgical site after procedure, initial encounter    2.  Abdominal wall abscess at site of surgical wound          DISPOSITION/PLAN:   DISPOSITION     TRANSFER to [facility]             (Please note thatportions of this note were completed with a voice recognition program.  Efforts were made to edit the dictations, but occasionally words are mis-transcribed.)    Philomena Chaudhary PA-C (electronicallysigned)              [facility]asey Singh [facility]  07/29/21 2040

## 2021-07-29 NOTE — ED PROVIDER NOTES
I independently performed a history and physical on Ankush Eldridge. All diagnostic, treatment, and disposition decisions were made by myself in conjunction with the advanced practice provider. Patient with a very large abdominal wall abscess following ventral hernia surgery in June at [facility]. She will be transferred back to [facility] for evaluation by general surgery. For further details of [facility] emergency department encounter, please see Cat MARTINEZ's documentation.              Val Cazares MD  07/29/21 1958

## 2021-07-30 PROBLEM — L02.211 ABDOMINAL WALL ABSCESS: Status: ACTIVE | Noted: 2021-07-30

## 2021-07-30 PROCEDURE — 6370000000 HC RX 637 (ALT 250 FOR IP): Performed by: INTERNAL MEDICINE

## 2021-07-30 PROCEDURE — 6360000002 HC RX W HCPCS: Performed by: EMERGENCY MEDICINE

## 2021-07-30 PROCEDURE — 96366 THER/PROPH/DIAG IV INF ADDON: CPT

## 2021-07-30 PROCEDURE — 2580000003 HC RX 258: Performed by: EMERGENCY MEDICINE

## 2021-07-30 PROCEDURE — 6360000002 HC RX W HCPCS: Performed by: INTERNAL MEDICINE

## 2021-07-30 PROCEDURE — 94640 AIRWAY INHALATION TREATMENT: CPT

## 2021-07-30 PROCEDURE — 2580000003 HC RX 258: Performed by: INTERNAL MEDICINE

## 2021-07-30 PROCEDURE — 1200000000 HC SEMI PRIVATE

## 2021-07-30 PROCEDURE — 99222 1ST HOSP IP/OBS MODERATE 55: CPT | Performed by: SURGERY

## 2021-07-30 RX ORDER — SODIUM CHLORIDE 9 MG/ML
25 INJECTION, SOLUTION INTRAVENOUS PRN
Status: DISCONTINUED | OUTPATIENT
Start: 2021-07-30 | End: 2021-08-04 | Stop reason: HOSPADM

## 2021-07-30 RX ORDER — ALBUTEROL SULFATE 90 UG/1
2 AEROSOL, METERED RESPIRATORY (INHALATION) EVERY 6 HOURS PRN
Status: DISCONTINUED | OUTPATIENT
Start: 2021-07-30 | End: 2021-08-04 | Stop reason: HOSPADM

## 2021-07-30 RX ORDER — MORPHINE SULFATE 2 MG/ML
2 INJECTION, SOLUTION INTRAMUSCULAR; INTRAVENOUS EVERY 4 HOURS PRN
Status: DISCONTINUED | OUTPATIENT
Start: 2021-07-30 | End: 2021-08-04 | Stop reason: HOSPADM

## 2021-07-30 RX ORDER — AMLODIPINE BESYLATE 5 MG/1
5 TABLET ORAL DAILY
Status: DISCONTINUED | OUTPATIENT
Start: 2021-07-31 | End: 2021-08-04 | Stop reason: HOSPADM

## 2021-07-30 RX ORDER — ONDANSETRON 4 MG/1
4 TABLET, ORALLY DISINTEGRATING ORAL EVERY 8 HOURS PRN
Status: DISCONTINUED | OUTPATIENT
Start: 2021-07-30 | End: 2021-08-04 | Stop reason: HOSPADM

## 2021-07-30 RX ORDER — ONDANSETRON 2 MG/ML
4 INJECTION INTRAMUSCULAR; INTRAVENOUS EVERY 6 HOURS PRN
Status: DISCONTINUED | OUTPATIENT
Start: 2021-07-30 | End: 2021-08-04 | Stop reason: HOSPADM

## 2021-07-30 RX ORDER — ATORVASTATIN CALCIUM 10 MG/1
20 TABLET, FILM COATED ORAL DAILY
Status: DISCONTINUED | OUTPATIENT
Start: 2021-07-31 | End: 2021-08-04 | Stop reason: HOSPADM

## 2021-07-30 RX ORDER — BUDESONIDE AND FORMOTEROL FUMARATE DIHYDRATE 160; 4.5 UG/1; UG/1
2 AEROSOL RESPIRATORY (INHALATION) 2 TIMES DAILY
Status: DISCONTINUED | OUTPATIENT
Start: 2021-07-30 | End: 2021-08-04 | Stop reason: HOSPADM

## 2021-07-30 RX ORDER — OXYBUTYNIN CHLORIDE 5 MG/1
5 TABLET, EXTENDED RELEASE ORAL DAILY
Status: DISCONTINUED | OUTPATIENT
Start: 2021-07-31 | End: 2021-08-04 | Stop reason: HOSPADM

## 2021-07-30 RX ORDER — SODIUM CHLORIDE 0.9 % (FLUSH) 0.9 %
5-40 SYRINGE (ML) INJECTION PRN
Status: DISCONTINUED | OUTPATIENT
Start: 2021-07-30 | End: 2021-08-04 | Stop reason: HOSPADM

## 2021-07-30 RX ORDER — DULOXETIN HYDROCHLORIDE 30 MG/1
30 CAPSULE, DELAYED RELEASE ORAL 2 TIMES DAILY
Status: DISCONTINUED | OUTPATIENT
Start: 2021-07-31 | End: 2021-08-04 | Stop reason: HOSPADM

## 2021-07-30 RX ORDER — CALCIUM CARBONATE 500(1250)
500 TABLET ORAL DAILY
Status: DISCONTINUED | OUTPATIENT
Start: 2021-07-31 | End: 2021-08-04 | Stop reason: HOSPADM

## 2021-07-30 RX ORDER — ACETAMINOPHEN 650 MG/1
650 SUPPOSITORY RECTAL EVERY 6 HOURS PRN
Status: DISCONTINUED | OUTPATIENT
Start: 2021-07-30 | End: 2021-08-04 | Stop reason: HOSPADM

## 2021-07-30 RX ORDER — CETIRIZINE HYDROCHLORIDE 10 MG/1
10 TABLET ORAL DAILY
Status: DISCONTINUED | OUTPATIENT
Start: 2021-07-31 | End: 2021-08-04 | Stop reason: HOSPADM

## 2021-07-30 RX ORDER — LIOTHYRONINE SODIUM 5 UG/1
25 TABLET ORAL 2 TIMES DAILY
Status: DISCONTINUED | OUTPATIENT
Start: 2021-07-30 | End: 2021-07-31

## 2021-07-30 RX ORDER — ARIPIPRAZOLE 10 MG/1
5 TABLET ORAL DAILY
Status: DISCONTINUED | OUTPATIENT
Start: 2021-07-31 | End: 2021-08-04 | Stop reason: HOSPADM

## 2021-07-30 RX ORDER — MORPHINE SULFATE 4 MG/ML
4 INJECTION, SOLUTION INTRAMUSCULAR; INTRAVENOUS EVERY 4 HOURS PRN
Status: DISCONTINUED | OUTPATIENT
Start: 2021-07-30 | End: 2021-08-04 | Stop reason: HOSPADM

## 2021-07-30 RX ORDER — IPRATROPIUM BROMIDE AND ALBUTEROL SULFATE 2.5; .5 MG/3ML; MG/3ML
1 SOLUTION RESPIRATORY (INHALATION) EVERY 4 HOURS PRN
Status: DISCONTINUED | OUTPATIENT
Start: 2021-07-30 | End: 2021-08-04 | Stop reason: HOSPADM

## 2021-07-30 RX ORDER — BUSPIRONE HYDROCHLORIDE 5 MG/1
15 TABLET ORAL 3 TIMES DAILY
Status: DISCONTINUED | OUTPATIENT
Start: 2021-07-30 | End: 2021-08-04 | Stop reason: HOSPADM

## 2021-07-30 RX ORDER — IPRATROPIUM BROMIDE AND ALBUTEROL SULFATE 2.5; .5 MG/3ML; MG/3ML
1 SOLUTION RESPIRATORY (INHALATION) EVERY 4 HOURS PRN
Status: DISCONTINUED | OUTPATIENT
Start: 2021-07-30 | End: 2021-07-30

## 2021-07-30 RX ORDER — ACETAMINOPHEN 325 MG/1
650 TABLET ORAL EVERY 6 HOURS PRN
Status: DISCONTINUED | OUTPATIENT
Start: 2021-07-30 | End: 2021-08-04 | Stop reason: HOSPADM

## 2021-07-30 RX ORDER — MONTELUKAST SODIUM 10 MG/1
10 TABLET ORAL NIGHTLY
Status: DISCONTINUED | OUTPATIENT
Start: 2021-07-30 | End: 2021-08-04 | Stop reason: HOSPADM

## 2021-07-30 RX ORDER — SODIUM CHLORIDE 0.9 % (FLUSH) 0.9 %
5-40 SYRINGE (ML) INJECTION EVERY 12 HOURS SCHEDULED
Status: DISCONTINUED | OUTPATIENT
Start: 2021-07-30 | End: 2021-08-04 | Stop reason: HOSPADM

## 2021-07-30 RX ORDER — ALBUTEROL SULFATE 90 UG/1
2 AEROSOL, METERED RESPIRATORY (INHALATION) EVERY 6 HOURS PRN
Status: DISCONTINUED | OUTPATIENT
Start: 2021-07-30 | End: 2021-07-30

## 2021-07-30 RX ORDER — AMITRIPTYLINE HYDROCHLORIDE 25 MG/1
75 TABLET, FILM COATED ORAL NIGHTLY
Status: DISCONTINUED | OUTPATIENT
Start: 2021-07-30 | End: 2021-07-31

## 2021-07-30 RX ORDER — POLYETHYLENE GLYCOL 3350 17 G/17G
17 POWDER, FOR SOLUTION ORAL DAILY PRN
Status: DISCONTINUED | OUTPATIENT
Start: 2021-07-30 | End: 2021-08-04 | Stop reason: HOSPADM

## 2021-07-30 RX ADMIN — VANCOMYCIN HYDROCHLORIDE 1250 MG: 10 INJECTION, POWDER, LYOPHILIZED, FOR SOLUTION INTRAVENOUS at 22:11

## 2021-07-30 RX ADMIN — ACETAMINOPHEN 650 MG: 325 TABLET ORAL at 22:08

## 2021-07-30 RX ADMIN — CEFEPIME HYDROCHLORIDE 2000 MG: 2 INJECTION, POWDER, FOR SOLUTION INTRAVENOUS at 05:35

## 2021-07-30 RX ADMIN — BUSPIRONE HYDROCHLORIDE 15 MG: 5 TABLET ORAL at 22:05

## 2021-07-30 RX ADMIN — Medication 1500 MG: at 07:35

## 2021-07-30 RX ADMIN — Medication 2 PUFF: at 21:21

## 2021-07-30 RX ADMIN — Medication 10 ML: at 22:05

## 2021-07-30 RX ADMIN — MONTELUKAST SODIUM 10 MG: 10 TABLET ORAL at 22:06

## 2021-07-30 RX ADMIN — CEFEPIME HYDROCHLORIDE 2000 MG: 2 INJECTION, POWDER, FOR SOLUTION INTRAVENOUS at 20:13

## 2021-07-30 RX ADMIN — MORPHINE SULFATE 2 MG: 2 INJECTION, SOLUTION INTRAMUSCULAR; INTRAVENOUS at 22:06

## 2021-07-30 RX ADMIN — ONDANSETRON 4 MG: 2 INJECTION INTRAMUSCULAR; INTRAVENOUS at 22:06

## 2021-07-30 ASSESSMENT — PAIN SCALES - GENERAL
PAINLEVEL_OUTOF10: 5

## 2021-07-30 ASSESSMENT — PAIN DESCRIPTION - ORIENTATION: ORIENTATION: MID

## 2021-07-30 ASSESSMENT — PAIN DESCRIPTION - FREQUENCY: FREQUENCY: CONTINUOUS

## 2021-07-30 ASSESSMENT — PAIN DESCRIPTION - DESCRIPTORS: DESCRIPTORS: SHARP

## 2021-07-30 ASSESSMENT — PAIN DESCRIPTION - LOCATION: LOCATION: ABDOMEN

## 2021-07-30 ASSESSMENT — PAIN DESCRIPTION - PAIN TYPE: TYPE: ACUTE PAIN

## 2021-07-30 NOTE — CARE COORDINATION
CM rec'd call from [facility] in ED. CM was asked to speak to transfer center regarding changing pt's insurance. Pt has [facility] Medicare and Medicaid. CM cannot change insurance. CM called transfer center [phone]. Informed them pt has a [place] medicare/medicaid product. We cannot change her insurance. Possible to see if they can find a transport company that is covered in both states. CM called [facility] in ED. Informed her of CM conversation with transfer center. 3:26 PM   CM rec'd call from Transfer center. At this time they have exhausted all ambulance availability today. They stated [facility] [place] told them to call in the morning with possible transport for tomorrow. They are continuing to try to secure transport. CM informed her that CM spoke  hospitalist MD regarding transport. He is aware of barriers. CM not sure if they are going to admit pt and wait for transport. Per transfer center they have called: [address] [place], [facility] ambulance, [place]. They asked CM to check with insurance to determine if they can assist with transport. 4:10 PM  Cm called [facility] provider services [phone]. They gave me there transport service #. [facility] [phone]. CM called [facility] [phone]; They need insurance approval for hospital to hospital transport. CM called Transfer center [phone]. Gave them insurance # to call from approval for Vel Bojorquez ID # and [facility] phone number. They will continue to try to get transport set up through her [facility].

## 2021-07-30 NOTE — ED PROVIDER NOTES
This patient's care was transferred to me at shift change with transfer to [address] [place] [address] pending. I did evaluate the patient and she is resting comfortably in bed. She did spike a fever here earlier but has defervesced after Tylenol. Remaining vital signs have remained stable. I have redosed her vancomycin and cefepime for second doses in the ER. The patient has no complaints and was sleeping tonight. The plan is for her to be transferred to the [address] [place] [address] ER in the morning once transport is available. This is where she had her abdominal surgery and the surgeons in emergency department there are aware and have accepted the patient.      Sylvie Rosenbaum MD  07/30/21 5179

## 2021-07-30 NOTE — H&P
Hospital Medicine History & Physical      PCP: No primary care provider on file. Date of Admission: 7/29/2021    Date of Service: Pt seen/examined on 7/30/2021 and Admitted to Inpatient with expected LOS greater than two midnights due to medical therapy. Chief Complaint: Abdominal pain    History Of Present Illness:   39 y.o. female who presented to [facility] with abdominal pain. PMHx significant for hypertension, thyroid disease, asthma, morbid obesity. She was recently hospitalized in [facility] for incarcerated umbilical hernia status post emergent hernia repair without mesh. She had some postsurgical complications with recurrent herniation. Given her morbid obesity and difficulty reducing the hernia she was transferred to the [address] [place] [address] for further management. Although records are not available from [facility] she reports she underwent additional hernia repair with mesh placement and was eventually discharged home on 6/29/2021. Although she previously lived in [place] she just recently moved up to the [facility] where she will be staying. Over the last week she has been having increased lower abdominal tenderness as well as some drainage from the lower margin of the incision. She has developed increased erythema over the lower abdomen. She reports subjective fevers and chills at home. She has had some nausea with decreased appetite. She initially presented to the urgent care and was referred to the ED. She was initially evaluated in the ED on 7/29/2021 and abdominal CT scan demonstrated a large fluid collection in the abdominal wall concerning for abscess. Attempts were made to have the patient transferred back to the [facility] and she was accepted for ED to ED transfer. However due to her insurance limitations transportation was unable to be arranged timely manner. She was ultimately admitted to [facility] on 7/30/2021.       Past Medical History: Diagnosis Date    Anemia     Arthritis     Asthma     Chronic bronchitis (HCC)     Chronic kidney disease     COPD (chronic obstructive pulmonary disease) ([facility] [address]     Depression     Hypertension     Pneumonia     Sleep apnea     Thyroid disease     Vitiligo        Past Surgical History:          Procedure Laterality Date    APPENDECTOMY  july 2002    CHOLECYSTECTOMY  2001    HAND SURGERY Left     CTR    MOUTH SURGERY      5 teeth removed       Medications Prior to Admission:      Prior to Admission medications    Medication Sig Start Date End Date Taking?  Authorizing Provider   Fluticasone furoate-vilanterol (BREO ELLIPTA) 200-25 MCG/INH AEPB inhaler Inhale into the lungs daily   Yes Historical Provider, MD   levothyroxine (SYNTHROID) 200 MCG tablet Take 200 mcg by mouth Daily   Yes Historical Provider, MD   famotidine (PEPCID) 40 MG tablet Take 40 mg by mouth 2 times daily   Yes Historical Provider, MD   ferrous sulfate (FE TABS 325) 325 (65 Fe) MG EC tablet Take 65 mg by mouth 3 times daily (with meals)   Yes Historical Provider, MD   liothyronine (CYTOMEL) 25 MCG tablet Take 25 mcg by mouth daily   Yes Historical Provider, MD   DULoxetine (CYMBALTA) 30 MG extended release capsule Take 30 mg by mouth daily   Yes Historical Provider, MD   Silicone-Vitamin E (REXASIL PATCH & VITAMIN E LIQ EX) Apply topically   Yes Historical Provider, MD   fluticasone-salmeterol (ADVAIR) 100-50 MCG/DOSE diskus inhaler Inhale 1 puff into the lungs every 12 hours   Yes Historical Provider, MD   acetaminophen (TYLENOL) 500 MG tablet Take 500 mg by mouth every 6 hours as needed for Pain   Yes Historical Provider, MD   Diclofen-raNITIdine-Capsaicin -0.025 MG-MG-% THPK by Combination route   Yes Historical Provider, MD   Azelastine HCl 137 MCG/SPRAY SOLN by Nasal route   Yes Historical Provider, MD   calcium-vitamin D (OSCAL-500) 500-200 MG-UNIT per tablet Take 1 tablet by mouth daily   Yes Historical Provider, MD Nutritional Supplements (GLUCOSE MANAGEMENT) TABS Take by mouth   Yes Historical Provider, MD   azelastine (OPTIVAR) 0.05 % ophthalmic solution 1 drop 2 times daily   Yes Historical Provider, MD   oxybutynin (DITROPAN-XL) 5 MG extended release tablet Take 5 mg by mouth daily   Yes Historical Provider, MD   rizatriptan (MAXALT) 10 MG tablet Take 10 mg by mouth once as needed for Migraine May repeat in 2 hours if needed   Yes Historical Provider, MD   sodium hyaluronate (EUFLEXXA) 20 MG/2ML SOSY injection Inject 20 mg into the articular space once a week   Yes Historical Provider, MD   vitamin D (ERGOCALCIFEROL) 03947 UNITS CAPS capsule Take 1 capsule by mouth once a week 8/11/16  Yes Roberto Montez MD   ARIPiprazole (ABILIFY) 5 MG tablet Take 1 tablet by mouth daily 8/11/16  Yes Roberto Montez MD   busPIRone (BUSPAR) 10 MG tablet Take 1 tablet by mouth 3 times daily  Patient taking differently: Take 15 mg by mouth 3 times daily  8/11/16  Yes Roberto Montez MD   amLODIPine (NORVASC) 5 MG tablet Take 1 tablet by mouth daily 8/11/16  Yes Roberto Montez MD   furosemide (LASIX) 40 MG tablet Take 1 tablet by mouth daily 8/11/16  Yes Roberto Montez MD   potassium chloride (KLOR-CON) 20 MEQ packet Take 20 mEq by mouth daily 8/11/16  Yes Roberto Montez MD   atorvastatin (LIPITOR) 10 MG tablet Take 1 tablet by mouth daily 8/11/16  Yes Roberto Montez MD   calcium carbonate (CALCIUM 600) 600 MG TABS tablet Take 1 tablet by mouth daily 8/11/16  Yes Roberto Montez MD   vitamin B-12 (CYANOCOBALAMIN) 1000 MCG tablet Take 1 tablet by mouth daily 8/11/16  Yes Roberto Montez MD   Adhesive Tape (PAPER TAPE 1\"[id number]) TAPE Apply to affected areas. 4/25/16  Yes Suki London MD   Wound Dressings (ADAPTIC NON-ADHERING DRESSING) PADS Apply to affected areas of skin. 4/25/16  Yes Suki London MD   neomycin-bacitracin-polymyxin (NEOSPORIN) 5-400-5000 ointment Apply topically 4 times daily Apply topically 4 times daily.    Yes Historical Provider, MD   Nystatin POWD by Does not apply route   Yes Historical Provider, MD   Multiple Vitamins-Minerals (THERAPEUTIC MULTIVITAMIN-MINERALS) tablet Take 1 tablet by mouth daily   Yes Historical Provider, MD   Polyethylene Glycol 3350 (MIRALAX PO) Take by mouth 2 times daily   Yes Historical Provider, MD   artificial tears (ARTIFICIAL TEARS) OINT as needed   Yes Historical Provider, MD   glucose monitoring kit (FREESTYLE) monitoring kit 1 kit by Does not apply route daily as needed 6/18/15  Yes Nitin Marhs MD   FREESTYLE LANCETS MISC 1 each by Does not apply route daily 6/18/15  Yes Nitin Marsh MD   fluticasone [place] [facility]or ALLERGY RELIEF) 50 MCG/ACT nasal spray 2 sprays by Nasal route 2 times daily    Yes Historical Provider, MD   albuterol (PROVENTIL HFA;VENTOLIN HFA) 108 (90 BASE) MCG/ACT inhaler Inhale 2 puffs into the lungs every 6 hours as needed for Wheezing   Yes Historical Provider, MD   EPINEPHrine 0.1 MG/ML injection Inject 0.3 mg into the muscle as needed   Yes Historical Provider, MD   dextromethorphan-guaiFENesin (MUCINEX DM)  MG per SR tablet Take 1 tablet by mouth every 12 hours as needed. Yes Historical Provider, MD   montelukast (SINGULAIR) 10 MG tablet Take 10 mg by mouth nightly.    Yes Historical Provider, MD   levothyroxine (SYNTHROID) 125 MCG tablet Take 2 tablets by mouth Daily 8/11/16   C Campbell Brown MD   sertraline (ZOLOFT) 100 MG tablet Take 1 tablet by mouth 2 times daily 8/11/16   C Campbell Brown MD   escitalopram (LEXAPRO) 10 MG tablet Take 1 tablet by mouth daily 8/11/16   C Campbell Brown MD   escitalopram (LEXAPRO) 20 MG tablet Take 1 tablet by mouth daily 8/11/16   C Campbell Brown MD   gabapentin (NEURONTIN) 600 MG tablet Take 1 tablet by mouth daily 8/11/16   C Campbell Brown MD   lisinopril (PRINIVIL;ZESTRIL) 20 MG tablet Take 1 tablet by mouth daily 8/11/16   C Campbell Brown MD   hydrochlorothiazide (HYDRODIURIL) 25 MG tablet Take 1 tablet by mouth daily 8/11/16 Kayla Ríos MD   Emollient (EUCERIN) lotion Apply topically as needed. 8/11/16   C Dexter Vallejo MD   omeprazole (PRILOSEC) 20 MG capsule Take 1 capsule by mouth 2 times daily 8/11/16   C Dexter Vallejo MD   vitamin E 400 UNIT capsule Take 1 capsule by mouth 2 times daily 8/11/16   C Dexter Vallejo MD   [facility] FIBER THERAPY 500 MG TABS Take 500 mg by mouth 2 times daily 8/11/16   C Dexter Vallejo MD   glucosamine-chondroitin 500-400 MG tablet Take 1 tablet by mouth 3 times daily 8/11/16   C Dexter Vallejo MD   rOPINIRole (REQUIP) 0.5 MG tablet Take 2 tablets by mouth nightly 8/11/16   AWA Robbins CNP   Petrolatum (PETROLEUM JELLY) OINT Apply to affected areas bid. 8/11/16   Thelma Carrillo MD   amitriptyline (ELAVIL) 75 MG tablet  4/21/16   Historical Provider, MD   nystatin-triamcinolone Sherraúl Simmering) 458500-5.1 UNIT/GM-% ointment  5/4/16   Historical Provider, MD   Sertraline HCl (ZOLOFT PO) Take 200 mg by mouth Indications: at hs     Historical Provider, MD   amitriptyline (ELAVIL) 10 MG tablet Take 75 mg by mouth nightly    Historical Provider, MD   promethazine (PHENERGAN) 25 MG tablet Take 25 mg by mouth every 6 hours as needed for Nausea    Historical Provider, MD   cetirizine (ZYRTEC) 10 MG tablet Take 10 mg by mouth daily. Historical Provider, MD       Allergies:  Bee venom, Ibuprofen, and Nsaids    Social History:    TOBACCO:   reports that she is a non-smoker but has been exposed to tobacco smoke. She has never used smokeless tobacco.  ETOH:   reports no history of alcohol use.   E-Cigarettes/Vaping Use     Questions Responses    E-Cigarette/Vaping Use     Start Date     Passive Exposure     Quit Date     Counseling Given     Comments             Family History:          Problem Relation Age of Onset    Hypertension Mother     Asthma Father     Diabetes Father     Emphysema Father     Hypertension Father     Diabetes Paternal Aunt     Diabetes Paternal [de-identified]     Other Sister problems with pregnancy. REVIEW OF SYSTEMS:   Pertinent positives as noted in the HPI. All other systems reviewed and negative. Physical Exam Performed:    /66   Pulse 102   Temp 98.9 °F (37.2 °C) (Oral)   Resp 18   Ht 5' (1.524 m)   Wt (!) 350 lb (158.8 kg)   SpO2 100%   BMI 68.35 kg/m²     General appearance: No apparent distress, appears stated age and cooperative. Morbid obesity. HEENT:  Normal cephalic, atraumatic without obvious deformity. Pupils equal, round, and reactive to light. Extra ocular muscles intact. Conjunctivae/corneas clear. Neck: Supple, no jugular venous distention. Trachea midline with full range of motion. Respiratory:  Normal respiratory effort. Clear to auscultation, bilaterally without Rales/Wheezes/Rhonchi. Cardiovascular: Regular rate and rhythm with normal S1/S2 without murmurs, rubs or gallops. Abdomen: Soft, obese midline surgical incision is well-healed although the lower margin has an open area which is draining serosanguineous fluid. At the time my evaluation there is not appear to be much cellulitic change at this time. This was in contrast to examination and pictures obtained on 7/29 by the ED providers which demonstrated fairly diffuse cellulitic change. Mild tenderness below the umbilicus. Normal bowel sounds. Musculoskelatal: No clubbing, cyanosis or edema bilaterally. Full range of motion without deformity. Neurologic:  Neurovascularly intact without any focal sensory/motor deficits. Cranial nerves: II-XII intact, grossly non-focal.  Psychiatric: Alert and oriented, thought content appropriate, normal insight  Skin: Skin color, texture, turgor normal.  No rashes or lesions.   Capillary Refill: Brisk,< 3 seconds   Peripheral Pulses: +2 palpable, equal bilaterally       Labs:     Recent Labs     07/29/21  1612   WBC 7.7   HGB 11.9*   HCT 36.4        Recent Labs     07/29/21  1612   *   K 3.3*   CL 99   CO2 22   BUN 4*   CREATININE uterus and adnexal structures are unremarkable. Mild distention of the urinary bladder. Peritoneum/Retroperitoneum: The abdominal aorta is normal in caliber with mild calcified plaque. No significant retroperitoneal adenopathy. Bones/Soft Tissues: Large fluid collection with marginal enhancement in the anterior abdominal wall consistent with an abscess. The collection measures 10.9 x 15.3 x 18.6 cm. No gas within the collection. Mild infiltration of the adjacent subcutaneous fat and overlying skin thickening extending into the pannus. No other focal subcutaneous collection. No recurrent abdominal wall hernia. No acute osseous findings. 1. Large anterior abdominal wall fluid collection most consistent with an abscess. Mild infiltration of the adjacent subcutaneous fat and overlying skin thickening. 2. No acute findings within the abdomen or pelvis. ASSESSMENT:    Active Problems:    Abdominal wall abscess  Resolved Problems:    * No resolved hospital problems. *      PLAN:    Postoperative abdominal wall cellulitis with abscess: While this could represent postsurgical seroma she has features of superficial cellulitis and imaging features concerning for abscess. The area is warm and tender. She has had fevers and chills although no leukocytosis. She does not have clinical features to suggest sepsis at this time. She has already received some IV antibiotics over the last 24 hours during her emergency department stay. Discussed with general surgery Dr. Ramsey Chavez. At this point given the delays and transportation arrangements, we will go ahead and admit the patient to [facility] and cancel request for transfer to [facility].  We will continue IV antibiotics including cefepime and vancomycin. She will be scheduled for IR drainage of the abdominal wall abscess tomorrow. For some reason this cannot be accomplished surgical intervention could be considered.   Continue pain control as needed. Hypertension: Controlled. Adjust regimen in setting of infection. Monitor and adjust as needed. Hypothyroidism: Continue home regimen. Asthma: Continue bronchodilator regimen. DVT Prophylaxis: Lovenox  Diet: Diet NPO Exceptions are: Sips of Water with Meds  Code Status: No Order    PT/OT Eval Status: NA    Dispo -greater than 2 days. We will go ahead and cancel the transfer request to [facility] at this time.      Diamante Keller MD

## 2021-07-30 NOTE — ED PROVIDER NOTES
1:49 PM: I discussed the history, physical examination, laboratory and imaging studies, and treatment plan with Dr. Cecy Acosta. Liam Pardo was signed out to me in stable condition. Please see Dr. Bradley Hunter documentation for details of their history, physical, and laboratory studies. Upon re-examination, a summary of Anjum Cruz's history, physical examination, and studies are as follows: On my evaluation the patient states she is very comfortable but desiring to have her abscess drained whenever possible. We discussed transportation options and how her insurance is currently not able or willing to pay for her to be transferred to [address] [place] [address]. She is agreeable to admission here. I believe she would benefit from IV antibiotics. Her abdominal wall cellulitis seems to be about the same extension as was described earlier. She is currently not septic. Case management attempted to help arrange transportation and was unsuccessful. General surgery has been consulted and we are admitting to the hospitalist for further IV antibiotics and treatment.     Results for orders placed or performed during the hospital encounter of 07/29/21   CBC Auto Differential   Result Value Ref Range    WBC 7.7 4.0 - 11.0 K/uL    RBC 4.52 4.00 - 5.20 M/uL    Hemoglobin 11.9 (L) 12.0 - 16.0 g/dL    Hematocrit 36.4 36.0 - 48.0 %    MCV 80.5 80.0 - 100.0 fL    MCH 26.4 26.0 - 34.0 pg    MCHC 32.8 31.0 - 36.0 g/dL    RDW 15.1 12.4 - 15.4 %    Platelets 771 422 - 657 K/uL    MPV 7.9 5.0 - 10.5 fL    Neutrophils % 78.8 %    Lymphocytes % 13.6 %    Monocytes % 7.2 %    Eosinophils % 0.1 %    Basophils % 0.3 %    Neutrophils Absolute 6.1 1.7 - 7.7 K/uL    Lymphocytes Absolute 1.1 1.0 - 5.1 K/uL    Monocytes Absolute 0.6 0.0 - 1.3 K/uL    Eosinophils Absolute 0.0 0.0 - 0.6 K/uL    Basophils Absolute 0.0 0.0 - 0.2 K/uL   Comprehensive Metabolic Panel w/ Reflex to MG   Result Value Ref Range    Sodium 135 (L) 136 - 145 mmol/L Department of Cardiovascular & Thoracic Surgery  History and Physical / Consultation          PCP: Anastacio Foss MD    Referring Physician: Olimpia Vora    DIAGNOSIS:  1V CAD, MR, TR    CHIEF COMPLAINT:  Chest pain when lying down at night or bending forward    History Obtained From:  patient, family member - daughter, electronic medical record    HISTORY OF PRESENT ILLNESS:      The patient is a 78 y.o. male with significant past medical history of tissue AVR in 2015 and RT treatment of head and neck cancer that left him with xerostomia and dysphagia who presents with chest pain when lying down at night (especially when he removes pillows and lays on his side) or when bending forward at the waist.  It does not occur with activity. The patient still rides horses. He has become a [employer] since his surgery and does not want any blood transfusions. His daughter is present at time of exam and is not a Evangel[employer]. He is pain free in his telemetry room. LHC today shows progression of LAD lesion, but with prompt LAD filling and TTE shows worse TR, roughly stable MR, the bioprosthetic aortic valve is functioning well with no leaks and a normal EF.        Past Medical History:        Diagnosis Date    Aortic valve insufficiency     Arthritis     Atrial fibrillation ([address]     Cancer ([facility] [address] 09/2017    TONGUE head and neck IN REMISSION    Dysphonia     GERD (gastroesophageal reflux disease)     Hearing loss     Heart disease     Medical history reviewed with no changes     Obstructive apnea does not use CPAP    Oropharyngeal dysphagia     Refusal of blood transfusions as patient is Evangelical     Thyroid disease        Past Surgical History:        Procedure Laterality Date    ANKLE SURGERY      AORTIC VALVE REPLACEMENT  1/28/15    Dr. Juliann Umaña 25mm Mosaic Ultra porcine valve; right and left modified maze procedure with ligation of DELGADO with Atriclip    ATRIAL ABLATION SURGERY      BACK Potassium reflex Magnesium 3.3 (L) 3.5 - 5.1 mmol/L    Chloride 99 99 - 110 mmol/L    CO2 22 21 - 32 mmol/L    Anion Gap 14 3 - 16    Glucose 84 70 - 99 mg/dL    BUN 4 (L) 7 - 20 mg/dL    CREATININE 0.8 0.6 - 1.1 mg/dL    GFR Non-African American >60 >60    GFR African American >60 >60    Calcium 9.2 8.3 - 10.6 mg/dL    Total Protein 8.1 6.4 - 8.2 g/dL    Albumin 3.5 3.4 - 5.0 g/dL    Albumin/Globulin Ratio 0.8 (L) 1.1 - 2.2    Total Bilirubin 0.5 0.0 - 1.0 mg/dL    Alkaline Phosphatase 60 40 - 129 U/L    ALT 8 (L) 10 - 40 U/L    AST 10 (L) 15 - 37 U/L    Globulin 4.6 g/dL   HCG Qualitative, Serum   Result Value Ref Range    hCG Qual Negative Detects HCG level >10 MIU/mL   Urinalysis, reflex to microscopic   Result Value Ref Range    Color, UA Yellow Straw/Yellow    Clarity, UA Clear Clear    Glucose, Ur Negative Negative mg/dL    Bilirubin Urine Negative Negative    Ketones, Urine Negative Negative mg/dL    Specific Gravity, UA 1.010 1.005 - 1.030    Blood, Urine Negative Negative    pH, UA 6.5 5.0 - 8.0    Protein, UA Negative Negative mg/dL    Urobilinogen, Urine 0.2 <2.0 E.U./dL    Nitrite, Urine Negative Negative    Leukocyte Esterase, Urine Negative Negative    Microscopic Examination Not Indicated     Urine Type NotGiven    Lactic acid, plasma   Result Value Ref Range    Lactic Acid 1.1 0.4 - 2.0 mmol/L   Magnesium   Result Value Ref Range    Magnesium 2.00 1.80 - 2.40 mg/dL    The total Critical Care time is 45 minutes which excludes separately billable procedures. The critical care was concerning IV antibiotics for treatment of abdominal wall cellulitis and abscess. This time is exclusive of any time documented by any other providers. I spoke with Dr. Enmanuel Aldana. We thoroughly discussed the history, physical exam, laboratory and imaging studies, as well as, emergency department course.  Based upon that discussion, we've decided to admit Nelly Ortiz for further observation and evaluation of Prateekace Jude SURGERY      CSP    CARDIAC CATHETERIZATION      HERNIA REPAIR      KNEE SURGERY      Arthroscopy    LUMBAR SPINE SURGERY Right 10/13/2020    RIGHT LUMBAR4-LUMBAR5 MICRO HEMILAMINECTOMY AND DISCECTOMY ([id number]) performed by Kelvin Chung MD at [facility]      collapsed lung due to broken ribs    MANDIBLE FRACTURE SURGERY      NECK SURGERY      Fusion    PAIN MANAGEMENT PROCEDURE Right 6/22/2020    RIGHT L4 AND L5 TRANSFORAMINAL EPIDURAL STEROID INJECTION WITH FLUOROSCOPY ([id number],60595) performed by Luis Kapoor MD at [facility] Right 7/8/2020    RIGHT L4 AND L5 TRANSFORAMINAL EPIDURAL STEROID INJECTION WITH FLUOROSCOPY ([id number]) performed by Luis Kapoor MD at [address] [place] [address]         Medications:   Current Facility-Administered Medications   Medication Dose Route Frequency Provider Last Rate Last Admin    sodium chloride flush 0.9 % injection 10 mL  10 mL Intravenous 2 times per day Janna Lees,         sodium chloride flush 0.9 % injection 10 mL  10 mL Intravenous PRN Janna Lees,         pantoprazole (PROTONIX) tablet 40 mg  40 mg Oral BID AC Kirti Euceda MD        oxyCODONE-acetaminophen (PERCOCET) 5-325 MG per tablet 1 tablet  1 tablet Oral Q8H PRN Purvialdo Romano, APRN - CNP   1 tablet at 03/08/21 0956    melatonin tablet 3 mg  3 mg Oral Nightly PRN Nicole Flores, APRN - CNP   3 mg at 03/07/21 2133    digoxin (LANOXIN) tablet 125 mcg  125 mcg Oral Daily Fely Easley MD   125 mcg at 03/08/21 0816    dilTIAZem (CARDIZEM 12 HR) extended release capsule 60 mg  60 mg Oral TID Fely Easley MD   60 mg at 03/08/21 0816    docusate sodium (COLACE) capsule 100 mg  100 mg Oral BID Fely Easley MD   100 mg at 03/08/21 0815    levothyroxine (SYNTHROID) tablet 100 mcg  100 mcg Oral Daily Fely Easley MD   100 mcg at 03/08/21 0816    methocarbamol (ROBAXIN) tablet 500 mg  500 mg [place]'s cellulitis. As I have deemed necessary from their history, physical, and studies, I have considered and evaluated Diallo Belts for the following diagnoses: DEEP SPACE INFECTIONS, DEEP VENOUS THROMBOSIS, BABAK'S GANGRENE, SEPSIS, and TOXIC SHOCK SYNDROME. FINAL IMPRESSION  1. Infection of deep incisional surgical site after procedure, initial encounter    2. Abdominal wall abscess at site of surgical wound    3. Abdominal wall cellulitis        Vitals:  Blood pressure 116/66, pulse 102, temperature 98.9 °F (37.2 °C), temperature source Oral, resp. rate 18, height 5' (1.524 m), weight (!) 350 lb (158.8 kg), SpO2 100 %, not currently breastfeeding.           Sunitha Fuller MD  07/30/21 3735 Oral 4x Daily Fely Easley MD   500 mg at 03/08/21 0816    rosuvastatin (CRESTOR) tablet 10 mg  10 mg Oral Daily Fely Easley MD   10 mg at 03/08/21 0816    sodium chloride flush 0.9 % injection 10 mL  10 mL Intravenous 2 times per day Fely Easley MD   10 mL at 03/08/21 0818    sodium chloride flush 0.9 % injection 10 mL  10 mL Intravenous PRN Fely Easley MD        promethazine (PHENERGAN) tablet 12.5 mg  12.5 mg Oral Q6H PRN Fely Easley MD        Or    ondansetron (ZOFRAN) injection 4 mg  4 mg Intravenous Q6H PRN Fely Easley MD        acetaminophen (TYLENOL) tablet 650 mg  650 mg Oral Q6H PRN Fely Easley MD        Or    acetaminophen (TYLENOL) suppository 650 mg  650 mg Rectal Q6H PRN Fely Easley MD        polyethylene glycol (GLYCOLAX) packet 17 g  17 g Oral Daily PRN Fely Easley MD        aspirin chewable tablet 81 mg  81 mg Oral Daily Fely Easley MD   81 mg at 03/08/21 0815    enoxaparin (LOVENOX) injection 40 mg  40 mg Subcutaneous Daily Fely Easlye MD        nitroGLYCERIN (NITROSTAT) SL tablet 0.4 mg  0.4 mg Sublingual Q5 Min PRN Fely Easley MD        fluticasone (FLOVENT HFA) 110 MCG/ACT inhaler 1 puff  1 puff Inhalation BID ISIDRA Butt - CNP   1 puff at 03/07/21 2012       Allergies:  Naproxen and Hydrocodone-acetaminophen    Social History:    TOBACCO:   reports that he quit smoking about 6 years ago. His smoking use included cigarettes. He started smoking about 46 years ago. He has a 40.00 pack-year smoking history. He has never used smokeless tobacco.  ETOH:   reports no history of alcohol use. DRUGS:   reports no history of drug use.   LIFESTYLE: less active since RT    MARITAL STATUS:  He has been  3 times and is currently single  OCCUPATION:  Retired    Family History:        Problem Relation Age of Onset    Heart Disease Other     High Blood Pressure Neg Hx     High Cholesterol Neg Hx        REVIEW OF SYSTEMS:    CONSTITUTIONAL:  fatigue  DERMATOLOGICAL: negative  NEUROLOGICAL:  positive for memory problems and dysphagia  EYES:  negative  HEENT:  positive for  Frequent gagging due to dysphagia -- he reports giving himself the Heimlich maneuver several times  RESPIRATORY:  positive for  dyspnea  CARDIOVASCULAR:  positive for  chest pain as per HPI  GASTROINTESTINAL:  positive for dysphagia, reflux and regurgitation  GENITO-URINARY: negative  ENDOCRINE: negative  MUSCULOSKELETAL:  negative  HEMATOLOGICAL AND LYMPHATIC: negative  IMMUNOLOGICAL: negative  PSYCHOLOGICAL: negative    PHYSICAL EXAM:    VITALS:  /77   Pulse 64   Temp 97.7 °F (36.5 °C) (Oral)   Resp 16   Ht 5' 11\" (1.803 m)   Wt 148 lb 1.6 oz (67.2 kg)   SpO2 97%   BMI 20.66 kg/m²     Eyes:  lids and lashes normal, pupils equal and round, extra ocular muscles intact, sclera clear, conjunctiva normal    Head/ENT:  Normocephalic, atraumatic, normal gums, & palate, oropharynx without erythema or exudates    Neck:  supple, symmetrical, trachea midline, no lymphadenopathy, no jugular venous distension, no carotid bruits and MASSES:  no masses    Lungs:  no increased work of breathing, decreased air exchange, no retractions and clear to auscultation, no crackles or wheezing, no palpable / percussible abnormalities    Cardiovascular:  normal apical impulse, regular rate and rhythm and no murmur detected    Pulses:  Right dorsalis pedis 2, Left dorsalis pedis 2, Right posterior tibial 1, Left Posterior tibial 1, Right Femoral 2, Left Femoral 2, Right radial 2, and Left radial 2    Abdomen:  Soft, normal bowel sounds, non-tender, no hepatosplenomegaly, aorta likely normal and bruits absent    Musculoskeletal:  Back is straight and non-tender,  No CVAT, full ROM of upper and lower extremities.     Extremities:   No clubbing, or cyanosis, or edema     Skin: warm and normal turgor, no ulcers, infections, or rashes, no jaundice    Neurological: awake, alert and oriented x 3, motor 5/5 bilateral upper and lower extremities, sensation grossly intact    Psychiatric: Mood and affect appear appropriate    DATA:    CBC:   Lab Results   Component Value Date    WBC 5.9 03/04/2021    RBC 4.22 03/04/2021    HGB 13.3 03/04/2021    HCT 40.0 03/04/2021    MCV 94.8 03/04/2021    MCH 31.5 03/04/2021    MCHC 33.2 03/04/2021    RDW 13.6 03/04/2021     03/04/2021    MPV 7.4 03/04/2021     BMP:    Lab Results   Component Value Date     03/05/2021    K 4.0 03/05/2021     03/05/2021    CO2 29 03/05/2021    BUN 8 03/05/2021    LABALBU 4.2 03/05/2021    CREATININE <0.5 03/05/2021    CALCIUM 9.4 03/05/2021    GFRAA >60 03/05/2021    GFRAA >60 12/26/2009    LABGLOM >60 03/05/2021    GLUCOSE 98 03/05/2021     CXRAY:  Personally reviewed, No discrete infiltrate  ECHO/CHIKIS: Personally reviewed as per HPI  Other diagnostic test:  C personally reviewed as per HPI. I did measure proximal LAD at less than report. ASSESSMENT AND PLAN:    Nocturnal CP most consistent with reflux, 1 V CAD likely non-occlusive, stable MR, worse TR in patient who sound to have episodes of aspiration with h/o RT for head and neck cancer that left him with xerostomia and hypothyroidism, significant h/o tobacco use and who refuses blood transfusion    I discussed the findings with the patient, his daughter and Dr. Zulma Corona. I favor aggressive treatment of reflux and monitoring symptoms. If they get better would consider yearly GXT. A CT scan of the chest and PFT's may be helpful in delineating degree of COPD / interstitial stigmata of chronic aspiration. The patient would be very high risk for redo cardiac surgery. I answered all the patient and his daughter's questions. Further work-up could likely be continued as outpatient.     Electronically signed by Andreina Hinds MD on 3/8/2021 at 12:19 PM

## 2021-07-31 ENCOUNTER — ANESTHESIA EVENT (OUTPATIENT)
Dept: OPERATING ROOM | Age: 46
DRG: 908 | End: 2021-07-31
Payer: MEDICARE

## 2021-07-31 ENCOUNTER — ANESTHESIA (OUTPATIENT)
Dept: OPERATING ROOM | Age: 46
DRG: 908 | End: 2021-07-31
Payer: MEDICARE

## 2021-07-31 VITALS — DIASTOLIC BLOOD PRESSURE: 66 MMHG | SYSTOLIC BLOOD PRESSURE: 119 MMHG | OXYGEN SATURATION: 100 %

## 2021-07-31 LAB
ANION GAP SERPL CALCULATED.3IONS-SCNC: 11 MMOL/L (ref 3–16)
BASOPHILS ABSOLUTE: 0 K/UL (ref 0–0.2)
BASOPHILS RELATIVE PERCENT: 0.2 %
BUN BLDV-MCNC: 6 MG/DL (ref 7–20)
CALCIUM SERPL-MCNC: 8.6 MG/DL (ref 8.3–10.6)
CHLORIDE BLD-SCNC: 106 MMOL/L (ref 99–110)
CO2: 21 MMOL/L (ref 21–32)
CREAT SERPL-MCNC: 0.7 MG/DL (ref 0.6–1.1)
EOSINOPHILS ABSOLUTE: 0 K/UL (ref 0–0.6)
EOSINOPHILS RELATIVE PERCENT: 0 %
GFR AFRICAN AMERICAN: >60
GFR NON-AFRICAN AMERICAN: >60
GLUCOSE BLD-MCNC: 105 MG/DL (ref 70–99)
GLUCOSE BLD-MCNC: 106 MG/DL (ref 70–99)
GLUCOSE BLD-MCNC: 67 MG/DL (ref 70–99)
GLUCOSE BLD-MCNC: 70 MG/DL (ref 70–99)
GLUCOSE BLD-MCNC: 73 MG/DL (ref 70–99)
HCG(URINE) PREGNANCY TEST: NEGATIVE
HCT VFR BLD CALC: 33.4 % (ref 36–48)
HEMOGLOBIN: 10.8 G/DL (ref 12–16)
INR BLD: 1.14 (ref 0.88–1.12)
LYMPHOCYTES ABSOLUTE: 1.2 K/UL (ref 1–5.1)
LYMPHOCYTES RELATIVE PERCENT: 15.1 %
MAGNESIUM: 2.1 MG/DL (ref 1.8–2.4)
MCH RBC QN AUTO: 26.1 PG (ref 26–34)
MCHC RBC AUTO-ENTMCNC: 32.4 G/DL (ref 31–36)
MCV RBC AUTO: 80.6 FL (ref 80–100)
MONOCYTES ABSOLUTE: 0.6 K/UL (ref 0–1.3)
MONOCYTES RELATIVE PERCENT: 7 %
NEUTROPHILS ABSOLUTE: 6.3 K/UL (ref 1.7–7.7)
NEUTROPHILS RELATIVE PERCENT: 77.7 %
PDW BLD-RTO: 15.3 % (ref 12.4–15.4)
PERFORMED ON: ABNORMAL
PERFORMED ON: ABNORMAL
PERFORMED ON: NORMAL
PERFORMED ON: NORMAL
PHOSPHORUS: 3.2 MG/DL (ref 2.5–4.9)
PLATELET # BLD: 346 K/UL (ref 135–450)
PMV BLD AUTO: 8.2 FL (ref 5–10.5)
POTASSIUM SERPL-SCNC: 3.7 MMOL/L (ref 3.5–5.1)
PROTHROMBIN TIME: 13 SEC (ref 9.9–12.7)
RBC # BLD: 4.15 M/UL (ref 4–5.2)
SARS-COV-2, NAAT: NOT DETECTED
SODIUM BLD-SCNC: 138 MMOL/L (ref 136–145)
VANCOMYCIN TROUGH: 17.4 UG/ML (ref 10–20)
WBC # BLD: 8.1 K/UL (ref 4–11)

## 2021-07-31 PROCEDURE — 36415 COLL VENOUS BLD VENIPUNCTURE: CPT

## 2021-07-31 PROCEDURE — 6360000002 HC RX W HCPCS: Performed by: SURGERY

## 2021-07-31 PROCEDURE — 6360000002 HC RX W HCPCS: Performed by: ANESTHESIOLOGY

## 2021-07-31 PROCEDURE — 87205 SMEAR GRAM STAIN: CPT

## 2021-07-31 PROCEDURE — 87070 CULTURE OTHR SPECIMN AEROBIC: CPT

## 2021-07-31 PROCEDURE — 2580000003 HC RX 258: Performed by: INTERNAL MEDICINE

## 2021-07-31 PROCEDURE — 7100000000 HC PACU RECOVERY - FIRST 15 MIN: Performed by: SURGERY

## 2021-07-31 PROCEDURE — 83735 ASSAY OF MAGNESIUM: CPT

## 2021-07-31 PROCEDURE — 6370000000 HC RX 637 (ALT 250 FOR IP): Performed by: SURGERY

## 2021-07-31 PROCEDURE — 2580000003 HC RX 258: Performed by: SURGERY

## 2021-07-31 PROCEDURE — 6360000002 HC RX W HCPCS: Performed by: INTERNAL MEDICINE

## 2021-07-31 PROCEDURE — 84703 CHORIONIC GONADOTROPIN ASSAY: CPT

## 2021-07-31 PROCEDURE — 87077 CULTURE AEROBIC IDENTIFY: CPT

## 2021-07-31 PROCEDURE — 0WBF0ZZ EXCISION OF ABDOMINAL WALL, OPEN APPROACH: ICD-10-PCS | Performed by: SURGERY

## 2021-07-31 PROCEDURE — 3600000002 HC SURGERY LEVEL 2 BASE: Performed by: SURGERY

## 2021-07-31 PROCEDURE — 10061 I&D ABSCESS COMP/MULTIPLE: CPT | Performed by: SURGERY

## 2021-07-31 PROCEDURE — 94640 AIRWAY INHALATION TREATMENT: CPT

## 2021-07-31 PROCEDURE — 2580000003 HC RX 258: Performed by: ANESTHESIOLOGY

## 2021-07-31 PROCEDURE — 3700000000 HC ANESTHESIA ATTENDED CARE: Performed by: SURGERY

## 2021-07-31 PROCEDURE — 7100000001 HC PACU RECOVERY - ADDTL 15 MIN: Performed by: SURGERY

## 2021-07-31 PROCEDURE — 84100 ASSAY OF PHOSPHORUS: CPT

## 2021-07-31 PROCEDURE — 0W9F0ZZ DRAINAGE OF ABDOMINAL WALL, OPEN APPROACH: ICD-10-PCS | Performed by: SURGERY

## 2021-07-31 PROCEDURE — 1200000000 HC SEMI PRIVATE

## 2021-07-31 PROCEDURE — 87076 CULTURE ANAEROBE IDENT EACH: CPT

## 2021-07-31 PROCEDURE — 85610 PROTHROMBIN TIME: CPT

## 2021-07-31 PROCEDURE — 80202 ASSAY OF VANCOMYCIN: CPT

## 2021-07-31 PROCEDURE — 3600000012 HC SURGERY LEVEL 2 ADDTL 15MIN: Performed by: SURGERY

## 2021-07-31 PROCEDURE — 87186 SC STD MICRODIL/AGAR DIL: CPT

## 2021-07-31 PROCEDURE — 80048 BASIC METABOLIC PNL TOTAL CA: CPT

## 2021-07-31 PROCEDURE — 99232 SBSQ HOSP IP/OBS MODERATE 35: CPT | Performed by: SURGERY

## 2021-07-31 PROCEDURE — 2709999900 HC NON-CHARGEABLE SUPPLY: Performed by: SURGERY

## 2021-07-31 PROCEDURE — 87635 SARS-COV-2 COVID-19 AMP PRB: CPT

## 2021-07-31 PROCEDURE — 3700000001 HC ADD 15 MINUTES (ANESTHESIA): Performed by: SURGERY

## 2021-07-31 PROCEDURE — 87075 CULTR BACTERIA EXCEPT BLOOD: CPT

## 2021-07-31 PROCEDURE — 2500000003 HC RX 250 WO HCPCS: Performed by: SURGERY

## 2021-07-31 PROCEDURE — 49250 EXCISION OF UMBILICUS: CPT | Performed by: SURGERY

## 2021-07-31 PROCEDURE — 85025 COMPLETE CBC W/AUTO DIFF WBC: CPT

## 2021-07-31 RX ORDER — SODIUM CHLORIDE, SODIUM LACTATE, POTASSIUM CHLORIDE, CALCIUM CHLORIDE 600; 310; 30; 20 MG/100ML; MG/100ML; MG/100ML; MG/100ML
INJECTION, SOLUTION INTRAVENOUS CONTINUOUS PRN
Status: DISCONTINUED | OUTPATIENT
Start: 2021-07-31 | End: 2021-07-31 | Stop reason: SDUPTHER

## 2021-07-31 RX ORDER — DIPHENHYDRAMINE HYDROCHLORIDE 50 MG/ML
12.5 INJECTION INTRAMUSCULAR; INTRAVENOUS
Status: DISCONTINUED | OUTPATIENT
Start: 2021-07-31 | End: 2021-07-31 | Stop reason: HOSPADM

## 2021-07-31 RX ORDER — MORPHINE SULFATE 2 MG/ML
2 INJECTION, SOLUTION INTRAMUSCULAR; INTRAVENOUS EVERY 5 MIN PRN
Status: DISCONTINUED | OUTPATIENT
Start: 2021-07-31 | End: 2021-07-31 | Stop reason: HOSPADM

## 2021-07-31 RX ORDER — PROPOFOL 10 MG/ML
INJECTION, EMULSION INTRAVENOUS PRN
Status: DISCONTINUED | OUTPATIENT
Start: 2021-07-31 | End: 2021-07-31 | Stop reason: SDUPTHER

## 2021-07-31 RX ORDER — OXYCODONE HYDROCHLORIDE AND ACETAMINOPHEN 5; 325 MG/1; MG/1
1 TABLET ORAL PRN
Status: DISCONTINUED | OUTPATIENT
Start: 2021-07-31 | End: 2021-07-31 | Stop reason: HOSPADM

## 2021-07-31 RX ORDER — PROMETHAZINE HYDROCHLORIDE 25 MG/ML
6.25 INJECTION, SOLUTION INTRAMUSCULAR; INTRAVENOUS
Status: DISCONTINUED | OUTPATIENT
Start: 2021-07-31 | End: 2021-07-31 | Stop reason: HOSPADM

## 2021-07-31 RX ORDER — HYDRALAZINE HYDROCHLORIDE 20 MG/ML
5 INJECTION INTRAMUSCULAR; INTRAVENOUS EVERY 10 MIN PRN
Status: DISCONTINUED | OUTPATIENT
Start: 2021-07-31 | End: 2021-07-31 | Stop reason: HOSPADM

## 2021-07-31 RX ORDER — MORPHINE SULFATE 2 MG/ML
1 INJECTION, SOLUTION INTRAMUSCULAR; INTRAVENOUS EVERY 5 MIN PRN
Status: DISCONTINUED | OUTPATIENT
Start: 2021-07-31 | End: 2021-07-31 | Stop reason: HOSPADM

## 2021-07-31 RX ORDER — ONDANSETRON 2 MG/ML
INJECTION INTRAMUSCULAR; INTRAVENOUS PRN
Status: DISCONTINUED | OUTPATIENT
Start: 2021-07-31 | End: 2021-07-31 | Stop reason: SDUPTHER

## 2021-07-31 RX ORDER — LEVOTHYROXINE SODIUM 0.1 MG/1
200 TABLET ORAL DAILY
Status: DISCONTINUED | OUTPATIENT
Start: 2021-07-31 | End: 2021-08-04 | Stop reason: HOSPADM

## 2021-07-31 RX ORDER — OXYCODONE HYDROCHLORIDE 5 MG/1
5 TABLET ORAL EVERY 4 HOURS PRN
Status: DISCONTINUED | OUTPATIENT
Start: 2021-07-31 | End: 2021-08-04 | Stop reason: HOSPADM

## 2021-07-31 RX ORDER — MEPERIDINE HYDROCHLORIDE 50 MG/ML
12.5 INJECTION INTRAMUSCULAR; INTRAVENOUS; SUBCUTANEOUS EVERY 5 MIN PRN
Status: DISCONTINUED | OUTPATIENT
Start: 2021-07-31 | End: 2021-07-31 | Stop reason: HOSPADM

## 2021-07-31 RX ORDER — MAGNESIUM HYDROXIDE 1200 MG/15ML
LIQUID ORAL CONTINUOUS PRN
Status: COMPLETED | OUTPATIENT
Start: 2021-07-31 | End: 2021-07-31

## 2021-07-31 RX ORDER — LABETALOL HYDROCHLORIDE 5 MG/ML
5 INJECTION, SOLUTION INTRAVENOUS EVERY 10 MIN PRN
Status: DISCONTINUED | OUTPATIENT
Start: 2021-07-31 | End: 2021-07-31 | Stop reason: HOSPADM

## 2021-07-31 RX ORDER — ONDANSETRON 2 MG/ML
4 INJECTION INTRAMUSCULAR; INTRAVENOUS
Status: DISCONTINUED | OUTPATIENT
Start: 2021-07-31 | End: 2021-07-31 | Stop reason: HOSPADM

## 2021-07-31 RX ORDER — BUPIVACAINE HYDROCHLORIDE AND EPINEPHRINE 5; 5 MG/ML; UG/ML
INJECTION, SOLUTION EPIDURAL; INTRACAUDAL; PERINEURAL PRN
Status: DISCONTINUED | OUTPATIENT
Start: 2021-07-31 | End: 2021-07-31 | Stop reason: ALTCHOICE

## 2021-07-31 RX ORDER — OXYCODONE HYDROCHLORIDE 5 MG/1
10 TABLET ORAL EVERY 4 HOURS PRN
Status: DISCONTINUED | OUTPATIENT
Start: 2021-07-31 | End: 2021-08-04 | Stop reason: HOSPADM

## 2021-07-31 RX ORDER — OXYCODONE HYDROCHLORIDE AND ACETAMINOPHEN 5; 325 MG/1; MG/1
2 TABLET ORAL PRN
Status: DISCONTINUED | OUTPATIENT
Start: 2021-07-31 | End: 2021-07-31 | Stop reason: HOSPADM

## 2021-07-31 RX ADMIN — HYDROMORPHONE HYDROCHLORIDE 0.5 MG: 1 INJECTION, SOLUTION INTRAMUSCULAR; INTRAVENOUS; SUBCUTANEOUS at 18:51

## 2021-07-31 RX ADMIN — PROPOFOL 30 MG: 10 INJECTION, EMULSION INTRAVENOUS at 18:31

## 2021-07-31 RX ADMIN — PROPOFOL 20 MG: 10 INJECTION, EMULSION INTRAVENOUS at 18:44

## 2021-07-31 RX ADMIN — PROPOFOL 20 MG: 10 INJECTION, EMULSION INTRAVENOUS at 18:41

## 2021-07-31 RX ADMIN — CEFEPIME HYDROCHLORIDE 2000 MG: 2 INJECTION, POWDER, FOR SOLUTION INTRAVENOUS at 05:40

## 2021-07-31 RX ADMIN — Medication 2 PUFF: at 19:32

## 2021-07-31 RX ADMIN — ONDANSETRON 4 MG: 2 INJECTION INTRAMUSCULAR; INTRAVENOUS at 18:31

## 2021-07-31 RX ADMIN — PROPOFOL 30 MG: 10 INJECTION, EMULSION INTRAVENOUS at 18:36

## 2021-07-31 RX ADMIN — SODIUM CHLORIDE, SODIUM LACTATE, POTASSIUM CHLORIDE, AND CALCIUM CHLORIDE: .6; .31; .03; .02 INJECTION, SOLUTION INTRAVENOUS at 18:20

## 2021-07-31 RX ADMIN — VANCOMYCIN HYDROCHLORIDE 1250 MG: 10 INJECTION, POWDER, LYOPHILIZED, FOR SOLUTION INTRAVENOUS at 10:01

## 2021-07-31 RX ADMIN — PROPOFOL 40 MG: 10 INJECTION, EMULSION INTRAVENOUS at 18:26

## 2021-07-31 RX ADMIN — MORPHINE SULFATE 2 MG: 2 INJECTION, SOLUTION INTRAMUSCULAR; INTRAVENOUS at 19:57

## 2021-07-31 RX ADMIN — PROPOFOL 80 MG: 10 INJECTION, EMULSION INTRAVENOUS at 18:23

## 2021-07-31 RX ADMIN — MONTELUKAST SODIUM 10 MG: 10 TABLET ORAL at 20:02

## 2021-07-31 RX ADMIN — Medication 10 ML: at 19:57

## 2021-07-31 RX ADMIN — VANCOMYCIN HYDROCHLORIDE 1250 MG: 10 INJECTION, POWDER, LYOPHILIZED, FOR SOLUTION INTRAVENOUS at 22:37

## 2021-07-31 RX ADMIN — CEFEPIME HYDROCHLORIDE 2000 MG: 2 INJECTION, POWDER, FOR SOLUTION INTRAVENOUS at 20:08

## 2021-07-31 RX ADMIN — Medication 2 PUFF: at 07:20

## 2021-07-31 RX ADMIN — DULOXETINE HYDROCHLORIDE 30 MG: 30 CAPSULE, DELAYED RELEASE ORAL at 20:03

## 2021-07-31 RX ADMIN — MORPHINE SULFATE 2 MG: 2 INJECTION, SOLUTION INTRAMUSCULAR; INTRAVENOUS at 04:12

## 2021-07-31 RX ADMIN — BUSPIRONE HYDROCHLORIDE 15 MG: 5 TABLET ORAL at 20:03

## 2021-07-31 ASSESSMENT — PULMONARY FUNCTION TESTS
PIF_VALUE: 1
PIF_VALUE: 0
PIF_VALUE: 1
PIF_VALUE: 0
PIF_VALUE: 1
PIF_VALUE: 0
PIF_VALUE: 1
PIF_VALUE: 0
PIF_VALUE: 1
PIF_VALUE: 0
PIF_VALUE: 1
PIF_VALUE: 0
PIF_VALUE: 1
PIF_VALUE: 0
PIF_VALUE: 1
PIF_VALUE: 0

## 2021-07-31 ASSESSMENT — PAIN SCALES - GENERAL
PAINLEVEL_OUTOF10: 5
PAINLEVEL_OUTOF10: 7
PAINLEVEL_OUTOF10: 5

## 2021-07-31 NOTE — ANESTHESIA PRE PROCEDURE
Department of Anesthesiology  Preprocedure Note       Name:  Paresh Romero   Age:  39 y.o.  :  1975                                          MRN:  [id number]         Date:  2021      Surgeon: Lakshmi Allen):  Naa Joshua MD    Procedure: Procedure(s):  ABDOMINAL WALL INCISION AND DRAINAGE    Medications prior to admission:   Prior to Admission medications    Medication Sig Start Date End Date Taking?  Authorizing Provider   Fluticasone furoate-vilanterol (BREO ELLIPTA) 200-25 MCG/INH AEPB inhaler Inhale into the lungs daily   Yes Historical Provider, MD   levothyroxine (SYNTHROID) 200 MCG tablet Take 200 mcg by mouth Daily   Yes Historical Provider, MD   famotidine (PEPCID) 40 MG tablet Take 40 mg by mouth 2 times daily   Yes Historical Provider, MD   ferrous sulfate (FE TABS 325) 325 (65 Fe) MG EC tablet Take 65 mg by mouth 3 times daily (with meals)   Yes Historical Provider, MD   liothyronine (CYTOMEL) 25 MCG tablet Take 25 mcg by mouth daily   Yes Historical Provider, MD   DULoxetine (CYMBALTA) 30 MG extended release capsule Take 30 mg by mouth daily   Yes Historical Provider, MD   Silicone-Vitamin E (REXASIL PATCH & VITAMIN E LIQ EX) Apply topically   Yes Historical Provider, MD   fluticasone-salmeterol (ADVAIR) 100-50 MCG/DOSE diskus inhaler Inhale 1 puff into the lungs every 12 hours   Yes Historical Provider, MD   acetaminophen (TYLENOL) 500 MG tablet Take 500 mg by mouth every 6 hours as needed for Pain   Yes Historical Provider, MD   Diclofen-raNITIdine-Capsaicin -0.025 MG-MG-% THPK by Combination route   Yes Historical Provider, MD   Azelastine HCl 137 MCG/SPRAY SOLN by Nasal route   Yes Historical Provider, MD   calcium-vitamin D (OSCAL-500) 500-200 MG-UNIT per tablet Take 1 tablet by mouth daily   Yes Historical Provider, MD   Nutritional Supplements (GLUCOSE MANAGEMENT) TABS Take by mouth   Yes Historical Provider, MD   azelastine (OPTIVAR) 0.05 % ophthalmic solution 1 drop 2 times daily   Yes Historical Provider, MD   oxybutynin (DITROPAN-XL) 5 MG extended release tablet Take 5 mg by mouth daily   Yes Historical Provider, MD   rizatriptan (MAXALT) 10 MG tablet Take 10 mg by mouth once as needed for Migraine May repeat in 2 hours if needed   Yes Historical Provider, MD   sodium hyaluronate (EUFLEXXA) 20 MG/2ML SOSY injection Inject 20 mg into the articular space once a week   Yes Historical Provider, MD   vitamin D (ERGOCALCIFEROL) 60452 UNITS CAPS capsule Take 1 capsule by mouth once a week 8/11/16  Yes Joseph Adams MD   ARIPiprazole (ABILIFY) 5 MG tablet Take 1 tablet by mouth daily 8/11/16  Yes Joseph Adams MD   busPIRone (BUSPAR) 10 MG tablet Take 1 tablet by mouth 3 times daily  Patient taking differently: Take 15 mg by mouth 3 times daily  8/11/16  Yes Joseph Adams MD   amLODIPine (NORVASC) 5 MG tablet Take 1 tablet by mouth daily 8/11/16  Yes Joseph Adams MD   furosemide (LASIX) 40 MG tablet Take 1 tablet by mouth daily 8/11/16  Yes Joseph Adams MD   potassium chloride (KLOR-CON) 20 MEQ packet Take 20 mEq by mouth daily 8/11/16  Yes Joseph Adams MD   atorvastatin (LIPITOR) 10 MG tablet Take 1 tablet by mouth daily 8/11/16  Yes Joseph Adams MD   calcium carbonate (CALCIUM 600) 600 MG TABS tablet Take 1 tablet by mouth daily 8/11/16  Yes Joseph Adams MD   vitamin B-12 (CYANOCOBALAMIN) 1000 MCG tablet Take 1 tablet by mouth daily 8/11/16  Yes Joseph Adams MD   Adhesive Tape (PAPER TAPE 1\"[id number]) TAPE Apply to affected areas. 4/25/16  Yes Que Ro MD   Wound Dressings (ADAPTIC NON-ADHERING DRESSING) PADS Apply to affected areas of skin. 4/25/16  Yes Que Ro MD   neomycin-bacitracin-polymyxin (NEOSPORIN) 5-400-5000 ointment Apply topically 4 times daily Apply topically 4 times daily.    Yes Historical Provider, MD   Nystatin POWD by Does not apply route   Yes Historical Provider, MD   Multiple Vitamins-Minerals (THERAPEUTIC MULTIVITAMIN-MINERALS) tablet Take 1 tablet by mouth daily   Yes Historical Provider, MD   Polyethylene Glycol 3350 (MIRALAX PO) Take by mouth 2 times daily   Yes Historical Provider, MD   artificial tears (ARTIFICIAL TEARS) OINT as needed   Yes Historical Provider, MD   glucose monitoring kit (FREESTYLE) monitoring kit 1 kit by Does not apply route daily as needed 6/18/15  Yes Wander Toth MD   FREESTYLE LANCETS MISC 1 each by Does not apply route daily 6/18/15  Yes Wander Toth MD   fluticasone [facility] ALLERGY RELIEF) 50 MCG/ACT nasal spray 2 sprays by Nasal route 2 times daily    Yes Historical Provider, MD   albuterol (PROVENTIL HFA;VENTOLIN HFA) 108 (90 BASE) MCG/ACT inhaler Inhale 2 puffs into the lungs every 6 hours as needed for Wheezing   Yes Historical Provider, MD   EPINEPHrine 0.1 MG/ML injection Inject 0.3 mg into the muscle as needed   Yes Historical Provider, MD   dextromethorphan-guaiFENesin (MUCINEX DM)  MG per SR tablet Take 1 tablet by mouth every 12 hours as needed. Yes Historical Provider, MD   montelukast (SINGULAIR) 10 MG tablet Take 10 mg by mouth nightly. Yes Historical Provider, MD   levothyroxine (SYNTHROID) 125 MCG tablet Take 2 tablets by mouth Daily 8/11/16   C Selina Whitaker MD   sertraline (ZOLOFT) 100 MG tablet Take 1 tablet by mouth 2 times daily 8/11/16   SHLOMO Whitaker MD   escitalopram (LEXAPRO) 10 MG tablet Take 1 tablet by mouth daily 8/11/16   SHLOMO Whitaker MD   escitalopram (LEXAPRO) 20 MG tablet Take 1 tablet by mouth daily 8/11/16   SHLOMO Whitaker MD   gabapentin (NEURONTIN) 600 MG tablet Take 1 tablet by mouth daily 8/11/16   SHLOMO Whitaker MD   lisinopril (PRINIVIL;ZESTRIL) 20 MG tablet Take 1 tablet by mouth daily 8/11/16   SHLOMO Whitaker MD   hydrochlorothiazide (HYDRODIURIL) 25 MG tablet Take 1 tablet by mouth daily 8/11/16   SHLOMO Whitaker MD   Emollient (EUCERIN) lotion Apply topically as needed.  8/11/16   SHLOMO Whitaker MD   omeprazole (PRILOSEC) 20 MG capsule Take 1 capsule by mouth 2 times daily 8/11/16   SHLOMO Gustafson MD   vitamin E 400 UNIT capsule Take 1 capsule by mouth 2 times daily 8/11/16   SHLOMO Gustafson MD   [facility] FIBER THERAPY 500 MG TABS Take 500 mg by mouth 2 times daily 8/11/16   SHLOMO Gustafson MD   glucosamine-chondroitin 500-400 MG tablet Take 1 tablet by mouth 3 times daily 8/11/16   SHLOMO Gustafson MD   rOPINIRole (REQUIP) 0.5 MG tablet Take 2 tablets by mouth nightly 8/11/16   AWA Patterson - CNP   Petrolatum (PETROLEUM JELLY) OINT Apply to affected areas bid. 8/11/16   Que Ro MD   amitriptyline (ELAVIL) 75 MG tablet  4/21/16   Historical Provider, MD   nystatin-triamcinolone Mcduffie Marie) 279825-1.1 UNIT/GM-% ointment  5/4/16   Historical Provider, MD   Sertraline HCl (ZOLOFT PO) Take 200 mg by mouth Indications: at hs     Historical Provider, MD   amitriptyline (ELAVIL) 10 MG tablet Take 75 mg by mouth nightly    Historical Provider, MD   promethazine (PHENERGAN) 25 MG tablet Take 25 mg by mouth every 6 hours as needed for Nausea    Historical Provider, MD   cetirizine (ZYRTEC) 10 MG tablet Take 10 mg by mouth daily.     Historical Provider, MD       Current medications:    Current Facility-Administered Medications   Medication Dose Route Frequency Provider Last Rate Last Admin    levothyroxine (SYNTHROID) tablet 200 mcg  200 mcg Oral Daily Louis Houser MD        cefepime (MAXIPIME) 2000 mg IVPB minibag  2,000 mg Intravenous Q12H Margarito Helton MD   Stopped at 07/31/21 0610    sodium chloride flush 0.9 % injection 5-40 mL  5-40 mL Intravenous 2 times per day Margarito Helton MD   10 mL at 07/30/21 2205    sodium chloride flush 0.9 % injection 5-40 mL  5-40 mL Intravenous PRN Margarito Helton MD        0.9 % sodium chloride infusion  25 mL Intravenous PRN MD Dallin Delaney ON 8/1/2021] enoxaparin (LOVENOX) injection 40 mg  40 mg Subcutaneous Daily Margarito Helton MD        ondansetron (ZOFRAN-ODT) disintegrating tablet 4 mg  4 mg Oral Q8H PRN Satya Boogie MD        Or    ondansetron [facility]) injection 4 mg  4 mg Intravenous Q6H PRN Satya Boogie MD   4 mg at 07/30/21 2206    polyethylene glycol (GLYCOLAX) packet 17 g  17 g Oral Daily PRN Satya Boogie MD        acetaminophen (TYLENOL) tablet 650 mg  650 mg Oral Q6H PRN Satya Boogie MD   650 mg at 07/30/21 2208    Or    acetaminophen (TYLENOL) suppository 650 mg  650 mg Rectal Q6H PRN Satya Boogie MD        morphine (PF) injection 2 mg  2 mg Intravenous Q4H PRN Satya Boogie MD   2 mg at 07/31/21 6723    Or    morphine (PF) injection 4 mg  4 mg Intravenous Q4H PRN Satya Boogie MD        amLODIPine (NORVASC) tablet 5 mg  5 mg Oral Daily Satya Boogie MD        ARIPiprazole (ABILIFY) tablet 5 mg  5 mg Oral Daily Satya Boogie MD        atorvastatin (LIPITOR) tablet 20 mg  20 mg Oral Daily Satya Boogie MD        busPIRone (BUSPAR) tablet 15 mg  15 mg Oral TID Satya Boogie MD   15 mg at 07/30/21 2205    calcium elemental (OSCAL) tablet 500 mg  500 mg Oral Daily Satya Boogie MD        cetirizine (ZYRTEC) tablet 10 mg  10 mg Oral Daily Satya Boogie MD        DULoxetine (CYMBALTA) extended release capsule 30 mg  30 mg Oral BID Satya Boogie MD        budesonide-formoterol [facility]) 160-4.5 MCG/ACT inhaler 2 puff  2 puff Inhalation BID Satya Boogie MD   2 puff at 07/31/21 0720    oxybutynin (DITROPAN-XL) extended release tablet 5 mg  5 mg Oral Daily Satya Boogie MD        montelukast (SINGULAIR) tablet 10 mg  10 mg Oral Nightly Satya Boogie MD   10 mg at 07/30/21 2206    vancomycin (VANCOCIN) 1,250 mg in dextrose 5 % 250 mL IVPB  1,250 mg Intravenous Q12H Satya Boogie MD   Stopped at 07/31/21 1148    albuterol sulfate  (90 Base) MCG/ACT inhaler 2 puff  2 puff Inhalation Q6H PRN Satya Boogie MD        ipratropium-albuterol (DUONEB) nebulizer solution 1 ampule  1 ampule Inhalation Q4H PRN Berenice Travis MD           Allergies: Allergies   Allergen Reactions    Bee Venom     Ibuprofen     Nsaids      Pt only has one kidney       Problem List:    Patient Active Problem List   Diagnosis Code    RLS (restless legs syndrome) G25.81    PARVIZ treated with BiPAP G47.33    Psychophysiological insomnia F51.04    Hypersomnia G47.10    Localized osteoarthrosis, lower leg M17.10    Essential hypertension I10    Acquired hypothyroidism E03.9    Pure hypercholesterolemia E78.00    Head injury S09.90XA    Lymphedema of right lower extremity I89.0    Left foot pain M79.672    Osteoarthritis of left knee M17.12    Obstructive sleep apnea syndrome G47.33    Abdominal wall abscess L02. 80    Infection of deep incisional surgical site after procedure T81.42XA       Past Medical History:        Diagnosis Date    Anemia     Arthritis     Asthma     Chronic bronchitis (HCC)     Chronic kidney disease     COPD (chronic obstructive pulmonary disease) ([facility] [address]     Depression     Hypertension     Pneumonia     Sleep apnea     Thyroid disease     Vitiligo        Past Surgical History:        Procedure Laterality Date    APPENDECTOMY  july 2002    CHOLECYSTECTOMY  2001    HAND SURGERY Left     CTR    MOUTH SURGERY      5 teeth removed       Social History:    Social History     Tobacco Use    Smoking status: Passive Smoke Exposure - Never Smoker    Smokeless tobacco: Never Used   Substance Use Topics    Alcohol use: No     Alcohol/week: 0.0 standard drinks                                Counseling given: Not Answered      Vital Signs (Current):   Vitals:    07/31/21 0720 07/31/21 1004 07/31/21 1531 07/31/21 1739   BP:  101/67 102/65 137/81   Pulse:  82 86 88   Resp:  16 16 20   Temp:  98.3 °F (36.8 °C) 98.1 °F (36.7 °C) 98 °F (36.7 °C)   TempSrc:  Oral Oral Temporal   SpO2: 96% 97% 96% 100%   Weight:       Height: BP Readings from Last 3 Encounters:   07/31/21 137/81   06/08/16 114/60   05/26/16 118/60       NPO Status: Time of last liquid consumption: 2359                        Time of last solid consumption: 2359                        Date of last liquid consumption: 07/30/21                        Date of last solid food consumption: 07/30/21    BMI:   Wt Readings from Last 3 Encounters:   07/30/21 (!) 337 lb 4.9 oz (153 kg)   06/08/16 (!) 360 lb 14.3 oz (163.7 kg)   05/26/16 (!) 360 lb 12.8 oz (163.7 kg)     Body mass index is 65.88 kg/m².     CBC:   Lab Results   Component Value Date    WBC 8.1 07/31/2021    RBC 4.15 07/31/2021    HGB 10.8 07/31/2021    HCT 33.4 07/31/2021    MCV 80.6 07/31/2021    RDW 15.3 07/31/2021     07/31/2021       CMP:   Lab Results   Component Value Date     07/31/2021    K 3.7 07/31/2021    K 3.3 07/29/2021     07/31/2021    CO2 21 07/31/2021    BUN 6 07/31/2021    CREATININE 0.7 07/31/2021    GFRAA >60 07/31/2021    AGRATIO 0.8 07/29/2021    LABGLOM >60 07/31/2021    GLUCOSE 105 07/31/2021    PROT 8.1 07/29/2021    CALCIUM 8.6 07/31/2021    BILITOT 0.5 07/29/2021    ALKPHOS 60 07/29/2021    AST 10 07/29/2021    ALT 8 07/29/2021       POC Tests:   Recent Labs     07/31/21  1743   POCGLU 70       Coags:   Lab Results   Component Value Date    PROTIME 13.0 07/31/2021    INR 1.14 07/31/2021       HCG (If Applicable):   Lab Results   Component Value Date    PREGTESTUR Negative 07/31/2021        ABGs: No results found for: PHART, PO2ART, RZU4IGE, GDA1LGB, BEART, R3TPEHPE     Type & Screen (If Applicable):  No results found for: LABABO, LABRH    Drug/Infectious Status (If Applicable):  No results found for: HIV, HEPCAB    COVID-19 Screening (If Applicable):   Lab Results   Component Value Date    COVID19 Not Detected 07/31/2021           Anesthesia Evaluation   no history of anesthetic complications:   Airway: Mallampati: II  TM distance: >3 FB   Neck ROM: limited  Mouth

## 2021-07-31 NOTE — PROGRESS NOTES
Hospitalist Progress Note  7/31/2021 10:51 AM  Subjective:   Admit Date: 7/29/2021  PCP: No primary care provider on file. Status: Inpatient [101]  Interval History: Hospital Day: 3, admitted with postoperative abdominal wall cellulitis with abscess. CT shows large subcutaneous fluid collection. Vancomycin antibiotic therapy. Post op 6/12/2021 open ventral hernia repair in [place], [place] and open ventral hernia repair with possible biologic mesh (operative note not in [address]) [facility]/20/2021 at [facility]. Initially planned for transfer to [facility] but admitted to [facility] due to delays in transport. Pending interventional radiology drainage of abscess. History of IgA deficiency. Diet NPO Exceptions are: Sips of Water with Meds  Left shoulder peripheral IV (7/30, day #2)  Medications:     cefepime  2,000 mg Intravenous Q12H (7/29, day #3)   enoxaparin  40 mg Subcutaneous Daily (start 8/1)   amitriptyline  75 mg Oral Nightly   amlodipine  5 mg Oral Daily   aripiprazole  5 mg Oral Daily   atorvastatin  20 mg Oral Daily   buspirone  15 mg Oral TID   calcium   500 mg Oral Daily   cetirizine  10 mg Oral Daily   duloxetine  30 mg Oral BID   budesonide-formoterol  2 puff Inhalation BID   liothyronine  25 mcg Oral BID   oxybutynin  5 mg Oral Daily   montelukast  10 mg Oral Nightly   vancomycin  1,250 mg Intravenous Q12H (7/29, day #3)     Recent Labs     07/29/21  1612 07/31/21  0801   WBC 7.7 8.1   HGB 11.9* 10.8*    346   MCV 80.5 80.6     Recent Labs     07/29/21  1612 07/31/21  0800   * 138   K 3.3* 3.7   CL 99 106   CO2 22 21   BUN 4* 6*   CREATININE 0.8 0.7   GLUCOSE 84 105*     Recent Labs     07/29/21  1612   AST 10*   ALT 8*   BILITOT 0.5   ALKPHOS 60     INR (7/31) 1.14  Magnesium (7/31) 2.10 mg/dL  Lactate (7/30) 1.1 mmol/L  Serum hCG (7/39) negative     CT abdomen / pelvis (7/29) Large anterior abdominal wall fluid collection most consistent with an abscess.  Mild infiltration of the adjacent subcutaneous fat and overlying skin thickening. No acute findings within the abdomen or pelvis. Objective:   Vitals:  /67   Pulse 82   Temp 98.3 °F (36.8 °C) (Oral)   Resp 16   Ht 5' (1.524 m)   Wt (!) 337 lb 4.9 oz (153 kg)   SpO2 97%   BMI 65.88 kg/m²   General appearance: alert and cooperative with exam  Lungs: clear to auscultation bilaterally  Heart: regular rate and rhythm, S1, S2 normal, no murmur, click, rub or gallop  Abdomen: post-op, diffusely tender, audible bowel sounds  Extremities: extremities normal, atraumatic, no cyanosis or edema  Neurologic: No obvious focal neurologic deficits. Assessment and Plan:   1. Post-operative abdominal wall abscess:  Antibiotic therapy with cefepime and vancomycin (day #3). CT shows large subcutaneous fluid collection. Pending interventional radiology drainage of abscess. 2. Hypertension:  Continues on amlodipine 5 mg daily. 3. Hypothyroid:  Discontinue liothyronine (Cytomel) 25 mcg BID and restart levothyroxine 200 mcg daily. She reports that the Unithyroid (branded rather than generic) is required. 4. Mild Persistent Asthma: Continues on budesonide-formoterol 2-puff BID. 5. Anxious depression:  Continues on duloxetine 30 mg BID, buspirone 15 mg TID and aripiprazole 5 mg nightly. 6. Dyslipidemia:  Continues on atorvastatin 20 mg nightly. 7. Lymphedema both lower extremities:  Restart furosemide 40 mg daily. 8. Migraine treated with Botox every 12 weeks (next dose pending in August). 9. Class III obesity (BMI 93.1) complicates medical and surgical management. She reports losing 150 pounds after gastric sleeve (2018 at [facility] in [place]).       Advance Directive: Full Code  DVT prophylaxis with enoxaparin 40 mg sub-Q daily (start 8/1)  Discharge planning: pending abscess drainage and surgical recommendations      Cristi Hinson MD, MD  Rounding Hospitalist

## 2021-07-31 NOTE — CARE COORDINATION
CASE MANAGEMENT INITIAL ASSESSMENT      Reviewed chart and completed assessment via telephone with: pt  Explained Case Management role/services. Primary contact information:    Health Care Decision Maker :   Primary Decision Maker: John Cruz - [place] - [phone]          Can this person be reached and be able to respond quickly, such as within a few minutes or hours? Yes    Admit date/status: 7/30/21 IP  Diagnosis: Abd wall fluid collection post hernia repair x 2 approx a month ago  Is this a Readmission?:  No      [place] [place]   Precert required for SNF: Yes       3 night stay required: No    Living arrangements, Adls, care needs, prior to admission: Pt lives with spouse in 1st floor apt with 2 ayse. pt requires assist with ADLs, helped by spouse,  does not drive    Transportation: spouse     Durable Medical Equipment at home:  Walker__Cane_x_RTS__ BSC__Shower Chair__  02__ HHN__ CPAP__  BiPap__  Hospital Bed__ W/C___ Other_rollator_________    Services in the home and/or outpatient, prior to admission: none    Dialysis Facility (if applicable) N/A    PT/OT recs: none available    Hospital Exemption Notification (HEN): not initiated    Barriers to discharge: none    Plan/comments:  Per pt, she recently moved to [place] and her [facility] will become [facility] Aug 1. Will still have [facility] until September. Pt on [facility] schedule to have I&D tonight and drain placement. Pt agreeable to [facility]  agency at d/c if rec'd but states spouse is teachable for drain and dressing management also. Open to any agency that takes insurance and services area. DCP following for needs.        ECOC on chart for MD signature

## 2021-07-31 NOTE — PROGRESS NOTES
Comprehensive Nutrition Assessment    Type and Reason for Visit:  Initial, Positive Nutrition Screen    Nutrition Recommendations/Plan:   Recommend resume general diet when appropriate  Add Ensure High Protein BID  Monitor po intakes, nutrition adequacy, weights, pertinent labs, BMs    Nutrition Assessment:  Positive nutrition screen for wounds. Pt admitted with postoperative abdominal wall cellulitis with abscess. S/p open ventral hernia repair x 2 recently. Pt currently NPO. Pt reports that she has a Hx of gastric sleeve and normally does not eat too much at one time. Pt reports that she drinks ~2 Premier Protein drinks per day, favorable to adding Ensure High Protein to order. Will order when appropriate. Encouraged nutrition. Pt had no questions. Malnutrition Assessment:  Malnutrition Status:  No malnutrition      Nutrition Related Findings:  +2 BLE edema. Hx of gastric sleeve. Wounds:  Surgical Incision       Current Nutrition Therapies:    Diet NPO Exceptions are: Sips of Water with Meds    Anthropometric Measures:  · Height: 5' (152.4 cm)  · Current Body Weight: 337 lb 4.9 oz (153 kg)   · Ideal Body Weight: 100 lbs  · BMI: 65.9  · BMI Categories: Obese Class 3 (BMI 40.0 or greater)       Nutrition Diagnosis:   · Increased nutrient needs related to increase demand for energy/nutrients as evidenced by wounds      Nutrition Interventions:   Food and/or Nutrient Delivery:  Continue Current Diet  Nutrition Education/Counseling:  No recommendation at this time   Coordination of Nutrition Care:  Continue to monitor while inpatient    Nutrition Monitoring and Evaluation:   Behavioral-Environmental Outcomes:  None Identified   Food/Nutrient Intake Outcomes:  Food and Nutrient Intake, Supplement Intake  Physical Signs/Symptoms Outcomes:  Skin     Discharge Planning:     Too soon to determine     Electronically signed by Eric Estrella RD, LD on 7/31/21 at 3:57 PM EDT    Contact: [phone]

## 2021-07-31 NOTE — CONSULTS
for now given stable renal functions and recent Vanc level for 1250 mg q12h  Srinivas Smallnick, [facility] PharmD  8/2/2021 at 10:25 AM    8/3/21  Vancomycin Day ___6_____  Current Dosing: __1,250 mg t79r______  Recent Labs     08/01/21  0441 08/02/21  0604 08/03/21  0452   CREATININE 0.6 0.6 0.6   WBC 7.9 5.2 5.7   Estimated Creatinine Clearance: 169 mL/min (based on SCr of 0.6 mg/dL). - [facility] updated  - Continue current dosing regimen   - Timing of next vancomycin level will be determined based on length of therapy, culture results, renal function, and clinical response. Leigha Duque PharmD    8/3/2021 1:26 PM    8/4/21  - Continue current dosing regimen   - Timing of next vancomycin level will be determined based on length of therapy, culture results, renal function, and clinical response.    Leigha Duque PharmD    8/4/2021 10:13 AM

## 2021-07-31 NOTE — PROGRESS NOTES
Pt transferred from ED to room 327 via wheelchair in stable condition. Handoff report given by Serjio Mann RN. Pt oriented to room, belongings place in room cabinet. Pt A&Ox4, VSS on RA. Pt has a healing midline incision, lower portion around umbilicus has purulent, malodorous drainage. New dressing with gauze applied. Pt c/o 5/10 pain and nausea, PRN medications given per MAR. Pt to be NPO at midnight. Pt denies any further needs at this time. Bed is locked and in lowest position. Call light and bedside table are within reach. Pt has on non skid sock wear.

## 2021-07-31 NOTE — PROGRESS NOTES
Bedside report received from [facility] [place] in ER. Waiting for transport. Admission charting began.     Electronically signed by Ivan Lew RN on 7/30/2021 at 8:12 PM

## 2021-07-31 NOTE — RT PROTOCOL NOTE
using Per Protocol order mode. Repeat RT Therapy Protocol Assessment with second treatment then BID and as needed. If Albuterol Inhaler not tolerated or not effective, then discontinue the Albuterol Inhaler orders and enter two Albuterol Nebulizer orders with same frequencies and PRN reasons. 11-13 - discontinue any other Inpatient aerosolized bronchodilator medication orders and enter DuoNeb Nebulizer orders QID frequency and an Albuterol Nebulizer order every 2 hours PRN wheezing or increased work of breathing using Per Protocol order mode. Repeat RT Therapy Protocol Assessment with second treatment then QID and as needed. Greater than 13 - discontinue any other Inpatient bronchodilator aerosolized medication orders and enter DuoNeb Nebulizer order every 4 hours frequency and Albuterol Nebulizer every 2 hours PRN wheezing or increased work of breathing using Per Protocol order mode. Repeat RT Therapy Protocol Assessment with second treatment then every 4 hours and as needed. RT to enter RT Home Evaluation for COPD & MDI Assessment order using Per Protocol order mode.     Electronically signed by Tom Henderson RCP on 7/30/2021 at 9:36 PM

## 2021-07-31 NOTE — ANESTHESIA POSTPROCEDURE EVALUATION
Department of Anesthesiology  Postprocedure Note    Patient: Sulma Lundberg  MRN: [id number]  YOB: 1975  Date of evaluation: 7/31/2021  Time:  6:52 PM     Procedure Summary     Date: 07/31/21 [facility] / [facility]    Anesthesia Start: Metsa 36 Anesthesia Stop: 1849    Procedure: ABDOMINAL WALL INCISION AND DRAINAGE (N/A Abdomen) Diagnosis: (-)    Surgeons: Angel Sexton MD Responsible Provider: Jolie Ibarra MD    Anesthesia Type: MAC ASA Status: 3          Anesthesia Type: MAC    Madeleine Phase I:      Madeleine Phase II:      Last vitals: Reviewed and per EMR flowsheets. Anesthesia Post Evaluation    Patient location during evaluation: PACU  Patient participation: complete - patient participated  Level of consciousness: awake and alert  Airway patency: patent  Nausea & Vomiting: no nausea and no vomiting  Complications: no  Cardiovascular status: blood pressure returned to baseline  Respiratory status: acceptable  Hydration status: euvolemic  Comments: VSS on transfer to phase 2 recovery. No anesthetic complications.

## 2021-07-31 NOTE — PROGRESS NOTES
4 Eyes Skin Assessment     The patient is being assess for  Admission    I agree that 2 RN's have performed a thorough Head to Toe Skin Assessment on the patient. ALL assessment sites listed below have been assessed. Areas assessed by both nurses: Vickey Adams RN  [x]   Head, Face, and Ears   [x]   Shoulders, Back, and Chest  [x]   Arms, Elbows, and Hands   [x]   Coccyx, Sacrum, and Ischum  [x]   Legs, Feet, and Heels        Does the Patient have Skin Breakdown?   No         Brian Prevention initiated:  No   Wound Care Orders initiated:  No      [facility] nurse consulted for Pressure Injury (Stage 3,4, Unstageable, DTI, NWPT, and Complex wounds):  No      Nurse 1 eSignature: Electronically signed by Jeaneth Barbour RN on 7/30/21 at 11:07 PM EDT    **SHARE this note so that the co-signing nurse is able to place an eSignature**    Nurse 2 eSignature: Electronically signed by Volodymyr Buchanan RN on 7/30/21 at 11:08 PM EDT

## 2021-07-31 NOTE — BRIEF OP NOTE
Brief Postoperative Note      Patient: Yaneli Saini  YOB: 1975  MRN: [id number]    Date of Procedure: 7/31/2021    Pre-Op Diagnosis: Abdominal wall abscess    Post-Op Diagnosis: Same       Procedure(s):  ABDOMINAL WALL INCISION AND DRAINAGE and omphalectomy    Surgeon(s):  Vitaly Kelsey MD    Assistant:  Surgical Assistant: Ann-Marie Davila    Anesthesia: General    Estimated Blood Loss (mL): Minimal    Complications: None    Specimens:   ID Type Source Tests Collected by Time Destination   1 : ABDOMINAL WOUND SURGICAL CULTURE Specimen Abdomen CULTURE, SURGICAL Vitaly Kelsey MD 7/31/2021 1829        Implants:  * No implants in log *      Drains: * No LDAs found *    Findings: As above    Electronically signed by Daysi Medina MD on 7/31/2021 at 6:43 PM

## 2021-08-01 LAB
ANION GAP SERPL CALCULATED.3IONS-SCNC: 12 MMOL/L (ref 3–16)
BASOPHILS ABSOLUTE: 0 K/UL (ref 0–0.2)
BASOPHILS RELATIVE PERCENT: 0.1 %
BUN BLDV-MCNC: 5 MG/DL (ref 7–20)
CALCIUM SERPL-MCNC: 8.7 MG/DL (ref 8.3–10.6)
CHLORIDE BLD-SCNC: 103 MMOL/L (ref 99–110)
CO2: 19 MMOL/L (ref 21–32)
CREAT SERPL-MCNC: 0.6 MG/DL (ref 0.6–1.1)
EOSINOPHILS ABSOLUTE: 0 K/UL (ref 0–0.6)
EOSINOPHILS RELATIVE PERCENT: 0 %
GFR AFRICAN AMERICAN: >60
GFR NON-AFRICAN AMERICAN: >60
GLUCOSE BLD-MCNC: 100 MG/DL (ref 70–99)
HCT VFR BLD CALC: 32.3 % (ref 36–48)
HEMOGLOBIN: 10.5 G/DL (ref 12–16)
LYMPHOCYTES ABSOLUTE: 1.5 K/UL (ref 1–5.1)
LYMPHOCYTES RELATIVE PERCENT: 19 %
MAGNESIUM: 2 MG/DL (ref 1.8–2.4)
MCH RBC QN AUTO: 26.1 PG (ref 26–34)
MCHC RBC AUTO-ENTMCNC: 32.4 G/DL (ref 31–36)
MCV RBC AUTO: 80.5 FL (ref 80–100)
MONOCYTES ABSOLUTE: 0.6 K/UL (ref 0–1.3)
MONOCYTES RELATIVE PERCENT: 7.2 %
NEUTROPHILS ABSOLUTE: 5.8 K/UL (ref 1.7–7.7)
NEUTROPHILS RELATIVE PERCENT: 73.7 %
PDW BLD-RTO: 15.5 % (ref 12.4–15.4)
PHOSPHORUS: 3.2 MG/DL (ref 2.5–4.9)
PLATELET # BLD: 353 K/UL (ref 135–450)
PMV BLD AUTO: 8.1 FL (ref 5–10.5)
POTASSIUM SERPL-SCNC: 3.5 MMOL/L (ref 3.5–5.1)
RBC # BLD: 4.01 M/UL (ref 4–5.2)
SODIUM BLD-SCNC: 134 MMOL/L (ref 136–145)
WBC # BLD: 7.9 K/UL (ref 4–11)

## 2021-08-01 PROCEDURE — 6360000002 HC RX W HCPCS: Performed by: SURGERY

## 2021-08-01 PROCEDURE — 84100 ASSAY OF PHOSPHORUS: CPT

## 2021-08-01 PROCEDURE — 6360000002 HC RX W HCPCS: Performed by: INTERNAL MEDICINE

## 2021-08-01 PROCEDURE — 85025 COMPLETE CBC W/AUTO DIFF WBC: CPT

## 2021-08-01 PROCEDURE — 2580000003 HC RX 258: Performed by: SURGERY

## 2021-08-01 PROCEDURE — 2580000003 HC RX 258: Performed by: INTERNAL MEDICINE

## 2021-08-01 PROCEDURE — 2500000003 HC RX 250 WO HCPCS: Performed by: NURSE PRACTITIONER

## 2021-08-01 PROCEDURE — 83735 ASSAY OF MAGNESIUM: CPT

## 2021-08-01 PROCEDURE — 1200000000 HC SEMI PRIVATE

## 2021-08-01 PROCEDURE — 99024 POSTOP FOLLOW-UP VISIT: CPT | Performed by: SURGERY

## 2021-08-01 PROCEDURE — 80048 BASIC METABOLIC PNL TOTAL CA: CPT

## 2021-08-01 PROCEDURE — 6370000000 HC RX 637 (ALT 250 FOR IP): Performed by: SURGERY

## 2021-08-01 PROCEDURE — 94640 AIRWAY INHALATION TREATMENT: CPT

## 2021-08-01 RX ADMIN — BUSPIRONE HYDROCHLORIDE 15 MG: 5 TABLET ORAL at 21:58

## 2021-08-01 RX ADMIN — ENOXAPARIN SODIUM 40 MG: 40 INJECTION SUBCUTANEOUS at 09:30

## 2021-08-01 RX ADMIN — Medication 10 ML: at 09:25

## 2021-08-01 RX ADMIN — Medication 10 ML: at 21:59

## 2021-08-01 RX ADMIN — MORPHINE SULFATE 2 MG: 2 INJECTION, SOLUTION INTRAMUSCULAR; INTRAVENOUS at 15:05

## 2021-08-01 RX ADMIN — BUSPIRONE HYDROCHLORIDE 15 MG: 5 TABLET ORAL at 15:02

## 2021-08-01 RX ADMIN — CEFEPIME HYDROCHLORIDE 2000 MG: 2 INJECTION, POWDER, FOR SOLUTION INTRAVENOUS at 17:45

## 2021-08-01 RX ADMIN — VANCOMYCIN HYDROCHLORIDE 1000 MG: 1 INJECTION, POWDER, LYOPHILIZED, FOR SOLUTION INTRAVENOUS at 22:03

## 2021-08-01 RX ADMIN — DULOXETINE HYDROCHLORIDE 30 MG: 30 CAPSULE, DELAYED RELEASE ORAL at 09:32

## 2021-08-01 RX ADMIN — MORPHINE SULFATE 4 MG: 4 INJECTION, SOLUTION INTRAMUSCULAR; INTRAVENOUS at 10:19

## 2021-08-01 RX ADMIN — ARIPIPRAZOLE 5 MG: 10 TABLET ORAL at 09:31

## 2021-08-01 RX ADMIN — Medication 2 PUFF: at 19:44

## 2021-08-01 RX ADMIN — AMLODIPINE BESYLATE 5 MG: 5 TABLET ORAL at 09:31

## 2021-08-01 RX ADMIN — MORPHINE SULFATE 2 MG: 2 INJECTION, SOLUTION INTRAMUSCULAR; INTRAVENOUS at 01:09

## 2021-08-01 RX ADMIN — VANCOMYCIN HYDROCHLORIDE 1250 MG: 10 INJECTION, POWDER, LYOPHILIZED, FOR SOLUTION INTRAVENOUS at 09:26

## 2021-08-01 RX ADMIN — LEVOTHYROXINE SODIUM 200 MCG: 0.1 TABLET ORAL at 06:00

## 2021-08-01 RX ADMIN — OXYCODONE 5 MG: 5 TABLET ORAL at 02:24

## 2021-08-01 RX ADMIN — Medication 2 PUFF: at 07:31

## 2021-08-01 RX ADMIN — DULOXETINE HYDROCHLORIDE 30 MG: 30 CAPSULE, DELAYED RELEASE ORAL at 21:58

## 2021-08-01 RX ADMIN — MONTELUKAST SODIUM 10 MG: 10 TABLET ORAL at 21:58

## 2021-08-01 RX ADMIN — OXYCODONE 5 MG: 5 TABLET ORAL at 21:58

## 2021-08-01 RX ADMIN — BUSPIRONE HYDROCHLORIDE 15 MG: 5 TABLET ORAL at 09:32

## 2021-08-01 RX ADMIN — CETIRIZINE HYDROCHLORIDE 10 MG: 10 TABLET, FILM COATED ORAL at 09:32

## 2021-08-01 RX ADMIN — ATORVASTATIN CALCIUM 20 MG: 10 TABLET, FILM COATED ORAL at 09:31

## 2021-08-01 RX ADMIN — OXYBUTYNIN CHLORIDE 5 MG: 5 TABLET, EXTENDED RELEASE ORAL at 09:32

## 2021-08-01 RX ADMIN — CALCIUM 500 MG: 500 TABLET ORAL at 09:32

## 2021-08-01 RX ADMIN — MICONAZOLE NITRATE: 2 POWDER TOPICAL at 21:59

## 2021-08-01 RX ADMIN — CEFEPIME HYDROCHLORIDE 2000 MG: 2 INJECTION, POWDER, FOR SOLUTION INTRAVENOUS at 06:03

## 2021-08-01 RX ADMIN — MICONAZOLE NITRATE: 2 POWDER TOPICAL at 09:37

## 2021-08-01 ASSESSMENT — PAIN SCALES - GENERAL
PAINLEVEL_OUTOF10: 4
PAINLEVEL_OUTOF10: 7
PAINLEVEL_OUTOF10: 5

## 2021-08-01 NOTE — PROGRESS NOTES
Pt a/o. VSS. Abd dressing changed by Dr. Tamela Edwards this am. Shift assessment updated and documented.

## 2021-08-01 NOTE — PROGRESS NOTES
Hospitalist Progress Note  8/1/2021 11:57 AM  Subjective:   Admit Date: 7/29/2021  PCP: none   Status: Inpatient [101]  Interval History: Hospital Day: 4, post op day #1 surgical drainage of abdominal wall incisional abscess and omphalectomy (7/31, Dr Ignacia Sanford). Cultures collected and pending. Wound VAC in the next week. History of present illness: Admitted with postoperative abdominal wall cellulitis with abscess. CT shows large subcutaneous fluid collection. Vancomycin antibiotic therapy. Post op 6/12/2021 open ventral hernia repair in [place], [place] and open ventral hernia repair with possible biologic mesh (operative note not in [facility]) 6/20/2021 at [facility]. Initially planned for transfer to [facility] but admitted to [facility] due to delays in transport. Pending interventional radiology drainage of abscess.   History of IgA deficiency.       Diet: Carbohydrate controlled (45 gm/meal)  Left shoulder peripheral IV (7/30, day #3)  Medications:     levothyroxine  200 mcg Oral Daily   cefepime  2,000 mg Intravenous Q12H (7/29, day #4)   enoxaparin  40 mg Subcutaneous Daily   amdoiine  5 mg Oral Daily   aripiprazole  5 mg Oral Daily   atorvastatin  20 mg Oral Daily   buspirone  15 mg Oral TID   calcium elemental  500 mg Oral Daily   cetirizine  10 mg Oral Daily   duloxetine  30 mg Oral BID   budesonide-formoterol  2 puff Inhalation BID   oxybutynin  5 mg Oral Daily   montelukast  10 mg Oral Nightly   vancomycin  1,250 mg Intravenous Q12H (7/29, day #4)     Recent Labs     07/29/21  1612 07/31/21  0801 08/01/21  0441   WBC 7.7 8.1 7.9   HGB 11.9* 10.8* 10.5*    346 353   MCV 80.5 80.6 80.5     Recent Labs     07/29/21  1612 07/31/21  0800 08/01/21  0441   * 138 134*   K 3.3* 3.7 3.5   CL 99 106 103   CO2 22 21 19*   BUN 4* 6* 5*   CREATININE 0.8 0.7 0.6   GLUCOSE 84 105* 100*       POC Glucose:   07/31/21  1743 07/31/21  1849 07/31/21  1900 07/31/21  2221   70 67* 73 106*     INR (7/31) 1.14  Magnesium (7/31) 2.10 mg/dL  Lactate (7/30) 1.1 mmol/L  Serum hCG (7/39) negative      CT abdomen / pelvis (7/29) Large anterior abdominal wall fluid collection most consistent with an abscess. Mild infiltration of the adjacent subcutaneous fat and overlying skin thickening. No acute findings within the abdomen or pelvis. Objective:   Vitals:  /81   Pulse 73   Temp 97.6 °F (oral)   Resp 16   Ht 5' 0\"  Wt 153 kg  SpO2 97%   BMI 65.88 kg/m²   General appearance: alert and cooperative with exam  Lungs: clear to auscultation bilaterally  Heart: regular rate and rhythm, S1, S2 normal, no murmur, click, rub or gallop  Abdomen: post-op dressing clean / dry, diffusely tender, audible bowel sounds  Extremities: extremities normal, atraumatic, no cyanosis or edema  Neurologic: No obvious focal neurologic deficits. Assessment and Plan:   1. Post-operative abdominal wall abscess:  Antibiotic therapy with cefepime and vancomycin (day #4). CT shows large subcutaneous fluid collection. Surgical drainage of abdominal wall incisional abscess and omphalectomy (7/31, Dr Raleigh Taylor). Cultures collected and pending. Wound VAC in the next week. 2. Hypertension:  Continues on amlodipine 5 mg daily. 3. Hypothyroid:  Discontinue liothyronine (Cytomel) 25 mcg BID and restart levothyroxine 200 mcg daily. She reports that the Unithyroid (branded rather than generic) is required. 4. Mild Persistent Asthma: Continues on budesonide-formoterol 2-puff BID. 5. Anxious depression:  Continues on duloxetine 30 mg BID, buspirone 15 mg TID and aripiprazole 5 mg nightly. 6. Dyslipidemia:  Continues on atorvastatin 20 mg nightly. 7. Lymphedema both lower extremities:  Restart furosemide 40 mg daily. 8. Migraine treated with Botox every 12 weeks (next dose pending in August). 9. Class III obesity (BMI 55.3) complicates medical and surgical management.    She reports losing 150 pounds after gastric sleeve (2018 at [facility] in [place]).    Advance Directive: Full Code  DVT prophylaxis with enoxaparin 40 mg sub-Q daily (start 8/1)  Discharge planning: pending wound VAC, likely by mid-week. Will need fluconazole at the end of antibiotic therapy. Has an appointment with Dr. Reza Huerta as new PCP on Tuesday, August 3 which she will not be able to keep. Will work to reschedule with the [facility] clinic. If needed, she could be seen once in transition at [facility].        Lindsey Mccain MD, MD  [facility] Hospitalist

## 2021-08-01 NOTE — PLAN OF CARE
Pt remains free from falls during this shift. Pt a/o and calls out appropriately. Bed in lowest position and wheels locked. Call light within reach. Bedside table within reach.     Problem: Falls - Risk of:  Goal: Will remain free from falls  Description: Will remain free from falls  8/1/2021 0004 by Wolf Foley RN  Outcome: Ongoing  7/31/2021 1152 by Mamta Morris RN  Outcome: Ongoing  Goal: Absence of physical injury  Description: Absence of physical injury  Outcome: Ongoing

## 2021-08-01 NOTE — PROGRESS NOTES
Pt assessment completed and charted. VSS. Pt a/o. Dressing to abdominal midline incision is c/d/i. Prn medication given per mar. Interdry in skin folds replaced. Bowel sounds active x4 quadrants. Bed in lowest position and wheels locked. Call light within reach. Bedside table within reach. Non-skid footwear in place. Pt denies any other needs at this time. Pt calls out appropriately. Will continue to monitor.

## 2021-08-02 PROBLEM — E66.01 MORBID OBESITY (HCC): Status: ACTIVE | Noted: 2021-08-02

## 2021-08-02 LAB
ANION GAP SERPL CALCULATED.3IONS-SCNC: 10 MMOL/L (ref 3–16)
BASOPHILS ABSOLUTE: 0 K/UL (ref 0–0.2)
BASOPHILS RELATIVE PERCENT: 0.1 %
BUN BLDV-MCNC: 5 MG/DL (ref 7–20)
CALCIUM SERPL-MCNC: 9 MG/DL (ref 8.3–10.6)
CHLORIDE BLD-SCNC: 105 MMOL/L (ref 99–110)
CO2: 23 MMOL/L (ref 21–32)
CREAT SERPL-MCNC: 0.6 MG/DL (ref 0.6–1.1)
EOSINOPHILS ABSOLUTE: 0 K/UL (ref 0–0.6)
EOSINOPHILS RELATIVE PERCENT: 0 %
GFR AFRICAN AMERICAN: >60
GFR NON-AFRICAN AMERICAN: >60
GLUCOSE BLD-MCNC: 99 MG/DL (ref 70–99)
HCT VFR BLD CALC: 33.7 % (ref 36–48)
HEMOGLOBIN: 10.9 G/DL (ref 12–16)
LYMPHOCYTES ABSOLUTE: 1.6 K/UL (ref 1–5.1)
LYMPHOCYTES RELATIVE PERCENT: 31.2 %
MCH RBC QN AUTO: 25.9 PG (ref 26–34)
MCHC RBC AUTO-ENTMCNC: 32.5 G/DL (ref 31–36)
MCV RBC AUTO: 79.8 FL (ref 80–100)
MONOCYTES ABSOLUTE: 0.4 K/UL (ref 0–1.3)
MONOCYTES RELATIVE PERCENT: 7.6 %
NEUTROPHILS ABSOLUTE: 3.2 K/UL (ref 1.7–7.7)
NEUTROPHILS RELATIVE PERCENT: 61.1 %
PDW BLD-RTO: 15.2 % (ref 12.4–15.4)
PLATELET # BLD: 361 K/UL (ref 135–450)
PMV BLD AUTO: 8 FL (ref 5–10.5)
POTASSIUM REFLEX MAGNESIUM: 3.6 MMOL/L (ref 3.5–5.1)
RBC # BLD: 4.22 M/UL (ref 4–5.2)
SODIUM BLD-SCNC: 138 MMOL/L (ref 136–145)
WBC # BLD: 5.2 K/UL (ref 4–11)

## 2021-08-02 PROCEDURE — 2580000003 HC RX 258: Performed by: SURGERY

## 2021-08-02 PROCEDURE — 6360000002 HC RX W HCPCS: Performed by: SURGERY

## 2021-08-02 PROCEDURE — 99024 POSTOP FOLLOW-UP VISIT: CPT | Performed by: SURGERY

## 2021-08-02 PROCEDURE — 80048 BASIC METABOLIC PNL TOTAL CA: CPT

## 2021-08-02 PROCEDURE — 1200000000 HC SEMI PRIVATE

## 2021-08-02 PROCEDURE — 6360000002 HC RX W HCPCS: Performed by: INTERNAL MEDICINE

## 2021-08-02 PROCEDURE — 2580000003 HC RX 258: Performed by: INTERNAL MEDICINE

## 2021-08-02 PROCEDURE — 85025 COMPLETE CBC W/AUTO DIFF WBC: CPT

## 2021-08-02 PROCEDURE — 94640 AIRWAY INHALATION TREATMENT: CPT

## 2021-08-02 PROCEDURE — 36415 COLL VENOUS BLD VENIPUNCTURE: CPT

## 2021-08-02 PROCEDURE — 6370000000 HC RX 637 (ALT 250 FOR IP): Performed by: SURGERY

## 2021-08-02 PROCEDURE — 97606 NEG PRS WND THER DME>50 SQCM: CPT

## 2021-08-02 RX ADMIN — MONTELUKAST SODIUM 10 MG: 10 TABLET ORAL at 22:15

## 2021-08-02 RX ADMIN — ARIPIPRAZOLE 5 MG: 10 TABLET ORAL at 09:07

## 2021-08-02 RX ADMIN — Medication 10 ML: at 09:14

## 2021-08-02 RX ADMIN — CETIRIZINE HYDROCHLORIDE 10 MG: 10 TABLET, FILM COATED ORAL at 09:07

## 2021-08-02 RX ADMIN — CEFEPIME HYDROCHLORIDE 2000 MG: 2 INJECTION, POWDER, FOR SOLUTION INTRAVENOUS at 17:53

## 2021-08-02 RX ADMIN — OXYBUTYNIN CHLORIDE 5 MG: 5 TABLET, EXTENDED RELEASE ORAL at 09:07

## 2021-08-02 RX ADMIN — AMLODIPINE BESYLATE 5 MG: 5 TABLET ORAL at 09:07

## 2021-08-02 RX ADMIN — DULOXETINE HYDROCHLORIDE 30 MG: 30 CAPSULE, DELAYED RELEASE ORAL at 22:12

## 2021-08-02 RX ADMIN — VANCOMYCIN HYDROCHLORIDE 1000 MG: 1 INJECTION, POWDER, LYOPHILIZED, FOR SOLUTION INTRAVENOUS at 09:06

## 2021-08-02 RX ADMIN — Medication 2 PUFF: at 19:39

## 2021-08-02 RX ADMIN — ENOXAPARIN SODIUM 40 MG: 40 INJECTION SUBCUTANEOUS at 09:07

## 2021-08-02 RX ADMIN — LEVOTHYROXINE SODIUM 200 MCG: 0.1 TABLET ORAL at 05:39

## 2021-08-02 RX ADMIN — VANCOMYCIN HYDROCHLORIDE 1250 MG: 10 INJECTION, POWDER, LYOPHILIZED, FOR SOLUTION INTRAVENOUS at 22:17

## 2021-08-02 RX ADMIN — BUSPIRONE HYDROCHLORIDE 15 MG: 5 TABLET ORAL at 09:07

## 2021-08-02 RX ADMIN — MORPHINE SULFATE 2 MG: 2 INJECTION, SOLUTION INTRAMUSCULAR; INTRAVENOUS at 05:40

## 2021-08-02 RX ADMIN — Medication 2 PUFF: at 07:23

## 2021-08-02 RX ADMIN — CEFEPIME HYDROCHLORIDE 2000 MG: 2 INJECTION, POWDER, FOR SOLUTION INTRAVENOUS at 05:44

## 2021-08-02 RX ADMIN — DULOXETINE HYDROCHLORIDE 30 MG: 30 CAPSULE, DELAYED RELEASE ORAL at 09:07

## 2021-08-02 RX ADMIN — MICONAZOLE NITRATE: 2 POWDER TOPICAL at 09:06

## 2021-08-02 RX ADMIN — ATORVASTATIN CALCIUM 20 MG: 10 TABLET, FILM COATED ORAL at 09:07

## 2021-08-02 RX ADMIN — BUSPIRONE HYDROCHLORIDE 15 MG: 5 TABLET ORAL at 14:15

## 2021-08-02 RX ADMIN — MICONAZOLE NITRATE: 2 POWDER TOPICAL at 22:17

## 2021-08-02 RX ADMIN — CALCIUM 500 MG: 500 TABLET ORAL at 09:07

## 2021-08-02 RX ADMIN — BUSPIRONE HYDROCHLORIDE 15 MG: 5 TABLET ORAL at 22:13

## 2021-08-02 ASSESSMENT — PAIN SCALES - GENERAL
PAINLEVEL_OUTOF10: 2
PAINLEVEL_OUTOF10: 4

## 2021-08-02 NOTE — CONSULTS
[id number] [facility] Wound Ostomy Continence Nurse  Consult Note       NAME:  Joey Lynn  MEDICAL RECORD NUMBER:  [id number]  AGE: 39 y.o. GENDER: female  : 1975  TODAY'S DATE:  2021    Subjective The MD told me he compromised my belly button   Reason for WOCN Evaluation and Assessment: vac placement abdominal wall wound      Joey Lynn is a 39 y.o. female referred by:   [x] Physician  [] Nursing  [] Other:     Wound Identification:  Wound Type:   Contributing Factors: diabetes, decreased mobility, shear force and obesity    Wound History: Abdominal wall abscess. Went to OR 21 for I & D and omphalectomy by Dr David Potter. Current Wound Care Treatment:  Moist to moist dressing in place    Patient Goal of Care:  [x] Wound Healing  [] Odor Control  [] Palliative Care  [] Pain Control   [] Other:         PAST MEDICAL HISTORY        Diagnosis Date    Anemia     Arthritis     Asthma     Chronic bronchitis (HCC)     Chronic kidney disease     COPD [facility]ronic obstructive pulmonary disease) ([facility] [address]     Depression     Hypertension     Pneumonia     Sleep apnea     Thyroid disease     Vitiligo        PAST SURGICAL HISTORY    Past Surgical History:   Procedure Laterality Date    APPENDECTOMY  2002    CHOLECYSTECTOMY      HAND SURGERY Left     CTR    MOUTH SURGERY      5 teeth removed    RECTAL SURGERY N/A 2021    ABDOMINAL WALL INCISION AND DRAINAGE performed by Fortino Ch MD at [address] [facility]    Family History   Problem Relation Age of Onset    Hypertension Mother     Asthma Father     Diabetes Father     Emphysema Father     Hypertension Father     Diabetes Paternal Aunt     Diabetes Paternal Grandmother     Other Sister         problems with pregnancy. SOCIAL HISTORY    Social History     Tobacco Use    Smoking status: Passive Smoke Exposure - Never Smoker    Smokeless tobacco: Never Used   Substance Use Topics    Alcohol use:  No  MG per SR tablet Take 1 tablet by mouth every 12 hours as needed.  montelukast (SINGULAIR) 10 MG tablet Take 10 mg by mouth nightly.  levothyroxine (SYNTHROID) 125 MCG tablet Take 2 tablets by mouth Daily 30 tablet 0    sertraline (ZOLOFT) 100 MG tablet Take 1 tablet by mouth 2 times daily 60 tablet 0    escitalopram (LEXAPRO) 10 MG tablet Take 1 tablet by mouth daily 30 tablet 0    escitalopram (LEXAPRO) 20 MG tablet Take 1 tablet by mouth daily 30 tablet 0    gabapentin (NEURONTIN) 600 MG tablet Take 1 tablet by mouth daily 90 tablet 0    lisinopril (PRINIVIL;ZESTRIL) 20 MG tablet Take 1 tablet by mouth daily 30 tablet 0    hydrochlorothiazide (HYDRODIURIL) 25 MG tablet Take 1 tablet by mouth daily 30 tablet 0    Emollient (EUCERIN) lotion Apply topically as needed. 1 Bottle 0    omeprazole (PRILOSEC) 20 MG capsule Take 1 capsule by mouth 2 times daily 30 capsule 0    vitamin E 400 UNIT capsule Take 1 capsule by mouth 2 times daily 30 capsule 0    GNP FIBER THERAPY 500 MG TABS Take 500 mg by mouth 2 times daily 60 tablet 0    glucosamine-chondroitin 500-400 MG tablet Take 1 tablet by mouth 3 times daily 90 tablet 0    rOPINIRole (REQUIP) 0.5 MG tablet Take 2 tablets by mouth nightly 60 tablet 3    Petrolatum (PETROLEUM JELLY) OINT Apply to affected areas bid. 454 g 2    amitriptyline (ELAVIL) 75 MG tablet       nystatin-triamcinolone (MYCOLOG) 229120-1.1 UNIT/GM-% ointment       Sertraline HCl (ZOLOFT PO) Take 200 mg by mouth Indications: at hs       amitriptyline (ELAVIL) 10 MG tablet Take 75 mg by mouth nightly      promethazine (PHENERGAN) 25 MG tablet Take 25 mg by mouth every 6 hours as needed for Nausea      cetirizine (ZYRTEC) 10 MG tablet Take 10 mg by mouth daily. Objective  Lying in bed.     /76   Pulse 79   Temp 97.8 °F (36.6 °C) (Oral)   Resp 16   Ht 5' (1.524 m)   Wt (!) 337 lb 4.9 oz (153 kg)   SpO2 98%   BMI 65.88 kg/m²     LABS:  WBC:    Lab Results   Component Value Date    WBC 5.2 08/02/2021     H/H:    Lab Results   Component Value Date    HGB 10.9 08/02/2021    HCT 33.7 08/02/2021     PTT:  No results found for: APTT, PTT[APTT}  PT/INR:    Lab Results   Component Value Date    PROTIME 13.0 07/31/2021    INR 1.14 07/31/2021     HgBA1c:  No results found for: LABA1C    Assessment  Red, granulation tissue. Uyen wound some skin tape burn lower abdomin. Drainage serosanguinous fluid. Brian Risk Score: Brian Scale Score: 18    Patient Active Problem List   Diagnosis    RLS (restless legs syndrome)    PARVIZ treated with BiPAP    Psychophysiological insomnia    Hypersomnia    Localized osteoarthrosis, lower leg    Essential hypertension    Acquired hypothyroidism    Pure hypercholesterolemia    Head injury    Lymphedema of right lower extremity    Left foot pain    Osteoarthritis of left knee    Obstructive sleep apnea syndrome    Abdominal wall abscess    Infection of deep incisional surgical site after procedure    Morbid obesity ([address]       Measurements:  Negative Pressure Wound Therapy Abdomen (Active)   $ Standard NPWT >50 sq cm PER TX $ Yes 08/02/21 1102   Wound Type Surgical 08/02/21 1102   Unit Type [facility] 08/02/21 1102   Dressing Type White foam;Black foam 08/02/21 1102   Number of pieces used 3 08/02/21 1102   Cycle Continuous 08/02/21 1102   Target Pressure (mmHg) 125 08/02/21 1102   Intensity 1 08/02/21 1102   Dressing Status Clean;Dry; Intact 08/02/21 1102   Dressing Changed Changed/New 08/02/21 1102   Drainage Amount Small 08/02/21 1102   Drainage Description Serosanguinous 08/02/21 1102   Dressing Change Due 08/04/21 08/02/21 1102   Wound Assessment Epithelialization; Red 08/02/21 1102   Uyen-wound Assessment Dry 08/02/21 1102   Shape oval 08/02/21 1102   Odor None 08/02/21 1102   Number of days: 0     Incision 07/31/21 Abdomen (Active)   Wound Image   08/02/21 1058   Dressing Status New dressing applied 08/02/21 1058 Dressing Change Due 08/04/21 08/02/21 1058   Incision Cleansed Cleansed with saline 08/02/21 1058   Dressing/Treatment Barrier film;Negative pressure wound therapy 08/02/21 1058   Incision Length (cm) 5.4 08/02/21 1058   Incision Width (cm) 3.2 cm 08/02/21 1058   Incision Depth (cm) 6.8 cm 08/02/21 1058   Margins Other (Comment) 08/02/21 1058   Incision Assessment Epithelialization;Erythema 08/02/21 1058   Drainage Amount Small 08/02/21 1058   Drainage Description Serosanguinous 08/02/21 1058   Odor None 08/02/21 1058   Edith-incision Assessment Intact 08/02/21 1058   Number of days: 2     Mid abdominal open incision:          Response to treatment:  Well tolerated by patient. Pain Assessment:  Severity:  2 / 10  Quality of pain: aching  Wound Pain Timing/Severity: intermittent  Premedicated: yes    Plan   Plan of Care:       Spoke with Dr Min Harris. Order for negative pressure wound therapy to mid abdominal incision. Current dressing removed. Site cleansed with normal saline. Prepped edith wound with VAC drape. 2 white sponges lightly packed into the wound bed and undermining. One black sponge applied and connected to track pad. Connected to 125 mmHg continuous low suction to [facility]. Plan to change 3 times a week. Wound care to follow. Call wound care for deterioration [phone]. Specialty Bed Required : N/A   [] Low Air Loss   [] Pressure Redistribution  [] Fluid Immersion  [] Bariatric  [] Total Pressure Relief  [] Other:     Current Diet: ADULT DIET; Regular  Dietician consult:  Yes    Discharge Plan:  Placement for patient upon discharge: skilled nursing    Patient appropriate for Outpatient [address]: Yes follow up with Dr Jose Carnes. Referrals:  [x]  / discharge planner following  [x] [facility]  [] Supplies  [] Other    Patient/Caregiver Teaching: Verbalized understanding.   Level of patient/caregiver understanding able to:   [] Indicates understanding       [] Needs reinforcement  [] Unsuccessful      [x] Verbal Understanding  [] Demonstrated understanding       [] No evidence of learning  [] Refused teaching         [] N/A       Electronically signed by Victor Manuel Houser RN, MSN, Donna Jenkins on 8/2/2021 at 11:21 AM

## 2021-08-02 NOTE — PROGRESS NOTES
[facility] GENERAL SURGERY    Surgery Progress Note           POD # 2    PATIENT NAME: Dipesh Rosa     TODAY'S DATE: 8/2/2021    INTERVAL HISTORY:    Pt without complaint. OBJECTIVE:   VITALS:  /76   Pulse 79   Temp 97.8 °F (36.6 °C) (Oral)   Resp 16   Ht 5' (1.524 m)   Wt (!) 337 lb 4.9 oz (153 kg)   SpO2 98%   BMI 65.88 kg/m²     INTAKE/OUTPUT:    I/O last 3 completed shifts: In: 1066.8 [P.O.:720; IV Piggyback:346.8]  Out: 1700 [Urine:1700]  No intake/output data recorded. CONSTITUTIONAL:  awake and alert  ABDOMEN:   normal bowel sounds, soft, non-distended, mild tender,    INCISION: serosanguinous drainage    Data:  CBC:   Recent Labs     07/31/21  0801 08/01/21  0441 08/02/21  0604   WBC 8.1 7.9 5.2   HGB 10.8* 10.5* 10.9*   HCT 33.4* 32.3* 33.7*    353 361     BMP:    Recent Labs     07/31/21  0800 08/01/21  0441 08/02/21  0604    134* 138   K 3.7 3.5 3.6    103 105   CO2 21 19* 23   BUN 6* 5* 5*   CREATININE 0.7 0.6 0.6   GLUCOSE 105* 100* 99     Hepatic: No results for input(s): AST, ALT, ALB, BILITOT, ALKPHOS in the last 72 hours. Mag:      Recent Labs     07/31/21  0800 08/01/21  0441   MG 2.10 2.00      Phos:     Recent Labs     07/31/21  0800 08/01/21  0441   PHOS 3.2 3.2      INR:   Recent Labs     07/31/21  0801   INR 1.14*         Radiology Review:  NA    ASSESSMENT AND PLAN:  39 y.o. female status post I&D of abdominal wall abscess  1. Pain well controlled  2. Continue abx  3.   Wound vac placement today        Electronically signed by Tessie Washington MD

## 2021-08-02 NOTE — PLAN OF CARE
Pt states that pain level is getting better. Will continue to monitor.   Problem: Pain:  Goal: Pain level will decrease  Description: Pain level will decrease  Outcome: Ongoing  Goal: Control of acute pain  Description: Control of acute pain  Outcome: Ongoing

## 2021-08-02 NOTE — PROGRESS NOTES
Spoke with Dr Betty Sotomayor for new referral for [facility] placement to abdomin. Call to central and ordered the machine. Will place when equipment arrives.

## 2021-08-02 NOTE — ADDENDUM NOTE
Addendum  created 08/02/21 1637 by Fred Fairbanks MD    Delete clinical note Pt discharged home in stable condition, IV removed, reviewed AVS and upcoming appointments, all questions answered and all belongings with patient.

## 2021-08-02 NOTE — PROGRESS NOTES
Hospitalist Progress Note      PCP: No primary care provider on file. Date of Admission: 7/29/2021    Chief Complaint: Abdominal Pain     Subjective: no new c/o. Medications:  Reviewed    Infusion Medications    sodium chloride       Scheduled Medications    vancomycin  1,000 mg Intravenous Q12H    levothyroxine  200 mcg Oral Daily    miconazole   Topical BID    cefepime  2,000 mg Intravenous Q12H    sodium chloride flush  5-40 mL Intravenous 2 times per day    enoxaparin  40 mg Subcutaneous Daily    amLODIPine  5 mg Oral Daily    ARIPiprazole  5 mg Oral Daily    atorvastatin  20 mg Oral Daily    busPIRone  15 mg Oral TID    calcium elemental  500 mg Oral Daily    cetirizine  10 mg Oral Daily    DULoxetine  30 mg Oral BID    budesonide-formoterol  2 puff Inhalation BID    oxybutynin  5 mg Oral Daily    montelukast  10 mg Oral Nightly     PRN Meds: oxyCODONE **OR** oxyCODONE, sodium chloride flush, sodium chloride, ondansetron **OR** ondansetron, polyethylene glycol, acetaminophen **OR** acetaminophen, morphine **OR** morphine, albuterol sulfate HFA, ipratropium-albuterol      Intake/Output Summary (Last 24 hours) at 8/2/2021 0937  Last data filed at 8/2/2021 0631  Gross per 24 hour   Intake 1066.82 ml   Output 1700 ml   Net -633.18 ml       Physical Exam Performed:    /76   Pulse 79   Temp 97.8 °F (36.6 °C) (Oral)   Resp 16   Ht 5' (1.524 m)   Wt (!) 337 lb 4.9 oz (153 kg)   SpO2 98%   BMI 65.88 kg/m²     General appearance: No apparent distress, appears stated age and cooperative. HEENT: Pupils equal, round, and reactive to light. Conjunctivae/corneas clear. Neck: Supple, with full range of motion. No jugular venous distention. Trachea midline. Respiratory:  Normal respiratory effort. Clear to auscultation, bilaterally without Rales/Wheezes/Rhonchi. Cardiovascular: Regular rate and rhythm with normal S1/S2 without murmurs, rubs or gallops.   Abdomen: Soft, non-tender, abdominal wall incisional abscess and omphalectomy (7/31, Dr Gino Mckeon). Cultures collected and pending. Wound VAC in the next week. HTN - w/out known CAD and no evidence of active signs/sxs of ischemia/failure. Currently controlled on home meds w/ vitals reviewed and documented as above. HyperLipidemia - controlled on home Statin. Continue, w/ f/u and med adjustment w/ PCP    HypoThyroid - clinically euthyroid on oral replacement therapy. Continue, w/ outpt monitoring as previously arranged. Mild Persistent Asthma: Continues on budesonide-formoterol 2-puff BID. Anxious depression:  Continues on duloxetine 30 mg BID, buspirone 15 mg TID and aripiprazole 5 mg nightly. Lymphedema both lower extremities - Resumed Lasix. Migraine - controlled on Botox every 12 weeks (next dose pending in August). Morbid Obesity -  With Body mass index is 65.88 kg/m². Complicating assessment and treatment. Placing patient at risk for multiple co-morbidities as well as early death and contributing to the patient's presentation. Counseled on weight loss.  She reports losing 150 pounds after gastric sleeve (2018 at [facility] in [place]).           DVT Prophylaxis: LMWH    Recent Labs     07/31/21  0801 08/01/21  0441 08/02/21  0604    353 361     Diet: ADULT DIET; Regular  Code Status: Full Code      PT/OT Eval Status: not yet ordered. Dispo - possibly Wed/Thurs 4/5 August pending wound VAC and clinical course. Will need fluconazole at the end of antibiotic therapy. Has an appointment with Dr. Hardy Estrella as new PCP on Tuesday, August 3 which she will not be able to keep. Will work to reschedule with the [facility] clinic.   If needed, she could be seen once in transition at [facility]

## 2021-08-02 NOTE — CARE COORDINATION
Hospital day 3 POD 2: Patient on C3 re Abdominal wall abscess followed by IM, General Surgery. Patient with plans for wound vac placement when machine arrives- home wound vac form on chart for MD signature. Patient with current IV ATB need. Writer placed call to [facility] needs, agreeable and will follow. SW will ct to follow for DCP needs. MILAD Fields

## 2021-08-02 NOTE — PROGRESS NOTES
Pt assessment completed and charted. VSS. Pt a/o. Abd dressing c/d/i. Prn medication given per mar. Bed in lowest position and wheels locked. Call light within reach. Bedside table within reach. Non-skid footwear in place. Pt denies any other needs at this time. Pt calls out appropriately. Will continue to monitor.

## 2021-08-03 LAB
ALBUMIN SERPL-MCNC: 3.1 G/DL (ref 3.4–5)
ANION GAP SERPL CALCULATED.3IONS-SCNC: 11 MMOL/L (ref 3–16)
BUN BLDV-MCNC: 5 MG/DL (ref 7–20)
CALCIUM SERPL-MCNC: 9.2 MG/DL (ref 8.3–10.6)
CHLORIDE BLD-SCNC: 105 MMOL/L (ref 99–110)
CO2: 25 MMOL/L (ref 21–32)
CREAT SERPL-MCNC: 0.6 MG/DL (ref 0.6–1.1)
GFR AFRICAN AMERICAN: >60
GFR NON-AFRICAN AMERICAN: >60
GLUCOSE BLD-MCNC: 85 MG/DL (ref 70–99)
HCT VFR BLD CALC: 34 % (ref 36–48)
HEMOGLOBIN: 11.1 G/DL (ref 12–16)
MCH RBC QN AUTO: 25.7 PG (ref 26–34)
MCHC RBC AUTO-ENTMCNC: 32.7 G/DL (ref 31–36)
MCV RBC AUTO: 78.7 FL (ref 80–100)
PDW BLD-RTO: 15.8 % (ref 12.4–15.4)
PHOSPHORUS: 3.5 MG/DL (ref 2.5–4.9)
PLATELET # BLD: 404 K/UL (ref 135–450)
PMV BLD AUTO: 8.3 FL (ref 5–10.5)
POTASSIUM SERPL-SCNC: 3.9 MMOL/L (ref 3.5–5.1)
RBC # BLD: 4.33 M/UL (ref 4–5.2)
SODIUM BLD-SCNC: 141 MMOL/L (ref 136–145)
WBC # BLD: 5.7 K/UL (ref 4–11)

## 2021-08-03 PROCEDURE — 80069 RENAL FUNCTION PANEL: CPT

## 2021-08-03 PROCEDURE — 85027 COMPLETE CBC AUTOMATED: CPT

## 2021-08-03 PROCEDURE — 2580000003 HC RX 258: Performed by: INTERNAL MEDICINE

## 2021-08-03 PROCEDURE — 2580000003 HC RX 258: Performed by: SURGERY

## 2021-08-03 PROCEDURE — 6370000000 HC RX 637 (ALT 250 FOR IP): Performed by: NURSE PRACTITIONER

## 2021-08-03 PROCEDURE — 6360000002 HC RX W HCPCS: Performed by: SURGERY

## 2021-08-03 PROCEDURE — 6360000002 HC RX W HCPCS: Performed by: INTERNAL MEDICINE

## 2021-08-03 PROCEDURE — 36415 COLL VENOUS BLD VENIPUNCTURE: CPT

## 2021-08-03 PROCEDURE — 6370000000 HC RX 637 (ALT 250 FOR IP): Performed by: SURGERY

## 2021-08-03 PROCEDURE — 94640 AIRWAY INHALATION TREATMENT: CPT

## 2021-08-03 PROCEDURE — 99024 POSTOP FOLLOW-UP VISIT: CPT | Performed by: SURGERY

## 2021-08-03 PROCEDURE — 1200000000 HC SEMI PRIVATE

## 2021-08-03 RX ORDER — FAMOTIDINE 20 MG/1
40 TABLET, FILM COATED ORAL 2 TIMES DAILY
Status: DISCONTINUED | OUTPATIENT
Start: 2021-08-03 | End: 2021-08-04 | Stop reason: HOSPADM

## 2021-08-03 RX ADMIN — OXYBUTYNIN CHLORIDE 5 MG: 5 TABLET, EXTENDED RELEASE ORAL at 10:18

## 2021-08-03 RX ADMIN — BUSPIRONE HYDROCHLORIDE 15 MG: 5 TABLET ORAL at 10:18

## 2021-08-03 RX ADMIN — SODIUM CHLORIDE 25 ML: 9 INJECTION, SOLUTION INTRAVENOUS at 21:33

## 2021-08-03 RX ADMIN — OXYCODONE 5 MG: 5 TABLET ORAL at 20:02

## 2021-08-03 RX ADMIN — MICONAZOLE NITRATE: 2 POWDER TOPICAL at 21:36

## 2021-08-03 RX ADMIN — VANCOMYCIN HYDROCHLORIDE 1250 MG: 10 INJECTION, POWDER, LYOPHILIZED, FOR SOLUTION INTRAVENOUS at 23:42

## 2021-08-03 RX ADMIN — CEFEPIME HYDROCHLORIDE 2000 MG: 2 INJECTION, POWDER, FOR SOLUTION INTRAVENOUS at 06:49

## 2021-08-03 RX ADMIN — BUSPIRONE HYDROCHLORIDE 15 MG: 5 TABLET ORAL at 16:12

## 2021-08-03 RX ADMIN — ARIPIPRAZOLE 5 MG: 10 TABLET ORAL at 10:19

## 2021-08-03 RX ADMIN — Medication 2 PUFF: at 07:59

## 2021-08-03 RX ADMIN — CALCIUM 500 MG: 500 TABLET ORAL at 10:18

## 2021-08-03 RX ADMIN — BUSPIRONE HYDROCHLORIDE 15 MG: 5 TABLET ORAL at 21:26

## 2021-08-03 RX ADMIN — FAMOTIDINE 40 MG: 20 TABLET ORAL at 21:27

## 2021-08-03 RX ADMIN — Medication 2 PUFF: at 19:18

## 2021-08-03 RX ADMIN — ENOXAPARIN SODIUM 40 MG: 40 INJECTION SUBCUTANEOUS at 10:17

## 2021-08-03 RX ADMIN — ATORVASTATIN CALCIUM 20 MG: 10 TABLET, FILM COATED ORAL at 10:19

## 2021-08-03 RX ADMIN — Medication 10 ML: at 10:17

## 2021-08-03 RX ADMIN — AMLODIPINE BESYLATE 5 MG: 5 TABLET ORAL at 10:18

## 2021-08-03 RX ADMIN — CETIRIZINE HYDROCHLORIDE 10 MG: 10 TABLET, FILM COATED ORAL at 10:18

## 2021-08-03 RX ADMIN — CEFEPIME HYDROCHLORIDE 2000 MG: 2 INJECTION, POWDER, FOR SOLUTION INTRAVENOUS at 21:35

## 2021-08-03 RX ADMIN — VANCOMYCIN HYDROCHLORIDE 1250 MG: 10 INJECTION, POWDER, LYOPHILIZED, FOR SOLUTION INTRAVENOUS at 11:46

## 2021-08-03 RX ADMIN — MICONAZOLE NITRATE: 2 POWDER TOPICAL at 10:23

## 2021-08-03 RX ADMIN — SODIUM CHLORIDE 25 ML: 9 INJECTION, SOLUTION INTRAVENOUS at 06:48

## 2021-08-03 RX ADMIN — DULOXETINE HYDROCHLORIDE 30 MG: 30 CAPSULE, DELAYED RELEASE ORAL at 10:18

## 2021-08-03 RX ADMIN — Medication 10 ML: at 21:28

## 2021-08-03 RX ADMIN — LEVOTHYROXINE SODIUM 200 MCG: 0.1 TABLET ORAL at 06:42

## 2021-08-03 RX ADMIN — MONTELUKAST SODIUM 10 MG: 10 TABLET ORAL at 21:27

## 2021-08-03 RX ADMIN — DULOXETINE HYDROCHLORIDE 30 MG: 30 CAPSULE, DELAYED RELEASE ORAL at 21:27

## 2021-08-03 ASSESSMENT — PAIN SCALES - GENERAL
PAINLEVEL_OUTOF10: 4
PAINLEVEL_OUTOF10: 2
PAINLEVEL_OUTOF10: 3
PAINLEVEL_OUTOF10: 5

## 2021-08-03 ASSESSMENT — PAIN DESCRIPTION - FREQUENCY: FREQUENCY: CONTINUOUS

## 2021-08-03 ASSESSMENT — PAIN DESCRIPTION - DESCRIPTORS
DESCRIPTORS: ACHING
DESCRIPTORS: ACHING

## 2021-08-03 ASSESSMENT — PAIN DESCRIPTION - PAIN TYPE: TYPE: ACUTE PAIN

## 2021-08-03 ASSESSMENT — PAIN DESCRIPTION - LOCATION
LOCATION: ABDOMEN
LOCATION: ABDOMEN

## 2021-08-03 ASSESSMENT — PAIN DESCRIPTION - ORIENTATION: ORIENTATION: LOWER

## 2021-08-03 NOTE — PROGRESS NOTES
Pt is alert and oriented x 4. Vitals signs are stable. Assessment is as charted. Wound vac remains in use with very little drainage noted in the canister. Pt denies further needs at this time.

## 2021-08-03 NOTE — PROGRESS NOTES
[facility] GENERAL SURGERY    Surgery Progress Note           POD # 3    PATIENT NAME: Sulma Lundberg     TODAY'S DATE: 8/3/2021    INTERVAL HISTORY:    Pt without much pain. OBJECTIVE:   VITALS:  /74   Pulse 62   Temp 98.2 °F (36.8 °C) (Oral)   Resp 16   Ht 5' (1.524 m)   Wt (!) 337 lb 4.9 oz (153 kg)   SpO2 96%   BMI 65.88 kg/m²     INTAKE/OUTPUT:    I/O last 3 completed shifts: In: 611.4 [P.O.:240; IV Piggyback:371.4]  Out: 700 [Urine:700]  I/O this shift: In: 52.5 [I.V.:2.5; IV Piggyback:50]  Out: -               CONSTITUTIONAL:  awake and alert  ABDOMEN:   normal bowel sounds, soft, non-distended, tender, vac in place   INCISION: vac in place    Data:  CBC:   Recent Labs     08/01/21 0441 08/02/21  0604 08/03/21  0452   WBC 7.9 5.2 5.7   HGB 10.5* 10.9* 11.1*   HCT 32.3* 33.7* 34.0*    361 404     BMP:    Recent Labs     08/01/21  0441 08/02/21  0604 08/03/21  0452   * 138 141   K 3.5 3.6 3.9    105 105   CO2 19* 23 25   BUN 5* 5* 5*   CREATININE 0.6 0.6 0.6   GLUCOSE 100* 99 85     Hepatic: No results for input(s): AST, ALT, ALB, BILITOT, ALKPHOS in the last 72 hours. Mag:      Recent Labs     08/01/21  0441   MG 2.00      Phos:     Recent Labs     08/01/21  0441 08/03/21  0452   PHOS 3.2 3.5      INR: No results for input(s): INR in the last 72 hours. Radiology Review:  NA    ASSESSMENT AND PLAN:  39 y.o. female status post I&D of abdominal wall abscess  1. Vac change tomorrow  2. Continue abx  3.   Possible discharge tomorrow if wound looks good         Electronically signed by Janette Estes MD

## 2021-08-03 NOTE — PLAN OF CARE
Pt rates her pain 2/10. However, pt reports that this is tolerable for her. Instructed her to call if her pain becomes intolerable.

## 2021-08-03 NOTE — PROGRESS NOTES
Pt assessment completed and documented. VSS on RA. Pt A&O x 4. Wound vac in place - abd. Pt ambulates with assist x1. Bed in lowest position and wheels locked. Non-skid footwear in place. Bed check is on. Call light and bedside table within reach. Pt calls out appropriately and denies any other needs at this time.

## 2021-08-03 NOTE — DISCHARGE INSTR - COC
Continuity of Care Form    Patient Name: Jayne Holter   :  1975  MRN:  [id number]    Admit date:  2021  Discharge date:  2021    Code Status Order: Full Code   Advance Directives:   [address] [facility] Documentation       Date/Time Healthcare Directive Type of Healthcare Directive Copy in [address] Agent's Name Healthcare Agent's Phone Number    21 1746  Yes, patient has an advance directive for healthcare treatment  --  --  --  --  --            Admitting Physician:  Marybeth Huff MD  PCP: No primary care provider on file. Discharging Nurse: [facility] Unit/Room#: 0327/0327-01  Discharging Unit Phone Number:     Emergency Contact:   Extended Emergency Contact Information  Primary Emergency Contact: John Cruz  Address: [address] [place] [address], [address] [facility] [facility]  Mobile Phone: [phone]  Relation: Other    Past Surgical History:  Past Surgical History:   Procedure Laterality Date    APPENDECTOMY  2002    CHOLECYSTECTOMY      HAND SURGERY Left     CTR    MOUTH SURGERY      5 teeth removed    RECTAL SURGERY N/A 2021    ABDOMINAL WALL INCISION AND DRAINAGE performed by Albin Garrett MD at [facility]       Immunization History: There is no immunization history on file for this patient. Active Problems:  Patient Active Problem List   Diagnosis Code    RLS (restless legs syndrome) G25.81    PARVIZ treated with BiPAP G47.33    Psychophysiological insomnia F51.04    Hypersomnia G47.10    Localized osteoarthrosis, lower leg M17.10    Essential hypertension I10    Acquired hypothyroidism E03.9    Pure hypercholesterolemia E78.00    Head injury S09.90XA    Lymphedema of right lower extremity I89.0    Left foot pain M79.672    Osteoarthritis of left knee M17.12    Obstructive sleep apnea syndrome G47.33    Abdominal wall abscess L02. 211    Infection of deep incisional surgical site after procedure T81.42XA    Morbid obesity ([facility] [address] E66.01       Isolation/Infection:   Isolation            No Isolation          Patient Infection Status       None to display            Nurse Assessment:  Last Vital Signs: /79   Pulse 79   Temp 98.1 °F (36.7 °C) (Oral)   Resp 16   Ht 5' (1.524 m)   Wt (!) 337 lb 4.9 oz (153 kg)   SpO2 98%   BMI 65.88 kg/m²     Last documented pain score (0-10 scale): Pain Level: 3  Last Weight:   Wt Readings from Last 1 Encounters:   07/30/21 (!) 337 lb 4.9 oz (153 kg)     Mental Status:  oriented and alert    IV Access:  - None    Nursing Mobility/ADLs:  Walking   Independent  Transfer  Independent  [facility]  Independent  Med Delivery   whole    Wound Care Documentation and Therapy:  Negative Pressure Wound Therapy Abdomen (Active)   $ Standard NPWT >50 sq cm PER TX $ Yes 08/02/21 1102   Wound Type Surgical 08/03/21 0158   Unit Type [facility] 08/02/21 1102   Dressing Type White foam;Black foam 08/02/21 1102   Number of pieces used 3 08/02/21 1102   Cycle Continuous 08/03/21 0158   Target Pressure (mmHg) 125 08/03/21 0158   Intensity 1 08/02/21 1102   Dressing Status Clean;Dry; Intact 08/03/21 0158   Dressing Changed Changed/New 08/02/21 1102   Drainage Amount Small 08/03/21 0158   Drainage Description Serosanguinous 08/03/21 0158   Dressing Change Due 08/04/21 08/02/21 1102   Output (ml) 0 ml 08/03/21 0158   Wound Assessment Epithelialization; Red 08/02/21 1102   Uyen-wound Assessment Dry 08/02/21 1102   Shape oval 08/02/21 1102   Odor None 08/02/21 1102   Number of days: 1     Abdomen:       Abdomen - remove current VAC dressing, clean with NS, dry, piece of barrier strip at distal edge of incision (umbilicus), frame with drape, 1 white foam, 1 black foam, cover with drape, attach tracker pad and achieve seal. 125 mmHg continuous cycle.  VAC dressing to be change 3 times a week. Elimination:  Continence:   · Bowel: Yes  · Bladder: Yes  Urinary Catheter: None   Colostomy/Ileostomy/Ileal Conduit: No       Date of Last BM:     Intake/Output Summary (Last 24 hours) at 8/3/2021 1251  Last data filed at 8/3/2021 1030  Gross per 24 hour   Intake 783.95 ml   Output 700 ml   Net 83.95 ml     I/O last 3 completed shifts: In: 611.4 [P.O.:240; IV Piggyback:371.4]  Out: 700 [Urine:700]    Safety Concerns:     None    Impairments/Disabilities:      None    Nutrition Therapy:  Current Nutrition Therapy:   - Oral Diet:  General    Routes of Feeding: Oral  Liquids: Thin Liquids  Daily Fluid Restriction: no  Last Modified Barium Swallow with Video (Video Swallowing Test): not done    Treatments at the Time of Hospital Discharge:   Respiratory Treatments:   Oxygen Therapy:  is not on home oxygen therapy. Ventilator:    - No ventilator support    Rehab Therapies: NA  Weight Bearing Status/Restrictions: No weight bearing restirctions  Other Medical Equipment (for information only, NOT a DME order):     Other Treatments: Wound vac    Patient's personal belongings (please select all that are sent with patient):  Gail    RN SIGNATURE:  Electronically signed by Bernardino Ortiz RN on 8/4/21 at 6:51 PM EDT    CASE MANAGEMENT/SOCIAL WORK SECTION    Inpatient Status Date: 07/30/2021    Readmission Risk Assessment Score:  Readmission Risk              Risk of Unplanned Readmission:  16           Discharging to Facility/ [address]   [facility]     [address] [place] [address]   [phone]    / signature: Electronically signed by MILAD Aguilar on 8/4/21 at 12:34 PM EDT    PHYSICIAN SECTION    Prognosis: Good    Condition at Discharge: Stable    Rehab Potential (if transferring to Rehab): Good    Recommended Labs or Other Treatments After Discharge: None    Physician Certification: I certify the above information and transfer of Samuel Loredo  is necessary for the continuing treatment of the diagnosis listed and that she requires [address] for less 30 days.      Update Admission H&P: No change in H&P    PHYSICIAN SIGNATURE:  Electronically signed by Jorge A Ricks MD on 8/4/21 at 11:48 AM EDT

## 2021-08-03 NOTE — CARE COORDINATION
Hospital day 4 POD 3: Patient on C3 re Abdominal wall abscces followed by IM and general surgery. Patient will need wound vac at dc form on chart form MD signature, General surgery NP is aware and will have MD sign. [facility] SW will ct to follow. MILAD James

## 2021-08-03 NOTE — PROGRESS NOTES
Hospitalist Progress Note      PCP: No primary care provider on file. Date of Admission: 7/29/2021    Chief Complaint: Abdominal Pain     Subjective: no new c/o. Medications:  Reviewed    Infusion Medications    sodium chloride 10 mL/hr at 08/03/21 0730     Scheduled Medications    vancomycin  1,250 mg Intravenous Q12H    levothyroxine  200 mcg Oral Daily    miconazole   Topical BID    cefepime  2,000 mg Intravenous Q12H    sodium chloride flush  5-40 mL Intravenous 2 times per day    enoxaparin  40 mg Subcutaneous Daily    amLODIPine  5 mg Oral Daily    ARIPiprazole  5 mg Oral Daily    atorvastatin  20 mg Oral Daily    busPIRone  15 mg Oral TID    calcium elemental  500 mg Oral Daily    cetirizine  10 mg Oral Daily    DULoxetine  30 mg Oral BID    budesonide-formoterol  2 puff Inhalation BID    oxybutynin  5 mg Oral Daily    montelukast  10 mg Oral Nightly     PRN Meds: oxyCODONE **OR** oxyCODONE, sodium chloride flush, sodium chloride, ondansetron **OR** ondansetron, polyethylene glycol, acetaminophen **OR** acetaminophen, morphine **OR** morphine, albuterol sulfate HFA, ipratropium-albuterol      Intake/Output Summary (Last 24 hours) at 8/3/2021 0932  Last data filed at 8/3/2021 0730  Gross per 24 hour   Intake 663.95 ml   Output 700 ml   Net -36.05 ml       Physical Exam Performed:    /74   Pulse 62   Temp 98.2 °F (36.8 °C) (Oral)   Resp 16   Ht 5' (1.524 m)   Wt (!) 337 lb 4.9 oz (153 kg)   SpO2 96%   BMI 65.88 kg/m²     General appearance: No apparent distress, appears stated age and cooperative. HEENT: Pupils equal, round, and reactive to light. Conjunctivae/corneas clear. Neck: Supple, with full range of motion. No jugular venous distention. Trachea midline. Respiratory:  Normal respiratory effort. Clear to auscultation, bilaterally without Rales/Wheezes/Rhonchi.   Cardiovascular: Regular rate and rhythm with normal S1/S2 without murmurs, rubs or gallops. Abdomen: Soft, non-tender, non-distended with normal bowel sounds. Musculoskeletal: No clubbing, cyanosis or edema bilaterally. Full range of motion without deformity. Skin: Skin color, texture, turgor normal.  No rashes or lesions. Neurologic:  Neurovascularly intact without any focal sensory/motor deficits. Cranial nerves: II-XII intact, grossly non-focal.  Psychiatric: Alert and oriented, thought content appropriate, normal insight  Capillary Refill: Brisk,< 3 seconds   Peripheral Pulses: +2 palpable, equal bilaterally       Labs:   Recent Labs     08/01/21 0441 08/02/21  0604 08/03/21  0452   WBC 7.9 5.2 5.7   HGB 10.5* 10.9* 11.1*   HCT 32.3* 33.7* 34.0*    361 404     Recent Labs     08/01/21 0441 08/02/21  0604 08/03/21  0452   * 138 141   K 3.5 3.6 3.9    105 105   CO2 19* 23 25   BUN 5* 5* 5*   CREATININE 0.6 0.6 0.6   CALCIUM 8.7 9.0 9.2   PHOS 3.2  --  3.5     No results for input(s): AST, ALT, BILIDIR, BILITOT, ALKPHOS in the last 72 hours. No results for input(s): INR in the last 72 hours. No results for input(s): Ta Seed in the last 72 hours.     Urinalysis:      Lab Results   Component Value Date    NITRU Negative 07/29/2021    WBCUA 10-20 08/17/2014    BACTERIA Rare 08/17/2014    RBCUA 3-5 08/17/2014    BLOODU Negative 07/29/2021    SPECGRAV 1.010 07/29/2021    GLUCOSEU Negative 07/29/2021       Consults:    IP CONSULT TO GENERAL SURGERY  IP CONSULT TO CASE MANAGEMENT  IP CONSULT TO HOSPITALIST  IP CONSULT TO GENERAL SURGERY  IP CONSULT TO RESPIRATORY CARE  IP CONSULT TO PHARMACY      Assessment/Plan:    Active Hospital Problems    Diagnosis     Morbid obesity ([facility] [address] [E66.01]     Abdominal wall abscess [L02.211]     Essential hypertension [I10]     Acquired hypothyroidism [E03.9]     Pure hypercholesterolemia [E78.00]        Post-operative abdominal wall abscess - Antibiotic therapy with cefepime and vancomycin.  CT shows large subcutaneous fluid collection.  Surgical drainage of abdominal wall incisional abscess and omphalectomy (7/31, Dr Chana Al). Cultures collected and pending. Wound VAC      HTN - w/out known CAD and no evidence of active signs/sxs of ischemia/failure. Currently controlled on home meds w/ vitals reviewed and documented as above. HyperLipidemia - controlled on home Statin. Continue, w/ f/u and med adjustment w/ PCP    HypoThyroid - clinically euthyroid on oral replacement therapy. Continue, w/ outpt monitoring as previously arranged. Mild Persistent Asthma - controlled on home Budesonide-Formoterol - continued. Anxious depression:  Continues on duloxetine 30 mg BID, buspirone 15 mg TID and aripiprazole 5 mg nightly. Lymphedema - bilateral chronic controlled on Lasix - continued. Migraine - controlled on Botox every 12 weeks (next dose pending in August). Morbid Obesity -  With Body mass index is 65.88 kg/m². Complicating assessment and treatment. Placing patient at risk for multiple co-morbidities as well as early death and contributing to the patient's presentation. Counseled on weight loss.  She reports losing 150 pounds after gastric sleeve (2018 at [facility] in [place]).         DVT Prophylaxis: LMWH    Recent Labs     08/01/21  0441 08/02/21  0604 08/03/21  0452    361 404     Diet: ADULT DIET; Regular  Code Status: Full Code      PT/OT Eval Status: not yet ordered. Dispo - possibly Wed/Thurs 4/5 August pending wound VAC and clinical course. Will need Fluconazole at the end of antibiotic therapy for recurrent yeast infection PPX. Has an appointment with Dr. Radha Bennett as new PCP on Tuesday, August 3 which she will not be able to keep. Will work to reschedule with the [facility] clinic.   If needed, she could be seen once in transition at Donis Muse MD

## 2021-08-04 VITALS
BODY MASS INDEX: 57.52 KG/M2 | HEART RATE: 69 BPM | SYSTOLIC BLOOD PRESSURE: 127 MMHG | TEMPERATURE: 97.8 F | HEIGHT: 60 IN | OXYGEN SATURATION: 95 % | WEIGHT: 293 LBS | RESPIRATION RATE: 16 BRPM | DIASTOLIC BLOOD PRESSURE: 81 MMHG

## 2021-08-04 LAB
ALBUMIN SERPL-MCNC: 2.9 G/DL (ref 3.4–5)
ANION GAP SERPL CALCULATED.3IONS-SCNC: 10 MMOL/L (ref 3–16)
BUN BLDV-MCNC: 6 MG/DL (ref 7–20)
CALCIUM SERPL-MCNC: 8.9 MG/DL (ref 8.3–10.6)
CHLORIDE BLD-SCNC: 105 MMOL/L (ref 99–110)
CO2: 23 MMOL/L (ref 21–32)
CREAT SERPL-MCNC: 0.6 MG/DL (ref 0.6–1.1)
GFR AFRICAN AMERICAN: >60
GFR NON-AFRICAN AMERICAN: >60
GLUCOSE BLD-MCNC: 88 MG/DL (ref 70–99)
HCT VFR BLD CALC: 33 % (ref 36–48)
HEMOGLOBIN: 10.8 G/DL (ref 12–16)
MCH RBC QN AUTO: 26 PG (ref 26–34)
MCHC RBC AUTO-ENTMCNC: 32.8 G/DL (ref 31–36)
MCV RBC AUTO: 79.3 FL (ref 80–100)
PDW BLD-RTO: 15.6 % (ref 12.4–15.4)
PHOSPHORUS: 3.7 MG/DL (ref 2.5–4.9)
PLATELET # BLD: 362 K/UL (ref 135–450)
PMV BLD AUTO: 7.8 FL (ref 5–10.5)
POTASSIUM SERPL-SCNC: 3.4 MMOL/L (ref 3.5–5.1)
RBC # BLD: 4.16 M/UL (ref 4–5.2)
SODIUM BLD-SCNC: 138 MMOL/L (ref 136–145)
WBC # BLD: 6.6 K/UL (ref 4–11)

## 2021-08-04 PROCEDURE — 6370000000 HC RX 637 (ALT 250 FOR IP): Performed by: NURSE PRACTITIONER

## 2021-08-04 PROCEDURE — 85027 COMPLETE CBC AUTOMATED: CPT

## 2021-08-04 PROCEDURE — 6360000002 HC RX W HCPCS: Performed by: INTERNAL MEDICINE

## 2021-08-04 PROCEDURE — 97606 NEG PRS WND THER DME>50 SQCM: CPT

## 2021-08-04 PROCEDURE — 94640 AIRWAY INHALATION TREATMENT: CPT

## 2021-08-04 PROCEDURE — 36415 COLL VENOUS BLD VENIPUNCTURE: CPT

## 2021-08-04 PROCEDURE — 2580000003 HC RX 258: Performed by: SURGERY

## 2021-08-04 PROCEDURE — 2580000003 HC RX 258: Performed by: INTERNAL MEDICINE

## 2021-08-04 PROCEDURE — 6360000002 HC RX W HCPCS: Performed by: SURGERY

## 2021-08-04 PROCEDURE — 6370000000 HC RX 637 (ALT 250 FOR IP): Performed by: SURGERY

## 2021-08-04 PROCEDURE — 80069 RENAL FUNCTION PANEL: CPT

## 2021-08-04 PROCEDURE — 6370000000 HC RX 637 (ALT 250 FOR IP): Performed by: INTERNAL MEDICINE

## 2021-08-04 RX ORDER — FLUCONAZOLE 100 MG/1
200 TABLET ORAL ONCE
Status: COMPLETED | OUTPATIENT
Start: 2021-08-04 | End: 2021-08-04

## 2021-08-04 RX ORDER — FLUCONAZOLE 150 MG/1
150 TABLET ORAL DAILY
Qty: 3 TABLET | Refills: 0 | Status: SHIPPED | OUTPATIENT
Start: 2021-08-04 | End: 2021-08-07

## 2021-08-04 RX ORDER — OXYCODONE HYDROCHLORIDE 5 MG/1
5 TABLET ORAL EVERY 6 HOURS PRN
Qty: 28 TABLET | Refills: 0 | Status: SHIPPED | OUTPATIENT
Start: 2021-08-04 | End: 2021-08-11

## 2021-08-04 RX ADMIN — ATORVASTATIN CALCIUM 20 MG: 10 TABLET, FILM COATED ORAL at 08:56

## 2021-08-04 RX ADMIN — DULOXETINE HYDROCHLORIDE 30 MG: 30 CAPSULE, DELAYED RELEASE ORAL at 08:42

## 2021-08-04 RX ADMIN — FLUCONAZOLE 200 MG: 100 TABLET ORAL at 12:34

## 2021-08-04 RX ADMIN — SODIUM CHLORIDE 25 ML: 9 INJECTION, SOLUTION INTRAVENOUS at 10:17

## 2021-08-04 RX ADMIN — FAMOTIDINE 40 MG: 20 TABLET ORAL at 09:11

## 2021-08-04 RX ADMIN — AMLODIPINE BESYLATE 5 MG: 5 TABLET ORAL at 08:37

## 2021-08-04 RX ADMIN — Medication 10 ML: at 09:04

## 2021-08-04 RX ADMIN — ARIPIPRAZOLE 5 MG: 10 TABLET ORAL at 08:53

## 2021-08-04 RX ADMIN — ENOXAPARIN SODIUM 40 MG: 40 INJECTION SUBCUTANEOUS at 08:43

## 2021-08-04 RX ADMIN — VANCOMYCIN HYDROCHLORIDE 1250 MG: 10 INJECTION, POWDER, LYOPHILIZED, FOR SOLUTION INTRAVENOUS at 10:17

## 2021-08-04 RX ADMIN — OXYCODONE 5 MG: 5 TABLET ORAL at 04:18

## 2021-08-04 RX ADMIN — MICONAZOLE NITRATE: 2 POWDER TOPICAL at 08:57

## 2021-08-04 RX ADMIN — BUSPIRONE HYDROCHLORIDE 15 MG: 5 TABLET ORAL at 14:30

## 2021-08-04 RX ADMIN — OXYBUTYNIN CHLORIDE 5 MG: 5 TABLET, EXTENDED RELEASE ORAL at 09:12

## 2021-08-04 RX ADMIN — BUSPIRONE HYDROCHLORIDE 15 MG: 5 TABLET ORAL at 08:39

## 2021-08-04 RX ADMIN — LEVOTHYROXINE SODIUM 200 MCG: 0.1 TABLET ORAL at 07:01

## 2021-08-04 RX ADMIN — CALCIUM 500 MG: 500 TABLET ORAL at 08:41

## 2021-08-04 RX ADMIN — CETIRIZINE HYDROCHLORIDE 10 MG: 10 TABLET, FILM COATED ORAL at 08:42

## 2021-08-04 RX ADMIN — Medication 2 PUFF: at 07:52

## 2021-08-04 RX ADMIN — OXYCODONE 5 MG: 5 TABLET ORAL at 15:05

## 2021-08-04 ASSESSMENT — PAIN SCALES - GENERAL
PAINLEVEL_OUTOF10: 5
PAINLEVEL_OUTOF10: 5

## 2021-08-04 NOTE — PROGRESS NOTES
Spoke with Vivian at [facility]. Wound vac has been approved through insurance. Home vac placed on patient. [facility] wound vac placed in soiled utility room. Pt signed delivery form & form was faxed back to [facility] at [address] [place] [address].

## 2021-08-04 NOTE — PROGRESS NOTES
Pt is alert and oriented x 4. Vitals signs are stable. Assessment is as charted. Wound vac remains in place.

## 2021-08-04 NOTE — CARE COORDINATION
1344: Call placed to [facility] remains pending at this time. MILAD Culver   1540: Writer placed call to check on status of wound vac remains pending at this time. MILAD Culver  1600: Spoke with Vivian at [facility] still pending at this time. MILAD Culver  1630: Spoke with Gamaliel Balloon [phone] at [facility] still no approval. Per Gamaliel Balloon she will call and send confirmation of delivery for Patient to sign to [facility]. Per Gamaliel Balloon unable to have signed form until replaced. Gamaliel Balloon is aware vac  number [id number]. Patient as long as Vac approval is obtained will dc with below plans     CASE MANAGEMENT DISCHARGE SUMMARY    Discharge to: Home    IMM given: (date) 08/02/2021    New Durable Medical Equipment ordered/agency: Wound Vac thru [facility] will be delivered to Patient room once approved     Transportation: via private car spouse    Confirmed discharge plan with: Patient    Facility/Agency, name: [facility] SONDRA/AVS- pulled by [facility]. Writer provided information on care clinic if unable to get new patient appointment rescheduled with PCP office    RN, name: KASEY Garvin     Note: Discharging nurse to complete SONDRA, reconcile AVS, and place final copy with patient's discharge packet. RN to ensure that written prescriptions for  Level II medications are sent with patient to the facility as per protocol.   MILAD Culver

## 2021-08-04 NOTE — PLAN OF CARE
Problem: Pain:  Goal: Control of acute pain    Pt is rating pain 5/10. Pt medicated with PRN Oxycodone as ordered. Will continue to monitor.

## 2021-08-04 NOTE — PROGRESS NOTES
[facility] Wound Ostomy Continence Nurse  Follow-up Progress Note       NAME:  Emily Ramirez  MEDICAL RECORD NUMBER:  [id number]  AGE:  39 y.o. GENDER:  female  :  1975  TODAY'S DATE:  2021    Subjective: I'm feeling okay today. They are waiting for insurance to approve the home VAC. Wound Identification:  Wound Type: non-healing surgical; Dr. Kia Costa 21 Abdominal Wall Incision and Drainage and Omphalectomy  Contributing Factors: diabetes, chronic pressure, decreased mobility and obesity        Patient Goal of Care:  [] Wound Healing  [] Odor Control  [] Palliative Care  [] Pain Control   [] Other:     Objective: A/O; lying in bed; current VAC dressing intact and sealed    /81   Pulse 69   Temp 97.8 °F (36.6 °C) (Oral)   Resp 16   Ht 5' (1.524 m)   Wt (!) 337 lb 4.9 oz (153 kg)   SpO2 95%   BMI 65.88 kg/m²   Brian Risk Score: Brian Scale Score: 20  Assessment: Abdomen - red granulation tissue, scant bleeding   Measurements:  Negative Pressure Wound Therapy Abdomen (Active)   $ Standard NPWT >50 sq cm PER TX $ Yes 21 1502   Wound Type Surgical 21 1502   Unit Type [facility] 21 1502   Dressing Type White foam;Black foam 21 1502   Number of pieces used 2 21 1502   Cycle Continuous 21 1502   Target Pressure (mmHg) 125 21 1502   Intensity 1 21 1502   Canister changed? No 21 1502   Dressing Status Changed 21 1502   Dressing Changed Changed/New 21 1502   Drainage Amount Moderate 21 1502   Drainage Description Brown;Serosanguinous 21 1502   Dressing Change Due 21 1502   Output (ml) 25 ml 21 1425   Wound Assessment Granulation tissue;Bleeding 21 1502   Uyen-wound Assessment Dry;Pink;Tape burn 21 1502   Shape oval 21 1102   Odor None 21 1502   Number of days: 2       Response to treatment:  Well tolerated by patient.      Pain Assessment:  Severity:  7 / 10  Quality of pain: sharp  Wound Pain Timing/Severity: intermittent  Premedicated: No  Plan:   Plan of Care:    Recommendation Abdomen - removed current VAC dressing, cleaned with NS, dried, piece of barrier strip used at distal edge of incision (umbilicus), framed with drape, 1 white foam, 1 black foam, covered with drape, attached tracker pad and seal achieved. 125 mmHg continuous cycle. VAC dressing to be change M,W,F    Specialty Bed Required : No   [] Low Air Loss   [] Pressure Redistribution  [] Fluid Immersion  [] Bariatric  [] Total Pressure Relief  [] Other:     Current Diet: ADULT DIET; Regular  Adult Oral Nutrition Supplement;  Low Calorie/High Protein Oral Supplement  Dietician consult:  Yes    Discharge Plan:  Placement for patient upon discharge: home with support   Patient appropriate for Outpatient [address]: F/U with General Surgery    Referrals:  [x]  following  [] [facility]  [] Supplies  [] Other    Patient/Caregiver Teaching:  Level of patient/caregiver understanding able to:   [x] Indicates understanding       [] Needs reinforcement  [] Unsuccessful      [] Verbal Understanding  [] Demonstrated understanding       [] No evidence of learning  [] Refused teaching         [] N/A       Electronically signed by Nancy Dockery RN, CWOCN on 8/4/2021 at 3:03 PM

## 2021-08-04 NOTE — PROGRESS NOTES
Hospitalist Progress Note      PCP: No primary care provider on file. Date of Admission: 7/29/2021    Chief Complaint: Abdominal Pain     Subjective: no new c/o. Medications:  Reviewed    Infusion Medications    sodium chloride Stopped (08/04/21 0420)     Scheduled Medications    famotidine  40 mg Oral BID    vancomycin  1,250 mg Intravenous Q12H    levothyroxine  200 mcg Oral Daily    miconazole   Topical BID    cefepime  2,000 mg Intravenous Q12H    sodium chloride flush  5-40 mL Intravenous 2 times per day    enoxaparin  40 mg Subcutaneous Daily    amLODIPine  5 mg Oral Daily    ARIPiprazole  5 mg Oral Daily    atorvastatin  20 mg Oral Daily    busPIRone  15 mg Oral TID    calcium elemental  500 mg Oral Daily    cetirizine  10 mg Oral Daily    DULoxetine  30 mg Oral BID    budesonide-formoterol  2 puff Inhalation BID    oxybutynin  5 mg Oral Daily    montelukast  10 mg Oral Nightly     PRN Meds: oxyCODONE **OR** oxyCODONE, sodium chloride flush, sodium chloride, ondansetron **OR** ondansetron, polyethylene glycol, acetaminophen **OR** acetaminophen, morphine **OR** morphine, albuterol sulfate HFA, ipratropium-albuterol      Intake/Output Summary (Last 24 hours) at 8/4/2021 0936  Last data filed at 8/4/2021 0904  Gross per 24 hour   Intake 1666.7 ml   Output 0 ml   Net 1666.7 ml       Physical Exam Performed:    BP (!) 110/55   Pulse 73   Temp 98.2 °F (36.8 °C) (Oral)   Resp 16   Ht 5' (1.524 m)   Wt (!) 337 lb 4.9 oz (153 kg)   SpO2 99%   BMI 65.88 kg/m²     General appearance: No apparent distress, appears stated age and cooperative. HEENT: Pupils equal, round, and reactive to light. Conjunctivae/corneas clear. Neck: Supple, with full range of motion. No jugular venous distention. Trachea midline. Respiratory:  Normal respiratory effort. Clear to auscultation, bilaterally without Rales/Wheezes/Rhonchi.   Cardiovascular: Regular rate and rhythm with normal S1/S2 without murmurs, rubs or gallops. Abdomen: Soft, non-tender, non-distended with normal bowel sounds. Musculoskeletal: No clubbing, cyanosis or edema bilaterally. Full range of motion without deformity. Skin: Skin color, texture, turgor normal.  No rashes or lesions. Neurologic:  Neurovascularly intact without any focal sensory/motor deficits. Cranial nerves: II-XII intact, grossly non-focal.  Psychiatric: Alert and oriented, thought content appropriate, normal insight  Capillary Refill: Brisk,< 3 seconds   Peripheral Pulses: +2 palpable, equal bilaterally       Labs:   Recent Labs     08/02/21  0604 08/03/21  0452 08/04/21  0603   WBC 5.2 5.7 6.6   HGB 10.9* 11.1* 10.8*   HCT 33.7* 34.0* 33.0*    404 362     Recent Labs     08/02/21  0604 08/03/21  0452 08/04/21  0604    141 138   K 3.6 3.9 3.4*    105 105   CO2 23 25 23   BUN 5* 5* 6*   CREATININE 0.6 0.6 0.6   CALCIUM 9.0 9.2 8.9   PHOS  --  3.5 3.7     No results for input(s): AST, ALT, BILIDIR, BILITOT, ALKPHOS in the last 72 hours. No results for input(s): INR in the last 72 hours. No results for input(s): Rosaline Pace in the last 72 hours.     Urinalysis:      Lab Results   Component Value Date    NITRU Negative 07/29/2021    WBCUA 10-20 08/17/2014    BACTERIA Rare 08/17/2014    RBCUA 3-5 08/17/2014    BLOODU Negative 07/29/2021    SPECGRAV 1.010 07/29/2021    GLUCOSEU Negative 07/29/2021       Consults:    IP CONSULT TO GENERAL SURGERY  IP CONSULT TO CASE MANAGEMENT  IP CONSULT TO HOSPITALIST  IP CONSULT TO GENERAL SURGERY  IP CONSULT TO RESPIRATORY CARE  IP CONSULT TO PHARMACY      Assessment/Plan:    Active Hospital Problems    Diagnosis     Morbid obesity ([facility] [address] [E66.01]     Abdominal wall abscess [L02.211]     Essential hypertension [I10]     Acquired hypothyroidism [E03.9]     Pure hypercholesterolemia [E78.00]        Post-operative abdominal wall abscess - Antibiotic therapy with cefepime and vancomycin.  CT shows large subcutaneous fluid collection.  Surgical drainage of abdominal wall incisional abscess and omphalectomy (7/31, Dr Va Holden). Cultures collected emonstrating only Staph Epi. Wound VAC      HTN - w/out known CAD and no evidence of active signs/sxs of ischemia/failure. Currently controlled on home meds w/ vitals reviewed and documented as above. HyperLipidemia - controlled on home Statin. Continue, w/ f/u and med adjustment w/ PCP    HypoThyroid - clinically euthyroid on oral replacement therapy. Continue, w/ outpt monitoring as previously arranged. Mild Persistent Asthma - controlled on home Budesonide-Formoterol - continued. Anxiety/Depression:  Controlled on home Duloxetine/Buspirone and Aripiprazole - continued. Lymphedema - bilateral chronic controlled on Lasix - continued. Migraine - controlled on Botox every 12 weeks (next dose pending in August). Morbid Obesity -  With Body mass index is 65.88 kg/m². Complicating assessment and treatment. Placing patient at risk for multiple co-morbidities as well as early death and contributing to the patient's presentation. Counseled on weight loss.  She reports losing 150 pounds after gastric sleeve (2018 at [facility] in [place]).         DVT Prophylaxis: LMWH    Recent Labs     08/02/21  0604 08/03/21  0452 08/04/21  0603    404 362     Diet: ADULT DIET; Regular  Adult Oral Nutrition Supplement; Low Calorie/High Protein Oral Supplement  Code Status: Full Code      PT/OT Eval Status: not yet ordered. Dispo - possibly Wed/Thurs 4/5 August pending wound VAC and clinical course. Will need Fluconazole at the end of antibiotic therapy for recurrent yeast infection PPX. Has an appointment with Dr. Evie Grayson as new PCP on Tuesday, August 3 which she will not be able to keep. Will work to reschedule with the [facility].   If needed, she could be seen once in transition at Donis Muse MD

## 2021-08-05 ENCOUNTER — CARE COORDINATION (OUTPATIENT)
Dept: CASE MANAGEMENT | Age: 46
End: 2021-08-05

## 2021-08-05 LAB
ANAEROBIC CULTURE: ABNORMAL
CULTURE SURGICAL: ABNORMAL
GRAM STAIN RESULT: ABNORMAL
ORGANISM: ABNORMAL
ORGANISM: ABNORMAL

## 2021-08-05 NOTE — DISCHARGE SUMMARY
after gastric sleeve (2018 at [facility]).         Labs: For convenience and continuity at follow-up the following most recent labs are provided:      CBC:    Lab Results   Component Value Date    WBC 6.6 08/04/2021    HGB 10.8 08/04/2021    HCT 33.0 08/04/2021     08/04/2021       Renal:    Lab Results   Component Value Date     08/04/2021    K 3.4 08/04/2021    K 3.6 08/02/2021     08/04/2021    CO2 23 08/04/2021    BUN 6 08/04/2021    CREATININE 0.6 08/04/2021    CALCIUM 8.9 08/04/2021    PHOS 3.7 08/04/2021         Significant Diagnostic Studies    Radiology:   CT ABDOMEN PELVIS W IV CONTRAST Additional Contrast? None   Final Result   1. Large anterior abdominal wall fluid collection most consistent with an   abscess. Mild infiltration of the adjacent subcutaneous fat and overlying   skin thickening. 2. No acute findings within the abdomen or pelvis. Consults:     IP CONSULT TO GENERAL SURGERY  IP CONSULT TO CASE MANAGEMENT  IP CONSULT TO HOSPITALIST  IP CONSULT TO GENERAL SURGERY  IP CONSULT TO RESPIRATORY CARE  IP CONSULT TO PHARMACY  IP CONSULT TO HOME CARE NEEDS    Disposition: Home     Condition at Discharge: Stable    Discharge Instructions/Follow-up:  w/ PCP 1-2 weeks and subspecialists as arranged. Code Status:  Full Code    Activity: activity as tolerated    Diet: regular diet      Discharge Medications:     Discharge Medication List as of 8/4/2021  6:51 PM           Details   oxyCODONE (ROXICODONE) 5 MG immediate release tablet Take 1 tablet by mouth every 6 hours as needed for Pain for up to 7 days. , Disp-28 tablet, R-0Print      fluconazole (DIFLUCAN) 150 MG tablet Take 1 tablet by mouth daily for 3 days, Disp-3 tablet, R-0Print              Details   Fluticasone furoate-vilanterol (BREO ELLIPTA) 200-25 MCG/INH AEPB inhaler Inhale into the lungs dailyHistorical Med      !! levothyroxine (SYNTHROID) 200 MCG tablet Take 200 mcg by mouth DailyHistorical Med      famotidine (PEPCID) 40 MG tablet Take 40 mg by mouth 2 times dailyHistorical Med      ferrous sulfate (FE TABS 325) 325 (65 Fe) MG EC tablet Take 65 mg by mouth 3 times daily (with meals)Historical Med      liothyronine (CYTOMEL) 25 MCG tablet Take 25 mcg by mouth dailyHistorical Med      DULoxetine (CYMBALTA) 30 MG extended release capsule Take 30 mg by mouth dailyHistorical Med      Silicone-Vitamin E (REXASIL PATCH & VITAMIN E LIQ EX) Apply topicallyHistorical Med      fluticasone-salmeterol (ADVAIR) 100-50 MCG/DOSE diskus inhaler Inhale 1 puff into the lungs every 12 hoursHistorical Med      acetaminophen (TYLENOL) 500 MG tablet Take 500 mg by mouth every 6 hours as needed for PainHistorical Med      Diclofen-raNITIdine-Capsaicin -0.025 MG-MG-% THPK by Combination routeHistorical Med      Azelastine HCl 137 MCG/SPRAY SOLN by Nasal routeHistorical Med      calcium-vitamin D (OSCAL-500) 500-200 MG-UNIT per tablet Take 1 tablet by mouth dailyHistorical Med      Nutritional Supplements (GLUCOSE MANAGEMENT) TABS Take by mouthHistorical Med      azelastine (OPTIVAR) 0.05 % ophthalmic solution 1 drop 2 times dailyHistorical Med      oxybutynin (DITROPAN-XL) 5 MG extended release tablet Take 5 mg by mouth dailyHistorical Med      rizatriptan (MAXALT) 10 MG tablet Take 10 mg by mouth once as needed for Migraine May repeat in 2 hours if neededHistorical Med      sodium hyaluronate (EUFLEXXA) 20 MG/2ML SOSY injection Inject 20 mg into the articular space once a weekHistorical Med      !! levothyroxine (SYNTHROID) 125 MCG tablet Take 2 tablets by mouth Daily, Disp-30 tablet, R-0      vitamin D (ERGOCALCIFEROL) 50055 UNITS CAPS capsule Take 1 capsule by mouth once a week, Disp-30 capsule, R-0      !! sertraline (ZOLOFT) 100 MG tablet Take 1 tablet by mouth 2 times daily, Disp-60 tablet, R-0      ARIPiprazole (ABILIFY) 5 MG tablet Take 1 tablet by mouth daily, Disp-30 tablet, R-0      busPIRone (BUSPAR) 10 MG tablet Take 1 tablet by mouth 3 times daily, Disp-90 tablet, R-0      !! escitalopram (LEXAPRO) 10 MG tablet Take 1 tablet by mouth daily, Disp-30 tablet, R-0      !! escitalopram (LEXAPRO) 20 MG tablet Take 1 tablet by mouth daily, Disp-30 tablet, R-0      gabapentin (NEURONTIN) 600 MG tablet Take 1 tablet by mouth daily, Disp-90 tablet, R-0      amLODIPine (NORVASC) 5 MG tablet Take 1 tablet by mouth daily, Disp-30 tablet, R-0      lisinopril (PRINIVIL;ZESTRIL) 20 MG tablet Take 1 tablet by mouth daily, Disp-30 tablet, R-0      hydrochlorothiazide (HYDRODIURIL) 25 MG tablet Take 1 tablet by mouth daily, Disp-30 tablet, R-0      furosemide (LASIX) 40 MG tablet Take 1 tablet by mouth daily, Disp-60 tablet, R-0      Emollient (EUCERIN) lotion Apply topically as needed. , Disp-1 Bottle, R-0, Normal      omeprazole (PRILOSEC) 20 MG capsule Take 1 capsule by mouth 2 times daily, Disp-30 capsule, R-0      vitamin E 400 UNIT capsule Take 1 capsule by mouth 2 times daily, Disp-30 capsule, R-0      potassium chloride (KLOR-CON) 20 MEQ packet Take 20 mEq by mouth daily, Disp-30 each, R-0      GNP FIBER THERAPY 500 MG TABS Take 500 mg by mouth 2 times daily, Disp-60 tablet, R-0, EZRA      atorvastatin (LIPITOR) 10 MG tablet Take 1 tablet by mouth daily, Disp-30 tablet, R-0      glucosamine-chondroitin 500-400 MG tablet Take 1 tablet by mouth 3 times daily, Disp-90 tablet, R-0      calcium carbonate (CALCIUM 600) 600 MG TABS tablet Take 1 tablet by mouth daily, Disp-30 tablet, R-0      vitamin B-12 (CYANOCOBALAMIN) 1000 MCG tablet Take 1 tablet by mouth daily, Disp-30 tablet, R-0      rOPINIRole (REQUIP) 0.5 MG tablet Take 2 tablets by mouth nightly, Disp-60 tablet, R-3      Petrolatum (PETROLEUM JELLY) OINT Disp-454 g, R-2, NormalApply to affected areas bid.       !! amitriptyline (ELAVIL) 75 MG tablet Historical Med      nystatin-triamcinolone (MYCOLOG) 909180-6.1 UNIT/GM-% ointment Historical Med      Adhesive Tape (PAPER TAPE 1\"X10YD) TAPE Apply to affected areas. , Disp-2 each, R-5      Wound Dressings (ADAPTIC NON-ADHERING DRESSING) PADS Apply to affected areas of skin., Disp-12 each, R-5      neomycin-bacitracin-polymyxin (NEOSPORIN) 5-400-5000 ointment Apply topically 4 times daily Apply topically 4 times daily. , Topical, Historical Med      Nystatin POWD by Does not apply route      !! Sertraline HCl (ZOLOFT PO) Take 200 mg by mouth Indications: at hs       Multiple Vitamins-Minerals (THERAPEUTIC MULTIVITAMIN-MINERALS) tablet Take 1 tablet by mouth daily      Polyethylene Glycol 3350 (MIRALAX PO) Take by mouth 2 times daily      artificial tears (ARTIFICIAL TEARS) OINT as needed      glucose monitoring kit (FREESTYLE) monitoring kit DAILY PRN Starting 6/18/2015, Until Discontinued, Disp-1 kit, R-0, Print      FREESTYLE LANCETS MISC DAILY Starting 6/18/2015, Until Discontinued, Disp-100 each, R-0, Print      !! amitriptyline (ELAVIL) 10 MG tablet Take 75 mg by mouth nightly      fluticasone (FLONASE ALLERGY RELIEF) 50 MCG/ACT nasal spray 2 sprays by Nasal route 2 times daily       albuterol (PROVENTIL HFA;VENTOLIN HFA) 108 (90 BASE) MCG/ACT inhaler Inhale 2 puffs into the lungs every 6 hours as needed for Wheezing      EPINEPHrine 0.1 MG/ML injection Inject 0.3 mg into the muscle as needed      promethazine (PHENERGAN) 25 MG tablet Take 25 mg by mouth every 6 hours as needed for Nausea      dextromethorphan-guaiFENesin (MUCINEX DM)  MG per SR tablet Take 1 tablet by mouth every 12 hours as needed. montelukast (SINGULAIR) 10 MG tablet Take 10 mg by mouth nightly. cetirizine (ZYRTEC) 10 MG tablet Take 10 mg by mouth daily. !! - Potential duplicate medications found. Please discuss with provider. Time Spent on discharge is more than 30 minutes in the examination, evaluation, counseling and review of medications and discharge plan.       Signed:    Jack Barcenas MD   8/5/2021

## 2021-08-05 NOTE — CARE COORDINATION
[facility] Transitions Initial Follow Up Call    Call within 2 business days of discharge: Yes    Patient: Paresh Romero Patient : 1975   MRN: [id number]  Reason for Admission: infection of deep incisional surgical site  Discharge Date: 21 RARS: Readmission Risk Score: 17      Last Discharge [address] [address]       Complaint Diagnosis Description Type Department Provider    21 Abdominal Pain Infection of deep incisional surgical site after procedure, initial encounter . .. ED to Hosp-Admission (Discharged) ([place]) Yocasta Addison MD; [address] Spoke with: [address] [place] [facility]\"" [address]: [facility]    Transitions of Care Initial Call    Challenges to be reviewed by the provider   Additional needs identified to be addressed with provider: No  none             Method of communication with provider : phone      Advance Care Planning:   Does patient have an Advance Directive: reviewed and current, reviewed and needs to be updated, not on file; education provided, not on file, patient declined education, decision maker updated and referral to internal ACP facilitator. Was this a readmission? No  Patient stated reason for admission: infected incision  Patients top risk factors for readmission: medical condition-abdominal wall abscess    Care Transition Nurse (CTN) contacted the patient by telephone to perform post hospital discharge assessment. Verified name and  with patient as identifiers. Provided introduction to self, and explanation of the CTN role. CTN reviewed discharge instructions, medical action plan and red flags with patient who verbalized understanding. Patient given an opportunity to ask questions and does not have any further questions or concerns at this time. Were discharge instructions available to patient? Yes.  Reviewed appropriate site of care based on symptoms and resources available to patient including: Specialist. The patient agrees to contact the PCP office for questions related to their healthcare. Medication reconciliation was not performed with patient, who verbalizes understanding of administration of home medications. Advised obtaining a 90-day supply of all daily and as-needed medications. Patient answered call and verified . Patient pleasant and agreeable to transition call. Patient stated that she is doing well and having some \"soreness\" at wound site. Patient has wound vac in place and tolerating well. Patient has not heard from home care agency yet, but CTN instructed patient that contact has been made and aware of her d/c home. Agency to be reaching out to patient today per Angie at [facility]. Patient confirmed that she is taking all medications as directed and CTN reviewed purpose and side effects of new medications. CTN offered to assist with follow up MD appts, but patient confirms that she has MD office numbers on d/c summary and would call today and get scheduled. Denies any acute needs at present time. Agreeable to f/u calls. Educated on the use of urgent care or physicians 24 hr access line if assistance is needed after hours. CTN provided contact information. Plan for follow-up call in 5-7 days based on severity of symptoms and risk factors. Freddy Daugherty RN   Care Transition Nurse  [phone]        Care Transitions 24 Hour Call    Do you have any ongoing symptoms?: No  Do you have a copy of your discharge instructions?: Yes  Do you have all of your prescriptions and are they filled?: Yes  Have you been contacted by a Isi Awad?: No  Have you scheduled your follow up appointment?: No  Were you discharged with any Home Care or Post Acute Services: Yes  Post Acute Services: [address] [place] [address] you feel like you have everything you need to keep you well at home?: Yes  Care Transitions Interventions         Follow Up  No future appointments.     Freddy Daugherty RN

## 2021-08-06 NOTE — OP NOTE
[address] [address]                                OPERATIVE REPORT    PATIENT NAME: Nellie Ruiz                    :        1975  MED REC NO:   [id number]                          ROOM:       [id number]  ACCOUNT NO:   [de-identified]                           ADMIT DATE: 2021  PROVIDER:     Odalis Penaloza MD    DATE OF PROCEDURE:  2021    LOCATION:  [facility]. OPERATIONS PERFORMED:  1. Complex incision and drainage of abdominal wall abscess. 2.  Omphalectomy. PREOPERATIVE DIAGNOSES:  1. Abdominal wall abscess. 2.  Morbid obesity. POSTOPERATIVE DIAGNOSES:  1. Abdominal wall abscess. 2.  Morbid obesity. SURGEON:  Odalis Penaloza MD    ANESTHESIA:  Total intravenous anesthesia. COMPLICATIONS:  None. ESTIMATED BLOOD LOSS:  Less than 50 mL. INDICATIONS FOR THE OPERATION:  A 72-year-old morbidly obese female who  recently had undergone surgery at a hospital in [place] and then was  transferred to the [address] for a second operation. This was related to incarcerated ventral hernia. Reportedly at second  operation Vicryl mesh was used as a part of the repair. The patient had  subsequently moved to this area. She was admitted with signs and  symptoms consistent with abdominal wall abscess. Imaging showed a large  collection. It was starting to drain purulent fluid through the  incision. I did not feel like a percutaneous drain, it was going to  provide adequate decompression and allow for potential healing. I  recommended operative incision and drainage. The patient understood the  risks, benefits, and wanted to proceed. DESCRIPTION OF OPERATION:  The patient was brought to the operating  room. Total intravenous anesthesia was initiated. She was prepped and  draped in the usual surgical sterile fashion. Local anesthetic was  infiltrated.   The inferior aspect of the incision was made around her  umbilicus. This is where there was drainage. The umbilical skin was  compromised and attenuated. It was not viable. I made an incision  around the umbilicus to complete omphalectomy. This then gained the  access to deep cavity where marked amount of infected fluid was  evacuated including large volume of proteinaceous material.  The entire  area was irrigated and evacuated. Micromesh was visible at the base. Hemostasis was obtained. The wound was then packed with Kerlix rolls. Dressing was placed. Local anesthetic was again infiltrated. DISPOSITION:  The patient tolerated the procedure without any acute  complication.         Alonso Cadena MD    D: 08/06/2021 11:45:25       T: 08/06/2021 12:39:54     [id number]  Job#: [id number]     Doc#: [id number]    CC:

## 2021-08-11 ENCOUNTER — CARE COORDINATION (OUTPATIENT)
Dept: CASE MANAGEMENT | Age: 46
End: 2021-08-11

## 2021-08-11 NOTE — CARE COORDINATION
[facility] Transitions Follow Up Call    2021    Patient: Melissa Mitchell  Patient : 1975   MRN: [id number]  Reason for Admission: abdominal wall abscess  Discharge Date: 21 RARS: Readmission Risk Score: 17         Spoke with: Melissa Mitchell    Patient answered call and verified . Patient pleasant and agreeable to final transition call. Patient doing well and wound continues to heal well. Patient has home care nurses 3xweek for wound vac changes. Patient had car trouble and had to cancel her follow up surgeon appt. Patient asked if her home care nurse would be able to send photos of wound and progress to surgeon after vac is removed for change. Patient to follow up with nurse on Friday. Patient confirms that she is taking all medication as instructed. Patient stable and denies any acute needs at present time. Educated on the use of urgent care or physicians 24 hr access line if assistance is needed after hours. Episode closed  Christiano Holden RN   Care Transition Nurse  [phone]      Care Transitions Subsequent and Final Call    Subsequent and Final Calls  Do you have any ongoing symptoms?: No  Have your medications changed?: No  Do you have any questions related to your medications?: No  Do you currently have any active services?: Yes  Are you currently active with any services?: Home Health  Do you have any needs or concerns that I can assist you with?: No  Identified Barriers: None  Care Transitions Interventions  Other Interventions: Follow Up  No future appointments.     Christiano Holden RN

## 2021-08-23 ENCOUNTER — TELEPHONE (OUTPATIENT)
Dept: SURGERY | Age: 46
End: 2021-08-23

## 2021-08-23 NOTE — TELEPHONE ENCOUNTER
Raquel Olson from [facility] care called back. She changes Poornima's wound vac 3 x a week. She states the wound looks great and drainage is normal. Denies fever or pain. But she states when she removed the sponge today, there was a really bad odor. She states patient feels fine, no other complaints or symptoms. She states their car broke down, so she has no transportation. She is asking if she needs antibiotic, and if so, could also get a Diflucan tablet ordered along with it? She uses [facility] in [facility].

## 2021-08-24 NOTE — TELEPHONE ENCOUNTER
I called and left a detailed message for Loni Coto to call the office back if she has any further questions or concerns.

## 2021-09-07 ENCOUNTER — APPOINTMENT (OUTPATIENT)
Dept: MAMMOGRAPHY | Facility: HOSPITAL | Age: 46
End: 2021-09-07

## 2021-09-14 ENCOUNTER — OFFICE VISIT (OUTPATIENT)
Dept: FAMILY MEDICINE CLINIC | Age: 46
End: 2021-09-14
Payer: MEDICARE

## 2021-09-14 VITALS
HEART RATE: 85 BPM | BODY MASS INDEX: 57.52 KG/M2 | SYSTOLIC BLOOD PRESSURE: 146 MMHG | HEIGHT: 60 IN | WEIGHT: 293 LBS | DIASTOLIC BLOOD PRESSURE: 108 MMHG | OXYGEN SATURATION: 98 %

## 2021-09-14 DIAGNOSIS — Z76.89 ENCOUNTER TO ESTABLISH CARE: Primary | ICD-10-CM

## 2021-09-14 PROCEDURE — 99212 OFFICE O/P EST SF 10 MIN: CPT | Performed by: PHYSICIAN ASSISTANT

## 2021-09-14 RX ORDER — BLOOD-GLUCOSE,RECEIVER,CONT
EACH MISCELLANEOUS 2 TIMES DAILY
COMMUNITY
Start: 2021-03-26

## 2021-09-14 ASSESSMENT — PATIENT HEALTH QUESTIONNAIRE - PHQ9
SUM OF ALL RESPONSES TO PHQ QUESTIONS 1-9: 2
SUM OF ALL RESPONSES TO PHQ QUESTIONS 1-9: 2
SUM OF ALL RESPONSES TO PHQ9 QUESTIONS 1 & 2: 2
2. FEELING DOWN, DEPRESSED OR HOPELESS: 1
1. LITTLE INTEREST OR PLEASURE IN DOING THINGS: 1
SUM OF ALL RESPONSES TO PHQ QUESTIONS 1-9: 2

## 2021-09-14 NOTE — PROGRESS NOTES
Met with patient briefly, due to her complex chronic health issues feel she would be better served being followed by physician. Appointment made for her 9/20/21 at [facility].

## 2021-09-19 PROBLEM — H47.20 OPTIC NERVE ATROPHY: Status: ACTIVE | Noted: 2021-09-19

## 2021-09-19 PROBLEM — K21.9 GERD (GASTROESOPHAGEAL REFLUX DISEASE): Status: ACTIVE | Noted: 2017-10-05

## 2021-09-19 PROBLEM — L30.4 INTERTRIGO: Status: ACTIVE | Noted: 2020-03-13

## 2021-09-19 PROBLEM — E05.80 HYPERTHYROIDISM WITH HASHIMOTO DISEASE: Status: ACTIVE | Noted: 2020-03-13

## 2021-09-19 PROBLEM — E53.8 B12 DEFICIENCY: Status: ACTIVE | Noted: 2020-03-13

## 2021-09-19 PROBLEM — E28.2 PCOS (POLYCYSTIC OVARIAN SYNDROME): Status: ACTIVE | Noted: 2017-10-05

## 2021-09-19 PROBLEM — D80.2 IGA DEFICIENCY (HCC): Status: ACTIVE | Noted: 2017-12-19

## 2021-09-19 PROBLEM — K46.0 INCARCERATED HERNIA: Status: RESOLVED | Noted: 2021-06-12 | Resolved: 2021-09-19

## 2021-09-19 PROBLEM — E06.3 HYPERTHYROIDISM WITH HASHIMOTO DISEASE: Status: ACTIVE | Noted: 2020-03-13

## 2021-09-19 PROBLEM — K46.0 INCARCERATED HERNIA: Status: ACTIVE | Noted: 2021-06-12

## 2021-09-19 PROBLEM — R26.89 POOR BALANCE: Status: ACTIVE | Noted: 2017-10-05

## 2021-09-20 ENCOUNTER — TELEPHONE (OUTPATIENT)
Dept: SURGERY | Age: 46
End: 2021-09-20

## 2021-09-20 NOTE — TELEPHONE ENCOUNTER
[id number] Macey Gao (home health nurse) called to leave a message saying she has had no drainage from [place] in 1 week. Changed dressing today and will be changing again tomorrow.  Number for Shashank Raf [phone]

## 2021-09-21 NOTE — TELEPHONE ENCOUNTER
Left message for Joe Rausch to call the office, will evaluate the patient once she comes into the office.

## 2021-09-22 ENCOUNTER — VIRTUAL VISIT (OUTPATIENT)
Dept: SURGERY | Age: 46
End: 2021-09-22

## 2021-09-22 DIAGNOSIS — Z09 POSTOP CHECK: Primary | ICD-10-CM

## 2021-09-22 NOTE — TELEPHONE ENCOUNTER
Per the patient- [facility], stopped the wound vac and switched to wet to dry dressings. Dr. Pepito Daly notified.

## 2021-09-23 PROBLEM — J38.3 PARADOXICAL VOCAL CORD MOTION: Status: ACTIVE | Noted: 2017-10-05

## 2021-09-23 PROBLEM — E78.5 DYSLIPIDEMIA: Status: ACTIVE | Noted: 2017-10-05

## 2021-09-23 PROBLEM — L03.90 CELLULITIS: Status: RESOLVED | Noted: 2017-12-19 | Resolved: 2021-09-23

## 2021-09-23 PROBLEM — E11.9 TYPE 2 DIABETES MELLITUS (HCC): Status: ACTIVE | Noted: 2021-06-13

## 2021-09-23 PROBLEM — L80 VITILIGO: Status: ACTIVE | Noted: 2020-03-13

## 2021-09-23 PROBLEM — M50.30 DEGENERATIVE DISC DISEASE, CERVICAL: Status: ACTIVE | Noted: 2020-03-13

## 2021-09-23 PROBLEM — M25.50 MULTIPLE JOINT PAIN: Status: ACTIVE | Noted: 2017-10-05

## 2021-09-23 PROBLEM — M25.473 ANKLE SWELLING: Status: ACTIVE | Noted: 2017-10-05

## 2021-09-23 PROBLEM — R73.03 PREDIABETES: Status: ACTIVE | Noted: 2017-10-05

## 2021-09-23 PROBLEM — R94.39 ABNORMAL NUCLEAR STRESS TEST: Status: ACTIVE | Noted: 2018-05-01

## 2021-09-23 PROBLEM — Z98.84 S/P LAPAROSCOPIC SLEEVE GASTRECTOMY: Status: ACTIVE | Noted: 2019-08-09

## 2021-09-23 PROBLEM — J44.9 COPD (CHRONIC OBSTRUCTIVE PULMONARY DISEASE) (HCC): Status: ACTIVE | Noted: 2017-10-05

## 2021-09-23 PROBLEM — I25.9 CHEST PAIN DUE TO MYOCARDIAL ISCHEMIA: Status: ACTIVE | Noted: 2018-05-01

## 2021-09-23 PROBLEM — Z87.19 S/P REPAIR OF VENTRAL HERNIA: Status: ACTIVE | Noted: 2021-06-14

## 2021-09-23 PROBLEM — M79.3 PANNICULITIS: Status: RESOLVED | Noted: 2020-03-13 | Resolved: 2021-09-23

## 2021-09-23 PROBLEM — Q60.2: Status: ACTIVE | Noted: 2017-12-19

## 2021-09-23 PROBLEM — R21 SKIN RASH: Status: ACTIVE | Noted: 2019-08-09

## 2021-09-23 PROBLEM — Z98.84 S/P LAPAROSCOPIC SLEEVE GASTRECTOMY: Status: RESOLVED | Noted: 2019-08-09 | Resolved: 2021-09-23

## 2021-09-23 PROBLEM — M25.473 ANKLE SWELLING: Status: RESOLVED | Noted: 2017-10-05 | Resolved: 2021-09-23

## 2021-09-23 PROBLEM — I25.9 CHEST PAIN DUE TO MYOCARDIAL ISCHEMIA: Status: RESOLVED | Noted: 2018-05-01 | Resolved: 2021-09-23

## 2021-09-23 PROBLEM — Z98.890 S/P REPAIR OF VENTRAL HERNIA: Status: RESOLVED | Noted: 2021-06-14 | Resolved: 2021-09-23

## 2021-09-23 PROBLEM — Z87.19 S/P REPAIR OF VENTRAL HERNIA: Status: RESOLVED | Noted: 2021-06-14 | Resolved: 2021-09-23

## 2021-09-23 PROBLEM — G62.9 NEUROPATHY: Status: ACTIVE | Noted: 2020-03-13

## 2021-09-23 PROBLEM — R21 SKIN RASH: Status: RESOLVED | Noted: 2019-08-09 | Resolved: 2021-09-23

## 2021-09-23 PROBLEM — L03.90 CELLULITIS: Status: ACTIVE | Noted: 2017-12-19

## 2021-09-23 PROBLEM — R73.03 PREDIABETES: Status: RESOLVED | Noted: 2017-10-05 | Resolved: 2021-09-23

## 2021-09-23 PROBLEM — M79.3 PANNICULITIS: Status: ACTIVE | Noted: 2020-03-13

## 2021-09-23 PROBLEM — E66.01 MORBID OBESITY (HCC): Status: ACTIVE | Noted: 2018-05-01

## 2021-09-23 PROBLEM — Z98.890 S/P REPAIR OF VENTRAL HERNIA: Status: ACTIVE | Noted: 2021-06-14

## 2021-09-23 NOTE — PROGRESS NOTES
HPI: Nursing notes reviewed. Patient stopped wound vac and doing wet to dry dressings. Minimal drainage. No fevers or chills. ROS:  10 point review of systems performed with pertinent positives in HPI    Phys:    Abd - wound smaller and no erythema       Assesment: 43 yo s/p I&D of abdominal wall abscess    Plan: 1. Continue wet to dry dressings   2.   Call or follow up in several weeks

## 2021-09-30 ENCOUNTER — VIRTUAL VISIT (OUTPATIENT)
Dept: SURGERY | Age: 46
End: 2021-09-30

## 2021-09-30 DIAGNOSIS — Z09 POSTOP CHECK: Primary | ICD-10-CM

## 2021-09-30 PROCEDURE — 99024 POSTOP FOLLOW-UP VISIT: CPT | Performed by: SURGERY

## 2021-10-06 NOTE — PROGRESS NOTES
HPI: Nursing notes reviewed. Patient with minimal drainage. No pain. No fevers. ROS:  10 point review of systems performed with pertinent positives in HPI    Phys:    Abd - video visit - no apparent erythema, appears healed       Assesment: 41 yo s/p I&D of abdominal wall abscess    Plan: 1. No apparent signs of infection   2.   Can leave open to air

## 2021-11-08 ENCOUNTER — OFFICE VISIT (OUTPATIENT)
Dept: PRIMARY CARE CLINIC | Age: 46
End: 2021-11-08
Payer: MEDICARE

## 2021-11-08 ENCOUNTER — HOSPITAL ENCOUNTER (OUTPATIENT)
Age: 46
Discharge: HOME | End: 2021-11-08
Payer: MEDICARE

## 2021-11-08 VITALS
WEIGHT: 293 LBS | HEIGHT: 60 IN | DIASTOLIC BLOOD PRESSURE: 82 MMHG | TEMPERATURE: 97.8 F | HEART RATE: 84 BPM | BODY MASS INDEX: 57.52 KG/M2 | OXYGEN SATURATION: 97 % | SYSTOLIC BLOOD PRESSURE: 132 MMHG

## 2021-11-08 DIAGNOSIS — F32.A DEPRESSION, UNSPECIFIED DEPRESSION TYPE: ICD-10-CM

## 2021-11-08 DIAGNOSIS — N39.3 STRESS INCONTINENCE: ICD-10-CM

## 2021-11-08 DIAGNOSIS — G62.9 NEUROPATHY: ICD-10-CM

## 2021-11-08 DIAGNOSIS — Z13.31 POSITIVE DEPRESSION SCREENING: ICD-10-CM

## 2021-11-08 DIAGNOSIS — I10 BENIGN ESSENTIAL HTN: ICD-10-CM

## 2021-11-08 DIAGNOSIS — Q60.2: ICD-10-CM

## 2021-11-08 DIAGNOSIS — E53.8 B12 DEFICIENCY: ICD-10-CM

## 2021-11-08 DIAGNOSIS — F41.9 ANXIETY: ICD-10-CM

## 2021-11-08 DIAGNOSIS — E28.2 PCOS (POLYCYSTIC OVARIAN SYNDROME): ICD-10-CM

## 2021-11-08 DIAGNOSIS — J45.30 MILD PERSISTENT ASTHMA WITHOUT COMPLICATION: ICD-10-CM

## 2021-11-08 DIAGNOSIS — M25.50 MULTIPLE JOINT PAIN: ICD-10-CM

## 2021-11-08 DIAGNOSIS — J45.909 UNCOMPLICATED ASTHMA, UNSPECIFIED ASTHMA SEVERITY, UNSPECIFIED WHETHER PERSISTENT: ICD-10-CM

## 2021-11-08 DIAGNOSIS — M50.30 DEGENERATIVE DISC DISEASE, CERVICAL: ICD-10-CM

## 2021-11-08 DIAGNOSIS — E11.40 TYPE 2 DIABETES MELLITUS WITH DIABETIC NEUROPATHY, UNSPECIFIED WHETHER LONG TERM INSULIN USE (HCC): Primary | ICD-10-CM

## 2021-11-08 DIAGNOSIS — E66.01 MORBID OBESITY (HCC): ICD-10-CM

## 2021-11-08 DIAGNOSIS — L30.4 INTERTRIGO: ICD-10-CM

## 2021-11-08 DIAGNOSIS — I89.0 LYMPHEDEMA OF RIGHT LOWER EXTREMITY: ICD-10-CM

## 2021-11-08 DIAGNOSIS — J30.1 ALLERGIC RHINITIS DUE TO POLLEN, UNSPECIFIED SEASONALITY: ICD-10-CM

## 2021-11-08 DIAGNOSIS — K21.9 GASTROESOPHAGEAL REFLUX DISEASE WITHOUT ESOPHAGITIS: ICD-10-CM

## 2021-11-08 DIAGNOSIS — E78.5 DYSLIPIDEMIA: ICD-10-CM

## 2021-11-08 DIAGNOSIS — H47.20 OPTIC NERVE ATROPHY: ICD-10-CM

## 2021-11-08 DIAGNOSIS — L80 VITILIGO: ICD-10-CM

## 2021-11-08 DIAGNOSIS — R69 MULTIPLE COMORBID CONDITIONS: ICD-10-CM

## 2021-11-08 DIAGNOSIS — E03.9 HYPOTHYROIDISM, UNSPECIFIED TYPE: ICD-10-CM

## 2021-11-08 DIAGNOSIS — Z00.00 HEALTHCARE MAINTENANCE: ICD-10-CM

## 2021-11-08 DIAGNOSIS — J44.9 CHRONIC OBSTRUCTIVE PULMONARY DISEASE, UNSPECIFIED COPD TYPE (HCC): ICD-10-CM

## 2021-11-08 DIAGNOSIS — G43.901 MIGRAINE WITH STATUS MIGRAINOSUS, NOT INTRACTABLE, UNSPECIFIED MIGRAINE TYPE: ICD-10-CM

## 2021-11-08 DIAGNOSIS — G25.81 RLS (RESTLESS LEGS SYNDROME): ICD-10-CM

## 2021-11-08 LAB
A/G RATIO: 1.5 (ref 1.1–2.2)
ALBUMIN SERPL-MCNC: 4.8 G/DL (ref 3.4–5)
ALP BLD-CCNC: 83 U/L (ref 40–129)
ALT SERPL-CCNC: 16 U/L (ref 10–40)
ANION GAP SERPL CALCULATED.3IONS-SCNC: 17 MMOL/L (ref 3–16)
AST SERPL-CCNC: 18 U/L (ref 15–37)
BASOPHILS ABSOLUTE: 0 K/UL (ref 0–0.2)
BASOPHILS RELATIVE PERCENT: 0.1 %
BILIRUB SERPL-MCNC: <0.2 MG/DL (ref 0–1)
BUN BLDV-MCNC: 11 MG/DL (ref 7–20)
CALCIUM SERPL-MCNC: 9.7 MG/DL (ref 8.3–10.6)
CHLORIDE BLD-SCNC: 102 MMOL/L (ref 99–110)
CHOLESTEROL, TOTAL: 252 MG/DL (ref 0–199)
CO2: 19 MMOL/L (ref 21–32)
CREAT SERPL-MCNC: 1 MG/DL (ref 0.6–1.1)
CREATININE URINE: 185.8 MG/DL (ref 28–259)
EOSINOPHILS ABSOLUTE: 0 K/UL (ref 0–0.6)
EOSINOPHILS RELATIVE PERCENT: 0 %
FOLATE: 7.08 NG/ML (ref 4.78–24.2)
GFR AFRICAN AMERICAN: >60
GFR NON-AFRICAN AMERICAN: >60
GLUCOSE BLD-MCNC: 110 MG/DL (ref 70–99)
HCT VFR BLD CALC: 43.4 % (ref 36–48)
HDLC SERPL-MCNC: 43 MG/DL (ref 40–60)
HEMOGLOBIN: 14.3 G/DL (ref 12–16)
HEPATITIS C ANTIBODY INTERPRETATION: NORMAL
LDL CHOLESTEROL CALCULATED: 163 MG/DL
LYMPHOCYTES ABSOLUTE: 3.4 K/UL (ref 1–5.1)
LYMPHOCYTES RELATIVE PERCENT: 35.2 %
MCH RBC QN AUTO: 26.7 PG (ref 26–34)
MCHC RBC AUTO-ENTMCNC: 32.9 G/DL (ref 31–36)
MCV RBC AUTO: 81.3 FL (ref 80–100)
MICROALBUMIN UR-MCNC: 3.5 MG/DL
MICROALBUMIN/CREAT UR-RTO: 18.8 MG/G (ref 0–30)
MONOCYTES ABSOLUTE: 0.6 K/UL (ref 0–1.3)
MONOCYTES RELATIVE PERCENT: 6.1 %
NEUTROPHILS ABSOLUTE: 5.7 K/UL (ref 1.7–7.7)
NEUTROPHILS RELATIVE PERCENT: 58.6 %
PDW BLD-RTO: 18.8 % (ref 12.4–15.4)
PLATELET # BLD: 271 K/UL (ref 135–450)
PMV BLD AUTO: 9.8 FL (ref 5–10.5)
POTASSIUM SERPL-SCNC: 4.1 MMOL/L (ref 3.5–5.1)
RBC # BLD: 5.34 M/UL (ref 4–5.2)
SODIUM BLD-SCNC: 138 MMOL/L (ref 136–145)
T4 TOTAL: 1.2 UG/DL (ref 4.5–10.9)
TOTAL PROTEIN: 8 G/DL (ref 6.4–8.2)
TRIGL SERPL-MCNC: 228 MG/DL (ref 0–150)
TSH SERPL DL<=0.05 MIU/L-ACNC: 100 UIU/ML (ref 0.27–4.2)
VITAMIN B-12: 427 PG/ML (ref 211–911)
VLDLC SERPL CALC-MCNC: 46 MG/DL
WBC # BLD: 9.7 K/UL (ref 4–11)

## 2021-11-08 PROCEDURE — 83036 HEMOGLOBIN GLYCOSYLATED A1C: CPT

## 2021-11-08 PROCEDURE — G8431 POS CLIN DEPRES SCRN F/U DOC: HCPCS | Performed by: STUDENT IN AN ORGANIZED HEALTH CARE EDUCATION/TRAINING PROGRAM

## 2021-11-08 PROCEDURE — 82570 ASSAY OF URINE CREATININE: CPT

## 2021-11-08 PROCEDURE — 82746 ASSAY OF FOLIC ACID SERUM: CPT

## 2021-11-08 PROCEDURE — 90756 CCIIV4 VACC ABX FREE IM: CPT | Performed by: FAMILY MEDICINE

## 2021-11-08 PROCEDURE — 90715 TDAP VACCINE 7 YRS/> IM: CPT | Performed by: FAMILY MEDICINE

## 2021-11-08 PROCEDURE — 82607 VITAMIN B-12: CPT

## 2021-11-08 PROCEDURE — G0009 ADMIN PNEUMOCOCCAL VACCINE: HCPCS | Performed by: FAMILY MEDICINE

## 2021-11-08 PROCEDURE — 86702 HIV-2 ANTIBODY: CPT

## 2021-11-08 PROCEDURE — 86803 HEPATITIS C AB TEST: CPT

## 2021-11-08 PROCEDURE — G0008 ADMIN INFLUENZA VIRUS VAC: HCPCS | Performed by: FAMILY MEDICINE

## 2021-11-08 PROCEDURE — 99205 OFFICE O/P NEW HI 60 MIN: CPT | Performed by: STUDENT IN AN ORGANIZED HEALTH CARE EDUCATION/TRAINING PROGRAM

## 2021-11-08 PROCEDURE — 82043 UR ALBUMIN QUANTITATIVE: CPT

## 2021-11-08 PROCEDURE — 86701 HIV-1ANTIBODY: CPT

## 2021-11-08 PROCEDURE — 84443 ASSAY THYROID STIM HORMONE: CPT

## 2021-11-08 PROCEDURE — 84439 ASSAY OF FREE THYROXINE: CPT

## 2021-11-08 PROCEDURE — 84436 ASSAY OF TOTAL THYROXINE: CPT

## 2021-11-08 PROCEDURE — 90732 PPSV23 VACC 2 YRS+ SUBQ/IM: CPT | Performed by: FAMILY MEDICINE

## 2021-11-08 PROCEDURE — 36415 COLL VENOUS BLD VENIPUNCTURE: CPT

## 2021-11-08 PROCEDURE — 87390 HIV-1 AG IA: CPT

## 2021-11-08 PROCEDURE — 90472 IMMUNIZATION ADMIN EACH ADD: CPT | Performed by: FAMILY MEDICINE

## 2021-11-08 PROCEDURE — 80061 LIPID PANEL: CPT

## 2021-11-08 PROCEDURE — 80053 COMPREHEN METABOLIC PANEL: CPT

## 2021-11-08 PROCEDURE — 85025 COMPLETE CBC W/AUTO DIFF WBC: CPT

## 2021-11-08 RX ORDER — BLOOD-GLUCOSE TRANSMITTER
1 EACH MISCELLANEOUS SEE ADMIN INSTRUCTIONS
Qty: 1 EACH | Refills: 2 | Status: SHIPPED | OUTPATIENT
Start: 2021-11-08 | End: 2022-02-06

## 2021-11-08 RX ORDER — MONTELUKAST SODIUM 10 MG/1
10 TABLET ORAL NIGHTLY
Qty: 30 TABLET | Refills: 1 | Status: SHIPPED | OUTPATIENT
Start: 2021-11-08 | End: 2022-08-30

## 2021-11-08 RX ORDER — OXYBUTYNIN CHLORIDE 5 MG/1
5 TABLET, EXTENDED RELEASE ORAL DAILY
Qty: 30 TABLET | Refills: 1 | Status: SHIPPED | OUTPATIENT
Start: 2021-11-08 | End: 2022-08-30

## 2021-11-08 RX ORDER — AZELASTINE 1 MG/ML
1 SPRAY, METERED NASAL 2 TIMES DAILY
Qty: 60 ML | Refills: 1 | Status: SHIPPED | OUTPATIENT
Start: 2021-11-08

## 2021-11-08 RX ORDER — ATORVASTATIN CALCIUM 20 MG/1
20 TABLET, FILM COATED ORAL DAILY
Qty: 30 TABLET | Refills: 1 | Status: SHIPPED | OUTPATIENT
Start: 2021-11-08 | End: 2022-03-16 | Stop reason: SDUPTHER

## 2021-11-08 RX ORDER — DULOXETIN HYDROCHLORIDE 30 MG/1
30 CAPSULE, DELAYED RELEASE ORAL 2 TIMES DAILY
Qty: 30 CAPSULE | Refills: 1 | Status: SHIPPED | OUTPATIENT
Start: 2021-11-08 | End: 2021-11-18

## 2021-11-08 RX ORDER — BUSPIRONE HYDROCHLORIDE 10 MG/1
15 TABLET ORAL 3 TIMES DAILY
Qty: 30 TABLET | Refills: 1 | Status: SHIPPED | OUTPATIENT
Start: 2021-11-08 | End: 2021-11-18

## 2021-11-08 RX ORDER — RIZATRIPTAN BENZOATE 10 MG/1
10 TABLET ORAL
Qty: 27 TABLET | Refills: 1 | Status: SHIPPED | OUTPATIENT
Start: 2021-11-08 | End: 2022-08-04

## 2021-11-08 RX ORDER — AZELASTINE HYDROCHLORIDE 0.5 MG/ML
1 SOLUTION/ DROPS OPHTHALMIC 2 TIMES DAILY
Qty: 1 EACH | Refills: 1 | Status: SHIPPED | OUTPATIENT
Start: 2021-11-08 | End: 2022-08-30

## 2021-11-08 RX ORDER — AMLODIPINE BESYLATE 5 MG/1
5 TABLET ORAL DAILY
Qty: 30 TABLET | Refills: 0 | Status: SHIPPED | OUTPATIENT
Start: 2021-11-08 | End: 2022-08-04

## 2021-11-08 RX ORDER — IPRATROPIUM BROMIDE AND ALBUTEROL SULFATE 2.5; .5 MG/3ML; MG/3ML
1 SOLUTION RESPIRATORY (INHALATION) EVERY 4 HOURS
Qty: 360 ML | Refills: 1 | Status: SHIPPED | OUTPATIENT
Start: 2021-11-08 | End: 2022-05-17

## 2021-11-08 RX ORDER — BLOOD-GLUCOSE SENSOR
1 EACH MISCELLANEOUS SEE ADMIN INSTRUCTIONS
Qty: 3 EACH | Refills: 1 | Status: SHIPPED | OUTPATIENT
Start: 2021-11-08 | End: 2021-12-08

## 2021-11-08 RX ORDER — ALBUTEROL SULFATE 90 UG/1
2 AEROSOL, METERED RESPIRATORY (INHALATION) EVERY 6 HOURS PRN
Qty: 18 G | Refills: 1 | Status: SHIPPED | OUTPATIENT
Start: 2021-11-08 | End: 2022-05-17

## 2021-11-08 RX ORDER — LIOTHYRONINE SODIUM 25 UG/1
25 TABLET ORAL 2 TIMES DAILY
Qty: 30 TABLET | Refills: 1 | Status: SHIPPED | OUTPATIENT
Start: 2021-11-08 | End: 2021-11-18

## 2021-11-08 RX ORDER — ARIPIPRAZOLE 5 MG/1
5 TABLET ORAL DAILY
Qty: 30 TABLET | Refills: 0 | Status: SHIPPED | OUTPATIENT
Start: 2021-11-08 | End: 2021-12-07

## 2021-11-08 RX ORDER — FUROSEMIDE 40 MG/1
40 TABLET ORAL DAILY
Qty: 60 TABLET | Refills: 0 | Status: SHIPPED | OUTPATIENT
Start: 2021-11-08 | End: 2022-10-20

## 2021-11-08 RX ORDER — FLUTICASONE PROPIONATE 50 MCG
2 SPRAY, SUSPENSION (ML) NASAL 2 TIMES DAILY
Qty: 16 G | Refills: 1 | Status: SHIPPED | OUTPATIENT
Start: 2021-11-08 | End: 2022-08-30

## 2021-11-08 RX ORDER — CETIRIZINE HYDROCHLORIDE 10 MG/1
10 TABLET ORAL DAILY
Qty: 30 TABLET | Refills: 1 | Status: SHIPPED | OUTPATIENT
Start: 2021-11-08 | End: 2022-08-08 | Stop reason: SDUPTHER

## 2021-11-08 RX ORDER — OMEPRAZOLE 20 MG/1
20 CAPSULE, DELAYED RELEASE ORAL
Qty: 180 CAPSULE | Refills: 1 | Status: SHIPPED | OUTPATIENT
Start: 2021-11-08 | End: 2022-01-14

## 2021-11-08 RX ORDER — NYSTATIN 10B UNIT
POWDER (EA) MISCELLANEOUS
Qty: 1 EACH | Refills: 2 | Status: SHIPPED | OUTPATIENT
Start: 2021-11-08 | End: 2022-03-11 | Stop reason: SDUPTHER

## 2021-11-08 ASSESSMENT — PATIENT HEALTH QUESTIONNAIRE - PHQ9
6. FEELING BAD ABOUT YOURSELF - OR THAT YOU ARE A FAILURE OR HAVE LET YOURSELF OR YOUR FAMILY DOWN: 2
5. POOR APPETITE OR OVEREATING: 3
SUM OF ALL RESPONSES TO PHQ QUESTIONS 1-9: 21
SUM OF ALL RESPONSES TO PHQ QUESTIONS 1-9: 21
2. FEELING DOWN, DEPRESSED OR HOPELESS: 2
1. LITTLE INTEREST OR PLEASURE IN DOING THINGS: 3
SUM OF ALL RESPONSES TO PHQ9 QUESTIONS 1 & 2: 5
SUM OF ALL RESPONSES TO PHQ QUESTIONS 1-9: 21
9. THOUGHTS THAT YOU WOULD BE BETTER OFF DEAD, OR OF HURTING YOURSELF: 0
8. MOVING OR SPEAKING SO SLOWLY THAT OTHER PEOPLE COULD HAVE NOTICED. OR THE OPPOSITE, BEING SO FIGETY OR RESTLESS THAT YOU HAVE BEEN MOVING AROUND A LOT MORE THAN USUAL: 2
4. FEELING TIRED OR HAVING LITTLE ENERGY: 3
7. TROUBLE CONCENTRATING ON THINGS, SUCH AS READING THE NEWSPAPER OR WATCHING TELEVISION: 3
3. TROUBLE FALLING OR STAYING ASLEEP: 3
10. IF YOU CHECKED OFF ANY PROBLEMS, HOW DIFFICULT HAVE THESE PROBLEMS MADE IT FOR YOU TO DO YOUR WORK, TAKE CARE OF THINGS AT HOME, OR GET ALONG WITH OTHER PEOPLE: 2

## 2021-11-08 ASSESSMENT — ANXIETY QUESTIONNAIRES
4. TROUBLE RELAXING: 3
2. NOT BEING ABLE TO STOP OR CONTROL WORRYING: 3
IF YOU CHECKED OFF ANY PROBLEMS ON THIS QUESTIONNAIRE, HOW DIFFICULT HAVE THESE PROBLEMS MADE IT FOR YOU TO DO YOUR WORK, TAKE CARE OF THINGS AT HOME, OR GET ALONG WITH OTHER PEOPLE: VERY DIFFICULT
1. FEELING NERVOUS, ANXIOUS, OR ON EDGE: 3
6. BECOMING EASILY ANNOYED OR IRRITABLE: 3
3. WORRYING TOO MUCH ABOUT DIFFERENT THINGS: 3
GAD7 TOTAL SCORE: 19
7. FEELING AFRAID AS IF SOMETHING AWFUL MIGHT HAPPEN: 1
5. BEING SO RESTLESS THAT IT IS HARD TO SIT STILL: 3

## 2021-11-08 ASSESSMENT — ENCOUNTER SYMPTOMS
SINUS PAIN: 0
EYE PAIN: 0
SHORTNESS OF BREATH: 0
ABDOMINAL PAIN: 0
CONSTIPATION: 1
SINUS PRESSURE: 0
COUGH: 0
BACK PAIN: 1
PHOTOPHOBIA: 1

## 2021-11-08 ASSESSMENT — COLUMBIA-SUICIDE SEVERITY RATING SCALE - C-SSRS
1. WITHIN THE PAST MONTH, HAVE YOU WISHED YOU WERE DEAD OR WISHED YOU COULD GO TO SLEEP AND NOT WAKE UP?: NO
6. HAVE YOU EVER DONE ANYTHING, STARTED TO DO ANYTHING, OR PREPARED TO DO ANYTHING TO END YOUR LIFE?: NO
2. HAVE YOU ACTUALLY HAD ANY THOUGHTS OF KILLING YOURSELF?: NO

## 2021-11-08 NOTE — PROGRESS NOTES
[address] [facility]                                             [address] [place] [address], [address] [place] [address]        Phone: [phone]                                     Name:  Luz Maria Hirsch  :    1975      Consultants:   Patient Care Team:  Lea Conte DO as PCP - General (Family Medicine)    Chief Complaint:     Luz Maria Hirsch is a 42 y.o. female  who presents today for a New Patient care visit with Personalized Prevention Plan Services as noted below. HPI:     Luz Maria Hirsch 42 y.o. female with GERD, hypothyroidism, PCOS, DMTII, aplastic kidney, neuropathy, optic nerve atrophy, osteoarthritis, vitiligo, asthma, allergic rhinitis, COPD, asthma, restless leg syndrome, insomnia, HTN, HLD, obesity, epression, anxiety, migraines, IBS-C, IgA deficiency, TBI 10/19/2014, B12 deficiency, and degenerative disc disease of the cervical spine  presents for a new patient appointment. She has been out of all of her medications since August. She needs all of her medication refilled. She had a ventral hernia surgery in [place] and was transferred to [facility] due to complications. 2021. Per patient, surgery was initially done due to possible incarcerated hernia. She had an infection in her umbilicus and was seen at [facility] for abdominal abscess, Surgical I&D. End of . She had a wound vac and was discharged from home health at the end of October. She feels that she is back at her baseline now. She has some deconditioning. DMTII  Previously using Dexcom glucose monitor. She was on Ozempic for 2 months but did not tolerate this medication. She usually has low blood sugar. She had an episode where her dexcom was alerting the her blood sugar was low, 40. Her  got her something to eat and drink and her numbers came up.  She was on metformin but the pills were too large for her to take because she takes so many medications and her she had gastric bypass surgery. Asthma  Previously managed with the following medications:  Fluticasone furoate-vilanterol inhaler (Breo)  Albuterol inhaler  Duo neb nebulizer    Hypothyroid  Previous medication:  Levothyroxine 200 mcg (unithyroid)  Liothyronine 25 mcg  She has not noticed any symptoms since her medications ran out but she is not sure due to her recovery from surgery. GERD  She was taking Famotidine 40 mg tablet but she would like to switch to omeprazole. She has taken omeprazole in the past and feels that this works better for her. She has been having daily symptoms of reflux since running out of her medications. IBS-C  She was taking Liniess 145 mcg for constipation. She takes this as a last resort. When she takes it she has severe diarrhea for for about two days. Seasonal Allergies and allergic rhinitis  She is allergic to bees and all peppers (vegetables), and black pepper. She also has seasonal allergies. Previously taking the following medications:  Fluticasone nasal spray  Montelukast 10 mg  Cetrizine 10 mg  Epi-pen  Azelastine nasal spray  Azelastine ophthalmic drops     Depression  Previously taking the following medications:   Duloxetine 30 mg  Aripiprazole 5 mg  She has been taking these medication for 10+ years. She has no thoughts of harming herself or ending her life. She has had an increase in depressive symptoms since running out of medications. Her and her  are also having financial stress since moving due to the difference in health benefits in this state. Positive PHQ-9 today. Severe depression. Anxiety  Previously taking Buspirone 10 mg TID  Positive FLAVIA-7 today. Severe anxiety. Migraine, not well controlled   Previously taking rizatripan 10 mg PRN  She continues to have migraines and states that this medication is vital to her.      Osteoarthritis, bilateral knees  Previously using the following medications:  Acetaminophen 500 mg   Voltaren gel    Stress incontinence  Previously taking Oxybutinin 5 mg  She has no complaint of urinary symptoms today    Neuropathy  Previously taking Gabapentine 600 mg TID she has not been on this for many years. She feels that her neuropathy is getting worse recently. She states that she has neuropathy in bilateral lower extremities as well as upper extremities. HTN  She does not take her BP regularly  Previously taking Amlodipine 5 mg daily     Lymphadema, right LE  Previously taking:  Furosemide 40 mg daily   Potassium 10 meq, capsule     HLD  Currently taking Atorvastatin 20 mg    Morbid obesity   Hx. Gastric sleeve in 2018  She has lost over 100 lbs and continues to have a goal to lose more weight. She was focusing on her diet but due to recent financial stress and recovery from multiple surgeries she has been unable to do this. Chronic yeast infection, intertrigo  She has had chronic yeast infections in multiple skin folds for many years.    Nystatin powder          Patient Active Problem List   Diagnosis    RLS (restless legs syndrome)    Asthma    Psychophysiological insomnia    Hypersomnia    Osteoarthrosis    HTN (hypertension)    Hypothyroidism    Pure hypercholesterolemia    Lymphedema of right lower extremity    Abdominal wall abscess    Morbid obesity ([address]    Allergic rhinitis    B12 deficiency    Depression    GERD (gastroesophageal reflux disease)    Hyperthyroidism with Hashimoto disease    IgA deficiency ([address]    Intertrigo    Optic nerve atrophy    PCOS (polycystic ovarian syndrome)    Poor balance    Abnormal nuclear stress test    COPD (chronic obstructive pulmonary disease) ([address]    Degenerative disc disease, cervical    Dyslipidemia    Kidney, aplastic    Multiple joint pain    Neuropathy    Paradoxical vocal cord motion    S/P laparoscopic sleeve gastrectomy    Type 2 diabetes mellitus ([address]    Vitamin D deficiency    Vitiligo         Past Medical History:    Past Medical History:   Diagnosis Date    Acquired hypothyroidism 5/26/2016    Allergic rhinitis 8/2/2013    Anemia     Ankle swelling 10/5/2017    Arthritis     Asthma     Cellulitis 12/19/2017    Chest pain due to myocardial ischemia 5/1/2018    Chronic bronchitis (HCC)     Chronic kidney disease     COPD ([facility] disease) [facility]     Depression     Head injury 5/26/2016    Hypertension     Hyperthyroidism with Hashimoto disease 3/13/2020    Hypothyroidism     Incarcerated hernia 6/12/2021    Formatting of this note might be different from the original. Added automatically from request for surgery [id number]    Infection of deep incisional surgical site after procedure     Left foot pain 5/26/2016    Lymphedema of right lower extremity 5/26/2016    Optic nerve atrophy 9/19/2021    PARVIZ treated with BiPAP 2/25/2016    Osteoarthritis of left knee 5/26/2016    Panniculitis 3/13/2020    PCOS (polycystic ovarian syndrome) 10/5/2017    Pneumonia     Prediabetes 10/5/2017    Psychophysiological insomnia 2/25/2016    RLS (restless legs syndrome) 2/25/2016    S/P laparoscopic sleeve gastrectomy 8/9/2019    S/P repair of ventral hernia 6/14/2021    Skin rash 8/9/2019    Formatting of this note might be different from the original. Under folds    Sleep apnea     Thyroid disease     Type 2 diabetes mellitus ([facility] 6/13/2021    Vitiligo        Past Surgical History:  Past Surgical History:   Procedure Laterality Date    APPENDECTOMY  july 2002    CHOLECYSTECTOMY  2001    HAND SURGERY Left     CTR    MOUTH SURGERY      5 teeth removed    RECTAL SURGERY N/A 7/31/2021    ABDOMINAL WALL INCISION AND DRAINAGE performed by Estrellita Traylor MD at [address] [place] [address]:  Prior to Visit Medications    Medication Sig Taking?  Authorizing Provider   atorvastatin (LIPITOR) 20 MG tablet Take 1 tablet by mouth daily Yes Wiley BARFIELD May, DO   furosemide (LASIX) 40 MG tablet Take 1 tablet by mouth daily Yes Jamesene LICO May, DO   cetirizine (ZYRTEC) 10 MG tablet Take 1 tablet by mouth daily Yes Wiley BARFIELD May, DO   liothyronine (CYTOMEL) 25 MCG tablet Take 1 tablet by mouth 2 times daily Yes Wiley BARFIELD May, DO   DULoxetine (CYMBALTA) 30 MG extended release capsule Take 1 capsule by mouth 2 times daily Yes Wiley BARFIELD May, DO   amLODIPine (NORVASC) 5 MG tablet Take 1 tablet by mouth daily Yes Wiley BARFIELD May, DO   ARIPiprazole (ABILIFY) 5 MG tablet Take 1 tablet by mouth daily Yes Wiley BARFIELD May, DO   busPIRone (BUSPAR) 10 MG tablet Take 1.5 tablets by mouth 3 times daily Yes Wiley BARFIELD May, DO   montelukast (SINGULAIR) 10 MG tablet Take 1 tablet by mouth nightly Yes Wiley BARFIELD May, DO   albuterol sulfate  (90 Base) MCG/ACT inhaler Inhale 2 puffs into the lungs every 6 hours as needed for Wheezing Yes Wiley LICO May, DO   fluticasone (FLONASE ALLERGY RELIEF) 50 MCG/ACT nasal spray 2 sprays by Nasal route 2 times daily Yes Wiley BARFIELD May, DO   nystatin (MYCOSTATIN) POWD powder Apply 3 times a day as needed to manage rash and moisture.  Yes Wiley LICO May, DO   Fluticasone furoate-vilanterol (BREO ELLIPTA) 200-25 MCG/INH AEPB inhaler Inhale 1 puff into the lungs daily Yes Wiley LICO May, DO   azelastine (OPTIVAR) 0.05 % ophthalmic solution Place 1 drop into both eyes 2 times daily Yes Jamesene LICO May, DO   azelastine (ASTELIN) 0.1 % nasal spray 1 spray by Nasal route 2 times daily Use in each nostril as directed Yes Wiley LICO May, DO   ipratropium-albuterol (DUONEB) 0.5-2.5 (3) MG/3ML SOLN nebulizer solution Inhale 3 mLs into the lungs every 4 hours Yes Wiley BARFIELD May, DO   oxybutynin (DITROPAN-XL) 5 MG extended release tablet Take 1 tablet by mouth daily Yes Wiley BARFIELD May, DO   rizatriptan (MAXALT) 10 MG tablet Take 1 tablet by mouth once as needed for Migraine May repeat in 2 hours if needed Yes Wiley BARFIELD May, DO omeprazole (PRILOSEC) 20 MG delayed release capsule Take 1 capsule by mouth 2 times daily (before meals) Yes Wiley BARFIELD May, DO   Continuous Blood Gluc Sensor (DEXCOM G4 SENSOR) MISC 1 Units by Does not apply route See Admin Instructions Change sensor every 10 days Yes Wiley BARFIELD May, DO   Continuous Blood Gluc Transmit (DEXCOM G4 PLATINUM TRANSMITTER) MISC 1 Units by Does not apply route See Admin Instructions Yes Wiley BARFIELD May, DO   Cholecalciferol 25 MCG (1000 UT) CHEW Take 2 tablets by mouth daily Yes Historical Provider, MD   Continuous Blood Gluc  ([address]) [address] 2 times daily Yes Historical Provider, MD   levothyroxine (SYNTHROID) 200 MCG tablet Take 200 mcg by mouth Daily  Yes Historical Provider, MD   famotidine (PEPCID) 40 MG tablet Take 40 mg by mouth 2 times daily  Yes Historical Provider, MD   ferrous sulfate (FE TABS 325) 325 (65 Fe) MG EC tablet Take 65 mg by mouth 3 times daily (with meals) Yes Historical Provider, MD   acetaminophen (TYLENOL) 500 MG tablet Take 500 mg by mouth every 6 hours as needed for Pain Yes Historical Provider, MD   calcium-vitamin D (OSCAL-500) 500-200 MG-UNIT per tablet Take 1 tablet by mouth daily  Yes Historical Provider, MD   Nutritional Supplements (GLUCOSE MANAGEMENT) TABS Take by mouth Yes Historical Provider, MD   vitamin D (ERGOCALCIFEROL) 01831 UNITS CAPS capsule Take 1 capsule by mouth once a week Yes Dimple Fang MD   gabapentin (NEURONTIN) 600 MG tablet Take 1 tablet by mouth daily Yes Dimple Fang MD   potassium chloride (KLOR-CON) 20 MEQ packet Take 20 mEq by mouth daily Yes Dimple Fang MD   GNP FIBER THERAPY 500 MG TABS Take 500 mg by mouth 2 times daily Yes Dimple Fang MD   calcium carbonate (CALCIUM 600) 600 MG TABS tablet Take 1 tablet by mouth daily  Patient taking differently: Take 1 tablet by mouth daily 500 Yes Dimple Fang MD   vitamin B-12 (CYANOCOBALAMIN) 1000 MCG tablet Take 1 tablet by mouth daily Yes SHLOMO Maggy Ireland MD   nystatin-triamcinolone [facility]) 406569-2.1 UNIT/GM-% ointment  Yes Historical Provider, MD   Adhesive Tape (PAPER TAPE 1\"X10YD) TAPE Apply to affected areas. Yes Sydney Santos MD   Multiple Vitamins-Minerals (THERAPEUTIC MULTIVITAMIN-MINERALS) tablet Take 1 tablet by mouth daily Yes Historical Provider, MD   Polyethylene Glycol 3350 (MIRALAX PO) Take by mouth 2 times daily  Yes Historical Provider, MD   artificial tears (ARTIFICIAL TEARS) OINT as needed Yes Historical Provider, MD   glucose monitoring kit (FREESTYLE) monitoring kit 1 kit by Does not apply route daily as needed Yes Alona Mcnamara MD   FREESTYLE LANCETS MISC 1 each by Does not apply route daily Yes Alona Mcnamara MD   EPINEPHrine 0.1 MG/ML injection Inject 0.3 mg into the muscle as needed Yes Historical Provider, MD   dextromethorphan-guaiFENesin (MUCINEX DM)  MG per SR tablet Take 1 tablet by mouth every 12 hours as needed  Yes Historical Provider, MD       Allergies:    Bee venom, Black pepper-turmeric, Ibuprofen, and Nsaids    Family History:       Problem Relation Age of Onset    Hypertension Mother     Asthma Father     Diabetes Father     Emphysema Father     Hypertension Father     Diabetes Paternal Aunt     Diabetes Paternal Grandmother     Other Sister         problems with pregnancy.        Social History:  Social History     Tobacco History     Smoking Status  Passive Smoke Exposure - Never Smoker Smoking Tobacco Type  Cigarettes    Smokeless Tobacco Use  Never Used          Alcohol History     Alcohol Use Status  No          Drug Use     Drug Use Status  No          Sexual Activity     Sexually Active  Not Asked                   Health Maintenance Completed:  Health Maintenance   Topic Date Due    A1C test (Diabetic or Prediabetic)  Never done    Diabetic retinal exam  Never done    COVID-19 Vaccine (1) Never done    HIV screen  Never done    Hepatitis B vaccine (1 of 3 - Risk 3-dose series) Never done    Cervical cancer screen  Never done    Diabetic foot exam  11/08/2022    Diabetic microalbuminuria test  11/08/2022    Lipid screen  11/08/2022    Pneumococcal 0-64 years Vaccine (2 of 4 - PCV13) 11/08/2022    TSH testing  11/08/2022    Potassium monitoring  11/08/2022    Creatinine monitoring  11/08/2022    DTaP/Tdap/Td vaccine (2 - Td or Tdap) 11/08/2031    Flu vaccine  Completed    Hepatitis C screen  Completed    Hepatitis A vaccine  Aged Out    Hib vaccine  Aged Out    Meningococcal (ACWY) vaccine  Aged [employer] History   Administered Date(s) Administered    Influenza, MDCK Quadv, with preserv IM (Flucelvax 2 yrs and older) 11/08/2021    Pneumococcal Polysaccharide ([id number]) 11/08/2021    Tdap (Boostrix, Adacel) 11/08/2021         Review of Systems:  Review of Systems   Constitutional: Positive for fatigue. Negative for activity change, appetite change and fever. HENT: Positive for ear pain. Negative for sinus pressure and sinus pain. Eyes: Positive for photophobia. Negative for pain. Respiratory: Negative for cough and shortness of breath. Cardiovascular: Positive for leg swelling. Negative for chest pain and palpitations. Gastrointestinal: Positive for constipation. Negative for abdominal pain. Endocrine: Negative for polydipsia, polyphagia and polyuria. Musculoskeletal: Positive for arthralgias, back pain and joint swelling. Negative for myalgias. Allergic/Immunologic: Positive for environmental allergies and food allergies. Neurological: Positive for headaches. Negative for seizures and light-headedness. Psychiatric/Behavioral: Negative for agitation and behavioral problems. Physical Exam:   Vitals:    11/08/21 1324   BP: 132/82   Pulse: 84   Temp: 97.8 °F (36.6 °C)   TempSrc: Temporal   SpO2: 97%   Weight: (!) 323 lb 12.8 oz (146.9 kg)   Height: 5' (1.524 m)     Body mass index is 63.24 kg/m².     Wt Readings from Last 3 Encounters: 11/08/21 (!) 323 lb 12.8 oz (146.9 kg)   09/14/21 (!) 321 lb 12.8 oz (146 kg)   07/30/21 (!) 337 lb 4.9 oz (153 kg)       BP Readings from Last 3 Encounters:   11/08/21 132/82   09/14/21 (!) 146/108   08/04/21 127/81       Physical Exam  Constitutional:       General: She is not in acute distress. Appearance: Normal appearance. HENT:      Head: Normocephalic and atraumatic. Right Ear: Tympanic membrane normal.      Left Ear: Tympanic membrane normal.   Eyes:      Extraocular Movements: Extraocular movements intact. Pupils: Pupils are equal, round, and reactive to light. Cardiovascular:      Rate and Rhythm: Normal rate and regular rhythm. Pulses: Normal pulses. Heart sounds: Normal heart sounds. Pulmonary:      Effort: Pulmonary effort is normal.      Breath sounds: Normal breath sounds. Abdominal:      General: Bowel sounds are normal.      Palpations: Abdomen is soft. Skin:     General: Skin is warm and dry. Neurological:      General: No focal deficit present. Mental Status: She is alert and oriented to person, place, and time. Psychiatric:         Mood and Affect: Mood normal.         Behavior: Behavior normal.             Assessment/Plan:  Reilly Simental was seen today for [facility] care and ingrown toenail. Diagnoses and all orders for this visit:    Jaycee Brewster 42 y.o. female with GERD, hypothyroidism, PCOS, DMTII, aplastic kidney, neuropathy, optic nerve atrophy, osteoarthritis, vitiligo, asthma, allergic rhinitis, COPD, restless leg syndrome, insomnia, HTN, HLD, obesity, and depression presents for a new patient appointment.      DMTII, not well controlled  Dexcom glucose transmitter and sensor ordered today  A1C ordered today  Diabetic foot exam performed today    Hypothyroid, not well controlled  Resume the following medications:  Levothyroxine 200 mcg  Liothyronine 25 mcg  TSH and free T4 ordered today    COPD with underlying Asthma, not well controlled  Resume the following medications:  Duoneb nebulizer   Fluticasone furoate-vilanterol inhaler   Albuterol inhaler    Seasonal Allergies and allergic rhinitis, well controlled  Resume the following medications:  Fluticasone nasal spray  Montelukast 10 mg  Cetrizine 10 mg    IBS-C  Recommend mirilax and fiber supplement for constipation  Consider restarting Liniess 145 mcg if constipation is not managed with the above. GERD, not well controlled  Omeprazole 20 mg ordered today  If not well controlled consider increasing dose. Osteoarthritis, well managed  Resume the following medications:  Acetaminophen 500 mg  Voltaren gel    Stress incontinence, well controlled  Resume the following medication:  Oxybutinin 5 mg    Neuropathy, upper and lower extremities, not well controlled  May be related to cervical degenerative disc disease  Consider restarting Gabapentine 600 mg after further discussion of neuropathy. If no recent EMG and cervical spine x-ray, consider ordering at next appointment.      Depression  Resume the following medications:  Duloxetine 30 mg  Aripiprazole 5 mg    Anxiety  Resume the following medication:  Buspirone 10 mg    Migraine, not well controlled   Resume rizatripan 10 mg PRN    HTN, well controlled  Resume the following medication:  Amlodipine 5 mg  Mircoalbumin/ Cr ratio ordered today    Lymphedema, Right lower extremity, not well controlled  Resume the following medication:  Furosemide 40 mg  Consider adding potassium supplement if deficient  CMP ordered today    HLD, not well controlled  Resume the following medication:  Atorvastatin 10 mg  Lipid panel ordered today    Intertrigo, not well controlled  Resume nystatin powder     Hx vitamin B12 deficiency  CBC ordered today  B12 and folate ordered today    Obesity, not well controlled  -Recommend 150 minutes of cardiovascular activity a week, moderate intensity  -Increase  intake of fruits and vegetables  -Minimize packaged foods  Plan to discuss weight loss goals further at follow up appointment       HCM  Recommend diabetic retinal exam, plan to make referral at next appointment  Recommend COVID-19 vaccine, patient defers  Recommend TDap vaccine, given today  Recommend Pneumococcal vaccine, given today  Recommend Flu vaccine, given today  Recommend Hep C screen, ordered today  Recommend HIV screen, ordered today  Recommend cervical CA screen, discuss need for screen at next appointment  Patient requested records release forms for records from multiple previous specialists. Health Maintenance Due:  Health Maintenance Due   Topic Date Due    A1C test (Diabetic or Prediabetic)  Never done    Diabetic retinal exam  Never done    COVID-19 Vaccine (1) Never done    HIV screen  Never done    Hepatitis B vaccine (1 of 3 - Risk 3-dose series) Never done    Cervical cancer screen  Never done        Health care decision maker:  <72years old      Health Maintenance: (USPSTF Recommendations)  (F) Breast Cancer Screen: (40-49 (C), 50-74 biennial screening mammogram (B))  (F) Cervical Cancer Screen: (21-29 q3yr cytology alone; 30-65 q3yr cytology alone, q5yr with hrHPV alone, or q5yr cytology+hrHPV (A))  CRC/Colonoscopy Screening: (adults 45-49 (B), 50-75 (A))  Lung Ca Screening: Annual LDCT (+smoker age 49-80, smoked within 15 years, total of 20 pack yr history (B)): Never smoker  DEXA Screen: (women >65 and older, <65 if at risk/postmenopausal (B))  HIV Screen: (16-65 yr old, and all pregnant patients (A)): ordered today  Hep C Screen: (21-70 yr old (B)): ordered today  [address] Screen: (all pts with cirrhosis and high risk Hep B (US q6 mo)):  Immunizations:    RTC:  Return in about 1 week (around 11/15/2021). Plan to discuss ability to fill medications, discuss lab results, and review family hx and medical hx in depth.      EMR Dragon/transcription disclaimer:  Much of this encounter note is electronic transcription/translation of spoken language to printed texts. The electronic translation of spoken language may be erroneous, or at times, nonsensical words or phrases may be inadvertently transcribed.   Although I have reviewed the note for such errors, some may still exist.

## 2021-11-09 LAB
ESTIMATED AVERAGE GLUCOSE: 108.3 MG/DL
HBA1C MFR BLD: 5.4 %
T4 FREE: 0.2 NG/DL (ref 0.9–1.8)

## 2021-11-10 LAB
HIV AG/AB: REACTIVE
HIV ANTIGEN: REACTIVE
HIV-1 ANTIBODY: REACTIVE
HIV-2 AB: REACTIVE

## 2021-11-15 LAB
HIV INTERPRETATION: NEGATIVE
HIV-1 ANTIBODY: NEGATIVE
HIV-2 AB: NEGATIVE

## 2021-11-18 ENCOUNTER — OFFICE VISIT (OUTPATIENT)
Dept: PRIMARY CARE CLINIC | Age: 46
End: 2021-11-18
Payer: MEDICARE

## 2021-11-18 VITALS
BODY MASS INDEX: 57.52 KG/M2 | HEIGHT: 60 IN | OXYGEN SATURATION: 98 % | DIASTOLIC BLOOD PRESSURE: 82 MMHG | SYSTOLIC BLOOD PRESSURE: 128 MMHG | HEART RATE: 71 BPM | WEIGHT: 293 LBS | TEMPERATURE: 97 F

## 2021-11-18 DIAGNOSIS — I10 BENIGN ESSENTIAL HTN: Primary | ICD-10-CM

## 2021-11-18 DIAGNOSIS — G43.901 MIGRAINE WITH STATUS MIGRAINOSUS, NOT INTRACTABLE, UNSPECIFIED MIGRAINE TYPE: ICD-10-CM

## 2021-11-18 DIAGNOSIS — E03.9 HYPOTHYROIDISM, UNSPECIFIED TYPE: ICD-10-CM

## 2021-11-18 DIAGNOSIS — E11.40 TYPE 2 DIABETES MELLITUS WITH DIABETIC NEUROPATHY, UNSPECIFIED WHETHER LONG TERM INSULIN USE (HCC): ICD-10-CM

## 2021-11-18 DIAGNOSIS — E55.9 VITAMIN D DEFICIENCY: ICD-10-CM

## 2021-11-18 DIAGNOSIS — F32.A DEPRESSION, UNSPECIFIED DEPRESSION TYPE: ICD-10-CM

## 2021-11-18 DIAGNOSIS — Z11.4 ENCOUNTER FOR SCREENING FOR HIV: ICD-10-CM

## 2021-11-18 PROCEDURE — 99215 OFFICE O/P EST HI 40 MIN: CPT | Performed by: STUDENT IN AN ORGANIZED HEALTH CARE EDUCATION/TRAINING PROGRAM

## 2021-11-18 RX ORDER — BLOOD-GLUCOSE SENSOR
1 EACH MISCELLANEOUS SEE ADMIN INSTRUCTIONS
Qty: 3 EACH | Refills: 2 | Status: SHIPPED | OUTPATIENT
Start: 2021-11-18 | End: 2021-12-18

## 2021-11-18 RX ORDER — LEVOTHYROXINE SODIUM 0.2 MG/1
200 TABLET ORAL DAILY
Qty: 30 TABLET | Refills: 3 | Status: SHIPPED | OUTPATIENT
Start: 2021-11-18 | End: 2022-03-18

## 2021-11-18 RX ORDER — BUSPIRONE HYDROCHLORIDE 10 MG/1
15 TABLET ORAL 3 TIMES DAILY
Qty: 135 TABLET | Refills: 0 | Status: SHIPPED | OUTPATIENT
Start: 2021-11-18 | End: 2022-08-04

## 2021-11-18 RX ORDER — DULOXETIN HYDROCHLORIDE 30 MG/1
30 CAPSULE, DELAYED RELEASE ORAL 2 TIMES DAILY
Qty: 60 CAPSULE | Refills: 2 | Status: SHIPPED | OUTPATIENT
Start: 2021-11-18 | End: 2022-08-11

## 2021-11-18 RX ORDER — BLOOD-GLUCOSE TRANSMITTER
1 EACH MISCELLANEOUS
Qty: 1 EACH | Refills: 3 | Status: SHIPPED | OUTPATIENT
Start: 2021-11-18 | End: 2022-02-16

## 2021-11-18 RX ORDER — NYSTATIN 100000 [USP'U]/G
POWDER TOPICAL
COMMUNITY
Start: 2021-11-08 | End: 2022-08-11 | Stop reason: SDUPTHER

## 2021-11-18 RX ORDER — LIOTHYRONINE SODIUM 25 UG/1
25 TABLET ORAL 2 TIMES DAILY
Qty: 60 TABLET | Refills: 1 | Status: SHIPPED | OUTPATIENT
Start: 2021-11-18 | End: 2022-08-11

## 2021-11-18 SDOH — ECONOMIC STABILITY: FOOD INSECURITY: WITHIN THE PAST 12 MONTHS, YOU WORRIED THAT YOUR FOOD WOULD RUN OUT BEFORE YOU GOT MONEY TO BUY MORE.: SOMETIMES TRUE

## 2021-11-18 SDOH — ECONOMIC STABILITY: FOOD INSECURITY: WITHIN THE PAST 12 MONTHS, THE FOOD YOU BOUGHT JUST DIDN'T LAST AND YOU DIDN'T HAVE MONEY TO GET MORE.: NEVER TRUE

## 2021-11-18 ASSESSMENT — ENCOUNTER SYMPTOMS
SINUS PAIN: 0
COUGH: 0
PHOTOPHOBIA: 1
SHORTNESS OF BREATH: 0
BACK PAIN: 1
EYE PAIN: 0
ABDOMINAL PAIN: 0
SINUS PRESSURE: 0
CONSTIPATION: 1

## 2021-11-18 ASSESSMENT — SOCIAL DETERMINANTS OF HEALTH (SDOH): HOW HARD IS IT FOR YOU TO PAY FOR THE VERY BASICS LIKE FOOD, HOUSING, MEDICAL CARE, AND HEATING?: VERY HARD

## 2021-11-18 NOTE — PROGRESS NOTES
[address] and [facility]                                             [address] [facility], [address] [place] [address], [address] [place] [address]        Phone: [phone]                                     Name:  Jcarlos Deng  :    1975      Consultants:   Patient Care Team:  Sekou Conte DO as PCP - General (Family Medicine)    Chief Complaint:     Jcarlos Deng is a 42 y.o. female  who presents today for a New Patient care visit with Personalized Prevention Plan Services as noted below. HPI:     Jcarlos Deng 42 y.o. female with GERD, hypothyroidism, PCOS, DMTII, aplastic kidney, neuropathy, optic nerve atrophy, osteoarthritis, vitiligo, asthma, allergic rhinitis, COPD, restless leg syndrome, insomnia, HTN, HLD, obesity, depression, anxiety, migraines, IBS-C, IgA deficiency, TBI 10/19/2014, B12 deficiency, and degenerative disc disease of the cervical spine  presents for follow up    She had a ventral hernia surgery in [place] and was transferred to [facility] due to complications. 2021. Per patient, surgery was initially done due to possible incarcerated hernia. She had an infection in her umbilicus and was seen at [facility] for abdominal abscess, Surgical I&D. End of . She had a wound vac and was discharged from home health at the end of October. She feels that she is back at her baseline now. DMTII  Previously using Dexcom glucose monitor. Dexcom sensor and transmitter reordered at last appointment but the model was not correct. She was on Ozempic for 2 months but did not tolerate this medication. She usually has low blood sugar. She had an episode where her dexcom was alerting the her blood sugar was low, 40. Her  got her something to eat and drink and her numbers came up.  She was on metformin but the pills were too large for her to take because she takes so many medications and her she had has no complaint of urinary symptoms today    Neuropathy  Previously taking Gabapentine 600 mg TID she has not been on this for many years. She feels that her neuropathy is getting worse recently. She states that she has neuropathy in bilateral lower extremities as well as upper extremities. Symptoms unchanged from last appointment    HTN  She does not take her BP regularly  Currently taking Amlodipine 5 mg daily. No chest pain. no palpitations. Shortness of breath with exertion at baseline. Lymphadema, right LE  Currently taking:  Furosemide 40 mg daily   Symptoms are improved from last appointment    HLD  Currently taking Atorvastatin 20 mg    Morbid obesity   Hx. Gastric sleeve in 2018  She has lost over 100 lbs and continues to have a goal to lose more weight. She was focusing on her diet but due to recent financial stress and recovery from multiple surgeries she has been unable to do this. Chronic yeast infection, intertrigo  She has had chronic yeast infections in multiple skin folds for many years. Currently using nystatin powder. She feels that this is manageable if she continues the nystatin powder.           Patient Active Problem List   Diagnosis    RLS (restless legs syndrome)    Asthma    Psychophysiological insomnia    Hypersomnia    Osteoarthrosis    HTN (hypertension)    Hypothyroidism    Pure hypercholesterolemia    Lymphedema of right lower extremity    Abdominal wall abscess    Morbid obesity ([address]    Allergic rhinitis    B12 deficiency    Depression    GERD (gastroesophageal reflux disease)    Hyperthyroidism with Hashimoto disease    IgA deficiency ([address]    Intertrigo    Optic nerve atrophy    PCOS (polycystic ovarian syndrome)    Poor balance    Abnormal nuclear stress test    COPD (chronic obstructive pulmonary disease) (HCC)    Degenerative disc disease, cervical    Dyslipidemia    Kidney, aplastic    Multiple joint pain    Neuropathy    Paradoxical vocal cord motion    S/P laparoscopic sleeve gastrectomy    Type 2 diabetes mellitus ([address]    Vitamin D deficiency    Vitiligo    Encounter for screening for HIV         Past Medical History:    Past Medical History:   Diagnosis Date    Acquired hypothyroidism 05/26/2016    Allergic rhinitis 08/02/2013    Anemia     Ankle swelling 10/05/2017    Arthritis     Asthma     Cellulitis 12/19/2017    Chest pain due to myocardial ischemia 05/01/2018    Chronic bronchitis (HCC)     Chronic kidney disease     COPD (chronic obstructive pulmonary disease) ([address]     Depression     Head injury 10/19/2014    Hypertension     Hyperthyroidism with Hashimoto disease 03/13/2020    Incarcerated hernia 06/12/2021    Formatting of this note might be different from the original. Added automatically from request for surgery [id number]    Infection of deep incisional surgical site after procedure     Intertrigo     Left foot pain 05/26/2016    Lymphedema of right lower extremity 05/26/2016    Optic nerve atrophy 09/19/2021    PARVIZ 02/25/2016    Osteoarthritis of left knee 05/26/2016    Panniculitis 03/13/2020    PCOS (polycystic ovarian syndrome) 10/05/2017    Prediabetes 10/05/2017    Psychophysiological insomnia 02/25/2016    RLS (restless legs syndrome) 02/25/2016    S/P laparoscopic sleeve gastrectomy 08/09/2019    S/P repair of ventral hernia 06/14/2021    Sleep apnea     Type 2 diabetes mellitus ([facility] 06/13/2021    Vitiligo        Past Surgical History:  Past Surgical History:   Procedure Laterality Date    APPENDECTOMY  07/01/2002    CHOLECYSTECTOMY  01/01/2001    HAND SURGERY Left     CTR    MOUTH SURGERY      5 teeth removed    RECTAL SURGERY N/A 07/31/2021    ABDOMINAL WALL INCISION AND DRAINAGE performed by Nita Goldstein MD at [address] [place] [address] GASTROPLASTY  2018   [address]  07/2021       Home Meds:  Prior to Visit Medications    Medication Sig Taking?  Authorizing Provider   nystatin (MYCOSTATIN) 232816 UNIT/GM powder APPLY TOPICALLY 3 TIMES DAILY AS NEEDED TO MANAGE RASH AND MOISTURE Yes Historical Provider, MD   Continuous Blood Gluc Sensor (DEXCOM G6 SENSOR) MISC 1 Units by Does not apply route See Admin Instructions Change every 10 days Yes Wiley BARFIELD May, DO   Continuous Blood Gluc Transmit (DEXCOM G6 TRANSMITTER) MISC 1 Units by Does not apply route every 3 months Yes Wiley BARFIELD May, DO   busPIRone (BUSPAR) 10 MG tablet Take 1.5 tablets by mouth 3 times daily Yes Wiley BARFIELD May, DO   DULoxetine (CYMBALTA) 30 MG extended release capsule Take 1 capsule by mouth 2 times daily Yes Wiley BARFIELD May, DO   levothyroxine (SYNTHROID) 200 MCG tablet Take 1 tablet by mouth Daily Yes Wiley BARFIELD May, DO   liothyronine (CYTOMEL) 25 MCG tablet Take 1 tablet by mouth 2 times daily for 120 doses Yes Wiley BARFIELD May, DO   atorvastatin (LIPITOR) 20 MG tablet Take 1 tablet by mouth daily Yes Wiley BARFIELD May, DO   furosemide (LASIX) 40 MG tablet Take 1 tablet by mouth daily Yes Wiley BARFIELD May, DO   cetirizine (ZYRTEC) 10 MG tablet Take 1 tablet by mouth daily Yes Wiley BARFIELD May, DO   amLODIPine (NORVASC) 5 MG tablet Take 1 tablet by mouth daily Yes Wiley BARFIELD May, DO   ARIPiprazole (ABILIFY) 5 MG tablet Take 1 tablet by mouth daily Yes Wiley BARFIELD May, DO   montelukast (SINGULAIR) 10 MG tablet Take 1 tablet by mouth nightly Yes Wiley BARFIELD May, DO   albuterol sulfate  (90 Base) MCG/ACT inhaler Inhale 2 puffs into the lungs every 6 hours as needed for Wheezing Yes Wiley BARFIELD May, DO   fluticasone (FLONASE ALLERGY RELIEF) 50 MCG/ACT nasal spray 2 sprays by Nasal route 2 times daily Yes Wiley BARFIELD May, DO   nystatin (MYCOSTATIN) POWD powder Apply 3 times a day as needed to manage rash and moisture.  Yes Wiley BARFIELD May, DO   Fluticasone furoate-vilanterol (BREO ELLIPTA) 200-25 MCG/INH AEPB inhaler Inhale 1 puff into the lungs daily Yes Wiley BARFIELD May, DO   azelastine (OPTIVAR) 0.05 % ophthalmic solution Place 1 drop into both eyes 2 times daily Yes Wiley BARFIELD May, DO   azelastine (ASTELIN) 0.1 % nasal spray 1 spray by Nasal route 2 times daily Use in each nostril as directed Yes Wiley BARFIELD May, DO   ipratropium-albuterol (DUONEB) 0.5-2.5 (3) MG/3ML SOLN nebulizer solution Inhale 3 mLs into the lungs every 4 hours Yes Wiley BARFIELD May, DO   oxybutynin (DITROPAN-XL) 5 MG extended release tablet Take 1 tablet by mouth daily Yes Wiley BARFIELD May, DO   omeprazole (PRILOSEC) 20 MG delayed release capsule Take 1 capsule by mouth 2 times daily (before meals) Yes Wiley BARFIELD May, DO   Continuous Blood Gluc Sensor (DEXCOM G4 SENSOR) MISC 1 Units by Does not apply route See Admin Instructions Change sensor every 10 days Yes Wiley BARFIELD May, DO   Continuous Blood Gluc Transmit (DEXCOM G4 PLATINUM TRANSMITTER) MISC 1 Units by Does not apply route See Admin Instructions Yes Wiley BARFIELD May, DO   Cholecalciferol 25 MCG (1000 UT) CHEW Take 2 tablets by mouth daily Yes Historical Provider, MD   Continuous Blood Gluc  (DEXCOM G6 ) NGHIA 2 times daily Yes Historical Provider, MD   famotidine (PEPCID) 40 MG tablet Take 40 mg by mouth 2 times daily  Yes Historical Provider, MD   ferrous sulfate (FE TABS 325) 325 (65 Fe) MG EC tablet Take 65 mg by mouth 3 times daily (with meals) Yes Historical Provider, MD   acetaminophen (TYLENOL) 500 MG tablet Take 500 mg by mouth every 6 hours as needed for Pain Yes Historical Provider, MD   calcium-vitamin D (OSCAL-500) 500-200 MG-UNIT per tablet Take 1 tablet by mouth daily  Yes Historical Provider, MD   Nutritional Supplements (GLUCOSE MANAGEMENT) TABS Take by mouth Yes Historical Provider, MD   vitamin D (ERGOCALCIFEROL) 93510 UNITS CAPS capsule Take 1 capsule by mouth once a week Yes Berhane Martinez MD   gabapentin (NEURONTIN) 600 MG tablet Take 1 tablet by mouth daily Yes Berhane Martinez MD   potassium chloride (KLOR-CON) 20 MEQ packet Take 20 mEq by mouth daily Yes Laura Turner MD   GNP FIBER THERAPY 500 MG TABS Take 500 mg by mouth 2 times daily Yes Laura Turner MD   calcium carbonate (CALCIUM 600) 600 MG TABS tablet Take 1 tablet by mouth daily  Patient taking differently: Take 1 tablet by mouth daily 500 Yes Laura Turner MD   vitamin B-12 (CYANOCOBALAMIN) 1000 MCG tablet Take 1 tablet by mouth daily Yes Laura Turner MD   nystatin-triamcinolone (MYCOLOG) 284623-0.2 UNIT/GM-% ointment  Yes Historical Provider, MD   Adhesive Tape (PAPER TAPE 1\"X10YD) TAPE Apply to affected areas. Yes Maggi Love MD   Multiple Vitamins-Minerals (THERAPEUTIC MULTIVITAMIN-MINERALS) tablet Take 1 tablet by mouth daily Yes Historical Provider, MD   Polyethylene Glycol 3350 (MIRALAX PO) Take by mouth 2 times daily  Yes Historical Provider, MD   artificial tears (ARTIFICIAL TEARS) OINT as needed Yes Historical Provider, MD   glucose monitoring kit (FREESTYLE) monitoring kit 1 kit by Does not apply route daily as needed Yes Wayne Ramirez MD   FREESTYLE LANCETS MISC 1 each by Does not apply route daily Yes Wayne Ramirez MD   EPINEPHrine 0.1 MG/ML injection Inject 0.3 mg into the muscle as needed Yes Historical Provider, MD   dextromethorphan-guaiFENesin (MUCINEX DM)  MG per SR tablet Take 1 tablet by mouth every 12 hours as needed  Yes Historical Provider, MD   rizatriptan (MAXALT) 10 MG tablet Take 1 tablet by mouth once as needed for Migraine May repeat in 2 hours if needed  Wiley Conte DO       Allergies:    Bee venom, Black pepper-turmeric, Ibuprofen, and Nsaids    Family History:       Problem Relation Age of Onset    Hypertension Mother     Hypothyroidism Mother     Asthma Father     Diabetes Father     Emphysema Father     Hypertension Father     Hypertension Sister         problems with pregnancy.     Asthma Sister     Diabetes Paternal Grandmother     Cervical Cancer Paternal Aunt        Social History:  Social History     Tobacco History Smoking Status  Passive Smoke Exposure - Never Smoker Smoking Tobacco Type  Cigarettes    Smokeless Tobacco Use  Never Used          Alcohol History     Alcohol Use Status  No          Drug Use     Drug Use Status  No          Sexual Activity     Sexually Active  Not Asked                   Health Maintenance Completed:  Health Maintenance   Topic Date Due    Diabetic retinal exam  Never done    COVID-19 Vaccine (1) Never done    Hepatitis B vaccine (1 of 3 - Risk 3-dose series) Never done    Cervical cancer screen  Never done    Diabetic foot exam  11/08/2022    A1C test (Diabetic or Prediabetic)  11/08/2022    Diabetic microalbuminuria test  11/08/2022    Lipid screen  11/08/2022    Pneumococcal 0-64 years Vaccine (2 of 4 - PCV13) 11/08/2022    TSH testing  11/08/2022    Potassium monitoring  11/08/2022    Creatinine monitoring  11/08/2022    DTaP/Tdap/Td vaccine (3 - Td or Tdap) 11/08/2031    Flu vaccine  Completed    Hepatitis C screen  Completed    HIV screen  Completed    Hepatitis A vaccine  Aged Out    Hib vaccine  Aged Out    Meningococcal (ACWY) vaccine  Aged [employer] History   Administered Date(s) Administered    Influenza, MDCK Quadv, with preserv IM (Flucelvax 2 yrs and older) 11/08/2021    Pneumococcal Polysaccharide (Fypdzyucd58) 11/08/2021    Tdap (Boostrix, Adacel) 11/08/2021         Review of Systems:  Review of Systems   Constitutional: Positive for fatigue. Negative for activity change, appetite change and fever. HENT: Positive for ear pain. Negative for sinus pressure and sinus pain. Eyes: Positive for photophobia. Negative for pain. Respiratory: Negative for cough and shortness of breath. Cardiovascular: Positive for leg swelling. Negative for chest pain and palpitations. Gastrointestinal: Positive for constipation. Negative for abdominal pain. Endocrine: Negative for polydipsia, polyphagia and polyuria.    Musculoskeletal: Positive for arthralgias, back pain and joint swelling. Negative for myalgias. Allergic/Immunologic: Positive for environmental allergies and food allergies. Neurological: Positive for headaches. Negative for seizures and light-headedness. Psychiatric/Behavioral: Negative for agitation and behavioral problems. Physical Exam:   Vitals:    11/18/21 1429   BP: 128/82   Pulse: 71   Temp: 97 °F (36.1 °C)   TempSrc: Temporal   SpO2: 98%   Weight: (!) 320 lb 6.4 oz (145.3 kg)   Height: 5' (1.524 m)     Body mass index is 62.57 kg/m². Wt Readings from Last 3 Encounters:   11/18/21 (!) 320 lb 6.4 oz (145.3 kg)   11/08/21 (!) 323 lb 12.8 oz (146.9 kg)   09/14/21 (!) 321 lb 12.8 oz (146 kg)       BP Readings from Last 3 Encounters:   11/18/21 128/82   11/08/21 132/82   09/14/21 (!) 146/108       Physical Exam  Constitutional:       General: She is not in acute distress. Appearance: Normal appearance. She is obese. HENT:      Head: Normocephalic and atraumatic. Right Ear: Tympanic membrane normal.      Left Ear: Tympanic membrane normal.   Eyes:      Extraocular Movements: Extraocular movements intact. Pupils: Pupils are equal, round, and reactive to light. Cardiovascular:      Rate and Rhythm: Normal rate and regular rhythm. Pulses: Normal pulses. Heart sounds: Normal heart sounds. Pulmonary:      Effort: Pulmonary effort is normal.      Breath sounds: Normal breath sounds. Abdominal:      General: Bowel sounds are normal.      Palpations: Abdomen is soft. Skin:     General: Skin is warm and dry. Neurological:      General: No focal deficit present. Mental Status: She is alert and oriented to person, place, and time.    Psychiatric:         Mood and Affect: Mood normal.         Behavior: Behavior normal.             Assessment/Plan:    Michael Uri 42 y.o. female with GERD, hypothyroidism, PCOS, DMTII, aplastic kidney, neuropathy, optic nerve atrophy, osteoarthritis, vitiligo, asthma, allergic rhinitis, COPD, restless leg syndrome, insomnia, HTN, HLD, obesity, and depression presents for follow up. HIV screening, unclear results  HIV 1/2 antibody multispot negative, HIV 1/2 antigen antibody reactive  Patient states that her ex- participated in high risk behaviors without her knowledge prior to their separation 7 years ago. HIV quantitative PCR ordered today  Follow-up in 2 weeks to discuss results    DMTII, well controlled  Dexcom G6 glucose transmitter and sensor ordered today. Previous order was for G4 model which is no longer in production. A1C 5.4, 11/8/2021  Follow-up in 3 months for review of blood sugar log    Hypothyroid, not well controlled  , 11/8/2021  Resume levothyroxine 200 mcg. Call if there is difficulty filling medication. Continue liothyronine 25 mcg  Follow-up in 3 months repeat TSH at that time    COPD with underlying Asthma, not well controlled  Continue the following medications:  Duoneb nebulizer   Fluticasone furoate-vilanterol inhaler   Albuterol inhaler  Follow-up in 1 month to discuss COPD and asthma symptoms and severity. Consider repeating PFTs if symptoms have changed since last PFTs.   Patient is requesting records from her pulmonologist.    Seasonal Allergies and allergic rhinitis, well controlled  Continue the following medications:  Fluticasone nasal spray  Montelukast 10 mg  Cetrizine 10 mg  Follow-up in 3 months to discuss allergy symptoms    IBS-C, well controlled  Recommend mirilax and fiber supplement for constipation  Follow-up in 3 months to discuss symptoms and severity    GERD, not well controlled  Continue omeprazole 20 mg  Follow-up in 1 month to discuss reflux symptoms and nausea    Osteoarthritis, well managed  Continue the following medications:  Acetaminophen 500 mg  Voltaren gel  Follow-up in 3 to 5 months to discuss symptoms and severity    Stress incontinence, well controlled  Continue the following medication:  Oxybutinin 5 mg  Follow-up in 3 to 5 months to discuss symptoms and severity    Neuropathy, upper and lower extremities, not well controlled  May be related to cervical degenerative disc disease  Consider restarting Gabapentine 600 mg after further discussion and evaluation of neuropathy. Patient is working to get records from previous neurologist . If no recent EMG and cervical spine x-ray, consider ordering. Follow-up in 1 month to discuss symptoms and severity    Depression, improved  Continue the following medications:  Duloxetine 30 mg  Aripiprazole 5 mg  Follow-up in 2 weeks to discuss symptoms and severity. Repeat PHQ-9 and FLAVIA-7 at that time. Anxiety  Continue following medication:  Buspirone 10 mg  Follow-up in 2 weeks to discuss symptoms and severity. Repeat PHQ-9 and FLAVIA-7 at that time.     Migraine, not well controlled   Continue rizatripan 10 mg PRN  Follow-up in 3 to 5 months to discuss symptoms and severity    HTN, well controlled  Microalbumin, random urine 3.5, 11/8/2021  Microalbumin creatinine ratio 18.5, 11/8/2021  Continue the following medication:  Amlodipine 5 mg  Follow-up in 3 to 5 months for repeat urine microalbumin creatinine ratio      Lymphedema, Right lower extremity, not well controlled  No sodium or potassium abnormalities on CMP 11/8/2021  Continue the following medication:  Furosemide 40 mg  Follow-up in 3 to 5 months to discuss symptoms and severity      HLD, not well controlled  Continue the following medication:  Atorvastatin 10 mg  Lipid panel elevated 11/8/2021  Follow-up in 3 months for repeat lipid panel    Intertrigo, not well controlled  Continue nystatin powder   Follow-up in 4 to 6 months to discuss symptoms and severity    Hx vitamin B12 deficiency, resolved  Normal vitamin B12 and folate 11/8/2021  No follow-up needed for this    Obesity, not well controlled  -Recommend 150 minutes of cardiovascular activity a week, moderate intensity  -Increase intake of fruits and vegetables  -Minimize packaged foods  Plan to discuss weight loss goals further at follow up appointments    History of vitamin D deficiency  Vitamin D level ordered today  Consider vitamin D supplement if below 20  Follow-up in 2 weeks to discuss lab results    Need for cervical cancer screen  Patient is unsure of when her last Pap smear was. She thinks she may have had a positive HPV at some point as well as a colposcopy. Patient is working to get records from previous OB. Next steps dictated by results contained in the records. Follow-up on records at appointment in 2 weeks. HCM  Recommend diabetic retinal exam, plan to make referral at next appointment  Recommend COVID-19 vaccine, patient defers  Recommend cervical CA screen, awaiting records  Patient requested records release forms for records from multiple previous specialists.         Health Maintenance Due:  Health Maintenance Due   Topic Date Due    Diabetic retinal exam  Never done    COVID-19 Vaccine (1) Never done    Hepatitis B vaccine (1 of 3 - Risk 3-dose series) Never done    Cervical cancer screen  Never done        Health care decision maker:  <72years old      Health Maintenance: (USPSTF Recommendations)  (F) Breast Cancer Screen: (40-49 (C), 50-74 biennial screening mammogram (B))  (F) Cervical Cancer Screen: (21-29 q3yr cytology alone; 30-65 q3yr cytology alone, q5yr with hrHPV alone, or q5yr cytology+hrHPV (A))   CRC/Colonoscopy Screening: (adults 45-49 (B), 50-75 (A))  Lung Ca Screening: Annual LDCT (+smoker age 49-80, smoked within 15 years, total of 20 pack yr history (B)): Never smoker  DEXA Screen: (women >65 and older, <65 if at risk/postmenopausal (B))  HIV Screen: (16-65 yr old, and all pregnant patients (A)): ordered today  Hep C Screen: (21-70 yr old (B)): ordered today  [address] Screen: (all pts with cirrhosis and high risk Hep B (US q6 mo)):  Immunizations:    RTC:  Return in about 2 weeks (around 12/2/2021) for Lab follow up. Plan to discuss ability to fill medications, discuss lab results, and review family hx and medical hx in depth. EMR Dragon/transcription disclaimer:  Much of this encounter note is electronic transcription/translation of spoken language to printed texts. The electronic translation of spoken language may be erroneous, or at times, nonsensical words or phrases may be inadvertently transcribed.   Although I have reviewed the note for such errors, some may still exist.

## 2021-11-22 ENCOUNTER — HOSPITAL ENCOUNTER (OUTPATIENT)
Age: 46
Discharge: HOME | End: 2021-11-22
Payer: MEDICARE

## 2021-11-22 DIAGNOSIS — Z11.4 ENCOUNTER FOR SCREENING FOR HIV: ICD-10-CM

## 2021-11-22 DIAGNOSIS — E55.9 VITAMIN D DEFICIENCY: ICD-10-CM

## 2021-11-22 PROCEDURE — 87536 HIV-1 QUANT&REVRSE TRNSCRPJ: CPT

## 2021-11-22 PROCEDURE — 36415 COLL VENOUS BLD VENIPUNCTURE: CPT

## 2021-11-22 PROCEDURE — 82306 VITAMIN D 25 HYDROXY: CPT

## 2021-11-23 LAB — VITAMIN D 25-HYDROXY: 16.1 NG/ML

## 2021-12-01 LAB
DIRECT EXAM: NORMAL
REPORT STATUS: NORMAL
SPECIMEN: NORMAL

## 2021-12-07 ENCOUNTER — OFFICE VISIT (OUTPATIENT)
Dept: PRIMARY CARE CLINIC | Age: 46
End: 2021-12-07
Payer: MEDICARE

## 2021-12-07 VITALS
DIASTOLIC BLOOD PRESSURE: 86 MMHG | HEART RATE: 98 BPM | OXYGEN SATURATION: 99 % | TEMPERATURE: 97.3 F | BODY MASS INDEX: 62.89 KG/M2 | SYSTOLIC BLOOD PRESSURE: 130 MMHG | WEIGHT: 293 LBS

## 2021-12-07 DIAGNOSIS — I10 PRIMARY HYPERTENSION: ICD-10-CM

## 2021-12-07 DIAGNOSIS — Z13.31 POSITIVE DEPRESSION SCREENING: ICD-10-CM

## 2021-12-07 DIAGNOSIS — Z11.4 ENCOUNTER FOR SCREENING FOR HIV: Primary | ICD-10-CM

## 2021-12-07 DIAGNOSIS — F41.9 ANXIETY: ICD-10-CM

## 2021-12-07 DIAGNOSIS — Z00.00 ENCOUNTER FOR WELL ADULT EXAM WITHOUT ABNORMAL FINDINGS: ICD-10-CM

## 2021-12-07 DIAGNOSIS — F32.2 CURRENT SEVERE EPISODE OF MAJOR DEPRESSIVE DISORDER WITHOUT PSYCHOTIC FEATURES, UNSPECIFIED WHETHER RECURRENT (HCC): ICD-10-CM

## 2021-12-07 PROCEDURE — G8431 POS CLIN DEPRES SCRN F/U DOC: HCPCS | Performed by: STUDENT IN AN ORGANIZED HEALTH CARE EDUCATION/TRAINING PROGRAM

## 2021-12-07 PROCEDURE — 99215 OFFICE O/P EST HI 40 MIN: CPT | Performed by: STUDENT IN AN ORGANIZED HEALTH CARE EDUCATION/TRAINING PROGRAM

## 2021-12-07 RX ORDER — ARIPIPRAZOLE 10 MG/1
10 TABLET ORAL DAILY
Qty: 90 TABLET | Refills: 3 | Status: SHIPPED | OUTPATIENT
Start: 2021-12-07

## 2021-12-07 RX ORDER — LISINOPRIL 5 MG/1
5 TABLET ORAL DAILY
Qty: 90 TABLET | Refills: 1 | Status: SHIPPED | OUTPATIENT
Start: 2021-12-07 | End: 2022-08-30

## 2021-12-07 ASSESSMENT — PATIENT HEALTH QUESTIONNAIRE - PHQ9
SUM OF ALL RESPONSES TO PHQ9 QUESTIONS 1 & 2: 6
9. THOUGHTS THAT YOU WOULD BE BETTER OFF DEAD, OR OF HURTING YOURSELF: 1
1. LITTLE INTEREST OR PLEASURE IN DOING THINGS: 3
6. FEELING BAD ABOUT YOURSELF - OR THAT YOU ARE A FAILURE OR HAVE LET YOURSELF OR YOUR FAMILY DOWN: 2
10. IF YOU CHECKED OFF ANY PROBLEMS, HOW DIFFICULT HAVE THESE PROBLEMS MADE IT FOR YOU TO DO YOUR WORK, TAKE CARE OF THINGS AT HOME, OR GET ALONG WITH OTHER PEOPLE: 2
SUM OF ALL RESPONSES TO PHQ QUESTIONS 1-9: 19
3. TROUBLE FALLING OR STAYING ASLEEP: 2
SUM OF ALL RESPONSES TO PHQ QUESTIONS 1-9: 19
7. TROUBLE CONCENTRATING ON THINGS, SUCH AS READING THE NEWSPAPER OR WATCHING TELEVISION: 2
8. MOVING OR SPEAKING SO SLOWLY THAT OTHER PEOPLE COULD HAVE NOTICED. OR THE OPPOSITE, BEING SO FIGETY OR RESTLESS THAT YOU HAVE BEEN MOVING AROUND A LOT MORE THAN USUAL: 2
2. FEELING DOWN, DEPRESSED OR HOPELESS: 3
SUM OF ALL RESPONSES TO PHQ QUESTIONS 1-9: 18
5. POOR APPETITE OR OVEREATING: 2
4. FEELING TIRED OR HAVING LITTLE ENERGY: 2

## 2021-12-07 ASSESSMENT — ANXIETY QUESTIONNAIRES
GAD7 TOTAL SCORE: 14
6. BECOMING EASILY ANNOYED OR IRRITABLE: 2
7. FEELING AFRAID AS IF SOMETHING AWFUL MIGHT HAPPEN: 2
5. BEING SO RESTLESS THAT IT IS HARD TO SIT STILL: 2
2. NOT BEING ABLE TO STOP OR CONTROL WORRYING: 2
1. FEELING NERVOUS, ANXIOUS, OR ON EDGE: 2
4. TROUBLE RELAXING: 2
3. WORRYING TOO MUCH ABOUT DIFFERENT THINGS: 2

## 2021-12-07 ASSESSMENT — COLUMBIA-SUICIDE SEVERITY RATING SCALE - C-SSRS
2. HAVE YOU ACTUALLY HAD ANY THOUGHTS OF KILLING YOURSELF?: NO
1. WITHIN THE PAST MONTH, HAVE YOU WISHED YOU WERE DEAD OR WISHED YOU COULD GO TO SLEEP AND NOT WAKE UP?: NO
6. HAVE YOU EVER DONE ANYTHING, STARTED TO DO ANYTHING, OR PREPARED TO DO ANYTHING TO END YOUR LIFE?: NO

## 2021-12-07 NOTE — PATIENT INSTRUCTIONS
Eosinophils % 11/08/2021 0.0     Basophils % 11/08/2021 0.1     Neutrophils Absolute 11/08/2021 5.7     Lymphocytes Absolute 11/08/2021 3.4     Monocytes Absolute 11/08/2021 0.6     Eosinophils Absolute 11/08/2021 0.0     Basophils Absolute 11/08/2021 0.0     Vitamin B-12 11/08/2021 427     Folate 11/08/2021 7.08     T4 Free 11/08/2021 0.2*    HIV-1 Antibody 11/08/2021 NEGATIVE     HIV-2 Ab 11/08/2021 NEGATIVE     HIV INTERPRETATION 11/08/2021 NEGATIVE

## 2021-12-07 NOTE — PROGRESS NOTES
[address] and [facility]                                             [address] [facility], [address] [place] [address], [address] [place] [address]        Phone: [phone]                                     Name:  Lisa Godfrey  :    1975      Consultants:   Patient Care Team:  Vinicio Conte DO as PCP - General (Family Medicine)    Chief Complaint:     Lisa Godfrey is a 42 y.o. female  who presents today for a New Patient care visit with Personalized Prevention Plan Services as noted below. HPI:     Lisa Godrfey 42 y.o. female with GERD, hypothyroidism, PCOS, DMTII, aplastic kidney, neuropathy, optic nerve atrophy, osteoarthritis, vitiligo, asthma, allergic rhinitis, COPD, restless leg syndrome, insomnia, HTN, HLD, obesity, depression, anxiety, migraines, IBS-C, IgA deficiency, TBI 10/19/2014, hx B12 deficiency, and degenerative disc disease of the cervical spine  presents for follow up up HIV screen, depression, anxiety, need for cervical cancer screen records, and HTN. She has been having new right ear pain and right lower molar pain. Her pain started about 4 days ago. She has a hx of TMJ but she wears a  for this. She last saw a dentist in April. She is due. Scratchy throat. She has post nasal drip which she uses Flonase and Azelastine nasal spray for. HTN  She does not take her BP regularly, with a wrist cuff. This is a different cuff than she usually uses. She has BP readings ranging from 130s-160s/ 80s -115   Currently taking Amlodipine 5 mg daily and Furosemide 40. No chest pain. No palpitations. Shortness of breath with exertion at baseline. HIV screen  Ab/Ag screen and multispot positive but  Quantitative PCR- RNA not detected 21. Patient does not have HIV.    Patient states that her ex- participated in high risk behaviors without her knowledge prior to their separation 7 years ago.    Depression  Currently taking the following medications:  Duloxetine 30 mg BID  Aripiprazole 5 mg  She feels that duloxetine has improved her symptoms but that there is more room for improvement. PHQ-9: 19.  No suicidal ideation. ABM C-SS RS: No to all 3 questions. Anxiety  Currently taking taking Buspirone 10 mg TID  Positive FLAVIA-7 today. She feels that her symptoms have improved since her last appointment but  finances are her biggest stressor. She has concerns about paying for her medications and this causes her significant stress. The program through the hospital pharmacy was discussed. She will talk to her  about the ability to drive to the hospital to  prescriptions. Cervical CA screen hx  She brought her filled out records releases today. HPV possible colposcopy    Below not discussed today ----------------------------------------------------------------------------------------------------    DMTII  Previously using Dexcom glucose monitor. Dexcom sensor and transmitter reordered at last appointment but the model was not correct. She was on Ozempic for 2 months but did not tolerate this medication. She usually has low blood sugar. She had an episode where her dexcom was alerting the her blood sugar was low, 40. Her  got her something to eat and drink and her numbers came up. She was on metformin but the pills were too large for her to take because she takes so many medications and her she had gastric bypass surgery. Asthma  Currently taking the following medications:  Fluticasone furoate-vilanterol inhaler (Breo)  Albuterol inhaler  Duo neb nebulizer    Hypothyroid  Currently taking Liothyronine 25 mcg  Pharmacy was unable to fill levothyroxine 200 mcg. Patient states that she needs to be on brand name Synthroid or human thyroid. Repeat order was placed today.   She is unsure whether she has having symptoms of hypothyroid because her other conditions cause her to feel tired and her IBS causes constipation. She has not noticed cold intolerance. GERD  Currently taking omeprazole 20 mg  She feels that her reflux symptoms have improved significantly however she continues to have nausea. She is unsure whether her nausea is related to reflux. IBS-C  Currently taking MiraLAX. Her constipation is well managed with this. Seasonal Allergies and allergic rhinitis  She is allergic to bees and all peppers (vegetables), and black pepper. She also has seasonal allergies. Currently taking the following medications:  Fluticasone nasal spray  Montelukast 10 mg  Cetrizine 10 mg  Epi-pen  Azelastine nasal spray  Azelastine ophthalmic drops     Migraine  Currently taking rizatripan 10 mg PRN  She has improved symptoms compared to prior appointment. She continues to have headaches but this is her baseline. Osteoarthritis, bilateral knees  Currently taking the following medications:  Acetaminophen 500 mg   Voltaren gel    Stress incontinence  Currently taking Oxybutinin 5 mg  She has no complaint of urinary symptoms today    Neuropathy  Previously taking Gabapentine 600 mg TID she has not been on this for many years. She feels that her neuropathy is getting worse recently. She states that she has neuropathy in bilateral lower extremities as well as upper extremities. Symptoms unchanged from last appointment    Lymphadema, right LE  Currently taking:  Furosemide 40 mg daily   Symptoms are improved from last appointment    HLD  Currently taking Atorvastatin 20 mg    Morbid obesity   Hx. Gastric sleeve in 2018  She has lost over 100 lbs and continues to have a goal to lose more weight. She was focusing on her diet but due to recent financial stress and recovery from multiple surgeries she has been unable to do this. Chronic yeast infection, intertrigo  She has had chronic yeast infections in multiple skin folds for many years. Currently using nystatin powder.   She feels that this is manageable if she continues the nystatin powder.                 Patient Active Problem List   Diagnosis    RLS (restless legs syndrome)    Asthma    Psychophysiological insomnia    Hypersomnia    Osteoarthrosis    HTN (hypertension)    Hypothyroidism    Pure hypercholesterolemia    Lymphedema of right lower extremity    Abdominal wall abscess    Morbid obesity ([address]    Allergic rhinitis    B12 deficiency    Depression    GERD (gastroesophageal reflux disease)    Hyperthyroidism with Hashimoto disease    IgA deficiency ([address]    Intertrigo    Optic nerve atrophy    PCOS (polycystic ovarian syndrome)    Poor balance    Abnormal nuclear stress test    COPD (chronic obstructive pulmonary disease) ([address]    Degenerative disc disease, cervical    Dyslipidemia    Kidney, aplastic    Multiple joint pain    Neuropathy    Paradoxical vocal cord motion    S/P laparoscopic sleeve gastrectomy    Type 2 diabetes mellitus ([address]    Vitamin D deficiency    Vitiligo    Encounter for screening for HIV    Anxiety         Past Medical History:    Past Medical History:   Diagnosis Date    Acquired hypothyroidism 05/26/2016    Allergic rhinitis 08/02/2013    Anemia     Ankle swelling 10/05/2017    Arthritis     Asthma     Cellulitis 12/19/2017    Chest pain due to myocardial ischemia 05/01/2018    Chronic bronchitis (HCC)     Chronic kidney disease     COPD (chronic obstructive pulmonary disease) ([address]     Depression     Head injury 10/19/2014    Hypertension     Hyperthyroidism with Hashimoto disease 03/13/2020    Incarcerated hernia 06/12/2021    Formatting of this note might be different from the original. Added automatically from request for surgery [id number]    Infection of deep incisional surgical site after procedure     Intertrigo     Left foot pain 05/26/2016    Lymphedema of right lower extremity 05/26/2016    Optic nerve atrophy 09/19/2021    PARVIZ 02/25/2016    Osteoarthritis of left knee 05/26/2016    Panniculitis 03/13/2020    PCOS (polycystic ovarian syndrome) 10/05/2017    Prediabetes 10/05/2017    Psychophysiological insomnia 02/25/2016    RLS (restless legs syndrome) 02/25/2016    S/P laparoscopic sleeve gastrectomy 08/09/2019    S/P repair of ventral hernia 06/14/2021    Sleep apnea     Type 2 diabetes mellitus ([address] 06/13/2021    Vitiligo        Past Surgical History:  Past Surgical History:   Procedure Laterality Date    APPENDECTOMY  07/01/2002    CHOLECYSTECTOMY  01/01/2001    HAND SURGERY Left     CTR    MOUTH SURGERY      5 teeth removed    RECTAL SURGERY N/A 07/31/2021    ABDOMINAL WALL INCISION AND DRAINAGE performed by Mehrdad Hollis MD at [address] [place] [address] GASTROPLASTY  2018   [facility]  07/2021       Home Meds:  Prior to Visit Medications    Medication Sig Taking?  Authorizing Provider   lisinopril (PRINIVIL;ZESTRIL) 5 MG tablet Take 1 tablet by mouth daily Yes Magdelene K May, DO   ARIPiprazole (ABILIFY) 10 MG tablet Take 1 tablet by mouth daily Yes Magdelene K May, DO   Cholecalciferol 25 MCG (1000 UT) CHEW Take 2 tablets by mouth daily Yes Magdelene K May, DO   nystatin (MYCOSTATIN) 072970 UNIT/GM powder APPLY TOPICALLY 3 TIMES DAILY AS NEEDED TO MANAGE RASH AND MOISTURE Yes Historical Provider, MD   Continuous Blood Gluc Sensor (DEXCOM G6 SENSOR) MISC 1 Units by Does not apply route See Admin Instructions Change every 10 days Yes Magdelene K May, DO   Continuous Blood Gluc Transmit (DEXCOM G6 TRANSMITTER) MISC 1 Units by Does not apply route every 3 months Yes Magdelene K May, DO   busPIRone (BUSPAR) 10 MG tablet Take 1.5 tablets by mouth 3 times daily Yes Magdelene K May, DO   DULoxetine (CYMBALTA) 30 MG extended release capsule Take 1 capsule by mouth 2 times daily Yes Magdelene K May, DO   levothyroxine (SYNTHROID) 200 MCG tablet Take 1 tablet by mouth Daily Yes Magdelene K May, DO   liothyronine (CYTOMEL) 25 MCG tablet Take 1 tablet by mouth 2 times daily for 120 doses Yes Wiley BARFIELD May, DO   atorvastatin (LIPITOR) 20 MG tablet Take 1 tablet by mouth daily Yes Wiley BARFIELD May, DO   furosemide (LASIX) 40 MG tablet Take 1 tablet by mouth daily Yes Jamesene LICO May, DO   cetirizine (ZYRTEC) 10 MG tablet Take 1 tablet by mouth daily Yes Wiley BARFIELD May, DO   amLODIPine (NORVASC) 5 MG tablet Take 1 tablet by mouth daily Yes Wiley BARFIELD May, DO   montelukast (SINGULAIR) 10 MG tablet Take 1 tablet by mouth nightly Yes Wiley BARFIELD May, DO   albuterol sulfate  (90 Base) MCG/ACT inhaler Inhale 2 puffs into the lungs every 6 hours as needed for Wheezing Yes Wiley BARFIELD May, DO   fluticasone (FLONASE ALLERGY RELIEF) 50 MCG/ACT nasal spray 2 sprays by Nasal route 2 times daily Yes Wiley BARFIELD May, DO   nystatin (MYCOSTATIN) POWD powder Apply 3 times a day as needed to manage rash and moisture.  Yes Wiley BARFIELD May, DO   Fluticasone furoate-vilanterol (BREO ELLIPTA) 200-25 MCG/INH AEPB inhaler Inhale 1 puff into the lungs daily Yes Wiley BARFIELD May, DO   azelastine (OPTIVAR) 0.05 % ophthalmic solution Place 1 drop into both eyes 2 times daily Yes Jamesene LICO May, DO   azelastine (ASTELIN) 0.1 % nasal spray 1 spray by Nasal route 2 times daily Use in each nostril as directed Yes Wiley BARFIELD May, DO   ipratropium-albuterol (DUONEB) 0.5-2.5 (3) MG/3ML SOLN nebulizer solution Inhale 3 mLs into the lungs every 4 hours Yes Wiley BARFIELD May, DO   oxybutynin (DITROPAN-XL) 5 MG extended release tablet Take 1 tablet by mouth daily Yes Wiley BARFIELD May, DO   omeprazole (PRILOSEC) 20 MG delayed release capsule Take 1 capsule by mouth 2 times daily (before meals) Yes Wiley BARFIELD May, DO   Continuous Blood Gluc Sensor (DEXCOM G4 SENSOR) MISC 1 Units by Does not apply route See Admin Instructions Change sensor every 10 days Yes Wiley Conte, DO   Continuous Blood Gluc Transmit (DEXCOM G4 PLATINUM TRANSMITTER) MISC 1 Units by Does not apply route See Admin Instructions Yes Wiley BARFIELD May, DO   Continuous Blood Gluc  ([address]) NGHIA 2 times daily Yes Historical Provider, MD   ferrous sulfate (FE TABS 325) 325 (65 Fe) MG EC tablet Take 65 mg by mouth 3 times daily (with meals) Yes Historical Provider, MD   acetaminophen (TYLENOL) 500 MG tablet Take 500 mg by mouth every 6 hours as needed for Pain Yes Historical Provider, MD   Nutritional Supplements (GLUCOSE MANAGEMENT) TABS Take by mouth Yes Historical Provider, MD   vitamin D (ERGOCALCIFEROL) 19580 UNITS CAPS capsule Take 1 capsule by mouth once a week Yes Dimple Fang MD   vitamin B-12 (CYANOCOBALAMIN) 1000 MCG tablet Take 1 tablet by mouth daily Yes SHLOMO Davis MD   Adhesive Tape (PAPER TAPE 1\"[id number]) TAPE Apply to affected areas.  Yes Waynetta Hammans, MD   Multiple Vitamins-Minerals (THERAPEUTIC MULTIVITAMIN-MINERALS) tablet Take 1 tablet by mouth daily Yes Historical Provider, MD   Polyethylene Glycol 3350 (MIRALAX PO) Take by mouth 2 times daily  Yes Historical Provider, MD   artificial tears (ARTIFICIAL TEARS) OINT as needed Yes Historical Provider, MD   glucose monitoring kit (FREESTYLE) monitoring kit 1 kit by Does not apply route daily as needed Yes Nelli Beckham MD   FREESTYLE LANCETS MISC 1 each by Does not apply route daily Yes Nelli Beckham MD   EPINEPHrine 0.1 MG/ML injection Inject 0.3 mg into the muscle as needed Yes Historical Provider, MD   dextromethorphan-guaiFENesin (MUCINEX DM)  MG per SR tablet Take 1 tablet by mouth every 12 hours as needed  Yes Historical Provider, MD   rizatriptan (MAXALT) 10 MG tablet Take 1 tablet by mouth once as needed for Migraine May repeat in 2 hours if needed  Wiley BARFIELD May, DO   famotidine (PEPCID) 40 MG tablet Take 40 mg by mouth 2 times daily   Patient not taking: Reported on 12/7/2021  Historical Provider, MD   calcium-vitamin D (OSCAL-500) 500-200 MG-UNIT per tablet Take 1 tablet by mouth daily   Patient not taking: Reported on 12/7/2021  Historical Provider, MD   gabapentin (NEURONTIN) 600 MG tablet Take 1 tablet by mouth daily  Patient not taking: Reported on 12/7/2021  SHLOMO Hi MD   potassium chloride (KLOR-CON) 20 MEQ packet Take 20 mEq by mouth daily  Patient not taking: Reported on 12/7/2021  SHLOMO Hi MD   [facility] FIBER THERAPY 500 MG TABS Take 500 mg by mouth 2 times daily  Patient not taking: Reported on 12/7/2021  Kerry Shirley MD   calcium carbonate (CALCIUM 600) 600 MG TABS tablet Take 1 tablet by mouth daily  Patient not taking: Reported on 12/7/2021  Kerry Shirley MD   nystatin-triamcinolone [facility]) 114408-3.1 UNIT/GM-% ointment   Historical Provider, MD       Allergies:    Bee venom, Black pepper-turmeric, Ibuprofen, and Nsaids    Family History:       Problem Relation Age of Onset    Hypertension Mother     Hypothyroidism Mother     Asthma Father     Diabetes Father     Emphysema Father     Hypertension Father     Hypertension Sister         problems with pregnancy.     Asthma Sister     Diabetes Paternal Grandmother     Cervical Cancer Paternal Aunt        Social History:  Social History     Tobacco History     Smoking Status  Passive Smoke Exposure - Never Smoker Smoking Tobacco Type  Cigarettes    Smokeless Tobacco Use  Never Used          Alcohol History     Alcohol Use Status  No          Drug Use     Drug Use Status  No          Sexual Activity     Sexually Active  Not Asked                   Health Maintenance Completed:  Health Maintenance   Topic Date Due    COVID-19 Vaccine (1) Never done    Diabetic retinal exam  Never done    Hepatitis B vaccine (1 of 3 - Risk 3-dose series) Never done    Cervical cancer screen  Never done    Diabetic foot exam  11/08/2022    A1C test (Diabetic or Prediabetic)  11/08/2022    Diabetic microalbuminuria test  11/08/2022    Lipid screen  11/08/2022    Pneumococcal 0-64 years Vaccine (2 of 4 - PCV13) 11/08/2022    TSH testing  11/08/2022 glucose transmitter and sensor ordered today. Previous order was for G4 model which is no longer in production. A1C 5.4, 11/8/2021  Follow-up in 2/2021 for review of blood sugar log    Hypothyroid, not well controlled  , 11/8/2021  Resume levothyroxine 200 mcg. Call if there is difficulty filling medication. Continue liothyronine 25 mcg  Follow-up in 2/2021 repeat TSH at that time    COPD with underlying Asthma, not well controlled  Continue the following medications:  Duoneb nebulizer   Fluticasone furoate-vilanterol inhaler   Albuterol inhaler  Follow-up in 2 weeks to discuss COPD and asthma symptoms and severity. Consider repeating PFTs if symptoms have changed since last PFTs. Patient is requesting records from her pulmonologist.    Seasonal Allergies and allergic rhinitis, well controlled  Continue the following medications:  Fluticasone nasal spray  Montelukast 10 mg  Cetrizine 10 mg  Follow-up in 2/2021 to discuss allergy symptoms    IBS-C, well controlled  Recommend mirilax and fiber supplement for constipation  Follow-up in 2/2021 to discuss symptoms and severity    GERD, not well controlled  Continue omeprazole 20 mg  Follow-up in 1/2021 to discuss reflux symptoms and nausea    Osteoarthritis, well managed  Continue the following medications:  Acetaminophen 500 mg  Voltaren gel  Follow-up in 2/2021 to 4/2021 to discuss symptoms and severity    Stress incontinence, well controlled  Continue the following medication:  Oxybutinin 5 mg  Follow-up in 2/2021 to 4/2021 to discuss symptoms and severity    Neuropathy, upper and lower extremities, not well controlled  May be related to cervical degenerative disc disease  Consider restarting Gabapentine 600 mg after further discussion and evaluation of neuropathy. Patient is working to get records from previous neurologist . If no recent EMG and cervical spine x-ray, consider ordering.    Follow-up in 3 weeks to discuss symptoms and a colposcopy. Patient is working to get records from previous OB. Next steps dictated by results contained in the records. HCM  Recommend diabetic retinal exam  Recommend dental exam  Recommend COVID-19 vaccine, patient defers  Recommend cervical CA screen, awaiting records, patient brought in records release today. Patient brought records release forms for records from multiple previous specialists today. Plan to do Hep B titer with next labs       Health Maintenance Due:  Health Maintenance Due   Topic Date Due    COVID-19 Vaccine (1) Never done    Diabetic retinal exam  Never done    Hepatitis B vaccine (1 of 3 - Risk 3-dose series) Never done    Cervical cancer screen  Never done        Health care decision maker:  <72years old      Health Maintenance: (USPSTF Recommendations)  (F) Breast Cancer Screen: (40-49 (C), 50-74 biennial screening mammogram (B))  (F) Cervical Cancer Screen: (21-29 q3yr cytology alone; 30-65 q3yr cytology alone, q5yr with hrHPV alone, or q5yr cytology+hrHPV (A))   CRC/Colonoscopy Screening: (adults 45-49 (B), 50-75 (A))  Lung Ca Screening: Annual LDCT (+smoker age 49-80, smoked within 15 years, total of 20 pack yr history (B)): Never smoker  DEXA Screen: (women >65 and older, <65 if at risk/postmenopausal (B))  HIV Screen: (16-65 yr old, and all pregnant patients (A)): ordered today  Hep C Screen: (21-70 yr old (B)): ordered today  [address] Screen: (all pts with cirrhosis and high risk Hep B (US q6 mo)):  Immunizations:    RTC:  Return in about 3 weeks (around 12/28/2021) for Chronic conditions. EMR Dragon/transcription disclaimer:  Much of this encounter note is electronic transcription/translation of spoken language to printed texts. The electronic translation of spoken language may be erroneous, or at times, nonsensical words or phrases may be inadvertently transcribed.   Although I have reviewed the note for such errors, some may still exist.

## 2021-12-18 PROBLEM — Z11.4 ENCOUNTER FOR SCREENING FOR HIV: Status: RESOLVED | Noted: 2021-11-18 | Resolved: 2021-12-18

## 2022-01-13 NOTE — PROGRESS NOTES
[address] and [facility]                                             [address] [facility], [facility], [address] [place] [address]        Phone: [phone]                                     Name:  Toñito Marcus  :    1975      Consultants:   Patient Care Team:  Rigo Conte DO as PCP - General (Family Medicine)    Chief Complaint:     Toñito Marcus is a 42 y.o. female  who presents today for a New Patient care visit with Personalized Prevention Plan Services as noted below. HPI:     Toñito Marcus 42 y.o. female with GERD, hypothyroidism, PCOS, DMTII, aplastic kidney, neuropathy, optic nerve atrophy, osteoarthritis, vitiligo, asthma, allergic rhinitis, COPD, restless leg syndrome, insomnia, HTN, HLD, obesity, depression, anxiety, migraines, IBS-C, IgA deficiency, TBI 10/19/2014, hx B12 deficiency, and degenerative disc disease of the cervical spine. Molar pain has resolved. Her ear pain has resolved for the most part however she is having Tinnitus. She has always had this, more than a decade. She had multiple hearing tests in the past. She did not need a hearing aid at that time. She does not want to be refered for further work up at this time due to cost.      HTN  Currently taking Amlodipine 5 mg daily and Furosemide 40 daily. Lisinopril 5 mg. No chest pain. No palpitations. Shortness of breath with exertion at baseline. Depression  Currently taking the following medications:  Duloxetine 30 mg BID  Aripiprazole 10 mg, increased at last appointment (2021)  She feels that duloxetine has improved her symptoms but that there is more room for improvement. She has been working to limit contact with her mom and sister because they exacerbate her depressive symptoms. She is on fb group for daughter's with dysfunctional families. This has been a beneficial support for her.  She would like to do therapy but she cannot afford it. Anxiety  Currently taking taking Buspirone 15 mg TID  Finances are her biggest stressor. She has concerns about paying for her medications and this causes her significant stress. She is switching insurance soon and this should have better prescription coverage. The program through the hospital pharmacy was discussed. She will talk to her  about the ability to drive to the hospital to  prescriptions. COPD/ Asthma  She thinks her last PFTs 2016  She has been coughing up more one phlegm which is yellow in color. She feels that she is SOB, some days more than others. She has not been as active lately due to the weather. No fever. When she has a coughing fit she feels that the sputum blocks her ability to take a breath. Currently taking the following medications:  Fluticasone furoate-vilanterol inhaler (Breo)  Albuterol inhaler  Duo neb nebulizer treatment PRN    PARVIZ  Previously diagnosed with PARVIZ and C-pap recommended. She has been getting 2-3 hours of sleep. Her bi-pap machine broke two years ago. She used a C-pap before transitioning to a Bi-pap.  says that she has been snoring much more. GERD  She ha been having reflux 4 or more times a week. Currently taking omeprazole 20 mg  She feels that her reflux symptoms have improved significantly however she continues to have nausea. She is unsure whether her nausea is related to reflux. Neuropathy  Previously taking Gabapentine 600 mg TID she has not been on this for many years. She had difficulty taking multiple large pills which is why she stopped taking these. The gabapentin did improve her neuropathy. She is wondering if there is a liquid option. She feels that her neuropathy is getting worse recently. She states that she has neuropathy in bilateral lower extremities as well as upper extremities. Symptoms unchanged from last appointment.  She has pain that shoots up and down her legs and up and down her arms and hands. Cervical CA screen hx  She brought her filled out records releases today, waiting on records  HX of HPV and possible colposcopy, per patient    Below not discussed today----------------------------------------------------    DMTII  She was on Ozempic but did not tolerate this medication. She usually has low blood sugar. She had an episode where her dexcom was alerting the her blood sugar was low, 40. She was on metformin but the pills were too large for her to take because she takes so many medications and her she had gastric bypass surgery. Hypothyroid  Currently taking Liothyronine 25 mcg, levothyroxine 200 mcg. She is unsure whether she has having symptoms of hypothyroid because her other conditions cause her to feel tired and her IBS causes constipation. She has not noticed cold intolerance. IBS-C  Currently taking MiraLAX. Her constipation is well managed with this. Seasonal Allergies and allergic rhinitis  She is allergic to bees and all peppers (vegetables), and black pepper. She also has seasonal allergies. Currently taking the following medications:  Fluticasone nasal spray  Montelukast 10 mg  Cetrizine 10 mg  Epi-pen  Azelastine nasal spray  Azelastine ophthalmic drops     Migraine  Currently taking rizatripan 10 mg PRN  She has improved symptoms compared to prior appointment. She continues to have headaches but this is her baseline. Osteoarthritis, bilateral knees  Currently taking the following medications:  Acetaminophen 500 mg   Voltaren gel    Stress incontinence  Currently taking Oxybutinin 5 mg  She has no complaint of urinary symptoms today    HLD  Currently taking Atorvastatin 20 mg    Morbid obesity   Hx. Gastric sleeve in 2018  She has lost over 100 lbs and continues to have a goal to lose more weight. She was focusing on her diet but due to recent financial stress and recovery from multiple surgeries she has been unable to do this.      Chronic yeast infection, intertrigo  She has had chronic yeast infections in multiple skin folds for many years. Currently using nystatin powder. She feels that this is manageable if she continues the nystatin powder.                 Patient Active Problem List   Diagnosis    RLS (restless legs syndrome)    Asthma    Psychophysiological insomnia    Hypersomnia    Osteoarthrosis    HTN (hypertension)    Hypothyroidism    Pure hypercholesterolemia    Lymphedema of right lower extremity    Abdominal wall abscess    Morbid obesity ([address]    Allergic rhinitis    B12 deficiency    Depression    GERD (gastroesophageal reflux disease)    Hyperthyroidism with Hashimoto disease    IgA deficiency ([address]    Intertrigo    Optic nerve atrophy    PCOS (polycystic ovarian syndrome)    Poor balance    Abnormal nuclear stress test    COPD (chronic obstructive pulmonary disease) ([address]    Degenerative disc disease, cervical    Dyslipidemia    Kidney, aplastic    Multiple joint pain    Neuropathy    Paradoxical vocal cord motion    S/P laparoscopic sleeve gastrectomy    Type 2 diabetes mellitus ([address]    Vitamin D deficiency    Vitiligo    Anxiety         Past Medical History:    Past Medical History:   Diagnosis Date    Acquired hypothyroidism 05/26/2016    Allergic rhinitis 08/02/2013    Anemia     Ankle swelling 10/05/2017    Arthritis     Asthma     Cellulitis 12/19/2017    Chest pain due to myocardial ischemia 05/01/2018    Chronic bronchitis (HCC)     Chronic kidney disease     COPD (chronic obstructive pulmonary disease) ([address]     Depression     Head injury 10/19/2014    Hypertension     Hyperthyroidism with Hashimoto disease 03/13/2020    Incarcerated hernia 06/12/2021    Formatting of this note might be different from the original. Added automatically from request for surgery [id number]    Infection of deep incisional surgical site after procedure     Intertrigo     Left foot pain 05/26/2016  Lymphedema of right lower extremity 05/26/2016    Optic nerve atrophy 09/19/2021    PARVIZ 02/25/2016    Osteoarthritis of left knee 05/26/2016    Panniculitis 03/13/2020    PCOS (polycystic ovarian syndrome) 10/05/2017    Prediabetes 10/05/2017    Psychophysiological insomnia 02/25/2016    RLS (restless legs syndrome) 02/25/2016    S/P laparoscopic sleeve gastrectomy 08/09/2019    S/P repair of ventral hernia 06/14/2021    Sleep apnea     Type 2 diabetes mellitus ([address] 06/13/2021    Vitiligo        Past Surgical History:  Past Surgical History:   Procedure Laterality Date    APPENDECTOMY  07/01/2002    CHOLECYSTECTOMY  01/01/2001    HAND SURGERY Left     CTR    MOUTH SURGERY      5 teeth removed    RECTAL SURGERY N/A 07/31/2021    ABDOMINAL WALL INCISION AND DRAINAGE performed by Michael Allan MD at [facility] GASTROPLASTY  2018   [address] [facility]  07/2021       Home Meds:  Prior to Visit Medications    Medication Sig Taking?  Authorizing Provider   omeprazole (PRILOSEC) 40 MG delayed release capsule Take 1 capsule by mouth Daily Yes Magdelene K May, DO   tiotropium (SPIRIVA HANDIHALER) 18 MCG inhalation capsule Inhale 1 capsule into the lungs daily Yes Magdelene K May, DO   lisinopril (PRINIVIL;ZESTRIL) 5 MG tablet Take 1 tablet by mouth daily Yes Magdelene K May, DO   ARIPiprazole (ABILIFY) 10 MG tablet Take 1 tablet by mouth daily Yes Magdelene K May, DO   Cholecalciferol 25 MCG (1000 UT) CHEW Take 2 tablets by mouth daily Yes Magdelene K May, DO   nystatin (MYCOSTATIN) 353300 UNIT/GM powder APPLY TOPICALLY 3 TIMES DAILY AS NEEDED TO MANAGE RASH AND MOISTURE Yes Historical Provider, MD   Continuous Blood Gluc Transmit (DEXCOM G6 TRANSMITTER) MISC 1 Units by Does not apply route every 3 months Yes Magdelene K May, DO   levothyroxine (SYNTHROID) 200 MCG tablet Take 1 tablet by mouth Daily Yes Magdelene K May, DO   liothyronine (CYTOMEL) 25 MCG tablet Take 1 tablet by mouth 2 times daily for 120 doses Yes Jamesene LICO May, DO   atorvastatin (LIPITOR) 20 MG tablet Take 1 tablet by mouth daily Yes Jamesene LICO May, DO   furosemide (LASIX) 40 MG tablet Take 1 tablet by mouth daily Yes Jamesene LICO May, DO   cetirizine (ZYRTEC) 10 MG tablet Take 1 tablet by mouth daily Yes Jamesene LICO May, DO   amLODIPine (NORVASC) 5 MG tablet Take 1 tablet by mouth daily Yes Jamesene LICO May, DO   montelukast (SINGULAIR) 10 MG tablet Take 1 tablet by mouth nightly Yes Jamesene K May, DO   albuterol sulfate  (90 Base) MCG/ACT inhaler Inhale 2 puffs into the lungs every 6 hours as needed for Wheezing Yes Jamesene LICO May, DO   fluticasone (FLONASE ALLERGY RELIEF) 50 MCG/ACT nasal spray 2 sprays by Nasal route 2 times daily Yes Jamesene LICO May, DO   nystatin (MYCOSTATIN) POWD powder Apply 3 times a day as needed to manage rash and moisture.  Yes Jamesene LICO May, DO   Fluticasone furoate-vilanterol (BREO ELLIPTA) 200-25 MCG/INH AEPB inhaler Inhale 1 puff into the lungs daily Yes Jamesene LICO May, DO   azelastine (OPTIVAR) 0.05 % ophthalmic solution Place 1 drop into both eyes 2 times daily Yes Jamesene K May, DO   azelastine (ASTELIN) 0.1 % nasal spray 1 spray by Nasal route 2 times daily Use in each nostril as directed Yes Jamesene LICO May, DO   ipratropium-albuterol (DUONEB) 0.5-2.5 (3) MG/3ML SOLN nebulizer solution Inhale 3 mLs into the lungs every 4 hours Yes Jamesene LICO May, DO   oxybutynin (DITROPAN-XL) 5 MG extended release tablet Take 1 tablet by mouth daily Yes Jamesene K May, DO   Continuous Blood Gluc Transmit (DEXCOM G4 PLATINUM TRANSMITTER) MISC 1 Units by Does not apply route See Admin Instructions Yes Jamesene K May, DO   Continuous Blood Gluc  (DEXCOM G6 ) NGHIA 2 times daily Yes Historical Provider, MD   famotidine (PEPCID) 40 MG tablet Take 40 mg by mouth 2 times daily  Yes Historical Provider, MD   ferrous sulfate (FE TABS 325) 325 (65 Fe) MG EC tablet Take 65 mg by mouth 3 times daily (with meals) Yes Historical Provider, MD   acetaminophen (TYLENOL) 500 MG tablet Take 500 mg by mouth every 6 hours as needed for Pain Yes Historical Provider, MD   calcium-vitamin D (OSCAL-500) 500-200 MG-UNIT per tablet Take 1 tablet by mouth daily  Yes Historical Provider, MD   Nutritional Supplements (GLUCOSE MANAGEMENT) TABS Take by mouth Yes Historical Provider, MD   vitamin D (ERGOCALCIFEROL) 59098 UNITS CAPS capsule Take 1 capsule by mouth once a week Yes Jorge A Mota MD   gabapentin (NEURONTIN) 600 MG tablet Take 1 tablet by mouth daily Yes Jorge A Mota MD   potassium chloride (KLOR-CON) 20 MEQ packet Take 20 mEq by mouth daily Yes Jorge A Mota MD   GNP FIBER THERAPY 500 MG TABS Take 500 mg by mouth 2 times daily Yes Jorge A Mota MD   calcium carbonate (CALCIUM 600) 600 MG TABS tablet Take 1 tablet by mouth daily Yes Jorge A Mota MD   vitamin B-12 (CYANOCOBALAMIN) 1000 MCG tablet Take 1 tablet by mouth daily Yes Jorge A Mota MD   nystatin-triamcinolone (MYCOLOG) 158159-6.9 UNIT/GM-% ointment  Yes Historical Provider, MD   Adhesive Tape (PAPER TAPE 1\"X10YD) TAPE Apply to affected areas.  Yes Lady Garay MD   Multiple Vitamins-Minerals (THERAPEUTIC MULTIVITAMIN-MINERALS) tablet Take 1 tablet by mouth daily Yes Historical Provider, MD   Polyethylene Glycol 3350 (MIRALAX PO) Take by mouth 2 times daily  Yes Historical Provider, MD   artificial tears (ARTIFICIAL TEARS) OINT as needed Yes Historical Provider, MD   glucose monitoring kit (FREESTYLE) monitoring kit 1 kit by Does not apply route daily as needed Yes Alexandra Mustafa MD   FREESTYLE LANCETS MISC 1 each by Does not apply route daily Yes Alexandra Mustafa MD   EPINEPHrine 0.1 MG/ML injection Inject 0.3 mg into the muscle as needed Yes Historical Provider, MD   dextromethorphan-guaiFENesin (MUCINEX DM)  MG per SR tablet Take 1 tablet by mouth every 12 hours as needed  Yes Historical Provider, MD   DULoxetine (CYMBALTA) 30 MG extended release capsule Take 1 capsule by mouth 2 times daily  Magdelene K May, DO   rizatriptan (MAXALT) 10 MG tablet Take 1 tablet by mouth once as needed for Migraine May repeat in 2 hours if needed  Magdelene K May, DO       Allergies:    Bee venom, Black pepper-turmeric, Ibuprofen, and Nsaids    Family History:       Problem Relation Age of Onset    Hypertension Mother     Hypothyroidism Mother     Asthma Father     Diabetes Father     Emphysema Father     Hypertension Father     Hypertension Sister         problems with pregnancy.     Asthma Sister     Diabetes Paternal Grandmother     Cervical Cancer Paternal Aunt        Social History:  Social History     Tobacco History     Smoking Status  Passive Smoke Exposure - Never Smoker Smoking Tobacco Type  Cigarettes    Smokeless Tobacco Use  Never Used          Alcohol History     Alcohol Use Status  No          Drug Use     Drug Use Status  No          Sexual Activity     Sexually Active  Not Asked                   Health Maintenance Completed:  Health Maintenance   Topic Date Due    Cervical cancer screen  Never done    Diabetic retinal exam  01/26/2022 (Originally 8/23/1995)    Hepatitis B vaccine (1 of 3 - Risk 3-dose series) 02/17/2022 (Originally 8/23/1996)    COVID-19 Vaccine (1) 01/04/2024 (Originally 8/23/1989)    Diabetic foot exam  11/08/2022    A1C test (Diabetic or Prediabetic)  11/08/2022    Diabetic microalbuminuria test  11/08/2022    Lipid screen  11/08/2022    Pneumococcal 0-64 years Vaccine (2 of 4 - PCV13) 11/08/2022    TSH testing  11/08/2022    Potassium monitoring  11/08/2022    Creatinine monitoring  11/08/2022    Depression Monitoring  01/14/2023    DTaP/Tdap/Td vaccine (3 - Td or Tdap) 11/08/2031    Flu vaccine  Completed    Hepatitis C screen  Completed    HIV screen  Completed    Hepatitis A vaccine  Aged Out    Hib vaccine  Aged Out    Meningococcal (ACWY) vaccine  Aged [employer] History   Administered Date(s) Administered    Influenza, MDCK Quadv, with preserv IM (Flucelvax 2 yrs and older) 11/08/2021    Pneumococcal Polysaccharide (Lealncftc67) 11/08/2021    Tdap (Boostrix, Adacel) 11/08/2021         Review of Systems:  Review of Systems   Constitutional: Negative for fatigue, fever and unexpected weight change. HENT: Positive for postnasal drip and tinnitus. Negative for ear discharge, ear pain and hearing loss. Eyes: Negative for pain. Respiratory: Positive for cough and shortness of breath. Negative for chest tightness and wheezing. Cardiovascular: Positive for leg swelling. Negative for chest pain. Gastrointestinal: Negative for abdominal pain. Endocrine: Negative for cold intolerance. Musculoskeletal: Positive for arthralgias. Neurological: Negative for dizziness, seizures and syncope. Psychiatric/Behavioral: Negative for agitation and behavioral problems. Physical Exam:   Vitals:    01/14/22 1531   BP: 128/86   Site: Left Lower Arm   Position: Sitting   Cuff Size: Large Adult   Pulse: 82   Resp: 18   Temp: 97.9 °F (36.6 °C)   TempSrc: Infrared   SpO2: 94%   Weight: (!) 323 lb 12.8 oz (146.9 kg)   Height: 5' (1.524 m)     Body mass index is 63.24 kg/m². Wt Readings from Last 3 Encounters:   01/14/22 (!) 323 lb 12.8 oz (146.9 kg)   12/07/21 (!) 322 lb (146.1 kg)   11/18/21 (!) 320 lb 6.4 oz (145.3 kg)       BP Readings from Last 3 Encounters:   01/14/22 128/86   12/07/21 130/86   11/18/21 128/82       Physical Exam  Constitutional:       Appearance: Normal appearance. HENT:      Head: Normocephalic and atraumatic. Eyes:      Extraocular Movements: Extraocular movements intact. Conjunctiva/sclera: Conjunctivae normal.   Cardiovascular:      Rate and Rhythm: Normal rate and regular rhythm. Pulses: Normal pulses. Heart sounds: Normal heart sounds.    Pulmonary:      Effort: Pulmonary effort is normal.      Breath sounds: Normal breath sounds. No wheezing or rhonchi. Neurological:      General: No focal deficit present. Mental Status: She is alert and oriented to person, place, and time. Psychiatric:         Mood and Affect: Mood normal.         Behavior: Behavior normal.               Assessment/Plan:    Sarah Watson 42 y.o. female with GERD, hypothyroidism, PCOS, DMTII, aplastic kidney, neuropathy, optic nerve atrophy, osteoarthritis, vitiligo, asthma, allergic rhinitis, COPD, restless leg syndrome, insomnia, HTN, HLD, obesity, depression, anxiety, migraines, IBS-C, IgA deficiency, TBI 10/19/2014, hx B12 deficiency, and degenerative disc disease of the cervical spine  presents for follow up up HIV screen, depression, anxiety, need for cervical cancer screen records, and HTN. COPD with underlying Asthma, not well controlled  Continue the following medications:  Duoneb nebulizer   Fluticasone furoate-vilanterol inhaler   Albuterol inhaler  Start tiotroptium 18 mcg inhaler  PFTs ordered today  Follow up in 1-2 months, when PFT results are available    PARVIZ, not well controlled  Sleep study ordered today  Recommend making this a priority as lack of sleep will affect other health conditions as well as overall wellbeing. GERD, not well controlled  Increased dose: omeprazole 40 mg daily  Follow-up if symptoms persist with increased dose of omeprazole. Neuropathy, upper and lower extremities, not well controlled  May be related to cervical degenerative disc disease, patient also previously diagnosed with bilateral carpal tunnel and has had bilateral carpal tunnel release.   EMG of bilateral upper and lower extremities ordered  Follow-up in 1 mo or when results are available    Depression, improved  Continue the following medications:  Duloxetine 30 mg BID, Aripiprazole 10 mg Daily  Recommend counseling but patient is unable to afford additional co-pays at this time  Assess symptoms at follow up in 1 mo    Anxiety, improved  Continue following medication:  Buspirone 10 mg  Assess symptoms at follow up in 1 mo    HTN, well controlled  Microalbumin, random urine 3.5, 11/8/2021  Microalbumin creatinine ratio 18.5, 11/8/2021  Continue Amlodipine 5 mg and furosemide 40 mg daily  Start lisinopril 5 mg daily  Follow-up in 3 months for BP check   Repeat urine microalbumin cr ratio after 2/8/2021    Below not discussed but reviewed for future visit planning-----------------------------------    Migraine, not well controlled   Continue rizatripan 10 mg PRN  Follow-up in 2/2021 to 4/2021 to discuss symptoms and severity    Hypothyroid, not well controlled  , 11/8/2021  Continue levothyroxine 200 mcg, liothyronine 25 mcg  Follow- up in 1 month    IBS-C, well controlled  Recommend mirilax and fiber supplement for constipation  Follow-up in 1 month    Lymphedema, Right lower extremity, not well controlled  No sodium or potassium abnormalities on CMP 11/8/2021  Follow-up in 2/2021 to 4/2021 to discuss symptoms and severity    HLD, not well controlled  Continue the following medication:  Atorvastatin 10 mg  Lipid panel elevated 11/8/2021  Follow-up in 2/2021 for repeat lipid panel    Seasonal Allergies and allergic rhinitis, well controlled  Continue the following medications:  Fluticasone nasal spray  Montelukast 10 mg  Cetrizine 10 mg    DMTII, well controlled  A1C 5.4, 11/8/2021    Osteoarthritis, well managed  Continue the following medications:  Acetaminophen 500 mg  Voltaren gel  Follow-up in 2/2021 to 4/2021 to discuss symptoms and severity    Stress incontinence, well controlled  Continue the following medication:  Oxybutinin 5 mg  Follow-up in 2/2021 to 4/2021 to discuss symptoms and severity    Intertrigo, not well controlled  Continue nystatin powder   Follow-up in 3/2021 to 6/2021 to discuss symptoms and severity    Obesity, not well controlled  -Recommend 150 minutes of cardiovascular activity a week, moderate intensity  -Increase  intake of fruits and vegetables  -Minimize packaged foods  Plan to discuss weight loss goals further at follow up appointments    Vitamin D deficiency, not at goal  Patient instructed to continue vitamin D supplement   D, 25 Hydroxy 16.1, 11/22/21    Need for cervical cancer screen  Patient is unsure of when her last Pap smear was. She thinks she may have had a positive HPV at some point as well as a colposcopy. Patient is working to get records from previous OB. Next steps dictated by results contained in the records. HCM  Recommend cervical CA screen, awaiting records, records release has been faxed  Patient brought records release forms for records from multiple previous specialists today. Health Maintenance Due:  Health Maintenance Due   Topic Date Due    Cervical cancer screen  Never done        Health care decision maker:  <72years old      Health Maintenance: (USPSTF Recommendations)  (F) Breast Cancer Screen: (40-49 (C), 50-74 biennial screening mammogram (B))  (F) Cervical Cancer Screen: (21-29 q3yr cytology alone; 30-65 q3yr cytology alone, q5yr with hrHPV alone, or q5yr cytology+hrHPV (A))   CRC/Colonoscopy Screening: (adults 45-49 (B), 50-75 (A))  Lung Ca Screening: Annual LDCT (+smoker age 49-80, smoked within 15 years, total of 20 pack yr history (B)): Never smoker  DEXA Screen: (women >65 and older, <65 if at risk/postmenopausal (B))  HIV Screen: (16-65 yr old, and all pregnant patients (A)): ordered today  Hep C Screen: (21-70 yr old (B)): ordered today  [facility] [address] Screen: (all pts with cirrhosis and high risk Hep B (US q6 mo)):  Immunizations:    RTC:  Return in about 1 month (around 2/14/2022). EMR Dragon/transcription disclaimer:  Much of this encounter note is electronic transcription/translation of spoken language to printed texts. The electronic translation of spoken language may be erroneous, or at times, nonsensical words or phrases may be inadvertently transcribed.   Although I have reviewed the note for such errors, some may still exist.

## 2022-01-14 ENCOUNTER — OFFICE VISIT (OUTPATIENT)
Dept: PRIMARY CARE CLINIC | Age: 47
End: 2022-01-14
Payer: MEDICARE

## 2022-01-14 VITALS
RESPIRATION RATE: 18 BRPM | HEART RATE: 82 BPM | TEMPERATURE: 97.9 F | WEIGHT: 293 LBS | OXYGEN SATURATION: 94 % | BODY MASS INDEX: 57.52 KG/M2 | HEIGHT: 60 IN | SYSTOLIC BLOOD PRESSURE: 128 MMHG | DIASTOLIC BLOOD PRESSURE: 86 MMHG

## 2022-01-14 DIAGNOSIS — G47.33 OSA (OBSTRUCTIVE SLEEP APNEA): ICD-10-CM

## 2022-01-14 DIAGNOSIS — I10 PRIMARY HYPERTENSION: ICD-10-CM

## 2022-01-14 DIAGNOSIS — G62.9 NEUROPATHY: ICD-10-CM

## 2022-01-14 DIAGNOSIS — K21.9 GASTROESOPHAGEAL REFLUX DISEASE WITHOUT ESOPHAGITIS: ICD-10-CM

## 2022-01-14 DIAGNOSIS — J45.30 MILD PERSISTENT ASTHMA WITHOUT COMPLICATION: ICD-10-CM

## 2022-01-14 DIAGNOSIS — F32.2 CURRENT SEVERE EPISODE OF MAJOR DEPRESSIVE DISORDER WITHOUT PSYCHOTIC FEATURES, UNSPECIFIED WHETHER RECURRENT (HCC): ICD-10-CM

## 2022-01-14 DIAGNOSIS — F41.9 ANXIETY: ICD-10-CM

## 2022-01-14 DIAGNOSIS — J44.9 CHRONIC OBSTRUCTIVE PULMONARY DISEASE, UNSPECIFIED COPD TYPE (HCC): Primary | ICD-10-CM

## 2022-01-14 PROCEDURE — 99215 OFFICE O/P EST HI 40 MIN: CPT | Performed by: STUDENT IN AN ORGANIZED HEALTH CARE EDUCATION/TRAINING PROGRAM

## 2022-01-14 RX ORDER — OMEPRAZOLE 40 MG/1
40 CAPSULE, DELAYED RELEASE ORAL DAILY
Qty: 90 CAPSULE | Refills: 3 | Status: SHIPPED | OUTPATIENT
Start: 2022-01-14 | End: 2022-03-11 | Stop reason: SDUPTHER

## 2022-01-14 RX ORDER — TIOTROPIUM BROMIDE 18 UG/1
18 CAPSULE ORAL; RESPIRATORY (INHALATION) DAILY
Qty: 90 CAPSULE | Refills: 1 | Status: SHIPPED | OUTPATIENT
Start: 2022-01-14 | End: 2022-08-22

## 2022-01-14 ASSESSMENT — PATIENT HEALTH QUESTIONNAIRE - PHQ9
SUM OF ALL RESPONSES TO PHQ QUESTIONS 1-9: 13
SUM OF ALL RESPONSES TO PHQ9 QUESTIONS 1 & 2: 4
8. MOVING OR SPEAKING SO SLOWLY THAT OTHER PEOPLE COULD HAVE NOTICED. OR THE OPPOSITE, BEING SO FIGETY OR RESTLESS THAT YOU HAVE BEEN MOVING AROUND A LOT MORE THAN USUAL: 1
4. FEELING TIRED OR HAVING LITTLE ENERGY: 2
SUM OF ALL RESPONSES TO PHQ QUESTIONS 1-9: 13
3. TROUBLE FALLING OR STAYING ASLEEP: 2
9. THOUGHTS THAT YOU WOULD BE BETTER OFF DEAD, OR OF HURTING YOURSELF: 0
6. FEELING BAD ABOUT YOURSELF - OR THAT YOU ARE A FAILURE OR HAVE LET YOURSELF OR YOUR FAMILY DOWN: 1
7. TROUBLE CONCENTRATING ON THINGS, SUCH AS READING THE NEWSPAPER OR WATCHING TELEVISION: 1
SUM OF ALL RESPONSES TO PHQ QUESTIONS 1-9: 13
1. LITTLE INTEREST OR PLEASURE IN DOING THINGS: 2
2. FEELING DOWN, DEPRESSED OR HOPELESS: 2
SUM OF ALL RESPONSES TO PHQ QUESTIONS 1-9: 13
5. POOR APPETITE OR OVEREATING: 2

## 2022-01-14 ASSESSMENT — ENCOUNTER SYMPTOMS
ABDOMINAL PAIN: 0
EYE PAIN: 0
COUGH: 1
SHORTNESS OF BREATH: 1
CHEST TIGHTNESS: 0
WHEEZING: 0

## 2022-01-14 ASSESSMENT — ANXIETY QUESTIONNAIRES
GAD7 TOTAL SCORE: 9
7. FEELING AFRAID AS IF SOMETHING AWFUL MIGHT HAPPEN: 1
6. BECOMING EASILY ANNOYED OR IRRITABLE: 1
1. FEELING NERVOUS, ANXIOUS, OR ON EDGE: 1
3. WORRYING TOO MUCH ABOUT DIFFERENT THINGS: 1
2. NOT BEING ABLE TO STOP OR CONTROL WORRYING: 2
IF YOU CHECKED OFF ANY PROBLEMS ON THIS QUESTIONNAIRE, HOW DIFFICULT HAVE THESE PROBLEMS MADE IT FOR YOU TO DO YOUR WORK, TAKE CARE OF THINGS AT HOME, OR GET ALONG WITH OTHER PEOPLE: SOMEWHAT DIFFICULT
4. TROUBLE RELAXING: 2
5. BEING SO RESTLESS THAT IT IS HARD TO SIT STILL: 1

## 2022-01-21 ENCOUNTER — TELEPHONE (OUTPATIENT)
Dept: PRIMARY CARE CLINIC | Age: 47
End: 2022-01-21

## 2022-01-21 DIAGNOSIS — G62.9 PERIPHERAL POLYNEUROPATHY: Primary | ICD-10-CM

## 2022-01-21 DIAGNOSIS — G56.03 BILATERAL CARPAL TUNNEL SYNDROME: ICD-10-CM

## 2022-01-21 NOTE — TELEPHONE ENCOUNTER
[facility] scheduling calling. They need a new order for EMG with dx code. Current order and dx code is not meeting medical necessity. Please place new order and contact patient when ok to schedule.     [phone] pt phone

## 2022-01-28 ENCOUNTER — TELEPHONE (OUTPATIENT)
Dept: PRIMARY CARE CLINIC | Age: 47
End: 2022-01-28

## 2022-01-28 NOTE — TELEPHONE ENCOUNTER
----- Message from Shandra Boudreaux sent at 1/28/2022 10:33 AM EST -----  Subject: Appointment Request    Reason for Call: Urgent Joint Pain    QUESTIONS  Type of Appointment? Established Patient  Reason for appointment request? No appointments available during search  Additional Information for Provider? pt fell last night due to weather she   cant go anywere right now. pt hurt her right ankle she felt a pop in it   ---------------------------------------------------------------------------  --------------  CALL BACK INFO  What is the best way for the office to contact you? OK to leave message on   voicemail  Preferred Call Back Phone Number? [phone]  ---------------------------------------------------------------------------  --------------  SCRIPT ANSWERS  Relationship to Patient? Self  Did you have an injury or trauma within the past 3 days? No  Is your joint red or swollen? Yes  Have you been diagnosed with, awaiting test results for, or told that you   are suspected of having COVID-19 (Coronavirus)? (If patient has tested   negative or was tested as a requirement for work, school, or travel and   not based on symptoms, answer no)? No  Within the past two weeks have you developed any of the following symptoms   (answer no if symptoms have been present longer than 2 weeks or began   more than 2 weeks ago)? Fever or Chills, Cough, Shortness of breath or   difficulty breathing, Loss of taste or smell, Sore throat, Nasal   congestion, Sneezing or runny nose, Fatigue or generalized body aches   (answer no if pain is specific to a body part e.g. back pain), Diarrhea,   Headache? No  Have you had close contact with someone with COVID-19 in the last 14 days? No  (Service Expert  click yes below to proceed with [employer] As Usual   Scheduling)?  Yes

## 2022-01-28 NOTE — TELEPHONE ENCOUNTER
PT fell last night. It doesn't hurt as much as it did last night. She can walk while using a cane. She felt a pop in the ankle. She has some anitinflammation gel. they did put ice on her ankle last night. PT isnt sure what medications she can take due to only having one kidney. She was wondering what the  recommended. Spoke with Dr. Demetrius Mendes and she said that if she can not walk on it then she needs to seek medical care. Also that if the PT could not safely get out of her apartment then she needs to call an ambulance. PT said she can barely walk and only when she is putting a lot pressure on her cane. I informed the PT of Dr. Radhika Hancock recommendation.

## 2022-01-28 NOTE — TELEPHONE ENCOUNTER
I had verbal conversation with Paz Silva on 1/28  regarding patient's message and recommended that patient seek medical care if she could not walk and call an ambulance if she did not feel that she could safely get out of her apartment.

## 2022-02-11 ENCOUNTER — HOSPITAL ENCOUNTER (OUTPATIENT)
Age: 47
Discharge: HOME | End: 2022-02-11
Payer: MEDICARE

## 2022-02-11 PROCEDURE — U0003 INFECTIOUS AGENT DETECTION BY NUCLEIC ACID (DNA OR RNA); SEVERE ACUTE RESPIRATORY SYNDROME CORONAVIRUS 2 (SARS-COV-2) (CORONAVIRUS DISEASE [COVID-19]), AMPLIFIED PROBE TECHNIQUE, MAKING USE OF HIGH THROUGHPUT TECHNOLOGIES AS DESCRIBED BY CMS-2020-01-R: HCPCS

## 2022-02-11 PROCEDURE — U0005 INFEC AGEN DETEC AMPLI PROBE: HCPCS

## 2022-02-13 LAB — SARS-COV-2: NOT DETECTED

## 2022-02-14 ENCOUNTER — HOSPITAL ENCOUNTER (OUTPATIENT)
Dept: PULMONOLOGY | Age: 47
Discharge: HOME | End: 2022-02-14
Payer: MEDICARE

## 2022-02-14 VITALS — OXYGEN SATURATION: 98 %

## 2022-02-14 DIAGNOSIS — J44.9 CHRONIC OBSTRUCTIVE PULMONARY DISEASE, UNSPECIFIED COPD TYPE (HCC): ICD-10-CM

## 2022-02-14 LAB
DLCO %PRED: 109 %
DLCO PRED: NORMAL
DLCO/VA %PRED: NORMAL
DLCO/VA PRED: NORMAL
DLCO/VA: NORMAL
DLCO: NORMAL
EXPIRATORY TIME-POST: NORMAL
EXPIRATORY TIME: NORMAL
FEF 25-75% %CHNG: NORMAL
FEF 25-75% %PRED-POST: NORMAL
FEF 25-75% %PRED-PRE: NORMAL
FEF 25-75% PRED: NORMAL
FEF 25-75%-POST: NORMAL
FEF 25-75%-PRE: NORMAL
FEV1 %PRED-POST: 91 %
FEV1 %PRED-PRE: 73 %
FEV1 PRED: NORMAL
FEV1-POST: NORMAL
FEV1-PRE: NORMAL
FEV1/FVC %PRED-POST: NORMAL
FEV1/FVC %PRED-PRE: NORMAL
FEV1/FVC PRED: NORMAL
FEV1/FVC-POST: 74 %
FEV1/FVC-PRE: 63 %
FVC %PRED-POST: 99 L
FVC %PRED-PRE: 94 %
FVC PRED: NORMAL
FVC-POST: NORMAL
FVC-PRE: NORMAL
GAW %PRED: NORMAL
GAW PRED: NORMAL
GAW: NORMAL
IC %PRED: NORMAL
IC PRED: NORMAL
IC: NORMAL
MEP: NORMAL
MIP: NORMAL
MVV %PRED-PRE: NORMAL
MVV PRED: NORMAL
MVV-PRE: NORMAL
PEF %PRED-POST: NORMAL
PEF %PRED-PRE: NORMAL
PEF PRED: NORMAL
PEF%CHNG: NORMAL
PEF-POST: NORMAL
PEF-PRE: NORMAL
RAW %PRED: NORMAL
RAW PRED: NORMAL
RAW: NORMAL
RV %PRED: NORMAL
RV PRED: NORMAL
RV: NORMAL
SVC %PRED: NORMAL
SVC PRED: NORMAL
SVC: NORMAL
TLC %PRED: 93 %
TLC PRED: NORMAL
TLC: NORMAL
VA %PRED: NORMAL
VA PRED: NORMAL
VA: NORMAL
VTG %PRED: NORMAL
VTG PRED: NORMAL
VTG: NORMAL

## 2022-02-14 PROCEDURE — 94726 PLETHYSMOGRAPHY LUNG VOLUMES: CPT

## 2022-02-14 PROCEDURE — 94060 EVALUATION OF WHEEZING: CPT

## 2022-02-14 PROCEDURE — 94760 N-INVAS EAR/PLS OXIMETRY 1: CPT

## 2022-02-14 PROCEDURE — 6370000000 HC RX 637 (ALT 250 FOR IP): Performed by: STUDENT IN AN ORGANIZED HEALTH CARE EDUCATION/TRAINING PROGRAM

## 2022-02-14 PROCEDURE — 94729 DIFFUSING CAPACITY: CPT

## 2022-02-14 RX ORDER — ALBUTEROL SULFATE 90 UG/1
4 AEROSOL, METERED RESPIRATORY (INHALATION) ONCE
Status: COMPLETED | OUTPATIENT
Start: 2022-02-14 | End: 2022-02-14

## 2022-02-14 RX ADMIN — Medication 4 PUFF: at 14:33

## 2022-02-14 ASSESSMENT — PULMONARY FUNCTION TESTS
FEV1/FVC_POST: 74
FVC_PERCENT_PREDICTED_POST: 99
FEV1_PERCENT_PREDICTED_PRE: 73
FVC_PERCENT_PREDICTED_PRE: 94
FEV1_PERCENT_PREDICTED_POST: 91
FEV1/FVC_PRE: 63

## 2022-02-16 NOTE — PROCEDURES
[facility]                               PULMONARY FUNCTION    PATIENT NAME: Dorian Chery                    :        1975  MED REC NO:   [id number]                          ROOM:  ACCOUNT NO:   [de-identified]                           ADMIT DATE: 2022  PROVIDER:     Viky Anthony MD    DATE OF PROCEDURE:  2022    PFT INTERPRETATION    The patient is a 51-year-old female who underwent a PFT for COPD. Spirometry shows FVC to be 94%, FEV1 to be 73%, FEV1 to FVC ratio was  77%, FEF 25%-75% was 24%. The patient had significant  postbronchodilator improvement in the study. Lung volume shows the  total capacity was normal.  The patient does not have any air trapping  or hyperinflation. The patient has decrease in ERV. The patient's  diffusion capacity when adjusted for volume was increased. The  patient's flow-volume loop was normal.  On the basis of this PFT, the  patient does have mild obstructive airway disease with reactive airway  disease. Along with that, the patient also has changes in the PFT  parameters because of body habitus. Please correlate clinically and  consider polysomnography if deemed appropriate in the relevant clinical  settings if deemed appropriate.         Veronica Jacinto MD    D: 02/15/2022 19:24:12       T: 02/15/2022 19:26:36     [email or web address]  Job#: [id number]     Doc#: [id number]    CC:

## 2022-02-22 NOTE — RESULT ENCOUNTER NOTE
Please let patient know that we got her lung study (PFT) results. ** text in () is for my record and will be discussed in depth at appointment**    Results show that you have COPD (Gold grade 2B). The tiotropium (LAMA) we started should help. Currently you are on many medications for COPD but we may be able to streamline your treatment. I recommend that you continue the tiotropium and albuterol inhaler and stop: Duoneb and Breo Ellipta. Please keep track of how often you need to use the albuterol inhaler. (If symptoms are not well controlled consider switching from LAMA to LABA)    Your lung capacity (FVC) is normal (>80%). After they gave you the inhaler medication your lung function improved. (FVC Increased by 160 ml, FEV1 24% improvement. Fully reversible= FEV1 >12% change, FVC increase > 200 ml.  Pure obstructive lung disease with air trapping, likely strong component of asthma)

## 2022-02-28 ENCOUNTER — HOSPITAL ENCOUNTER (OUTPATIENT)
Dept: NEUROLOGY | Age: 47
Discharge: HOME | End: 2022-02-28
Payer: MEDICARE

## 2022-02-28 DIAGNOSIS — G62.9 PERIPHERAL POLYNEUROPATHY: ICD-10-CM

## 2022-02-28 DIAGNOSIS — G62.9 NEUROPATHY: ICD-10-CM

## 2022-02-28 DIAGNOSIS — G56.03 BILATERAL CARPAL TUNNEL SYNDROME: ICD-10-CM

## 2022-02-28 PROCEDURE — 95912 NRV CNDJ TEST 11-12 STUDIES: CPT | Performed by: PHYSICAL MEDICINE & REHABILITATION

## 2022-02-28 PROCEDURE — 95885 MUSC TST DONE W/NERV TST LIM: CPT | Performed by: PHYSICAL MEDICINE & REHABILITATION

## 2022-02-28 NOTE — PROCEDURES
Test Date:  2022    Patient: Quinton Cardona : 1975 Physician: Park Cota DO   Sex: Female ID#:  Ref Phys: Rich Engel May, DO     Patient Complaints:  Patient is a 42year-old female who presents with numbness tingling weakness in the upper and lower extremities. Patient History / Exam:  PMH: + left carpal tunnel [id number]   Recent diagnosis of type 2 diabetes,  No spine surgery PE: reflexes trace to absent, give way weakness arms and legs     NCV & EMG Findings:  Evaluation of the right fibular (EDB) motor nerve showed decreased conduction velocity (Pop Fossa-Bel Fib Head, 40 m/s). The right median (APB) motor nerve showed reduced amplitude (4.6 mV). The left median sensory and the right median sensory nerves showed prolonged distal peak latency (L3.8, R3.7 ms) and decreased conduction velocity (L37, R38 m/s). All remaining nerves (as indicated in the following tables) were within normal limits. All examined muscles (as indicated in the following table) showed no evidence of electrical instability. Impression:  Essentially normal study mild prolongation of both median sensory distal latencies. left post carpal tunnel release. No evidence of an acute radiculopathy, generalized peripheral neuropathy or other lower motor neuron dysfunction.        Park Cota DO        Nerve Conduction Studies  Motor Nerve Results      Latency Amplitude F-Lat Segment Distance CV Comment   Site (ms) Norm (mV) Norm (ms)  (cm) (m/s) Norm    Left Fibular (EDB) Motor   Ankle 5.2  < 6.1 4.4  > 2.0         Bel Fib Head 11.8 - 4.3 -  Bel Fib Head-Ankle 34 52  > 38    Right Fibular (EDB) Motor   Ankle 4.8  < 6.1 4.7  > 2.0         Bel Fib Head 11.7 - 4.2 -  Bel Fib Head-Ankle 34 49  > 38    Pop Fossa 12.7 - 2.4 -  Pop Fossa-Bel Fib Head 4 40  > 42    Left Median (APB) Motor   Wrist 4.1  < 4.2 6.4  > 5.0         Elbow 8.3 - 6.9 -  Elbow-Wrist 23 55  > 50    Right Median (APB) Motor   Wrist 4.1  < 4.2 4.6  > 5.0         Elbow 7.3 - 4.0 -  Elbow-Wrist 20 63  > 50    Left Ulnar (ADM) Motor   Wrist 3.0  < 4.2 7.1  > 3.0         Bel Elbow 6.5 - 7.3 -  Bel Elbow-Wrist 23 66  > 50    Right Ulnar (ADM) Motor   Wrist 3.6  < 4.2 7.1  > 3.0         Bel Elbow 7.0 - 7.3 -  Bel Elbow-Wrist 22 65  > 50      Sensory Nerve Results      Latency (Peak) Amplitude (P-P) Segment Distance CV Comment   Site (ms) Norm (µV) Norm  (cm) (m/s) Norm    Left Median Sensory   Wrist-Dig II 3.8  < 3.6 61  > 10 Wrist-Dig II 14 37  > 39    Right Median Sensory   Wrist-Dig II 3.7  < 3.6 31  > 10 Wrist-Dig II 14 38  > 39    Left Sural Sensory   Calf-Lat Mall 3.0  < 4.0 11  > 5 Calf-Lat Mall 14 47  > 35    Left Ulnar Sensory   Wrist-Dig V 2.9  < 3.7 37  > 15 Wrist-Dig V 14 48  > 38    Right Ulnar Sensory   Wrist-Dig V 3.4  < 3.7 22  > 15 Wrist-Dig V 14 41  > 38        Electromyography     Side Muscle Nerve Root Ins Act Fibs Psw Amp Dur Poly Recrt Int Irven Royals Comment   Right Deltoid Axillary C5-C6 Nml Nml Nml Nml Nml 0 Nml Nml    Right Biceps Musculocut C5-C6 Nml Nml Nml Nml Nml 0 Nml Nml    Right Triceps Radial C6-C8 Nml Nml Nml Nml Nml 0 Nml Nml    Right Brachiorad Radial C5-C6 Nml Nml Nml Nml Nml 0 Nml Nml    Right Pronator Teres Median C6-C7 Nml Nml Nml Nml Nml 0 Nml Nml    Right EIP Post Interosseous,  R... C7-C8 Nml Nml Nml Nml Nml 0 Nml Nml    Right APB Median C8-T1 Nml Nml Nml Nml Nml 0 Nml Nml    Right FDI Ulnar C8-T1 Nml Nml Nml Nml Nml 0 Nml Nml    Right Cervical Paraspinal (Uppe. .. Rami C1-C3 Nml Nml Nml         Right Cervical Paraspinal (Mid) Rami C4-C6 Nml Nml Nml         Right Cervical Paraspinal (Ermalinda Charlene. ..  Rami C7-C8 Nml Nml Nml         Left Deltoid Axillary C5-C6 Nml Nml Nml Nml Nml 0 Nml Nml    Left Biceps Musculocut C5-C6 Nml Nml Nml Nml Nml 0 Nml Nml    Left Triceps Radial C6-C8 Nml Nml Nml Nml Nml 0 Nml Nml    Left Brachiorad Radial C5-C6 Nml Nml Nml Nml Nml 0 Nml Nml    Left Pronator Teres Median C6-C7 Nml Nml Nml Nml Nml 0 Nml Nml Left EIP Post Interosseous,  R... C7-C8 Nml Nml Nml Nml Nml 0 Nml Nml    Left APB Median C8-T1 Nml Nml Nml Nml Nml 0 Nml Nml    Left FDI Ulnar C8-T1 Nml Nml Nml Nml Nml 0 Nml Nml    Left Cervical Paraspinal (Uppe. .. Rami C1-C3 Nml Nml Nml         Left Cervical Paraspinal (Mid) Rami C4-C6 Nml Nml Nml         Left Cervical Paraspinal (Daniels Shelter. .. Rami C7-C8 Nml Nml Nml         Left Gluteus Med Sup Gluteal L5-S1 Nml Nml Nml Nml Nml 0 Nml Nml    Left Vastus Med Femoral L2-L4 Nml Nml Nml Nml Nml 0 Nml Nml    Left Add Longus Obturator L2-L4 Nml Nml Nml Nml Nml 0 Nml Nml    Left Tib Anterior Deep Fibular,  Fibula. .. L4-L5 Nml Nml Nml Nml Nml 0 Nml Nml    Left Fib longus  L5-S1 Nml Nml Nml Nml Nml 0 Nml Nml    Left Gastroc MH Tibial S1-S2 Nml Nml Nml Nml Nml 0 Nml Nml    Left Ext De Leon Long Deep Fibular,  Fibula. .. L5-S1 Nml Nml Nml Nml Nml 0 Nml Nml    Left EDB Deep Fibular,  Fibula. .. L5-S1 Nml Nml Nml Nml Nml 0 Nml Nml    Left AHB Medial Plantar,  Tibi. .. S1-S2 Nml Nml Nml Nml Nml 0 Nml Nml    Left Lumbo Paraspinal (Upper) Rami L1-L2 Nml Nml Nml         Left Lumbo Paraspinal (Mid) Rami L3-L4 Nml Nml Nml         Left Lumbo Paraspinal (Lower) Rami L5-S1 Nml Nml Nml         Right Gluteus Med Sup Gluteal L5-S1 Nml Nml Nml Nml Nml 0 Nml Nml    Right Vastus Med Femoral L2-L4 Nml Nml Nml Nml Nml 0 Nml Nml    Right Add Longus Obturator L2-L4 Nml Nml Nml Nml Nml 0 Nml Nml    Right Tib Anterior Deep Fibular,  Fibula. .. L4-L5 Nml Nml Nml Nml Nml 0 Nml Nml    Right Fib longus  L5-S1 Nml Nml Nml Nml Nml 0 Nml Nml    Right Gastroc MH Tibial S1-S2 Nml Nml Nml Nml Nml 0 Nml Nml    Right Ext De Leon Long Deep Fibular,  Fibula. .. L5-S1 Nml Nml Nml Nml Nml 0 Nml Nml    Right EDB Deep Fibular,  Fibula. .. L5-S1 Nml Nml Nml Nml Nml 0 Nml Nml    Right AHB Medial Plantar,  Tibi. ..  S1-S2 Nml Nml Nml Nml Nml 0 Nml Nml    Right Lumbo Paraspinal (Upper) Rami L1-L2 Nml Nml Nml         Right Lumbo Paraspinal (Mid) Rami L3-L4 Nml Nml Nml         Right Lumbo Paraspinal (Lower) Rami L5-S1 Nml Nml Nml           Electronically signed by Dorr DO Warren on 2/28/2022 at 2:33 PM

## 2022-03-09 NOTE — PROGRESS NOTES
[address] and [facility]                                             [address] [facility], [address] [place] [address], [address] [place] [address]        Phone: [phone]                                     Name:  Rehan Brock  :    1975      Consultants:   Patient Care Team:  Jen Conte DO as PCP - General (Family Medicine)    Chief Complaint:     Rehan Brock is a 42 y.o. female  who presents today for a New Patient care visit with Personalized Prevention Plan Services as noted below. HPI:     Rehan Brock 42 y.o. female with GERD, hypothyroidism, PCOS, DMTII, aplastic kidney, neuropathy, optic nerve atrophy, osteoarthritis, vitiligo, asthma, allergic rhinitis, COPD, restless leg syndrome, insomnia, HTN, HLD, obesity, depression, anxiety, migraines, IBS-C, IgA deficiency, TBI 10/19/2014, hx B12 deficiency, and degenerative disc disease of the cervical spine. New concern today- multiple nodules on hands and arms. She has had these for years but they have recently become more bothersome. They are painful to palpation and she feels that they are growing. On her left hand she has multiple nodules over the scar from her previous arthroscopic carpal tunnel release and a large cluster of nodules over the 1st [facility] crease where she had a previous ganglion cyst removed. IgA deficiency  She is unsure when her last IgA was checked and she would like this ordered today. HTN  Currently taking Amlodipine 5 mg daily and Furosemide 40 daily. Lisinopril 5 mg. No chest pain. No palpitations. Shortness of breath with exertion at baseline. Depression  Currently taking the following medications:  Duloxetine 30 mg BID  Aripiprazole 10 mg, increased (2021)  She would like to do therapy but she cannot afford it.    PHQ-9: 17    Anxiety  Currently taking taking Buspirone 15 mg TID    COPD/ Asthma  PFT results show GOLD grade 2B with pure obstructive lung disease with air trapping, likely with a strong component of asthma. Currently taking the following medications:  Tiotropium inhaler  Albuterol inhaler  She has needed to use her albuterol inhaler 2-4 times a day. No cough. No increased sputum production. Her SOB is exacerbated by perfumes, air freshener, and changes in temperature. Her symptoms do not seem to be brought on by activity. PARVIZ  Previously diagnosed with PARVIZ and C-pap recommended. She has been getting 2-3 hours of sleep. Her bi-pap machine broke two years ago. Referred to sleep medicine at prior appointment. She was able to schedule an appointment, but not until August.    GERD  Currently taking omeprazole 40 mg BID    Neuropathy  EMG results- show overall normal study with evidence of mild carpal tunnel. Patient continues to have leg pain and shooting pain that go from her hands and up her arms. She does not notice exacerbation with activity or position. Cervical CA screen hx  Patient was unable to get records due to cost.  HX of HPV and possible colposcopy, per patient    DMTII  She was on Ozempic but did not tolerate this medication. She usually has low blood sugar. She was on metformin but the pills were too large for her to take because she takes so many medications and her she had gastric bypass surgery. Last A1C 5.4, 11/8/2021. BS log brought in by patient and scanned. Hypothyroid  Currently taking Liothyronine 25 mcg, levothyroxine 200 mcg.  (11/8/2021)    IBS-C  Currently taking MiraLAX. Her constipation is well managed with this. Seasonal Allergies and allergic rhinitis  She is allergic to bees and all peppers (vegetables), and black pepper. She also has seasonal allergies.    Currently taking the following medications:  Fluticasone nasal spray  Montelukast 10 mg  Cetrizine 10 mg  Epi-pen  Azelastine nasal spray  Azelastine ophthalmic drops     Migraine  Currently taking rizatripan 10 mg PRN  She has improved symptoms compared to prior appointment. She continues to have headaches but this is her baseline. Osteoarthritis, bilateral knees  Currently taking the following medications:  Acetaminophen 500 mg   Voltaren gel    Stress incontinence  Currently taking Oxybutinin 5 mg  She has no complaint of urinary symptoms today. HLD  Currently taking Atorvastatin 20 mg, increased dose (11/8) due to elevated lipid panel. Morbid obesity   Hx. Gastric sleeve in 2018  She has lost over 100 lbs and continues to have a goal to lose more weight. She was focusing on her diet but due to recent financial stress and recovery from multiple surgeries she has been unable to do this. Chronic yeast infection, intertrigo  She has had chronic yeast infections in multiple skin folds for many years. Currently using nystatin powder. She feels that this is manageable if she continues the nystatin powder.         Patient Active Problem List   Diagnosis    RLS (restless legs syndrome)    Asthma    Psychophysiological insomnia    Hypersomnia    Osteoarthrosis    HTN (hypertension)    Hypothyroidism    Pure hypercholesterolemia    Lymphedema of right lower extremity    Abdominal wall abscess    Morbid obesity ([address]    Allergic rhinitis    B12 deficiency    Depression    GERD (gastroesophageal reflux disease)    Hyperthyroidism with Hashimoto disease    IgA deficiency ([address]    Intertrigo    Optic nerve atrophy    PCOS (polycystic ovarian syndrome)    Poor balance    Abnormal nuclear stress test    COPD (chronic obstructive pulmonary disease) ([address]    Degenerative disc disease, cervical    Dyslipidemia    Kidney, aplastic    Multiple joint pain    Neuropathy    Paradoxical vocal cord motion    S/P laparoscopic sleeve gastrectomy    Type 2 diabetes mellitus (HCC)    Vitamin D deficiency    Vitiligo    Anxiety         Past Medical History:    Past Medical History:   Diagnosis Date    Acquired hypothyroidism 05/26/2016    Allergic rhinitis 08/02/2013    Anemia     Ankle swelling 10/05/2017    Arthritis     Asthma     Cellulitis 12/19/2017    Chest pain due to myocardial ischemia 05/01/2018    Chronic bronchitis (HCC)     Chronic kidney disease     COPD (chronic obstructive pulmonary disease) ([facility]     Depression     Head injury 10/19/2014    Hypertension     Hyperthyroidism with Hashimoto disease 03/13/2020    Incarcerated hernia 06/12/2021    Formatting of this note might be different from the original. Added automatically from request for surgery [id number]    Infection of deep incisional surgical site after procedure     Intertrigo     Left foot pain 05/26/2016    Lymphedema of right lower extremity 05/26/2016    Optic nerve atrophy 09/19/2021    PARVIZ 02/25/2016    Osteoarthritis of left knee 05/26/2016    Panniculitis 03/13/2020    PCOS (polycystic ovarian syndrome) 10/05/2017    Prediabetes 10/05/2017    Psychophysiological insomnia 02/25/2016    RLS (restless legs syndrome) 02/25/2016    S/P laparoscopic sleeve gastrectomy 08/09/2019    S/P repair of ventral hernia 06/14/2021    Sleep apnea     Type 2 diabetes mellitus ([facility] 06/13/2021    Vitiligo        Past Surgical History:  Past Surgical History:   Procedure Laterality Date    APPENDECTOMY  07/01/2002    CHOLECYSTECTOMY  01/01/2001    HAND SURGERY Left     CTR    MOUTH SURGERY      5 teeth removed    RECTAL SURGERY N/A 07/31/2021    ABDOMINAL WALL INCISION AND DRAINAGE performed by Roni Grossman MD at [address] [place] [address] GASTROPLASTY  2018   [address]  07/2021       Home Meds:  Prior to Visit Medications    Medication Sig Taking?  Authorizing Provider   omeprazole (PRILOSEC) 40 MG delayed release capsule Take 1 capsule by mouth Daily  Magdelene K May, DO   tiotropium (SPIRIVA HANDIHALER) 18 MCG inhalation capsule Inhale 1 capsule into the lungs daily  Magdelene K May, DO   lisinopril (PRINIVIL;ZESTRIL) 5 MG tablet Take 1 tablet by mouth daily  Jamesene LICO May, DO   ARIPiprazole (ABILIFY) 10 MG tablet Take 1 tablet by mouth daily  Jamesene LICO May, DO   Cholecalciferol 25 MCG (1000 UT) CHEW Take 2 tablets by mouth daily  Jamesene K May, DO   nystatin (MYCOSTATIN) 181391 UNIT/GM powder APPLY TOPICALLY 3 TIMES DAILY AS NEEDED TO MANAGE RASH AND MOISTURE  Historical Provider, MD   DULoxetine (CYMBALTA) 30 MG extended release capsule Take 1 capsule by mouth 2 times daily  Jamesene LICO May, DO   levothyroxine (SYNTHROID) 200 MCG tablet Take 1 tablet by mouth Daily  Jamesene LICO May, DO   liothyronine (CYTOMEL) 25 MCG tablet Take 1 tablet by mouth 2 times daily for 120 doses  Wiley BARFIELD May, DO   atorvastatin (LIPITOR) 20 MG tablet Take 1 tablet by mouth daily  Jamesene LICO May, DO   furosemide (LASIX) 40 MG tablet Take 1 tablet by mouth daily  Jamesene LICO May, DO   cetirizine (ZYRTEC) 10 MG tablet Take 1 tablet by mouth daily  florecitaene LICO May, DO   amLODIPine (NORVASC) 5 MG tablet Take 1 tablet by mouth daily  Jamesene LICO May, DO   montelukast (SINGULAIR) 10 MG tablet Take 1 tablet by mouth nightly  Jamesene LICO May, DO   albuterol sulfate  (90 Base) MCG/ACT inhaler Inhale 2 puffs into the lungs every 6 hours as needed for Wheezing  Jamesene LICO May, DO   fluticasone (FLONASE ALLERGY RELIEF) 50 MCG/ACT nasal spray 2 sprays by Nasal route 2 times daily  florecitaene LICO May, DO   nystatin (MYCOSTATIN) POWD powder Apply 3 times a day as needed to manage rash and moisture.   Jamesene LICO May, DO   Fluticasone furoate-vilanterol (BREO ELLIPTA) 200-25 MCG/INH AEPB inhaler Inhale 1 puff into the lungs daily  Jamesene K May, DO   azelastine (OPTIVAR) 0.05 % ophthalmic solution Place 1 drop into both eyes 2 times daily  Jamesene K May, DO   azelastine (ASTELIN) 0.1 % nasal spray 1 spray by Nasal route 2 times daily Use in each nostril as directed  Wiley Conte, DO   ipratropium-albuterol (DUONEB) 0.5-2.5 (3) Provider, MD   artificial tears (ARTIFICIAL TEARS) OINT as needed  Historical Provider, MD   glucose monitoring kit (FREESTYLE) monitoring kit 1 kit by Does not apply route daily as needed  Moira Wynn MD   FREESTYLE LANCETS MISC 1 each by Does not apply route daily  Moira Wynn MD   EPINEPHrine 0.1 MG/ML injection Inject 0.3 mg into the muscle as needed  Historical Provider, MD   dextromethorphan-guaiFENesin (MUCINEX DM)  MG per SR tablet Take 1 tablet by mouth every 12 hours as needed   Historical Provider, MD       Allergies:    Bee venom, Black pepper-turmeric, Ibuprofen, and Nsaids    Family History:       Problem Relation Age of Onset    Hypertension Mother     Hypothyroidism Mother     Asthma Father     Diabetes Father     Emphysema Father     Hypertension Father     Hypertension Sister         problems with pregnancy.     Asthma Sister     Diabetes Paternal Grandmother     Cervical Cancer Paternal Aunt        Social History:  Social History     Tobacco History     Smoking Status  Passive Smoke Exposure - Never Smoker Smoking Tobacco Type  Cigarettes    Smokeless Tobacco Use  Never Used          Alcohol History     Alcohol Use Status  No          Drug Use     Drug Use Status  No          Sexual Activity     Sexually Active  Not Asked                   Health Maintenance Completed:  Health Maintenance   Topic Date Due    Diabetic retinal exam  Never done    Hepatitis B vaccine (1 of 3 - Risk 3-dose series) Never done    Cervical cancer screen  Never done    COVID-19 Vaccine (1) 01/04/2024 (Originally 8/23/1987)    Diabetic foot exam  11/08/2022    A1C test (Diabetic or Prediabetic)  11/08/2022    Diabetic microalbuminuria test  11/08/2022    Lipid screen  11/08/2022    Pneumococcal 0-64 years Vaccine (2 of 4 - PCV13) 11/08/2022    TSH testing  11/08/2022    Potassium monitoring  11/08/2022    Creatinine monitoring  11/08/2022    Depression Monitoring  01/14/2023    DTaP/Tdap/Td vaccine (3 - Td or Tdap) 11/08/2031    Flu vaccine  Completed    Hepatitis C screen  Completed    HIV screen  Completed    Hepatitis A vaccine  Aged Out    Hib vaccine  Aged Out    Meningococcal (ACWY) vaccine  Aged [employer] History   Administered Date(s) Administered    Influenza, MDCK Quadv, with preserv IM (Flucelvax 2 yrs and older) 11/08/2021    Pneumococcal Polysaccharide ([id number]) 11/08/2021    Tdap (Boostrix, Adacel) 11/08/2021         Review of Systems:  Review of Systems   Constitutional: Positive for fatigue. Negative for activity change, appetite change and fever. HENT: Negative for ear pain and sinus pain. Eyes: Negative for pain. Respiratory: Positive for shortness of breath. Negative for cough and wheezing. Cardiovascular: Negative for chest pain and palpitations. Gastrointestinal: Negative for abdominal pain and constipation. Musculoskeletal: Negative for arthralgias and myalgias. Neurological: Negative for dizziness and numbness. Psychiatric/Behavioral: Negative for agitation and behavioral problems. Physical Exam:   There were no vitals filed for this visit. There is no height or weight on file to calculate BMI. Wt Readings from Last 3 Encounters:   01/14/22 (!) 323 lb 12.8 oz (146.9 kg)   12/07/21 (!) 322 lb (146.1 kg)   11/18/21 (!) 320 lb 6.4 oz (145.3 kg)       BP Readings from Last 3 Encounters:   01/14/22 128/86   12/07/21 130/86   11/18/21 128/82       Physical Exam  Constitutional:       Appearance: Normal appearance. HENT:      Head: Normocephalic and atraumatic. Eyes:      Extraocular Movements: Extraocular movements intact. Conjunctiva/sclera: Conjunctivae normal.   Cardiovascular:      Rate and Rhythm: Normal rate and regular rhythm. Pulses: Normal pulses. Heart sounds: Normal heart sounds. Pulmonary:      Effort: Pulmonary effort is normal.      Breath sounds: Normal breath sounds. Musculoskeletal:         General: Normal range of motion. Skin:     General: Skin is warm and dry. Comments: Multiple less less than 5mm nodules on forearms and hand. Tender to palpation. Mobile. Deep. Some nodules are over tendons or bony prominence but others are over muscle. Superficial nodular-cystic, clustered lesion over left carpal tunnel scar. Appears to be fluid filled. Firm. Tender to palpation. Similar lesion over ganglion cyst excision scar at the crease of the 1st [facility] joint on the left, also tender to palpation. No erythema. No edema. Neurological:      General: No focal deficit present. Mental Status: She is alert and oriented to person, place, and time. Psychiatric:         Mood and Affect: Mood normal.         Behavior: Behavior normal.               Assessment/Plan:    Sarah Watson 42 y.o. female with GERD, hypothyroidism, PCOS, DMTII, aplastic kidney, neuropathy, optic nerve atrophy, osteoarthritis, vitiligo, asthma, allergic rhinitis, COPD, restless leg syndrome, insomnia, HTN, HLD, obesity, depression, anxiety, migraines, IBS-C, IgA deficiency, TBI 10/19/2014, hx B12 deficiency, and degenerative disc disease of the cervical spine  presents for follow up    Multiple hand and forearm nodules, unclear etiology, chronic  The more superficial nodules overlying past surgical scars may be keloid. DDx for deeper nodules: Fibroblastic rheumatism, desmoid type fibromatosis, nodular scleroderma, nodular morphea  - Patient is not interested in referrals at this point  - I am hesitant to do any cortisone injections in the possible keloids at this point due to her unique neuropathy and referred pain symptoms. Her neuropathy symptoms may be exacerbated by hypothyroidism. We will work to manage this and reevaluate as needed.    - Plan to monitor for growth and change at follow up appointment 1-2 mo    COPD with underlying Asthma, not well controlled  Based on PFT (2/15/2022) COPD Gold 2B. Pure obstructive disease with air trapping. Continue the following medications:  Albuterol inhaler   Continue tiotroptium (advair) 18 mcg inhaler  Start Spiriva inhaler  Follow up in 1-2 months    Depression, improved  Continue the following medications:  Duloxetine 30 mg BID, Aripiprazole 10 mg Daily  Recommend counseling but patient is unable to afford additional co-pays at this time  Assess symptoms at follow up in 1-2 mo    Anxiety, improved  Continue following medication:  Buspirone 10 mg  Assess symptoms at follow up in 1-2 mo    HLD, not well controlled  Continue the following medication:  Atorvastatin 10 mg, restarted 11/8/2021  Repeat lipid panel ordered today  Recommend mediterranean diet, patient education handout given  -Recommend 150 minutes of cardiovascular activity a week, moderate intensity  -Increase  intake of fruits and vegetables  -Minimize packaged foods  - Lipid panel ordered today  Follow up in when lipid panel results available    HTN, well controlled  Microalbumin, random urine 3.5, 11/8/2021  Microalbumin creatinine ratio 18.5, 11/8/2021  Continue Amlodipine 5 mg and furosemide 40 mg daily  Continue isinopril 5 mg daily (started 12/7/21)  Repeat urine microalbumin cr ratio ordered today  Follow up in when results available    Hypothyroid, not well controlled, asymptomatic  , 11/8/2021  Continue levothyroxine 200 mcg, liothyronine 25 mcg  TSH ordered today  Plan to adjust medication dose based on these results, once TSH wnl will follow up annually or if patient becomes symptomatic. GERD, not well controlled  Increased dose: omeprazole 40 mg BID  Follow-up 1-2 mo    PARVIZ, not well controlled  Sleep study ordered previously  Recommend making this a priority as lack of sleep will affect other health conditions as well as overall wellbeing.    Follow up after sleep study, scheduled in August    Hx IgA deficiency  - IgA ordered today  - Follow up based on results      Below not discussed but reviewed for future visit planning-----------------------------------    IBS-C, well controlled  Recommend mirilax and fiber supplement for constipation  Follow-up as needed for this    Lymphedema, Right lower extremity, not well controlled  No sodium or potassium abnormalities on CMP 11/8/2021  Follow-up in 4/2022-6/2022    Neuropathy, upper and lower extremities, not well controlled  May be related to cervical degenerative disc disease, patient also previously diagnosed with bilateral carpal tunnel and has had bilateral carpal tunnel release. EMG of bilateral upper and lower extremities ordered previously  Follow-up in when EMG results are available    Migraine, not well controlled   Continue rizatripan 10 mg PRN  Follow-up in 2/2022 to 4/2022 to discuss symptoms and severity    Seasonal Allergies and allergic rhinitis, well controlled  Continue the following medications:  Fluticasone nasal spray  Montelukast 10 mg  Cetrizine 10 mg  Follow up if symptomatic     DMTII, well controlled  A1C 5.4, 11/8/2021  Follow up every 3-6 mo for management.     Osteoarthritis, well managed  Continue the following medications:  Acetaminophen 500 mg  Voltaren gel  Follow-up in 2/2022 to 4/2022 to discuss symptoms and severity    Stress incontinence, well controlled  Continue the following medication:  Oxybutinin 5 mg  Follow-up in 2/2022 to 4/2022 to discuss symptoms and severity    Intertrigo, not well controlled  Continue nystatin powder   Follow-up in 3/2022 to 6/2022 to discuss symptoms and severity    Obesity, not well controlled  -Recommend 150 minutes of cardiovascular activity a week, moderate intensity  -Increase  intake of fruits and vegetables  -Minimize packaged foods  Plan to discuss weight loss goals further at follow up appointments    Vitamin D deficiency, not at goal  Patient instructed to continue vitamin D supplement   D, 25 Hydroxy 16.1, 11/22/21  Follow up 4/2022 to 6/2022       Health Maintenance Due:  Health Maintenance Due   Topic Date Due    Diabetic retinal exam  Never done    Hepatitis B vaccine (1 of 3 - Risk 3-dose series) Never done    Cervical cancer screen  Never done      HCM  -Need for cervical CA screen, due to difficulty of getting records I recommend that she schedule a pap at next available appointment. She thinks she may have had a positive HPV at some point as well as a colposcopy. - Hep B surface antibody ordered today  - Recommend retinal exam, patient plans to schedule this. Health care decision maker:  <72years old      Health Maintenance: (USPSTF Recommendations)  (F) Breast Cancer Screen: (40-49 (C), 50-74 biennial screening mammogram (B))  (F) Cervical Cancer Screen: (21-29 q3yr cytology alone; 30-65 q3yr cytology alone, q5yr with hrHPV alone, or q5yr cytology+hrHPV (A))   CRC/Colonoscopy Screening: (adults 45-49 (B), 50-75 (A))  Lung Ca Screening: Annual LDCT (+smoker age 49-80, smoked within 15 years, total of 20 pack yr history (B)): Never smoker  DEXA Screen: (women >65 and older, <65 if at risk/postmenopausal (B))  HIV Screen: (16-65 yr old, and all pregnant patients (A)): ordered today  Hep C Screen: (21-70 yr old (B)): ordered today  [address] Screen: (all pts with cirrhosis and high risk Hep B (US q6 mo)):  Immunizations:    RTC:  No follow-ups on file. EMR Dragon/transcription disclaimer:  Much of this encounter note is electronic transcription/translation of spoken language to printed texts. The electronic translation of spoken language may be erroneous, or at times, nonsensical words or phrases may be inadvertently transcribed.   Although I have reviewed the note for such errors, some may still exist.

## 2022-03-11 ENCOUNTER — HOSPITAL ENCOUNTER (OUTPATIENT)
Age: 47
Discharge: HOME | End: 2022-03-11
Payer: MEDICARE

## 2022-03-11 ENCOUNTER — OFFICE VISIT (OUTPATIENT)
Dept: PRIMARY CARE CLINIC | Age: 47
End: 2022-03-11
Payer: MEDICARE

## 2022-03-11 VITALS
BODY MASS INDEX: 64.06 KG/M2 | HEART RATE: 77 BPM | SYSTOLIC BLOOD PRESSURE: 118 MMHG | OXYGEN SATURATION: 99 % | WEIGHT: 293 LBS | TEMPERATURE: 97.5 F | DIASTOLIC BLOOD PRESSURE: 76 MMHG

## 2022-03-11 DIAGNOSIS — E11.22 TYPE 2 DIABETES MELLITUS WITH CHRONIC KIDNEY DISEASE, WITHOUT LONG-TERM CURRENT USE OF INSULIN, UNSPECIFIED CKD STAGE (HCC): ICD-10-CM

## 2022-03-11 DIAGNOSIS — Z00.00 HEALTHCARE MAINTENANCE: ICD-10-CM

## 2022-03-11 DIAGNOSIS — M15.9 OSTEOARTHRITIS OF MULTIPLE JOINTS, UNSPECIFIED OSTEOARTHRITIS TYPE: ICD-10-CM

## 2022-03-11 DIAGNOSIS — D80.2 IGA DEFICIENCY (HCC): ICD-10-CM

## 2022-03-11 DIAGNOSIS — E78.5 DYSLIPIDEMIA: ICD-10-CM

## 2022-03-11 DIAGNOSIS — K21.9 GASTROESOPHAGEAL REFLUX DISEASE WITHOUT ESOPHAGITIS: ICD-10-CM

## 2022-03-11 DIAGNOSIS — I10 PRIMARY HYPERTENSION: ICD-10-CM

## 2022-03-11 DIAGNOSIS — E03.8 OTHER SPECIFIED HYPOTHYROIDISM: ICD-10-CM

## 2022-03-11 DIAGNOSIS — F32.1 CURRENT MODERATE EPISODE OF MAJOR DEPRESSIVE DISORDER, UNSPECIFIED WHETHER RECURRENT (HCC): Primary | ICD-10-CM

## 2022-03-11 DIAGNOSIS — G25.81 RLS (RESTLESS LEGS SYNDROME): ICD-10-CM

## 2022-03-11 DIAGNOSIS — J45.40 MODERATE PERSISTENT ASTHMA, UNSPECIFIED WHETHER COMPLICATED: ICD-10-CM

## 2022-03-11 LAB
CHOLESTEROL, TOTAL: 235 MG/DL (ref 0–199)
CREATININE URINE: 78.1 MG/DL (ref 28–259)
HBV SURFACE AB TITR SER: <3.5 MIU/ML
HDLC SERPL-MCNC: 38 MG/DL (ref 40–60)
IGA: 50 MG/DL (ref 70–400)
LDL CHOLESTEROL CALCULATED: 168 MG/DL
MICROALBUMIN UR-MCNC: <1.2 MG/DL
MICROALBUMIN/CREAT UR-RTO: NORMAL MG/G (ref 0–30)
TRIGL SERPL-MCNC: 145 MG/DL (ref 0–150)
TSH SERPL DL<=0.05 MIU/L-ACNC: 273 UIU/ML (ref 0.27–4.2)
VLDLC SERPL CALC-MCNC: 29 MG/DL

## 2022-03-11 PROCEDURE — 86706 HEP B SURFACE ANTIBODY: CPT

## 2022-03-11 PROCEDURE — 80061 LIPID PANEL: CPT

## 2022-03-11 PROCEDURE — 84443 ASSAY THYROID STIM HORMONE: CPT

## 2022-03-11 PROCEDURE — 99215 OFFICE O/P EST HI 40 MIN: CPT | Performed by: STUDENT IN AN ORGANIZED HEALTH CARE EDUCATION/TRAINING PROGRAM

## 2022-03-11 PROCEDURE — 82784 ASSAY IGA/IGD/IGG/IGM EACH: CPT

## 2022-03-11 PROCEDURE — 82043 UR ALBUMIN QUANTITATIVE: CPT

## 2022-03-11 PROCEDURE — 36415 COLL VENOUS BLD VENIPUNCTURE: CPT

## 2022-03-11 PROCEDURE — 82570 ASSAY OF URINE CREATININE: CPT

## 2022-03-11 RX ORDER — OMEPRAZOLE 40 MG/1
40 CAPSULE, DELAYED RELEASE ORAL 2 TIMES DAILY
Qty: 180 CAPSULE | Refills: 3 | Status: SHIPPED | OUTPATIENT
Start: 2022-03-11 | End: 2023-03-06

## 2022-03-11 RX ORDER — BUSPIRONE HYDROCHLORIDE 15 MG/1
15 TABLET ORAL 3 TIMES DAILY
COMMUNITY
End: 2022-08-04 | Stop reason: SDUPTHER

## 2022-03-11 RX ORDER — BLOOD-GLUCOSE SENSOR
EACH MISCELLANEOUS
COMMUNITY
Start: 2022-01-23 | End: 2022-06-02 | Stop reason: SDUPTHER

## 2022-03-11 RX ORDER — BLOOD SUGAR DIAGNOSTIC
STRIP MISCELLANEOUS
COMMUNITY
Start: 2022-01-28 | End: 2022-05-24 | Stop reason: SDUPTHER

## 2022-03-11 ASSESSMENT — ENCOUNTER SYMPTOMS
EYE PAIN: 0
WHEEZING: 0
SHORTNESS OF BREATH: 1
SINUS PAIN: 0
CONSTIPATION: 0
COUGH: 0
ABDOMINAL PAIN: 0

## 2022-03-11 ASSESSMENT — PATIENT HEALTH QUESTIONNAIRE - PHQ9
10. IF YOU CHECKED OFF ANY PROBLEMS, HOW DIFFICULT HAVE THESE PROBLEMS MADE IT FOR YOU TO DO YOUR WORK, TAKE CARE OF THINGS AT HOME, OR GET ALONG WITH OTHER PEOPLE: 2
3. TROUBLE FALLING OR STAYING ASLEEP: 3
9. THOUGHTS THAT YOU WOULD BE BETTER OFF DEAD, OR OF HURTING YOURSELF: 1
1. LITTLE INTEREST OR PLEASURE IN DOING THINGS: 2
SUM OF ALL RESPONSES TO PHQ QUESTIONS 1-9: 16
SUM OF ALL RESPONSES TO PHQ9 QUESTIONS 1 & 2: 4
8. MOVING OR SPEAKING SO SLOWLY THAT OTHER PEOPLE COULD HAVE NOTICED. OR THE OPPOSITE, BEING SO FIGETY OR RESTLESS THAT YOU HAVE BEEN MOVING AROUND A LOT MORE THAN USUAL: 2
SUM OF ALL RESPONSES TO PHQ QUESTIONS 1-9: 17
6. FEELING BAD ABOUT YOURSELF - OR THAT YOU ARE A FAILURE OR HAVE LET YOURSELF OR YOUR FAMILY DOWN: 2
4. FEELING TIRED OR HAVING LITTLE ENERGY: 3
SUM OF ALL RESPONSES TO PHQ QUESTIONS 1-9: 17
SUM OF ALL RESPONSES TO PHQ QUESTIONS 1-9: 17
7. TROUBLE CONCENTRATING ON THINGS, SUCH AS READING THE NEWSPAPER OR WATCHING TELEVISION: 2
2. FEELING DOWN, DEPRESSED OR HOPELESS: 2

## 2022-03-11 ASSESSMENT — COLUMBIA-SUICIDE SEVERITY RATING SCALE - C-SSRS
2. HAVE YOU ACTUALLY HAD ANY THOUGHTS OF KILLING YOURSELF?: NO
6. HAVE YOU EVER DONE ANYTHING, STARTED TO DO ANYTHING, OR PREPARED TO DO ANYTHING TO END YOUR LIFE?: NO
1. WITHIN THE PAST MONTH, HAVE YOU WISHED YOU WERE DEAD OR WISHED YOU COULD GO TO SLEEP AND NOT WAKE UP?: NO

## 2022-03-15 DIAGNOSIS — J44.9 STAGE 2 MODERATE COPD BY GOLD CLASSIFICATION (HCC): Primary | ICD-10-CM

## 2022-03-15 DIAGNOSIS — J45.40 MODERATE PERSISTENT ASTHMA, UNSPECIFIED WHETHER COMPLICATED: ICD-10-CM

## 2022-03-15 RX ORDER — BUDESONIDE AND FORMOTEROL FUMARATE DIHYDRATE 160; 4.5 UG/1; UG/1
2 AEROSOL RESPIRATORY (INHALATION) 2 TIMES DAILY
Qty: 10.2 G | Refills: 3 | Status: SHIPPED | OUTPATIENT
Start: 2022-03-15 | End: 2022-05-25 | Stop reason: SDUPTHER

## 2022-03-15 NOTE — PROGRESS NOTES
Received message from insurance that Advair coverage is limited quantity. After further review of patient's symptoms I believe that Symbicort will be a better option as it can be used for COPD maintenance, asthma maintenance, and asthma exacerbations. - Based on Single- inhaler maintenance and reliever tx (SMART)    Patient can finish the dispensed Advair prior to starting Symbicort. Once patients starts Symbicort, stop use of albuterol inhaler. Continue Spiriva inhaler.

## 2022-03-16 ENCOUNTER — TELEPHONE (OUTPATIENT)
Dept: PRIMARY CARE CLINIC | Age: 47
End: 2022-03-16

## 2022-03-16 DIAGNOSIS — E78.5 DYSLIPIDEMIA: ICD-10-CM

## 2022-03-16 RX ORDER — ATORVASTATIN CALCIUM 20 MG/1
40 TABLET, FILM COATED ORAL DAILY
Qty: 30 TABLET | Refills: 1 | Status: SHIPPED | OUTPATIENT
Start: 2022-03-16 | End: 2022-03-18 | Stop reason: SDUPTHER

## 2022-03-18 DIAGNOSIS — E78.5 DYSLIPIDEMIA: ICD-10-CM

## 2022-03-18 DIAGNOSIS — E03.9 HYPOTHYROIDISM, UNSPECIFIED TYPE: ICD-10-CM

## 2022-03-18 RX ORDER — LEVOTHYROXINE SODIUM 0.12 MG/1
250 TABLET ORAL DAILY
Qty: 120 TABLET | Refills: 0 | Status: SHIPPED | OUTPATIENT
Start: 2022-03-18 | End: 2022-05-20

## 2022-03-18 RX ORDER — ATORVASTATIN CALCIUM 40 MG/1
40 TABLET, FILM COATED ORAL DAILY
Qty: 90 TABLET | Refills: 3 | Status: SHIPPED | OUTPATIENT
Start: 2022-03-18 | End: 2022-11-04

## 2022-04-11 ENCOUNTER — TELEPHONE (OUTPATIENT)
Dept: PRIMARY CARE CLINIC | Age: 47
End: 2022-04-11

## 2022-04-11 NOTE — TELEPHONE ENCOUNTER
----- Message from Alex Aldrich sent at 4/11/2022  1:23 PM EDT -----  Subject: Appointment Request    Reason for Call: Routine Medicare AWV    QUESTIONS  Type of Appointment? Established Patient  Reason for appointment request? No appointments available during search  Additional Information for Provider? Pt requesting appt with Dr. Conte for   Medicare AWV with Pap as soon as available after 12pm. Screened green. Pt   is having phone issues and requests contact by email address on file if   possible.  ---------------------------------------------------------------------------  --------------  [address]  What is the best way for the office to contact you? OK to leave message on   voicemail  Preferred Call Back Phone Number? [phone]  ---------------------------------------------------------------------------  --------------  SCRIPT ANSWERS  Relationship to Patient? Self  Have your symptoms changed? No  (If the patient has Medicare as their primary insurance coverage ask this   question) Are you requesting a Medicare Annual Wellness Visit? Yes   (Is the patient requesting a pap smear with their physical exam?)? Yes  (Is the patient requesting their annual physical and does not need PAP or   AWV per above?)? No  Have you been diagnosed with, awaiting test results for, or told that you   are suspected of having COVID-19 (Coronavirus)? (If patient has tested   negative or was tested as a requirement for work, school, or travel and   not based on symptoms, answer no)? No  Within the past 10 days have you developed any of the following symptoms   (answer no if symptoms have been present longer than 10 days or began   more than 10 days ago)? Fever or Chills, Cough, Shortness of breath or   difficulty breathing, Loss of taste or smell, Sore throat, Nasal   congestion, Sneezing or runny nose, Fatigue or generalized body aches   (answer no if pain is specific to a body part e.g. back pain), Diarrhea,   Headache? No  Have you had close contact with someone with COVID-19 in the last 7 days? No  (Service Expert  click yes below to proceed with Dotted [employer] As Usual   Scheduling)?  Yes

## 2022-04-20 ENCOUNTER — TELEPHONE (OUTPATIENT)
Dept: PULMONOLOGY | Age: 47
End: 2022-04-20

## 2022-04-20 NOTE — LETTER
[facility]  [address]   [address] [facility] [address]  Phone: [phone]  Fax: [phone]    Nazia Canales MD        April 20, 2022    Radha Chenbal [address] [facility] [address] [facility]      Dear Gutierrez Block:    I am writing about your new patient appointment you have scheduled with Dr. New Wilcox on 8/3/22. Unfortunately Dr. New Wilcox will not be in the office and we need to reschedule your appointment. Our office tried multiple times to reach you by phone, but your voicemail was not set up. Your new appointment is scheduled for Wednesday, 7/27/22 @ 3 pm.  If you cannot make this appointment, please contact our office as soon as possible at [phone]. We apologize about any inconvenience this may bring.     Sincerely,        ANT CARTWRIGHT CMA for Dr. New Wilcox

## 2022-04-20 NOTE — TELEPHONE ENCOUNTER
Patient cancelled appointment on 8/3/22 with Dr. Sharmila Cortez for NPT sleep. Reason: Provider not in office    Patient did not reschedule appointment. Appointment rescheduled: We were unsuccessful in reaching patient as her VM was full and we had no other alternative numbers.     Letter sent to pt informing of cancelled appt and new appt scheduled for 7/27/22 @ 3 PM.

## 2022-05-11 ENCOUNTER — NURSE TRIAGE (OUTPATIENT)
Dept: OTHER | Facility: CLINIC | Age: 47
End: 2022-05-11

## 2022-05-11 NOTE — TELEPHONE ENCOUNTER
Received call from crystall at [facility] with Red Flag Complaint. Subjective: Caller states \"[facility] I had checked my blood sugar before I called and it was 66. im hypo-glycemic type II DM if go below 90 hard to gt blood sugar up\"     Pt hx Type II DM, gastric sleeve surgery    Current Symptoms: pt reports checked blood sugar 10 minutes ago and was 66. Pt feels overheated, nausea and sick to stomach like hot flash. Pt denies weakness when standing beyond baseline, confusion, vomiting, chest pain or shortness of breath beyond baseline. Pt is drinking diet green tea. Pt did try 3 teaspoons of honey while on the phone. Pt reports right now doesn't feel lightheaded or dizzy. Pt has service dog usually alerts pt when reading is below 90. Pt is not on insulin. Pt rechecked sugar on the phone was 83. Onset: today     Associated Symptoms: irritability     Pain Severity: no acute pain    Temperature: no fever    What has been tried: honey , diet green tea     LMP: 2 years ago  Pregnant: No    Recommended disposition: DISCUSS WITH PCP AND CALLBACK BY NURSE WITHIN 1 HOUR. Care advice provided, patient verbalizes understanding; denies any other questions or concerns; instructed to call back for any new or worsening symptoms. Writer provided warm transfer to [place] at [facility]  for further assessment and resume of care     Attention Provider: Thank you for allowing me to participate in the care of your patient. The patient was connected to triage in response to information provided to the ECC/PSC. Please do not respond through this encounter as the response is not directed to a shared pool.           Reason for Disposition   Low blood glucose (< 70 mg/dL or 3.9 mmol/L) or symptomatic, now improved with Care Advice AND cause unknown    Protocols used: DIABETES - LOW BLOOD SUGAR-ADULT-OH

## 2022-05-11 NOTE — TELEPHONE ENCOUNTER
I agree with the below advice. If low blood sugar does not improve with eating she will need to go to the ED.

## 2022-05-11 NOTE — TELEPHONE ENCOUNTER
Spoke to pt and she stated she was working outside and thought maybe she got hot and was burning to many calories. She only had a bowl of cereal today. Pt advised her BS currently was 80 and her  fixed her some eggs with Crystal [employer] and she was getting ready to eat. Pt stated she can't  remember to eat and that is why her Blood sugar goes low. I told pt to remember to eat especially if she is outdoors working with the heat and make sure she is getting plenty to drink. Pt is scheduled for 5/19/22 . She is to bring in a blood sugar log and to call her insurance to find out why they aren't covering her Dexcom meter before her next visit.   Pt does have a blood glucose monitor at home for back up     FYI to Dr. Dusty Hendrix

## 2022-05-15 DIAGNOSIS — J45.909 UNCOMPLICATED ASTHMA, UNSPECIFIED ASTHMA SEVERITY, UNSPECIFIED WHETHER PERSISTENT: ICD-10-CM

## 2022-05-16 ASSESSMENT — ENCOUNTER SYMPTOMS
WHEEZING: 0
EYE PAIN: 0
SINUS PAIN: 0
CONSTIPATION: 0
ABDOMINAL PAIN: 0
SHORTNESS OF BREATH: 1
COUGH: 0

## 2022-05-16 NOTE — TELEPHONE ENCOUNTER
Refill Request       Last Seen: Last Seen Department: 3/11/2022  Last Seen by PCP: 3/11/2022    Last Written: 11/08/2021  Dispensed 18g With 1 Refill     Next Appointment:   Future Appointments   Date Time Provider Israel Cote   5/19/2022 12:30 PM Wiley Conte DO [facility] AND [facility]   7/27/2022  3:00 PM Leander Steinberg MD AND [facility]           Requested Prescriptions     Pending Prescriptions Disp Refills    albuterol sulfate  (90 Base) MCG/ACT inhaler [Pharmacy Med Name: Albuterol Sulfate HFA Inhalation Aerosol Solution 108 (90 Base) MCG/ACT] 18 g 0     Sig: INHALE 2 PUFFS BY MOUTH EVERY SIX HOURS AS NEEDED FOR WHEEZING

## 2022-05-16 NOTE — PROGRESS NOTES
[address] and [facility]                                             [address] [facility], [address] [place], [place] [address], [address] [place] [id number]        Phone: [phone]                                     Name:  Diallo More  :    1975      Consultants:   Patient Care Team:  Silvina Conte DO as PCP - General (Family Medicine)  JUAN Krueger as PCP - [facility] Empaneled Provider    Chief Complaint:     Diallo More is a 42 y.o. female  who presents today for a New Patient care visit with Personalized Prevention Plan Services as noted below. HPI:     Diallo More 42 y.o. female with GERD, hypothyroidism, PCOS, DMTII, aplastic kidney, neuropathy, optic nerve atrophy, osteoarthritis, vitiligo, asthma, allergic rhinitis, COPD, restless leg syndrome, insomnia, HTN, HLD, obesity, depression, anxiety, Chronic migraines, IBS-C, IgA deficiency, TBI 10/19/2014, hx B12 deficiency, and degenerative disc disease of the cervical spine. She is in the process of applying for medicaid and \"srs\", a medical assistance program.     Anxiety and depression  Positive screen. She is stressed about finances and insurance issue. She has been on the phone with so many people she feels that she is a major source of stress. Multiple nodules on hands and arms  She has had these for years but they have recently become more bothersome. They are painful to palpation. On her left hand she has multiple nodules over the scar from her previous arthroscopic carpal tunnel release and a large cluster of nodules over the 1st [facility] crease where she had a previous ganglion cyst removed. She also has felt nodules in her wrist. She feels that her nodules were larger last month and then they went down again. IgA deficiency  Last IgA (3/11/22), 50  No issues with infection. HTN  Currently taking Amlodipine 5 mg daily and Furosemide 40 daily. Lisinopril 5 mg. No chest pain. No palpitations. Shortness of breath with exertion at baseline. Depression  Currently taking the following medications:  Duloxetine 30 mg BID  Aripiprazole 10 mg, increased (12/7/2021)  She would like to do therapy but she cannot afford it. Anxiety  Currently taking taking Buspirone 15 mg TID    COPD/ Asthma  PFT results show GOLD grade 2B with pure obstructive lung disease with air trapping, likely with a strong component of asthma. Currently taking the following medications:  Tiotropium inhaler  Albuterol inhaler  Symbicort inhaler  She has needed to use her albuterol inhaler 2-4 times a day. No cough. No increased sputum production. Her SOB is exacerbated by perfumes, air freshener, and changes in temperature. Her symptoms do not seem to be brought on by activity. She did not realize that she could use her Symbicort inhaler as a maintenance and rescue inhaler. CAT 30    PARVIZ  Previously diagnosed with PARVIZ and C-pap recommended. She has been getting 2-3 hours of sleep. Her bi-pap machine broke two years ago. Referred to sleep medicine at prior appointment. She was able to schedule an appointment, her appointment was moved earlier to July. GERD  Currently taking omeprazole 40 mg BID. She is having reflux symptoms twice a week and the symptoms are very mild compared to before she was taking the medication. Patient states she has a swallowing disorder and a vocal cord dysfunction. Neuropathy  EMG results- show overall normal study with evidence of mild carpal tunnel. Patient continues to have leg pain and shooting pain that go from her hands and up her arms. She does not notice exacerbation with activity or position. Cervical CA screen hx  Patient was unable to get records due to cost.  HX of HPV and possible colposcopy, per patient. She was scheduled for a pap but she had to cancel due to transportation issues.      DMTII  She was on Ozempic but did not tolerate this medication. She usually has low blood sugar. She was on metformin but the pills were too large for her to take because she takes so many medications and her she had gastric bypass surgery. Last A1C 5.4, 11/8/2021. BS log brought in by patient and scanned. She has not been wearing her dexa com because she has been having issues with her insurance. She had an episode of low blood sugar. Hypothyroid   (11/8/2021)   (3/11/2022)  Currently taking Liothyronine 25 mcg, levothyroxine 250 mcg, increased 3/2022. Seasonal Allergies and allergic rhinitis  She is allergic to bees and all peppers (vegetables), and black pepper. She also has seasonal allergies. Currently taking the following medications:  Fluticasone nasal spray  Montelukast 10 mg  Cetrizine 10 mg  Epi-pen  Azelastine nasal spray  Azelastine ophthalmic drops     Migraine  Currently taking rizatripan 10 mg PRN  She has improved symptoms compared to prior appointment. She continues to have headaches but this is her baseline. HLD  Currently taking Atorvastatin 20 mg, increased dose (11/8) due to elevated lipid panel. Morbid obesity   Hx. Gastric sleeve in 2018  She has lost over 100 lbs and continues to have a goal to lose more weight. She was focusing on her diet but due to recent financial stress and recovery from multiple surgeries she has been unable to do this. Chronic yeast infection, intertrigo  She has had chronic yeast infections in multiple skin folds for many years. Currently using nystatin powder. She feels that this is manageable if she continues the nystatin powder.         Patient Active Problem List   Diagnosis    RLS (restless legs syndrome)    Asthma    Psychophysiological insomnia    Hypersomnia    Osteoarthrosis    HTN (hypertension)    Hypothyroidism    Pure hypercholesterolemia    Lymphedema of right lower extremity    Abdominal wall abscess    Morbid obesity ([address]    Allergic rhinitis    B12 deficiency    Depression    GERD (gastroesophageal reflux disease)    Hyperthyroidism with Hashimoto disease    IgA deficiency ([address]    Intertrigo    Optic nerve atrophy    PCOS (polycystic ovarian syndrome)    Poor balance    Abnormal nuclear stress test    COPD (chronic obstructive pulmonary disease) (HCC)    Degenerative disc disease, cervical    Dyslipidemia    Kidney, aplastic    Multiple joint pain    Neuropathy    Paradoxical vocal cord motion    S/P laparoscopic sleeve gastrectomy    Type 2 diabetes mellitus ([address]    Vitamin D deficiency    Vitiligo    Anxiety         Past Medical History:    Past Medical History:   Diagnosis Date    Acquired hypothyroidism 05/26/2016    Allergic rhinitis 08/02/2013    Anemia     Ankle swelling 10/05/2017    Arthritis     Asthma     Cellulitis 12/19/2017    Chest pain due to myocardial ischemia 05/01/2018    Chronic bronchitis (HCC)     Chronic kidney disease     COPD (chronic obstructive pulmonary disease) ([address]     Depression     Head injury 10/19/2014    Hypertension     Hyperthyroidism with Hashimoto disease 03/13/2020    Incarcerated hernia 06/12/2021    Formatting of this note might be different from the original. Added automatically from request for surgery [id number]    Infection of deep incisional surgical site after procedure     Intertrigo     Left foot pain 05/26/2016    Lymphedema of right lower extremity 05/26/2016    Optic nerve atrophy 09/19/2021    PARVIZ 02/25/2016    Osteoarthritis of left knee 05/26/2016    Panniculitis 03/13/2020    PCOS (polycystic ovarian syndrome) 10/05/2017    Prediabetes 10/05/2017    Psychophysiological insomnia 02/25/2016    RLS (restless legs syndrome) 02/25/2016    S/P laparoscopic sleeve gastrectomy 08/09/2019    S/P repair of ventral hernia 06/14/2021    Sleep apnea     Type 2 diabetes mellitus ([address] 06/13/2021    Vitiligo        Past Surgical History:  Past Surgical History: Procedure Laterality Date    APPENDECTOMY  07/01/2002    CHOLECYSTECTOMY  01/01/2001    HAND SURGERY Left     CTR    MOUTH SURGERY      5 teeth removed    RECTAL SURGERY N/A 07/31/2021    ABDOMINAL WALL INCISION AND DRAINAGE performed by Mirela Proctor MD at [address] [place] [address] GASTROPLASTY  2018   [id number]   07/2021       Home Meds:  Prior to Visit Medications    Medication Sig Taking? Authorizing Provider   Lancets MISC Max allowable insurance Yes Magdelene K May, DO   blood glucose monitor strips PLEASE GIVE TYPE THAT MATCHES PATIENTS GLUCOMETER PER INSURANCE Yes Magdelene K May, DO   albuterol sulfate  (90 Base) MCG/ACT inhaler INHALE 2 PUFFS BY MOUTH EVERY SIX HOURS AS NEEDED FOR WHEEZING  Magdelene K May, DO   atorvastatin (LIPITOR) 40 MG tablet Take 1 tablet by mouth daily  Magdelene K May, DO   levothyroxine (SYNTHROID) 125 MCG tablet Take 2 tablets by mouth Daily  Magdelene K May, DO   budesonide-formoterol (SYMBICORT) 160-4.5 MCG/ACT AERO Inhale 2 puffs into the lungs 2 times daily Use for asthma exacerbations. 1-2 puffs q4h.  Max 12 puffs/ day  Magdelene K May, DO   Continuous Blood Gluc Sensor (DEXCOM G6 SENSOR) MISC change sensor every 10 days, see admin instructions  Historical Provider, MD   ONETOUCH ULTRA strip USE TO TEST BLOOD SUGAR FOUR TIMES DAILY  Historical Provider, MD   busPIRone (BUSPAR) 15 MG tablet Take 15 mg by mouth 3 times daily  Historical Provider, MD   fluticasone-salmeterol (ADVAIR DISKUS) 250-50 MCG/DOSE AEPB Inhale 1 puff into the lungs every 12 hours  Magdelene K May, DO   omeprazole (PRILOSEC) 40 MG delayed release capsule Take 1 capsule by mouth in the morning and at bedtime  Magdelene K May, DO   tiotropium (SPIRIVA HANDIHALER) 18 MCG inhalation capsule Inhale 1 capsule into the lungs daily  Magdelene K May, DO   lisinopril (PRINIVIL;ZESTRIL) 5 MG tablet Take 1 tablet by mouth daily  Magdelene K May, DO   ARIPiprazole (ABILIFY) 10 MG tablet Take 1 tablet by mouth daily  florecitamadi LICO May, DO   Cholecalciferol 25 MCG (1000 UT) CHEW Take 2 tablets by mouth daily  florecitamadi LICO May, DO   nystatin (MYCOSTATIN) 227705 UNIT/GM powder APPLY TOPICALLY 3 TIMES DAILY AS NEEDED TO MANAGE RASH AND MOISTURE  Historical Provider, MD   DULoxetine (CYMBALTA) 30 MG extended release capsule Take 1 capsule by mouth 2 times daily  florecitamadi BARFIELD May, DO   liothyronine (CYTOMEL) 25 MCG tablet Take 1 tablet by mouth 2 times daily for 120 doses  florecitamadi BARFIELD May, DO   furosemide (LASIX) 40 MG tablet Take 1 tablet by mouth daily  Patient not taking: Reported on 3/11/2022  Wiley BARFIELD May, DO   cetirizine (ZYRTEC) 10 MG tablet Take 1 tablet by mouth daily  ethan LICO May, DO   amLODIPine (NORVASC) 5 MG tablet Take 1 tablet by mouth daily  ethan LICO May, DO   montelukast (SINGULAIR) 10 MG tablet Take 1 tablet by mouth nightly  florecitamadi LICO May, DO   fluticasone (FLONASE ALLERGY RELIEF) 50 MCG/ACT nasal spray 2 sprays by Nasal route 2 times daily  Wiley BARFIELD May, DO   azelastine (OPTIVAR) 0.05 % ophthalmic solution Place 1 drop into both eyes 2 times daily  ethan BARFIELD May, DO   azelastine (ASTELIN) 0.1 % nasal spray 1 spray by Nasal route 2 times daily Use in each nostril as directed  ethan BARFIELD May, DO   oxybutynin (DITROPAN-XL) 5 MG extended release tablet Take 1 tablet by mouth daily  ethan BARFIELD May, DO   rizatriptan (MAXALT) 10 MG tablet Take 1 tablet by mouth once as needed for Migraine May repeat in 2 hours if needed  ethan BARFIELD May, DO   Continuous Blood Gluc  (DEXCOM G6 ) NGHIA 2 times daily  Historical Provider, MD   famotidine (PEPCID) 40 MG tablet Take 40 mg by mouth 2 times daily   Patient not taking: Reported on 3/11/2022  Historical Provider, MD   ferrous sulfate (FE TABS 325) 325 (65 Fe) MG EC tablet Take 65 mg by mouth 3 times daily (with meals)  Patient not taking: Reported on 3/11/2022  Historical Provider, MD   acetaminophen (TYLENOL) 500 MG tablet Take 500 mg by mouth every 6 hours as needed for Pain  Historical Provider, MD   calcium-vitamin D (OSCAL-500) 500-200 MG-UNIT per tablet Take 1 tablet by mouth daily   Historical Provider, MD   Nutritional Supplements (GLUCOSE MANAGEMENT) TABS Take by mouth  Historical Provider, MD   vitamin D (ERGOCALCIFEROL) 04952 UNITS CAPS capsule Take 1 capsule by mouth once a week  SHLOMO Whitaker MD   gabapentin (NEURONTIN) 600 MG tablet Take 1 tablet by mouth daily  Patient not taking: Reported on 3/11/2022  Romy Martinez MD   potassium chloride (KLOR-CON) 20 MEQ packet Take 20 mEq by mouth daily  Patient not taking: Reported on 3/11/2022  Romy Martinez MD   GNP FIBER THERAPY 500 MG TABS Take 500 mg by mouth 2 times daily  Patient not taking: Reported on 3/11/2022  Romy Martinez MD   calcium carbonate (CALCIUM 600) 600 MG TABS tablet Take 1 tablet by mouth daily  SHLOMO Whitaker MD   vitamin B-12 (CYANOCOBALAMIN) 1000 MCG tablet Take 1 tablet by mouth daily  Patient not taking: Reported on 3/11/2022  Romy Martinez MD   Adhesive Tape (PAPER TAPE 1\"X10YD) TAPE Apply to affected areas.   Natali Morales MD   Multiple Vitamins-Minerals (THERAPEUTIC MULTIVITAMIN-MINERALS) tablet Take 1 tablet by mouth daily  Historical Provider, MD   Polyethylene Glycol 3350 (MIRALAX PO) Take by mouth 2 times daily   Historical Provider, MD   artificial tears (ARTIFICIAL TEARS) OINT as needed  Historical Provider, MD   glucose monitoring kit (FREESTYLE) monitoring kit 1 kit by Does not apply route daily as needed  Patient not taking: Reported on 3/11/2022  Wander Toth MD   FREESTYLE LANCETS MISC 1 each by Does not apply route daily  Patient not taking: Reported on 3/11/2022  Wander Toth MD   EPINEPHrine 0.1 MG/ML injection Inject 0.3 mg into the muscle as needed  Patient not taking: Reported on 3/11/2022  Historical Provider, MD   dextromethorphan-guaiFENesin ([address] DM)  MG per SR tablet Take 1 tablet by mouth every 12 hours change and fever. HENT: Negative for ear pain and sinus pain. Eyes: Negative for pain. Respiratory: Positive for shortness of breath. Negative for cough and wheezing. Cardiovascular: Negative for chest pain and palpitations. Gastrointestinal: Negative for abdominal pain and constipation. Musculoskeletal: Negative for arthralgias and myalgias. Neurological: Negative for dizziness and numbness. Psychiatric/Behavioral: Negative for agitation and behavioral problems. Physical Exam:   Vitals:    05/19/22 1230   BP: 116/76   Site: Left Upper Arm   Position: Sitting   Cuff Size: Small Adult   Pulse: 76   Temp: 97.2 °F (36.2 °C)   TempSrc: Temporal   SpO2: 98%   Weight: (!) 332 lb (150.6 kg)     Body mass index is 64.84 kg/m². Wt Readings from Last 3 Encounters:   05/19/22 (!) 332 lb (150.6 kg)   03/11/22 (!) 328 lb (148.8 kg)   01/14/22 (!) 323 lb 12.8 oz (146.9 kg)       BP Readings from Last 3 Encounters:   05/19/22 116/76   03/11/22 118/76   01/14/22 128/86       Physical Exam  Constitutional:       Appearance: Normal appearance. HENT:      Head: Normocephalic and atraumatic. Eyes:      Extraocular Movements: Extraocular movements intact. Conjunctiva/sclera: Conjunctivae normal.   Cardiovascular:      Rate and Rhythm: Normal rate and regular rhythm. Pulses: Normal pulses. Heart sounds: Normal heart sounds. Pulmonary:      Effort: Pulmonary effort is normal.      Breath sounds: Normal breath sounds. Musculoskeletal:         General: Normal range of motion. Skin:     General: Skin is warm and dry. Comments: Multiple less less than 5mm nodules on forearms and hand. Tender to palpation. Mobile. Deep. Some nodules are over tendons or bony prominence but others are over muscle. Superficial nodular-cystic, clustered lesion over left carpal tunnel scar. Appears to be fluid filled. Firm. Tender to palpation.  Similar lesion over ganglion cyst excision scar at the crease of the 1st [facility] joint on the left, also tender to palpation. No erythema. No edema. Neurological:      General: No focal deficit present. Mental Status: She is alert and oriented to person, place, and time. Psychiatric:         Mood and Affect: Mood normal.         Behavior: Behavior normal.               Assessment/Plan:    Samuel Loredo 42 y.o. female with GERD, hypothyroidism, PCOS, DMTII, aplastic kidney, neuropathy, optic nerve atrophy, osteoarthritis, vitiligo, asthma, allergic rhinitis, COPD, restless leg syndrome, insomnia, HTN, HLD, obesity, depression, anxiety, migraines, IBS-C, IgA deficiency, TBI 10/19/2014, hx B12 deficiency, and degenerative disc disease of the cervical spine  presents for follow up    Multiple hand and forearm nodules, unclear etiology, chronic  The more superficial nodules overlying past surgical scars may be keloid. DDx for deeper nodules: Fibroblastic rheumatism, desmoid type fibromatosis, nodular scleroderma, nodular morphea  - Patient is not interested in referrals at this point  - I am hesitant to do any cortisone injections in the possible keloids at this point due to her unique neuropathy and referred pain symptoms. Her neuropathy symptoms may be exacerbated by hypothyroidism. We will work to manage this and reevaluate as needed. - Plan to monitor for growth and change at follow up appointment 1-2 mo    COPD with underlying Asthma, not well controlled  Based on PFT (2/15/2022) COPD Gold 2B. Pure obstructive disease with air trapping.   Continue the following medications:  Albuterol inhaler   Continue tiotroptium (advair) 18 mcg inhaler  Start Spiriva inhaler  Follow up in 1-2 months    Depression, improved  Continue the following medications:  Duloxetine 30 mg BID, Aripiprazole 10 mg Daily  Recommend counseling but patient is unable to afford additional co-pays at this time  Assess symptoms at follow up in 1-2 mo    Anxiety, improved  Continue following medication:  Buspirone 10 mg  Assess symptoms at follow up in 1-2 mo    HLD, not well controlled  Continue the following medication:  Atorvastatin 40 mg increased 3/2022  Recommend mediterranean diet, patient education handout given  -Recommend 150 minutes of cardiovascular activity a week, moderate intensity  -Increase  intake of fruits and vegetables  -Minimize packaged foods  Repeat lipid June     HTN, well controlled  Microalbumin, random urine 3.5, 11/8/2021  Microalbumin creatinine ratio 18.5 (11/8/2021), too low to calculate 3/11/22  Continue Amlodipine 5 mg and furosemide 40 mg daily  Continue lisinopril 5 mg daily (started 12/7/21)  Repeat microalbumin cr ratio annually  - Follow up as needed    Hypothyroid, not well controlled, asymptomatic  Continue levothyroxine 250 mcg, liothyronine 25 mcg  TSH ordered previously, patient encouraged to get lab drawn.  - Will adjust medication when results available  - Patient cannot see endocrinologist as she cannot afford co-pays  - Lower extremity weakness and neuropathy likley related to hypothyroidism  - Follow up next available appointment      GERD, not well controlled  Continue dose: omeprazole 40 mg BID  - Recommend EGD for chronic GERD as well as hx of dysphagia. Patient declined at this time due to cost    PARVIZ, not well controlled  Sleep study ordered previously  Recommend making this a priority as lack of sleep will affect other health conditions as well as overall wellbeing. Follow up after sleep study, scheduled in July    Hx IgA deficiency  - Risk of infection and need to notify doctors if patient needs a blood transfusion discussed. Neuropathy, upper and lower extremities, not well controlled  May be related to cervical degenerative disc disease, patient also previously diagnosed with bilateral carpal tunnel and has had bilateral carpal tunnel release.   EMG unrevealing likely related to hypothyroidism     Migraine, not well controlled   Recommend [facility], patient will see how much it will cost her and then call if she would like a prescription. Continue rizatripan 10 mg PRN  -Follow-up 2-4 weeks    Intertrigo, not well controlled  Continue nystatin powder     Vitamin D deficiency, not at goal  Patient instructed to continue vitamin D supplement   D, 25 Hydroxy 16.1, 11/22/21    [facility]  Recommend rescheduling PAP  Recommend eye exam    Below not discussed but reviewed for future visit planning-----------------------------------    IBS-C, well controlled  Recommend mirilax and fiber supplement for constipation  Follow-up as needed for this    Seasonal Allergies and allergic rhinitis, well controlled  Continue the following medications:  Fluticasone nasal spray  Montelukast 10 mg  Cetrizine 10 mg  Follow up if symptomatic     DMTII, well controlled  A1C 5.4, 11/8/2021  Follow up every 3-6 mo for management. Osteoarthritis, well managed  Continue the following medications:  Acetaminophen 500 mg  Voltaren gel    Stress incontinence, well controlled  Continue the following medication:  Oxybutinin 5 mg    Obesity, not well controlled  -Recommend 150 minutes of cardiovascular activity a week, moderate intensity  -Increase  intake of fruits and vegetables  -Minimize packaged foods  Plan to discuss weight loss goals further at follow up appointments           Health Maintenance Due:  Health Maintenance Due   Topic Date Due    Diabetic retinal exam  Never done    Hepatitis B vaccine (1 of 3 - Risk 3-dose series) Never done    Cervical cancer screen  Never done      [facility]  -Need for cervical CA screen, due to difficulty of getting records I recommend that she schedule a pap at next available appointment. She thinks she may have had a positive HPV at some point as well as a colposcopy. - Hep B surface antibody ordered today  - Recommend retinal exam, patient plans to schedule this.      Health care decision maker:  <72years old      Health Maintenance: (USPSTF Recommendations)  (F) Breast Cancer Screen: (40-49 (C), 50-74 biennial screening mammogram (B))  (F) Cervical Cancer Screen: (21-29 q3yr cytology alone; 30-65 q3yr cytology alone, q5yr with hrHPV alone, or q5yr cytology+hrHPV (A))   CRC/Colonoscopy Screening: (adults 45-49 (B), 50-75 (A))  Lung Ca Screening: Annual LDCT (+smoker age 49-80, smoked within 15 years, total of 20 pack yr history (B)): Never smoker  DEXA Screen: (women >65 and older, <65 if at risk/postmenopausal (B))  HIV Screen: (16-65 yr old, and all pregnant patients (A)): ordered today  Hep C Screen: (21-70 yr old (B)): ordered today  [address] Screen: (all pts with cirrhosis and high risk Hep B (US q6 mo)):  Immunizations:    RTC:  Return 2-4 weeks. EMR Dragon/transcription disclaimer:  Much of this encounter note is electronic transcription/translation of spoken language to printed texts. The electronic translation of spoken language may be erroneous, or at times, nonsensical words or phrases may be inadvertently transcribed.   Although I have reviewed the note for such errors, some may still exist.

## 2022-05-17 RX ORDER — ALBUTEROL SULFATE 90 UG/1
AEROSOL, METERED RESPIRATORY (INHALATION)
Qty: 18 G | Refills: 0 | Status: SHIPPED | OUTPATIENT
Start: 2022-05-17 | End: 2022-08-04

## 2022-05-17 NOTE — TELEPHONE ENCOUNTER
(Refill Request)  Spoke with patient today she stated she is using the Albuterol Inhaler as her rescue inhaler and that she is using 2-3 times daily  Patient also stated she is using symbicort & spirivia as directed

## 2022-05-17 NOTE — TELEPHONE ENCOUNTER
Please let the patient know that I refilled her albuterol inhaler but that I would like her to use her Symbicort inhaler as follows:     1-2 puffs twice a day, and then 1-2 puffs ever 4 hours as needed, not to exceed a total of 12 puffs per day. If she is using her Symbicort inhaler this way, the hope is that she will not need albuterol.

## 2022-05-19 ENCOUNTER — OFFICE VISIT (OUTPATIENT)
Dept: PRIMARY CARE CLINIC | Age: 47
End: 2022-05-19
Payer: MEDICARE

## 2022-05-19 ENCOUNTER — HOSPITAL ENCOUNTER (OUTPATIENT)
Age: 47
Discharge: HOME | End: 2022-05-19
Payer: MEDICARE

## 2022-05-19 ENCOUNTER — TELEPHONE (OUTPATIENT)
Dept: ADMINISTRATIVE | Age: 47
End: 2022-05-19

## 2022-05-19 VITALS
DIASTOLIC BLOOD PRESSURE: 76 MMHG | HEART RATE: 76 BPM | TEMPERATURE: 97.2 F | SYSTOLIC BLOOD PRESSURE: 116 MMHG | BODY MASS INDEX: 64.84 KG/M2 | WEIGHT: 293 LBS | OXYGEN SATURATION: 98 %

## 2022-05-19 DIAGNOSIS — Z12.39 SCREENING BREAST EXAMINATION: Primary | ICD-10-CM

## 2022-05-19 DIAGNOSIS — E03.9 HYPOTHYROIDISM, UNSPECIFIED TYPE: ICD-10-CM

## 2022-05-19 LAB — TSH SERPL DL<=0.05 MIU/L-ACNC: 166.6 UIU/ML (ref 0.27–4.2)

## 2022-05-19 PROCEDURE — 99214 OFFICE O/P EST MOD 30 MIN: CPT | Performed by: STUDENT IN AN ORGANIZED HEALTH CARE EDUCATION/TRAINING PROGRAM

## 2022-05-19 PROCEDURE — 84443 ASSAY THYROID STIM HORMONE: CPT

## 2022-05-19 PROCEDURE — 36415 COLL VENOUS BLD VENIPUNCTURE: CPT

## 2022-05-19 RX ORDER — LANCETS 30 GAUGE
EACH MISCELLANEOUS
Qty: 100 EACH | Refills: 3 | Status: SHIPPED | OUTPATIENT
Start: 2022-05-19 | End: 2022-05-24 | Stop reason: SDUPTHER

## 2022-05-19 RX ORDER — GLUCOSAMINE HCL/CHONDROITIN SU 500-400 MG
CAPSULE ORAL
Qty: 200 STRIP | Refills: 0 | Status: SHIPPED | OUTPATIENT
Start: 2022-05-19 | End: 2022-09-27

## 2022-05-19 ASSESSMENT — PATIENT HEALTH QUESTIONNAIRE - PHQ9
6. FEELING BAD ABOUT YOURSELF - OR THAT YOU ARE A FAILURE OR HAVE LET YOURSELF OR YOUR FAMILY DOWN: 2
4. FEELING TIRED OR HAVING LITTLE ENERGY: 3
SUM OF ALL RESPONSES TO PHQ QUESTIONS 1-9: 20
5. POOR APPETITE OR OVEREATING: 2
SUM OF ALL RESPONSES TO PHQ QUESTIONS 1-9: 20
SUM OF ALL RESPONSES TO PHQ QUESTIONS 1-9: 19
8. MOVING OR SPEAKING SO SLOWLY THAT OTHER PEOPLE COULD HAVE NOTICED. OR THE OPPOSITE, BEING SO FIGETY OR RESTLESS THAT YOU HAVE BEEN MOVING AROUND A LOT MORE THAN USUAL: 3
2. FEELING DOWN, DEPRESSED OR HOPELESS: 2
3. TROUBLE FALLING OR STAYING ASLEEP: 3
7. TROUBLE CONCENTRATING ON THINGS, SUCH AS READING THE NEWSPAPER OR WATCHING TELEVISION: 1
SUM OF ALL RESPONSES TO PHQ QUESTIONS 1-9: 20
10. IF YOU CHECKED OFF ANY PROBLEMS, HOW DIFFICULT HAVE THESE PROBLEMS MADE IT FOR YOU TO DO YOUR WORK, TAKE CARE OF THINGS AT HOME, OR GET ALONG WITH OTHER PEOPLE: 2
SUM OF ALL RESPONSES TO PHQ9 QUESTIONS 1 & 2: 5
1. LITTLE INTEREST OR PLEASURE IN DOING THINGS: 3
9. THOUGHTS THAT YOU WOULD BE BETTER OFF DEAD, OR OF HURTING YOURSELF: 1

## 2022-05-19 ASSESSMENT — ANXIETY QUESTIONNAIRES
GAD7 TOTAL SCORE: 16
7. FEELING AFRAID AS IF SOMETHING AWFUL MIGHT HAPPEN: 2
1. FEELING NERVOUS, ANXIOUS, OR ON EDGE: 3
4. TROUBLE RELAXING: 2
2. NOT BEING ABLE TO STOP OR CONTROL WORRYING: 3
5. BEING SO RESTLESS THAT IT IS HARD TO SIT STILL: 2
6. BECOMING EASILY ANNOYED OR IRRITABLE: 2
IF YOU CHECKED OFF ANY PROBLEMS ON THIS QUESTIONNAIRE, HOW DIFFICULT HAVE THESE PROBLEMS MADE IT FOR YOU TO DO YOUR WORK, TAKE CARE OF THINGS AT HOME, OR GET ALONG WITH OTHER PEOPLE: VERY DIFFICULT
3. WORRYING TOO MUCH ABOUT DIFFERENT THINGS: 2

## 2022-05-19 NOTE — TELEPHONE ENCOUNTER
Submitted PA for Spiriva HandiHaler 18MCG capsules Via CMM Key: [id number] STATUS: APPROVED effective 02/01/2022 - 12/31/2022    Please notify patient.  Thank you

## 2022-05-19 NOTE — PATIENT INSTRUCTIONS
[facility]- Nurtec ODT          Asthma Action Plan for Martha Castro    Printed: 5/19/2022  Doctor's Name: Raul PrietoDO, Phone Number: [phone]  Hospital/ Emergency Room Phone Number:      Please bring this plan and all your medications to each visit to our office or the emergency room. Treatment Goals Discussed with your Provider:   Be able to participate fully in activities of your choice. Your Asthma Triggers: (Avoid these things that make your asthma worse!)  dust and fumes    Your Medications:  1. Daily CONTROLLER medicines (use every day, regardless of symptoms): Symbicort  2. As-needed QUICK-RELIEF medicines (use when you are having asthma symptoms): Symbicort and Albuterol if needed    Monitoring your Asthma: It is important to keep track of your asthma in order to identify asthma attacks as soon as they begin. You should monitor your asthma in the following ways:     1. ASTHMA SYMPTOMS (shortness of breath, wheezing, chest tightness, or cough). 2. ACTIVITY RESTRICTION due to asthma. 3. How often you are needing to use QUICK-RELIEF MEDICATIONS. Asthma Action Plan   The traffic light system    GREEN means GO!--use your asthma medication as prescribed  YELLOW means CAUTION!--increase your quick-relief medication and monitor closely  RED means STOP!--you are having an asthma attack, get help from a medical provider right away! AT ANY TIME, CALL YOUR MEDICAL PROVIDER IF:   Asthma symptoms worsen while you are taking oral steroids, or   Quick-relief medication is lasting less than 4 hours, or   You have to use quick-relief medication longer than 24 hours            Based on [employer], and Blood institute, National Asthma Education and Prevention Program Expert Panel Report II.   Guideline for the Diagnosis and Management of Asthma, 1997 and  'NAEPP Expert Panel Report Guidelines for the Diagnosis and Management of Asthma- Update on Selected Topics,' 2002.        GREEN ZONE: Doing Well    No cough, wheeze, chest tightness or shortness of breath during the day or night   Can do your usual activities     Take These Long-Term-Control Medicines Each Day   Medicine How much to take When to take it   Symbicort 2 puffs BID          YELLOW ZONE: Asthma is Getting Worse    Cough, wheeze, chest tightness or shortness of breath more than two times a month, or   Waking at night due to asthma, or   Can do some, but not all, usual activities, or    Add: Quick-Relief Medicine - and keep taking your GREEN ZONE medicines   First: Symbicort 1-2 puffs every 4 hours. At least 4 hours after maintenance dose. If symptoms still in yellow zone after 1 hour take albuterol inhaler. Repeat every 4 hours. Do not exceed 12 puffs of Symbicort daily. If your symptoms return to 3401  resume green zone treatment regimen. RED ZONE: Medical Alert!  Very short of breath, or   Quick relief medications have not helped, or   Cannot do usual activities, or   Symptoms are same or worse after 24 hours in the Yellow Zone, or   You have to use quick-relief medicine more often than every 4 hours   Take the Following Actions:   First: Use albuterol inhaler immediately and then may also take Symbicort as directed. Then call your medical provider NOW! Go to the hospital or call an ambulance if:   You are still in the Red Zone after 15 minutes, and   You have not reached your medical provider    DANGER SIGNS    Trouble walking and talking due to shortness of breath, or   Lips or fingernails are blue    Take albuterol inhaler  Go to the hospital or call for an ambulance (call [phone]!

## 2022-05-19 NOTE — TELEPHONE ENCOUNTER
Pharmacy is calling needing new rx's for   Lancets MISC  blood glucose monitor strips  They are unable to accept the directions it will kick back as insurance fraud.  They will need specific directions   Please advise  Will need new rx sent over

## 2022-05-20 ENCOUNTER — CARE COORDINATION (OUTPATIENT)
Dept: CARE COORDINATION | Age: 47
End: 2022-05-20

## 2022-05-20 DIAGNOSIS — E03.9 HYPOTHYROIDISM, UNSPECIFIED TYPE: ICD-10-CM

## 2022-05-20 RX ORDER — LEVOTHYROXINE SODIUM 300 UG/1
300 TABLET ORAL DAILY
Qty: 90 TABLET | Refills: 3 | Status: SHIPPED | OUTPATIENT
Start: 2022-05-20 | End: 2022-08-22 | Stop reason: SDUPTHER

## 2022-05-20 NOTE — CARE COORDINATION
Ambulatory Care Coordination Note  5/20/2022  CM Risk Score: 7  Charlson 10 Year Mortality Risk Score: 10%     ACC: Pat Barber, RN    Summary Note: Returning call. Reports moved here from [place] and now having trouble with medicaid that was suppose to have transferred from [place] to [place]. 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 Did give her the number for [facility]) partnership program to see if they can help with medical bills and pantries. Offered local pantry phone numbers . Pt wants paper with all the different pantries as she has trouble writing a lot and gets things backwards because of dyslexia. Pt having trouble affording her medications even with them being $4-20 dollars at times. Gave her [address] phone number and she plans on calling to see if she qualifies. Will make a referral to . Reports BS this am was 100 and she checks it 4 times a day and then if she gets symptomatic. She had gastric bypass so she has to eat little amts frequently. Does have glucose tablets and glucagon in case BS goes low. Also has a service dog that alarms her  when she needs help. No sob noted while speaking on phone. PLAN  1) fu appts  2) review fs  3) review [facility] ? 4) review if f/u with [address] for med bills assist  5) Offer RD  6) could use a ramp to get in/out apt. ( People working cooperatively?)    Diabetes Assessment    Meal Planning: Avoidance of concentrated sweets   How often do you test your blood sugar?: Meals, Bedtime   Do you have barriers with adherence to non-pharmacologic self-management interventions?  (Nutrition/Exercise/Self-Monitoring): Yes   Have you ever had to go to the ED for symptoms of low blood sugar?: No       No patient-reported symptoms   Do you have hyperglycemia symptoms?: No   Last Blood Sugar Value: 100   Blood Sugar Monitoring Regimen: Once a Day, Morning Fasting, Before Meals, At Bedtime   Blood Sugar Trends: Fluctuating       and   COPD Assessment    Does the patient understand envrionmental exposure?: Yes  Does the patient have a nebulizer?: No  Does the patient use a space with inhaled medications?: No     No patient-reported symptoms         Symptoms:  None: Yes               Ambulatory Care Coordination Assessment    Care Coordination Protocol  Referral from Primary Care Provider: No  Week 1 - Initial Assessment     Do you have all of your prescriptions and are they filled?: Yes  Barriers to medication adherence: None  Are you able to afford your medications?: With Assistance  Medication Assistance Program: Low cost pharmacy, Other  Other Assistance Program: Will try [facility]P  How often do you have trouble taking your medications the way you have been told to take them?: I always take them as prescribed. Do you have Home O2 Therapy?: No      Ability to seek help/take action for Emergent Urgent situations i.e. fire, crime, inclement weather or health crisis. : Independent  Ability to ambulate to restroom: Independent  Ability handle personal hygeine needs (bathing/dressing/grooming): Needs Assistance  Ability to manage Medications:  Independent  Ability to prepare Food Preparation: Needs Assistance  Ability to maintain home (clean home, laundry): Needs Assistance  Ability to drive and/or has transportation: Dependent  Ability to do shopping: Dependent  Ability to manage finances: Needs Assistance  Is patient able to live independently?: Yes     Current Housing: Apartment        Per the Fall Risk Screening, did the patient have 2 or more falls or 1 fall with injury in the past year?: No     Frequent urination at night?: No  Do you use rails/bars?: Yes  Do you have a non-slip tub mat?: Yes        Thinking about your patient's physical health needs, are there any symptoms or problems (risk indicators) you are unsure about that require further investigation?: No identified areas of uncertainly or problems already being investigated   Are the patients physical health problems impacting on their mental well-being?: No identified areas of concern   Are there any problems with your patients lifestyle behaviors (alcohol, drugs, diet, exercise) that are impacting on physical or mental well-being?: No identified areas of concern   Do you have any other concerns about your patients mental well-being? How would you rate their severity and impact on the patient?: No identified areas of concern   How would you rate their home environment in terms of safety and stability (including domestic violence, insecure housing, neighbor harassment)?: Consistently safe, supportive, stable, no identified problems   How do daily activities impact on the patient's well-being? (include current or anticipated unemployment, work, caregiving, access to transportation or other): Some general dissatisfaction but no concern   How would you rate their social network (family, work, friends)?: Adequate participation with social networks   How would you rate their financial resources (including ability to afford all required medical care)?: Financially secure, some resource challenges   How wells does the patient now understand their health and well-being (symptoms, signs or risk factors) and what they need to do to manage their health?: Reasonable to good understanding and already engages in managing health or is willing to undertake better management   How well do you think your patient can engage in healthcare discussions? (Barriers include language, deafness, aphasia, alcohol or drug problems, learning difficulties, concentration): Clear and open communication, no identified barriers   Suggested Interventions and Community Resources  Diabetes Education: In Process   Fall Risk Prevention: In Process Disease Assocation: In Process   Zone Management Tools:  In Process                  Prior to Admission medications    Medication Sig Start Date End Date Taking? Authorizing Provider   levothyroxine (SYNTHROID) 300 MCG tablet Take 1 tablet by mouth Daily 5/20/22 8/18/22  Wiley BARFIELD May, DO   Lancets MISC Max allowable insurance 5/19/22   Wiley BARFIELD May, DO   blood glucose monitor strips PLEASE GIVE TYPE THAT MATCHES PATIENTS GLUCOMETER PER INSURANCE 5/19/22   Wiley BARFIELD May, DO   albuterol sulfate  (90 Base) MCG/ACT inhaler INHALE 2 PUFFS BY MOUTH EVERY SIX HOURS AS NEEDED FOR WHEEZING 5/17/22   Wiley BARFIELD May, DO   atorvastatin (LIPITOR) 40 MG tablet Take 1 tablet by mouth daily 3/18/22 6/16/22  Wiley BARFIELD May, DO   budesonide-formoterol (SYMBICORT) 160-4.5 MCG/ACT AERO Inhale 2 puffs into the lungs 2 times daily Use for asthma exacerbations. 1-2 puffs q4h.  Max 12 puffs/ day 3/15/22   Wiley BARFIELD May, DO   Continuous Blood Gluc Sensor (DEXCOM G6 SENSOR) MISC change sensor every 10 days, see admin instructions 1/23/22   Historical Provider, MD   ONETOUCH ULTRA strip USE TO TEST BLOOD SUGAR FOUR TIMES DAILY 1/28/22   Historical Provider, MD   busPIRone (BUSPAR) 15 MG tablet Take 15 mg by mouth 3 times daily    Historical Provider, MD   fluticasone-salmeterol (ADVAIR DISKUS) 250-50 MCG/DOSE AEPB Inhale 1 puff into the lungs every 12 hours 3/11/22 6/9/22  Wiley BARFIELD May, DO   omeprazole (PRILOSEC) 40 MG delayed release capsule Take 1 capsule by mouth in the morning and at bedtime 3/11/22 3/6/23  Wiley BARFIELD May, DO   tiotropium (SPIRIVA HANDIHALER) 18 MCG inhalation capsule Inhale 1 capsule into the lungs daily 1/14/22   Wiley BARFIELD May, DO   lisinopril (PRINIVIL;ZESTRIL) 5 MG tablet Take 1 tablet by mouth daily 12/7/21   Wiley BARFIELD May, DO   ARIPiprazole (ABILIFY) 10 MG tablet Take 1 tablet by mouth daily 12/7/21   Wiley BARFIELD May, DO   Cholecalciferol 25 MCG (1000 UT) CHEW Take 2 tablets by mouth daily 11/23/21   Wiley BARFIELD May, DO   nystatin (MYCOSTATIN) 177712 UNIT/GM powder APPLY TOPICALLY 3 TIMES DAILY AS NEEDED TO MANAGE RASH AND MOISTURE 11/8/21   Historical Provider, MD   DULoxetine (CYMBALTA) 30 MG extended release capsule Take 1 capsule by mouth 2 times daily 11/18/21 3/11/22  [facility] May, DO   liothyronine (CYTOMEL) 25 MCG tablet Take 1 tablet by mouth 2 times daily for 120 doses 11/18/21 3/11/22  [facility] May, DO   furosemide (LASIX) 40 MG tablet Take 1 tablet by mouth daily  Patient not taking: Reported on 3/11/2022 11/8/21   [facility] May, DO   cetirizine (ZYRTEC) 10 MG tablet Take 1 tablet by mouth daily 11/8/21   [facility] May, DO   amLODIPine (NORVASC) 5 MG tablet Take 1 tablet by mouth daily 11/8/21   [facility] May, DO   montelukast (SINGULAIR) 10 MG tablet Take 1 tablet by mouth nightly 11/8/21   [facility] May, DO   fluticasone (FLONASE ALLERGY RELIEF) 50 MCG/ACT nasal spray 2 sprays by Nasal route 2 times daily 11/8/21   [facility] May, DO   azelastine (OPTIVAR) 0.05 % ophthalmic solution Place 1 drop into both eyes 2 times daily 11/8/21   [facility] May, DO   azelastine (ASTELIN) 0.1 % nasal spray 1 spray by Nasal route 2 times daily Use in each nostril as directed 11/8/21   [facility] May, DO   oxybutynin (DITROPAN-XL) 5 MG extended release tablet Take 1 tablet by mouth daily 11/8/21   [facility] May, DO   rizatriptan (MAXALT) 10 MG tablet Take 1 tablet by mouth once as needed for Migraine May repeat in 2 hours if needed 11/8/21 3/11/22  [facility] May, DO   Continuous Blood Gluc  (DEXCOM G6 ) NGHIA 2 times daily 3/26/21   Historical Provider, MD   famotidine (PEPCID) 40 MG tablet Take 40 mg by mouth 2 times daily   Patient not taking: Reported on 3/11/2022    Historical Provider, MD   ferrous sulfate (FE TABS 325) 325 (65 Fe) MG EC tablet Take 65 mg by mouth 3 times daily (with meals)  Patient not taking: Reported on 3/11/2022    Historical Provider, MD   acetaminophen (TYLENOL) 500 MG tablet Take 500 mg by mouth every 6 hours as needed for Pain Historical Provider, MD   calcium-vitamin D (OSCAL-500) 500-200 MG-UNIT per tablet Take 1 tablet by mouth daily     Historical Provider, MD   Nutritional Supplements (GLUCOSE MANAGEMENT) TABS Take by mouth    Historical Provider, MD   vitamin D (ERGOCALCIFEROL) 57007 UNITS CAPS capsule Take 1 capsule by mouth once a week 8/11/16   Marielle Da Silva MD   gabapentin (NEURONTIN) 600 MG tablet Take 1 tablet by mouth daily  Patient not taking: Reported on 3/11/2022 8/11/16   Marielle Da Silva MD   potassium chloride (KLOR-CON) 20 MEQ packet Take 20 mEq by mouth daily  Patient not taking: Reported on 3/11/2022 8/11/16   Marielle Da Silva MD   GNP FIBER THERAPY 500 MG TABS Take 500 mg by mouth 2 times daily  Patient not taking: Reported on 3/11/2022 8/11/16   Marielle Da Silva MD   calcium carbonate (CALCIUM 600) 600 MG TABS tablet Take 1 tablet by mouth daily 8/11/16   SHLOMO Swanson MD   vitamin B-12 (CYANOCOBALAMIN) 1000 MCG tablet Take 1 tablet by mouth daily  Patient not taking: Reported on 3/11/2022 8/11/16   SHLOMO Swanson MD   Adhesive Tape (PAPER TAPE 1\"X10YD) TAPE Apply to affected areas.  4/25/16   Iris Lindquist MD   Multiple Vitamins-Minerals (THERAPEUTIC MULTIVITAMIN-MINERALS) tablet Take 1 tablet by mouth daily    Historical Provider, MD   Polyethylene Glycol 3350 (MIRALAX PO) Take by mouth 2 times daily     Historical Provider, MD   artificial tears (ARTIFICIAL TEARS) OINT as needed    Historical Provider, MD   glucose monitoring kit (FREESTYLE) monitoring kit 1 kit by Does not apply route daily as needed  Patient not taking: Reported on 3/11/2022 6/18/15   Pippa Merida MD   FREESTYLE LANCETS MISC 1 each by Does not apply route daily  Patient not taking: Reported on 3/11/2022 6/18/15   Pippa Merida MD   EPINEPHrine 0.1 MG/ML injection Inject 0.3 mg into the muscle as needed  Patient not taking: Reported on 3/11/2022    Historical Provider, MD   dextromethorphan-guaiFENesin ([place] [address] DM)  MG per SR tablet Take 1 tablet by mouth every 12 hours as needed     Historical Provider, MD       Future Appointments   Date Time Provider Israel Cote   6/2/2022  3:30 PM Wiley BARFIELD May,  [facility] AND [facility]   6/30/2022  1:15 PM Wiley BARFIELD May,  [facility] AND [facility]   7/27/2022  3:00 PM Santos Hill MD AND [facility]

## 2022-05-23 ENCOUNTER — CARE COORDINATION (OUTPATIENT)
Dept: CARE COORDINATION | Age: 47
End: 2022-05-23

## 2022-05-23 NOTE — TELEPHONE ENCOUNTER
Refill Request       Spoke with pharm the Rx has to be Onetouch ultra test strips with Dx  Included    Spoke with pharm  The Rx has to be [facility] with Dx Included or the insurance will not cover please advise thanks       Last Seen: Last Seen Department: 5/19/2022  Last Seen by PCP: 5/19/2022    Last Written: Test Strip Monitor & Lancets     Next Appointment:   Future Appointments   Date Time Provider Israel Cote   6/2/2022  3:30 PM Wiley Conte DO [facility] AND [facility]   6/30/2022  1:15 PM Wiley Conte DO [facility] AND [facility]   7/27/2022  3:00 PM Heather Pierre MD AND [facility]         Requested Prescriptions     Pending Prescriptions Disp Refills    ONETOUCH ULTRA strip 100 each      Sig: As needed.

## 2022-05-23 NOTE — CARE COORDINATION
placed an outreach phone call  today and spoke to patient's spouse. Mr. Neda Skiff  advised that patient was sleeping. Per family request  phoned back several hours later, however there was no answer at the listed phone numbers. A voice message was left providing contact information and a request for a return phone call. SHIRAZ Barber will be advised of the outreach.

## 2022-05-23 NOTE — TELEPHONE ENCOUNTER
Noted. Thanks for your outreach, [facility]! Covering for ACM this week, I will advise ACM in handoff report for her return.

## 2022-05-24 ENCOUNTER — TELEPHONE (OUTPATIENT)
Dept: PRIMARY CARE CLINIC | Age: 47
End: 2022-05-24

## 2022-05-24 ENCOUNTER — TELEPHONE (OUTPATIENT)
Dept: ADMINISTRATIVE | Age: 47
End: 2022-05-24

## 2022-05-24 RX ORDER — BLOOD SUGAR DIAGNOSTIC
STRIP MISCELLANEOUS
Qty: 100 EACH | Refills: 5 | Status: SHIPPED | OUTPATIENT
Start: 2022-05-24 | End: 2022-09-27 | Stop reason: SDUPTHER

## 2022-05-24 RX ORDER — LANCETS 33 GAUGE
EACH MISCELLANEOUS
Qty: 100 EACH | Refills: 11 | Status: SHIPPED | OUTPATIENT
Start: 2022-05-24 | End: 2022-05-31 | Stop reason: SDUPTHER

## 2022-05-24 RX ORDER — LANCETS 30 GAUGE
EACH MISCELLANEOUS
Qty: 100 EACH | Refills: 3 | Status: SHIPPED | OUTPATIENT
Start: 2022-05-24 | End: 2022-05-31 | Stop reason: CLARIF

## 2022-05-24 NOTE — TELEPHONE ENCOUNTER
Pharmacy called they are not able to accept the rx for patients Lancets MISC. It was denied and they are requesting it to be re-sent with directions they are not able to accept MAX dose allowed.   Please advise

## 2022-05-24 NOTE — TELEPHONE ENCOUNTER
Submitted PA for OneTouch Ultra strips Via [facility] Key: [id number] STATUS: APPROVED effective 05/24/2022-05/24/2023    Please notify patient.  Thank you

## 2022-05-24 NOTE — TELEPHONE ENCOUNTER
Pt is calling to let Dr. Conte know that the inusrance said nurtec ODT would need a PA done.           Pt says that she also has not gotten her:   budesonide-formoterol (SYMBICORT) 160-4.5 MCG/ACT AERO

## 2022-05-24 NOTE — TELEPHONE ENCOUNTER
Please see refill encounter 5/19/22  I copied information in from this message to the refill encounter

## 2022-05-24 NOTE — TELEPHONE ENCOUNTER
I copied / past a new telephone message started today from [address] [place] [address]. Please see below. I pended the correct Lancets that Lolita's message stated yesterday. Pharmacy called they are not able to accept the rx for patients Lancets MISC. It was denied and they are requesting it to be re-sent with directions they are not able to accept MAX dose allowed states this is fraud.    Please advise

## 2022-05-25 ENCOUNTER — CARE COORDINATION (OUTPATIENT)
Dept: CARE COORDINATION | Age: 47
End: 2022-05-25

## 2022-05-25 ENCOUNTER — TELEPHONE (OUTPATIENT)
Dept: ADMINISTRATIVE | Age: 47
End: 2022-05-25

## 2022-05-25 DIAGNOSIS — J45.902 SEVERE ASTHMA WITH STATUS ASTHMATICUS, UNSPECIFIED WHETHER PERSISTENT: ICD-10-CM

## 2022-05-25 DIAGNOSIS — G43.111 INTRACTABLE MIGRAINE WITH AURA WITH STATUS MIGRAINOSUS: Primary | ICD-10-CM

## 2022-05-25 DIAGNOSIS — J44.9 STAGE 2 MODERATE COPD BY GOLD CLASSIFICATION (HCC): ICD-10-CM

## 2022-05-25 RX ORDER — RIMEGEPANT SULFATE 75 MG/75MG
75 TABLET, ORALLY DISINTEGRATING ORAL EVERY OTHER DAY
Qty: 18 TABLET | Refills: 3 | Status: SHIPPED | OUTPATIENT
Start: 2022-05-25 | End: 2022-06-24

## 2022-05-25 RX ORDER — BUDESONIDE AND FORMOTEROL FUMARATE DIHYDRATE 160; 4.5 UG/1; UG/1
2 AEROSOL RESPIRATORY (INHALATION) 2 TIMES DAILY
Qty: 10.2 G | Refills: 3 | Status: SHIPPED | OUTPATIENT
Start: 2022-05-25 | End: 2022-10-24

## 2022-05-25 NOTE — TELEPHONE ENCOUNTER
I will put in a prescription for nurtec so we can get the PA process started. Does she need a refill for Symbicort?

## 2022-05-25 NOTE — TELEPHONE ENCOUNTER
Submitted PA for Nurtec 75MG dispersible tablets Via [facility] Key: [id number] STATUS: APPROVED effective 02/01/2022 - 12/31/2022    Please notify patient.  Thank you

## 2022-05-27 ENCOUNTER — CARE COORDINATION (OUTPATIENT)
Dept: CARE COORDINATION | Age: 47
End: 2022-05-27

## 2022-05-27 NOTE — CARE COORDINATION
Initial Contact Social Work Note - Ambulatory  5/26/2022      Date of referral: 5/20/22  Referral received from: [address]  Reason for referral: Community Resource Assessment    Previous SW referral: No  If yes, brief summary of outcome:     Two Identifiers Verified: Yes    Insurance Provider: [place] [employer] Advantage    Support System:  Spouse/Partner     Status: Spouse of Tripping Providers: None at Present    ADL Assistance Needed: Spouse assists with ADL's IADL's as needed    Housing/Living Concerns or Home Modification Needs: Lives in [address] with spouse     Transportation Concern: Spouse    Medication Cost Concern: On Extra Help, but still has challenges meeting co pays     Medication Adherence Concern: Cannot afford some medications    Financial Concern(s): Concerns related to medical bills and co-pays    Income (only if applicable): SSD    Ability to Read/Write: Yes Bachelors in Social Work    Advance Care Plan:  N/A    Other:  received a referral from [address] requesting outreach to patient to discuss community resources. After several previous unsuccessful attempts, patient was reached on 5/26/22. Mrs. Jovany Oliver is a 41 y/o  female who resides with her spouse in their [facility] apartment. She has lived in [address] [place] [address] for less than a year, previously residing in [place]. Patient's primary insurance is a Medicare Advantage plan. Patient's primary support is her spouse, Billy Newsome. [facility] was [facility] in [facility]. Patient states she has a bachelor's Degree in Social Work, although she chose not to work in the field. Patient states that her main needs are financial related to affordability of her medical needs / expenses. Mrs. Jovany Oliver stated that she was on [facility] in [place] and participated in the Qualified Medicare Beneficiary program [facility]).  Patient has been assessed by [facility] and deemed ineligible for Medicaid and SNAP benefits due to her combined household income. A review of her current household income confirms that patient is not eligible for [employer] based [employer], any [place], and Extra Help for Prescription Medication programs based on combined marital income guidelines. Patient's current participation in the Extra Help program and past eligibility for QMB appear to be based on patient's individual income and not as a  couple. Eligibility for any of the above noted programs is under the purview  of the local [address] [place] [address] office and the Social Security Administration. Patient has subsequently reapplied for Medicaid and SNAP benefits. Patient will be mailed a list of food pantries in the area to assist with food stability. Patient has outstanding medical bills from previous [facility] hospitalizations. Patient has confirmed, and the medical records indicate, that she has completed and submitted financial assistance [facility] Patient was encouraged to submit new applications for every admission or bill including attaching the verification of income letters for both her and her spouse. Patient has completed her part of the process. Although patient currently participates in the Extra Help for Prescription Medication Program( Keeps costs fixed with minimal co-pays for generic and brand drugs), she claims even the small co-pays are not affordable due to her Medicare deductions and co-pays for subspecialty visits. As patient has been previously ineligible for Medicaid, she was provided education on the [address] program and will be mailed written literature per request.Patient was made aware that she will have to contact the agency to determine her eligibility for participation. Patient expressed an interest in having a wheelchair ramp installed at her apartment complex.  Patient was made aware that any such endeavor would require the permission and approval of the property owner. SHIRAZ provided the phone number for [employer], who sometimes can assist with ramp construction. She will be mailed literature on the program per her request as well. Patient's best chance to qualify for [facility] may be through the [facility] program.  discussed the program with patient along with her current ADL needs. She will be mailed  contact information to request an assessment. Approval for the program would assist with co-payments , medical equipment, home health aide service and medical transportation if patient can meet both the physical and financial requirements for the program. The Medicaid income threshold for this program is higher than for [employer]. Patient was given the opportunity to ask questions. The plan is for  to mail patient resource information and follow up in several weeks after she has had the opportunity to review the resource literature. SHIRAZ Barber will be advised of the outreach.

## 2022-05-31 ENCOUNTER — CARE COORDINATION (OUTPATIENT)
Dept: CARE COORDINATION | Age: 47
End: 2022-05-31

## 2022-05-31 ENCOUNTER — TELEPHONE (OUTPATIENT)
Dept: PRIMARY CARE CLINIC | Age: 47
End: 2022-05-31

## 2022-05-31 RX ORDER — LANCETS 33 GAUGE
EACH MISCELLANEOUS
Qty: 100 EACH | Refills: 11 | Status: SHIPPED | OUTPATIENT
Start: 2022-05-31 | End: 2022-10-04 | Stop reason: CLARIF

## 2022-05-31 NOTE — TELEPHONE ENCOUNTER
There is another message (Refill encounter 5/19 that pertains to this issue. I will copy paste this message into that encounter since it's still open.

## 2022-05-31 NOTE — TELEPHONE ENCOUNTER
Pt is calling because she picked up her nurtec. Pt is confused because the directions tell her to take one every other day but the insurance only approved 8 tablets a month.

## 2022-05-31 NOTE — TELEPHONE ENCOUNTER
Pharmacy called because they need a new prescription for the lancets due to the directions saying max allowable. They said the directions need to say that she test 4 times merritt.          Lancets MISC    Test 4 times daily

## 2022-05-31 NOTE — CARE COORDINATION
Ambulatory Care Coordination Note  5/31/2022  CM Risk Score: 7  Charlson 10 Year Mortality Risk Score: 10%     ACC: Pat Barber, RN    Summary Note: Spoke w pt briefly since they aren't at home  . No sob noted while speaking on phone. Reports has gained some wt since having the gastric sleeve in 2018. Offered RD and will put in referral for this. No changes in medications. Reports hasn't contacted [facility] yet but has had a lot going on. Did speak with TERESSA Salgado and waiting on some info that he is mailing out. PLAN  1) fu appts  2) review fs  3) review SVDP  4) review meds  5) review sob    Diabetes Assessment    Meal Planning: Avoidance of concentrated sweets   How often do you test your blood sugar?: Meals, Bedtime   Do you have barriers with adherence to non-pharmacologic self-management interventions? (Nutrition/Exercise/Self-Monitoring): Yes   Have you ever had to go to the ED for symptoms of low blood sugar?: No       No patient-reported symptoms   Blood Sugar Trends: No Change       and   COPD Assessment    Does the patient understand envrionmental exposure?: Yes  Does the patient have a nebulizer?: No  Does the patient use a space with inhaled medications?: No            Symptoms:              Lab Results     None          Care Coordination Interventions    Referral from Primary Care Provider: No  Suggested Interventions and Community Resources  Diabetes Education: In Process  Fall Risk Prevention: In Process  Disease Association: In Process (Comment: COPD)  Zone Management Tools: In Process         Goals Addressed    None         Prior to Admission medications    Medication Sig Start Date End Date Taking?  Authorizing Provider   OneTouch Delica Lancets 10T MISC Check blood sugars 4 times daily for E11.9 5/31/22   Wiley Conte, DO   Rimegepant Sulfate (NURTEC) 75 MG TBDP Take 75 mg by mouth every other day Take every other day to prevent headache  May take daily for acute headache relief, not to exceed 18 doses a month. 5/25/22 6/24/22  Wiley LICO May, DO   SYMBICORT 160-4.5 MCG/ACT AERO Inhale 2 puffs into the lungs 2 times daily Use for asthma exacerbations. 1-2 puffs q4h.  Max 12 puffs/ day 5/25/22   Jamesmadi K May, DO   ONETOUCH ULTRA strip USE TO TEST BLOOD SUGAR FOUR TIMES DAILY 5/24/22   Wiley BARFIELD May, DO   levothyroxine (SYNTHROID) 300 MCG tablet Take 1 tablet by mouth Daily 5/20/22 8/18/22  ethan K May, DO   blood glucose monitor strips PLEASE GIVE TYPE THAT MATCHES PATIENTS GLUCOMETER PER INSURANCE 5/19/22   Wiley BARFIELD May, DO   albuterol sulfate  (90 Base) MCG/ACT inhaler INHALE 2 PUFFS BY MOUTH EVERY SIX HOURS AS NEEDED FOR WHEEZING 5/17/22   Wiley BARFIELD May, DO   atorvastatin (LIPITOR) 40 MG tablet Take 1 tablet by mouth daily 3/18/22 6/16/22  Jamesmadi LICO May, DO   Continuous Blood Gluc Sensor (DEXCOM G6 SENSOR) MISC change sensor every 10 days, see admin instructions 1/23/22   Historical Provider, MD   busPIRone (BUSPAR) 15 MG tablet Take 15 mg by mouth 3 times daily    Historical Provider, MD   fluticasone-salmeterol (ADVAIR DISKUS) 250-50 MCG/DOSE AEPB Inhale 1 puff into the lungs every 12 hours 3/11/22 6/9/22  Jamesmadi BARFIELD May, DO   omeprazole (PRILOSEC) 40 MG delayed release capsule Take 1 capsule by mouth in the morning and at bedtime 3/11/22 3/6/23  Jamesmadi BARFIELD May, DO   tiotropium (SPIRIVA HANDIHALER) 18 MCG inhalation capsule Inhale 1 capsule into the lungs daily 1/14/22   ethan LICO May, DO   lisinopril (PRINIVIL;ZESTRIL) 5 MG tablet Take 1 tablet by mouth daily 12/7/21   florecitaene K May, DO   ARIPiprazole (ABILIFY) 10 MG tablet Take 1 tablet by mouth daily 12/7/21   florecitaene K May, DO   Cholecalciferol 25 MCG (1000 UT) CHEW Take 2 tablets by mouth daily 11/23/21   Wiley BARFIELD May, DO   nystatin (MYCOSTATIN) 674006 UNIT/GM powder APPLY TOPICALLY 3 TIMES DAILY AS NEEDED TO MANAGE RASH AND MOISTURE 11/8/21   Historical Provider, MD   DULoxetine (CYMBALTA) 30 MG extended release capsule Take 1 capsule by mouth 2 times daily 11/18/21 3/11/22  [facility] May, DO   liothyronine (CYTOMEL) 25 MCG tablet Take 1 tablet by mouth 2 times daily for 120 doses 11/18/21 3/11/22  [facility] May, DO   furosemide (LASIX) 40 MG tablet Take 1 tablet by mouth daily  Patient not taking: Reported on 3/11/2022 11/8/21   [facility] May, DO   cetirizine (ZYRTEC) 10 MG tablet Take 1 tablet by mouth daily 11/8/21   [facility] May, DO   amLODIPine (NORVASC) 5 MG tablet Take 1 tablet by mouth daily 11/8/21   [facility] May, DO   montelukast (SINGULAIR) 10 MG tablet Take 1 tablet by mouth nightly 11/8/21   [facility] May, DO   fluticasone (FLONASE ALLERGY RELIEF) 50 MCG/ACT nasal spray 2 sprays by Nasal route 2 times daily 11/8/21   [facility] May, DO   azelastine (OPTIVAR) 0.05 % ophthalmic solution Place 1 drop into both eyes 2 times daily 11/8/21   [facility] May, DO   azelastine (ASTELIN) 0.1 % nasal spray 1 spray by Nasal route 2 times daily Use in each nostril as directed 11/8/21   [facility] May, DO   oxybutynin (DITROPAN-XL) 5 MG extended release tablet Take 1 tablet by mouth daily 11/8/21   [facility] May, DO   rizatriptan (MAXALT) 10 MG tablet Take 1 tablet by mouth once as needed for Migraine May repeat in 2 hours if needed 11/8/21 3/11/22  [facility] May, DO   Continuous Blood Gluc  (DEXCOM G6 ) NGHIA 2 times daily 3/26/21   Historical Provider, MD   famotidine (PEPCID) 40 MG tablet Take 40 mg by mouth 2 times daily   Patient not taking: Reported on 3/11/2022    Historical Provider, MD   ferrous sulfate (FE TABS 325) 325 (65 Fe) MG EC tablet Take 65 mg by mouth 3 times daily (with meals)  Patient not taking: Reported on 3/11/2022    Historical Provider, MD   acetaminophen (TYLENOL) 500 MG tablet Take 500 mg by mouth every 6 hours as needed for Pain    Historical Provider, MD   calcium-vitamin D (OSCAL-500) 500-200 MG-UNIT per tablet Take 1 tablet by mouth daily Historical Provider, MD   Nutritional Supplements (GLUCOSE MANAGEMENT) TABS Take by mouth    Historical Provider, MD   vitamin D (ERGOCALCIFEROL) 71154 UNITS CAPS capsule Take 1 capsule by mouth once a week 8/11/16   Giuseppe Canales MD   gabapentin (NEURONTIN) 600 MG tablet Take 1 tablet by mouth daily  Patient not taking: Reported on 3/11/2022 8/11/16   Giuseppe Canales MD   potassium chloride (KLOR-CON) 20 MEQ packet Take 20 mEq by mouth daily  Patient not taking: Reported on 3/11/2022 8/11/16   Giuseppe Canales MD   calcium carbonate (CALCIUM 600) 600 MG TABS tablet Take 1 tablet by mouth daily 8/11/16   SHLOMO Phelps MD   vitamin B-12 (CYANOCOBALAMIN) 1000 MCG tablet Take 1 tablet by mouth daily  Patient not taking: Reported on 3/11/2022 8/11/16   SHLOMO Phelps MD   Adhesive Tape (PAPER TAPE 1\"X10YD) TAPE Apply to affected areas.  4/25/16   Narcisa Rodriguez MD   Multiple Vitamins-Minerals (THERAPEUTIC MULTIVITAMIN-MINERALS) tablet Take 1 tablet by mouth daily    Historical Provider, MD   Polyethylene Glycol 3350 (MIRALAX PO) Take by mouth 2 times daily     Historical Provider, MD   artificial tears (ARTIFICIAL TEARS) OINT as needed    Historical Provider, MD   glucose monitoring kit (FREESTYLE) monitoring kit 1 kit by Does not apply route daily as needed  Patient not taking: Reported on 3/11/2022 6/18/15   Margoth Alberts MD   FREESTYLE LANCETS MISC 1 each by Does not apply route daily  Patient not taking: Reported on 3/11/2022 6/18/15   Margoth Alberts MD   EPINEPHrine 0.1 MG/ML injection Inject 0.3 mg into the muscle as needed  Patient not taking: Reported on 3/11/2022    Historical Provider, MD   dextromethorphan-guaiFENesin [facility] DM)  MG per SR tablet Take 1 tablet by mouth every 12 hours as needed     Historical Provider, MD       Future Appointments   Date Time Provider Israel Cote   6/2/2022  3:30 PM Wiley Conte DO [facility]   6/30/2022  1:15 PM Wiley Conte DO [facility] 7/27/2022  3:00 PM Sherry Santillan MD AND [facility]

## 2022-05-31 NOTE — TELEPHONE ENCOUNTER
There was a refill encounter 5/19/22 that had the same request but we had to change the SIG. Is pt on Insulin? I didn't know if the insurance will pay up to 4 times daily if they aren't insulin dependent. Pharmacy is requesting it so I pended the change for 4 times daily     Pharmacy called because they need a new prescription for the lancets due to the directions saying max allowable.  They said the directions need to say that she test 4 times merritt.            Lancets MISC     Test 4 times daily

## 2022-06-01 ASSESSMENT — ENCOUNTER SYMPTOMS
EYE PAIN: 0
ABDOMINAL PAIN: 0
SHORTNESS OF BREATH: 1
SINUS PAIN: 0
CONSTIPATION: 0
COUGH: 0
WHEEZING: 0

## 2022-06-02 ENCOUNTER — OFFICE VISIT (OUTPATIENT)
Dept: PRIMARY CARE CLINIC | Age: 47
End: 2022-06-02
Payer: MEDICARE

## 2022-06-02 VITALS
OXYGEN SATURATION: 98 % | TEMPERATURE: 97.9 F | DIASTOLIC BLOOD PRESSURE: 70 MMHG | BODY MASS INDEX: 57.52 KG/M2 | WEIGHT: 293 LBS | SYSTOLIC BLOOD PRESSURE: 124 MMHG | HEIGHT: 60 IN | RESPIRATION RATE: 18 BRPM | HEART RATE: 78 BPM

## 2022-06-02 DIAGNOSIS — E03.9 HYPOTHYROIDISM, UNSPECIFIED TYPE: ICD-10-CM

## 2022-06-02 DIAGNOSIS — J44.9 STAGE 2 MODERATE COPD BY GOLD CLASSIFICATION (HCC): ICD-10-CM

## 2022-06-02 DIAGNOSIS — G43.111 INTRACTABLE MIGRAINE WITH AURA WITH STATUS MIGRAINOSUS: Primary | ICD-10-CM

## 2022-06-02 DIAGNOSIS — I10 PRIMARY HYPERTENSION: ICD-10-CM

## 2022-06-02 DIAGNOSIS — F32.1 CURRENT MODERATE EPISODE OF MAJOR DEPRESSIVE DISORDER, UNSPECIFIED WHETHER RECURRENT (HCC): ICD-10-CM

## 2022-06-02 DIAGNOSIS — J45.40 MODERATE PERSISTENT ASTHMA, UNSPECIFIED WHETHER COMPLICATED: ICD-10-CM

## 2022-06-02 DIAGNOSIS — E11.22 TYPE 2 DIABETES MELLITUS WITH CHRONIC KIDNEY DISEASE, WITHOUT LONG-TERM CURRENT USE OF INSULIN, UNSPECIFIED CKD STAGE (HCC): ICD-10-CM

## 2022-06-02 DIAGNOSIS — F41.9 ANXIETY: ICD-10-CM

## 2022-06-02 PROCEDURE — 3046F HEMOGLOBIN A1C LEVEL >9.0%: CPT | Performed by: STUDENT IN AN ORGANIZED HEALTH CARE EDUCATION/TRAINING PROGRAM

## 2022-06-02 PROCEDURE — 3023F SPIROM DOC REV: CPT | Performed by: STUDENT IN AN ORGANIZED HEALTH CARE EDUCATION/TRAINING PROGRAM

## 2022-06-02 PROCEDURE — G8427 DOCREV CUR MEDS BY ELIG CLIN: HCPCS | Performed by: STUDENT IN AN ORGANIZED HEALTH CARE EDUCATION/TRAINING PROGRAM

## 2022-06-02 PROCEDURE — 2022F DILAT RTA XM EVC RTNOPTHY: CPT | Performed by: STUDENT IN AN ORGANIZED HEALTH CARE EDUCATION/TRAINING PROGRAM

## 2022-06-02 PROCEDURE — 99214 OFFICE O/P EST MOD 30 MIN: CPT | Performed by: STUDENT IN AN ORGANIZED HEALTH CARE EDUCATION/TRAINING PROGRAM

## 2022-06-02 PROCEDURE — G8417 CALC BMI ABV UP PARAM F/U: HCPCS | Performed by: STUDENT IN AN ORGANIZED HEALTH CARE EDUCATION/TRAINING PROGRAM

## 2022-06-02 PROCEDURE — 1036F TOBACCO NON-USER: CPT | Performed by: STUDENT IN AN ORGANIZED HEALTH CARE EDUCATION/TRAINING PROGRAM

## 2022-06-02 RX ORDER — BLOOD-GLUCOSE SENSOR
1 EACH MISCELLANEOUS CONTINUOUS
Qty: 3 EACH | Refills: 2 | Status: SHIPPED | OUTPATIENT
Start: 2022-06-02 | End: 2022-07-02

## 2022-06-02 RX ORDER — PROPRANOLOL HYDROCHLORIDE 80 MG/1
80 CAPSULE, EXTENDED RELEASE ORAL 2 TIMES DAILY
Qty: 60 CAPSULE | Refills: 2 | Status: SHIPPED | OUTPATIENT
Start: 2022-06-02 | End: 2022-09-29

## 2022-06-02 RX ORDER — BLOOD-GLUCOSE TRANSMITTER
1 EACH MISCELLANEOUS CONTINUOUS
Qty: 1 EACH | Refills: 3 | Status: SHIPPED | OUTPATIENT
Start: 2022-06-02 | End: 2022-08-31

## 2022-06-02 SDOH — ECONOMIC STABILITY: TRANSPORTATION INSECURITY
IN THE PAST 12 MONTHS, HAS LACK OF TRANSPORTATION KEPT YOU FROM MEETINGS, WORK, OR FROM GETTING THINGS NEEDED FOR DAILY LIVING?: NO

## 2022-06-02 SDOH — ECONOMIC STABILITY: TRANSPORTATION INSECURITY
IN THE PAST 12 MONTHS, HAS THE LACK OF TRANSPORTATION KEPT YOU FROM MEDICAL APPOINTMENTS OR FROM GETTING MEDICATIONS?: NO

## 2022-06-02 ASSESSMENT — PATIENT HEALTH QUESTIONNAIRE - PHQ9
10. IF YOU CHECKED OFF ANY PROBLEMS, HOW DIFFICULT HAVE THESE PROBLEMS MADE IT FOR YOU TO DO YOUR WORK, TAKE CARE OF THINGS AT HOME, OR GET ALONG WITH OTHER PEOPLE: 2
9. THOUGHTS THAT YOU WOULD BE BETTER OFF DEAD, OR OF HURTING YOURSELF: 0
SUM OF ALL RESPONSES TO PHQ QUESTIONS 1-9: 19
6. FEELING BAD ABOUT YOURSELF - OR THAT YOU ARE A FAILURE OR HAVE LET YOURSELF OR YOUR FAMILY DOWN: 2
5. POOR APPETITE OR OVEREATING: 3
3. TROUBLE FALLING OR STAYING ASLEEP: 2
SUM OF ALL RESPONSES TO PHQ QUESTIONS 1-9: 19
SUM OF ALL RESPONSES TO PHQ QUESTIONS 1-9: 19
8. MOVING OR SPEAKING SO SLOWLY THAT OTHER PEOPLE COULD HAVE NOTICED. OR THE OPPOSITE, BEING SO FIGETY OR RESTLESS THAT YOU HAVE BEEN MOVING AROUND A LOT MORE THAN USUAL: 2
2. FEELING DOWN, DEPRESSED OR HOPELESS: 2
4. FEELING TIRED OR HAVING LITTLE ENERGY: 3
SUM OF ALL RESPONSES TO PHQ9 QUESTIONS 1 & 2: 4
SUM OF ALL RESPONSES TO PHQ QUESTIONS 1-9: 19
1. LITTLE INTEREST OR PLEASURE IN DOING THINGS: 2
7. TROUBLE CONCENTRATING ON THINGS, SUCH AS READING THE NEWSPAPER OR WATCHING TELEVISION: 3

## 2022-06-02 ASSESSMENT — ANXIETY QUESTIONNAIRES
7. FEELING AFRAID AS IF SOMETHING AWFUL MIGHT HAPPEN: 1
IF YOU CHECKED OFF ANY PROBLEMS ON THIS QUESTIONNAIRE, HOW DIFFICULT HAVE THESE PROBLEMS MADE IT FOR YOU TO DO YOUR WORK, TAKE CARE OF THINGS AT HOME, OR GET ALONG WITH OTHER PEOPLE: VERY DIFFICULT
3. WORRYING TOO MUCH ABOUT DIFFERENT THINGS: 2
5. BEING SO RESTLESS THAT IT IS HARD TO SIT STILL: 3
2. NOT BEING ABLE TO STOP OR CONTROL WORRYING: 3
4. TROUBLE RELAXING: 2
1. FEELING NERVOUS, ANXIOUS, OR ON EDGE: 3
GAD7 TOTAL SCORE: 16
6. BECOMING EASILY ANNOYED OR IRRITABLE: 2

## 2022-06-02 ASSESSMENT — LIFESTYLE VARIABLES: HOW OFTEN DO YOU HAVE A DRINK CONTAINING ALCOHOL: NEVER

## 2022-06-02 NOTE — PROGRESS NOTES
[address] and [facility] Medicine Residency [facility]                                             [address] [facility], [facility], [facility] [address]        Phone: [phone]                                     Name:  Ankush Eldridge  :    1975      Consultants:   Patient Care Team:  Srinivasan Conte DO as PCP - General (Family Medicine)  JUAN Brown as PCP - [facility] Empaneled Provider  Erin Banks. Taye Kirkpatrick RN as [address], [place] as   Todd Eldridge, [address] [place] [address] as Dietitian (Dietitian Registered)    Chief Complaint:     Ankush Eldridge is a 42 y.o. female  who presents today for a Established Patient care visit with [address] [place] [address] as noted below. HPI:     Ankush Eldridge 42 y.o. female with GERD, hypothyroidism, PCOS, DMTII, aplastic kidney, neuropathy, optic nerve atrophy, osteoarthritis, vitiligo, asthma, allergic rhinitis, COPD, restless leg syndrome, insomnia, HTN, HLD, obesity, depression, anxiety, Chronic migraines, IBS-C, IgA deficiency, TBI 10/19/2014, hx B12 deficiency, and degenerative disc disease of the cervical spine. Migraine   Since she was last seen her [facility] prescription was approved for only 8 doses a month. She took a single dose on  and it made her sleepy but when she woke up she felt more clear than she has in a long time and did not have a headache. She started feeling a migraine coming on again late yesterday. She did not take Maxalt as she was unsure if she should. She has tried amytryptiline and topomax which did not work for her. She also tried two different injection medications butt she is unsure of which ones. These gave her some relief but her doctor at that time did not feel that they worked well enough. She has also tried botox in the past which gave her some relief. She does no think that she has ever tried propranolol.  She occassionally takes zofran for nausea. She has visual changes/ aura preceding a migraine. She also has significant photophobia. When her migraines are most severe she has to lay down in a dark room. She has migraines >15 days a month, nearly every day. Patient continues to have episodes of symptomatic hypoglycemia. Around the time she established care with me she had an episode in the 29's. Her service dog alerted. Most recently her lowest recorded was in the 62s. She was very dizzy and felt like she was going to pass out during this episode. She does not take any diabetes medication. Her last A1C was 5.4, 11/8/21. She needs a new prescription for dexcom. She has been taking her blood sugar multiple times a day and brought in a log. HTN  There was significant variation in her blood pressure log but today in office her BP was well controlled. BP Readings from Last 3 Encounters:   06/02/22 124/70   05/19/22 116/76   03/11/22 118/76   She is currently taking:  Amlodipine 5 mg daily  Furosemide 40 mg daily  Lisinopril 5 mg daily  No chest pain. No palpitations. Shortness of breath with exertion at baseline. Asthma/ COPD  She has been able to use her albuterol inhaler less since her last appointment 2 weeks ago. She is using her Symbicort inhaler as a maintenance and rescue inhaler. She uses the Symbicort 1 additional dose a day as rescue. Asthma action plan reviewed. Previous PFT results show GOLD grade 2B with pure obstructive lung disease with air trapping, likely with a strong component of asthma. Currently taking the following medications:  Tiotropium inhaler  Albuterol inhaler  Symbicort inhaler  Last CAT: 30 (5/19/22)    Anxiety and depression  Positive screen. She is stressed about finances and insurance issue. Since having a new car battery her stress has improved somewhat as she now has transportation.    Currently taking the following medications:  Duloxetine 30 mg BID  Aripiprazole 10 mg, increased (12/7/2021). Buspirone 15 mg TID     DMTII  She was on Ozempic but did not tolerate this medication. She usually has low blood sugar. She was on metformin but the pills were too large for her to take because she takes so many medications and her she had gastric bypass surgery. Last A1C 5.4, 11/8/2021. BS log brought in by patient and scanned. She has not been wearing her dexa com because she has been having issues with her insurance. She had an episode of low blood sugar.      Hypothyroid   (11/8/2021)   (3/11/2022)  .60 (5/19/22)  Currently taking Liothyronine 25 mcg, levothyroxine 300 mcg, increased 5/20/22      Patient Active Problem List   Diagnosis    RLS (restless legs syndrome)    Asthma    Psychophysiological insomnia    Hypersomnia    Osteoarthrosis    HTN (hypertension)    Hypothyroidism    Pure hypercholesterolemia    Lymphedema of right lower extremity    Abdominal wall abscess    Morbid obesity ([address]    Allergic rhinitis    B12 deficiency    Depression    GERD (gastroesophageal reflux disease)    Hyperthyroidism with Hashimoto disease    IgA deficiency ([address]    Intertrigo    Optic nerve atrophy    PCOS (polycystic ovarian syndrome)    Poor balance    Abnormal nuclear stress test    COPD (chronic obstructive pulmonary disease) (HCC)    Degenerative disc disease, cervical    Dyslipidemia    Kidney, aplastic    Multiple joint pain    Neuropathy    Paradoxical vocal cord motion    S/P laparoscopic sleeve gastrectomy    Type 2 diabetes mellitus (HCC)    Vitamin D deficiency    Vitiligo    Anxiety         Past Medical History:    Past Medical History:   Diagnosis Date    Acquired hypothyroidism 05/26/2016    Allergic rhinitis 08/02/2013    Anemia     Ankle swelling 10/05/2017    Arthritis     Asthma     Cellulitis 12/19/2017    Chest pain due to myocardial ischemia 05/01/2018    Chronic bronchitis (HCC)     Chronic kidney disease     COPD MG TBDP Take 75 mg by mouth every other day Take every other day to prevent headache  May take daily for acute headache relief, not to exceed 18 doses a month. Yes Wiley BARFIELD May, DO   SYMBICORT 160-4.5 MCG/ACT AERO Inhale 2 puffs into the lungs 2 times daily Use for asthma exacerbations. 1-2 puffs q4h.  Max 12 puffs/ day Yes Wiley BARFIELD May, DO   ONETOUCH ULTRA strip USE TO TEST BLOOD SUGAR FOUR TIMES DAILY Yes Wiley BARFIELD May, DO   levothyroxine (SYNTHROID) 300 MCG tablet Take 1 tablet by mouth Daily Yes Wiley BARFIELD May, DO   blood glucose monitor strips PLEASE GIVE TYPE THAT MATCHES PATIENTS GLUCOMETER PER INSURANCE Yes Wiley BARFIELD May, DO   albuterol sulfate  (90 Base) MCG/ACT inhaler INHALE 2 PUFFS BY MOUTH EVERY SIX HOURS AS NEEDED FOR WHEEZING Yes Wiley BARFIELD May, DO   atorvastatin (LIPITOR) 40 MG tablet Take 1 tablet by mouth daily Yes Wiley BARFIELD May, DO   busPIRone (BUSPAR) 15 MG tablet Take 15 mg by mouth 3 times daily Yes Historical Provider, MD   omeprazole (PRILOSEC) 40 MG delayed release capsule Take 1 capsule by mouth in the morning and at bedtime Yes Wiley BARFIELD May, DO   tiotropium (SPIRIVA HANDIHALER) 18 MCG inhalation capsule Inhale 1 capsule into the lungs daily Yes Wiley BARFIELD May, DO   lisinopril (PRINIVIL;ZESTRIL) 5 MG tablet Take 1 tablet by mouth daily Yes Wiley BARFIELD May, DO   ARIPiprazole (ABILIFY) 10 MG tablet Take 1 tablet by mouth daily Yes Wiley BARFIELD May, DO   Cholecalciferol 25 MCG (1000 UT) CHEW Take 2 tablets by mouth daily Yes Wiley BARFIELD May, DO   nystatin (MYCOSTATIN) 568178 UNIT/GM powder APPLY TOPICALLY 3 TIMES DAILY AS NEEDED TO MANAGE RASH AND MOISTURE Yes Historical Provider, MD   furosemide (LASIX) 40 MG tablet Take 1 tablet by mouth daily Yes Wiley BARFIELD May, DO   cetirizine (ZYRTEC) 10 MG tablet Take 1 tablet by mouth daily Yes Wiley BARFIELD May, DO   amLODIPine (NORVASC) 5 MG tablet Take 1 tablet by mouth daily Yes Wiley BARFIELD May, DO   montelukast (SINGULAIR) 10 MG tablet Take 1 tablet by mouth nightly Yes Wiley BARFIELD May, DO   fluticasone (FLONASE ALLERGY RELIEF) 50 MCG/ACT nasal spray 2 sprays by Nasal route 2 times daily Yes Wiley BARFIELD May, DO   azelastine (OPTIVAR) 0.05 % ophthalmic solution Place 1 drop into both eyes 2 times daily Yes Wiley BARFIELD May, DO   azelastine (ASTELIN) 0.1 % nasal spray 1 spray by Nasal route 2 times daily Use in each nostril as directed Yes Wiley BARFIELD May, DO   oxybutynin (DITROPAN-XL) 5 MG extended release tablet Take 1 tablet by mouth daily Yes Wiley BARFIELD May, DO   Continuous Blood Gluc  (DEXCOM G6 ) NGHIA 2 times daily Yes Historical Provider, MD   famotidine (PEPCID) 40 MG tablet Take 40 mg by mouth 2 times daily  Yes Historical Provider, MD   ferrous sulfate (FE TABS 325) 325 (65 Fe) MG EC tablet Take 65 mg by mouth 3 times daily (with meals)  Yes Historical Provider, MD   acetaminophen (TYLENOL) 500 MG tablet Take 500 mg by mouth every 6 hours as needed for Pain Yes Historical Provider, MD   calcium-vitamin D (OSCAL-500) 500-200 MG-UNIT per tablet Take 1 tablet by mouth daily  Yes Historical Provider, MD   Nutritional Supplements (GLUCOSE MANAGEMENT) TABS Take by mouth Yes Historical Provider, MD   vitamin D (ERGOCALCIFEROL) 65045 UNITS CAPS capsule Take 1 capsule by mouth once a week Yes Munir Still MD   gabapentin (NEURONTIN) 600 MG tablet Take 1 tablet by mouth daily Yes Munir Still MD   potassium chloride (KLOR-CON) 20 MEQ packet Take 20 mEq by mouth daily Yes Munir Still MD   calcium carbonate (CALCIUM 600) 600 MG TABS tablet Take 1 tablet by mouth daily Yes Munir Still MD   vitamin B-12 (CYANOCOBALAMIN) 1000 MCG tablet Take 1 tablet by mouth daily Yes SHLOMO Ortiz MD   Adhesive Tape (PAPER TAPE 1\"[id number]) TAPE Apply to affected areas.  Yes Kevin Velez MD   Multiple Vitamins-Minerals (THERAPEUTIC MULTIVITAMIN-MINERALS) tablet Take 1 tablet by mouth daily Yes Historical Provider, MD   Polyethylene Glycol 3350 (MIRALAX PO) Take by mouth 2 times daily  Yes Historical Provider, MD   artificial tears (ARTIFICIAL TEARS) OINT as needed Yes Historical Provider, MD   glucose monitoring kit (FREESTYLE) monitoring kit 1 kit by Does not apply route daily as needed Yes Pippa Merida MD   FREESTYLE LANCETS MISC 1 each by Does not apply route daily Yes Pippa Merida MD   EPINEPHrine 0.1 MG/ML injection Inject 0.3 mg into the muscle as needed  Yes Historical Provider, MD   dextromethorphan-guaiFENesin (MUCINEX DM)  MG per SR tablet Take 1 tablet by mouth every 12 hours as needed  Yes Historical Provider, MD   DULoxetine (CYMBALTA) 30 MG extended release capsule Take 1 capsule by mouth 2 times daily  Magdelene K May, DO   liothyronine (CYTOMEL) 25 MCG tablet Take 1 tablet by mouth 2 times daily for 120 doses  Magdelene K May, DO   rizatriptan (MAXALT) 10 MG tablet Take 1 tablet by mouth once as needed for Migraine May repeat in 2 hours if needed  Magdelene K May, DO       Allergies:    Bee venom, Black pepper-turmeric, Ibuprofen, and Nsaids    Family History:       Problem Relation Age of Onset    Hypertension Mother     Hypothyroidism Mother     Asthma Father     Diabetes Father     Emphysema Father     Hypertension Father     Hypertension Sister         problems with pregnancy.     Asthma Sister     Diabetes Paternal Grandmother     Cervical Cancer Paternal Aunt        Social History:  Social History     Tobacco History     Smoking Status  Passive Smoke Exposure - Never Smoker Smoking Tobacco Type  Cigarettes    Smokeless Tobacco Use  Never Used          Alcohol History     Alcohol Use Status  No          Drug Use     Drug Use Status  No          Sexual Activity     Sexually Active  Not Asked                   Health Maintenance Completed:  Health Maintenance   Topic Date Due    Diabetic retinal exam  Never done    Hepatitis B vaccine (1 of 3 - Risk 3-dose series) Never done    Cervical cancer screen Never done    COVID-19 Vaccine (1) 01/04/2024 (Originally 8/23/1987)    Diabetic foot exam  11/08/2022    A1C test (Diabetic or Prediabetic)  11/08/2022    Pneumococcal 0-64 years Vaccine (2 - PCV) 11/08/2022    Diabetic microalbuminuria test  03/11/2023    Lipids  03/11/2023    Depression Monitoring  06/02/2023    DTaP/Tdap/Td vaccine (3 - Td or Tdap) 11/08/2031    Flu vaccine  Completed    Hepatitis C screen  Completed    HIV screen  Completed    Hepatitis A vaccine  Aged Out    Hib vaccine  Aged Out    Meningococcal (ACWY) vaccine  Aged [employer] History   Administered Date(s) Administered    Influenza, MDCK Quadv, with preserv IM (Flucelvax 2 yrs and older) 11/08/2021    Pneumococcal Polysaccharide ([id number]) 11/08/2021    Tdap (Boostrix, Adacel) 03/31/2016, 11/08/2021         Review of Systems:  Review of Systems   Constitutional: Positive for fatigue. Negative for activity change, appetite change and fever. HENT: Negative for ear pain and sinus pain. Eyes: Negative for pain. Respiratory: Positive for shortness of breath. Negative for cough and wheezing. Cardiovascular: Negative for chest pain and palpitations. Gastrointestinal: Negative for abdominal pain and constipation. Musculoskeletal: Negative for arthralgias and myalgias. Neurological: Negative for dizziness and numbness. Psychiatric/Behavioral: Negative for agitation and behavioral problems. Physical Exam:   Vitals:    06/02/22 1541   BP: 124/70   Site: Left Lower Arm   Position: Sitting   Cuff Size: Medium Adult   Pulse: 78   Resp: 18   Temp: 97.9 °F (36.6 °C)   TempSrc: Infrared   SpO2: 98%   Weight: (!) 331 lb 12.8 oz (150.5 kg)   Height: 5' (1.524 m)     Body mass index is 64.8 kg/m².     Wt Readings from Last 3 Encounters:   06/02/22 (!) 331 lb 12.8 oz (150.5 kg)   05/19/22 (!) 332 lb (150.6 kg)   03/11/22 (!) 328 lb (148.8 kg)       BP Readings from Last 3 Encounters:   06/02/22 124/70 05/19/22 116/76   03/11/22 118/76       Physical Exam  Constitutional:       Appearance: Normal appearance. HENT:      Head: Normocephalic and atraumatic. Eyes:      Extraocular Movements: Extraocular movements intact. Conjunctiva/sclera: Conjunctivae normal.   Cardiovascular:      Rate and Rhythm: Normal rate and regular rhythm. Pulses: Normal pulses. Heart sounds: Normal heart sounds. Pulmonary:      Effort: Pulmonary effort is normal.      Breath sounds: Normal breath sounds. Musculoskeletal:         General: Normal range of motion. Skin:     General: Skin is warm and dry. Neurological:      General: No focal deficit present. Mental Status: She is alert and oriented to person, place, and time. Psychiatric:         Mood and Affect: Mood normal.         Behavior: Behavior normal.          Assessment/Plan:    Reid Beckett 42 y.o. female with GERD, hypothyroidism, PCOS, DMTII, aplastic kidney, neuropathy, optic nerve atrophy, osteoarthritis, vitiligo, asthma, allergic rhinitis, COPD, restless leg syndrome, insomnia, HTN, HLD, obesity, depression, anxiety, migraines, IBS-C, IgA deficiency, TBI 10/19/2014, hx B12 deficiency, and degenerative disc disease of the cervical spine  presents for follow up    Migraine, not well controlled  - Continue Nurtec every 4 days as insurance will only approve 8 per month  - Continue taking Maxalt as breakthrough medication if Nurtec is insufficient  - Start propranolol XR 80 mg BID as preventative medication, plan to increase dose if well tolerated. - Follow up in 4-6 weeks, call if any concerns    COPD with underlying Asthma, not well controlled  Based on PFT (2/15/2022) COPD Gold 2B. Pure obstructive disease with air trapping.   Continue the following medications:  Symbicort inhaler BID and additional rescue doses  Albuterol inhaler PRN  Spiriva (tiotroprium) inhaler  Follow up in 4-6 weeks    Depression, improved  Continue the following medications:  Duloxetine 30 mg BID, Aripiprazole 10 mg Daily  Recommend counseling but patient is unable to afford additional co-pays at this time  Assess symptoms at follow up in 4-6 weeks    Anxiety, improved  Continue following medication:  Buspirone 10 mg  Assess symptoms at follow up in 4-6 weeks    HTN, well controlled  Home BP likely varied due to being a wrist BP cuff  Microalbumin, random urine 3.5, 11/8/2021  Microalbumin creatinine ratio 18.5 (11/8/2021), too low to calculate 3/11/22  Continue Amlodipine 5 mg and furosemide 40 mg daily  Continue lisinopril 5 mg daily (started 12/7/21)  Repeat microalbumin cr ratio annually  - Follow up as needed    Hypothyroid, not well controlled, asymptomatic  Continue levothyroxine 300 mcg, liothyronine 25 mcg BID  - Patient cannot see endocrinologist as she cannot afford co-pays  - Lower extremity weakness and neuropathy likley related to hypothyroidism  - Follow up 4-6 weeks plan to repeat TSH     HCM  Patient is scheduled for a Pap       Health Maintenance Due:  Health Maintenance Due   Topic Date Due    Diabetic retinal exam  Never done    Hepatitis B vaccine (1 of 3 - Risk 3-dose series) Never done    Cervical cancer screen  Never done         Health care decision maker:  <72years old      Health Maintenance: (USPSTF Recommendations)  (F) Breast Cancer Screen: (40-49 (C), 50-74 biennial screening mammogram (B))  (F) Cervical Cancer Screen: (21-29 q3yr cytology alone; 30-65 q3yr cytology alone, q5yr with hrHPV alone, or q5yr cytology+hrHPV (A))   CRC/Colonoscopy Screening: (adults 45-49 (B), 50-75 (A))  Lung Ca Screening: Annual LDCT (+smoker age 49-80, smoked within 15 years, total of 20 pack yr history (B)): Never smoker  DEXA Screen: (women >65 and older, <65 if at risk/postmenopausal (B))  HIV Screen: (16-65 yr old, and all pregnant patients (A)): ordered today  Hep C Screen: (21-70 yr old (B)): ordered today  [address] Screen: (all pts with cirrhosis and high risk Hep B (US q6 mo)):  Immunizations:    RTC:  Return in about 6 weeks (around 7/14/2022). EMR Dragon/transcription disclaimer:  Much of this encounter note is electronic transcription/translation of spoken language to printed texts. The electronic translation of spoken language may be erroneous, or at times, nonsensical words or phrases may be inadvertently transcribed.   Although I have reviewed the note for such errors, some may still exist.

## 2022-06-03 ENCOUNTER — CARE COORDINATION (OUTPATIENT)
Dept: CARE COORDINATION | Age: 47
End: 2022-06-03

## 2022-06-03 NOTE — CARE COORDINATION
[place] and left voicemail regarding Dietitian referral. Left call back number and will follow up as appropriate.        Fortino Heck, [address] [place] [address], [place]  [phone]

## 2022-06-06 ENCOUNTER — CARE COORDINATION (OUTPATIENT)
Dept: CARE COORDINATION | Age: 47
End: 2022-06-06

## 2022-06-06 NOTE — TELEPHONE ENCOUNTER
Damian,     Thank you for reaching out to pt and for the updates. Very much appreciated.   Thank you,  Kaley Lund RN   Ambulatory Care Manager  [phone]

## 2022-06-06 NOTE — CARE COORDINATION
received a return phone call from patient. Mrs. Felix Purdy stated that she was contacted by someone (did not recall the part or agency) who will call back to schedule her assessment for the [facility]. She advised that she has not received the previously mailed resource literature to date. Patient was encouraged to call back by 6/10/22 if she does not receive the information in the mail so that a second packet of information can be sent out. In addition, we discussed the [facility] assessment and the [address] program. Solmon Najjar will be advised of the contact.

## 2022-06-06 NOTE — CARE COORDINATION
attempted a follow up phone call to determine if patient had received the previously mailed resource information. There was no answer at the time of the call. A voice mail message was left providing contact information. SHIRAZ Barber will be advised of the outreach attempt.

## 2022-06-08 ENCOUNTER — CARE COORDINATION (OUTPATIENT)
Dept: CARE COORDINATION | Age: 47
End: 2022-06-08

## 2022-06-08 NOTE — CARE COORDINATION
Lab Results     None          Care Coordination Interventions    Referral from Primary Care Provider: No  Suggested Interventions and Community Resources  Diabetes Education: Completed  Fall Risk Prevention: In Process  Disease Association: Completed (Comment: COPD)  Registered Dietician: Completed (Comment: 6/3/22-referral made)  Zone Management Tools: Completed (Comment: 6/8/22-reviewed)         Goals Addressed                    This Visit's Progress      Patient Stated (pt-stated)         Will try to eat more vegetables and fruits    Barriers: financial and lack of support  Plan for overcoming my barriers: will work with ACM/RD prn  Confidence: 8/10  Anticipated Goal Completion Date: 11/8/22            Prior to Admission medications    Medication Sig Start Date End Date Taking? Authorizing Provider   Continuous Blood Gluc Sensor (DEXCOM G6 SENSOR) MISC 1 each by Does not apply route continuous 6/2/22 7/2/22  Magdelene K May, DO   Continuous Blood Gluc Transmit (DEXCOM G6 TRANSMITTER) MISC 1 each by Does not apply route continuous 6/2/22 8/31/22  Magdelene K May, DO   propranolol (INDERAL LA) 80 MG extended release capsule Take 1 capsule by mouth in the morning and at bedtime 6/2/22 7/2/22  Magdelene K May, DO   OneTouch Delica Lancets 27L MISC Check blood sugars 4 times daily for E11.9 5/31/22   Magdelene K May, DO   Rimegepant Sulfate (NURTEC) 75 MG TBDP Take 75 mg by mouth every other day Take every other day to prevent headache  May take daily for acute headache relief, not to exceed 18 doses a month. 5/25/22 6/24/22  Magdelene K May, DO   SYMBICORT 160-4.5 MCG/ACT AERO Inhale 2 puffs into the lungs 2 times daily Use for asthma exacerbations. 1-2 puffs q4h.  Max 12 puffs/ day 5/25/22   Magdelene K May, DO   ONETOUCH ULTRA strip USE TO TEST BLOOD SUGAR FOUR TIMES DAILY 5/24/22   Magdelene K May, DO   levothyroxine (SYNTHROID) 300 MCG tablet Take 1 tablet by mouth Daily 5/20/22 8/18/22  Magdelene K May, DO   blood glucose monitor strips PLEASE GIVE TYPE THAT MATCHES PATIENTS GLUCOMETER PER INSURANCE 5/19/22   Wiley BARFIELD May, DO   albuterol sulfate  (90 Base) MCG/ACT inhaler INHALE 2 PUFFS BY MOUTH EVERY SIX HOURS AS NEEDED FOR WHEEZING 5/17/22   Wiley BARFIELD May, DO   atorvastatin (LIPITOR) 40 MG tablet Take 1 tablet by mouth daily 3/18/22 6/16/22  Wiley BARFIELD May, DO   busPIRone (BUSPAR) 15 MG tablet Take 15 mg by mouth 3 times daily    Historical Provider, MD   omeprazole (PRILOSEC) 40 MG delayed release capsule Take 1 capsule by mouth in the morning and at bedtime 3/11/22 3/6/23  Wiley BARFIELD May, DO   tiotropium (SPIRIVA HANDIHALER) 18 MCG inhalation capsule Inhale 1 capsule into the lungs daily 1/14/22   Wiley BARFIELD May, DO   lisinopril (PRINIVIL;ZESTRIL) 5 MG tablet Take 1 tablet by mouth daily 12/7/21   [facility] May, DO   ARIPiprazole (ABILIFY) 10 MG tablet Take 1 tablet by mouth daily 12/7/21   [facility] May, DO   Cholecalciferol 25 MCG (1000 UT) CHEW Take 2 tablets by mouth daily 11/23/21   [facility] L[facility] May, DO   nystatin (MYCOSTATIN) 075242 UNIT/GM powder APPLY TOPICALLY 3 TIMES DAILY AS NEEDED TO MANAGE RASH AND MOISTURE 11/8/21   Historical Provider, MD   DULoxetine (CYMBALTA) 30 MG extended release capsule Take 1 capsule by mouth 2 times daily 11/18/21 3/11/22  Wiley BARFIELD May, DO   liothyronine (CYTOMEL) 25 MCG tablet Take 1 tablet by mouth 2 times daily for 120 doses 11/18/21 3/11/22  Wiley BARFIELD May, DO   furosemide (LASIX) 40 MG tablet Take 1 tablet by mouth daily 11/8/21   Wiley BARFIELD May, DO   cetirizine (ZYRTEC) 10 MG tablet Take 1 tablet by mouth daily 11/8/21   Wiley BARFIELD May, DO   amLODIPine (NORVASC) 5 MG tablet Take 1 tablet by mouth daily 11/8/21   Wiley BARFIELD May, DO   montelukast (SINGULAIR) 10 MG tablet Take 1 tablet by mouth nightly 11/8/21   Magdelene LICO May, DO   fluticasone (FLONASE ALLERGY RELIEF) 50 MCG/ACT nasal spray 2 sprays by Nasal route 2 times daily 11/8/21 Jamesene K May, DO   azelastine (OPTIVAR) 0.05 % ophthalmic solution Place 1 drop into both eyes 2 times daily 11/8/21   Jamesene K May, DO   azelastine (ASTELIN) 0.1 % nasal spray 1 spray by Nasal route 2 times daily Use in each nostril as directed 11/8/21   Jamesene K May, DO   oxybutynin (DITROPAN-XL) 5 MG extended release tablet Take 1 tablet by mouth daily 11/8/21   Wiley BARFIELD May, DO   rizatriptan (MAXALT) 10 MG tablet Take 1 tablet by mouth once as needed for Migraine May repeat in 2 hours if needed 11/8/21 3/11/22  Wiley BARFIELD May, DO   Continuous Blood Gluc  (DEXCOM G6 ) NGHIA 2 times daily 3/26/21   Historical Provider, MD   famotidine (PEPCID) 40 MG tablet Take 40 mg by mouth 2 times daily     Historical Provider, MD   ferrous sulfate (FE TABS 325) 325 (65 Fe) MG EC tablet Take 65 mg by mouth 3 times daily (with meals)     Historical Provider, MD   acetaminophen (TYLENOL) 500 MG tablet Take 500 mg by mouth every 6 hours as needed for Pain    Historical Provider, MD   calcium-vitamin D (OSCAL-500) 500-200 MG-UNIT per tablet Take 1 tablet by mouth daily     Historical Provider, MD   Nutritional Supplements (GLUCOSE MANAGEMENT) TABS Take by mouth    Historical Provider, MD   vitamin D (ERGOCALCIFEROL) 91167 UNITS CAPS capsule Take 1 capsule by mouth once a week 8/11/16   Nighat Anthony MD   gabapentin (NEURONTIN) 600 MG tablet Take 1 tablet by mouth daily 8/11/16   SHLOMO Reddy MD   potassium chloride (KLOR-CON) 20 MEQ packet Take 20 mEq by mouth daily 8/11/16   SHLOMO Reddy MD   calcium carbonate (CALCIUM 600) 600 MG TABS tablet Take 1 tablet by mouth daily 8/11/16   SHLOMO Reddy MD   vitamin B-12 (CYANOCOBALAMIN) 1000 MCG tablet Take 1 tablet by mouth daily 8/11/16   SHLOMO Reddy MD   Adhesive Tape (PAPER TAPE 1\"X10YD) TAPE Apply to affected areas.  4/25/16   Michelle Bond MD   Multiple Vitamins-Minerals (THERAPEUTIC MULTIVITAMIN-MINERALS) tablet Take 1 tablet by mouth daily Historical Provider, MD   Polyethylene Glycol 3350 (MIRALAX PO) Take by mouth 2 times daily     Historical Provider, MD   artificial tears (ARTIFICIAL TEARS) OINT as needed    Historical Provider, MD   glucose monitoring kit (FREESTYLE) monitoring kit 1 kit by Does not apply route daily as needed 6/18/15   Karen Dallas MD   FREESTYLE LANCETS MISC 1 each by Does not apply route daily 6/18/15   Karen Dallas MD   EPINEPHrine 0.1 MG/ML injection Inject 0.3 mg into the muscle as needed     Historical Provider, MD   dextromethorphan-guaiFENesin [facility] DM)  MG per SR tablet Take 1 tablet by mouth every 12 hours as needed     Historical Provider, MD       Future Appointments   Date Time Provider Israel Cote   6/30/2022  1:15 PM Wiley Conte DO [facility] AND RES [facility]   7/19/2022  1:30 PM Wiley Conte [facility] AND RES [facility]   7/27/2022  3:00 PM Hal Sood MD AND [facility]

## 2022-06-09 ENCOUNTER — CARE COORDINATION (OUTPATIENT)
Dept: CARE COORDINATION | Age: 47
End: 2022-06-09

## 2022-06-09 NOTE — CARE COORDINATION
returned a phone call from patient who stated she had received the mailed resource information packet. Patient has not heard back yet from [facility] regarding her [address] Patient was encouraged to advise the care team of the assessment outcome after it occurs. SHIRAZ Barber will be advised of the outreach.

## 2022-06-10 ENCOUNTER — CARE COORDINATION (OUTPATIENT)
Dept: CARE COORDINATION | Age: 47
End: 2022-06-10

## 2022-06-10 NOTE — CARE COORDINATION
[place] and left voicemail regarding Dietitian referral. Left call back number and will follow up as appropriate.        La Sanders, [address] [place] [address], [place]  [phone]

## 2022-06-17 ENCOUNTER — CARE COORDINATION (OUTPATIENT)
Dept: CARE COORDINATION | Age: 47
End: 2022-06-17

## 2022-06-17 NOTE — CARE COORDINATION
[place] and left voicemail regarding Dietitian referral. Left call back number and will follow up as appropriate.        Gavi Mercer, [address] [place] [address], [place]  [phone]

## 2022-06-20 ENCOUNTER — CARE COORDINATION (OUTPATIENT)
Dept: CARE COORDINATION | Age: 47
End: 2022-06-20

## 2022-06-20 ENCOUNTER — HOSPITAL ENCOUNTER (OUTPATIENT)
Dept: WOMENS IMAGING | Age: 47
Discharge: HOME | End: 2022-06-20
Payer: MEDICARE

## 2022-06-20 ENCOUNTER — TELEPHONE (OUTPATIENT)
Dept: PRIMARY CARE CLINIC | Age: 47
End: 2022-06-20

## 2022-06-20 VITALS — BODY MASS INDEX: 57.52 KG/M2 | HEIGHT: 60 IN | WEIGHT: 293 LBS

## 2022-06-20 DIAGNOSIS — Z12.39 SCREENING BREAST EXAMINATION: ICD-10-CM

## 2022-06-20 PROCEDURE — 77067 SCR MAMMO BI INCL CAD: CPT

## 2022-06-20 NOTE — TELEPHONE ENCOUNTER
Patient is calling stating that she is needing 2 letters one stating that she is in need of AC in her apartment and the second one needs to state that she needs a ramp due to her walker they have steps.  Please advise  Manjeet Mota [phone]    Patient would like these done by 12:00 today if possible she is going to get her mammogram done and does not want to have to use any more gas then she has to

## 2022-06-20 NOTE — CARE COORDINATION
Ambulatory Care Coordination Note  6/20/2022  CM Risk Score: 7  [place]son 10 Year Mortality Risk Score: 10%     ACC: Pat Barber, RN    Summary Note: Reports breathing is doing ok but difficult when the weather's in the 100's. She has calls into multiple places for additional window units and has 2 small fans as well to try to help cool apartment. Reports she has a window unit in her apartment but it doesn't seem to cool when temps are in 100's like it's been lately. Reports it's been too hot to cook so doing the best she can for her blood sugars. Did not want to talk about blood sugars but kept going on about the apartment not cool enough. Waiting on multiple places to call her back about the window units. Hasn't heard back from [address] [place] [address] about the Home care waiver yet but plans on calling them. Hasn't called [facility] for possible assist with meds. PLAN  1) fu appts  2) review meds  3) review fs  Diabetes Assessment    Meal Planning: Avoidance of concentrated sweets   How often do you test your blood sugar?: Meals, Bedtime   Do you have barriers with adherence to non-pharmacologic self-management interventions? (Nutrition/Exercise/Self-Monitoring): Yes   Have you ever had to go to the ED for symptoms of low blood sugar?: No       No patient-reported symptoms   Do you have hyperglycemia symptoms?: No   Do you have hypoglycemia symptoms?: No   Blood Sugar Trends: No Change       and   COPD Assessment    Does the patient understand envrionmental exposure?: Yes  Is the patient able to verbalize Rescue vs. Long Acting medications?: Yes  Does the patient have a nebulizer?: No  Does the patient use a space with inhaled medications?: No     No patient-reported symptoms         Symptoms:              Lab Results     None          Care Coordination Interventions    Referral from Primary Care Provider: No  Suggested Interventions and Community Resources  Diabetes Education: Completed  Fall Risk Prevention:  In Process  Disease Association: Completed (Comment: COPD)  Registered Dietician: Completed (Comment: 6/3/22-referral made)  Zone Management Tools: Completed (Comment: 6/8/22-reviewed)         Goals Addressed    None         Prior to Admission medications    Medication Sig Start Date End Date Taking? Authorizing Provider   Continuous Blood Gluc Sensor (DEXCOM G6 SENSOR) MISC 1 each by Does not apply route continuous 6/2/22 7/2/22  Magdelene K May, DO   Continuous Blood Gluc Transmit (DEXCOM G6 TRANSMITTER) MISC 1 each by Does not apply route continuous 6/2/22 8/31/22  Magdelene K May, DO   propranolol (INDERAL LA) 80 MG extended release capsule Take 1 capsule by mouth in the morning and at bedtime 6/2/22 7/2/22  Magdelene K May, DO   OneTouch Delica Lancets 47S MISC Check blood sugars 4 times daily for E11.9 5/31/22   delene K May, DO   Rimegepant Sulfate (NURTEC) 75 MG TBDP Take 75 mg by mouth every other day Take every other day to prevent headache  May take daily for acute headache relief, not to exceed 18 doses a month. 5/25/22 6/24/22  Magdelene K May, DO   SYMBICORT 160-4.5 MCG/ACT AERO Inhale 2 puffs into the lungs 2 times daily Use for asthma exacerbations. 1-2 puffs q4h.  Max 12 puffs/ day 5/25/22   Magdelene K May, DO   ONETOUCH ULTRA strip USE TO TEST BLOOD SUGAR FOUR TIMES DAILY 5/24/22   Magdelene K May, DO   levothyroxine (SYNTHROID) 300 MCG tablet Take 1 tablet by mouth Daily 5/20/22 8/18/22  Magdelene K May, DO   blood glucose monitor strips PLEASE GIVE TYPE THAT MATCHES PATIENTS GLUCOMETER PER INSURANCE 5/19/22   Magdelene K May, DO   albuterol sulfate  (90 Base) MCG/ACT inhaler INHALE 2 PUFFS BY MOUTH EVERY SIX HOURS AS NEEDED FOR WHEEZING 5/17/22   Magdelene K May, DO   atorvastatin (LIPITOR) 40 MG tablet Take 1 tablet by mouth daily 3/18/22 6/16/22  Magdelene K May, DO   busPIRone (BUSPAR) 15 MG tablet Take 15 mg by mouth 3 times daily    Historical Provider, MD   omeprazole (PRILOSEC) 40 MG delayed release capsule Take 1 capsule by mouth in the morning and at bedtime 3/11/22 3/6/23  [facility] May, DO   tiotropium (SPIRIVA HANDIHALER) 18 MCG inhalation capsule Inhale 1 capsule into the lungs daily 1/14/22   [facility] May, DO   lisinopril (PRINIVIL;ZESTRIL) 5 MG tablet Take 1 tablet by mouth daily 12/7/21   [facility] May, DO   ARIPiprazole (ABILIFY) 10 MG tablet Take 1 tablet by mouth daily 12/7/21   [facility] May, DO   Cholecalciferol 25 MCG (1000 UT) CHEW Take 2 tablets by mouth daily 11/23/21   [facility] May, DO   nystatin (MYCOSTATIN) 031161 UNIT/GM powder APPLY TOPICALLY 3 TIMES DAILY AS NEEDED TO MANAGE RASH AND MOISTURE 11/8/21   Historical Provider, MD   DULoxetine (CYMBALTA) 30 MG extended release capsule Take 1 capsule by mouth 2 times daily 11/18/21 3/11/22  [facility] May, DO   liothyronine (CYTOMEL) 25 MCG tablet Take 1 tablet by mouth 2 times daily for 120 doses 11/18/21 3/11/22  [facility] May, DO   furosemide (LASIX) 40 MG tablet Take 1 tablet by mouth daily 11/8/21   [facility] May, DO   cetirizine (ZYRTEC) 10 MG tablet Take 1 tablet by mouth daily 11/8/21   [facility] May, DO   amLODIPine (NORVASC) 5 MG tablet Take 1 tablet by mouth daily 11/8/21   [facility] May, DO   montelukast (SINGULAIR) 10 MG tablet Take 1 tablet by mouth nightly 11/8/21   [facility] May, DO   fluticasone (FLONASE ALLERGY RELIEF) 50 MCG/ACT nasal spray 2 sprays by Nasal route 2 times daily 11/8/21   [facility] May, DO   azelastine (OPTIVAR) 0.05 % ophthalmic solution Place 1 drop into both eyes 2 times daily 11/8/21   [facility] May, DO   azelastine (ASTELIN) 0.1 % nasal spray 1 spray by Nasal route 2 times daily Use in each nostril as directed 11/8/21   [facility] May, DO   oxybutynin (DITROPAN-XL) 5 MG extended release tablet Take 1 tablet by mouth daily 11/8/21   Wiley BARFIELD May, DO   rizatriptan (MAXALT) 10 MG tablet Take 1 tablet by mouth once as needed for Migraine May repeat in 2 hours if needed 11/8/21 3/11/22  Wiley Conte, DO   Continuous Blood Gluc  (DEXCOM G6 ) NGHIA 2 times daily 3/26/21   Historical Provider, MD   famotidine (PEPCID) 40 MG tablet Take 40 mg by mouth 2 times daily     Historical Provider, MD   ferrous sulfate (FE TABS 325) 325 (65 Fe) MG EC tablet Take 65 mg by mouth 3 times daily (with meals)     Historical Provider, MD   acetaminophen (TYLENOL) 500 MG tablet Take 500 mg by mouth every 6 hours as needed for Pain    Historical Provider, MD   calcium-vitamin D (OSCAL-500) 500-200 MG-UNIT per tablet Take 1 tablet by mouth daily     Historical Provider, MD   Nutritional Supplements (GLUCOSE MANAGEMENT) TABS Take by mouth    Historical Provider, MD   vitamin D (ERGOCALCIFEROL) 89768 UNITS CAPS capsule Take 1 capsule by mouth once a week 8/11/16   Josiah Antonio MD   gabapentin (NEURONTIN) 600 MG tablet Take 1 tablet by mouth daily 8/11/16   C Valentino Lords, MD   potassium chloride (KLOR-CON) 20 MEQ packet Take 20 mEq by mouth daily 8/11/16   C Valentino Lords, MD   calcium carbonate (CALCIUM 600) 600 MG TABS tablet Take 1 tablet by mouth daily 8/11/16   C Valentino Lords, MD   vitamin B-12 (CYANOCOBALAMIN) 1000 MCG tablet Take 1 tablet by mouth daily 8/11/16   C Valentino Lords, MD   Adhesive Tape (PAPER TAPE 1\"X10YD) TAPE Apply to affected areas.  4/25/16   Asia Woods MD   Multiple Vitamins-Minerals (THERAPEUTIC MULTIVITAMIN-MINERALS) tablet Take 1 tablet by mouth daily    Historical Provider, MD   Polyethylene Glycol 3350 (MIRALAX PO) Take by mouth 2 times daily     Historical Provider, MD   artificial tears (ARTIFICIAL TEARS) OINT as needed    Historical Provider, MD   glucose monitoring kit (FREESTYLE) monitoring kit 1 kit by Does not apply route daily as needed 6/18/15   Mariia Cyr MD   FREESTYLE LANCETS MISC 1 each by Does not apply route daily 6/18/15   Mariia Cyr MD   EPINEPHrine 0.1 MG/ML injection Inject 0.3 mg into the muscle as needed Historical Provider, MD   dextromethorphan-guaiFENesin [facility] DM)  MG per SR tablet Take 1 tablet by mouth every 12 hours as needed     Historical Provider, MD       Future Appointments   Date Time Provider Israel Cote   6/30/2022  1:15 PM Wiley BARFIELD MayDO [facility] AND [facility]   7/19/2022  1:30 PM Wiley BARFIELD May DO [facility] AND RES [facility]   7/27/2022  3:00 PM Kit Maradiaga MD AND [facility]

## 2022-06-21 NOTE — TELEPHONE ENCOUNTER
I am not sure that I can write a letter that air conditioner is medically necessary. Do we have a form letter for needing a ramp? Who is this letter being sent to and are there specific qualifications that I need to include in order for it to be approved?

## 2022-06-21 NOTE — TELEPHONE ENCOUNTER
I spoke to pt I informed her any letters or correspondence must be given a 72 hour notice to complete. We can't get letter done today and we could call her once Dr. Marguerite Cota reviews and agrees to complete      Pt advised the ramp letter is for her apartment complex . She stated she lives at [facility] and they are agreeable for pt to have a ramp just needs to get documentation it Is needed. Stated Dr. Conte can address it : To whom it may concern     Pt said the air conditioner isn't working at her apartment and they are going to apply for a portable one from [employer]. She advised her  has been in her apartment and advised that the window unit is working it's just the heat is making it work twice as hard and not cooling the apartment. She needs a letter stating that she needs a air conditioner in order to get a free one      Please call pt when letters are finished.  [phone] (home) [phone] (work)

## 2022-06-28 NOTE — TELEPHONE ENCOUNTER
Will put on back counter for Dr. Conte to sign. Pt will need to be contacted once letters are signed.

## 2022-06-29 ENCOUNTER — CARE COORDINATION (OUTPATIENT)
Dept: CARE COORDINATION | Age: 47
End: 2022-06-29

## 2022-07-01 NOTE — CARE COORDINATION
Jaycee Brewster  July 1, 2022    Initial Referral Reason: Desired Weight Loss     Patient Care Team:  Anthony Conte DO as PCP - General (Family Medicine)  JUAN Priest as PCP - [facility]  Camryn Barber, RN as University [facility], [place] as   Kayla Alcantara, [address] [place] as Dietitian (Dietitian Registered)    Past Medical History:    Current Outpatient Medications   Medication Sig Dispense Refill    Continuous Blood Gluc Sensor (DEXCOM G6 SENSOR) MISC 1 each by Does not apply route continuous 3 each 2    Continuous Blood Gluc Transmit (DEXCOM G6 TRANSMITTER) MISC 1 each by Does not apply route continuous 1 each 3    propranolol (INDERAL LA) 80 MG extended release capsule Take 1 capsule by mouth in the morning and at bedtime 60 capsule 2    OneTouch Delica Lancets 98N MISC Check blood sugars 4 times daily for E11.9 100 each 11    SYMBICORT 160-4.5 MCG/ACT AERO Inhale 2 puffs into the lungs 2 times daily Use for asthma exacerbations. 1-2 puffs q4h.  Max 12 puffs/ day 10.2 g 3    ONETOUCH ULTRA strip USE TO TEST BLOOD SUGAR FOUR TIMES DAILY 100 each 5    levothyroxine (SYNTHROID) 300 MCG tablet Take 1 tablet by mouth Daily 90 tablet 3    blood glucose monitor strips PLEASE GIVE TYPE THAT MATCHES PATIENTS GLUCOMETER PER INSURANCE 200 strip 0    albuterol sulfate  (90 Base) MCG/ACT inhaler INHALE 2 PUFFS BY MOUTH EVERY SIX HOURS AS NEEDED FOR WHEEZING 18 g 0    atorvastatin (LIPITOR) 40 MG tablet Take 1 tablet by mouth daily 90 tablet 3    busPIRone (BUSPAR) 15 MG tablet Take 15 mg by mouth 3 times daily      omeprazole (PRILOSEC) 40 MG delayed release capsule Take 1 capsule by mouth in the morning and at bedtime 180 capsule 3    tiotropium (SPIRIVA HANDIHALER) 18 MCG inhalation capsule Inhale 1 capsule into the lungs daily 90 capsule 1    lisinopril (PRINIVIL;ZESTRIL) 5 MG tablet Take 1 tablet by mouth daily 90 tablet 1    ARIPiprazole (ABILIFY) 10 MG tablet Take 1 tablet by mouth daily 90 tablet 3    Cholecalciferol 25 MCG (1000 UT) CHEW Take 2 tablets by mouth daily 60 tablet 3    nystatin (MYCOSTATIN) 142521 UNIT/GM powder APPLY TOPICALLY 3 TIMES DAILY AS NEEDED TO MANAGE RASH AND MOISTURE      DULoxetine (CYMBALTA) 30 MG extended release capsule Take 1 capsule by mouth 2 times daily 60 capsule 2    liothyronine (CYTOMEL) 25 MCG tablet Take 1 tablet by mouth 2 times daily for 120 doses 60 tablet 1    furosemide (LASIX) 40 MG tablet Take 1 tablet by mouth daily 60 tablet 0    cetirizine (ZYRTEC) 10 MG tablet Take 1 tablet by mouth daily 30 tablet 1    amLODIPine (NORVASC) 5 MG tablet Take 1 tablet by mouth daily 30 tablet 0    montelukast (SINGULAIR) 10 MG tablet Take 1 tablet by mouth nightly 30 tablet 1    fluticasone (FLONASE ALLERGY RELIEF) 50 MCG/ACT nasal spray 2 sprays by Nasal route 2 times daily 16 g 1    azelastine (OPTIVAR) 0.05 % ophthalmic solution Place 1 drop into both eyes 2 times daily 1 each 1    azelastine (ASTELIN) 0.1 % nasal spray 1 spray by Nasal route 2 times daily Use in each nostril as directed 60 mL 1    oxybutynin (DITROPAN-XL) 5 MG extended release tablet Take 1 tablet by mouth daily 30 tablet 1    rizatriptan (MAXALT) 10 MG tablet Take 1 tablet by mouth once as needed for Migraine May repeat in 2 hours if needed 27 tablet 1    Continuous Blood Gluc  (DEXCOM G6 ) NGHIA 2 times daily      famotidine (PEPCID) 40 MG tablet Take 40 mg by mouth 2 times daily       ferrous sulfate (FE TABS 325) 325 (65 Fe) MG EC tablet Take 65 mg by mouth 3 times daily (with meals)       acetaminophen (TYLENOL) 500 MG tablet Take 500 mg by mouth every 6 hours as needed for Pain      calcium-vitamin D (OSCAL-500) 500-200 MG-UNIT per tablet Take 1 tablet by mouth daily       Nutritional Supplements (GLUCOSE MANAGEMENT) TABS Take by mouth      vitamin D (ERGOCALCIFEROL) 87604 UNITS CAPS capsule Take 1 capsule by mouth once a week 30 capsule 0    gabapentin (NEURONTIN) 600 MG tablet Take 1 tablet by mouth daily 90 tablet 0    potassium chloride (KLOR-CON) 20 MEQ packet Take 20 mEq by mouth daily 30 each 0    calcium carbonate (CALCIUM 600) 600 MG TABS tablet Take 1 tablet by mouth daily 30 tablet 0    vitamin B-12 (CYANOCOBALAMIN) 1000 MCG tablet Take 1 tablet by mouth daily 30 tablet 0    Adhesive Tape (PAPER TAPE 1\"X10YD) TAPE Apply to affected areas. 2 each 5    Multiple Vitamins-Minerals (THERAPEUTIC MULTIVITAMIN-MINERALS) tablet Take 1 tablet by mouth daily      Polyethylene Glycol 3350 (MIRALAX PO) Take by mouth 2 times daily       artificial tears (ARTIFICIAL TEARS) OINT as needed      glucose monitoring kit (FREESTYLE) monitoring kit 1 kit by Does not apply route daily as needed 1 kit 0    FREESTYLE LANCETS MISC 1 each by Does not apply route daily 100 each 0    EPINEPHrine 0.1 MG/ML injection Inject 0.3 mg into the muscle as needed       dextromethorphan-guaiFENesin (MUCINEX DM)  MG per SR tablet Take 1 tablet by mouth every 12 hours as needed        No current facility-administered medications for this visit.        Biochemical Data, Medical Tests and Procedures:    Lab Results   Component Value Date    LABA1C 5.4 11/08/2021     Lab Results   Component Value Date    .3 11/08/2021       Lab Results   Component Value Date    CHOL 235 (H) 03/11/2022    CHOL 252 (H) 11/08/2021     Lab Results   Component Value Date    TRIG 145 03/11/2022    TRIG 228 (H) 11/08/2021     Lab Results   Component Value Date    HDL 38 (L) 03/11/2022    HDL 43 11/08/2021     Lab Results   Component Value Date    LDLCALC 168 (H) 03/11/2022    LDLCALC 163 (H) 11/08/2021     Lab Results   Component Value Date    LABVLDL 29 03/11/2022    LABVLDL 46 11/08/2021     No results found for: CHOLHDLRATIO    Lab Results   Component Value Date    WBC 9.7 11/08/2021    HGB 14.3 11/08/2021    HCT 43.4 11/08/2021    MCV 81.3 11/08/2021     11/08/2021       Lab Results   Component Value Date    CREATININE 1.0 11/08/2021    BUN 11 11/08/2021     11/08/2021    K 4.1 11/08/2021     11/08/2021    CO2 19 (L) 11/08/2021         Anthropometric Measurements:    Height: 60\"  Weight: 332 lb (150.6 kg)  BMI: 64.84 kg/m2 (obese, class III)  IBW: 100 lb +-10%  %IBW: 332 %    Physical Exam Findings:  Deferred    Nutrition Interview: RD called pt, explained reason for call and role in care. Pt states her main nutrition concern is desired weight loss. Patient states that she had bariatric surgery in 2018 but has not followed up since moving to [place] due to cost. Patient states that prior to surgery in 2018, she weighed 490 lbs. States that she now weighs 332 lbs. Patient states that she has trouble affording healthy, fresh foods. Notes that she recently re-qualified for SNAP. Patient states that her budget for food is $150-200 per month and utilizes food pantries in the area. Patient states that she utilizes the air fryer often and cooks with eggs often. Patient states that she eats one to two small meals per day. Patient does not dine out due to cost. Patient does not exercise, but notes that she has [employer]. Patient states that she uses cane for mobility around the house. RD explained the importance of setting healthy, realistic goals. Discussed you are more likely to succeed when you set realistic goals for yourself- make small changes step by step and you will see a big impact with time. Explained the importance of eating at least 3 meals/day and making these meals balanced with a variety of food. Discussed the components of a balanced meal using the MyPlate [id number]-0/4 plate fruits and/or vegetables, 1/4 plate protein and 1/4 plate starchy carbohydrates with 8 oz glass of low fat milk if desired. Provided an example of a balanced meal: grilled chicken with broccoli and a serving of brown rice.  Discussed watching portion sizes to help manage calorie intake. RD encouraged patient to focus on eating 3 balanced meals a day instead of 2 meals/day. Explained the importance of meeting estimated nutrition needs. Explained if we are not eating enough throughout the day, our body will go into starvation mode and hold onto fat cells. RD reiterated importance of choosing fruits, vegetables, whole grains, lean protein sources and low fat dairy products. RD discussed the importance of incorporating physical activity. Explained the recommended amount is 150 minutes/week. RD acknowledged patient's readiness to change and encouraged pt to focus on making small changes one at a time. RD offered to mail educational handouts to pt to reinforce concepts discussed during phone conversation, pt was agreeable. RD verified patient's home address. 24-Hour Diet Recall  Breakfast  Consumed: Biscuit with egg and sausage or omelet or hot cereal (grits, oatmeal)    Lunch  Consumed: Yogurt, cottage cheese, fruit     Dinner  Consumed: Stewed beans or grilled cheese or hamburger and [place] fries or crust-less pizza    Snacks  Consumed: Sugar-free pudding, sugar-free Jell-O     Beverages: Water, iced tea with Splenda    Frequency of meals away from home: None     Exercise: None     Nutrition Diagnosis:  #1 Problem Food and nutrition-related knowledge deficit       Etiology related to lack of prior nutrition education       Signs/Symptoms as evidenced by poor food choices per food recall, obesity    Nutrition Intervention:     Estimated Needs  regular diet providing 1900 kcals to promote 2 lb weight loss/week ([address] Jeor based on CBW).     Estimated daily CHO Needs: 263 g (based on 45-65% total calorie intake)  Estimated daily Protein Needs:  g (based on 2-2.5 g/kg based on IBW)  Estimated daily Fluid Needs: 1 mL/kcal    #1 Nutrition Information: Provided patient with MyPlate, Affordable Meal Guide, Eating Healthy on a Budget, Eat Right When Money's Tight, Healthy Shopping at the [employer] on a Budget, All Foods Can Fit, Increasing Physical Activity, Label Reading, Grocery Shopping Tips, Weight Loss Tips handouts for reinforcement of concepts discussed during nutrition interview. #2 Nutrition Counseling: Used open-ended questions to assess patients perceived susceptibility and severity of disease state. Discussed potential impact of health threat on patient's lifestyle. Used open-ended questions to assess patient's perception regarding benefits of and barriers to implementation of nutrition therapy. #3 Nutrition Education: Clearly defined the benefits of nutrition therapy. Summarized and affirmed positive aspects of current nutrition patterns. Provided education regarding value of adherence to regular diet. Discussed ways to establish applying concepts of alternatives and choices regarding implementation of diet. Explored ideas for small, incremental goals to initiate behavior change. Monitoring and Evaluation:    Indicator/Goal Criteria   #1 Follow MyPlate guidelines  #1 I will build balanced meal using the MyPlate reference: 1/2 plate fruits and/or vegetables, 1/4 plate protein, and 1/4 plate starchy carbohydrates with 8 oz glass of low fat milk if desired     #2 Eat consistently  #2 I will eat three meals per day or four to six small meals per day    #3 Limit portion sizes  #3 I will adhere to suggested portion sizes      Follow Up: RD will call pt in three weeks to follow up and make sure pt received handouts in mail. RD will answer any nutrition related questions at this time.      Ivis Rowe, [address] [place] [address],    [phone]

## 2022-07-05 ENCOUNTER — CARE COORDINATION (OUTPATIENT)
Dept: CARE COORDINATION | Age: 47
End: 2022-07-05

## 2022-07-05 NOTE — CARE COORDINATION
Ambulatory Care Coordination Note  7/5/2022  CM Risk Score: 7  Charlson 10 Year Mortality Risk Score: 10%     ACC: Pat Barber RN    Summary Note: Reports is doing ok and did get an additional air conditioner until they can get theirs fixed in apartment. Reports fs today was 100 and knows what to do if fs goes low. Reviewed diabetic, low fat, low sodium diets. Did get packet that dietician mailed out today. No changes in medications. PLAN  1) fu appt  2) review fs  3) review sob  COPD Assessment    Does the patient understand envrionmental exposure?: Yes  Is the patient able to verbalize Rescue vs. Long Acting medications?: Yes  Does the patient have a nebulizer?: No  Does the patient use a space with inhaled medications?: No     No patient-reported symptoms         Symptoms:            Diabetes Assessment    Meal Planning: Avoidance of concentrated sweets, Plate Method   How often do you test your blood sugar?: Meals, Bedtime   Do you have barriers with adherence to non-pharmacologic self-management interventions? (Nutrition/Exercise/Self-Monitoring): Yes   Have you ever had to go to the ED for symptoms of low blood sugar?: No       No patient-reported symptoms   Do you have hyperglycemia symptoms?: No   Do you have hypoglycemia symptoms?: No   Last Blood Sugar Value: 100   Blood Sugar Trends: No Change       and   COPD Assessment    Does the patient understand envrionmental exposure?: Yes  Is the patient able to verbalize Rescue vs. Long Acting medications?: Yes  Does the patient have a nebulizer?: No  Does the patient use a space with inhaled medications?: No     No patient-reported symptoms         Symptoms:              Lab Results     None          Care Coordination Interventions    Referral from Primary Care Provider: No  Suggested Interventions and Community Resources  Diabetes Education: Completed  Fall Risk Prevention:  In Process  Disease Association: Completed (Comment: COPD)  Registered Dietician: Completed (Comment: 6/3/22-referral made)  [employer] Management Tools: Completed (Comment: 6/8/22-reviewed)         Goals Addressed                    This Visit's Progress      Patient Stated (pt-stated)   Improving      Will try to eat more vegetables and fruits    Barriers: financial and lack of support  Plan for overcoming my barriers: will work with ACM/RD prn  Confidence: 8/10  Anticipated Goal Completion Date: 11/8/22            Prior to Admission medications    Medication Sig Start Date End Date Taking? Authorizing Provider   Continuous Blood Gluc Transmit (DEXCOM G6 TRANSMITTER) MISC 1 each by Does not apply route continuous 6/2/22 8/31/22  Magdelene K May, DO   propranolol (INDERAL LA) 80 MG extended release capsule Take 1 capsule by mouth in the morning and at bedtime 6/2/22 7/2/22  Magdelene K May, DO   OneTouch Delica Lancets 34M MISC Check blood sugars 4 times daily for E11.9 5/31/22   Magdelene K May, DO   SYMBICORT 160-4.5 MCG/ACT AERO Inhale 2 puffs into the lungs 2 times daily Use for asthma exacerbations. 1-2 puffs q4h.  Max 12 puffs/ day 5/25/22   Magdelene K May, DO   ONETOUCH ULTRA strip USE TO TEST BLOOD SUGAR FOUR TIMES DAILY 5/24/22   Magdelene K May, DO   levothyroxine (SYNTHROID) 300 MCG tablet Take 1 tablet by mouth Daily 5/20/22 8/18/22  Magdelene K May, DO   blood glucose monitor strips PLEASE GIVE TYPE THAT MATCHES PATIENTS GLUCOMETER PER INSURANCE 5/19/22   Magdelene K May, DO   albuterol sulfate  (90 Base) MCG/ACT inhaler INHALE 2 PUFFS BY MOUTH EVERY SIX HOURS AS NEEDED FOR WHEEZING 5/17/22   Magdelene K May, DO   atorvastatin (LIPITOR) 40 MG tablet Take 1 tablet by mouth daily 3/18/22 6/16/22  Magdelene K May, DO   busPIRone (BUSPAR) 15 MG tablet Take 15 mg by mouth 3 times daily    Historical Provider, MD   omeprazole (PRILOSEC) 40 MG delayed release capsule Take 1 capsule by mouth in the morning and at bedtime 3/11/22 3/6/23  Jamesene K May, DO   tiotropium [facility] HANDIHALER) 18 MCG inhalation capsule Inhale 1 capsule into the lungs daily 1/14/22   [facility] May, DO   lisinopril (PRINIVIL;ZESTRIL) 5 MG tablet Take 1 tablet by mouth daily 12/7/21   [facility] May, DO   ARIPiprazole (ABILIFY) 10 MG tablet Take 1 tablet by mouth daily 12/7/21   [facility] May, DO   Cholecalciferol 25 MCG (1000 UT) CHEW Take 2 tablets by mouth daily 11/23/21   [facility] May, DO   nystatin (MYCOSTATIN) 323892 UNIT/GM powder APPLY TOPICALLY 3 TIMES DAILY AS NEEDED TO MANAGE RASH AND MOISTURE 11/8/21   Historical Provider, MD   DULoxetine (CYMBALTA) 30 MG extended release capsule Take 1 capsule by mouth 2 times daily 11/18/21 3/11/22  [facility] May, DO   liothyronine (CYTOMEL) 25 MCG tablet Take 1 tablet by mouth 2 times daily for 120 doses 11/18/21 3/11/22  [facility] May, DO   furosemide (LASIX) 40 MG tablet Take 1 tablet by mouth daily 11/8/21   [facility] May, DO   cetirizine (ZYRTEC) 10 MG tablet Take 1 tablet by mouth daily 11/8/21   [facility] May, DO   amLODIPine (NORVASC) 5 MG tablet Take 1 tablet by mouth daily 11/8/21   [facility] May, DO   montelukast (SINGULAIR) 10 MG tablet Take 1 tablet by mouth nightly 11/8/21   [facility] May, DO   fluticasone (FLONASE ALLERGY RELIEF) 50 MCG/ACT nasal spray 2 sprays by Nasal route 2 times daily 11/8/21   [facility] May, DO   azelastine (OPTIVAR) 0.05 % ophthalmic solution Place 1 drop into both eyes 2 times daily 11/8/21   [facility] May, DO   azelastine (ASTELIN) 0.1 % nasal spray 1 spray by Nasal route 2 times daily Use in each nostril as directed 11/8/21   [facility] May, DO   oxybutynin (DITROPAN-XL) 5 MG extended release tablet Take 1 tablet by mouth daily 11/8/21   [facility] May, DO   rizatriptan (MAXALT) 10 MG tablet Take 1 tablet by mouth once as needed for Migraine May repeat in 2 hours if needed 11/8/21 3/11/22  Wiley BARFIELD May, DO   Continuous Blood Gluc  (DEXCOM G6 ) NGHIA 2 times daily 3/26/21 Historical Provider, MD   famotidine (PEPCID) 40 MG tablet Take 40 mg by mouth 2 times daily     Historical Provider, MD   ferrous sulfate (FE TABS 325) 325 (65 Fe) MG EC tablet Take 65 mg by mouth 3 times daily (with meals)     Historical Provider, MD   acetaminophen (TYLENOL) 500 MG tablet Take 500 mg by mouth every 6 hours as needed for Pain    Historical Provider, MD   calcium-vitamin D (OSCAL-500) 500-200 MG-UNIT per tablet Take 1 tablet by mouth daily     Historical Provider, MD   Nutritional Supplements (GLUCOSE MANAGEMENT) TABS Take by mouth    Historical Provider, MD   vitamin D (ERGOCALCIFEROL) 04445 UNITS CAPS capsule Take 1 capsule by mouth once a week 8/11/16   Charlene Lopes MD   gabapentin (NEURONTIN) 600 MG tablet Take 1 tablet by mouth daily 8/11/16   SHLOMO Donato MD   potassium chloride (KLOR-CON) 20 MEQ packet Take 20 mEq by mouth daily 8/11/16   SHLOMO Donato MD   calcium carbonate (CALCIUM 600) 600 MG TABS tablet Take 1 tablet by mouth daily 8/11/16   SHLOMO Donato MD   vitamin B-12 (CYANOCOBALAMIN) 1000 MCG tablet Take 1 tablet by mouth daily 8/11/16   C Yasmani Donato MD   Adhesive Tape (PAPER TAPE 1\"X10YD) TAPE Apply to affected areas.  4/25/16   Chico Grady MD   Multiple Vitamins-Minerals (THERAPEUTIC MULTIVITAMIN-MINERALS) tablet Take 1 tablet by mouth daily    Historical Provider, MD   Polyethylene Glycol 3350 (MIRALAX PO) Take by mouth 2 times daily     Historical Provider, MD   artificial tears (ARTIFICIAL TEARS) OINT as needed    Historical Provider, MD   glucose monitoring kit (FREESTYLE) monitoring kit 1 kit by Does not apply route daily as needed 6/18/15   Parke Mohs, MD   FREESTYLE LANCETS MISC 1 each by Does not apply route daily 6/18/15   Parke Mohs, MD   EPINEPHrine 0.1 MG/ML injection Inject 0.3 mg into the muscle as needed     Historical Provider, MD   dextromethorphan-guaiFENesin (MUCINEX DM)  MG per SR tablet Take 1 tablet by mouth every 12 hours as needed Historical Provider, MD       Future Appointments   Date Time Provider Israel Kera   7/7/2022  1:40 PM [facility] RM Mandel Rickylla [facility]   7/7/2022  2:20 PM ULTRASOUND BREAST [facility]   7/15/2022 10:00 AM Wiley Conte DO [facility] AND [facility]   7/22/2022  1:30 PM Raiza Rome MD [facility] AND [facility]   7/27/2022  3:00 PM Audra Dance, MD AND [facility]

## 2022-07-06 ENCOUNTER — TELEPHONE (OUTPATIENT)
Dept: PRIMARY CARE CLINIC | Age: 47
End: 2022-07-06

## 2022-07-07 ENCOUNTER — CARE COORDINATION (OUTPATIENT)
Dept: CARE COORDINATION | Age: 47
End: 2022-07-07

## 2022-07-07 NOTE — CARE COORDINATION
attempted to contact patient for an update however there was no answer at the time of the call. A message was left for patient providing contact information. SHIRAZ Barber will be advised of the outreach attempt.

## 2022-07-11 ENCOUNTER — CARE COORDINATION (OUTPATIENT)
Dept: CARE COORDINATION | Age: 47
End: 2022-07-11

## 2022-07-11 NOTE — CARE COORDINATION
received a phone call from patient . We discussed a Medicaid re certification letter that she had received.  spoke to patient about the [facility] Medicaid and [employer]. Patient has spoken to an waiver program  by phone but was not sure what program she was in contact with.  sent patient an e-mail that contained a [facility] participant handbook and an [place]. In addition , patient was sent a handout from [place] on the [employer]. Patient was given the opportunity to ask questions during the 75 minute phone conversation. SHIRAZ Barber will be advised of the outreach.

## 2022-07-18 ENCOUNTER — CARE COORDINATION (OUTPATIENT)
Dept: CARE COORDINATION | Age: 47
End: 2022-07-18

## 2022-07-22 ENCOUNTER — OFFICE VISIT (OUTPATIENT)
Dept: PRIMARY CARE CLINIC | Age: 47
End: 2022-07-22
Payer: MEDICARE

## 2022-07-22 VITALS
WEIGHT: 293 LBS | SYSTOLIC BLOOD PRESSURE: 132 MMHG | DIASTOLIC BLOOD PRESSURE: 74 MMHG | HEIGHT: 60 IN | BODY MASS INDEX: 57.52 KG/M2 | HEART RATE: 78 BPM

## 2022-07-22 DIAGNOSIS — Z12.4 PAP SMEAR FOR CERVICAL CANCER SCREENING: ICD-10-CM

## 2022-07-22 DIAGNOSIS — Z01.419 ENCOUNTER FOR WELL WOMAN EXAM WITH ROUTINE GYNECOLOGICAL EXAM: Primary | ICD-10-CM

## 2022-07-22 DIAGNOSIS — B37.9 YEAST INFECTION: ICD-10-CM

## 2022-07-22 DIAGNOSIS — N89.8 VAGINAL DISCHARGE: ICD-10-CM

## 2022-07-22 RX ORDER — RIMEGEPANT SULFATE 75 MG/75MG
TABLET, ORALLY DISINTEGRATING ORAL
COMMUNITY
Start: 2022-06-30 | End: 2022-08-11 | Stop reason: SDUPTHER

## 2022-07-22 ASSESSMENT — PATIENT HEALTH QUESTIONNAIRE - PHQ9
5. POOR APPETITE OR OVEREATING: 3
9. THOUGHTS THAT YOU WOULD BE BETTER OFF DEAD, OR OF HURTING YOURSELF: 1
3. TROUBLE FALLING OR STAYING ASLEEP: 3
6. FEELING BAD ABOUT YOURSELF - OR THAT YOU ARE A FAILURE OR HAVE LET YOURSELF OR YOUR FAMILY DOWN: 2
SUM OF ALL RESPONSES TO PHQ9 QUESTIONS 1 & 2: 4
10. IF YOU CHECKED OFF ANY PROBLEMS, HOW DIFFICULT HAVE THESE PROBLEMS MADE IT FOR YOU TO DO YOUR WORK, TAKE CARE OF THINGS AT HOME, OR GET ALONG WITH OTHER PEOPLE: 1
7. TROUBLE CONCENTRATING ON THINGS, SUCH AS READING THE NEWSPAPER OR WATCHING TELEVISION: 3
SUM OF ALL RESPONSES TO PHQ QUESTIONS 1-9: 21
SUM OF ALL RESPONSES TO PHQ QUESTIONS 1-9: 20
SUM OF ALL RESPONSES TO PHQ QUESTIONS 1-9: 21
2. FEELING DOWN, DEPRESSED OR HOPELESS: 2
8. MOVING OR SPEAKING SO SLOWLY THAT OTHER PEOPLE COULD HAVE NOTICED. OR THE OPPOSITE, BEING SO FIGETY OR RESTLESS THAT YOU HAVE BEEN MOVING AROUND A LOT MORE THAN USUAL: 2
1. LITTLE INTEREST OR PLEASURE IN DOING THINGS: 2
4. FEELING TIRED OR HAVING LITTLE ENERGY: 3
SUM OF ALL RESPONSES TO PHQ QUESTIONS 1-9: 21

## 2022-07-22 ASSESSMENT — COLUMBIA-SUICIDE SEVERITY RATING SCALE - C-SSRS
6. HAVE YOU EVER DONE ANYTHING, STARTED TO DO ANYTHING, OR PREPARED TO DO ANYTHING TO END YOUR LIFE?: NO
1. WITHIN THE PAST MONTH, HAVE YOU WISHED YOU WERE DEAD OR WISHED YOU COULD GO TO SLEEP AND NOT WAKE UP?: NO
2. HAVE YOU ACTUALLY HAD ANY THOUGHTS OF KILLING YOURSELF?: NO

## 2022-07-22 ASSESSMENT — ANXIETY QUESTIONNAIRES
5. BEING SO RESTLESS THAT IT IS HARD TO SIT STILL: 2
IF YOU CHECKED OFF ANY PROBLEMS ON THIS QUESTIONNAIRE, HOW DIFFICULT HAVE THESE PROBLEMS MADE IT FOR YOU TO DO YOUR WORK, TAKE CARE OF THINGS AT HOME, OR GET ALONG WITH OTHER PEOPLE: VERY DIFFICULT
1. FEELING NERVOUS, ANXIOUS, OR ON EDGE: 3
6. BECOMING EASILY ANNOYED OR IRRITABLE: 3
GAD7 TOTAL SCORE: 19
2. NOT BEING ABLE TO STOP OR CONTROL WORRYING: 3
4. TROUBLE RELAXING: 3
3. WORRYING TOO MUCH ABOUT DIFFERENT THINGS: 3
7. FEELING AFRAID AS IF SOMETHING AWFUL MIGHT HAPPEN: 2

## 2022-07-22 NOTE — PROGRESS NOTES
[facility],  Keisha Johnson, [address] [facility] [place] [address]  Phone: [phone]    Annual GYN Visit      Minerva Abel  YOB: 1975    Date of Service:  7/22/2022    Chief Complaint:   Minerva Abel is a 42 y.o. female who presents for routine annual gynecologic visit. HPI:  Patient is postmenopausal- Patient's last menstrual period was 05/03/2016. Courtney Varela She complains of vaginal discharge- brown and smelly for the last month. Menopausal/perimenopausal symptoms: hot flashes. Hormone therapy side effects: not applicable. Allergies   Allergen Reactions    Bee Venom     Black Pepper-Turmeric      Allergic to all peppers    Dust Mite Extract      Other reaction(s): Unknown    Ibuprofen     Nsaids      Pt only has one kidney    Other      Other reaction(s): Unknown     Outpatient Medications Marked as Taking for the 7/22/22 encounter (Office Visit) with Everett Jurado MD   Medication Sig Dispense Refill    fluconazole (DIFLUCAN) 150 MG tablet Take 1 tablet by mouth once for 1 dose 1 tablet 0    NURTEC 75 MG TBDP DISSOLVE 1 TABLET IN MOUTH EVERY OTHER DAY TO PREVENT HEADACHES. MAY TAKE DAILY FOR ACUTE HEADACHE RELIEF. DO NOT EXCEED 18 DOSES A MONTH      Continuous Blood Gluc Transmit (DEXCOM G6 TRANSMITTER) MISC 1 each by Does not apply route continuous 1 each 3    OneTouch Delica Lancets 19A MISC Check blood sugars 4 times daily for E11.9 100 each 11    SYMBICORT 160-4.5 MCG/ACT AERO Inhale 2 puffs into the lungs 2 times daily Use for asthma exacerbations. 1-2 puffs q4h.  Max 12 puffs/ day 10.2 g 3    ONETOUCH ULTRA strip USE TO TEST BLOOD SUGAR FOUR TIMES DAILY 100 each 5    levothyroxine (SYNTHROID) 300 MCG tablet Take 1 tablet by mouth Daily 90 tablet 3    blood glucose monitor strips PLEASE GIVE TYPE THAT MATCHES PATIENTS GLUCOMETER PER INSURANCE 200 strip 0    albuterol sulfate  (90 Base) MCG/ACT inhaler INHALE 2 PUFFS BY MOUTH EVERY SIX HOURS AS NEEDED FOR WHEEZING 18 g 0    busPIRone (BUSPAR) 15 MG tablet Take 15 mg by mouth 3 times daily      omeprazole (PRILOSEC) 40 MG delayed release capsule Take 1 capsule by mouth in the morning and at bedtime 180 capsule 3    tiotropium (SPIRIVA HANDIHALER) 18 MCG inhalation capsule Inhale 1 capsule into the lungs daily 90 capsule 1    lisinopril (PRINIVIL;ZESTRIL) 5 MG tablet Take 1 tablet by mouth daily 90 tablet 1    ARIPiprazole (ABILIFY) 10 MG tablet Take 1 tablet by mouth daily 90 tablet 3    Cholecalciferol 25 MCG (1000 UT) CHEW Take 2 tablets by mouth daily 60 tablet 3    nystatin (MYCOSTATIN) 660817 UNIT/GM powder APPLY TOPICALLY 3 TIMES DAILY AS NEEDED TO MANAGE RASH AND MOISTURE      cetirizine (ZYRTEC) 10 MG tablet Take 1 tablet by mouth daily 30 tablet 1    amLODIPine (NORVASC) 5 MG tablet Take 1 tablet by mouth daily 30 tablet 0    montelukast (SINGULAIR) 10 MG tablet Take 1 tablet by mouth nightly 30 tablet 1    fluticasone (FLONASE ALLERGY RELIEF) 50 MCG/ACT nasal spray 2 sprays by Nasal route 2 times daily 16 g 1    azelastine (OPTIVAR) 0.05 % ophthalmic solution Place 1 drop into both eyes 2 times daily 1 each 1    azelastine (ASTELIN) 0.1 % nasal spray 1 spray by Nasal route 2 times daily Use in each nostril as directed 60 mL 1    oxybutynin (DITROPAN-XL) 5 MG extended release tablet Take 1 tablet by mouth daily 30 tablet 1    Continuous Blood Gluc  (DEXCOM G6 ) NGHIA 2 times daily      acetaminophen (TYLENOL) 500 MG tablet Take 500 mg by mouth every 6 hours as needed for Pain      Nutritional Supplements (GLUCOSE MANAGEMENT) TABS Take by mouth      calcium carbonate (CALCIUM 600) 600 MG TABS tablet Take 1 tablet by mouth daily 30 tablet 0    Adhesive Tape (PAPER TAPE 1\"X10YD) TAPE Apply to affected areas.  2 each 5    Multiple Vitamins-Minerals (THERAPEUTIC MULTIVITAMIN-MINERALS) tablet Take 1 tablet by mouth daily      Polyethylene Glycol 3350 (MIRALAX PO) Take by mouth 2 times daily       artificial tears (ARTIFICIAL TEARS) OINT as needed      glucose monitoring kit (FREESTYLE) monitoring kit 1 kit by Does not apply route daily as needed 1 kit 0    FREESTYLE LANCETS MISC 1 each by Does not apply route daily 100 each 0    EPINEPHrine 0.1 MG/ML injection Inject 0.3 mg into the muscle as needed       dextromethorphan-guaiFENesin (MUCINEX DM)  MG per SR tablet Take 1 tablet by mouth every 12 hours as needed          Preventive Care and Risk Factor Assessment  Health Maintenance   Topic Date Due    Diabetic retinal exam  Never done    Hepatitis B vaccine (1 of 3 - Risk 3-dose series) Never done    COVID-19 Vaccine (1) 01/04/2024 (Originally 8/23/1982)    Flu vaccine (1) 09/01/2022    Diabetic foot exam  11/08/2022    A1C test (Diabetic or Prediabetic)  11/08/2022    Pneumococcal 0-64 years Vaccine (3 - PCV) 11/08/2022    Diabetic microalbuminuria test  03/11/2023    Lipids  03/11/2023    Depression Monitoring  07/22/2023    Cervical cancer screen  08/17/2023    DTaP/Tdap/Td vaccine (3 - Td or Tdap) 11/08/2031    Hepatitis C screen  Completed    HIV screen  Completed    Hepatitis A vaccine  Aged Out    Hib vaccine  Aged Out    Meningococcal (ACWY) vaccine  Aged Out      Hx abnormal PAP: yes - had colposcopy that was clear  Sexual activity: has sex with males. Hx of STD: no  Hx of abnormal mammogram: yes -asymmetry in right breast noted in 2018, unchanged  Self-breast exams: no  FH of breast cancer: yes - both sides  FH of GYN cancer: yes - ovarian cancer both sides   Previous DEXA scan: no  - Note patient did have onset of menses at age 8 and early menopause ~ age 42 yo. Exercise: no regular exercise   She has silver sneakers.  Currently a high fall risk      Social History     Tobacco Use   Smoking Status Passive Smoke Exposure - Never Smoker    Passive exposure: Yes   Smokeless Tobacco Never      Social History     Substance and Sexual Activity Alcohol Use No    Comment: Less than once a month        Immunization History   Administered Date(s) Administered    Influenza, MDCK Quadv, IM, PF (Flucelvax 2 yrs and older) 12/30/2019, 10/21/2020    Influenza, MDCK Quadv, with preserv IM (Flucelvax 2 yrs and older) 11/08/2021    Influenza, MDCK, Preservative free 08/21/2017    Pneumococcal Polysaccharide (Ltrcdianc47) 12/04/2017, 11/08/2021    Tdap (Boostrix, Adacel) 03/31/2016, 11/08/2021       Review of Systems:  A comprehensive review of systems was negative except for what was noted in the HPI. Wt Readings from Last 3 Encounters:   07/22/22 (!) 329 lb (149.2 kg)   06/20/22 (!) 332 lb (150.6 kg)   06/02/22 (!) 331 lb 12.8 oz (150.5 kg)     BP Readings from Last 3 Encounters:   07/22/22 132/74   06/02/22 124/70   05/19/22 116/76       Physical Exam:  Vitals:    07/22/22 1325   BP: 132/74   Pulse: 78   Weight: (!) 329 lb (149.2 kg)   Height: 5' (1.524 m)      Body mass index is 64.25 kg/m². Constitutional: She is oriented to person, place, and time. She appears well-developed and well-nourished. No distress. Neck: No mass and no thyromegaly present. Cardiovascular: Normal rate, regular rhythm and normal heart sounds. No murmur heard. Pulmonary/Chest: Effort and breath sounds normal.   Abdominal: Soft, non-tender. No distension or masses. Genitourinary: normal external genitalia, vulva, vagina, cervix, uterus and adnexa and exam limited by:  body habitus. Neurological: She is alert and oriented to person, place, and time. Skin: Skin is warm and dry. No rash noted. No erythema. Psychiatric: She has a normal mood and affect.  Her behavior is normal.     Assessment/Plan:  Dee Desai was seen today for gynecologic exam.    Diagnoses and all orders for this visit:    Vaginal discharge  - Raven Cicero discharge ongoing over last month, STI and vaginal probe testing  - Educated patient on vaginal health, including advisement to not douche  -     C.trachomatis N.gonorrhoeae DNA  -     Vaginal Pathogens Probes *A    Pap smear for cervical cancer screening  -     PAP SMEAR    Yeast infection  - Vaginal probe testing came back positive for Candida  - Patient instructed to follow-up if symptoms do not resolve  -     fluconazole (DIFLUCAN) 150 MG tablet; Take 1 tablet by mouth once for 1 dose    Encounter for well women exam w/ routine gynecological exam  - Mammogram up to date: 7/5/2022. Normal and follow-up in 12 months  - DEXA Scan: patient did have early onset of menses at age 8 and early menopause ~ age 42 yo. Consider early screening for osteoporosis due to pt's high risk. Potentially next year rather than the recommended 71 yo.      Antonia Stanton MD  PGY-2      EMR Dragon/transcription disclaimer:  Much of this encounter note is electronic transcription/translation of spoken language to printed texts. The electronic translation of spoken language may be erroneous, or at times, nonsensical words or phrases may be inadvertently transcribed.   Although I have reviewed the note for such errors, some may still exist.

## 2022-07-23 LAB
CANDIDA SPECIES, DNA PROBE: ABNORMAL
GARDNERELLA VAGINALIS, DNA PROBE: ABNORMAL
TRICHOMONAS VAGINALIS DNA: ABNORMAL

## 2022-07-25 ENCOUNTER — CARE COORDINATION (OUTPATIENT)
Dept: CARE COORDINATION | Age: 47
End: 2022-07-25

## 2022-07-25 LAB
C TRACH DNA GENITAL QL NAA+PROBE: NEGATIVE
N. GONORRHOEAE DNA: NEGATIVE

## 2022-07-25 RX ORDER — FLUCONAZOLE 150 MG/1
150 TABLET ORAL ONCE
Qty: 1 TABLET | Refills: 0 | Status: SHIPPED | OUTPATIENT
Start: 2022-07-25 | End: 2022-07-25

## 2022-07-25 NOTE — CARE COORDINATION
Contacted [employer] and left voicemail regarding Dietitian follow up. Left call back number and will follow up as appropriate.        Angus Coto, [address] [place] [address],   [phone]

## 2022-07-27 ENCOUNTER — OFFICE VISIT (OUTPATIENT)
Dept: PULMONOLOGY | Age: 47
End: 2022-07-27
Payer: MEDICARE

## 2022-07-27 VITALS
SYSTOLIC BLOOD PRESSURE: 139 MMHG | HEIGHT: 60 IN | HEART RATE: 63 BPM | WEIGHT: 293 LBS | OXYGEN SATURATION: 99 % | TEMPERATURE: 98.2 F | DIASTOLIC BLOOD PRESSURE: 90 MMHG | RESPIRATION RATE: 16 BRPM | BODY MASS INDEX: 57.52 KG/M2

## 2022-07-27 DIAGNOSIS — R44.2 HYPNOPOMPIC HALLUCINATION: ICD-10-CM

## 2022-07-27 DIAGNOSIS — E66.01 CLASS 3 SEVERE OBESITY WITH BODY MASS INDEX (BMI) OF 60.0 TO 69.9 IN ADULT, UNSPECIFIED OBESITY TYPE, UNSPECIFIED WHETHER SERIOUS COMORBIDITY PRESENT (HCC): ICD-10-CM

## 2022-07-27 DIAGNOSIS — G25.81 RLS (RESTLESS LEGS SYNDROME): ICD-10-CM

## 2022-07-27 DIAGNOSIS — G47.33 OSA (OBSTRUCTIVE SLEEP APNEA): Primary | ICD-10-CM

## 2022-07-27 LAB
HPV COMMENT: NORMAL
HPV TYPE 16: NOT DETECTED
HPV TYPE 18: NOT DETECTED
HPVOH (OTHER TYPES): NOT DETECTED

## 2022-07-27 PROCEDURE — 99204 OFFICE O/P NEW MOD 45 MIN: CPT | Performed by: INTERNAL MEDICINE

## 2022-07-27 ASSESSMENT — SLEEP AND FATIGUE QUESTIONNAIRES
HOW LIKELY ARE YOU TO NOD OFF OR FALL ASLEEP WHEN YOU ARE A PASSENGER IN A CAR FOR AN HOUR WITHOUT A BREAK: 3
HOW LIKELY ARE YOU TO NOD OFF OR FALL ASLEEP IN A CAR, WHILE STOPPED FOR A FEW MINUTES IN TRAFFIC: 0
HOW LIKELY ARE YOU TO NOD OFF OR FALL ASLEEP WHILE LYING DOWN TO REST IN THE AFTERNOON WHEN CIRCUMSTANCES PERMIT: 3
HOW LIKELY ARE YOU TO NOD OFF OR FALL ASLEEP WHILE SITTING AND TALKING TO SOMEONE: 0
HOW LIKELY ARE YOU TO NOD OFF OR FALL ASLEEP WHILE WATCHING TV: 1
HOW LIKELY ARE YOU TO NOD OFF OR FALL ASLEEP WHILE SITTING QUIETLY AFTER LUNCH WITHOUT ALCOHOL: 1
NECK CIRCUMFERENCE (INCHES): 17
ESS TOTAL SCORE: 11
HOW LIKELY ARE YOU TO NOD OFF OR FALL ASLEEP WHILE SITTING INACTIVE IN A PUBLIC PLACE: 1
HOW LIKELY ARE YOU TO NOD OFF OR FALL ASLEEP WHILE SITTING AND READING: 2

## 2022-07-27 ASSESSMENT — ENCOUNTER SYMPTOMS
EYES NEGATIVE: 1
SHORTNESS OF BREATH: 1
ALLERGIC/IMMUNOLOGIC NEGATIVE: 1
GASTROINTESTINAL NEGATIVE: 1

## 2022-07-27 NOTE — PROGRESS NOTES
MA Communication:   The following orders are received by verbal communication from Diane Hurt MD    Orders include:  PSG (sleep center to call pt)       31-90 day scheduled 11/9/22

## 2022-07-27 NOTE — LETTER
[address] [facility] Pulmonary Critical Care and Sleep  [address] [facility] [address] [address]  Phone: [phone]  Fax: [phone]    Skylar Brower MD        July 27, 2022     Patient: Lubna Horton   YOB: 1975   Date of Visit: 7/27/2022 7/27/22        Lubna Horton      I have seen this patient in the office today and wanted to communicate my findings and recommendations. Patient Instructions           ASSESSMENT/PLAN:   Diagnosis Orders   1. PARVIZ (obstructive sleep apnea)        2. Hypnopompic hallucination        3. RLS (restless legs syndrome)        4. Class 3 severe obesity with body mass index (BMI) of 60.0 to 69.9 in adult, unspecified obesity type, unspecified whether serious comorbidity present ([facility]                 I  RECOMMENDATIONS:     she will be scheduled for polysomnography in order to evaluate for the presence and severity of obstructive sleep apnea. He was given a discussion of the pathophysiology, evaluation and treatment of apnea. Thyroid function tests are recommended if not done recently. Advised to avoid driving when too sleepy to function safely and given a discussion of the risks of untreated apnea such as accidents, cognitive impairment, mood impairment, high blood pressure, various cardiac diseases and stroke. Weight loss was encouraged.        ESS is 11/24             Will get Sleep study  I will call you with results    Will do split night      RTC in 4 months                   Thank you for allowing me to assist in the care of the MD Skylar Merino MD

## 2022-07-27 NOTE — PROGRESS NOTES
Anthony Clark (: 1975 ) is a 42 y.o. female here for an evaluation of   Chief Complaint   Patient presents with    Sleep Apnea     New patient         ASSESSMENT/PLAN:   Diagnosis Orders   1. PARVIZ (obstructive sleep apnea)        2. Hypnopompic hallucination        3. RLS (restless legs syndrome)        4. Class 3 severe obesity with body mass index (BMI) of 60.0 to 69.9 in adult, unspecified obesity type, unspecified whether serious comorbidity present ([facility]                 I  RECOMMENDATIONS:     she will be scheduled for polysomnography in order to evaluate for the presence and severity of obstructive sleep apnea. He was given a discussion of the pathophysiology, evaluation and treatment of apnea. Thyroid function tests are recommended if not done recently. Advised to avoid driving when too sleepy to function safely and given a discussion of the risks of untreated apnea such as accidents, cognitive impairment, mood impairment, high blood pressure, various cardiac diseases and stroke. Weight loss was encouraged. ESS is              Will get Sleep study  I will call you with results    Will do split night      RTC in 4 months          No follow-ups on file. SUBJECTIVE/OBJECTIVE:    Consult from Dr. Conte  For sleep issues  Initially seen 2022      Subjective:          42old year old,female, with PMH significant for RLS, insomnia and asthma, Morbid obesity, PCOS and s/p sleeve,  that presents today for initial evaluation. Pt reports snore for years  and that it is getting wore. Last seen in Dr Titus Cavanaugh in 2016  But now having issues with the     Pt reports does snore moderate for years. Patient's partner does complain of pt snoring. Pt's partner does not notice that she stops breathing during sleep. Pt's  does  wake   herself with headache, dry mouth, gerd and sleepy. Pt does report fatigue or tiredness frequently.  Pt sleeps more than 8 hours, and at times overwhelming sleepiness attacks. Pt  does dozes unintentionally while watching TV. While driving  does not feel drowsy / nod off / fall sleep at stop lights. Pt does not have h/o sleepiness associated wrecks/near wrecks. Pt does  nod off while  unattended. Pt does report having restless legs 3 times a week. This is often accompanied by leg jerks during sleep, numbness in legs or feet, aches/burning/cramps in legs, feet. Does  report having nasal congestion. negative for use of nasal sprays, nose or sinus surgery. negative for broken nose, tonsillectomy, sleeping w/ chest raised. Does not sleep with oxygen. Pt odes not have a dental appliance or braces on teeth. Does not teeth grinding. Does not report nightmares, sleep walking, dreaming during naps. When angry or laughing Minerva Flash does not report cataplexy. she does report hallucinations when dozing off or immediately upon awakening. Does not report sleep paralysis. Does not have parasomnia. Patient's [place] Sleepiness score  is required. Patient  is CONSISTENT with moderate daytime sleepiness. Review of Systems   Constitutional: Negative. HENT: Negative. Eyes: Negative. Respiratory:  Positive for shortness of breath. Cardiovascular: Negative. Gastrointestinal: Negative. Endocrine: Negative. Genitourinary: Negative. Musculoskeletal: Negative. Skin: Negative. Allergic/Immunologic: Negative. Neurological: Negative. Hematological: Negative. Psychiatric/Behavioral: Negative. Vitals:    07/27/22 1511 07/27/22 1519   BP: (!) 141/89 (!) 139/90   Site: Left Lower Arm Right Lower Arm   Position: Sitting Sitting   Cuff Size: Medium Adult Medium Adult   Pulse: 63    Resp: 16    Temp: 98.2 °F (36.8 °C)    TempSrc: Temporal    SpO2: 99%    Weight: (!) 328 lb (148.8 kg)    Height: 5' (1.524 m)         Physical Exam  Vitals and nursing note reviewed.    Constitutional:       General: She is not in acute distress. Appearance: Normal appearance. She is obese. She is not ill-appearing. HENT:      Head: Normocephalic and atraumatic. Right Ear: External ear normal.      Left Ear: External ear normal.      Nose: Nose normal.      Mouth/Throat:      Mouth: Mucous membranes are moist.      Pharynx: Oropharynx is clear. Comments: Mallampati 3  Eyes:      General: No scleral icterus. Extraocular Movements: Extraocular movements intact. Conjunctiva/sclera: Conjunctivae normal.      Pupils: Pupils are equal, round, and reactive to light. Cardiovascular:      Rate and Rhythm: Normal rate and regular rhythm. Pulses: Normal pulses. Heart sounds: Normal heart sounds. No murmur heard. No friction rub. Pulmonary:      Effort: No respiratory distress. Breath sounds: No stridor. No wheezing, rhonchi or rales. Chest:      Chest wall: No tenderness. Abdominal:      General: Abdomen is flat. Bowel sounds are normal. There is no distension. Tenderness: There is no abdominal tenderness. There is no guarding. Musculoskeletal:         General: No swelling or tenderness. Normal range of motion. Cervical back: Normal range of motion and neck supple. No rigidity. Right lower leg: Edema present. Left lower leg: Edema present. Skin:     General: Skin is warm and dry. Coloration: Skin is not jaundiced. Neurological:      General: No focal deficit present. Mental Status: She is alert and oriented to person, place, and time. Mental status is at baseline. Cranial Nerves: No cranial nerve deficit. Sensory: No sensory deficit. Motor: No weakness. Gait: Gait normal.   Psychiatric:         Mood and Affect: Mood normal.         Thought Content:  Thought content normal.         Judgment: Judgment normal.            Nini Griffith MD

## 2022-07-27 NOTE — PATIENT INSTRUCTIONS
ASSESSMENT/PLAN:   Diagnosis Orders   1. PARVIZ (obstructive sleep apnea)        2. Hypnopompic hallucination        3. RLS (restless legs syndrome)        4. Class 3 severe obesity with body mass index (BMI) of 60.0 to 69.9 in adult, unspecified obesity type, unspecified whether serious comorbidity present ([facility]                 I  RECOMMENDATIONS:     she will be scheduled for polysomnography in order to evaluate for the presence and severity of obstructive sleep apnea. He was given a discussion of the pathophysiology, evaluation and treatment of apnea. Thyroid function tests are recommended if not done recently. Advised to avoid driving when too sleepy to function safely and given a discussion of the risks of untreated apnea such as accidents, cognitive impairment, mood impairment, high blood pressure, various cardiac diseases and stroke. Weight loss was encouraged. ESS is 11/24             Will get Sleep study  I will call you with results    Will do split night      RTC in 4 months    Remember to bring a list of pulmonary medications and any CPAP or BiPAP machines to your next appointment with the office. Please keep all of your future appointments scheduled by [address], [facility] Pulmonary office. Out of respect for other patients and providers, you may be asked to reschedule your appointment if you arrive later than your scheduled appointment time. Appointments cancelled less than 24hrs in advance will be considered a no show. Patients with three missed appointments within 1 year or four missed appointments within 2 years can be dismissed from the practice. Please be aware that our physicians are required to work in the Intensive Care Unit at [facility].  Your appointment may need to be rescheduled if they are designated to work during your appointment time. You may receive a survey regarding the care you received during your visit.   Your input is valuable to us. We encourage you to complete and return your survey. We hope you will choose us in the future for your healthcare needs. Pt instructed of all future appointment dates & times, including radiology, labs, procedures & referrals. If procedures were scheduled preparation instructions provided. Instructions on future appointments with [facility] were given.

## 2022-07-29 ENCOUNTER — CARE COORDINATION (OUTPATIENT)
Dept: CARE COORDINATION | Age: 47
End: 2022-07-29

## 2022-08-01 ENCOUNTER — TELEPHONE (OUTPATIENT)
Dept: PHARMACY | Facility: CLINIC | Age: 47
End: 2022-08-01

## 2022-08-01 NOTE — TELEPHONE ENCOUNTER
[facility] CLINICAL PHARMACY: ADHERENCE REVIEW  Identified care gap per [employer]: fills at [facility]: Statin adherence    Patient identified as LIS = 1, therefore patient may be able to receive a 90-day supply for the same cost as a 30-day supply    [employer] claims through 7/17/22 (Prior Year Jah Call =  n/a%; YTD Jah Call = Filled only once; Potential Fail Date: 8/12/22 ):   Atorvastatin 40 mg tablets last filled on 3/18/22 for 90 day supply. Next refill due: 6/16/22  Per [employer] portal, same as above  Per chart, refills remaining    Lab Results   Component Value Date    CHOL 235 (H) 03/11/2022    TRIG 145 03/11/2022    HDL 38 (L) 03/11/2022    LDLCALC 168 (H) 03/11/2022     ALT   Date Value Ref Range Status   11/08/2021 16 10 - 40 U/L Final     AST   Date Value Ref Range Status   11/08/2021 18 15 - 37 U/L Final     The 10-year ASCVD risk score (Sheron Castillo, et al., 2013) is: 5.6%    Values used to calculate the score:      Age: 42 years      Sex: Female      Is Non- : No      Diabetic: Yes      Tobacco smoker: No      Systolic Blood Pressure: 605 mmHg      Is BP treated: Yes      HDL Cholesterol: 38 mg/dL      Total Cholesterol: 235 mg/dL     PLAN  The following are interventions that have been identified:   - Patient overdue refilling atorvastatin and active on home medication list.     Reached patient to review. Shae Nowak states she is still taking atorvastatin. She states she had a lot of 20 mg tablets remaining before switched over to 40 mg, and so she is using those up by taking two 20 mg tablets. Offered to contact pharmacy for patient to have medication refilled- she declines at this time stating she still has plenty and would not be able to pick it up due to her car getting fixed. No further outreach planned at this time.     Future Appointments   Date Time Provider Israel Cote   8/11/2022  1:00 PM Wiley Conte DO [facility] AND [facility]   9/26/2022 8:30 PM SCHEDULE, [facility] SLEEP ROOM 3 [facility] SLEEP Real Hernandez   11/9/2022  1:15 PM Debora Neff MD AND PULM [facility]       Gus Chavira, PharmD, [facility], [employer]   Department, toll free: [phone], option 1    For [address] [facility] in place:  No  Gap Closed?: No   Time Spent (min): 10

## 2022-08-02 ENCOUNTER — CARE COORDINATION (OUTPATIENT)
Dept: CARE COORDINATION | Age: 47
End: 2022-08-02

## 2022-08-02 NOTE — CARE COORDINATION
Contacted [employer] and left voicemail regarding Dietitian follow up. Left call back number and will follow up as appropriate.        Desiree Dasilva, [address] [place] [address],   [phone]

## 2022-08-03 ENCOUNTER — CARE COORDINATION (OUTPATIENT)
Dept: CARE COORDINATION | Age: 47
End: 2022-08-03

## 2022-08-03 ENCOUNTER — PATIENT MESSAGE (OUTPATIENT)
Dept: PRIMARY CARE CLINIC | Age: 47
End: 2022-08-03

## 2022-08-03 ENCOUNTER — PATIENT MESSAGE (OUTPATIENT)
Dept: PULMONOLOGY | Age: 47
End: 2022-08-03

## 2022-08-03 DIAGNOSIS — J45.909 UNCOMPLICATED ASTHMA, UNSPECIFIED ASTHMA SEVERITY, UNSPECIFIED WHETHER PERSISTENT: ICD-10-CM

## 2022-08-03 DIAGNOSIS — I10 BENIGN ESSENTIAL HTN: ICD-10-CM

## 2022-08-03 DIAGNOSIS — G43.901 MIGRAINE WITH STATUS MIGRAINOSUS, NOT INTRACTABLE, UNSPECIFIED MIGRAINE TYPE: ICD-10-CM

## 2022-08-03 NOTE — CARE COORDINATION
Ambulatory Care Coordination Note  8/3/2022    ACC: Erna Vargas RN    Summary Note: Reports called about the [facility] ,also about final decision on forms the RAD. Carmen Yanez gave her and they said it's still pending. Reports saw pulmonologist and she has PARVIZ and is having a sleep study in September so she hopes the medicaid is finalized b  FS are about the same. Reviewed last a1c 5.4. Reviewed diabetic, low fat, low sodium diets. Reports having car trouble which is making it difficult to get to appts but plans to discuss with CM once medicaid is in place. PLAN  1) fu appts  2) review sob  3) fu paper work decision  Diabetes Assessment      Meal Planning: Avoidance of concentrated sweets, Plate Method   How often do you test your blood sugar?: Meals, Bedtime   Do you have barriers with adherence to non-pharmacologic self-management interventions?  (Nutrition/Exercise/Self-Monitoring): Yes   Have you ever had to go to the ED for symptoms of low blood sugar?: No       No patient-reported symptoms   Do you have hyperglycemia symptoms?: No   Do you have hypoglycemia symptoms?: No   Blood Sugar Trends: No Change         and   COPD Assessment    Does the patient understand envrionmental exposure?: Yes  Is the patient able to verbalize Rescue vs. Long Acting medications?: Yes  Does the patient have a nebulizer?: No  Does the patient use a space with inhaled medications?: No     No patient-reported symptoms         Symptoms:                Lab Results       None            Care Coordination Interventions    Referral from Primary Care Provider: No  Suggested Interventions and Community Resources  Diabetes Education: Completed  Fall Risk Prevention: Completed  Disease Association: Completed (Comment: COPD)  Registered Dietician: Completed (Comment: 6/3/22-referral made)  Zone Management Tools: Completed (Comment: 6/8/22-reviewed)          Goals Addressed                      This Visit's Progress      Patient Stated (pt-stated)   Improving      Will try to eat more vegetables and fruits    Barriers: financial and lack of support  Plan for overcoming my barriers: will work with ACM/RD prn  Confidence: 8/10  Anticipated Goal Completion Date: 11/8/22              Prior to Admission medications    Medication Sig Start Date End Date Taking? Authorizing Provider   diclofenac sodium (VOLTAREN) 1 % GEL Apply topically 2 times daily    Historical Provider, MD BUTTERFIELDTEC 75 MG TBDP DISSOLVE 1 TABLET IN MOUTH EVERY OTHER DAY TO PREVENT HEADACHES. MAY TAKE DAILY FOR ACUTE HEADACHE RELIEF. DO NOT EXCEED 18 DOSES A MONTH 6/30/22   Historical Provider, MD   Continuous Blood Gluc Transmit (DEXCOM G6 TRANSMITTER) MISC 1 each by Does not apply route continuous 6/2/22 8/31/22  Wiley BARFIELD May, DO   propranolol (INDERAL LA) 80 MG extended release capsule Take 1 capsule by mouth in the morning and at bedtime 6/2/22 7/27/22  Wiley BARFIELD May, DO   OneTouch Delica Lancets 56D MISC Check blood sugars 4 times daily for E11.9 5/31/22   Wiley BARFIELD May, DO   SYMBICORT 160-4.5 MCG/ACT AERO Inhale 2 puffs into the lungs 2 times daily Use for asthma exacerbations. 1-2 puffs q4h.  Max 12 puffs/ day 5/25/22   Wiley BARFIELD May, DO   ONETOUCH ULTRA strip USE TO TEST BLOOD SUGAR FOUR TIMES DAILY 5/24/22   Wiley BARFIELD May, DO   levothyroxine (SYNTHROID) 300 MCG tablet Take 1 tablet by mouth Daily 5/20/22 8/18/22  Wiley BARFIELD May, DO   blood glucose monitor strips PLEASE GIVE TYPE THAT MATCHES PATIENTS GLUCOMETER PER INSURANCE 5/19/22   Wiley BARFIELD May, DO   albuterol sulfate  (90 Base) MCG/ACT inhaler INHALE 2 PUFFS BY MOUTH EVERY SIX HOURS AS NEEDED FOR WHEEZING 5/17/22   Wiley BARFIELD May, DO   atorvastatin (LIPITOR) 40 MG tablet Take 1 tablet by mouth daily 3/18/22 7/27/22  Wiley BARIFELD May, DO   busPIRone (BUSPAR) 15 MG tablet Take 15 mg by mouth 3 times daily    Historical Provider, MD   omeprazole (PRILOSEC) 40 MG delayed release capsule Take 1 capsule by mouth in the morning and at bedtime 3/11/22 3/6/23  [facility] May, DO   tiotropium (SPIRIVA HANDIHALER) 18 MCG inhalation capsule Inhale 1 capsule into the lungs daily 1/14/22   [facility] May, DO   lisinopril (PRINIVIL;ZESTRIL) 5 MG tablet Take 1 tablet by mouth daily 12/7/21   [facility] May, DO   ARIPiprazole (ABILIFY) 10 MG tablet Take 1 tablet by mouth daily 12/7/21   [facility] May, DO   Cholecalciferol 25 MCG (1000 UT) CHEW Take 2 tablets by mouth daily 11/23/21   [facility] May, DO   nystatin (MYCOSTATIN) 175774 UNIT/GM powder APPLY TOPICALLY 3 TIMES DAILY AS NEEDED TO MANAGE RASH AND MOISTURE 11/8/21   Historical Provider, MD   DULoxetine (CYMBALTA) 30 MG extended release capsule Take 1 capsule by mouth 2 times daily 11/18/21 7/27/22  [facility] May, DO   liothyronine (CYTOMEL) 25 MCG tablet Take 1 tablet by mouth 2 times daily for 120 doses 11/18/21 7/27/22  [facility] May, DO   furosemide (LASIX) 40 MG tablet Take 1 tablet by mouth daily 11/8/21   [facility] May, DO   cetirizine (ZYRTEC) 10 MG tablet Take 1 tablet by mouth daily 11/8/21   [facility] May, DO   amLODIPine (NORVASC) 5 MG tablet Take 1 tablet by mouth daily 11/8/21   [facility] May, DO   montelukast (SINGULAIR) 10 MG tablet Take 1 tablet by mouth nightly 11/8/21   [facility] May, DO   fluticasone (FLONASE ALLERGY RELIEF) 50 MCG/ACT nasal spray 2 sprays by Nasal route 2 times daily 11/8/21   [facility] May, DO   azelastine (OPTIVAR) 0.05 % ophthalmic solution Place 1 drop into both eyes 2 times daily 11/8/21   [facility] May, DO   azelastine (ASTELIN) 0.1 % nasal spray 1 spray by Nasal route 2 times daily Use in each nostril as directed 11/8/21   [facility] May, DO   oxybutynin (DITROPAN-XL) 5 MG extended release tablet Take 1 tablet by mouth daily 11/8/21   Wiley BARFIELD May, DO   rizatriptan (MAXALT) 10 MG tablet Take 1 tablet by mouth once as needed for Migraine May repeat in 2 hours if needed 11/8/21 7/27/22  Wiley BARFIELD May, DO   Continuous Blood Gluc  (DEXCOM G6 ) NGHIA 2 times daily 3/26/21   Historical Provider, MD   acetaminophen (TYLENOL) 500 MG tablet Take 500 mg by mouth every 6 hours as needed for Pain    Historical Provider, MD   Nutritional Supplements (GLUCOSE MANAGEMENT) TABS Take by mouth    Historical Provider, MD   calcium carbonate (CALCIUM 600) 600 MG TABS tablet Take 1 tablet by mouth daily 8/11/16   SHLOMO Hi MD   Adhesive Tape (PAPER TAPE 1\"X10YD) TAPE Apply to affected areas.  4/25/16   Tyesha Adkins MD   Multiple Vitamins-Minerals (THERAPEUTIC MULTIVITAMIN-MINERALS) tablet Take 1 tablet by mouth daily    Historical Provider, MD   Polyethylene Glycol 3350 (MIRALAX PO) Take by mouth 2 times daily     Historical Provider, MD   artificial tears (ARTIFICIAL TEARS) OINT as needed    Historical Provider, MD   glucose monitoring kit (FREESTYLE) monitoring kit 1 kit by Does not apply route daily as needed 6/18/15   Jessica Melo MD   FREESTYLE LANCETS MISC 1 each by Does not apply route daily 6/18/15   Jessica Melo MD   EPINEPHrine 0.1 MG/ML injection Inject 0.3 mg into the muscle as needed     Historical Provider, MD   dextromethorphan-guaiFENesin ([place] 598 DM)  MG per SR tablet Take 1 tablet by mouth every 12 hours as needed     Historical Provider, MD       Future Appointments   Date Time Provider Israel Cote   8/11/2022  1:00 PM DO Elena Thompson [address]   9/26/2022  8:30 PM SCHEDULE, [facility] SLEEP ROOM 3 [facility] SLEEP [place] [facility] [place][note 2]   11/9/2022  1:15 PM [place] MD [place] AND MONICO ZELAYA

## 2022-08-04 ENCOUNTER — CARE COORDINATION (OUTPATIENT)
Dept: CARE COORDINATION | Age: 47
End: 2022-08-04

## 2022-08-04 RX ORDER — RIZATRIPTAN BENZOATE 10 MG/1
10 TABLET ORAL
Qty: 27 TABLET | Refills: 0 | Status: SHIPPED | OUTPATIENT
Start: 2022-08-04 | End: 2022-11-04

## 2022-08-04 RX ORDER — BUSPIRONE HYDROCHLORIDE 15 MG/1
15 TABLET ORAL 3 TIMES DAILY
Qty: 90 TABLET | Refills: 0 | Status: SHIPPED | OUTPATIENT
Start: 2022-08-04 | End: 2022-08-30

## 2022-08-04 RX ORDER — AMLODIPINE BESYLATE 5 MG/1
TABLET ORAL
Qty: 30 TABLET | Refills: 0 | Status: SHIPPED | OUTPATIENT
Start: 2022-08-04 | End: 2022-08-30

## 2022-08-04 RX ORDER — ALBUTEROL SULFATE 90 UG/1
AEROSOL, METERED RESPIRATORY (INHALATION)
Qty: 18 G | Refills: 0 | Status: SHIPPED | OUTPATIENT
Start: 2022-08-04 | End: 2022-08-30

## 2022-08-04 NOTE — CARE COORDINATION
placed an outreach phone call to patient after receiving a message from her indicating that she was approved for Medicaid and future enrollment into a My [employer] 1724.  spoke to patient about My [address] [facility] and e-mailed her some reference material on the program. In addition, we discussed some transportation questions she was facing. A transportation resource list will be mailed to the patient's residence for future reference. SHIRAZ Barber will be advised of the outreach.

## 2022-08-04 NOTE — TELEPHONE ENCOUNTER
From: Jaycee Brewster  To: Wiley May, DO  Sent: 8/3/2022 7:42 PM EDT  Subject: Medicaid    I did get approved for [place] Medicaid. Active date 7/29/22 and retroactive back to 7/1/22. I sent a bunch of med refills through pharmacy and. I will be contacting [employer] to get Dexcom supplies and urinary pads and chucks. These are needed supplies since I have to wear and use them. I will discuss stuff on the appt on the 11th. But we can now schedule all the specialist I need to see. This will help you with future appts. Also Logan Freed has ordered a sleep study for Sept and was wondering if you wanted to keep treating my respiratory or have him take over. He did praise you on your method you have chosen with my treatment.       My [address] number is  [phone]    And starting Sept 1st my [employer] will change to My care [facility] (spelling) I won't have that insurance info until theu send me my card

## 2022-08-04 NOTE — TELEPHONE ENCOUNTER
From: Hans Arroyo  To: Dr. Deepthi Florence: 8/3/2022 7:47 PM EDT  Subject: Medical insurance change info    I did get approved for [facility]. Active date 22 and retroactive back to 22.     My [facility] number is  [phone]    And starting  my [employer] will change to My care [facility] (spelling) I won't have that insurance info until they send me my card

## 2022-08-04 NOTE — TELEPHONE ENCOUNTER
Refill Request       Last Seen: Last Seen Department: 7/22/2022  Last Seen by PCP: 6/2/2022    Last Written: Amlodipine  11/08/2021  Albuterol  05/17/2022  Rizatriptan 11/08/2021 #27   w/ 1 refill   Buspirone 10mg 11/18/2021       Next Appointment:   Future Appointments   Date Time Provider Israel Cote   8/11/2022  1:00 PM Wiley Conte DO [facility] AND RES [address]   9/26/2022  8:30 PM SCHEDULE, [facility] SLEEP ROOM 3 [facility] SLEEP [place]   11/9/2022  1:15 PM Darion Castaneda MD AND MONICO ZELAYA             Requested Prescriptions     Pending Prescriptions Disp Refills    amLODIPine (NORVASC) 5 MG tablet [Pharmacy Med Name: amLODIPine Besylate Oral Tablet 5 MG] 30 tablet 0     Sig: TAKE 1 TABLET BY MOUTH EVERY DAY    rizatriptan (MAXALT) 10 MG tablet [Pharmacy Med Name: Rizatriptan Benzoate Oral Tablet 10 MG] 27 tablet 0     Sig: take 1 tablet by mouth once as needed for migraine may repeat in 2 hours if needed    albuterol sulfate HFA (PROVENTIL;VENTOLIN;PROAIR) 108 (90 Base) MCG/ACT inhaler [Pharmacy Med Name: Albuterol Sulfate HFA Inhalation Aerosol Solution 108 (90 Base) MCG/ACT] 18 g 0     Sig: INHALE 2 PUFFS BY MOUTH EVERY SIX HOURS AS NEEDED FOR WHEEZING    busPIRone (BUSPAR) 10 MG tablet [Pharmacy Med Name: busPIRone HCl Oral Tablet 10 MG] 135 tablet 0     Sig: TAKE 1 AND 1/2 TABLETS BY MOUTH THREE TIMES A DAY

## 2022-08-05 ENCOUNTER — TELEPHONE (OUTPATIENT)
Dept: ADMINISTRATIVE | Age: 47
End: 2022-08-05

## 2022-08-05 NOTE — TELEPHONE ENCOUNTER
Found in [address]en PA for Rizatriptan Benzoate 10MG tablets  Key: Aniceto Nunez 51 - PA Case ID: [id number] - Rx #: [id number]  This PA is Approved

## 2022-08-05 NOTE — TELEPHONE ENCOUNTER
PA COVER MY MEDS  Medication:Vitamin D3 25 MCG(1000 UT) chewable tablets    Key:[id number] - [id number]  Status:PENDING      PA has been denied

## 2022-08-08 ENCOUNTER — CARE COORDINATION (OUTPATIENT)
Dept: CARE COORDINATION | Age: 47
End: 2022-08-08

## 2022-08-08 DIAGNOSIS — J30.1 ALLERGIC RHINITIS DUE TO POLLEN, UNSPECIFIED SEASONALITY: ICD-10-CM

## 2022-08-08 RX ORDER — CETIRIZINE HYDROCHLORIDE 10 MG/1
10 TABLET ORAL DAILY
Qty: 30 TABLET | Refills: 1 | Status: SHIPPED | OUTPATIENT
Start: 2022-08-08

## 2022-08-08 NOTE — CARE COORDINATION
Kiki Deleon  8/8/2022    Registered Dietitian Progress Note for Care Coordination    Assessment: Blaise Adams is a 42 y.o. female. RD referred for desired weight loss. RD spoke with patient for initial nutrition assessment on 6/29/22. RD called to follow up with patient today, 8/8/22. RD discussed previous goals with patient. Patient states she received the nutrition handouts RD sent in the mail, did not have any questions or concerns. Patient has been eating two small meals plus one snack on most days. Patient reports that her appetite fluctuates from day to day, and that, some days, she is not hungry. Patient has been trying to build balanced meals. States that her meals have \"gotten better\" since her SNAP benefits were increased, and can now purchase more fresh fruits and vegetables. Patient has been eating small portions. Patient reports that she has been gradually losing one pound per week. RD praised patient for gradual weight loss. Patient states she recently signed up for [employer] and wants to start going to the gym to swim. Nutrition Monitoring and Evaluation  Indicator/Goal Criteria Progress   #1 Follow MyPlate guidelines #1 I will build balanced meal using the MyPlate reference: 1/2 plate fruits and/or vegetables, 1/4 plate protein, and 1/4 plate starchy carbohydrates with 8 oz glass of low fat milk if desired #1 Patient has been working toward building balanced meals    #2  Eat consistently #2 I will eat three meals per day or four to six small meals per day  #2 Patient has been eating two small meals + 1 snack per day, on most days    #3  Limit portion sizes  #3 I will adhere to suggested portion sizes  #3 Patient has been eating small portions        Plan of Care:  RD encouraged patient to keep working toward goals set. RD will follow up with patient to discuss any questions patient has and check the progress toward goals.      Follow Up:    RD will call patient in four weeks to follow up and answer any nutrition related questions at that time.      Latrice Carver, [address] [facility],    [phone]

## 2022-08-08 NOTE — TELEPHONE ENCOUNTER
Refill Request       Last Seen: Last Seen Department: 7/22/2022  Last Seen by PCP: 6/2/2022    Last Written: 11/8/21, #30, 1 refill    Next Appointment:   Future Appointments   Date Time Provider Israel Cote   8/11/2022  1:00 PM Wiley Conte DO [facility] AND RES [facility]   9/26/2022  8:30 PM SCHEDULE, [facility] SLEEP ROOM 3 [facility]   11/9/2022  1:15 PM Susan Varghese MD AND MONICO ZELAYA             Requested Prescriptions     Pending Prescriptions Disp Refills    cetirizine (ZYRTEC) 10 MG tablet 30 tablet 1     Sig: Take 1 tablet by mouth in the morning.

## 2022-08-11 ENCOUNTER — TELEPHONE (OUTPATIENT)
Dept: PRIMARY CARE CLINIC | Age: 47
End: 2022-08-11

## 2022-08-11 ENCOUNTER — HOSPITAL ENCOUNTER (OUTPATIENT)
Age: 47
Discharge: HOME | End: 2022-08-11
Payer: MEDICARE

## 2022-08-11 ENCOUNTER — OFFICE VISIT (OUTPATIENT)
Dept: PRIMARY CARE CLINIC | Age: 47
End: 2022-08-11
Payer: MEDICARE

## 2022-08-11 VITALS
OXYGEN SATURATION: 98 % | BODY MASS INDEX: 57.52 KG/M2 | HEIGHT: 60 IN | WEIGHT: 293 LBS | DIASTOLIC BLOOD PRESSURE: 72 MMHG | SYSTOLIC BLOOD PRESSURE: 128 MMHG | TEMPERATURE: 97.1 F | HEART RATE: 65 BPM | RESPIRATION RATE: 18 BRPM

## 2022-08-11 DIAGNOSIS — B37.9 YEAST INFECTION: ICD-10-CM

## 2022-08-11 DIAGNOSIS — Z87.892 HX OF ANAPHYLAXIS: ICD-10-CM

## 2022-08-11 DIAGNOSIS — M17.0 OSTEOARTHRITIS OF BOTH KNEES, UNSPECIFIED OSTEOARTHRITIS TYPE: ICD-10-CM

## 2022-08-11 DIAGNOSIS — F32.2 CURRENT SEVERE EPISODE OF MAJOR DEPRESSIVE DISORDER WITHOUT PSYCHOTIC FEATURES, UNSPECIFIED WHETHER RECURRENT (HCC): ICD-10-CM

## 2022-08-11 DIAGNOSIS — E03.9 HYPOTHYROIDISM, UNSPECIFIED TYPE: ICD-10-CM

## 2022-08-11 DIAGNOSIS — E11.40 TYPE 2 DIABETES MELLITUS WITH DIABETIC NEUROPATHY, UNSPECIFIED WHETHER LONG TERM INSULIN USE (HCC): ICD-10-CM

## 2022-08-11 DIAGNOSIS — E03.9 HYPOTHYROIDISM, UNSPECIFIED TYPE: Primary | ICD-10-CM

## 2022-08-11 DIAGNOSIS — L30.4 INTERTRIGO: ICD-10-CM

## 2022-08-11 DIAGNOSIS — F32.A DEPRESSION, UNSPECIFIED DEPRESSION TYPE: ICD-10-CM

## 2022-08-11 DIAGNOSIS — G43.901 MIGRAINE WITH STATUS MIGRAINOSUS, NOT INTRACTABLE, UNSPECIFIED MIGRAINE TYPE: ICD-10-CM

## 2022-08-11 PROCEDURE — 36415 COLL VENOUS BLD VENIPUNCTURE: CPT

## 2022-08-11 PROCEDURE — 84443 ASSAY THYROID STIM HORMONE: CPT

## 2022-08-11 PROCEDURE — 99214 OFFICE O/P EST MOD 30 MIN: CPT | Performed by: STUDENT IN AN ORGANIZED HEALTH CARE EDUCATION/TRAINING PROGRAM

## 2022-08-11 RX ORDER — DULOXETIN HYDROCHLORIDE 30 MG/1
30 CAPSULE, DELAYED RELEASE ORAL 2 TIMES DAILY
Qty: 180 CAPSULE | Refills: 3 | Status: SHIPPED | OUTPATIENT
Start: 2022-08-11 | End: 2022-11-09

## 2022-08-11 RX ORDER — RIMEGEPANT SULFATE 75 MG/75MG
75 TABLET, ORALLY DISINTEGRATING ORAL EVERY OTHER DAY
Qty: 18 TABLET | Refills: 3 | Status: SHIPPED | OUTPATIENT
Start: 2022-08-11 | End: 2022-10-17

## 2022-08-11 RX ORDER — NYSTATIN 100000 [USP'U]/G
POWDER TOPICAL
Qty: 60 G | Refills: 1 | Status: SHIPPED | OUTPATIENT
Start: 2022-08-11 | End: 2022-10-10 | Stop reason: SDUPTHER

## 2022-08-11 RX ORDER — EPINEPHRINE 0.3 MG/.3ML
0.3 INJECTION SUBCUTANEOUS ONCE
Qty: 0.3 ML | Refills: 1 | Status: SHIPPED | OUTPATIENT
Start: 2022-08-11 | End: 2022-11-04

## 2022-08-11 RX ORDER — FLUCONAZOLE 150 MG/1
150 TABLET ORAL ONCE
Qty: 1 TABLET | Refills: 0 | Status: SHIPPED | OUTPATIENT
Start: 2022-08-11 | End: 2022-08-11

## 2022-08-11 RX ORDER — LIOTHYRONINE SODIUM 25 UG/1
25 TABLET ORAL 2 TIMES DAILY
Qty: 180 TABLET | Refills: 3 | Status: SHIPPED | OUTPATIENT
Start: 2022-08-11 | End: 2022-08-22 | Stop reason: SDUPTHER

## 2022-08-11 RX ORDER — EPINEPHRINE 0.3 MG/.3ML
0.3 INJECTION SUBCUTANEOUS PRN
Qty: 1 EACH | Refills: 2 | Status: SHIPPED | OUTPATIENT
Start: 2022-08-11

## 2022-08-11 ASSESSMENT — PATIENT HEALTH QUESTIONNAIRE - PHQ9
6. FEELING BAD ABOUT YOURSELF - OR THAT YOU ARE A FAILURE OR HAVE LET YOURSELF OR YOUR FAMILY DOWN: 2
9. THOUGHTS THAT YOU WOULD BE BETTER OFF DEAD, OR OF HURTING YOURSELF: 1
SUM OF ALL RESPONSES TO PHQ QUESTIONS 1-9: 17
7. TROUBLE CONCENTRATING ON THINGS, SUCH AS READING THE NEWSPAPER OR WATCHING TELEVISION: 2
SUM OF ALL RESPONSES TO PHQ QUESTIONS 1-9: 18
3. TROUBLE FALLING OR STAYING ASLEEP: 3
SUM OF ALL RESPONSES TO PHQ9 QUESTIONS 1 & 2: 4
4. FEELING TIRED OR HAVING LITTLE ENERGY: 2
SUM OF ALL RESPONSES TO PHQ QUESTIONS 1-9: 18
SUM OF ALL RESPONSES TO PHQ QUESTIONS 1-9: 18
8. MOVING OR SPEAKING SO SLOWLY THAT OTHER PEOPLE COULD HAVE NOTICED. OR THE OPPOSITE, BEING SO FIGETY OR RESTLESS THAT YOU HAVE BEEN MOVING AROUND A LOT MORE THAN USUAL: 1
5. POOR APPETITE OR OVEREATING: 3
2. FEELING DOWN, DEPRESSED OR HOPELESS: 2
10. IF YOU CHECKED OFF ANY PROBLEMS, HOW DIFFICULT HAVE THESE PROBLEMS MADE IT FOR YOU TO DO YOUR WORK, TAKE CARE OF THINGS AT HOME, OR GET ALONG WITH OTHER PEOPLE: 1
1. LITTLE INTEREST OR PLEASURE IN DOING THINGS: 2

## 2022-08-11 ASSESSMENT — ENCOUNTER SYMPTOMS
EYE PAIN: 0
VOMITING: 0
EYE DISCHARGE: 0
NAUSEA: 0
COUGH: 0
RHINORRHEA: 0
SHORTNESS OF BREATH: 0
SORE THROAT: 0
ABDOMINAL PAIN: 0

## 2022-08-11 ASSESSMENT — ANXIETY QUESTIONNAIRES
7. FEELING AFRAID AS IF SOMETHING AWFUL MIGHT HAPPEN: 2
1. FEELING NERVOUS, ANXIOUS, OR ON EDGE: 2
IF YOU CHECKED OFF ANY PROBLEMS ON THIS QUESTIONNAIRE, HOW DIFFICULT HAVE THESE PROBLEMS MADE IT FOR YOU TO DO YOUR WORK, TAKE CARE OF THINGS AT HOME, OR GET ALONG WITH OTHER PEOPLE: VERY DIFFICULT
2. NOT BEING ABLE TO STOP OR CONTROL WORRYING: 2
GAD7 TOTAL SCORE: 14
4. TROUBLE RELAXING: 2
6. BECOMING EASILY ANNOYED OR IRRITABLE: 2
3. WORRYING TOO MUCH ABOUT DIFFERENT THINGS: 2
5. BEING SO RESTLESS THAT IT IS HARD TO SIT STILL: 2

## 2022-08-11 NOTE — TELEPHONE ENCOUNTER
diclofenac sodium (VOLTAREN) 1 % GEL    Written with dispense quantity of 350g   Pharmacy states that it only comes in 100g increments so they need a new Rx sent with either 300g or 400g.       [address], [address]   [address], [address] [address]   Phone:  [phone]  Fax:  [phone]

## 2022-08-11 NOTE — PROGRESS NOTES
possible dose increase at last appointment. Started Nurtec 5/19/2022. Insurance only covers 8 pills/month. Patient takes Maxalt as breakthrough medication. She is doing better but continues to have migraines 3-4 a week. Patient is now duly insured and would like to see if Nurtec 18 tabs monthly will be covered by her insurance. COPD with underlying Asthma  Last CAT: 30 (5/19/2022)  Based on PFT (2/15/2022) COPD Gold 2B. Pure obstructive disease with air trapping. Taking the following medications:  Symbicort inhaler BID and additional rescue doses  Albuterol inhaler PRN  Spiriva (tiotroprium) inhaler  No complaint of increased shortness of breath or sputum production today. Patient establish care with Dr. Esthela Srivastava as noted above. He is agreeable to managing COPD in the future. Hypothyroid  Lab Results   Component Value Date    .60 (H) 05/19/2022   Levothyroxine dose increased to 300 mcg daily 5/20/2022  Patient also takes liothyronine 25 mcg twice daily    Bilateral knee osteoarthritis  Bilateral knee pain discussed at prior appointment.   At that time patient was unable to afford co-pay for orthopedic referral.  Now that patient is duly insured if she would like a referral.      Patient Active Problem List   Diagnosis    RLS (restless legs syndrome)    Asthma    Psychophysiological insomnia    Hypersomnia    Osteoarthrosis    HTN (hypertension)    Hypothyroidism    Pure hypercholesterolemia    Lymphedema of right lower extremity    Abdominal wall abscess    Morbid obesity (HCC)    Allergic rhinitis    B12 deficiency    Depression    GERD (gastroesophageal reflux disease)    Hyperthyroidism with Hashimoto disease    IgA deficiency ([facility] [address]    Intertrigo    Optic nerve atrophy    PCOS (polycystic ovarian syndrome)    Poor balance    Abnormal nuclear stress test    COPD (chronic obstructive pulmonary disease) (HCC)    Degenerative disc disease, cervical    Dyslipidemia    Kidney, aplastic    Multiple joint pain    Neuropathy    Paradoxical vocal cord motion    S/P laparoscopic sleeve gastrectomy    Type 2 diabetes mellitus ([address]    Vitamin D deficiency    Vitiligo    Anxiety         Past Medical History:    Past Medical History:   Diagnosis Date    Acquired hypothyroidism 05/26/2016    ADHD (attention deficit hyperactivity disorder) Not sure    Allergic rhinitis 08/02/2013    Anemia     Ankle swelling 10/05/2017    Anxiety     Arthritis     Asthma     Bipolar disorder ([address]     Cellulitis 12/19/2017    Chest pain due to myocardial ischemia 05/01/2018    Chronic bronchitis (HCC)     Chronic kidney disease     COPD (chronic obstructive pulmonary disease) (HCC)     Depression     GERD (gastroesophageal reflux disease)     Head injury 10/19/2014    Headache     Hypertension     Hyperthyroidism with Hashimoto disease 03/13/2020    Incarcerated hernia 06/12/2021    Formatting of this note might be different from the original. Added automatically from request for surgery [id number]    Infection of deep incisional surgical site after procedure     Intertrigo     Irritable bowel syndrome     Left foot pain 05/26/2016    Lymphedema of right lower extremity 05/26/2016    Obesity     Optic nerve atrophy 09/19/2021    PARVIZ 02/25/2016    Osteoarthritis of left knee 05/26/2016    Panniculitis 03/13/2020    PCOS (polycystic ovarian syndrome) 10/05/2017    Prediabetes 10/05/2017    Psychophysiological insomnia 02/25/2016    Restless legs syndrome     RLS (restless legs syndrome) 02/25/2016    S/P laparoscopic sleeve gastrectomy 08/09/2019    S/P repair of ventral hernia 06/14/2021    Sleep apnea     Type 2 diabetes mellitus ([facility] [address] 06/13/2021    Urinary incontinence     Vitiligo        Past Surgical History:  Past Surgical History:   Procedure Laterality Date    APPENDECTOMY  07/01/2002    CHOLECYSTECTOMY  01/01/2001    HAND SURGERY Left     CTR    HERNIA REPAIR  June 2021    MOUTH SURGERY      5 teeth removed    RECTAL SURGERY N/A Lizzie Acevedo MD   busPIRone (BUSPAR) 15 MG tablet Take 15 mg by mouth in the morning and 15 mg at noon and 15 mg before bedtime. Yes Lizzie Acevedo MD   Continuous Blood Gluc Transmit (DEXCOM G6 TRANSMITTER) MISC 1 each by Does not apply route continuous Yes Wiley BARFIELD May, DO   OneTouch Delica Lancets 48V MISC Check blood sugars 4 times daily for E11.9 Yes Wiley BARFIELD May, DO   SYMBICORT 160-4.5 MCG/ACT AERO Inhale 2 puffs into the lungs 2 times daily Use for asthma exacerbations. 1-2 puffs q4h.  Max 12 puffs/ day Yes Wiley BARFIELD May, DO   ONETOUCH ULTRA strip USE TO TEST BLOOD SUGAR FOUR TIMES DAILY Yes Wiley BARFIELD May, DO   levothyroxine (SYNTHROID) 300 MCG tablet Take 1 tablet by mouth Daily Yes Wiley BARFIELD May, DO   blood glucose monitor strips PLEASE GIVE TYPE THAT MATCHES PATIENTS GLUCOMETER PER INSURANCE Yes Wiley BARFIELD May, DO   omeprazole (PRILOSEC) 40 MG delayed release capsule Take 1 capsule by mouth in the morning and at bedtime Yes Wiley BARFIELD May, DO   tiotropium (SPIRIVA HANDIHALER) 18 MCG inhalation capsule Inhale 1 capsule into the lungs daily Yes Wiley BARFIELD May, DO   lisinopril (PRINIVIL;ZESTRIL) 5 MG tablet Take 1 tablet by mouth daily Yes Wiley BARFIELD May, DO   ARIPiprazole (ABILIFY) 10 MG tablet Take 1 tablet by mouth daily Yes Wiley BARFIELD May, DO   Cholecalciferol 25 MCG (1000 UT) CHEW Take 2 tablets by mouth daily Yes Wiley BARFIELD May, DO   furosemide (LASIX) 40 MG tablet Take 1 tablet by mouth daily Yes Wiley BARFIELD May, DO   montelukast (SINGULAIR) 10 MG tablet Take 1 tablet by mouth nightly Yes Wiley BARFIELD May, DO   fluticasone (FLONASE ALLERGY RELIEF) 50 MCG/ACT nasal spray 2 sprays by Nasal route 2 times daily Yes Wiley BARFIELD May, DO   azelastine (OPTIVAR) 0.05 % ophthalmic solution Place 1 drop into both eyes 2 times daily Yes Wiley BARFIELD May, DO   azelastine (ASTELIN) 0.1 % nasal spray 1 spray by Nasal route 2 times daily Use in each nostril as directed Yes Wiley BARFIELD May, DO oxybutynin (DITROPAN-XL) 5 MG extended release tablet Take 1 tablet by mouth daily Yes Wiley BARFIELD May, DO   Continuous Blood Gluc  (DEXCOM G6 ) NGHIA 2 times daily Yes Historical Provider, MD   acetaminophen (TYLENOL) 500 MG tablet Take 500 mg by mouth every 6 hours as needed for Pain Yes Historical Provider, MD   Nutritional Supplements (GLUCOSE MANAGEMENT) TABS Take by mouth Yes Historical Provider, MD   calcium carbonate (CALCIUM 600) 600 MG TABS tablet Take 1 tablet by mouth daily Yes C Shelle Soulier, MD   Adhesive Tape (PAPER TAPE 1\"[id number]) TAPE Apply to affected areas.  Yes Chetna Dorado MD   Multiple Vitamins-Minerals (THERAPEUTIC MULTIVITAMIN-MINERALS) tablet Take 1 tablet by mouth daily Yes Historical Provider, MD   Polyethylene Glycol 3350 (MIRALAX PO) Take by mouth 2 times daily  Yes Historical Provider, MD   artificial tears (ARTIFICIAL TEARS) OINT as needed Yes Historical Provider, MD   glucose monitoring kit (FREESTYLE) monitoring kit 1 kit by Does not apply route daily as needed Yes Severo Pacheco MD   FREESTYLE LANCETS MISC 1 each by Does not apply route daily Yes Severo Pacheco MD   dextromethorphan-guaiFENesin (MUCINEX DM)  MG per SR tablet Take 1 tablet by mouth every 12 hours as needed  Yes Historical Provider, MD   rizatriptan (MAXALT) 10 MG tablet take 1 tablet by mouth once as needed for migraine may repeat in 2 hours if needed  Jabier London MD   propranolol (INDERAL LA) 80 MG extended release capsule Take 1 capsule by mouth in the morning and at bedtime  Wiley BARFIELD May, DO   atorvastatin (LIPITOR) 40 MG tablet Take 1 tablet by mouth daily  Wiley BARFIELD May, DO       Allergies:    Bee venom, Black pepper-turmeric, Dust mite extract, Ibuprofen, Nsaids, and Other    Family History:       Problem Relation Age of Onset    Hypertension Mother     Hypothyroidism Mother     Depression Mother     Diabetes Mother     Heart Disease Mother     High Blood Pressure Mother     High Cholesterol Mother     Kidney Disease Mother         Had kidney failure    Mental Illness Mother     Miscarriages / Stillbirths Mother     Obesity Mother     Stroke Mother     Asthma Father     Diabetes Father     Emphysema Father     Hypertension Father     Depression Father     Early Death Father     Heart Attack Father     Heart Disease Father     High Blood Pressure Father     High Cholesterol Father     Obesity Father     Substance Abuse Father     Hypertension Sister         problems with pregnancy.     Asthma Sister     Depression Sister     Hearing Loss Sister     Heart Disease Sister     High Blood Pressure Sister     High Cholesterol Sister     Learning Disabilities Sister     Mental Illness Sister     Obesity Sister     Substance Abuse Sister     Diabetes Paternal Grandmother     Heart Attack Paternal Grandmother     Stroke Paternal Grandmother     Cervical Cancer Paternal Aunt     Breast Cancer Paternal Aunt     Cancer Paternal Aunt     Depression Paternal Aunt     Mental Illness Paternal Aunt     Miscarriages / Stillbirths Paternal Aunt     Breast Cancer Maternal Aunt     Cancer Maternal Aunt     Heart Disease Maternal Aunt     High Blood Pressure Maternal Aunt     Immune Disorder Maternal Aunt     Obesity Maternal Aunt     Breast Cancer Maternal Aunt     Cancer Maternal Aunt     Heart Disease Maternal Aunt     High Blood Pressure Maternal Aunt     Immune Disorder Maternal Belinda Jyotsna  had thyroid remove    Obesity Maternal Aunt     Ovarian Cancer Paternal Aunt     Heart Attack Maternal Grandfather     High Blood Pressure Maternal Grandfather     Obesity Maternal Grandfather     Stroke Maternal Grandfather     Heart Attack Maternal Grandmother     High Blood Pressure Maternal Grandmother     Obesity Maternal Grandmother     Stroke Maternal Grandmother     Vision Loss Maternal Grandmother     Hearing Loss Maternal Uncle     Heart Disease Maternal Uncle     High Blood Pressure Maternal Uncle Obesity Maternal Uncle     Heart Disease Maternal Uncle     High Blood Pressure Maternal Uncle     Obesity Maternal Uncle     Heart Disease Maternal Uncle     High Blood Pressure Maternal Uncle     Obesity Maternal Uncle     Heart Disease Paternal Uncle     Obesity Paternal Uncle     Heart Disease Paternal Uncle     Obesity Paternal Uncle     Heart Disease Maternal Aunt     High Blood Pressure Maternal Aunt     Immune Disorder Maternal Aunt     Obesity Maternal Aunt     Immune Disorder Maternal Cousin         Tree Putnam had thyroid removed    Immune Disorder Maternal Cousin         Frankie had thyroid removed         Health Maintenance Completed:  Health Maintenance   Topic Date Due    Diabetic retinal exam  Never done    Hepatitis B vaccine (1 of 3 - Risk 3-dose series) Never done    COVID-19 Vaccine (1) 01/04/2024 (Originally 2/23/1976)    Flu vaccine (1) 09/01/2022    Diabetic foot exam  11/08/2022    A1C test (Diabetic or Prediabetic)  11/08/2022    Pneumococcal 0-64 years Vaccine (3 - PCV) 11/08/2022    Diabetic microalbuminuria test  03/11/2023    Lipids  03/11/2023    Depression Monitoring  07/22/2023    Cervical cancer screen  07/22/2027    DTaP/Tdap/Td vaccine (3 - Td or Tdap) 11/08/2031    Hepatitis C screen  Completed    HIV screen  Completed    Hepatitis A vaccine  Aged Out    Hib vaccine  Aged Out    Meningococcal (ACWY) vaccine  Aged Out          Immunization History   Administered Date(s) Administered    Influenza, MDCK Quadv, IM, PF (Flucelvax 2 yrs and older) 12/30/2019, 10/21/2020    Influenza, MDCK Quadv, with preserv IM (Flucelvax 2 yrs and older) 11/08/2021    Influenza, MDCK, Preservative free 08/21/2017    Pneumococcal Polysaccharide ([id number]) 12/04/2017, 11/08/2021    Tdap (Boostrix, Adacel) 03/31/2016, 11/08/2021         Review of Systems:  Review of Systems   Constitutional:  Negative for activity change, chills and fever.    HENT:  Negative for congestion, rhinorrhea and sore throat. Eyes:  Negative for pain and discharge. Respiratory:  Negative for cough and shortness of breath. Cardiovascular:  Negative for chest pain and palpitations. Gastrointestinal:  Negative for abdominal pain, nausea and vomiting. Genitourinary:  Positive for vaginal discharge. Negative for difficulty urinating and dysuria. Musculoskeletal:  Negative for arthralgias and myalgias. Skin:  Negative for rash and wound. Neurological:  Negative for dizziness and syncope. Psychiatric/Behavioral:  Negative for agitation and behavioral problems. Physical Exam:   Vitals:    08/11/22 1315   BP: 128/72   Site: Left Upper Arm   Position: Sitting   Cuff Size: Large Adult   Pulse: 65   Resp: 18   Temp: 97.1 °F (36.2 °C)   TempSrc: Infrared   SpO2: 98%   Weight: (!) 326 lb 12.8 oz (148.2 kg)   Height: 5' (1.524 m)     Body mass index is 63.82 kg/m². Wt Readings from Last 3 Encounters:   08/11/22 (!) 326 lb 12.8 oz (148.2 kg)   07/27/22 (!) 328 lb (148.8 kg)   07/22/22 (!) 329 lb (149.2 kg)       BP Readings from Last 3 Encounters:   08/11/22 128/72   07/27/22 (!) 139/90   07/22/22 132/74       Physical Exam  Constitutional:       Appearance: Normal appearance. HENT:      Head: Normocephalic and atraumatic. Nose: Nose normal. No congestion or rhinorrhea. Eyes:      Extraocular Movements: Extraocular movements intact. Conjunctiva/sclera: Conjunctivae normal.      Pupils: Pupils are equal, round, and reactive to light. Cardiovascular:      Rate and Rhythm: Normal rate and regular rhythm. Pulses: Normal pulses. Heart sounds: Normal heart sounds. Pulmonary:      Effort: Pulmonary effort is normal.      Breath sounds: Normal breath sounds. Abdominal:      General: Abdomen is flat. Bowel sounds are normal.      Palpations: Abdomen is soft. Tenderness: There is no abdominal tenderness. There is no guarding or rebound.    Musculoskeletal:         General: Normal range of today.  [facility]    Type 2 diabetes mellitus with diabetic neuropathy, unspecified whether long term insulin use ([facility] [address]  -    A1c of 5.4 (11/8/2021) indicate that diabetes is well controlled. However, patient has labile blood sugars on home log and is prone to symptomatic hypoglycemia. Patient is not currently taking any diabetes medications. -  [facility]    Migraine with status migrainosus, not intractable, unspecified migraine type, not well controlled  -     NURTEC 75 MG TBDP; Take 75 mg by mouth every other day  -  Continue taking Maxalt as breakthrough medication.  -  Continue propranolol XR 80 mg BID as preventative medication  - Follow-up in 3 months to assess migraine relief. Yeast infection, persistent versus recurrent  -Based on patient recent diagnosis of yeast infection and treatment with single dose of diflucan which improved her symptoms significantly but did not fully resolve symptoms, continued vaginal discharge and itching likely due to persistent yeast infection. - fluconazole (DIFLUCAN) 150 MG tablet; Take 1 tablet by mouth once for 1 dose  -Recommend that patient follow-up if symptoms have not resolved in the next week. Medication refills/ referrals, conditions not discussed in depth today:    Depression, unspecified depression type  -     DULoxetine (CYMBALTA) 30 MG extended release capsule; Take 1 capsule by mouth in the morning and 1 capsule before bedtime. Osteoarthritis of both knees, unspecified osteoarthritis type  -     [address] [place] [address] and Sports Medicine  -     Discontinue: diclofenac sodium (VOLTAREN) 1 % GEL; Apply 4 g topically in the morning and 4 g at noon and 4 g in the evening and 4 g before bedtime.  -     diclofenac sodium (VOLTAREN) 1 % GEL;  Apply topically 4 times daily as needed for Pain Apply topically 2 times daily    Intertrigo        -nystatin powder    Hx of anaphylaxis  - EPINEPHrine (EPIPEN 2-SHELBIE) 0.3 MG/0.3ML SOAJ injection; Inject 0.3 mLs into the muscle as needed (In the case of anaphylaxis) Use as directed for allergic reaction             Health Maintenance Due:  Health Maintenance Due   Topic Date Due    Diabetic retinal exam  Never done    Hepatitis B vaccine (1 of 3 - Risk 3-dose series) Never done          Health care decision maker:  <72years old      Health Maintenance: (USPSTF Recommendations)  (F) Breast Cancer Screen: (40-49 (C), 50-74 biennial screening mammogram (B))  (F) Cervical Cancer Screen: (21-29 q3yr cytology alone; 30-65 q3yr cytology alone, q5yr with hrHPV alone, or q5yr cytology+hrHPV (A)) 7/22/22  CRC/Colonoscopy Screening: (adults 45-49 (B), 50-75 (A))  Lung Ca Screening: Annual LDCT (+smoker age 49-80, smoked within 15 years, total of 20 pack yr history (B)): Never smoker  DEXA Screen: (women >65 and older, <65 if at risk/postmenopausal (B))  HIV Screen: (16-65 yr old, and all pregnant patients (A)): 11/8/2021  Hep C Screen: (18-79 yr old (B)): 11/8/2021  [facility] Screen: (all pts with cirrhosis and high risk Hep B (US q6 mo)):  Immunizations:    RTC:  Return in about 3 months (around 11/11/2022). EMR Dragon/transcription disclaimer:  Much of this encounter note is electronic transcription/translation of spoken language to printed texts. The electronic translation of spoken language may be erroneous, or at times, nonsensical words or phrases may be inadvertently transcribed.   Although I have reviewed the note for such errors, some may still exist.

## 2022-08-12 ENCOUNTER — TELEPHONE (OUTPATIENT)
Dept: ADMINISTRATIVE | Age: 47
End: 2022-08-12

## 2022-08-12 LAB — TSH SERPL DL<=0.05 MIU/L-ACNC: 2.7 UIU/ML (ref 0.27–4.2)

## 2022-08-12 NOTE — TELEPHONE ENCOUNTER
PA submitted VIA CMM for  Nurtec 75MG dispersible tablets  (Key: [id number]) - [id number]  PENDING      Received a denial

## 2022-08-17 ENCOUNTER — OFFICE VISIT (OUTPATIENT)
Dept: ORTHOPEDIC SURGERY | Age: 47
End: 2022-08-17
Payer: MEDICARE

## 2022-08-17 ENCOUNTER — TELEPHONE (OUTPATIENT)
Dept: ORTHOPEDIC SURGERY | Age: 47
End: 2022-08-17

## 2022-08-17 VITALS — WEIGHT: 293 LBS | BODY MASS INDEX: 57.52 KG/M2 | HEIGHT: 60 IN

## 2022-08-17 DIAGNOSIS — M25.562 ACUTE PAIN OF LEFT KNEE: ICD-10-CM

## 2022-08-17 DIAGNOSIS — M17.0 PRIMARY OSTEOARTHRITIS OF BOTH KNEES: ICD-10-CM

## 2022-08-17 DIAGNOSIS — M25.561 ACUTE PAIN OF RIGHT KNEE: Primary | ICD-10-CM

## 2022-08-17 PROCEDURE — 99204 OFFICE O/P NEW MOD 45 MIN: CPT | Performed by: PHYSICIAN ASSISTANT

## 2022-08-17 RX ORDER — VIT C/B6/B5/MAGNESIUM/HERB 173 50-5-6-5MG
1000 CAPSULE ORAL DAILY
Qty: 60 CAPSULE | Refills: 3 | Status: CANCELLED | OUTPATIENT
Start: 2022-08-17 | End: 2022-09-16

## 2022-08-17 NOTE — TELEPHONE ENCOUNTER
General Question     Subject: PRESCRIPTION Debora DANG   Patient and /or Facility Request:   Contact Number:  [phone]    PATIENT WOULD LIKE A CALL REGARDING A PRESCRIPTION THAT SHE WAS TOLD WOULD BE SENT TO THE PHARMACY FOLLOWING HER APPOINTMENT TODAY PATIENT CAN BE REACHED AT THE NUMBER LISTED ABOVE

## 2022-08-17 NOTE — PROGRESS NOTES
Chief Complaint    Knee Pain (Bilateral knees)      History of Present Illness:  Karla Mccabe is a 42 y.o. female who is morbidly obese with multiple medical and musculoskeletal issues presents today for evaluation of bilateral knee pain left greater than right. Patient states that she has had bilateral knee pain for several years and has been treated in the past with conservative treatments of injections and viscosupplementation. She also has had bracing and ambulates with a Rollator. Pain is mainly over the medial aspect of both knees that is increased with any weightbearing. She does have a sense of instability when she walks and she  does have a history of patella subluxation. She states that she has not been able to wear her braces due to lymphedema. Medical History:  Patient's medications, allergies, past medical, surgical, social and family histories were reviewed and updated as appropriate. Review of Systems:  Relevant review of systems reviewed on 8/17/2022 and available in the patient's chart    Vital Signs: There were no vitals filed for this visit. General Exam:   Constitutional: Patient is adequately groomed with no evidence of malnutrition  DTRs: Deep tendon reflexes are intact  Mental Status: The patient is oriented to time, place and person. The patient's mood and affect are appropriate. Lymphatic: The lymphatic examination bilaterally reveals all areas to be without enlargement or induration. Vascular: Examination reveals no swelling or calf tenderness. Peripheral pulses are palpable and 2+. Neurological: The patient has good coordination. There is no weakness or sensory deficit. Body mass index is 63.67 kg/m². Bilateral knee Examination:    Inspection: Inspection shows no obvious deformity but there are very large lower extremities with no erythema or signs of infection.   There are no cutaneous lesions    Palpation:  There is tenderness to palpation along the medial joint line bilaterally    Range of Motion: 0 extension to 10 of active flexion. Strength:  4+/5 quadriceps and hamstrings    Special Tests: The knee is stable to varus valgus stressing/anterior posterior drawer. Negative Homans test.                                 Skin: There are no rashes, ulcerations or lesions. Gait: mildly antalgic favoring the left side    Distal neurovascular status is grossly intact    Radiology:   X-rays obtained and reviewed in office:  Views 4 views including AP weightbearing, PA flexed weightbearing, lateral, and skyline  Location left and right knee:    Impression:   There is grade 3-4/4 osteoarthritis noted within the medial compartment of both knees with sclerosis and osteophyte formation present. The patellofemoral joint grade 2/4 osteoarthritis noted  No fractures are identified. Impression:  Encounter Diagnoses   Name Primary? Acute pain of right knee Yes    Acute pain of left knee        Office Procedures:  Orders Placed This Encounter   Procedures    XR KNEE RIGHT (MIN 4 VIEWS)     Standing Status:   Future     Number of Occurrences:   1     Standing Expiration Date:   8/17/2023    XR KNEE LEFT (MIN 4 VIEWS)     Standing Status:   Future     Number of Occurrences:   1     Standing Expiration Date:   8/17/2023       Treatment Plan:  I discussed with the patient the nature of osteoarthritis of the knee. We talked about treatment of arthritis and the various options that are involved with this. The patient understands that the treatments can vary from essentially doing nothing to a total joint replacement arthroplasty for arthritis. . We also discussed the possibility of brace wear or orthotic wear if the patient has significant varus alignment. We did discuss outpatient physical therapy and she was referred to outpatient physical therapy for range of motion progressive strengthening exercises for both knees. Follow-up:  With Dr. Spencer Sanchez for his continue evaluation and care.     Konrad Fontaine PA-C  Board certified by the [address] [facility]

## 2022-08-19 RX ORDER — FLUCONAZOLE 150 MG/1
TABLET ORAL PRN
COMMUNITY
Start: 2022-08-11

## 2022-08-20 DIAGNOSIS — J44.9 CHRONIC OBSTRUCTIVE PULMONARY DISEASE, UNSPECIFIED COPD TYPE (HCC): ICD-10-CM

## 2022-08-22 ENCOUNTER — CARE COORDINATION (OUTPATIENT)
Dept: CARE COORDINATION | Age: 47
End: 2022-08-22

## 2022-08-22 ENCOUNTER — OFFICE VISIT (OUTPATIENT)
Dept: ENDOCRINOLOGY | Age: 47
End: 2022-08-22
Payer: MEDICARE

## 2022-08-22 VITALS
HEIGHT: 60 IN | WEIGHT: 293 LBS | TEMPERATURE: 96.9 F | DIASTOLIC BLOOD PRESSURE: 78 MMHG | BODY MASS INDEX: 57.52 KG/M2 | OXYGEN SATURATION: 99 % | SYSTOLIC BLOOD PRESSURE: 124 MMHG | HEART RATE: 92 BPM

## 2022-08-22 DIAGNOSIS — E03.9 HYPOTHYROIDISM, UNSPECIFIED TYPE: ICD-10-CM

## 2022-08-22 DIAGNOSIS — E03.9 ACQUIRED HYPOTHYROIDISM: ICD-10-CM

## 2022-08-22 DIAGNOSIS — E11.40 TYPE 2 DIABETES MELLITUS WITH DIABETIC NEUROPATHY, UNSPECIFIED WHETHER LONG TERM INSULIN USE (HCC): Primary | ICD-10-CM

## 2022-08-22 LAB — HBA1C MFR BLD: 5 %

## 2022-08-22 PROCEDURE — 83036 HEMOGLOBIN GLYCOSYLATED A1C: CPT | Performed by: INTERNAL MEDICINE

## 2022-08-22 PROCEDURE — 95251 CONT GLUC MNTR ANALYSIS I&R: CPT | Performed by: INTERNAL MEDICINE

## 2022-08-22 PROCEDURE — 99204 OFFICE O/P NEW MOD 45 MIN: CPT | Performed by: INTERNAL MEDICINE

## 2022-08-22 RX ORDER — LIOTHYRONINE SODIUM 25 UG/1
25 TABLET ORAL 2 TIMES DAILY
Qty: 180 TABLET | Refills: 3 | Status: SHIPPED | OUTPATIENT
Start: 2022-08-22 | End: 2022-11-20

## 2022-08-22 RX ORDER — TIOTROPIUM BROMIDE 18 UG/1
CAPSULE ORAL; RESPIRATORY (INHALATION)
Qty: 90 CAPSULE | Refills: 0 | Status: SHIPPED | OUTPATIENT
Start: 2022-08-22

## 2022-08-22 RX ORDER — LEVOTHYROXINE SODIUM 300 UG/1
300 TABLET ORAL DAILY
Qty: 90 TABLET | Refills: 3 | Status: SHIPPED | OUTPATIENT
Start: 2022-08-22 | End: 2022-11-20

## 2022-08-22 NOTE — TELEPHONE ENCOUNTER
Refill Request       Last Seen: Last Seen Department: 8/11/2022  Last Seen by PCP: 8/11/2022    Last Written: 01/14/2022    Next Appointment:   Future Appointments   Date Time Provider Israel Kera   8/24/2022  1:00 PM Scott Wu, PT [facility]   8/24/2022  2:00 PM Tj Canseco PA-C [facility] ORTHO [facility]   8/26/2022  1:40 PM Margretta Fabry, MD [facility] MMA   9/26/2022  8:30 PM SCHEDULE, [facility] SLEEP ROOM 3 [facility] SLEEP [facility]   11/9/2022  1:15 PM Dewayne Flores MD AND PULM [facility]   11/14/2022  1:30 PM Wiley Conte DO [facility] AND RES [facility]             Requested Prescriptions     Pending Prescriptions Disp Refills    SPIRIVA HANDIHALER 18 MCG inhalation capsule [Pharmacy Med Name: Spiriva HandiHaler Inhalation Capsule 18 MCG] 90 capsule 0     Sig: INHALE CONTENTS 1 CAPSULE BY MOUTH EVERY DAY

## 2022-08-22 NOTE — PROGRESS NOTES
Subjective:      43 y/o WF who is here for thyroid evaluation. She has moved from [address], seeing endocrinologist there    She has hypothyroidism for years, Dx at age 27    Was diagnosed with Hashimoto's    She is on levothyroxine 300mcg  and liothyronine 25mcg BID    States was on tirosint liquid in the past, absorbing it well. When she was at [facility], placed back on oral medication    8/22 TSH 2.7  5/22   3/22   11/21  FT4 0.2 FT3 1.2  9/20 TSH 0.04 Ft4 2.2  Reports variable results in the past since Dx    Current complaints: see HPI    History of radiation to patient's neck: no  Family history includes Aunt Hashimoto's , mom Hyperthyroid  Family history of thyroid cancer: no    Moderate, uncontrolled, now improved    She reports was on 800mcg of levothyroxine     H/o gastric sleeve in 2018    States hernia surgery at [address] in 2021  Saw Endocrinologist at the hospital admission  States they advised also to see endocrinology and wanted to discuss furthur work up.  Advised needs information of Endocrinologist at [facility]    She has a h/o T2DM Dx in 2021    Mild, controlled    5/21 A1c 5.7%  11/21 A1c  5.4%---> 5.5%  Glucose 110    Not on medication    She was placed on ozempic, did not feel well on it    She reports issues with hypoglycemia, glucose < 90 drops lows quickly        Is on Dexcom to prevent hypoglycemia  Reports 30-40 in the past    She reports hypoglycemia in the past, even  in 35s    Was told has reactive hypoglycemia    Dexcom  Average 129    She was on Metformin the past in 2018    Lost 169 lb in the past, gained 50 lbs    M/C  3/22    Reports was advised glucose control boost by dietician     Past Medical History:   Diagnosis Date    Acquired hypothyroidism 05/26/2016    ADHD (attention deficit hyperactivity disorder) Not sure    Allergic rhinitis 08/02/2013    Anemia     Ankle swelling 10/05/2017    Anxiety     Arthritis     Asthma     Bipolar disorder ([facility]     Cellulitis 12/19/2017    Chest pain due to myocardial ischemia 05/01/2018    Chronic bronchitis (HCC)     Chronic kidney disease     COPD (chronic obstructive pulmonary disease) (HCC)     Depression     GERD (gastroesophageal reflux disease)     Head injury 10/19/2014    Headache     Hypertension     Hyperthyroidism with Hashimoto disease 03/13/2020    Incarcerated hernia 06/12/2021    Formatting of this note might be different from the original. 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 06/13/2021    Urinary incontinence     Vitiligo      Past Surgical History:   Procedure Laterality Date    APPENDECTOMY  07/01/2002    CHOLECYSTECTOMY  01/01/2001    HAND SURGERY Left     CTR    HERNIA REPAIR  June 2021    MOUTH SURGERY      5 teeth removed    RECTAL SURGERY N/A 07/31/2021    ABDOMINAL WALL INCISION AND DRAINAGE performed by Keaton Duenas MD at [address] [facility] GASTROPLASTY  [address]  06/2022    UPPER GASTROINTESTINAL ENDOSCOPY      VENTRAL HERNIA REPAIR  07/2021     Current Outpatient Medications   Medication Sig Dispense Refill    fluconazole (DIFLUCAN) 150 MG tablet TAKE 1 TABLET BY MOUTH ONCE FOR 1 DOSE      polyethylene glycol (GLYCOLAX) 17 GM/SCOOP powder Take 17 g by mouth in the morning.  1 each 1    nystatin (MYCOSTATIN) 346796 UNIT/GM powder APPLY TOPICALLY 3 TIMES DAILY AS NEEDED TO MANAGE RASH AND MOISTURE 60 g 1    DULoxetine (CYMBALTA) 30 MG extended release capsule Take 1 capsule by mouth in the morning and 1 capsule before bedtime. 180 capsule 3    liothyronine (CYTOMEL) 25 MCG tablet Take 1 tablet by mouth in the morning and 1 tablet before bedtime. Do all this for 180 doses. 180 tablet 3    NURTEC 75 MG TBDP Take 75 mg by mouth every other day 18 tablet 3    EPINEPHrine (EPIPEN 2-SHELBIE) 0.3 MG/0.3ML SOAJ injection Inject 0.3 mLs into the muscle as needed (In the case of anaphylaxis) Use as directed for allergic reaction 1 each 2    diclofenac sodium (VOLTAREN) 1 % GEL Apply topically 4 times daily as needed for Pain Apply topically 2 times daily 100 g 0    cetirizine (ZYRTEC) 10 MG tablet Take 1 tablet by mouth in the morning. 30 tablet 1    amLODIPine (NORVASC) 5 MG tablet TAKE 1 TABLET BY MOUTH EVERY DAY 30 tablet 0    albuterol sulfate HFA (PROVENTIL;VENTOLIN;PROAIR) 108 (90 Base) MCG/ACT inhaler INHALE 2 PUFFS BY MOUTH EVERY SIX HOURS AS NEEDED FOR WHEEZING 18 g 0    busPIRone (BUSPAR) 15 MG tablet Take 15 mg by mouth in the morning and 15 mg at noon and 15 mg before bedtime. 90 tablet 0    Continuous Blood Gluc Transmit (DEXCOM G6 TRANSMITTER) MISC 1 each by Does not apply route continuous 1 each 3    OneTouch Delica Lancets 30P MISC Check blood sugars 4 times daily for E11.9 100 each 11    SYMBICORT 160-4.5 MCG/ACT AERO Inhale 2 puffs into the lungs 2 times daily Use for asthma exacerbations. 1-2 puffs q4h.  Max 12 puffs/ day 10.2 g 3    ONETOUCH ULTRA strip USE TO TEST BLOOD SUGAR FOUR TIMES DAILY 100 each 5    blood glucose monitor strips PLEASE GIVE TYPE THAT MATCHES PATIENTS GLUCOMETER PER INSURANCE 200 strip 0    omeprazole (PRILOSEC) 40 MG delayed release capsule Take 1 capsule by mouth in the morning and at bedtime 180 capsule 3    tiotropium (SPIRIVA HANDIHALER) 18 MCG inhalation capsule Inhale 1 capsule into the lungs daily 90 capsule 1    lisinopril (PRINIVIL;ZESTRIL) 5 MG tablet Take 1 tablet by mouth daily 90 tablet 1    ARIPiprazole (ABILIFY) 10 MG tablet Take 1 tablet by mouth daily 90 tablet 3    Cholecalciferol 25 MCG (1000 UT) CHEW Take 2 tablets by mouth daily 60 tablet 3    furosemide (LASIX) 40 MG tablet Take 1 tablet by mouth daily 60 tablet 0    montelukast (SINGULAIR) 10 MG tablet Take 1 tablet by mouth nightly 30 tablet 1    fluticasone (FLONASE ALLERGY RELIEF) 50 MCG/ACT nasal spray 2 sprays by Nasal route 2 times daily 16 g 1    azelastine (OPTIVAR) 0.05 % ophthalmic solution Place 1 drop into both eyes 2 times daily 1 each 1    azelastine (ASTELIN) 0.1 % nasal spray 1 spray by Nasal route 2 times daily Use in each nostril as directed 60 mL 1    oxybutynin (DITROPAN-XL) 5 MG extended release tablet Take 1 tablet by mouth daily 30 tablet 1    Continuous Blood Gluc  (DEXCOM G6 ) NGHIA 2 times daily      acetaminophen (TYLENOL) 500 MG tablet Take 500 mg by mouth every 6 hours as needed for Pain      Nutritional Supplements (GLUCOSE MANAGEMENT) TABS Take by mouth      calcium carbonate (CALCIUM 600) 600 MG TABS tablet Take 1 tablet by mouth daily 30 tablet 0    Adhesive Tape (PAPER TAPE 1\"[id number]) TAPE Apply to affected areas.  2 each 5    Multiple Vitamins-Minerals (THERAPEUTIC MULTIVITAMIN-MINERALS) tablet Take 1 tablet by mouth daily      artificial tears (ARTIFICIAL TEARS) OINT as needed      glucose monitoring kit (FREESTYLE) monitoring kit 1 kit by Does not apply route daily as needed 1 kit 0    FREESTYLE LANCETS MISC 1 each by Does not apply route daily 100 each 0    dextromethorphan-guaiFENesin (MUCINEX DM)  MG per SR tablet Take 1 tablet by mouth every 12 hours as needed       EPINEPHrine (EPIPEN 2-SHELBIE) 0.3 MG/0.3ML SOAJ injection Inject 0.3 mLs into the muscle once for 1 dose Use as directed for allergic reaction 0.3 mL 1    rizatriptan (MAXALT) 10 MG tablet take 1 tablet by mouth once as needed for migraine may repeat in 2 hours if needed 27 tablet 0    propranolol (INDERAL LA) 80 MG extended release capsule Take 1 capsule by mouth in the morning and at bedtime 60 capsule 2    levothyroxine (SYNTHROID) 300 MCG tablet Take 1 tablet by mouth Daily 90 tablet 3    atorvastatin (LIPITOR) 40 MG tablet Take 1 tablet by mouth daily 90 tablet 3     No current facility-administered medications for this visit. Review of Systems  Please see scanned     Objective:    Ht 5' (1.524 m)   Wt (!) 329 lb 12.8 oz (149.6 kg)   LMP 05/03/2016   BMI 64.41 kg/m²   Wt Readings from Last 3 Encounters:   08/22/22 (!) 329 lb 12.8 oz (149.6 kg)   08/17/22 (!) 326 lb (147.9 kg)   08/11/22 (!) 326 lb 12.8 oz (148.2 kg)       Constitutional: Well-developed, appears stated age, cooperative, in no acute distress  H/E/N/M/T:atraumatic, normocephalic, external ears, nose, lips normal without lesions  No facial puffiness, no hoarseness    Eyes: Lids, lashes, conjunctivae and sclerae normal, No proptosis, no lid lag, no stare, no periorbital edema, extraocular movements intact. Neck: supple, symmetrical, trachea midline   Thyroid: gland size normal, non-tender on palpation  Respiratory: clear to auscultation bilaterally and breathing is unlabored  Cardiovascular: regular rate and rhythm, S1, S2, regular rate and rhythm, no murmur, rub or gallop. Skin: No obvious rashes or lesions present. Skin and hair texture normal  Psychiatric: Judgement and Insight:  judgement and insight appear normal  Neuro: Normal without focal findings, speech is normal normal, speech is spontaneous, and movements are coordinated, alert and oriented x3    Lab Review  Lab Results   Component Value Date/Time    TSH 2.70 08/11/2022 02:21 PM     No results found for: FREET4      Assessment: 1. Hypothyroidism: For years, has had variable levels. She reports has been on high dose in the past. Euthyroid on current dose.  Will continue same. Advised may plan to switch back to tirosint based on response. Check HAMA and Celiac panel. Reevaluate in 3 months  2. T2DM: Diet controlled, she has had issue with hypoglycemia, on Dexcom, prevents lows, She has related hypoglycemia  3. Obesity:    Plan: 1. Check TFT  2. Antibodies, HAMA  3. Celaic panel  4. Levothyroxine 300mcg liothyronine 25mcg BID  5. May switch to [address]

## 2022-08-23 ENCOUNTER — CARE COORDINATION (OUTPATIENT)
Dept: CARE COORDINATION | Age: 47
End: 2022-08-23

## 2022-08-23 NOTE — PLAN OF CARE
[facility], [facility], [address]  Phone: [phone]   Fax:     [phone]       Physical Therapy Certification    Dear  Flakito Rizzo PA-C,    We had the pleasure of evaluating the following patient for physical therapy services at [address] [place] [address]. A summary of our findings can be found in the initial assessment below. This includes our plan of care. If you have any questions or concerns regarding these findings, please do not hesitate to contact me at the office phone number checked above.   Thank you for the referral.       Physician Signature:_______________________________Date:__________________  By signing above (or electronic signature), therapists plan is approved by physician      Patient: Luz Maria Hirsch   : 1975   MRN: [id number]  Referring Physician:  Flakito Rizzo PA-C      Evaluation Date: 2022      Medical Diagnosis Information:    M17.0 (ICD-10-CM) - Primary osteoarthritis of both knees                                            Insurance information:  [employer] MEDICARE/ medicaid     Precautions/ Contra-indications: none    C-SSRS Triggered by Intake questionnaire (Past 2 wk assessment):   [x] No, Questionnaire did not trigger screening.   [] Yes, Patient intake triggered further evaluation      [] C-SSRS Screening completed  [] PCP notified via Plan of Care  [] Emergency services notified     Latex Allergy:  [x]NO      []YES  Preferred Language for Healthcare:   [x]English       []other:    SUBJECTIVE: Patient states history of previous knee injuries in her past. Currently OA B knee pain left knee worse    Relevant Medical History:see below  FOTO Score:     Pain Scale: 8/10  Easing factors: voltaren gel  Provocative factors: stairs walking     Type: []Constant   [x]Intermittent (E03.9)  []Hyperthyroid Gastrointestinal conditions   []Constipation (F02.01)   Metabolic conditions   []Morbid obesity (E66.01)  [x]Diabetes type 1(E10.65) or 2 (E11.65)   []Neuropathy (G60.9)     Pulmonary conditions   []Asthma (J45)  []Coughing   [x]COPD (J44.9)   Psychological Disorders  [x]Anxiety (F41.9)  [x]Depression (F32.9)   []Other:   []Other:          Barriers to/and or personal factors that will affect rehab potential:              []Age  []Sex              []Motivation/Lack of Motivation                        [x]Co-Morbidities              []Cognitive Function, education/learning barriers              []Environmental, home barriers              []profession/work barriers  []past PT/medical experience  []other:  Justification:     Falls Risk Assessment (30 days):   [x] Falls Risk assessed and no intervention required.   [] Falls Risk assessed and Patient requires intervention due to being higher risk   TUG score (>12s at risk):     [] Falls education provided, including       G-Codes:       ASSESSMENT:   Functional Impairments:     []Noted lumbar/proximal hip/LE joint hypomobility   [x]Decreased LE functional ROM   [x]Decreased core/proximal hip strength and neuromuscular control   [x]Decreased LE functional strength   [x]Reduced balance/proprioceptive control   []other:      Functional Activity Limitations (from functional questionnaire and intake)   [x]Reduced ability to tolerate prolonged functional positions   [x]Reduced ability or difficulty with changes of positions or transfers between positions   [x]Reduced ability to maintain good posture and demonstrate good body mechanics with sitting, bending, and lifting   [x]Reduced ability to sleep   [x] Reduced ability or tolerance with driving and/or computer work   [x]Reduced ability to perform lifting, carrying tasks   [x]Reduced ability to squat   []Reduced ability to forward bend   [x]Reduced ability to ambulate prolonged functional periods/distances/surfaces   [x]Reduced ability to ascend/descend stairs   [x]Reduced ability to run, hop, cut or jump   []other:    Participation Restrictions   [x]Reduced participation in self care activities   [x]Reduced participation in home management activities   [x]Reduced participation in work activities   []Reduced participation in social activities. []Reduced participation in sport/recreation activities. Classification :    []Signs/symptoms consistent with post-surgical status including decreased ROM, strength and function.    []Signs/symptoms consistent with joint sprain/strain   [x]Signs/symptoms consistent with patella-femoral syndrome   [x]Signs/symptoms consistent with knee OA/hip OA   []Signs/symptoms consistent with internal derangement of knee/Hip   []Signs/symptoms consistent with functional hip weakness/NMR control      []Signs/symptoms consistent with tendinitis/tendinosis    []signs/symptoms consistent with pathology which may benefit from Dry needling      []other:      Prognosis/Rehab Potential:      []Excellent   [x]Good    []Fair   []Poor    Tolerance of evaluation/treatment:    []Excellent   [x]Good    []Fair   []Poor    Physical Therapy Evaluation Complexity Justification  [x] A history of present problem with:  [] no personal factors and/or comorbidities that impact the plan of care;  []1-2 personal factors and/or comorbidities that impact the plan of care  [x]3 personal factors and/or comorbidities that impact the plan of care  [x] An examination of body systems using standardized tests and measures addressing any of the following: body structures and functions (impairments), activity limitations, and/or participation restrictions;:  [x] a total of 1-2 or more elements   [] a total of 3 or more elements   [] a total of 4 or more elements   [x] A clinical presentation with:  [x] stable and/or uncomplicated characteristics   [] evolving clinical presentation with changing characteristics  [] unstable and unpredictable characteristics;   [x] Clinical decision making of [x] low, [] moderate, [] high complexity using standardized patient assessment instrument and/or measurable assessment of functional outcome. [x] EVAL (LOW) 18477 (typically 20 minutes face-to-face)  [] EVAL (MOD) 70687 (typically 30 minutes face-to-face)  [] EVAL (HIGH) 15361 (typically 45 minutes face-to-face)  [] RE-EVAL     PLAN:   Frequency/Duration:  2 days per week for 6-8 Weeks:  Interventions:  [x]  Therapeutic exercise including: strength training, ROM, for Lower extremity and core   [x]  NMR activation and proprioception for LE, Glutes and Core   [x]  Manual therapy as indicated for LE, Hip and spine to include: Dry Needling/IASTM, STM, PROM, Gr I-IV mobilizations, manipulation. [x] Modalities as needed that may include: thermal agents, E-stim, Biofeedback, US, iontophoresis as indicated  [x] Patient education on joint protection, postural re-education, activity modification, progression of HEP. HEP instruction: Refer to [address] [place] [address] access code and exercises on the 1st visit treatment note    GOALS:  Patient stated goal: walk/ stairs     Therapist goals for Patient:   Short Term Goals: To be achieved in: 2 weeks  1. Independent in HEP and progression per patient tolerance, in order to prevent re-injury. [x] Progressing: [] Met: [] Not Met: [] Adjusted     2. Patient will have a decrease in pain to facilitate improvement in movement, function, and ADLs as indicated by Functional Deficits. [x] Progressing: [] Met: [] Not Met: [] Adjusted     Long Term Goals: To be achieved in: 6-8 weeks  1. FOTO score will match or exceed predicted score to assist with reaching prior level of function. [x] Progressing: [] Met: [] Not Met: [] Adjusted     2. Patient will demonstrate increased AROM to 135-0 to allow for proper joint functioning as indicated by patients Functional Deficits.    [x] Progressing: [] Met: [] Not Met: [] Adjusted     3. Patient will demonstrate an increase in Strength to good proximal hip strength and control, within 5lb HHD in LE to allow for proper functional mobility as indicated by patients Functional Deficits. [x] Progressing: [] Met: [] Not Met: [] Adjusted     4. Patient will return to [facility] for  functional activities without increased symptoms or restriction. [x] Progressing: [] Met: [] Not Met: [] Adjusted     5.  Walk/ stand/ stairs with min to no limitations (patient specific functional goal)    [x] Progressing: [] Met: [] Not Met: [] Adjusted      Electronically signed by:  Quynh Fragoso PT

## 2022-08-23 NOTE — CARE COORDINATION
Ambulatory Care Coordination Note  8/23/2022    ACC: Tiffanie Gerardo, RN    Summary Note: Returning call. Reports fs are usually good and <130's. Reviewed last a1c 5.4. Did see endo and will be checking pt for celiac. Plans to call dietician to discuss this. No sob noted while speaking on phone. Reports will be switching to [place] the first of the month and plans to meet with a CM as well for medicaid. PLAN  1) fu appts  2) review sob  3) review fs  4) dc if dietician is finished  Diabetes Assessment      Meal Planning: Avoidance of concentrated sweets, Plate Method   How often do you test your blood sugar?: Meals, Bedtime   Do you have barriers with adherence to non-pharmacologic self-management interventions?  (Nutrition/Exercise/Self-Monitoring): Yes   Have you ever had to go to the ED for symptoms of low blood sugar?: No       No patient-reported symptoms   Last Blood Sugar Value: 130   Blood Sugar Monitoring Regimen: Before Meals   Blood Sugar Trends: No Change         and   COPD Assessment    Does the patient understand envrionmental exposure?: Yes  Is the patient able to verbalize Rescue vs. Long Acting medications?: Yes  Does the patient have a nebulizer?: No  Does the patient use a space with inhaled medications?: No     No patient-reported symptoms         Symptoms:                Lab Results       None            Care Coordination Interventions    Referral from Primary Care Provider: No  Suggested Interventions and Community Resources  Diabetes Education: Completed  Fall Risk Prevention: Completed  Disease Association: Completed (Comment: COPD)  Registered Dietician: Completed (Comment: 6/3/22-referral made)  Zone Management Tools: Completed (Comment: 6/8/22-reviewed)          Goals Addressed                      This Visit's Progress      Patient Stated (pt-stated)   Improving      Will try to eat more vegetables and fruits    Barriers: financial and lack of support  Plan for overcoming my tablet take 1 tablet by mouth once as needed for migraine may repeat in 2 hours if needed 8/4/22 8/4/22  Jaunita Spatz, MD   albuterol sulfate HFA (PROVENTIL;VENTOLIN;PROAIR) 108 (90 Base) MCG/ACT inhaler INHALE 2 PUFFS BY MOUTH EVERY SIX HOURS AS NEEDED FOR WHEEZING 8/4/22   Jaunita Spatz, MD   busPIRone (BUSPAR) 15 MG tablet Take 15 mg by mouth in the morning and 15 mg at noon and 15 mg before bedtime. 8/4/22 9/3/22  Jaunita Spatz, MD   Continuous Blood Gluc Transmit (DEXCOM G6 TRANSMITTER) MISC 1 each by Does not apply route continuous 6/2/22 8/31/22  Wiley BARFIELD May, DO   propranolol (INDERAL LA) 80 MG extended release capsule Take 1 capsule by mouth in the morning and at bedtime 6/2/22 7/27/22  Wiley BARFIELD May, DO   OneTouch Delica Lancets 14S MISC Check blood sugars 4 times daily for E11.9 5/31/22   Wiley BARFIELD May, DO   SYMBICORT 160-4.5 MCG/ACT AERO Inhale 2 puffs into the lungs 2 times daily Use for asthma exacerbations. 1-2 puffs q4h.  Max 12 puffs/ day 5/25/22   Wiley BARFIELD May, DO   ONETOUCH ULTRA strip USE TO TEST BLOOD SUGAR FOUR TIMES DAILY 5/24/22   Wiley BARFIELD May, DO   blood glucose monitor strips PLEASE GIVE TYPE THAT MATCHES PATIENTS GLUCOMETER PER INSURANCE 5/19/22   Wiley BARFIELD May, DO   atorvastatin (LIPITOR) 40 MG tablet Take 1 tablet by mouth daily 3/18/22 7/27/22  Wiley BARFIELD May, DO   omeprazole (PRILOSEC) 40 MG delayed release capsule Take 1 capsule by mouth in the morning and at bedtime 3/11/22 3/6/23  Wiley BARFIELD May, DO   lisinopril (PRINIVIL;ZESTRIL) 5 MG tablet Take 1 tablet by mouth daily 12/7/21   Wiley BARFIELD May, DO   ARIPiprazole (ABILIFY) 10 MG tablet Take 1 tablet by mouth daily 12/7/21   Wiley BARFIELD May, DO   Cholecalciferol 25 MCG (1000 UT) CHEW Take 2 tablets by mouth daily 11/23/21   Wiley BARFIELD May, DO   furosemide (LASIX) 40 MG tablet Take 1 tablet by mouth daily 11/8/21   Wiley BARFIELD May, DO   montelukast (SINGULAIR) 10 MG tablet Take 1 tablet by mouth nightly 11/8/21 Wiley BARFIELD May, DO   fluticasone [facility] ALLERGY RELIEF) 50 MCG/ACT nasal spray 2 sprays by Nasal route 2 times daily 11/8/21   Wiley BARFIELD May, DO   azelastine (OPTIVAR) 0.05 % ophthalmic solution Place 1 drop into both eyes 2 times daily 11/8/21   Jamesene LICO May, DO   azelastine (ASTELIN) 0.1 % nasal spray 1 spray by Nasal route 2 times daily Use in each nostril as directed 11/8/21   Wiley BARFIELD May, DO   oxybutynin (DITROPAN-XL) 5 MG extended release tablet Take 1 tablet by mouth daily 11/8/21   Wiley BARFIELD May, DO   Continuous Blood Gluc  (DEXCOM G6 ) NGHIA 2 times daily 3/26/21   Historical Provider, MD   acetaminophen (TYLENOL) 500 MG tablet Take 500 mg by mouth every 6 hours as needed for Pain    Historical Provider, MD   Nutritional Supplements (GLUCOSE MANAGEMENT) TABS Take by mouth    Historical Provider, MD   calcium carbonate (CALCIUM 600) 600 MG TABS tablet Take 1 tablet by mouth daily 8/11/16   C Magali Hammans, MD   Adhesive Tape (PAPER TAPE 1\"X10YD) TAPE Apply to affected areas.  4/25/16   Dolores Peterson MD   Multiple Vitamins-Minerals (THERAPEUTIC MULTIVITAMIN-MINERALS) tablet Take 1 tablet by mouth daily    Historical Provider, MD   artificial tears (ARTIFICIAL TEARS) OINT as needed    Historical Provider, MD   glucose monitoring kit (FREESTYLE) monitoring kit 1 kit by Does not apply route daily as needed 6/18/15   MD FLORY CastanedaYLE LANCETS MISC 1 each by Does not apply route daily 6/18/15   Matt Sanchez MD   dextromethorphan-guaiFENesin [facility] DM)  MG per SR tablet Take 1 tablet by mouth every 12 hours as needed     Historical Provider, MD       Future Appointments   Date Time Provider Israel Cote   8/24/2022  1:00 PM Demetra Joya, PT [facility]   8/24/2022  2:00 PM CARLI Woods [facility]   8/26/2022  1:40 PM MD MIGUEL A Blunt [facility]   9/26/2022  8:30 PM SCHEDULE, [address] [facility] [address]   11/9/2022

## 2022-08-23 NOTE — FLOWSHEET NOTE
[facility], [address] [place] [address] [address]  Phone: [phone]   Fax:     [phone]      Physical Therapy Treatment Note/ Progress Report:     Date:  2022    Patient Name:  Anthony Clark    :  1975  MRN: [id number]  Restrictions/Precautions:    Medical/Treatment Diagnosis Information:   M17.0 (ICD-10-CM) - Primary osteoarthritis of both knees     Insurance/Certification information:   [place]  Physician Information:   Helen Borges PA-C  Has the plan of care been signed (Y/N):        []  Yes  [x]  No     Date of Patient follow up with Physician: NS    Is this a Progress Report:     []  Yes  [x]  No      If Yes:  Date Range for reporting period:  Beginnin22 ------------ Endin22    Progress report will be due (10 Rx or 30 days whichever is less):      Recertification will be due (POC Duration  / 90 days whichever is less): 22      Visit # Insurance Allowable Auth Required   In Person 1 BMN []  Yes     []  No    Tele Health 0  []  Yes     []  No    Total 1       FOTO Score:      Date assessed:  22      Latex Allergy:  [x]NO      []YES  Preferred Language for Healthcare:   [x]English       []other:    Pain level:  8/10     SUBJECTIVE:  See eval    OBJECTIVE: See eval  Observation:   Test measurements:      RESTRICTIONS/PRECAUTIONS: obese/ breathing    Exercises/Interventions:   Therapeutic Ex (22638) Sets/sec Reps Notes/CUES HEP   QS  10x  x   Heel slides  10x  x   SLR/ abd  10x  x                               Aquatic exercise handouts  NV                                 Manual Intervention ([id number])       Pat mob/ knee flex  8 min                                        NMR re-education (58337)   CUES NEEDED    Stand calf stretch 20 2x ea  x   HR/TR  15x  x                 bridges  15x  x   Supine PB clamshells OR 15x  x Therapeutic Activity (03962)                            Vaso L knee/ CP R  10 min            Medbridge access code:  [facility]           Therapeutic Exercise and NMR EXR  [x] (62894) Provided verbal/tactile cueing for activities related to strengthening, flexibility, endurance, ROM for improvements in LE, proximal hip, and core control with self care, mobility, lifting, ambulation. [x] (21316) Provided verbal/tactile cueing for activities related to improving balance, coordination, kinesthetic sense, posture, motor skill, proprioception to assist with LE, proximal hip, and core control in self-care, mobility, lifting, ambulation and eccentric single leg control.      NMR and Therapeutic Activities:    [x] (96891 or 83361) Provided verbal/tactile cueing for activities related to improving balance, coordination, kinesthetic sense, posture, motor skill, proprioception and motor activation to allow for proper function of core, proximal hip and LE with self-care and ADLs and functional mobility.   [] (02902) Gait Re-education- Provided training and instruction to the patient for proper LE, core and proximal hip recruitment and positioning and eccentric body weight control with ambulation re-education including up and down stairs     Home Exercise Program:    [x] (44586) Reviewed/Progressed HEP activities related to strengthening, flexibility, endurance, ROM of core, proximal hip and LE for functional self-care, mobility, lifting and ambulation/stair navigation   [] (11361) Reviewed/Progressed HEP activities related to improving balance, coordination, kinesthetic sense, posture, motor skill, proprioception of core, proximal hip and LE for self-care, mobility, lifting, and ambulation/stair navigation      Manual Treatments:  PROM / STM / Oscillations-Mobs:  G-I, II, III, IV (PA's, Inf., Post.)  [x] (71912) Provided manual therapy to mobilize LE, proximal hip and/or LS spine soft tissue/joints for the purpose of modulating pain, promoting relaxation, increasing ROM, reducing/eliminating soft tissue swelling/inflammation/restriction, improving soft tissue extensibility and allowing for proper ROM for normal function with self-care, mobility, lifting and ambulation. Modalities:     [x] GAME READY (VASO)- for significant edema, swelling, pain control. Charges:  Timed Code Treatment Minutes: 25   Total Treatment Minutes:  55   BWC:  TE TIME:  NMR TIME:  MANUAL TIME:  UNTIMED MINUTES:  Medicare Total:         [x] EVAL (LOW) 34397 (typically 20 minutes face-to-face)  [] EVAL (MOD) 92937 (typically 30 minutes face-to-face)  [] EVAL (HIGH) 99639 (typically 45 minutes face-to-face)  [] RE-EVAL     [x] WL(40173) x  1   [] IONTO  [] NMR (50883) x     [x] VASO  [x] Manual (89123) x  1   [] Other:  [] TA x      [] Mech Traction (05761)  [] ES(attended) (07432)      [] ES (un) (35462):    ASSESSMENT:  See eval    GOALS:      Patient stated goal: walk/ stairs     Therapist goals for Patient:   Short Term Goals: To be achieved in: 2 weeks  1. Independent in HEP and progression per patient tolerance, in order to prevent re-injury. [x] Progressing: [] Met: [] Not Met: [] Adjusted     2. Patient will have a decrease in pain to facilitate improvement in movement, function, and ADLs as indicated by Functional Deficits. [x] Progressing: [] Met: [] Not Met: [] Adjusted     Long Term Goals: To be achieved in: 6-8 weeks  1. FOTO score will match or exceed predicted score to assist with reaching prior level of function. [x] Progressing: [] Met: [] Not Met: [] Adjusted     2. Patient will demonstrate increased AROM to 135-0 to allow for proper joint functioning as indicated by patients Functional Deficits. [x] Progressing: [] Met: [] Not Met: [] Adjusted     3.  Patient will demonstrate an increase in Strength to good proximal hip strength and control, within 5lb HHD in LE to allow for proper functional mobility as indicated by patients Functional Deficits. [x] Progressing: [] Met: [] Not Met: [] Adjusted     4. Patient will return to [facility] for  functional activities without increased symptoms or restriction. [x] Progressing: [] Met: [] Not Met: [] Adjusted     5. Walk/ stand/ stairs with min to no limitations (patient specific functional goal)    [x] Progressing: [] Met: [] Not Met: [] Adjusted          Overall Progression Towards Functional goals/ Treatment Progress Update:  [] Patient is progressing as expected towards functional goals listed. [] Progression is slowed due to complexities/Impairments listed. [] Progression has been slowed due to co-morbidities. [x] Plan just implemented, too soon to assess goals progression <30days   [] Goals require adjustment due to lack of progress  [] Patient is not progressing as expected and requires additional follow up with physician  [] Other    Prognosis for POC: [x] Good [] Fair  [] Poor      Patient requires continued skilled intervention: [x] Yes  [] No    Treatment/Activity Tolerance:  [x] Patient able to complete treatment  [] Patient limited by fatigue  [] Patient limited by pain    [] Patient limited by other medical complications  [] Other:     Return to Play: (if applicable)   []  Stage 1: Intro to Strength   []  Stage 2: Return to Run and Strength   []  Stage 3: Return to Jump and Strength   []  Stage 4: Dynamic Strength and Agility   []  Stage 5: Sport Specific Training     []  Ready to Return to Play, Meets All Above Stages   [x]  Not Ready for Return to Sports   Comments:                          PLAN: See eval  [] Continue per plan of care [] Alter current plan (see comments above)  [x] Plan of care initiated [] Hold pending MD visit [] Discharge    Electronically signed by:  Tod Kramer PT    Note: If patient does not return for scheduled/ recommended follow up visits, this note will serve as a discharge from care along with most recent update on progress.

## 2022-08-24 ENCOUNTER — OFFICE VISIT (OUTPATIENT)
Dept: ORTHOPEDIC SURGERY | Age: 47
End: 2022-08-24
Payer: MEDICARE

## 2022-08-24 ENCOUNTER — HOSPITAL ENCOUNTER (OUTPATIENT)
Dept: PHYSICAL THERAPY | Age: 47
Setting detail: THERAPIES SERIES
Discharge: HOME | End: 2022-08-24
Payer: MEDICAID

## 2022-08-24 VITALS — BODY MASS INDEX: 57.52 KG/M2 | HEIGHT: 60 IN | WEIGHT: 293 LBS

## 2022-08-24 DIAGNOSIS — M54.50 ACUTE LOW BACK PAIN, UNSPECIFIED BACK PAIN LATERALITY, UNSPECIFIED WHETHER SCIATICA PRESENT: Primary | ICD-10-CM

## 2022-08-24 DIAGNOSIS — M50.30 DDD (DEGENERATIVE DISC DISEASE), CERVICAL: ICD-10-CM

## 2022-08-24 DIAGNOSIS — M51.36 DDD (DEGENERATIVE DISC DISEASE), LUMBAR: ICD-10-CM

## 2022-08-24 PROCEDURE — 97110 THERAPEUTIC EXERCISES: CPT | Performed by: SPECIALIST

## 2022-08-24 PROCEDURE — 97016 VASOPNEUMATIC DEVICE THERAPY: CPT | Performed by: SPECIALIST

## 2022-08-24 PROCEDURE — 97140 MANUAL THERAPY 1/> REGIONS: CPT | Performed by: SPECIALIST

## 2022-08-24 PROCEDURE — 99214 OFFICE O/P EST MOD 30 MIN: CPT | Performed by: PHYSICIAN ASSISTANT

## 2022-08-24 PROCEDURE — 97161 PT EVAL LOW COMPLEX 20 MIN: CPT | Performed by: SPECIALIST

## 2022-08-24 NOTE — PROGRESS NOTES
New Patient: LUMBAR SPINE    Referring Provider:  No ref. provider found    CHIEF COMPLAINT:    Chief Complaint   Patient presents with    Back Pain     lumbar       HISTORY OF PRESENT ILLNESS:       Ms. Selene Huerta  is a pleasant 41 y.o. morbidly obese female here for consultation regarding her LBP and bilateral leg pain. Patient was seen last week in my office for bilateral knee pain and she was referred to outpatient physical therapy for her knees which she has started today. She states her pain began several years ago with various different injuries and then again approximately 8 years ago she states the pain is gradually increased since then. She states that the pain is mainly across her low back with radiation into her lateral hips. She describes paresthesias into both lower extremities. She states that her present back pain is 8/10, bilateral buttock pain 8/10, and bilateral anterior and posterior leg pain 9/10. She presently ambulates with a Rollator and a forward flexed position due to pain in her back and her knees. She has had no specific treatment to her back in the past and she finds that any weightbearing activity increases her pain along with prolonged sitting. She does feel better at times with sitting and then changing to a standing position. Her symptoms also improve a lying down. She denies any current or past bowel or bladder dysfunction or saddle anesthesia. She denies any progressive lower extremity weakness.     Pain Assessment  Location of Pain: Back  Severity of Pain: 8  Quality of Pain: Other (Comment)  Duration of Pain: Other (Comment)  Frequency of Pain: Other (Comment)]    Current/Past Treatment:   Physical Therapy: None  Chiropractic: None  Injection: None  Medications: None presently    Past Medical History:   Past Medical History:   Diagnosis Date    Acquired hypothyroidism 05/26/2016    ADHD (attention deficit hyperactivity disorder) Not sure    Allergic rhinitis CAPSULE BY MOUTH EVERY DAY, Disp: 90 capsule, Rfl: 0    levothyroxine (SYNTHROID) 300 MCG tablet, Take 1 tablet by mouth Daily, Disp: 90 tablet, Rfl: 3    liothyronine (CYTOMEL) 25 MCG tablet, Take 1 tablet by mouth 2 times daily for 180 doses, Disp: 180 tablet, Rfl: 3    fluconazole (DIFLUCAN) 150 MG tablet, TAKE 1 TABLET BY MOUTH ONCE FOR 1 DOSE, Disp: , Rfl:     polyethylene glycol (GLYCOLAX) 17 GM/SCOOP powder, Take 17 g by mouth in the morning., Disp: 1 each, Rfl: 1    nystatin (MYCOSTATIN) 285438 UNIT/GM powder, APPLY TOPICALLY 3 TIMES DAILY AS NEEDED TO MANAGE RASH AND MOISTURE, Disp: 60 g, Rfl: 1    EPINEPHrine (EPIPEN 2-SHELBIE) 0.3 MG/0.3ML SOAJ injection, Inject 0.3 mLs into the muscle once for 1 dose Use as directed for allergic reaction, Disp: 0.3 mL, Rfl: 1    DULoxetine (CYMBALTA) 30 MG extended release capsule, Take 1 capsule by mouth in the morning and 1 capsule before bedtime. , Disp: 180 capsule, Rfl: 3    NURTEC 75 MG TBDP, Take 75 mg by mouth every other day, Disp: 18 tablet, Rfl: 3    EPINEPHrine (EPIPEN 2-SHELBIE) 0.3 MG/0.3ML SOAJ injection, Inject 0.3 mLs into the muscle as needed (In the case of anaphylaxis) Use as directed for allergic reaction, Disp: 1 each, Rfl: 2    diclofenac sodium (VOLTAREN) 1 % GEL, Apply topically 4 times daily as needed for Pain Apply topically 2 times daily, Disp: 100 g, Rfl: 0    cetirizine (ZYRTEC) 10 MG tablet, Take 1 tablet by mouth in the morning., Disp: 30 tablet, Rfl: 1    amLODIPine (NORVASC) 5 MG tablet, TAKE 1 TABLET BY MOUTH EVERY DAY, Disp: 30 tablet, Rfl: 0    rizatriptan (MAXALT) 10 MG tablet, take 1 tablet by mouth once as needed for migraine may repeat in 2 hours if needed, Disp: 27 tablet, Rfl: 0    albuterol sulfate HFA (PROVENTIL;VENTOLIN;PROAIR) 108 (90 Base) MCG/ACT inhaler, INHALE 2 PUFFS BY MOUTH EVERY SIX HOURS AS NEEDED FOR WHEEZING, Disp: 18 g, Rfl: 0    busPIRone (BUSPAR) 15 MG tablet, Take 15 mg by mouth in the morning and 15 mg at noon and 15 mg before bedtime. , Disp: 90 tablet, Rfl: 0    Continuous Blood Gluc Transmit (DEXCOM G6 TRANSMITTER) MISC, 1 each by Does not apply route continuous, Disp: 1 each, Rfl: 3    propranolol (INDERAL LA) 80 MG extended release capsule, Take 1 capsule by mouth in the morning and at bedtime, Disp: 60 capsule, Rfl: 2    OneTouch Delica Lancets 23A MISC, Check blood sugars 4 times daily for E11.9, Disp: 100 each, Rfl: 11    SYMBICORT 160-4.5 MCG/ACT AERO, Inhale 2 puffs into the lungs 2 times daily Use for asthma exacerbations. 1-2 puffs q4h.  Max 12 puffs/ day, Disp: 10.2 g, Rfl: 3    ONETOUCH ULTRA strip, USE TO TEST BLOOD SUGAR FOUR TIMES DAILY, Disp: 100 each, Rfl: 5    blood glucose monitor strips, PLEASE GIVE TYPE THAT MATCHES PATIENTS GLUCOMETER PER INSURANCE, Disp: 200 strip, Rfl: 0    atorvastatin (LIPITOR) 40 MG tablet, Take 1 tablet by mouth daily, Disp: 90 tablet, Rfl: 3    omeprazole (PRILOSEC) 40 MG delayed release capsule, Take 1 capsule by mouth in the morning and at bedtime, Disp: 180 capsule, Rfl: 3    lisinopril (PRINIVIL;ZESTRIL) 5 MG tablet, Take 1 tablet by mouth daily, Disp: 90 tablet, Rfl: 1    ARIPiprazole (ABILIFY) 10 MG tablet, Take 1 tablet by mouth daily, Disp: 90 tablet, Rfl: 3    Cholecalciferol 25 MCG (1000 UT) CHEW, Take 2 tablets by mouth daily, Disp: 60 tablet, Rfl: 3    furosemide (LASIX) 40 MG tablet, Take 1 tablet by mouth daily, Disp: 60 tablet, Rfl: 0    montelukast (SINGULAIR) 10 MG tablet, Take 1 tablet by mouth nightly, Disp: 30 tablet, Rfl: 1    fluticasone (FLONASE ALLERGY RELIEF) 50 MCG/ACT nasal spray, 2 sprays by Nasal route 2 times daily, Disp: 16 g, Rfl: 1    azelastine (OPTIVAR) 0.05 % ophthalmic solution, Place 1 drop into both eyes 2 times daily, Disp: 1 each, Rfl: 1    azelastine (ASTELIN) 0.1 % nasal spray, 1 spray by Nasal route 2 times daily Use in each nostril as directed, Disp: 60 mL, Rfl: 1    oxybutynin (DITROPAN-XL) 5 MG extended release tablet, Take 1 tablet by mouth daily, Disp: 30 tablet, Rfl: 1    Continuous Blood Gluc  (DEXCOM G6 ) NGHIA, 2 times daily, Disp: , Rfl:     acetaminophen (TYLENOL) 500 MG tablet, Take 500 mg by mouth every 6 hours as needed for Pain, Disp: , Rfl:     Nutritional Supplements (GLUCOSE MANAGEMENT) TABS, Take by mouth, Disp: , Rfl:     calcium carbonate (CALCIUM 600) 600 MG TABS tablet, Take 1 tablet by mouth daily, Disp: 30 tablet, Rfl: 0    Adhesive Tape (PAPER TAPE 1\"X10YD) TAPE, Apply to affected areas. , Disp: 2 each, Rfl: 5    Multiple Vitamins-Minerals (THERAPEUTIC MULTIVITAMIN-MINERALS) tablet, Take 1 tablet by mouth daily, Disp: , Rfl:     artificial tears (ARTIFICIAL TEARS) OINT, as needed, Disp: , Rfl:     glucose monitoring kit (FREESTYLE) monitoring kit, 1 kit by Does not apply route daily as needed, Disp: 1 kit, Rfl: 0    FREESTYLE LANCETS MISC, 1 each by Does not apply route daily, Disp: 100 each, Rfl: 0    dextromethorphan-guaiFENesin (MUCINEX DM)  MG per SR tablet, Take 1 tablet by mouth every 12 hours as needed , Disp: , Rfl:     Allergies:  Bee venom, Black pepper-turmeric, Dust mite extract, Ibuprofen, Nsaids, and Other    Social History:    reports that she has never smoked. She has been exposed to tobacco smoke. She has never used smokeless tobacco. She reports that she does not drink alcohol and does not use drugs.     Family History:   Family History   Problem Relation Age of Onset    Hypertension Mother     Hypothyroidism Mother     Depression Mother     Diabetes Mother     Heart Disease Mother     High Blood Pressure Mother     High Cholesterol Mother     Kidney Disease Mother         Had kidney failure    Mental Illness Mother     Miscarriages / Nicklas Mercado Mother     Obesity Mother     Stroke Mother     Asthma Father     Diabetes Father     Emphysema Father     Hypertension Father     Depression Father     Early Death Father     Heart Attack Father     Heart Disease Father     High Blood Pressure Father     High Cholesterol Father     Obesity Father     Substance Abuse Father     Hypertension Sister         problems with pregnancy.     Asthma Sister     Depression Sister     Hearing Loss Sister     Heart Disease Sister     High Blood Pressure Sister     High Cholesterol Sister     Learning Disabilities Sister     Mental Illness Sister     Obesity Sister     Substance Abuse Sister     Diabetes Paternal Grandmother     Heart Attack Paternal Grandmother     Stroke Paternal Grandmother     Cervical Cancer Paternal Aunt     Breast Cancer Paternal Aunt     Cancer Paternal Aunt     Depression Paternal Aunt     Mental Illness Paternal Aunt     Miscarriages / Stillbirths Paternal Aunt     Breast Cancer Maternal Aunt     Cancer Maternal Aunt     Heart Disease Maternal Aunt     High Blood Pressure Maternal Aunt     Immune Disorder Maternal Aunt     Obesity Maternal Aunt     Breast Cancer Maternal Aunt     Cancer Maternal Aunt     Heart Disease Maternal Aunt     High Blood Pressure Maternal Aunt     Immune Disorder Maternal Montse Vitale  had thyroid remove    Obesity Maternal Aunt     Ovarian Cancer Paternal Aunt     Heart Attack Maternal Grandfather     High Blood Pressure Maternal Grandfather     Obesity Maternal Grandfather     Stroke Maternal Grandfather     Heart Attack Maternal Grandmother     High Blood Pressure Maternal Grandmother     Obesity Maternal Grandmother     Stroke Maternal Grandmother     Vision Loss Maternal Grandmother     Hearing Loss Maternal Uncle     Heart Disease Maternal Uncle     High Blood Pressure Maternal Uncle     Obesity Maternal Uncle     Heart Disease Maternal Uncle     High Blood Pressure Maternal Uncle     Obesity Maternal Uncle     Heart Disease Maternal Uncle     High Blood Pressure Maternal Uncle     Obesity Maternal Uncle     Heart Disease Paternal Uncle     Obesity Paternal Uncle     Heart Disease Paternal Uncle     Obesity Paternal Uncle     Heart Disease Maternal Aunt High Blood Pressure Maternal Aunt     Immune Disorder Maternal Aunt     Obesity Maternal Aunt     Immune Disorder Maternal Cousin         Katiana Navarrete had thyroid removed    Immune Disorder Maternal Cousin         Hypothyroidism had thyroid removed       REVIEW OF SYSTEMS: Full ROS noted & scanned   CONSTITUTIONAL: Denies unexplained weight loss, fevers, chills or fatigue  NEUROLOGICAL: Denies unsteady gait or progressive weakness  MUSCULOSKELETAL: Denies joint swelling or redness  PSYCHOLOGICAL: Patient has a history of anxiety, bipolar disease, depression  SKIN: Denies skin changes, delayed healing, rash, itching   HEMATOLOGIC: Denies easy bleeding or bruising  ENDOCRINE: Denies excessive thirst, urination, heat/cold  RESPIRATORY: Denies current dyspnea, cough  GI: Denies nausea, vomiting, diarrhea   : Denies bowel or bladder issues      PHYSICAL EXAM:    Vitals: Height 5' (1.524 m), weight (!) 329 lb (149.2 kg), last menstrual period 05/03/2016, not currently breastfeeding. GENERAL EXAM:  General Apparence: Patient is adequately groomed large morbidly obese female  Orientation: The patient is oriented to time, place and person. Mood & Affect:The patient's mood and affect are appropriate. Vascular: Examination reveals no swelling tenderness in upper or lower extremities. Good capillary refill. Lymphatic: The lymphatic examination bilaterally reveals all areas to be without enlargement or induration  Sensation: Sensation is intact without deficit  Coordination/Balance: Good coordination. Patient ambulates with a Rollator. LUMBAR/SACRAL EXAMINATION:  Inspection: Local inspection shows no step-off or bruising. Lumbar alignment is normal.  Sagittal and Coronal balance is neutral.      Palpation: Diffuse tenderness at the midline. Diffuse tenderness bilaterally at the paraspinal or trochanters. There is no step-off or paraspinal spasm.    Range of Motion: Lumbar flexion, extension and rotation are moderately limited due to pain. Strength:   Strength testing is 4/5 in all muscle groups tested. Special Tests:   Straight leg raise and crossed SLR negative. Leg length and pelvis level. Skin: There are no rashes, ulcerations or lesions. Reflexes: Reflexes are symmetrically 2+ at the patellar and ankle tendons. Clonus absent bilaterally at the feet. Gait & station: Patient ambulates with a forward flexed position with a Rollator. Additional Examinations:   RIGHT LOWER EXTREMITY: Inspection/examination of the right lower extremity does not show any tenderness, deformity or injury. Range of motion is unremarkable. There is no gross instability. There are no rashes, ulcerations or lesions. Strength and tone are normal.  LEFT LOWER EXTREMITY:  Inspection/examination of the left lower extremity does not show any tenderness, deformity or injury. Range of motion is unremarkable. There is no gross instability. There are no rashes, ulcerations or lesions. Strength and tone are normal.    Diagnostic Testing:    X-rays: 2 views to include AP and lateral were obtained today in the office and independently reviewed with the patient which shows well-maintained lumbar lordosis with disc base narrowing mainly at L5-S1. There is facet arthropathy noted from L4-S1. Impression:   Lumbar spondylosis with radiculopathy    1. Acute low back pain, unspecified back pain laterality, unspecified whether sciatica present        Plan:      We discussed the diagnosis and treatment options including observation, additional oral steroids, physical therapy, epidural injections and additional imaging. She wishes to proceed with outpatient physical therapy for lumbar stabilization, core strengthening exercises, and modalities of choice to include potential dry needling.   If the patient has no durable benefit with these conservative treatments after outpatient physical therapy will contact the office for scheduling of a lumbar MRI.    Follow up -as needed    Old records were reviewed.     Lucille Murguia PA-C  Board certified by the [facility]

## 2022-08-25 SDOH — HEALTH STABILITY: PHYSICAL HEALTH: ON AVERAGE, HOW MANY MINUTES DO YOU ENGAGE IN EXERCISE AT THIS LEVEL?: 30 MIN

## 2022-08-25 SDOH — HEALTH STABILITY: PHYSICAL HEALTH: ON AVERAGE, HOW MANY DAYS PER WEEK DO YOU ENGAGE IN MODERATE TO STRENUOUS EXERCISE (LIKE A BRISK WALK)?: 1 DAY

## 2022-08-25 NOTE — TELEPHONE ENCOUNTER
Please let patient know that Maxalt and vitamin D were approved. If she needs a refill of these medications let me know.

## 2022-08-26 ENCOUNTER — HOSPITAL ENCOUNTER (OUTPATIENT)
Dept: PHYSICAL THERAPY | Age: 47
Setting detail: THERAPIES SERIES
Discharge: HOME | End: 2022-08-26
Payer: MEDICAID

## 2022-08-26 ENCOUNTER — OFFICE VISIT (OUTPATIENT)
Dept: ORTHOPEDIC SURGERY | Age: 47
End: 2022-08-26
Payer: MEDICARE

## 2022-08-26 VITALS — BODY MASS INDEX: 57.52 KG/M2 | WEIGHT: 293 LBS | HEIGHT: 60 IN

## 2022-08-26 DIAGNOSIS — E66.01 MORBID OBESITY WITH BMI OF 60.0-69.9, ADULT (HCC): ICD-10-CM

## 2022-08-26 DIAGNOSIS — M17.0 PRIMARY OSTEOARTHRITIS OF BOTH KNEES: Primary | ICD-10-CM

## 2022-08-26 PROCEDURE — 99213 OFFICE O/P EST LOW 20 MIN: CPT | Performed by: ORTHOPAEDIC SURGERY

## 2022-08-26 PROCEDURE — 97110 THERAPEUTIC EXERCISES: CPT | Performed by: SPECIALIST/TECHNOLOGIST

## 2022-08-26 PROCEDURE — 97140 MANUAL THERAPY 1/> REGIONS: CPT | Performed by: SPECIALIST/TECHNOLOGIST

## 2022-08-26 PROCEDURE — 97112 NEUROMUSCULAR REEDUCATION: CPT | Performed by: SPECIALIST/TECHNOLOGIST

## 2022-08-26 NOTE — TELEPHONE ENCOUNTER
Refill Request       Last Seen: Last Seen Department: 8/11/2022  Last Seen by PCP: 8/11/2022    Last Written: 7/22/22    Next Appointment:   Future Appointments   Date Time Provider Israel Kera   8/26/2022  1:40 PM Marsha Washington MD [facility]   8/26/2022  3:15 PM Comal Rupali, PTA [facility]   8/30/2022  4:00 PM Jelena Boros, PT [facility]   9/1/2022  1:00 PM Comal Rupali, PTA [facility]   9/6/2022  1:45 PM Caden Rupali, PTA [facility]   9/8/2022  1:00 PM Comal Rupali, PTA [facility]   9/13/2022  1:00 PM Jelena Boros, PT [facility] [place] [facility]   9/15/2022  1:00 PM Jelena Boros, PT [facility]   9/20/2022  1:00 PM Caden Rupali, PTA [facility] [place] [facility]   9/22/2022  1:00 PM Comal Rupali, PTA [facility]   9/26/2022  8:30 PM SCHEDULE, [facility] SLEEP ROOM 3 [facility]   9/27/2022  1:00 PM Jelena Boros, PT [facility] [place] [facility]   9/29/2022  1:00 PM Jelena Boros, PT [facility] [facility]   11/9/2022  1:15 PM Indiana Madison MD AND PULM [facility]   11/14/2022  1:30 PM Wiley Conte DO [facility] AND RES [facility]   11/23/2022 10:00 AM Faythe Fothergill, MD [facility]             Requested Prescriptions     Pending Prescriptions Disp Refills    vitamin D (ERGOCALCIFEROL) 1.25 MG (79290 UT) CAPS capsule 12 capsule 0     Sig: Take 1 capsule by mouth once a week

## 2022-08-26 NOTE — FLOWSHEET NOTE
[facility] [address], [address] [place] [address] [address]  Phone: [phone]   Fax:     [phone]      Physical Therapy Treatment Note/ Progress Report:     Date:  2022    Patient Name:  Hamzah Hernandez    :  1975  MRN: [id number]  Restrictions/Precautions:    Medical/Treatment Diagnosis Information:   M17.0 (ICD-10-CM) - Primary osteoarthritis of both knees     Insurance/Certification information:   Isidoro Rakers medicaid  Physician Information:   Marisela Paris PA-C  Has the plan of care been signed (Y/N):        []  Yes  [x]  No     Date of Patient follow up with Physician: NS    Is this a Progress Report:     []  Yes  [x]  No      If Yes:  Date Range for reporting period:  Beginnin22 ------------ Endin22    Progress report will be due (10 Rx or 30 days whichever is less):      Recertification will be due (POC Duration  / 90 days whichever is less): 22      Visit # Insurance Allowable Auth Required   In Person 2 BMN []  Yes     []  No    Tele Health 0  []  Yes     []  No    Total 2       FOTO Score:      Date assessed:  22      Latex Allergy:  [x]NO      []YES  Preferred Language for Healthcare:   [x]English       []other:    Pain level:  8/10     SUBJECTIVE:  Pt notes she was sore after her first visit.      OBJECTIVE:   Observation:   Test measurements:      RESTRICTIONS/PRECAUTIONS: obese/ breathing    Exercises/Interventions:   Therapeutic Ex (67341) Sets/sec Reps Notes/CUES HEP   QS  10x  x   Heel slides  10x  x   SLR/ abd  10x  x   Calf stretch  30\" x3ea     BS 10\" 10x                   Aquatic exercise handouts  NV                                 Manual Intervention ([id number])       Pat mob/ knee flex  8 min                                        NMR re-education (11627)   CUES NEEDED    Stand calf stretch 20 2x ea  x   HR/TR  15x  x                 bridges  15x  x   Supine PB clamshells OR 15x  x                        Therapeutic Activity (91159)                            Vaso L knee/ CP R  10 min            Medbridge access code:  [facility]           Therapeutic Exercise and NMR EXR  [x] (92241) Provided verbal/tactile cueing for activities related to strengthening, flexibility, endurance, ROM for improvements in LE, proximal hip, and core control with self care, mobility, lifting, ambulation. [x] (02974) Provided verbal/tactile cueing for activities related to improving balance, coordination, kinesthetic sense, posture, motor skill, proprioception to assist with LE, proximal hip, and core control in self-care, mobility, lifting, ambulation and eccentric single leg control.      NMR and Therapeutic Activities:    [x] (00665 or 26292) Provided verbal/tactile cueing for activities related to improving balance, coordination, kinesthetic sense, posture, motor skill, proprioception and motor activation to allow for proper function of core, proximal hip and LE with self-care and ADLs and functional mobility.   [] (17746) Gait Re-education- Provided training and instruction to the patient for proper LE, core and proximal hip recruitment and positioning and eccentric body weight control with ambulation re-education including up and down stairs     Home Exercise Program:    [x] (34189) Reviewed/Progressed HEP activities related to strengthening, flexibility, endurance, ROM of core, proximal hip and LE for functional self-care, mobility, lifting and ambulation/stair navigation   [] (47144) Reviewed/Progressed HEP activities related to improving balance, coordination, kinesthetic sense, posture, motor skill, proprioception of core, proximal hip and LE for self-care, mobility, lifting, and ambulation/stair navigation      Manual Treatments:  PROM / STM / Oscillations-Mobs:  G-I, II, III, IV (PA's, Inf., Post.)  [x] (62213) Provided manual therapy to mobilize LE, proximal hip and/or LS spine soft tissue/joints for the purpose of modulating pain, promoting relaxation, increasing ROM, reducing/eliminating soft tissue swelling/inflammation/restriction, improving soft tissue extensibility and allowing for proper ROM for normal function with self-care, mobility, lifting and ambulation. Modalities:     [x] GAME READY (VASO)- for significant edema, swelling, pain control. Charges:  Timed Code Treatment Minutes: 40   Total Treatment Minutes:  40   BWC:  TE TIME:  NMR TIME:  MANUAL TIME:  UNTIMED MINUTES:  Medicare Total:         [] EVAL (LOW) 07344 (typically 20 minutes face-to-face)  [] EVAL (MOD) 89172 (typically 30 minutes face-to-face)  [] EVAL (HIGH) 30837 (typically 45 minutes face-to-face)  [] RE-EVAL     [x] TZ(33884) x  1   [] IONTO  [x] NMR (84381) x    1 [] VASO  [x] Manual (05908) x  1   [] Other:  [] TA x      [] Mech Traction (71942)  [] ES(attended) (78360)      [] ES (un) (56195):    ASSESSMENT:  See eval    GOALS:      Patient stated goal: walk/ stairs     Therapist goals for Patient:   Short Term Goals: To be achieved in: 2 weeks  1. Independent in HEP and progression per patient tolerance, in order to prevent re-injury. [x] Progressing: [] Met: [] Not Met: [] Adjusted     2. Patient will have a decrease in pain to facilitate improvement in movement, function, and ADLs as indicated by Functional Deficits. [x] Progressing: [] Met: [] Not Met: [] Adjusted     Long Term Goals: To be achieved in: 6-8 weeks  1. FOTO score will match or exceed predicted score to assist with reaching prior level of function. [x] Progressing: [] Met: [] Not Met: [] Adjusted     2. Patient will demonstrate increased AROM to 135-0 to allow for proper joint functioning as indicated by patients Functional Deficits. [x] Progressing: [] Met: [] Not Met: [] Adjusted     3.  Patient will demonstrate an increase in Strength to good proximal hip strength and control, within 5lb HHD in LE to allow for proper functional mobility as indicated by patients Functional Deficits. [x] Progressing: [] Met: [] Not Met: [] Adjusted     4. Patient will return to [facility] for  functional activities without increased symptoms or restriction. [x] Progressing: [] Met: [] Not Met: [] Adjusted     5. Walk/ stand/ stairs with min to no limitations (patient specific functional goal)    [x] Progressing: [] Met: [] Not Met: [] Adjusted          Overall Progression Towards Functional goals/ Treatment Progress Update:  [] Patient is progressing as expected towards functional goals listed. [] Progression is slowed due to complexities/Impairments listed. [] Progression has been slowed due to co-morbidities.   [x] Plan just implemented, too soon to assess goals progression <30days   [] Goals require adjustment due to lack of progress  [] Patient is not progressing as expected and requires additional follow up with physician  [] Other    Prognosis for POC: [x] Good [] Fair  [] Poor      Patient requires continued skilled intervention: [x] Yes  [] No    Treatment/Activity Tolerance:  [x] Patient able to complete treatment  [] Patient limited by fatigue  [] Patient limited by pain    [] Patient limited by other medical complications  [] Other:     Return to Play: (if applicable)   []  Stage 1: Intro to Strength   []  Stage 2: Return to Run and Strength   []  Stage 3: Return to Jump and Strength   []  Stage 4: Dynamic Strength and Agility   []  Stage 5: Sport Specific Training     []  Ready to Return to Play, Meets All Above Stages   [x]  Not Ready for Return to Sports   Comments:                          PLAN: See eval  [] Continue per plan of care [] Alter current plan (see comments above)  [x] Plan of care initiated [] Hold pending MD visit [] Discharge    Electronically signed by:  Maggie Dickey, MONA, MHI, ATC    Note: If patient does not return for scheduled/ recommended follow up visits, this note will serve as a discharge from care along with most recent update on progress.

## 2022-08-28 NOTE — PROGRESS NOTES
ORTHOPAEDIC SURGERY INITIAL EVALUATION NOTE  Chief Complaint   Patient presents with    Knee Pain     BILATERAL KNEES, L > R      HISTORY OF PRESENT ILLNESS:  44-year-old female presents for a history of chronic bilateral knee pain left greater than right. It has been ongoing for many years. It has worsened insidiously. She has reported significant trauma to her knee in the past which serves as the elena of her problem. She has tried and failed numerous conservative treatments over the years including bracing, anti-inflammatories, which she is no longer able to take, physical therapy, ineffective medication. She continues to have pain rated an 8 out of 10 on a daily basis. She endorses some mechanical symptoms. She has extremely limited mobility as result of her knee pain and morbid obesity.     Past Medical History:   Diagnosis Date    Acquired hypothyroidism 05/26/2016    ADHD (attention deficit hyperactivity disorder) Not sure    Allergic rhinitis 08/02/2013    Anemia     Ankle swelling 10/05/2017    Anxiety     Arthritis     Asthma     Bipolar disorder ([facility]     Cellulitis 12/19/2017    Chest pain due to myocardial ischemia 05/01/2018    Chronic bronchitis (HCC)     Chronic kidney disease     COPD (chronic obstructive pulmonary disease) (HCC)     Depression     GERD (gastroesophageal reflux disease)     Head injury 10/19/2014    Headache     Hypertension     Hyperthyroidism with Hashimoto disease 03/13/2020    Incarcerated hernia 06/12/2021    Formatting of this note might be different from the original. 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    PARVIZ 02/25/2016    Osteoarthritis of left knee 05/26/2016    Panniculitis 03/13/2020    PCOS (polycystic ovarian syndrome) 10/05/2017    Prediabetes 10/05/2017    Psychophysiological insomnia 02/25/2016    Restless legs syndrome     RLS (restless legs syndrome) 02/25/2016    S/P laparoscopic sleeve gastrectomy 08/09/2019    S/P repair of ventral hernia 06/14/2021    Sleep apnea     Type 2 diabetes mellitus ([facility] 06/13/2021    Urinary incontinence     Vitiligo        Current Outpatient Medications   Medication Sig Dispense Refill    SPIRIVA HANDIHALER 18 MCG inhalation capsule INHALE CONTENTS 1 CAPSULE BY MOUTH EVERY DAY 90 capsule 0    levothyroxine (SYNTHROID) 300 MCG tablet Take 1 tablet by mouth Daily 90 tablet 3    liothyronine (CYTOMEL) 25 MCG tablet Take 1 tablet by mouth 2 times daily for 180 doses 180 tablet 3    fluconazole (DIFLUCAN) 150 MG tablet TAKE 1 TABLET BY MOUTH ONCE FOR 1 DOSE      polyethylene glycol (GLYCOLAX) 17 GM/SCOOP powder Take 17 g by mouth in the morning. 1 each 1    nystatin (MYCOSTATIN) 683225 UNIT/GM powder APPLY TOPICALLY 3 TIMES DAILY AS NEEDED TO MANAGE RASH AND MOISTURE 60 g 1    EPINEPHrine (EPIPEN 2-SHELBIE) 0.3 MG/0.3ML SOAJ injection Inject 0.3 mLs into the muscle once for 1 dose Use as directed for allergic reaction 0.3 mL 1    DULoxetine (CYMBALTA) 30 MG extended release capsule Take 1 capsule by mouth in the morning and 1 capsule before bedtime. 180 capsule 3    NURTEC 75 MG TBDP Take 75 mg by mouth every other day 18 tablet 3    EPINEPHrine (EPIPEN 2-SHELBIE) 0.3 MG/0.3ML SOAJ injection Inject 0.3 mLs into the muscle as needed (In the case of anaphylaxis) Use as directed for allergic reaction 1 each 2    diclofenac sodium (VOLTAREN) 1 % GEL Apply topically 4 times daily as needed for Pain Apply topically 2 times daily 100 g 0    cetirizine (ZYRTEC) 10 MG tablet Take 1 tablet by mouth in the morning.  30 tablet 1    amLODIPine (NORVASC) 5 MG tablet TAKE 1 TABLET BY MOUTH EVERY DAY 30 tablet 0    rizatriptan (MAXALT) 10 MG tablet take 1 tablet by mouth once as needed for migraine may repeat in extended release tablet Take 1 tablet by mouth daily 30 tablet 1    Continuous Blood Gluc  (DEXCOM G6 ) NGHIA 2 times daily      acetaminophen (TYLENOL) 500 MG tablet Take 500 mg by mouth every 6 hours as needed for Pain      Nutritional Supplements (GLUCOSE MANAGEMENT) TABS Take by mouth      calcium carbonate (CALCIUM 600) 600 MG TABS tablet Take 1 tablet by mouth daily 30 tablet 0    Adhesive Tape (PAPER TAPE 1\"[id number]) TAPE Apply to affected areas. 2 each 5    Multiple Vitamins-Minerals (THERAPEUTIC MULTIVITAMIN-MINERALS) tablet Take 1 tablet by mouth daily      artificial tears (ARTIFICIAL TEARS) OINT as needed      glucose monitoring kit (FREESTYLE) monitoring kit 1 kit by Does not apply route daily as needed 1 kit 0    FREESTYLE LANCETS MISC 1 each by Does not apply route daily 100 each 0    dextromethorphan-guaiFENesin (MUCINEX DM)  MG per SR tablet Take 1 tablet by mouth every 12 hours as needed        No current facility-administered medications for this visit.         Past Surgical History:   Procedure Laterality Date    APPENDECTOMY  07/01/2002    CHOLECYSTECTOMY  01/01/2001    HAND SURGERY Left     CTR    HERNIA REPAIR  June 2021    MOUTH SURGERY      5 teeth removed    RECTAL SURGERY N/A 07/31/2021    ABDOMINAL WALL INCISION AND DRAINAGE performed by Be Francisco MD at [address] [facility] GASTROPLASTY  [id number]    UMBILICAL HERNIA REPAIR  06/2022    UPPER GASTROINTESTINAL ENDOSCOPY      VENTRAL HERNIA REPAIR  07/2021       Allergies   Allergen Reactions    Bee Venom     Black Pepper-Turmeric      Allergic to all peppers    Dust Mite Extract      Other reaction(s): Unknown    Ibuprofen     Nsaids      Pt only has one kidney    Other      Other reaction(s): Unknown       Family History   Problem Relation Age of Onset    Hypertension Mother     Hypothyroidism Mother     Depression Mother     Diabetes Mother     Heart Disease Mother     High Blood Pressure Mother     High Cholesterol Mother     Kidney Disease Mother         Had kidney failure    Mental Illness Mother     Miscarriages / Stillbirths Mother     Obesity Mother     Stroke Mother     Asthma Father     Diabetes Father     Emphysema Father     Hypertension Father     Depression Father     Early Death Father     Heart Attack Father     Heart Disease Father     High Blood Pressure Father     High Cholesterol Father     Obesity Father     Substance Abuse Father     Hypertension Sister         problems with pregnancy.     Asthma Sister     Depression Sister     Hearing Loss Sister     Heart Disease Sister     High Blood Pressure Sister     High Cholesterol Sister     Learning Disabilities Sister     Mental Illness Sister     Obesity Sister     Substance Abuse Sister     Diabetes Paternal Grandmother     Heart Attack Paternal Grandmother     Stroke Paternal Grandmother     Cervical Cancer Paternal Aunt     Breast Cancer Paternal Aunt     Cancer Paternal Aunt     Depression Paternal Aunt     Mental Illness Paternal Aunt     Miscarriages / Stillbirths Paternal Aunt     Breast Cancer Maternal Aunt     Cancer Maternal Aunt     Heart Disease Maternal Aunt     High Blood Pressure Maternal Aunt     Immune Disorder Maternal Aunt     Obesity Maternal Aunt     Breast Cancer Maternal Aunt     Cancer Maternal Aunt     Heart Disease Maternal Aunt     High Blood Pressure Maternal Aunt     Immune Disorder Maternal Olimpia Leone  had thyroid remove    Obesity Maternal Aunt     Ovarian Cancer Paternal Aunt     Heart Attack Maternal Grandfather     High Blood Pressure Maternal Grandfather     Obesity Maternal Grandfather     Stroke Maternal Grandfather     Heart Attack Maternal Grandmother     High Blood Pressure Maternal Grandmother     Obesity Maternal Grandmother     Stroke Maternal Grandmother     Vision Loss Maternal Grandmother     Hearing Loss Maternal Uncle     Heart Disease Maternal Uncle     High Blood Pressure Maternal Uncle     Obesity Maternal Uncle     Heart Disease Maternal Uncle     High Blood Pressure Maternal Uncle     Obesity Maternal Uncle     Heart Disease Maternal Uncle     High Blood Pressure Maternal Uncle     Obesity Maternal Uncle     Heart Disease Paternal Uncle     Obesity Paternal Uncle     Heart Disease Paternal Uncle     Obesity Paternal Uncle     Heart Disease Maternal Aunt     High Blood Pressure Maternal Aunt     Immune Disorder Maternal Aunt     Obesity Maternal Aunt     Immune Disorder Maternal Cousin         Iain Emerson had thyroid removed    Immune Disorder Maternal Cousin         Hypothyroidism had thyroid removed       Social History     Socioeconomic History    Marital status:      Spouse name: Not on file    Number of children: Not on file    Years of education: Not on file    Highest education level: Not on file   Occupational History    Not on file   Tobacco Use    Smoking status: Never     Passive exposure: Yes    Smokeless tobacco: Never   Vaping Use    Vaping Use: Never used   Substance and Sexual Activity    Alcohol use: No     Comment: Less than once a month    Drug use: No    Sexual activity: Yes     Partners: Male   Other Topics Concern    Not on file   Social History Narrative    Not on file     Social Determinants of Health     Financial Resource Strain: High Risk    Difficulty of Paying Living Expenses: Very hard   Food Insecurity: Food Insecurity Present    Worried About Running Out of Food in the Last Year: Sometimes true    Ran Out of Food in the Last Year: Never true   Transportation Needs: No Transportation Needs    Lack of Transportation (Medical): No    Lack of Transportation (Non-Medical): No   Physical Activity: Insufficiently Active    Days of Exercise per Week: 1 day    Minutes of Exercise per Session: 30 min   Stress: Not on file   Social Connections: Not on file   Intimate Partner Violence: Not At Risk    Fear of Current or Ex-Partner: No    Emotionally Abused: No    Physically Abused:  No Sexually Abused: No   Housing Stability: Not on file     Review of Systems : Intake form for complete review of systems. PHYSICAL EXAM  5 feet tall 329 pounds. BMI 64.25  Gen: awake, alert, oriented  HEENT: atraumatic, normocephalic  Neck: supple  Resp: equal chest rise bilaterally, no audible wheezes, no accessory muscle use. CV: Lower extremities warm and well perfused. Abd: soft, nontender   Focused examination of the right lower extremity: No cuts, open wounds, or abrasions to the knee. No obvious effusion. No swelling. No bruising or ecchymosis. No obvious deformity. There is tenderness to palpation globally about the knee including medial and lateral joint lines. There is pain with patellar ballottement. Patellar tracking is appropriate. There is minor subpatellar crepitus during range of motion. No reproducible mechanical symptoms are present. There is negative anterior and posterior drawer test.  Negative Lachman. Pain is present with Rosana. Sensation is intact to light touch in deep peroneal, superficial peroneal, tibial, sural, and saphenous nerve distributions. Motor function is intact to EHL, FHL, tibialis anterior, and gastroc. There is brisk capillary refill to the toes and a strong palpable dorsalis pedis pulse. Compartments are soft and compressible. There is no calf tenderness and a negative Homans' sign. Focused examination of the letf lower extremity: No cuts, open wounds, or abrasions to the knee. No obvious effusion. No swelling. No bruising or ecchymosis. No obvious deformity. There is tenderness to palpation globally about the knee including medial and lateral joint lines. There is pain with patellar ballottement. Patellar tracking is appropriate. There is minor subpatellar crepitus during range of motion. No reproducible mechanical symptoms are present. There is negative anterior and posterior drawer test.  Negative Lachman.   Pain is present with Rosana. Sensation is intact to light touch in deep peroneal, superficial peroneal, tibial, sural, and saphenous nerve distributions. Motor function is intact to EHL, FHL, tibialis anterior, and gastroc. There is brisk capillary refill to the toes and a strong palpable dorsalis pedis pulse. Compartments are soft and compressible. There is no calf tenderness and a negative Homans' sign. RADIOGRAPHIC EXAM:  4 views of each knee including weightbearing AP, tunnel, lateral, and sunrise projections demonstrate osteoarthritic change of both knees. Osteoarthritic change is symmetric. There is  bony erosion involving the lateral compartment primarily of the right knee. There is no apparent malalignment. There is osteophyte about medial and lateral joint lines. There is lateral facet osteoarthrosis. Patellar tracking is appropriate without lateral subluxation or tilt. No obvious effusion. ASSESSEMENT AND PLAN    41 y.o. female with the following orthopaedic surgery problems:    Bilateral knee osteoarthritis  Morbid obesity, BMI 64  IgA deficiency  Lower extremity lymphedema  Diabetes, type II    I discussed the conservative managements of osteoarthritis of the knee. She indicates she is unable to take oral anti-inflammatories. I advised her to use topical modalities and treatments. She is currently undergoing a course of physical therapy for her back. She was also tried to build strength in her knees. I advised her that intra-articular injections would be an option, however they would provide temporary relief. She will hold off on this. She has a set of custom braces from before that helped her and she inquires about having a new set of braces made. I deferred that discussion to physical therapy. She is interested in total knee arthroplasty. I counseled her regarding this. I advised her that she was a long way from being an appropriate surgical candidate.   I discussed that the complications of total knee arthroplasty significantly increased at a benchmark of BMI 40 including but not limited to infection, DVT, wound healing problems, early loosening. She will need to make progress to that goal prior to any consideration. Additionally, patient does have additional risk factors including diabetes, and IgA deficiency and lymphedema. I will see her back as needed in the future.     Renzo Fernandez MD

## 2022-08-29 DIAGNOSIS — J30.1 ALLERGIC RHINITIS DUE TO POLLEN, UNSPECIFIED SEASONALITY: ICD-10-CM

## 2022-08-29 DIAGNOSIS — G43.901 MIGRAINE WITH STATUS MIGRAINOSUS, NOT INTRACTABLE, UNSPECIFIED MIGRAINE TYPE: ICD-10-CM

## 2022-08-29 DIAGNOSIS — N39.3 STRESS INCONTINENCE: ICD-10-CM

## 2022-08-29 DIAGNOSIS — J45.909 UNCOMPLICATED ASTHMA, UNSPECIFIED ASTHMA SEVERITY, UNSPECIFIED WHETHER PERSISTENT: ICD-10-CM

## 2022-08-29 DIAGNOSIS — I10 BENIGN ESSENTIAL HTN: ICD-10-CM

## 2022-08-29 DIAGNOSIS — I10 PRIMARY HYPERTENSION: ICD-10-CM

## 2022-08-29 RX ORDER — ERGOCALCIFEROL 1.25 MG/1
50000 CAPSULE ORAL WEEKLY
Qty: 12 CAPSULE | Refills: 0 | Status: SHIPPED | OUTPATIENT
Start: 2022-08-29

## 2022-08-30 ENCOUNTER — HOSPITAL ENCOUNTER (OUTPATIENT)
Dept: PHYSICAL THERAPY | Age: 47
Setting detail: THERAPIES SERIES
Discharge: HOME | End: 2022-08-30
Payer: MEDICAID

## 2022-08-30 ENCOUNTER — TELEPHONE (OUTPATIENT)
Dept: INTERNAL MEDICINE CLINIC | Age: 47
End: 2022-08-30

## 2022-08-30 DIAGNOSIS — J30.1 ALLERGIC RHINITIS DUE TO POLLEN, UNSPECIFIED SEASONALITY: ICD-10-CM

## 2022-08-30 RX ORDER — BUSPIRONE HYDROCHLORIDE 15 MG/1
TABLET ORAL
Qty: 90 TABLET | Refills: 0 | Status: SHIPPED | OUTPATIENT
Start: 2022-08-30 | End: 2022-09-29

## 2022-08-30 RX ORDER — LISINOPRIL 5 MG/1
TABLET ORAL
Qty: 90 TABLET | Refills: 0 | Status: SHIPPED | OUTPATIENT
Start: 2022-08-30 | End: 2022-10-03

## 2022-08-30 RX ORDER — AZELASTINE HYDROCHLORIDE 0.5 MG/ML
SOLUTION/ DROPS OPHTHALMIC
Qty: 6 ML | Refills: 0 | Status: SHIPPED | OUTPATIENT
Start: 2022-08-30 | End: 2022-09-29

## 2022-08-30 RX ORDER — FLUTICASONE PROPIONATE 50 MCG
SPRAY, SUSPENSION (ML) NASAL
Qty: 16 G | Refills: 0 | Status: SHIPPED | OUTPATIENT
Start: 2022-08-30 | End: 2022-09-29

## 2022-08-30 RX ORDER — AMLODIPINE BESYLATE 5 MG/1
TABLET ORAL
Qty: 30 TABLET | Refills: 0 | Status: SHIPPED | OUTPATIENT
Start: 2022-08-30 | End: 2022-09-29

## 2022-08-30 RX ORDER — MONTELUKAST SODIUM 10 MG/1
TABLET ORAL
Qty: 30 TABLET | Refills: 0 | Status: SHIPPED | OUTPATIENT
Start: 2022-08-30 | End: 2022-09-02

## 2022-08-30 RX ORDER — OXYBUTYNIN CHLORIDE 5 MG/1
TABLET, EXTENDED RELEASE ORAL
Qty: 30 TABLET | Refills: 0 | Status: SHIPPED | OUTPATIENT
Start: 2022-08-30 | End: 2022-09-29

## 2022-08-30 NOTE — FLOWSHEET NOTE
[facility], [address] [place] [address] [address]  Phone: [phone]   Fax:     [phone]      Physical Therapy Treatment Note/ Progress Report:       Date:  2022    Patient Name:  Hans Arroyo    :  1975  MRN: [id number]  Restrictions/Precautions:    Medical/Treatment Diagnosis Information:     M51.36 (ICD-10-CM) - DDD (degenerative disc disease), lumbar   M50.30 (ICD-10-CM) - DDD (degenerative disc disease), cervical        Insurance/Certification information:   Daune Sor medicaid  Physician Information:   Serina Gonzalez PA-C  Has the plan of care been signed (Y/N):        []  Yes  [x]  No     Date of Patient follow up with Physician: NS    Is this a Progress Report:     []  Yes  [x]  No      If Yes:  Date Range for reporting period:  Beginnin22 ------------ Endin22    Progress report will be due (10 Rx or 30 days whichever is less):      Recertification will be due (POC Duration  / 90 days whichever is less): 22      Visit # Insurance Allowable Auth Required   In Person 1 BMN []  Yes     []  No    Tele Health 0  []  Yes     []  No    Total 1       FOTO Score:      Date assessed:  22      Latex Allergy:  [x]NO      []YES  Preferred Language for Healthcare:   [x]English       []other:    Pain level:  9/10     SUBJECTIVE:  See eval    OBJECTIVE: See eval  Observation:   Test measurements:      RESTRICTIONS/PRECAUTIONS: none    Exercises/Interventions:   Therapeutic Ex (20863) Sets/sec Reps Notes/CUES HEP   Chin tucks  15x  x   PB rows/ ext or 15x  x   shrugs  15x  x                                                                  Manual Intervention (49525)       Sit MFR C/ UT/ occipital   8 min                                        NMR re-education (33216)   CUES NEEDED    bridges  10x  x   LTR  10x  x                                                    Therapeutic [] GAME READY (VASO)- for significant edema, swelling, pain control. Charges:  Timed Code Treatment Minutes: 25   Total Treatment Minutes:  45   BWC:  TE TIME:  NMR TIME:  MANUAL TIME:  UNTIMED MINUTES:  Medicare Total:         [x] EVAL (LOW) 21506 (typically 20 minutes face-to-face)  [] EVAL (MOD) 91326 (typically 30 minutes face-to-face)  [] EVAL (HIGH) 85244 (typically 45 minutes face-to-face)  [] RE-EVAL     [x] AH(57917) x     [] IONTO  [] NMR (70253) x     [] VASO  [x] Manual (44536) x     [] Other:  [] TA x      [] Mech Traction (22753)  [] ES(attended) (09481)      [] ES (un) (20719):       ASSESSMENT:  See eval      GOALS:     Patient stated goal: ADLs headaches    Therapist goals for Patient:   Short Term Goals: To be achieved in: 2 weeks  1. Independent in HEP and progression per patient tolerance, in order to prevent re-injury. [x] Progressing: [] Met: [] Not Met: [] Adjusted  2. Patient will have a decrease in pain to facilitate improvement in movement, function, and ADLs as indicated by Functional Deficits. [x] Progressing: [] Met: [] Not Met: [] Adjusted    Long Term Goals: To be achieved in: 6-8 weeks  1. FOTO score will match or exceed predicted score to assist with reaching prior level of function. [x] Progressing: [] Met: [] Not Met: [] Adjusted  2. Patient will demonstrate increased AROM to WNL, good LS mobility, good hip ROM to allow for proper joint functioning as indicated by patients Functional Deficits. [x] Progressing: [] Met: [] Not Met: [] Adjusted  3. Patient will demonstrate an increase in Strength to good proximal hip and core activation to allow for proper functional mobility as indicated by patients Functional Deficits. [x] Progressing: [] Met: [] Not Met: [] Adjusted  4. Patient will return to [facility] for functional activities without increased symptoms or restriction. [x] Progressing: [] Met: [] Not Met: [] Adjusted  5.  ADLs/ headaches with min to no limitations (patient specific functional goal)    [x] Progressing: [] Met: [] Not Met: [] Adjusted         Overall Progression Towards Functional goals/ Treatment Progress Update:  [] Patient is progressing as expected towards functional goals listed. [] Progression is slowed due to complexities/Impairments listed. [] Progression has been slowed due to co-morbidities. [x] Plan just implemented, too soon to assess goals progression <30days   [] Goals require adjustment due to lack of progress  [] Patient is not progressing as expected and requires additional follow up with physician  [] Other    Prognosis for POC: [x] Good [] Fair  [] Poor      Patient requires continued skilled intervention: [x] Yes  [] No    Treatment/Activity Tolerance:  [x] Patient able to complete treatment  [] Patient limited by fatigue  [] Patient limited by pain    [] Patient limited by other medical complications  [] Other:     Return to Play: (if applicable)   []  Stage 1: Intro to Strength   []  Stage 2: Return to Run and Strength   []  Stage 3: Return to Jump and Strength   []  Stage 4: Dynamic Strength and Agility   []  Stage 5: Sport Specific Training     []  Ready to Return to Play, Meets All Above Stages   []  Not Ready for Return to Sports   Comments:                           PLAN: See eval  [] Continue per plan of care [] Alter current plan (see comments above)  [x] Plan of care initiated [] Hold pending MD visit [] Discharge    Electronically signed by:  Yaa Yanez PT    Note: If patient does not return for scheduled/ recommended follow up visits, this note will serve as a discharge from care along with most recent update on progress.

## 2022-08-30 NOTE — TELEPHONE ENCOUNTER
8/30/22-spoke to patient about fax sent to our office. Per patient paper work was sent in error and she will contact sender with new contact information.

## 2022-08-30 NOTE — TELEPHONE ENCOUNTER
Refill Request       Last Seen: Last Seen Department: 8/11/2022  Last Seen by PCP: 8/11/2022        Next Appointment:   Future Appointments   Date Time Provider Israel Fontanezi   8/30/2022  4:00 PM Muriel Andradew, PT [address] EG PT [place] [facility]   9/1/2022  1:00 PM Izabelbertin Chapa, PTA [facility] [place] [facility]   9/6/2022  1:45 PM Izabelbertin Chapa, PTA [facility] [place] [facility]   9/8/2022  1:00 PM Izabel Eduard, PTA [facility] [place] [facility]   9/13/2022  1:00 PM Heloise Blow, PT [facility] [place] [facility]   9/15/2022  1:00 PM Heloise Blow, PT [facility] [place] [facility]   9/20/2022  1:00 PM Izabel Chapa, PTA [facility] [place] [facility]   9/22/2022  1:00 PM Izabel Chapa, PTA [facility] [place] [facility]   9/26/2022  8:30 PM SCHEDULE, [facility] SLEEP ROOM 3 [facility]   9/27/2022  1:00 PM Helonie Raymondw, PT [facility] [place] [facility]   9/29/2022  1:00 PM Helonie Blow, PT [facility] [place] [facility]   11/9/2022  1:15 PM Debora Neff MD AND PULM [facility]   11/14/2022  1:30 PM Wiley Conte,  [facility] AND RES [facility]   11/23/2022 10:00 AM Emory Etienne, MD Laurelyn Ahumada [facility]             Requested Prescriptions     Pending Prescriptions Disp Refills    montelukast (SINGULAIR) 10 MG tablet [Pharmacy Med Name: Montelukast Sodium Oral Tablet 10 MG] 30 tablet 0     Sig: TAKE 1 TABLET BY MOUTH EVERY DAY AT NIGHT    oxybutynin (DITROPAN-XL) 5 MG extended release tablet [Pharmacy Med Name: Oxybutynin Chloride ER Oral Tablet Extended Release 24 Hour 5 MG] 30 tablet 0     Sig: TAKE 1 TABLET BY MOUTH EVERY DAY    fluticasone (FLONASE) 50 MCG/ACT nasal spray [Pharmacy Med Name: Fluticasone Propionate Nasal Suspension 50 MCG/ACT] 16 g 0     Sig: INHALE 2 SPRAYS BY NASAL ROUTE TWO TIMES A DAY    azelastine (OPTIVAR) 0.05 % ophthalmic solution [Pharmacy Med Name: Azelastine HCl Ophthalmic Solution 0.05 %] 6 mL 0     Sig: INSTILL 1 DROP IN [facility] EYE TWO TIMES A DAY (GENERIC FOR optivar)    lisinopril (PRINIVIL;ZESTRIL) 5 MG tablet [Pharmacy Med Name: Lisinopril Oral Tablet 5 MG] 90 tablet 0     Sig: TAKE 1 TABLET BY MOUTH EVERY DAY

## 2022-08-30 NOTE — TELEPHONE ENCOUNTER
Refill Request       Last Seen: Last Seen Department: 8/11/2022  Last Seen by PCP: 7/22/2022    Last Written:     Next Appointment:   Future Appointments   Date Time Provider Israel Cote   8/30/2022  4:00 PM Yaa Yanez, PT [facility] EG PT [place] [facility]   9/1/2022  1:00 PM Jeana Blake, PTA [facility] [place] [facility]   9/6/2022  1:45 PM Jeana Hayden, PTA [facility] PT [place] [facility]   9/8/2022  1:00 PM Jeana Haimus, PTA [facility] [place] [facility]   9/13/2022  1:00 PM Yaa Yanez, PT [facility] [place] [facility]   9/15/2022  1:00 PM Yaa Hash, PT [facility] [place] [facility]   9/20/2022  1:00 PM Jeana Blake, PTA [facility] [place] [facility]   9/22/2022  1:00 PM Jeana Blake, PTA [facility] [place] [facility]   9/26/2022  8:30 PM SCHEDULE, [facility] SLEEP ROOM 3 [facility]   9/27/2022  1:00 PM Yaa Yanez, PT [facility] [place] [facility]   9/29/2022  1:00 PM Yaa Yanez, PT [facility] [place] [facility]   11/9/2022  1:15 PM Kaci Harris MD AND PULM [facility]   11/14/2022  1:30 PM Wiley Conte, DO [facility] AND RES [facility]   11/23/2022 10:00 AM MD Melissa Villafana [facility]             Requested Prescriptions     Pending Prescriptions Disp Refills    amLODIPine (NORVASC) 5 MG tablet [Pharmacy Med Name: amLODIPine Besylate Oral Tablet 5 MG] 30 tablet 0     Sig: TAKE 1 TABLET BY MOUTH EVERY DAY    busPIRone (BUSPAR) 15 MG tablet [Pharmacy Med Name: busPIRone HCl Oral Tablet 15 MG] 90 tablet 0     Sig: TAKE 1 TABLET BY MOUTH IN THE MORNING, NOON, AND BEFORE BEDTIME    PROAIR  (90 Base) MCG/ACT inhaler [Pharmacy Med Name: ProAir HFA Inhalation Aerosol Solution 108 (90 Base) MCG/ACT] 8.5 g 0     Sig: INHALE 2 PUFFS BY MOUTH EVERY SIX HOURS AS NEEDED FOR WHEEZING

## 2022-08-30 NOTE — PLAN OF CARE
[facility]. [address] [facility], [address] [facility] [address]  Phone: [phone]   Fax:     [phone]     Physical Therapy Certification    Dear Cheryle Harlem, PA-C,    We had the pleasure of evaluating the following patient for physical therapy services at [address] [place] [address]. A summary of our findings can be found in the initial assessment below. This includes our plan of care. If you have any questions or concerns regarding these findings, please do not hesitate to contact me at the office phone number checked above.   Thank you for the referral.       Physician Signature:_______________________________Date:__________________  By signing above (or electronic signature), therapists plan is approved by physician      Patient: Jaycee Brewster   : 1975   MRN: [id number]  Referring Physician:  Cheryle Harlem, PA-C      Evaluation Date: 2022      Medical Diagnosis Information:      M51.36 (ICD-10-CM) - DDD (degenerative disc disease), lumbar   M50.30 (ICD-10-CM) - DDD (degenerative disc disease), cervical                                               Insurance information:  [employer] medicaid     Precautions/ Contra-indications: rolling walker    C-SSRS Triggered by Intake questionnaire (Past 2 wk assessment):   [x] No, Questionnaire did not trigger screening.   [] Yes, Patient intake triggered further evaluation      [] C-SSRS Screening completed  [] PCP notified via Plan of Care  [] Emergency services notified     Latex Allergy:  [x]NO      []YES  Preferred Language for Healthcare:   [x]English       []other:    SUBJECTIVE: Patient states 10-15 year history of cervical pain and migraines  Cervical pain radiates to UE to hands    Relevant Medical History:see below  FOTO Score:   26/38    Pain Scale: 9/10  Easing factors: rest  Provocative factors: reach     Type: []Constant   [x]Intermittent  [x]Radiating []Localized []other:     Numbness/Tingling: UE    Occupation/School: Disabled     Living Status/Prior Level of Function: Independent with ADLs and IADLs,     OBJECTIVE:     ROM  Comments   Lumbar Flex 50%    Lumbar Ext 50%      ROM LEFT RIGHT Comments   Lumbar Side Bend      Lumbar Rotation      Hip Flexion      Hip Abd      Hip ER      Hip IR      Hip Extension      Knee Ext      Knee Flex      Hamstring Flex      Piriformis      Cervical flex/ ext 20/25      Cervical lat flexion 25 20      Strength LEFT RIGHT Comments   Multifidus      Transverse Ab      Hip Flexors 4+ 4+    Hip Abductors      Hip Extensors      Shoulder flex/ abd 4 4             Myotomes Normal Abnormal Comments   Hip flexion (L1-L2)      Knee extension (L2-L4)      Dorsiflexion (L4-L5)      Great Toe Ext (L5)      Ankle Eversion (S1-S2)      Ankle PF(S1-S2)        Dermatomes Normal Abnormal Comments   inguinal area (L1)       anterior mid-thigh (L2)      distal ant thigh/med knee (L3)      medial lower leg and foot (L4)      lateral lower leg and foot (L5)      posterior calf (S1)      medial calcaneus (S2)        Neural dynamic tension testing Normal Abnormal Comments   Slump Test  - Degree of knee flexion:       SLR       0-30      30-70      Femoral nerve (L2-4)        Reflexes Normal Abnormal Comments   S1-2 Seated achilles      S1-2 Prone knee bend      L3-4 Patellar tendon      C5-6 Biceps      C6 Brachioradialis      C7-8 Triceps      Clonus      Babinski      Adam's        Joint mobility:    []Normal    [x]Hypo   []Hyper    Palpation: C/ UT/ occipital    Functional Mobility/Transfers: slow guarded    Posture: flexed    Gait: (include devices/WB status) rolling walker    Bandages/Dressings/Incisions: NA    Orthopedic Special Tests:    Normal Abnormal N/A Comments   Toe walk         Heel Walk       Fwd Bend-aberrant or innominate mvmt)       Trendelenburg       Kemps/Quadrant       Stork CEDRIC/Yakov       Hip scour       SLR       Crossed SLR       Supine to sit       Hip thrust       SI distraction/compression       PA/Spring       Prone Instability test       Prone knee bend       Sacral Spring/thrust                  [x] Patient history, allergies, meds reviewed. Medical chart reviewed. See intake form. Review Of Systems (ROS):  [x]Performed Review of systems (Integumentary, CardioPulmonary, Neurological) by intake and observation. Intake form has been scanned into medical record. Patient has been instructed to contact their primary care physician regarding ROS issues if not already being addressed at this time.       Co-morbidities/Complexities (which will affect course of rehabilitation):   []None           Arthritic conditions   []Rheumatoid arthritis (M05.9)  [x]Osteoarthritis (M19.91)   Cardiovascular conditions   [x]Hypertension (I10)  []Hyperlipidemia (E78.5)  []Angina pectoris (I20)  []Atherosclerosis (I70)   Musculoskeletal conditions   []Disc pathology   []Congenital spine pathologies   []Prior surgical intervention  []Osteoporosis (M81.8)  []Osteopenia (M85.8)   Endocrine conditions   []Hypothyroid (E03.9)  []Hyperthyroid Gastrointestinal conditions   []Constipation (K52.63)   Metabolic conditions   []Morbid obesity (E66.01)  [x]Diabetes type 1(E10.65) or 2 (E11.65)   []Neuropathy (G60.9)     Pulmonary conditions   []Asthma (J45)  []Coughing   [x]COPD (J44.9)   Psychological Disorders  [x]Anxiety (F41.9)  [x]Depression (F32.9)   []Other:   []Other:           Barriers to/and or personal factors that will affect rehab potential:              []Age  []Sex              []Motivation/Lack of Motivation                        [x]Co-Morbidities              []Cognitive Function, education/learning barriers              []Environmental, home barriers              []profession/work barriers  []past PT/medical experience  []other:  Justification:     Falls Risk Assessment (30 days):   [x] Falls Risk assessed and no intervention required. [] Falls Risk assessed and Patient requires intervention due to being higher risk   TUG score (>12s at risk):     [] Falls education provided, including       G-Codes:       ASSESSMENT:   Functional Impairments:     []Noted lumbar/proximal hip hypomobility   []Noted lumbosacral and/or generalized hypermobility   [x]Decreased Lumbosacral/hip/LE functional ROM   [x]Decreased core/proximal hip strength and neuromuscular control    [x]Decreased LE functional strength    []Abnormal reflexes/sensation/myotomal/dermatomal deficits  [x]Reduced balance/proprioceptive control    []other:      Functional Activity Limitations (from functional questionnaire and intake)   [x]Reduced ability to tolerate prolonged functional positions   [x]Reduced ability or difficulty with changes of positions or transfers between positions   [x]Reduced ability to maintain good posture and demonstrate good body mechanics with sitting, bending, and lifting   [x]Reduced ability to sleep   [x] Reduced ability or tolerance with driving and/or computer work   [x]Reduced ability to perform lifting, reaching, carrying tasks   [x]Reduced ability to squat   [x]Reduced ability to forward bend   [x]Reduced ability to ambulate prolonged functional periods/distances/surfaces   [x]Reduced ability to ascend/descend stairs   []other:       Participation Restrictions   [x]Reduced participation in self care activities   [x]Reduced participation in home management activities   [x]Reduced participation in work activities   []Reduced participation in social activities. []Reduced participation in sport/recreational activities. Classification:   []Signs/symptoms consistent with Lumbar instability/stabilization subgroup. [x]Signs/symptoms consistent with Lumbar mobilization/manipulation subgroup, myotomes and dermatomes intact. Meets manipulation criteria.     []Signs/symptoms consistent with Lumbar direction specific/centralization subgroup   [x]Signs/symptoms consistent with Lumbar traction subgroup     []Signs/symptoms consistent with lumbar facet dysfunction   [x]Signs/symptoms consistent with lumbar stenosis type dysfunction   []Signs/symptoms consistent with nerve root involvement including myotome & dermatome dysfunction   []Signs/symptoms consistent with post-surgical status including: decreased ROM, strength and function. []signs/symptoms consistent with pathology which may benefit from Dry needling     []other:      Prognosis/Rehab Potential:      []Excellent   [x]Good    []Fair   []Poor    Tolerance of evaluation/treatment:    []Excellent   [x]Good    []Fair   []Poor     Physical Therapy Evaluation Complexity Justification  [x] A history of present problem with:  [] no personal factors and/or comorbidities that impact the plan of care;  []1-2 personal factors and/or comorbidities that impact the plan of care  [x]3 personal factors and/or comorbidities that impact the plan of care  [x] An examination of body systems using standardized tests and measures addressing any of the following: body structures and functions (impairments), activity limitations, and/or participation restrictions;:  [x] a total of 1-2 or more elements   [] a total of 3 or more elements   [] a total of 4 or more elements   [x] A clinical presentation with:  [x] stable and/or uncomplicated characteristics   [] evolving clinical presentation with changing characteristics  [] unstable and unpredictable characteristics;   [x] Clinical decision making of [x] low, [] moderate, [] high complexity using standardized patient assessment instrument and/or measurable assessment of functional outcome.     [x] EVAL (LOW) 05474 (typically 20 minutes face-to-face)  [] EVAL (MOD) 09395 (typically 30 minutes face-to-face)  [] EVAL (HIGH) 07122 (typically 45 minutes face-to-face)  [] RE-EVAL     PLAN: Begin PT focusing on: proximal hip mobilizations, LB mobs, LB core activation, proximal hip activation, and HEP    Frequency/Duration:  2 days per week for 6-8 Weeks:  Interventions:  [x]  Therapeutic exercise including: strength training, ROM, for LE, Glutes and core   [x]  NMR activation and proprioception for glutes , LE and Core   [x]  Manual therapy as indicated for Hip complex, LE and spine to include: Dry Needling/IASTM, STM, PROM, Gr I-IV mobilizations, manipulation. [x]  Modalities as needed that may include: thermal agents, E-stim, Biofeedback, US, iontophoresis as indicated  [x]  Patient education on joint protection, postural re-education, activity modification, progression of HEP. HEP instruction: Refer to [address] [place] [address] access code and exercises on the 1st visit treatment note    GOALS:  Patient stated goal: ADLs headaches    Therapist goals for Patient:   Short Term Goals: To be achieved in: 2 weeks  1. Independent in HEP and progression per patient tolerance, in order to prevent re-injury. [x] Progressing: [] Met: [] Not Met: [] Adjusted  2. Patient will have a decrease in pain to facilitate improvement in movement, function, and ADLs as indicated by Functional Deficits. [x] Progressing: [] Met: [] Not Met: [] Adjusted    Long Term Goals: To be achieved in: 6-8 weeks  1. FOTO score will match or exceed predicted score to assist with reaching prior level of function. [x] Progressing: [] Met: [] Not Met: [] Adjusted  2. Patient will demonstrate increased AROM to WNL, good LS mobility, good hip ROM to allow for proper joint functioning as indicated by patients Functional Deficits. [x] Progressing: [] Met: [] Not Met: [] Adjusted  3. Patient will demonstrate an increase in Strength to good proximal hip and core activation to allow for proper functional mobility as indicated by patients Functional Deficits. [x] Progressing: [] Met: [] Not Met: [] Adjusted  4.  Patient will return to [facility] for functional activities without increased symptoms or restriction. [x] Progressing: [] Met: [] Not Met: [] Adjusted  5.  ADLs/ headaches with min to no limitations (patient specific functional goal)    [x] Progressing: [] Met: [] Not Met: [] Adjusted     Electronically signed by:  Aubree Wallis, PT

## 2022-08-31 DIAGNOSIS — J30.1 ALLERGIC RHINITIS DUE TO POLLEN, UNSPECIFIED SEASONALITY: ICD-10-CM

## 2022-08-31 RX ORDER — AZELASTINE HYDROCHLORIDE 0.5 MG/ML
SOLUTION/ DROPS OPHTHALMIC
Qty: 6 ML | Refills: 0 | OUTPATIENT
Start: 2022-08-31

## 2022-09-01 ENCOUNTER — HOSPITAL ENCOUNTER (OUTPATIENT)
Dept: PHYSICAL THERAPY | Age: 47
Setting detail: THERAPIES SERIES
Discharge: HOME | End: 2022-09-01
Payer: COMMERCIAL

## 2022-09-01 DIAGNOSIS — J30.1 ALLERGIC RHINITIS DUE TO POLLEN, UNSPECIFIED SEASONALITY: ICD-10-CM

## 2022-09-01 PROCEDURE — 97016 VASOPNEUMATIC DEVICE THERAPY: CPT | Performed by: SPECIALIST/TECHNOLOGIST

## 2022-09-01 PROCEDURE — 97112 NEUROMUSCULAR REEDUCATION: CPT | Performed by: SPECIALIST/TECHNOLOGIST

## 2022-09-01 PROCEDURE — 97110 THERAPEUTIC EXERCISES: CPT | Performed by: SPECIALIST/TECHNOLOGIST

## 2022-09-01 PROCEDURE — 97140 MANUAL THERAPY 1/> REGIONS: CPT | Performed by: SPECIALIST/TECHNOLOGIST

## 2022-09-01 RX ORDER — AZELASTINE HYDROCHLORIDE 0.5 MG/ML
SOLUTION/ DROPS OPHTHALMIC
Qty: 6 ML | Refills: 0 | OUTPATIENT
Start: 2022-09-01

## 2022-09-01 NOTE — PROGRESS NOTES
[facility] [facility], [facility]5 [address], [address] [place] [address] [address]  Phone: [phone]   Fax:     [phone]      Physical Therapy Treatment Note/ Progress Report:     Date:  2022    Patient Name:  Michael Grewal    :  1975  MRN: [id number]  Restrictions/Precautions:    Medical/Treatment Diagnosis Information:   M17.0 (ICD-10-CM) - Primary osteoarthritis of both knees     Insurance/Certification information:   [employer] medicaid  Physician Information:   Valeriy Potts PA-C  Has the plan of care been signed (Y/N):        []  Yes  [x]  No     Date of Patient follow up with Physician: NS    Is this a Progress Report:     []  Yes  [x]  No      If Yes:  Date Range for reporting period:  Beginnin22 ------------ Endin22    Progress report will be due (10 Rx or 30 days whichever is less): 3/08/96     Recertification will be due (POC Duration  / 90 days whichever is less): 22      Visit # Insurance Allowable Auth Required   In Person 3 knee  1 back BMN []  Yes     []  No    Tele Health 0  []  Yes     []  No    Total 4       FOTO Score:      Date assessed:  22      Latex Allergy:  [x]NO      []YES  Preferred Language for Healthcare:   [x]English       []other:    Pain level:  8/10     SUBJECTIVE:  Pt notes that she is feeling pretty good today.       OBJECTIVE:   Observation:   Test measurements:      RESTRICTIONS/PRECAUTIONS: obese/ breathing    Exercises/Interventions:   Therapeutic Ex (31145) Sets/sec Reps Notes/CUES HEP   QS  10x  x   Heel slides  10x  x   SLR/ abd  10x  x   Calf stretch  30\" x3ea     BS 10\" 10x     LAQ  15x     Hamstring stretch 30\" 3x     Aquatic exercise handouts   Code: [id number]                                Manual Intervention (22347)       Pat mob/ knee flex  8 min                                        NMR re-education (63403)   CUES NEEDED    Stand calf stretch 20 2x ea  x HR/TR  15x  x                 bridges  15x  x   Supine PB clamshells Green  10x  x                        Therapeutic Activity (18721)                            Vaso L knee/ CP R  10 min            Medbridge access code:  [facility]           Therapeutic Exercise and NMR EXR  [x] (40476) Provided verbal/tactile cueing for activities related to strengthening, flexibility, endurance, ROM for improvements in LE, proximal hip, and core control with self care, mobility, lifting, ambulation. [x] (75034) Provided verbal/tactile cueing for activities related to improving balance, coordination, kinesthetic sense, posture, motor skill, proprioception to assist with LE, proximal hip, and core control in self-care, mobility, lifting, ambulation and eccentric single leg control.      NMR and Therapeutic Activities:    [x] (79242 or 76500) Provided verbal/tactile cueing for activities related to improving balance, coordination, kinesthetic sense, posture, motor skill, proprioception and motor activation to allow for proper function of core, proximal hip and LE with self-care and ADLs and functional mobility.   [] (44591) Gait Re-education- Provided training and instruction to the patient for proper LE, core and proximal hip recruitment and positioning and eccentric body weight control with ambulation re-education including up and down stairs     Home Exercise Program:    [x] (26984) Reviewed/Progressed HEP activities related to strengthening, flexibility, endurance, ROM of core, proximal hip and LE for functional self-care, mobility, lifting and ambulation/stair navigation   [] (51983) Reviewed/Progressed HEP activities related to improving balance, coordination, kinesthetic sense, posture, motor skill, proprioception of core, proximal hip and LE for self-care, mobility, lifting, and ambulation/stair navigation      Manual Treatments:  PROM / STM / Oscillations-Mobs:  G-I, II, III, IV (PA's, Inf., Post.)  [x] (88790) Provided manual therapy to mobilize LE, proximal hip and/or LS spine soft tissue/joints for the purpose of modulating pain, promoting relaxation, increasing ROM, reducing/eliminating soft tissue swelling/inflammation/restriction, improving soft tissue extensibility and allowing for proper ROM for normal function with self-care, mobility, lifting and ambulation. Modalities:     [x] GAME READY (VASO)- for significant edema, swelling, pain control. Charges:  Timed Code Treatment Minutes: 38   Total Treatment Minutes:  48   BWC:  TE TIME:  NMR TIME:  MANUAL TIME:  UNTIMED MINUTES:  Medicare Total:         [] EVAL (LOW) 86228 (typically 20 minutes face-to-face)  [] EVAL (MOD) 58398 (typically 30 minutes face-to-face)  [] EVAL (HIGH) 25184 (typically 45 minutes face-to-face)  [] RE-EVAL     [x] LK(19205) x  1   [] IONTO  [x] NMR (71118) x    1 [x] VASO  [x] Manual (11543) x  1   [] Other:  [] TA x      [] Mech Traction (74565)  [] ES(attended) (05942)      [] ES (un) (83023):    ASSESSMENT:  Pt fatigues very easily with exercises. GOALS:      Patient stated goal: walk/ stairs     Therapist goals for Patient:   Short Term Goals: To be achieved in: 2 weeks  1. Independent in HEP and progression per patient tolerance, in order to prevent re-injury. [x] Progressing: [] Met: [] Not Met: [] Adjusted     2. Patient will have a decrease in pain to facilitate improvement in movement, function, and ADLs as indicated by Functional Deficits. [x] Progressing: [] Met: [] Not Met: [] Adjusted     Long Term Goals: To be achieved in: 6-8 weeks  1. FOTO score will match or exceed predicted score to assist with reaching prior level of function. [x] Progressing: [] Met: [] Not Met: [] Adjusted     2. Patient will demonstrate increased AROM to 135-0 to allow for proper joint functioning as indicated by patients Functional Deficits. [x] Progressing: [] Met: [] Not Met: [] Adjusted     3.  Patient will demonstrate an increase in Strength to good proximal hip strength and control, within 5lb HHD in LE to allow for proper functional mobility as indicated by patients Functional Deficits. [x] Progressing: [] Met: [] Not Met: [] Adjusted     4. Patient will return to [facility] for  functional activities without increased symptoms or restriction. [x] Progressing: [] Met: [] Not Met: [] Adjusted     5. Walk/ stand/ stairs with min to no limitations (patient specific functional goal)    [x] Progressing: [] Met: [] Not Met: [] Adjusted          Overall Progression Towards Functional goals/ Treatment Progress Update:  [] Patient is progressing as expected towards functional goals listed. [] Progression is slowed due to complexities/Impairments listed. [] Progression has been slowed due to co-morbidities.   [x] Plan just implemented, too soon to assess goals progression <30days   [] Goals require adjustment due to lack of progress  [] Patient is not progressing as expected and requires additional follow up with physician  [] Other    Prognosis for POC: [x] Good [] Fair  [] Poor      Patient requires continued skilled intervention: [x] Yes  [] No    Treatment/Activity Tolerance:  [x] Patient able to complete treatment  [] Patient limited by fatigue  [] Patient limited by pain    [] Patient limited by other medical complications  [] Other:     Return to Play: (if applicable)   []  Stage 1: Intro to Strength   []  Stage 2: Return to Run and Strength   []  Stage 3: Return to Jump and Strength   []  Stage 4: Dynamic Strength and Agility   []  Stage 5: Sport Specific Training     []  Ready to Return to Play, Meets All Above Stages   [x]  Not Ready for Return to Sports   Comments:                          PLAN: See eval  [] Continue per plan of care [] Alter current plan (see comments above)  [x] Plan of care initiated [] Hold pending MD visit [] Discharge    Electronically signed by:  Chucho Drake, PTA, MHI, ATC    Note: If patient does not return for scheduled/ recommended follow up visits, this note will serve as a discharge from care along with most recent update on progress.

## 2022-09-02 RX ORDER — MONTELUKAST SODIUM 10 MG/1
TABLET ORAL
Qty: 30 TABLET | Refills: 5 | Status: SHIPPED | OUTPATIENT
Start: 2022-09-02

## 2022-09-02 NOTE — TELEPHONE ENCOUNTER
Refill Request       Last Seen: Last Seen Department: 8/11/2022  Last Seen by PCP: 8/11/2022    Last Written: 8/30/22 #30 with no refill     Next Appointment:   Future Appointments   Date Time Provider Israel Cote   9/6/2022  1:45 PM Odella Penobscot, PTA [facility] [place] [facility]   9/8/2022  1:00 PM Odella Penobscot, PTA [facility] [place] [facility]   9/13/2022  1:00 PM Odella Penobscot, PTA [facility] [place]   9/15/2022  1:00 PM Geovanna Boyd, PT [facility]   9/20/2022  1:00 PM Odella Penobscot, PTA [facility] [place]   9/22/2022  1:00 PM Odella Penobscot, PTA [facility] [place] [facility]   9/26/2022  8:30 PM SCHEDULE, [facility] SLEEP ROOM 3 [facility]   9/27/2022  1:00 PM Geovanna Boyd, PT [facility] [place] [facility]   9/29/2022  1:00 PM Geovanna Boyd, PT [facility] [place] [facility]   11/9/2022  1:15 PM Serina Fuastin MD AND PULRAD [facility]   11/14/2022  1:30 PM Wiley Conte DO [facility] AND RES [facility]   11/23/2022 10:00 AM MD Benoit Martinez [facility]           Requested Prescriptions     Pending Prescriptions Disp Refills    montelukast (SINGULAIR) 10 MG tablet [Pharmacy Med Name: Montelukast Sodium Oral Tablet 10 MG] 30 tablet 0     Sig: TAKE 1 TABLET BY MOUTH EVERY DAY AT NIGHT

## 2022-09-06 ENCOUNTER — TELEPHONE (OUTPATIENT)
Dept: PRIMARY CARE CLINIC | Age: 47
End: 2022-09-06

## 2022-09-06 ENCOUNTER — HOSPITAL ENCOUNTER (OUTPATIENT)
Dept: PHYSICAL THERAPY | Age: 47
Setting detail: THERAPIES SERIES
Discharge: HOME | End: 2022-09-06
Payer: COMMERCIAL

## 2022-09-06 NOTE — PROGRESS NOTES
723 [facility], [address] [place] [address], [address] [place] [address] [address]  Phone: [phone]   Fax:     [phone]    Physical Therapy  Cancellation/No-show Note  Patient Name:  aH Alarcon  :  1975   Date:  2022    Cancelled visits to date: 1  No-shows to date: 0    For today's appointment patient:  [x]  Cancelled  [x]  Rescheduled appointment  []  No-show     Reason given by patient:  [x]  Patient ill  []  Conflicting appointment  []  No transportation    []  Conflict with work  []  No reason given  []  Other:     Comments:      Phone call information:   []  Phone call made today to patient at am/pm at the number provided:      []  Patient answered, conversation as follows:    []  Patient did not answer, message left as follows:  [x]  Phone call not needed - pt contacted us to cancel and provided reason for cancellation.      Electronically signed by:  Caden Zapien PTA, MHI, ATC

## 2022-09-06 NOTE — TELEPHONE ENCOUNTER
----- Message from Griselda Belts sent at 9/6/2022 10:47 AM EDT -----  Subject: Message to Provider    QUESTIONS  Information for Provider? Pt. sees Dr. Conte. [facility] from [facility] called today 9/6/2022 to follow up on a fax for clinical notes   request and a detailed written order that was faxed on 8/31/2022 and   refaxed on 9/1/2022. [facility] wants to know if this fax has been received. The call back number is [phone] and rep can assist.   ---------------------------------------------------------------------------  --------------  Daryle Haver INFO  [phone]; Do not leave any message, patient will call back for answer  ---------------------------------------------------------------------------  --------------  SCRIPT ANSWERS  Relationship to Patient? Third Party  Third Party Type? Other  Other Third Party Type? [place]  Representative Name?  [facility]

## 2022-09-06 NOTE — TELEPHONE ENCOUNTER
Received another fax from [facility] past 6months clinical notes, Pt Dx, lab reports.   I printed and faxed back to [phone]  These notes were 111 pages they are in my black folder by my desk if something  needs reprinted

## 2022-09-08 ENCOUNTER — CARE COORDINATION (OUTPATIENT)
Dept: CARE COORDINATION | Age: 47
End: 2022-09-08

## 2022-09-08 ENCOUNTER — HOSPITAL ENCOUNTER (OUTPATIENT)
Dept: PHYSICAL THERAPY | Age: 47
Setting detail: THERAPIES SERIES
Discharge: HOME | End: 2022-09-08
Payer: COMMERCIAL

## 2022-09-08 PROCEDURE — 97112 NEUROMUSCULAR REEDUCATION: CPT | Performed by: SPECIALIST/TECHNOLOGIST

## 2022-09-08 PROCEDURE — 97140 MANUAL THERAPY 1/> REGIONS: CPT | Performed by: SPECIALIST/TECHNOLOGIST

## 2022-09-08 PROCEDURE — 97110 THERAPEUTIC EXERCISES: CPT | Performed by: SPECIALIST/TECHNOLOGIST

## 2022-09-08 NOTE — FLOWSHEET NOTE
[address] [facility], [address] [place] [address], [address] [place] [address] [address]  Phone: [phone]   Fax:     [phone]      Physical Therapy Treatment Note/ Progress Report:       Date:  2022    Patient Name:  Lubna Horton    :  1975  MRN: [id number]  Restrictions/Precautions:    Medical/Treatment Diagnosis Information:     M51.36 (ICD-10-CM) - DDD (degenerative disc disease), lumbar   M50.30 (ICD-10-CM) - DDD (degenerative disc disease), cervical        Insurance/Certification information:   [place] medicaid  Physician Information:   Zeb Angel PA-C  Has the plan of care been signed (Y/N):        []  Yes  [x]  No     Date of Patient follow up with Physician: NS    Is this a Progress Report:     []  Yes  [x]  No      If Yes:  Date Range for reporting period:  Beginnin22 ------------ Endin22    Progress report will be due (10 Rx or 30 days whichever is less): 3/87/77     Recertification will be due (POC Duration  / 90 days whichever is less): 22      Visit # Insurance Allowable Auth Required   In Person 3 knee  2 back BMN []  Yes     []  No    Tele Health 0  []  Yes     []  No    Total 5       FOTO Score: 2638     Date assessed:  22      Latex Allergy:  [x]NO      []YES  Preferred Language for Healthcare:   [x]English       []other:    Pain level:  9/10     SUBJECTIVE:  Pt notes she had stomach issues and wasn't able to make it in last visit.      OBJECTIVE: See eval  Observation:   Test measurements:      RESTRICTIONS/PRECAUTIONS: none    Exercises/Interventions:   Therapeutic Ex (65828) Sets/sec Reps Notes/CUES HEP   Chin tucks  15x  x   PB rows/ ext or 30x  x   shrugs  30x  x   Posture scap pinches  30x     No moneys  30x     Horabder  30x                                               Manual Intervention (37841)       Sit MFR C/ UT/ occipital   8 min                                        NMR re-education (44811)   CUES NEEDED    bridges  10x  x   LTR  10x  x                                                    Therapeutic Activity (13634)                     C traction  NV                   Medbridge access code: [email or web address]           Therapeutic Exercise and NMR EXR  [x] (34455) Provided verbal/tactile cueing for activities related to strengthening, flexibility, endurance, ROM  for improvements in proximal hip and core control with self care, mobility, lifting and ambulation.  [] (28299) Provided verbal/tactile cueing for activities related to improving balance, coordination, kinesthetic sense, posture, motor skill, proprioception  to assist with core control in self care, mobility, lifting, and ambulation.      Therapeutic Activities:    [x] (65278 or 60126) Provided verbal/tactile cueing for activities related to improving balance, coordination, kinesthetic sense, posture, motor skill, proprioception and motor activation to allow for proper function  with self care and ADLs  [] (12558) Provided training and instruction to the patient for proper core and proximal hip recruitment and positioning with ambulation re-education     Home Exercise Program:    [x] (29017) Reviewed/Progressed HEP activities related to strengthening, flexibility, endurance, ROM of core, proximal hip and LE for functional self-care, mobility, lifting and ambulation   [] (87534) Reviewed/Progressed HEP activities related to improving balance, coordination, kinesthetic sense, posture, motor skill, proprioception of core, proximal hip and LE for self care, mobility, lifting, and ambulation      Manual Treatments:  PROM / STM / Oscillations-Mobs:  G-I, II, III, IV (PA's, Inf., Post.)  [x] (54724) Provided manual therapy to mobilize proximal hip and LS spine soft tissue/joints for the purpose of modulating pain, promoting relaxation,  increasing ROM, reducing/eliminating soft tissue swelling/inflammation/restriction, improving soft tissue extensibility and allowing for proper ROM for normal function with self care, mobility, lifting and ambulation. Modalities:     [] GAME READY (VASO)- for significant edema, swelling, pain control. Charges:  Timed Code Treatment Minutes: 40   Total Treatment Minutes:  40   BWC:  TE TIME:  NMR TIME:  MANUAL TIME:  UNTIMED MINUTES:  Medicare Total:         [] EVAL (LOW) 46551 (typically 20 minutes face-to-face)  [] EVAL (MOD) 44125 (typically 30 minutes face-to-face)  [] EVAL (HIGH) 19986 (typically 45 minutes face-to-face)  [] RE-EVAL     [x] UE(15246) x   1  [] IONTO  [x] NMR (13437) x    1 [] VASO  [x] Manual (03534) x    1 [] Other:  [] TA x      [] Mech Traction (32902)  [] ES(attended) (30018)      [] ES (un) (54053):       ASSESSMENT:  See eval      GOALS:     Patient stated goal: ADLs headaches    Therapist goals for Patient:   Short Term Goals: To be achieved in: 2 weeks  1. Independent in HEP and progression per patient tolerance, in order to prevent re-injury. [x] Progressing: [] Met: [] Not Met: [] Adjusted  2. Patient will have a decrease in pain to facilitate improvement in movement, function, and ADLs as indicated by Functional Deficits. [x] Progressing: [] Met: [] Not Met: [] Adjusted    Long Term Goals: To be achieved in: 6-8 weeks  1. FOTO score will match or exceed predicted score to assist with reaching prior level of function. [x] Progressing: [] Met: [] Not Met: [] Adjusted  2. Patient will demonstrate increased AROM to WNL, good LS mobility, good hip ROM to allow for proper joint functioning as indicated by patients Functional Deficits. [x] Progressing: [] Met: [] Not Met: [] Adjusted  3. Patient will demonstrate an increase in Strength to good proximal hip and core activation to allow for proper functional mobility as indicated by patients Functional Deficits. [x] Progressing: [] Met: [] Not Met: [] Adjusted  4.  Patient will return to [facility] for functional activities without increased symptoms or restriction. [x] Progressing: [] Met: [] Not Met: [] Adjusted  5. ADLs/ headaches with min to no limitations (patient specific functional goal)    [x] Progressing: [] Met: [] Not Met: [] Adjusted         Overall Progression Towards Functional goals/ Treatment Progress Update:  [] Patient is progressing as expected towards functional goals listed. [] Progression is slowed due to complexities/Impairments listed. [] Progression has been slowed due to co-morbidities. [x] Plan just implemented, too soon to assess goals progression <30days   [] Goals require adjustment due to lack of progress  [] Patient is not progressing as expected and requires additional follow up with physician  [] Other    Prognosis for POC: [x] Good [] Fair  [] Poor      Patient requires continued skilled intervention: [x] Yes  [] No    Treatment/Activity Tolerance:  [x] Patient able to complete treatment  [] Patient limited by fatigue  [] Patient limited by pain    [] Patient limited by other medical complications  [] Other:     Return to Play: (if applicable)   []  Stage 1: Intro to Strength   []  Stage 2: Return to Run and Strength   []  Stage 3: Return to Jump and Strength   []  Stage 4: Dynamic Strength and Agility   []  Stage 5: Sport Specific Training     []  Ready to Return to Play, Meets All Above Stages   []  Not Ready for Return to Sports   Comments:                           PLAN: See eval  [] Continue per plan of care [] Alter current plan (see comments above)  [x] Plan of care initiated [] Hold pending MD visit [] Discharge    Electronically signed by:  Jessie Brody PTA, MHI, ATC    Note: If patient does not return for scheduled/ recommended follow up visits, this note will serve as a discharge from care along with most recent update on progress.

## 2022-09-09 DIAGNOSIS — M17.0 OSTEOARTHRITIS OF BOTH KNEES, UNSPECIFIED OSTEOARTHRITIS TYPE: ICD-10-CM

## 2022-09-09 NOTE — TELEPHONE ENCOUNTER
Refill Request       Last Seen: Last Seen Department: 8/11/2022  Last Seen by PCP: 8/11/2022    Last Written: 08/11/2022    Next Appointment:   Future Appointments   Date Time Provider Israel Cote   9/13/2022  1:00 PM Theta Leonard, PTA [facility] [place] [facility]   9/15/2022  1:00 PM Leopoldbenita Esquivelner, PT [facility] [place] [facility]   9/20/2022  1:00 PM Theta Leonard, PTA [facility] [place] [facility]   9/22/2022  1:00 PM Theta Leonard, PTA [facility] [place] [facility]   9/26/2022  8:30 PM SCHEDULE, [facility] SLEEP ROOM 3 [facility]   9/27/2022  1:00 PM Leopold Laury, PT [facility] [place] [facility]   9/29/2022  1:00 PM Leopold Laury, PT [facility] [place] [facility]   11/9/2022  1:15 PM Chino Christy MD AND PULM [facility]   11/14/2022  1:30 PM Wiley Conte DO [facility] AND RES [facility]   11/23/2022 10:00 AM MD Alia Roberto [facility]             Requested Prescriptions     Pending Prescriptions Disp Refills    diclofenac sodium (VOLTAREN) 1 % GEL [Pharmacy Med Name: Diclofenac Sodium External Gel 1 %] 100 g 0     Sig: Apply 4 GRAMS topically 4 times daily as needed for Pain

## 2022-09-13 ENCOUNTER — HOSPITAL ENCOUNTER (OUTPATIENT)
Dept: PHYSICAL THERAPY | Age: 47
Setting detail: THERAPIES SERIES
Discharge: HOME | End: 2022-09-13
Payer: COMMERCIAL

## 2022-09-13 PROCEDURE — 97110 THERAPEUTIC EXERCISES: CPT | Performed by: SPECIALIST/TECHNOLOGIST

## 2022-09-13 PROCEDURE — 97112 NEUROMUSCULAR REEDUCATION: CPT | Performed by: SPECIALIST/TECHNOLOGIST

## 2022-09-13 PROCEDURE — 97140 MANUAL THERAPY 1/> REGIONS: CPT | Performed by: SPECIALIST/TECHNOLOGIST

## 2022-09-13 NOTE — PROGRESS NOTES
[facility], [address] [place] [address] [address]  Phone: [phone]   Fax:     [phone]      Physical Therapy Treatment Note/ Progress Report:       Date:  2022    Patient Name:  Jaycee Brewster    :  1975  MRN: [id number]  Restrictions/Precautions:    Medical/Treatment Diagnosis Information:     M51.36 (ICD-10-CM) - DDD (degenerative disc disease), lumbar   M50.30 (ICD-10-CM) - DDD (degenerative disc disease), cervical        Insurance/Certification information:   [place] medicaid  Physician Information:   Cheryle Harlem, PA-C  Has the plan of care been signed (Y/N):        []  Yes  [x]  No     Date of Patient follow up with Physician: NS    Is this a Progress Report:     []  Yes  [x]  No      If Yes:  Date Range for reporting period:  Beginnin22 ------------ Endin22    Progress report will be due (10 Rx or 30 days whichever is less):      Recertification will be due (POC Duration  / 90 days whichever is less): 22      Visit # Insurance Allowable Auth Required   In Person 3 knee  3 back BMN []  Yes     []  No    Tele Health 0  []  Yes     []  No    Total 6       FOTO Score: 26/38     Date assessed:  22      Latex Allergy:  [x]NO      []YES  Preferred Language for Healthcare:   [x]English       []other:    Pain level:  9/10     SUBJECTIVE:  Pt notes she     OBJECTIVE: See eval  Observation:   Test measurements:      RESTRICTIONS/PRECAUTIONS: none    Exercises/Interventions:   Therapeutic Ex (65987) Sets/sec Reps Notes/CUES HEP   Chin tucks  15x  x   PB rows/ ext or 30x  x   shrugs  30x 2# x   Posture scap pinches  30x     No moneys  30x     Horabder  30x     Bicep curls  30x 2#                                       Manual Intervention (80691)       Sit MFR C/ UT/ occipital   8 min                                        NMR re-education (46345)   CUES NEEDED    bridges 10x  x   LTR  10x  x   Pelvic tilt 10\" 10x     Pelvic tilt c marching   10x     Pelvic tilt arms oh  10x                                 Therapeutic Activity (86338)                     C traction  NV                   Medbridge access code: [email or web address]           Therapeutic Exercise and NMR EXR  [x] (73328) Provided verbal/tactile cueing for activities related to strengthening, flexibility, endurance, ROM  for improvements in proximal hip and core control with self care, mobility, lifting and ambulation.  [] (78132) Provided verbal/tactile cueing for activities related to improving balance, coordination, kinesthetic sense, posture, motor skill, proprioception  to assist with core control in self care, mobility, lifting, and ambulation.      Therapeutic Activities:    [x] (57396 or 04659) Provided verbal/tactile cueing for activities related to improving balance, coordination, kinesthetic sense, posture, motor skill, proprioception and motor activation to allow for proper function  with self care and ADLs  [] (39183) Provided training and instruction to the patient for proper core and proximal hip recruitment and positioning with ambulation re-education     Home Exercise Program:    [x] (02944) Reviewed/Progressed HEP activities related to strengthening, flexibility, endurance, ROM of core, proximal hip and LE for functional self-care, mobility, lifting and ambulation   [] (19926) Reviewed/Progressed HEP activities related to improving balance, coordination, kinesthetic sense, posture, motor skill, proprioception of core, proximal hip and LE for self care, mobility, lifting, and ambulation      Manual Treatments:  PROM / STM / Oscillations-Mobs:  G-I, II, III, IV (PA's, Inf., Post.)  [x] (90470) Provided manual therapy to mobilize proximal hip and LS spine soft tissue/joints for the purpose of modulating pain, promoting relaxation,  increasing ROM, reducing/eliminating soft tissue swelling/inflammation/restriction, improving soft tissue extensibility and allowing for proper ROM for normal function with self care, mobility, lifting and ambulation. Modalities:     [] GAME READY (VASO)- for significant edema, swelling, pain control. Charges:  Timed Code Treatment Minutes: 40   Total Treatment Minutes:  40   BWC:  TE TIME:  NMR TIME:  MANUAL TIME:  UNTIMED MINUTES:  Medicare Total:         [] EVAL (LOW) 79260 (typically 20 minutes face-to-face)  [] EVAL (MOD) 31893 (typically 30 minutes face-to-face)  [] EVAL (HIGH) 00509 (typically 45 minutes face-to-face)  [] RE-EVAL     [x] UX(40234) x   1  [] IONTO  [x] NMR (54858) x    1 [] VASO  [x] Manual (23081) x    1 [] Other:  [] TA x      [] Mech Traction (08251)  [] ES(attended) (91471)      [] ES (un) (00480):       ASSESSMENT:  Pt fatigues easily with PREs. Trap spasm was smaller before manuals today. GOALS:     Patient stated goal: ADLs headaches    Therapist goals for Patient:   Short Term Goals: To be achieved in: 2 weeks  1. Independent in HEP and progression per patient tolerance, in order to prevent re-injury. [x] Progressing: [] Met: [] Not Met: [] Adjusted  2. Patient will have a decrease in pain to facilitate improvement in movement, function, and ADLs as indicated by Functional Deficits. [x] Progressing: [] Met: [] Not Met: [] Adjusted    Long Term Goals: To be achieved in: 6-8 weeks  1. FOTO score will match or exceed predicted score to assist with reaching prior level of function. [x] Progressing: [] Met: [] Not Met: [] Adjusted  2. Patient will demonstrate increased AROM to WNL, good LS mobility, good hip ROM to allow for proper joint functioning as indicated by patients Functional Deficits. [x] Progressing: [] Met: [] Not Met: [] Adjusted  3. Patient will demonstrate an increase in Strength to good proximal hip and core activation to allow for proper functional mobility as indicated by patients Functional Deficits.    [x] Progressing: [] Met: [] Not Met: [] Adjusted  4. Patient will return to [facility] for functional activities without increased symptoms or restriction. [x] Progressing: [] Met: [] Not Met: [] Adjusted  5. ADLs/ headaches with min to no limitations (patient specific functional goal)    [x] Progressing: [] Met: [] Not Met: [] Adjusted         Overall Progression Towards Functional goals/ Treatment Progress Update:  [] Patient is progressing as expected towards functional goals listed. [] Progression is slowed due to complexities/Impairments listed. [] Progression has been slowed due to co-morbidities. [x] Plan just implemented, too soon to assess goals progression <30days   [] Goals require adjustment due to lack of progress  [] Patient is not progressing as expected and requires additional follow up with physician  [] Other    Prognosis for POC: [x] Good [] Fair  [] Poor      Patient requires continued skilled intervention: [x] Yes  [] No    Treatment/Activity Tolerance:  [x] Patient able to complete treatment  [] Patient limited by fatigue  [] Patient limited by pain    [] Patient limited by other medical complications  [] Other:     Return to Play: (if applicable)   []  Stage 1: Intro to Strength   []  Stage 2: Return to Run and Strength   []  Stage 3: Return to Jump and Strength   []  Stage 4: Dynamic Strength and Agility   []  Stage 5: Sport Specific Training     []  Ready to Return to Play, Meets All Above Stages   []  Not Ready for Return to Sports   Comments:                           PLAN:   [x] Continue per plan of care [] Alter current plan (see comments above)  [] Plan of care initiated [] Hold pending MD visit [] Discharge    Electronically signed by:  Herberth Ortiz PTA, MHI, ATC    Note: If patient does not return for scheduled/ recommended follow up visits, this note will serve as a discharge from care along with most recent update on progress.

## 2022-09-15 ENCOUNTER — HOSPITAL ENCOUNTER (OUTPATIENT)
Dept: PHYSICAL THERAPY | Age: 47
Setting detail: THERAPIES SERIES
Discharge: HOME | End: 2022-09-15
Payer: COMMERCIAL

## 2022-09-15 PROCEDURE — 97110 THERAPEUTIC EXERCISES: CPT | Performed by: SPECIALIST

## 2022-09-15 PROCEDURE — 97140 MANUAL THERAPY 1/> REGIONS: CPT | Performed by: SPECIALIST

## 2022-09-15 PROCEDURE — 97112 NEUROMUSCULAR REEDUCATION: CPT | Performed by: SPECIALIST

## 2022-09-15 NOTE — PROGRESS NOTES
723 [facility], [facility]5 [address], [address] [place] [address] [address]  Phone: [phone]   Fax:     [phone]      Physical Therapy Treatment Note/ Progress Report:     Date:  9/15/2022    Patient Name:  Kiki Deleon    :  1975  MRN: [id number]  Restrictions/Precautions:    Medical/Treatment Diagnosis Information:   M17.0 (ICD-10-CM) - Primary osteoarthritis of both knees     Insurance/Certification information:   Nellene Dural medicaid  Physician Information:   Laquita Garcia PA-C  Has the plan of care been signed (Y/N):        []  Yes  [x]  No     Date of Patient follow up with Physician: NS    Is this a Progress Report:     []  Yes  [x]  No      If Yes:  Date Range for reporting period:  Beginnin22 ------------ Endin22    Progress report will be due (10 Rx or 30 days whichever is less):      Recertification will be due (POC Duration  / 90 days whichever is less): 22      Visit # Insurance Allowable Auth Required   In Person 4 knee  1 back BMN []  Yes     []  No    Tele Health 0  []  Yes     []  No    Total 5       FOTO Score:      Date assessed:  22      Latex Allergy:  [x]NO      []YES  Preferred Language for Healthcare:   [x]English       []other:    Pain level:  6/10     SUBJECTIVE:  Pt notes that she is feeling pretty good today.       OBJECTIVE:   Observation:   Test measurements:      RESTRICTIONS/PRECAUTIONS: obese/ breathing    Exercises/Interventions:   Therapeutic Ex (68505) Sets/sec Reps Notes/CUES HEP    x   Heel slides  10x  x   SLR/ abd  10x  x       BS 10\" 10x     LAQ  15x     Hamstring stretch 30\" 3x     Aquatic exercise handouts   Code: [id number]           bike  5 min                   Manual Intervention (79603)           MFR stick R IT band buttock  8 min                                 NMR re-education (21171)   CUES NEEDED    Stand calf stretch 20 2x ea  x   HR/TR  15x  x bridges  15x  x   Supine PB clamshells Green  10x  x                        Therapeutic Activity (05665)                            Vaso L knee/ CP R  10 min NV           Medbridge access code:  [facility]           Therapeutic Exercise and NMR EXR  [x] (92969) Provided verbal/tactile cueing for activities related to strengthening, flexibility, endurance, ROM for improvements in LE, proximal hip, and core control with self care, mobility, lifting, ambulation. [x] (13855) Provided verbal/tactile cueing for activities related to improving balance, coordination, kinesthetic sense, posture, motor skill, proprioception to assist with LE, proximal hip, and core control in self-care, mobility, lifting, ambulation and eccentric single leg control.      NMR and Therapeutic Activities:    [x] (49682 or 77757) Provided verbal/tactile cueing for activities related to improving balance, coordination, kinesthetic sense, posture, motor skill, proprioception and motor activation to allow for proper function of core, proximal hip and LE with self-care and ADLs and functional mobility.   [] (86370) Gait Re-education- Provided training and instruction to the patient for proper LE, core and proximal hip recruitment and positioning and eccentric body weight control with ambulation re-education including up and down stairs     Home Exercise Program:    [x] (79525) Reviewed/Progressed HEP activities related to strengthening, flexibility, endurance, ROM of core, proximal hip and LE for functional self-care, mobility, lifting and ambulation/stair navigation   [] (05449) Reviewed/Progressed HEP activities related to improving balance, coordination, kinesthetic sense, posture, motor skill, proprioception of core, proximal hip and LE for self-care, mobility, lifting, and ambulation/stair navigation      Manual Treatments:  PROM / STM / Oscillations-Mobs:  G-I, II, III, IV (PA's, Inf., Post.)  [x] (19283) Provided manual therapy to mobilize LE, proximal hip and/or LS spine soft tissue/joints for the purpose of modulating pain, promoting relaxation, increasing ROM, reducing/eliminating soft tissue swelling/inflammation/restriction, improving soft tissue extensibility and allowing for proper ROM for normal function with self-care, mobility, lifting and ambulation. Modalities:     [x] GAME READY (VASO)- for significant edema, swelling, pain control. Charges:  Timed Code Treatment Minutes: 40   Total Treatment Minutes:  40   BWC:  TE TIME:  NMR TIME:  MANUAL TIME:  UNTIMED MINUTES:  Medicare Total:         [] EVAL (LOW) 35169 (typically 20 minutes face-to-face)  [] EVAL (MOD) 99281 (typically 30 minutes face-to-face)  [] EVAL (HIGH) 49563 (typically 45 minutes face-to-face)  [] RE-EVAL     [x] SW(51406) x  1   [] IONTO  [x] NMR (27277) x    1 [] VASO  [x] Manual (25144) x  1   [] Other:  [] TA x      [] Mech Traction (98597)  [] ES(attended) (26402)      [] ES (un) (46767):    ASSESSMENT:  Improved tolerance with Shantelle today. Soreness with MFR    GOALS:      Patient stated goal: walk/ stairs     Therapist goals for Patient:   Short Term Goals: To be achieved in: 2 weeks  1. Independent in HEP and progression per patient tolerance, in order to prevent re-injury. [x] Progressing: [] Met: [] Not Met: [] Adjusted     2. Patient will have a decrease in pain to facilitate improvement in movement, function, and ADLs as indicated by Functional Deficits. [x] Progressing: [] Met: [] Not Met: [] Adjusted     Long Term Goals: To be achieved in: 6-8 weeks  1. FOTO score will match or exceed predicted score to assist with reaching prior level of function. [x] Progressing: [] Met: [] Not Met: [] Adjusted     2. Patient will demonstrate increased AROM to 135-0 to allow for proper joint functioning as indicated by patients Functional Deficits. [x] Progressing: [] Met: [] Not Met: [] Adjusted     3.  Patient will demonstrate an increase in Strength to good proximal hip strength and control, within 5lb HHD in LE to allow for proper functional mobility as indicated by patients Functional Deficits. [x] Progressing: [] Met: [] Not Met: [] Adjusted     4. Patient will return to [facility] for  functional activities without increased symptoms or restriction. [x] Progressing: [] Met: [] Not Met: [] Adjusted     5. Walk/ stand/ stairs with min to no limitations (patient specific functional goal)    [x] Progressing: [] Met: [] Not Met: [] Adjusted          Overall Progression Towards Functional goals/ Treatment Progress Update:  [x] Patient is progressing as expected towards functional goals listed. [] Progression is slowed due to complexities/Impairments listed. [] Progression has been slowed due to co-morbidities.   [] Plan just implemented, too soon to assess goals progression <30days   [] Goals require adjustment due to lack of progress  [] Patient is not progressing as expected and requires additional follow up with physician  [] Other    Prognosis for POC: [x] Good [] Fair  [] Poor      Patient requires continued skilled intervention: [x] Yes  [] No    Treatment/Activity Tolerance:  [x] Patient able to complete treatment  [] Patient limited by fatigue  [] Patient limited by pain    [] Patient limited by other medical complications  [] Other:     Return to Play: (if applicable)   []  Stage 1: Intro to Strength   []  Stage 2: Return to Run and Strength   []  Stage 3: Return to Jump and Strength   []  Stage 4: Dynamic Strength and Agility   []  Stage 5: Sport Specific Training     []  Ready to Return to Play, Meets All Above Stages   [x]  Not Ready for Return to Sports   Comments:                          PLAN:   [x] Continue per plan of care [] Alter current plan (see comments above)  [] Plan of care initiated [] Hold pending MD visit [] Discharge    Electronically signed by:  Kosta Pham PT,     Note: If patient does not return for scheduled/ recommended follow up visits, this note will serve as a discharge from care along with most recent update on progress.

## 2022-09-20 ENCOUNTER — HOSPITAL ENCOUNTER (OUTPATIENT)
Dept: PHYSICAL THERAPY | Age: 47
Setting detail: THERAPIES SERIES
Discharge: HOME | End: 2022-09-20
Payer: COMMERCIAL

## 2022-09-20 PROCEDURE — 97110 THERAPEUTIC EXERCISES: CPT | Performed by: SPECIALIST/TECHNOLOGIST

## 2022-09-20 PROCEDURE — 97112 NEUROMUSCULAR REEDUCATION: CPT | Performed by: SPECIALIST/TECHNOLOGIST

## 2022-09-20 PROCEDURE — 97140 MANUAL THERAPY 1/> REGIONS: CPT | Performed by: SPECIALIST/TECHNOLOGIST

## 2022-09-20 NOTE — PROGRESS NOTES
[facility]23 [facility], [address] [place] [address], [address] [place] [address] [address]  Phone: [phone]   Fax:     [phone]      Physical Therapy Treatment Note/ Progress Report:       Date:  2022    Patient Name:  Joey Castro    :  1975  MRN: [id number]  Restrictions/Precautions:    Medical/Treatment Diagnosis Information:     M51.36 (ICD-10-CM) - DDD (degenerative disc disease), lumbar   M50.30 (ICD-10-CM) - DDD (degenerative disc disease), cervical        Insurance/Certification information:   Caryl Canes medicaid  Physician Information:   Ayla Mckeon PA-C  Has the plan of care been signed (Y/N):        []  Yes  [x]  No     Date of Patient follow up with Physician: NS    Is this a Progress Report:     []  Yes  [x]  No      If Yes:  Date Range for reporting period:  Beginnin22 ------------ Endin22    Progress report will be due (10 Rx or 30 days whichever is less): 32     Recertification will be due (POC Duration  / 90 days whichever is less): 22      Visit # Insurance Allowable Auth Required   In Person 4 knee  4 back BMN []  Yes     []  No    Tele Health 0  []  Yes     []  No    Total 8       FOTO Score:      Date assessed:  22      Latex Allergy:  [x]NO      []YES  Preferred Language for Healthcare:   [x]English       []other:    Pain level:  9/10     SUBJECTIVE:  Pt notes she has been feeling a little better.       OBJECTIVE: See eval  Observation:   Test measurements:      RESTRICTIONS/PRECAUTIONS: none    Exercises/Interventions:   Therapeutic Ex (17383) Sets/sec Reps Notes/CUES HEP   Chin tucks  15x  x   PB rows/ ext or 30x  x   shrugs  30x 2# x   Posture scap pinches  30x     No moneys  30x     Horabder  30x     Bicep curls  30x 2#    UBE   4' 2'fwd/2'bkwd                                Manual Intervention (27201)       Sit MFR C/ UT/ occipital   8 min NMR re-education (24552)   CUES NEEDED    bridges  10x  x   LTR  10x  x   Pelvic tilt 10\" 10x     Pelvic tilt c marching   10x     Pelvic tilt arms oh  10x     Pelvic tilt opp arm/leg  10x                          Therapeutic Activity (78149)                     C traction  NV                   Medbridge access code: [email or web address]           Therapeutic Exercise and NMR EXR  [x] (04624) Provided verbal/tactile cueing for activities related to strengthening, flexibility, endurance, ROM  for improvements in proximal hip and core control with self care, mobility, lifting and ambulation.  [] (19758) Provided verbal/tactile cueing for activities related to improving balance, coordination, kinesthetic sense, posture, motor skill, proprioception  to assist with core control in self care, mobility, lifting, and ambulation.      Therapeutic Activities:    [x] (32115 or 40308) Provided verbal/tactile cueing for activities related to improving balance, coordination, kinesthetic sense, posture, motor skill, proprioception and motor activation to allow for proper function  with self care and ADLs  [] (48247) Provided training and instruction to the patient for proper core and proximal hip recruitment and positioning with ambulation re-education     Home Exercise Program:    [x] (35092) Reviewed/Progressed HEP activities related to strengthening, flexibility, endurance, ROM of core, proximal hip and LE for functional self-care, mobility, lifting and ambulation   [] (73500) Reviewed/Progressed HEP activities related to improving balance, coordination, kinesthetic sense, posture, motor skill, proprioception of core, proximal hip and LE for self care, mobility, lifting, and ambulation      Manual Treatments:  PROM / STM / Oscillations-Mobs:  G-I, II, III, IV (PA's, Inf., Post.)  [x] (40025) Provided manual therapy to mobilize proximal hip and LS spine soft tissue/joints for the purpose of modulating pain, promoting relaxation, increasing ROM, reducing/eliminating soft tissue swelling/inflammation/restriction, improving soft tissue extensibility and allowing for proper ROM for normal function with self care, mobility, lifting and ambulation. Modalities:     [] GAME READY (VASO)- for significant edema, swelling, pain control. Charges:  Timed Code Treatment Minutes: 40   Total Treatment Minutes:  40   BWC:  TE TIME:  NMR TIME:  MANUAL TIME:  UNTIMED MINUTES:  Medicare Total:         [] EVAL (LOW) 63094 (typically 20 minutes face-to-face)  [] EVAL (MOD) 13613 (typically 30 minutes face-to-face)  [] EVAL (HIGH) 15715 (typically 45 minutes face-to-face)  [] RE-EVAL     [x] YO(32944) x   1  [] IONTO  [x] NMR (94001) x    1 [] VASO  [x] Manual (31702) x    1 [] Other:  [] TA x      [] Mech Traction (66746)  [] ES(attended) (03031)      [] ES (un) (66713):       ASSESSMENT:  Pt fatigues easily with PREs. Trap spasm was smaller before manuals today. GOALS:     Patient stated goal: ADLs headaches    Therapist goals for Patient:   Short Term Goals: To be achieved in: 2 weeks  1. Independent in HEP and progression per patient tolerance, in order to prevent re-injury. [x] Progressing: [] Met: [] Not Met: [] Adjusted  2. Patient will have a decrease in pain to facilitate improvement in movement, function, and ADLs as indicated by Functional Deficits. [x] Progressing: [] Met: [] Not Met: [] Adjusted    Long Term Goals: To be achieved in: 6-8 weeks  1. FOTO score will match or exceed predicted score to assist with reaching prior level of function. [x] Progressing: [] Met: [] Not Met: [] Adjusted  2. Patient will demonstrate increased AROM to WNL, good LS mobility, good hip ROM to allow for proper joint functioning as indicated by patients Functional Deficits. [x] Progressing: [] Met: [] Not Met: [] Adjusted  3.  Patient will demonstrate an increase in Strength to good proximal hip and core activation to allow for proper functional

## 2022-09-22 ENCOUNTER — HOSPITAL ENCOUNTER (OUTPATIENT)
Dept: PHYSICAL THERAPY | Age: 47
Setting detail: THERAPIES SERIES
Discharge: HOME | End: 2022-09-22
Payer: COMMERCIAL

## 2022-09-26 ENCOUNTER — CARE COORDINATION (OUTPATIENT)
Dept: CARE COORDINATION | Age: 47
End: 2022-09-26

## 2022-09-26 DIAGNOSIS — E11.40 TYPE 2 DIABETES MELLITUS WITH DIABETIC NEUROPATHY, UNSPECIFIED WHETHER LONG TERM INSULIN USE (HCC): Primary | ICD-10-CM

## 2022-09-26 NOTE — CARE COORDINATION
Ambulatory Care Coordination Note  9/26/2022    ACC: Mykel Junior RN    Summary Note: Reports is doing ok. Does have a sleep study tonight. Agreeable to dc from [facility] after next call from dietician. Diabetes Assessment    Medic Alert ID: No  Meal Planning: Avoidance of concentrated sweets, Plate Method, Carb counting   How often do you test your blood sugar?: Meals, Bedtime   Do you have barriers with adherence to non-pharmacologic self-management interventions? (Nutrition/Exercise/Self-Monitoring): Yes   Have you ever had to go to the ED for symptoms of low blood sugar?: No       No patient-reported symptoms         and   COPD Assessment    Does the patient understand envrionmental exposure?: Yes  Is the patient able to verbalize Rescue vs. Long Acting medications?: Yes  Does the patient have a nebulizer?: No  Does the patient use a space with inhaled medications?: No     No patient-reported symptoms         Symptoms:                Lab Results       None            Care Coordination Interventions    Referral from Primary Care Provider: No  Suggested Interventions and Community Resources  Diabetes Education: Completed  Fall Risk Prevention: Completed  Disease Association: Completed (Comment: COPD)  Registered Dietician: Completed (Comment: 6/3/22-referral made)  [employer] Management Tools: Completed (Comment: 6/8/22-reviewed)          Goals Addressed                      This Visit's Progress      COMPLETED: Patient Stated (pt-stated)         Will try to eat more vegetables and fruits    Barriers: financial and lack of support  Plan for overcoming my barriers: will work with [facility]CM/RD prn  Confidence: 8/10  Anticipated Goal Completion Date: 11/8/22              Prior to Admission medications    Medication Sig Start Date End Date Taking?  Authorizing Provider   montelukast (SINGULAIR) 10 MG tablet TAKE 1 TABLET BY MOUTH EVERY DAY AT NIGHT 9/2/22   Wiley Conte, DO   diclofenac sodium (VOLTAREN) 1 % GEL Apply 4 GRAMS topically 4 times daily as needed for Pain 9/9/22   Wiley LICO May, DO   oxybutynin (DITROPAN-XL) 5 MG extended release tablet TAKE 1 TABLET BY MOUTH EVERY DAY 8/30/22   ethan BARFIELD May, DO   fluticasone (FLONASE) 50 MCG/ACT nasal spray INHALE 2 SPRAYS BY NASAL ROUTE TWO TIMES A DAY 8/30/22   ethan BARFIELD May, DO   azelastine (OPTIVAR) 0.05 % ophthalmic solution INSTILL 1 DROP IN [facility] EYE TWO TIMES A DAY (GENERIC FOR optivar) 8/30/22   Wiley LICO May, DO   lisinopril (PRINIVIL;ZESTRIL) 5 MG tablet TAKE 1 TABLET BY MOUTH EVERY DAY 8/30/22   ethan BARFIELD May, DO   amLODIPine (NORVASC) 5 MG tablet TAKE 1 TABLET BY MOUTH EVERY DAY 8/30/22   ethan BARFIELD May, DO   busPIRone (BUSPAR) 15 MG tablet TAKE 1 TABLET BY MOUTH IN THE MORNING, NOON, AND BEFORE BEDTIME 8/30/22   ethan BARFIELD May, DO   PROAIR  (90 Base) MCG/ACT inhaler INHALE 2 PUFFS BY MOUTH EVERY SIX HOURS AS NEEDED FOR WHEEZING 8/30/22   ethna BARFIELD May, DO   vitamin D (ERGOCALCIFEROL) 1.25 MG (11776 UT) CAPS capsule Take 1 capsule by mouth once a week 8/29/22   ethan BARFIELD May, DO   SPIRIVA HANDIHALER 18 MCG inhalation capsule INHALE CONTENTS 1 CAPSULE BY MOUTH EVERY DAY 8/22/22   ethan BARFIELD May, DO   levothyroxine (SYNTHROID) 300 MCG tablet Take 1 tablet by mouth Daily 8/22/22 11/20/22  Nidia Marrero MD   liothyronine (CYTOMEL) 25 MCG tablet Take 1 tablet by mouth 2 times daily for 180 doses 8/22/22 11/20/22  Nidia Marrero MD   fluconazole (DIFLUCAN) 150 MG tablet TAKE 1 TABLET BY MOUTH ONCE FOR 1 DOSE 8/11/22   Historical MD Danii   nystatin (MYCOSTATIN) 243236 UNIT/GM powder APPLY TOPICALLY 3 TIMES DAILY AS NEEDED TO MANAGE RASH AND MOISTURE 8/11/22   Wiley LICO May, DO   EPINEPHrine (EPIPEN 2-SHELBIE) 0.3 MG/0.3ML SOAJ injection Inject 0.3 mLs into the muscle once for 1 dose Use as directed for allergic reaction 8/11/22 8/11/22  Wiley BARFIELD May, DO   DULoxetine (CYMBALTA) 30 MG extended release capsule Take 1 capsule by mouth in the morning and 1 capsule before bedtime. 8/11/22 11/9/22  Wiley BARFIELD May, DO   NURTEC 75 MG TBDP Take 75 mg by mouth every other day 8/11/22   Wiley BARFIELD May, DO   EPINEPHrine (EPIPEN 2-SHELBIE) 0.3 MG/0.3ML SOAJ injection Inject 0.3 mLs into the muscle as needed (In the case of anaphylaxis) Use as directed for allergic reaction 8/11/22   Wiley BARFIELD May, DO   cetirizine (ZYRTEC) 10 MG tablet Take 1 tablet by mouth in the morning. 8/8/22   Jamesene LICO May, DO   rizatriptan (MAXALT) 10 MG tablet take 1 tablet by mouth once as needed for migraine may repeat in 2 hours if needed 8/4/22 8/4/22  Ayse Barton MD   propranolol (INDERAL LA) 80 MG extended release capsule Take 1 capsule by mouth in the morning and at bedtime 6/2/22 7/27/22  Wiley BARFIELD May, DO   OneTouch Delica Lancets 16D MISC Check blood sugars 4 times daily for E11.9 5/31/22   Wiley BARFIELD May, DO   SYMBICORT 160-4.5 MCG/ACT AERO Inhale 2 puffs into the lungs 2 times daily Use for asthma exacerbations. 1-2 puffs q4h.  Max 12 puffs/ day 5/25/22   Wiley K May, DO   ONETOUCH ULTRA strip USE TO TEST BLOOD SUGAR FOUR TIMES DAILY 5/24/22   Wiley BARFIELD May, DO   blood glucose monitor strips PLEASE GIVE TYPE THAT MATCHES PATIENTS GLUCOMETER PER INSURANCE 5/19/22   Wiley BARFIELD May, DO   atorvastatin (LIPITOR) 40 MG tablet Take 1 tablet by mouth daily 3/18/22 7/27/22  Wiley BARFIELD May, DO   omeprazole (PRILOSEC) 40 MG delayed release capsule Take 1 capsule by mouth in the morning and at bedtime 3/11/22 3/6/23  Wiley BARFIELD May, DO   ARIPiprazole (ABILIFY) 10 MG tablet Take 1 tablet by mouth daily 12/7/21   Jamesene LICO May, DO   Cholecalciferol 25 MCG (1000 UT) CHEW Take 2 tablets by mouth daily 11/23/21   Jamesene LICO May, DO   furosemide (LASIX) 40 MG tablet Take 1 tablet by mouth daily 11/8/21   Wiley BARFIELD May, DO   azelastine (ASTELIN) 0.1 % nasal spray 1 spray by Nasal route 2 times daily Use in each nostril as directed 11/8/21   Wiley BARFIELD May, DO   Continuous

## 2022-09-26 NOTE — TELEPHONE ENCOUNTER
Refill Request       Last Seen: Last Seen Department: 8/11/2022  Last Seen by PCP: 8/11/2022    Last Written: 2014    Next Appointment:   Future Appointments   Date Time Provider Israel Cote   9/26/2022  8:30 PM SCHEDULE, [facility] SLEEP ROOM [address] [facility]   9/27/2022  1:00 PM Anh Hernandez, PT [facility] EG PT [place] [facility]   9/29/2022  1:00 PM Anh Hernandez, PT [facility] EG PT [place] HOD   11/9/2022  1:15 PM April Kaplan MD AND PULM [facility]   11/14/2022  1:30 PM Wiley Conte DO [facility] AND RES [facility]   11/23/2022 10:00 AM MD Kita Busch [facility]             Requested Prescriptions     Pending Prescriptions Disp Refills    polyethylene glycol (GLYCOLAX) 17 GM/SCOOP powder [Pharmacy Med Name: Polyethylene Glycol 3350 Oral Powder 17 GM/SCOOP] 238 g 0     Sig: MIX 17 GRAMS IN LIQUID AND DRINK BY MOUTH IN THE MORNING

## 2022-09-27 ENCOUNTER — HOSPITAL ENCOUNTER (OUTPATIENT)
Dept: PHYSICAL THERAPY | Age: 47
Setting detail: THERAPIES SERIES
Discharge: HOME | End: 2022-09-27
Payer: COMMERCIAL

## 2022-09-27 PROCEDURE — 97110 THERAPEUTIC EXERCISES: CPT | Performed by: SPECIALIST

## 2022-09-27 PROCEDURE — 97112 NEUROMUSCULAR REEDUCATION: CPT | Performed by: SPECIALIST

## 2022-09-27 PROCEDURE — 97012 MECHANICAL TRACTION THERAPY: CPT | Performed by: SPECIALIST

## 2022-09-27 PROCEDURE — 97140 MANUAL THERAPY 1/> REGIONS: CPT | Performed by: SPECIALIST

## 2022-09-27 RX ORDER — BLOOD SUGAR DIAGNOSTIC
STRIP MISCELLANEOUS
Qty: 100 EACH | Refills: 5 | Status: SHIPPED | OUTPATIENT
Start: 2022-09-27 | End: 2022-10-04 | Stop reason: CLARIF

## 2022-09-27 RX ORDER — POLYETHYLENE GLYCOL 3350 17 G/17G
POWDER, FOR SOLUTION ORAL
Qty: 238 G | Refills: 0 | Status: SHIPPED | OUTPATIENT
Start: 2022-09-27 | End: 2022-09-29

## 2022-09-27 NOTE — FLOWSHEET NOTE
NMR re-education (19415)   CUES NEEDED    bridges  10x  x   LTR  10x  x   Pelvic tilt 10\" 10x     Pelvic tilt c marching   10x     Pelvic tilt arms oh  10x     Pelvic tilt opp arm/leg  10x                          Therapeutic Activity (36608)                     C traction 17/9 10 min                   Internal Gaming access code: [email or web address]           Therapeutic Exercise and NMR EXR  [x] (24267) Provided verbal/tactile cueing for activities related to strengthening, flexibility, endurance, ROM  for improvements in proximal hip and core control with self care, mobility, lifting and ambulation.  [] (49249) Provided verbal/tactile cueing for activities related to improving balance, coordination, kinesthetic sense, posture, motor skill, proprioception  to assist with core control in self care, mobility, lifting, and ambulation.      Therapeutic Activities:    [x] (57258 or 85557) Provided verbal/tactile cueing for activities related to improving balance, coordination, kinesthetic sense, posture, motor skill, proprioception and motor activation to allow for proper function  with self care and ADLs  [] (67183) Provided training and instruction to the patient for proper core and proximal hip recruitment and positioning with ambulation re-education     Home Exercise Program:    [x] (25697) Reviewed/Progressed HEP activities related to strengthening, flexibility, endurance, ROM of core, proximal hip and LE for functional self-care, mobility, lifting and ambulation   [] (14580) Reviewed/Progressed HEP activities related to improving balance, coordination, kinesthetic sense, posture, motor skill, proprioception of core, proximal hip and LE for self care, mobility, lifting, and ambulation      Manual Treatments:  PROM / STM / Oscillations-Mobs:  G-I, II, III, IV (PA's, Inf., Post.)  [x] (36893) Provided manual therapy to mobilize proximal hip and LS spine soft tissue/joints for the purpose of modulating pain, promoting relaxation, increasing ROM, reducing/eliminating soft tissue swelling/inflammation/restriction, improving soft tissue extensibility and allowing for proper ROM for normal function with self care, mobility, lifting and ambulation. Modalities:     [] GAME READY (VASO)- for significant edema, swelling, pain control. Charges:  Timed Code Treatment Minutes: 40   Total Treatment Minutes:  50   BWC:  TE TIME:  NMR TIME:  MANUAL TIME:  UNTIMED MINUTES:  Medicare Total:         [] EVAL (LOW) 55147 (typically 20 minutes face-to-face)  [] EVAL (MOD) 25581 (typically 30 minutes face-to-face)  [] EVAL (HIGH) 50629 (typically 45 minutes face-to-face)  [] RE-EVAL     [x] KF(28537) x   1  [] IONTO  [x] NMR (48658) x    1 [] VASO  [x] Manual (39468) x    1 [] Other:  [] TA x      [x] Mech Traction (74406)  [] ES(attended) (36864)      [] ES (un) (10393):       ASSESSMENT:  Good tolerance with Reyes . Smaller Dowagers hump      GOALS:     Patient stated goal: ADLs headaches    Therapist goals for Patient:   Short Term Goals: To be achieved in: 2 weeks  1. Independent in HEP and progression per patient tolerance, in order to prevent re-injury. [x] Progressing: [] Met: [] Not Met: [] Adjusted  2. Patient will have a decrease in pain to facilitate improvement in movement, function, and ADLs as indicated by Functional Deficits. [x] Progressing: [] Met: [] Not Met: [] Adjusted    Long Term Goals: To be achieved in: 6-8 weeks  1. FOTO score will match or exceed predicted score to assist with reaching prior level of function. [x] Progressing: [] Met: [] Not Met: [] Adjusted  2. Patient will demonstrate increased AROM to WNL, good LS mobility, good hip ROM to allow for proper joint functioning as indicated by patients Functional Deficits. [x] Progressing: [] Met: [] Not Met: [] Adjusted  3.  Patient will demonstrate an increase in Strength to good proximal hip and core activation to allow for proper functional mobility as indicated by

## 2022-09-28 DIAGNOSIS — G43.901 MIGRAINE WITH STATUS MIGRAINOSUS, NOT INTRACTABLE, UNSPECIFIED MIGRAINE TYPE: ICD-10-CM

## 2022-09-28 DIAGNOSIS — J30.1 ALLERGIC RHINITIS DUE TO POLLEN, UNSPECIFIED SEASONALITY: ICD-10-CM

## 2022-09-28 DIAGNOSIS — J45.909 UNCOMPLICATED ASTHMA, UNSPECIFIED ASTHMA SEVERITY, UNSPECIFIED WHETHER PERSISTENT: ICD-10-CM

## 2022-09-28 DIAGNOSIS — N39.3 STRESS INCONTINENCE: ICD-10-CM

## 2022-09-28 DIAGNOSIS — G43.111 INTRACTABLE MIGRAINE WITH AURA WITH STATUS MIGRAINOSUS: ICD-10-CM

## 2022-09-28 DIAGNOSIS — I10 BENIGN ESSENTIAL HTN: ICD-10-CM

## 2022-09-29 ENCOUNTER — HOSPITAL ENCOUNTER (OUTPATIENT)
Dept: PHYSICAL THERAPY | Age: 47
Setting detail: THERAPIES SERIES
Discharge: HOME | End: 2022-09-29
Payer: COMMERCIAL

## 2022-09-29 PROCEDURE — 97016 VASOPNEUMATIC DEVICE THERAPY: CPT | Performed by: SPECIALIST

## 2022-09-29 PROCEDURE — 97140 MANUAL THERAPY 1/> REGIONS: CPT | Performed by: SPECIALIST

## 2022-09-29 PROCEDURE — 97112 NEUROMUSCULAR REEDUCATION: CPT | Performed by: SPECIALIST

## 2022-09-29 PROCEDURE — 97110 THERAPEUTIC EXERCISES: CPT | Performed by: SPECIALIST

## 2022-09-29 RX ORDER — BUSPIRONE HYDROCHLORIDE 15 MG/1
TABLET ORAL
Qty: 90 TABLET | Refills: 0 | Status: SHIPPED | OUTPATIENT
Start: 2022-09-29

## 2022-09-29 RX ORDER — AMLODIPINE BESYLATE 5 MG/1
TABLET ORAL
Qty: 30 TABLET | Refills: 0 | Status: SHIPPED | OUTPATIENT
Start: 2022-09-29

## 2022-09-29 RX ORDER — AZELASTINE HYDROCHLORIDE 0.5 MG/ML
SOLUTION/ DROPS OPHTHALMIC
Qty: 6 ML | Refills: 0 | Status: SHIPPED | OUTPATIENT
Start: 2022-09-29

## 2022-09-29 RX ORDER — FLUTICASONE PROPIONATE 50 MCG
SPRAY, SUSPENSION (ML) NASAL
Qty: 16 G | Refills: 0 | Status: SHIPPED | OUTPATIENT
Start: 2022-09-29

## 2022-09-29 RX ORDER — PROPRANOLOL HYDROCHLORIDE 80 MG/1
80 CAPSULE, EXTENDED RELEASE ORAL 2 TIMES DAILY
Qty: 60 CAPSULE | Refills: 0 | Status: SHIPPED | OUTPATIENT
Start: 2022-09-29 | End: 2022-10-25 | Stop reason: SDUPTHER

## 2022-09-29 RX ORDER — POLYETHYLENE GLYCOL 3350 17 G/17G
POWDER, FOR SOLUTION ORAL
Qty: 238 G | Refills: 0 | Status: SHIPPED | OUTPATIENT
Start: 2022-09-29

## 2022-09-29 RX ORDER — OXYBUTYNIN CHLORIDE 5 MG/1
TABLET, EXTENDED RELEASE ORAL
Qty: 30 TABLET | Refills: 0 | Status: SHIPPED | OUTPATIENT
Start: 2022-09-29

## 2022-09-29 NOTE — TELEPHONE ENCOUNTER
Refill Request       Last Seen: Last Seen Department: 8/11/2022  Last Seen by PCP: 8/11/2022    Last Written: Polyethylene 09/27/2022   Propranolol  06/02/2022  Buspirone  08/30/2022  Oxybtynin  08/30/2022  Azelastine  11/08/2021  Amlodipine 08//30/2022  Fluticasone  08/30/2022  Proair  08/30/2022                Next Appointment:   Future Appointments   Date Time Provider Israel Kera   9/29/2022  1:00 PM Aubree Wallis, PT [facility] PT [place] [facility]   10/4/2022  2:30 PM Erika Douglas, PTA [facility] PT [place] [facility]   10/5/2022  8:30 PM SCHEDULE, [facility] SLEEP ROOM 3 [facility] SLEEP [facility]   11/9/2022  1:15 PM Teresa Mena MD AND PULM [facility]   11/14/2022  1:30 PM Wiley Conte DO [facility] AND RES [facility]   11/23/2022 10:00 AM Vita Jones MD Rehana Chamois Endo [facility]             Requested Prescriptions     Pending Prescriptions Disp Refills    polyethylene glycol (GLYCOLAX) 17 GM/SCOOP powder [Pharmacy Med Name: Polyethylene Glycol 3350 Oral Powder 17 GM/SCOOP] 238 g 0     Sig: MIX 17 GRAMS IN LIQUID AND DRINK BY MOUTH IN THE MORNING    propranolol (INDERAL LA) 80 MG extended release capsule [Pharmacy Med Name: Propranolol HCl ER Oral Capsule Extended Release 24 Hour 80 MG] 60 capsule 0     Sig: TAKE 1 CAPSULE BY MOUTH IN THE MORNING AND AT BEDTIME    busPIRone (BUSPAR) 15 MG tablet [Pharmacy Med Name: busPIRone HCl Oral Tablet 15 MG] 90 tablet 0     Sig: TAKE 1 TABLET BY MOUTH IN THE MORNING, NOON, AND BEFORE BEDTIME    oxybutynin (DITROPAN-XL) 5 MG extended release tablet [Pharmacy Med Name: Oxybutynin Chloride ER Oral Tablet Extended Release 24 Hour 5 MG] 30 tablet 0     Sig: TAKE 1 TABLET BY MOUTH EVERY DAY    azelastine (OPTIVAR) 0.05 % ophthalmic solution [Pharmacy Med Name: Azelastine HCl Ophthalmic Solution 0.05 %] 6 mL 0     Sig: INSTILL 1 DROP IN [facility] EYE TWO TIMES A DAY (GENERIC FOR optivar)    amLODIPine (NORVASC) 5 MG tablet [Pharmacy Med Name: amLODIPine Besylate Oral Tablet 5 MG] 30 tablet 0     Sig: TAKE 1 TABLET BY MOUTH EVERY DAY    fluticasone (FLONASE) 50 MCG/ACT nasal spray [Pharmacy Med Name: Fluticasone Propionate Nasal Suspension 50 MCG/ACT] 16 g 0     Sig: INHALE 2 SPRAYS BY NASAL ROUTE TWO TIMES A DAY    PROAIR  (90 Base) MCG/ACT inhaler [Pharmacy Med Name: ProAir HFA Inhalation Aerosol Solution 108 (90 Base) MCG/ACT] 8.5 g 0     Sig: INHALE 2 PUFFS BY MOUTH EVERY SIX HOURS AS NEEDED FOR WHEEZING

## 2022-10-01 DIAGNOSIS — I10 PRIMARY HYPERTENSION: ICD-10-CM

## 2022-10-03 RX ORDER — LISINOPRIL 5 MG/1
TABLET ORAL
Qty: 90 TABLET | Refills: 0 | Status: SHIPPED | OUTPATIENT
Start: 2022-10-03

## 2022-10-03 NOTE — TELEPHONE ENCOUNTER
Refill Request       Last Seen: Last Seen Department: 8/11/2022  Last Seen by PCP: 8/11/2022    Last Written: 08/30/2022    Next Appointment:   Future Appointments   Date Time Provider Israel Kera   10/4/2022  2:30 PM Jenni Stauffer, PTA [facility] PT [place] [facility]   10/5/2022  8:30 PM SCHEDULE, [facility] SLEEP ROOM [place] [address]   10/6/2022  3:15 PM Mildred Abbey, PT [facility] EG PT [place] [facility]   10/11/2022  2:30 PM Mildred Abbey, PT [facility] PT [place] [facility]   10/13/2022  1:00 PM Mildred Abbey, PT [facility] PT [place] [facility]   11/9/2022  1:15 PM Mohsen Sharma MD AND PULM [place]   11/14/2022  1:30 PM Wiley Conte DO [facility] AND RES [facility]   11/23/2022 10:00 AM MD Kit Trevizo [facility]             Requested Prescriptions     Pending Prescriptions Disp Refills    lisinopril (PRINIVIL;ZESTRIL) 5 MG tablet [Pharmacy Med Name: Lisinopril Oral Tablet 5 MG] 90 tablet 0     Sig: TAKE 1 TABLET BY MOUTH EVERY DAY

## 2022-10-03 NOTE — TELEPHONE ENCOUNTER
Insurance company is calling they will not cover one touch but will cover amador Metrix. Unless there is medical reason they can not use amador Metrix and they will need a new rx sent over for the new meds.

## 2022-10-03 NOTE — TELEPHONE ENCOUNTER
PA submitted VIA [facility] for  OneTouch Ultra strips  (Key: [id number]) - [id number]  PENDING  PA has been denied

## 2022-10-04 ENCOUNTER — HOSPITAL ENCOUNTER (OUTPATIENT)
Dept: PHYSICAL THERAPY | Age: 47
Setting detail: THERAPIES SERIES
Discharge: HOME | End: 2022-10-04
Payer: COMMERCIAL

## 2022-10-04 PROCEDURE — 97112 NEUROMUSCULAR REEDUCATION: CPT | Performed by: SPECIALIST/TECHNOLOGIST

## 2022-10-04 PROCEDURE — 97140 MANUAL THERAPY 1/> REGIONS: CPT | Performed by: SPECIALIST/TECHNOLOGIST

## 2022-10-04 PROCEDURE — 97012 MECHANICAL TRACTION THERAPY: CPT | Performed by: SPECIALIST/TECHNOLOGIST

## 2022-10-04 PROCEDURE — 97110 THERAPEUTIC EXERCISES: CPT | Performed by: SPECIALIST/TECHNOLOGIST

## 2022-10-04 NOTE — TELEPHONE ENCOUNTER
Dr. Conte I pended the new Rx for you. Discontinued the one touch that isn't covered by insurance in pt's chart.

## 2022-10-04 NOTE — PROGRESS NOTES
[address] [facility], [address] [place] [address], [address] [place] [address] [address]  Phone: [phone]   Fax:     [phone]      Physical Therapy Treatment Note/ Progress Report:       Date:  10/4/2022    Patient Name:  Marianna Singh    :  1975  MRN: [id number]  Restrictions/Precautions:    Medical/Treatment Diagnosis Information:     M51.36 (ICD-10-CM) - DDD (degenerative disc disease), lumbar   M50.30 (ICD-10-CM) - DDD (degenerative disc disease), cervical        Insurance/Certification information:   Adrain Hoar medicaid  Physician Information:   Lamar Diggs PA-C  Has the plan of care been signed (Y/N):        []  Yes  [x]  No     Date of Patient follow up with Physician: NS    Is this a Progress Report:     []  Yes  [x]  No      If Yes:  Date Range for reporting period:  Beginnin22 ------------ Endin22    Progress report will be due (10 Rx or 30 days whichever is less): 3/33/11     Recertification will be due (POC Duration  / 90 days whichever is less): 22      Visit # Insurance Allowable Auth Required   In Person 5 knee  6 back BMN []  Yes     []  No    Tele Health 0  []  Yes     []  No    Total 11       FOTO Score: 2638     Date assessed:  22      Latex Allergy:  [x]NO      []YES  Preferred Language for Healthcare:   [x]English       []other:    Pain level:  9/10     SUBJECTIVE:  Pt notes she has been feeling a little better. Notes she had some discomfort the last couple days from doing crafts.      OBJECTIVE: See eval  Observation:   Test measurements:      RESTRICTIONS/PRECAUTIONS: none    Exercises/Interventions:   Therapeutic Ex (10291) Sets/sec Reps Notes/CUES HEP   Chin tucks  15x  x   PB rows/ ext BL 30x  x   shrugs  30x 2# x   Posture scap pinches  30x     No moneys BL 30x     Horabder  30x     Bicep curls  30x 2#    UBE   4' 2'fwd/2'bkwd                                Manual Intervention (94057) Sit MFR C/ UT/ occipital   8 min                                        NMR re-education (67708)   CUES NEEDED    bridges  10x  x   LTR  10x  x   Pelvic tilt 10\" 10x     Pelvic tilt c marching   10x     Pelvic tilt arms oh  10x     Pelvic tilt opp arm/leg  10x                          Therapeutic Activity (07685)                     C traction 17/9 10 min                   Medbridge access code: [email or web address]           Therapeutic Exercise and NMR EXR  [x] (03049) Provided verbal/tactile cueing for activities related to strengthening, flexibility, endurance, ROM  for improvements in proximal hip and core control with self care, mobility, lifting and ambulation.  [] (67164) Provided verbal/tactile cueing for activities related to improving balance, coordination, kinesthetic sense, posture, motor skill, proprioception  to assist with core control in self care, mobility, lifting, and ambulation.      Therapeutic Activities:    [x] (04318 or 22755) Provided verbal/tactile cueing for activities related to improving balance, coordination, kinesthetic sense, posture, motor skill, proprioception and motor activation to allow for proper function  with self care and ADLs  [] (90647) Provided training and instruction to the patient for proper core and proximal hip recruitment and positioning with ambulation re-education     Home Exercise Program:    [x] (28727) Reviewed/Progressed HEP activities related to strengthening, flexibility, endurance, ROM of core, proximal hip and LE for functional self-care, mobility, lifting and ambulation   [] (67873) Reviewed/Progressed HEP activities related to improving balance, coordination, kinesthetic sense, posture, motor skill, proprioception of core, proximal hip and LE for self care, mobility, lifting, and ambulation      Manual Treatments:  PROM / STM / Oscillations-Mobs:  G-I, II, III, IV (PA's, Inf., Post.)  [x] (99039) Provided manual therapy to mobilize proximal hip and LS spine soft tissue/joints for the purpose of modulating pain, promoting relaxation,  increasing ROM, reducing/eliminating soft tissue swelling/inflammation/restriction, improving soft tissue extensibility and allowing for proper ROM for normal function with self care, mobility, lifting and ambulation. Modalities:     [] GAME READY (VASO)- for significant edema, swelling, pain control. Charges:  Timed Code Treatment Minutes: 40   Total Treatment Minutes:  50   BWC:  TE TIME:  NMR TIME:  MANUAL TIME:  UNTIMED MINUTES:  Medicare Total:         [] EVAL (LOW) 53255 (typically 20 minutes face-to-face)  [] EVAL (MOD) 33240 (typically 30 minutes face-to-face)  [] EVAL (HIGH) 10344 (typically 45 minutes face-to-face)  [] RE-EVAL     [x] BN(82530) x   1  [] IONTO  [x] NMR (53642) x    1 [] VASO  [x] Manual (28705) x    1 [] Other:  [] TA x      [x] Mech Traction (62205)  [] ES(attended) (62063)      [] ES (un) (73286):       ASSESSMENT:  Good tolerance with Reyes . Smaller Dowagers hump      GOALS:     Patient stated goal: ADLs headaches    Therapist goals for Patient:   Short Term Goals: To be achieved in: 2 weeks  1. Independent in HEP and progression per patient tolerance, in order to prevent re-injury. [x] Progressing: [] Met: [] Not Met: [] Adjusted  2. Patient will have a decrease in pain to facilitate improvement in movement, function, and ADLs as indicated by Functional Deficits. [x] Progressing: [] Met: [] Not Met: [] Adjusted    Long Term Goals: To be achieved in: 6-8 weeks  1. FOTO score will match or exceed predicted score to assist with reaching prior level of function. [x] Progressing: [] Met: [] Not Met: [] Adjusted  2. Patient will demonstrate increased AROM to WNL, good LS mobility, good hip ROM to allow for proper joint functioning as indicated by patients Functional Deficits. [x] Progressing: [] Met: [] Not Met: [] Adjusted  3.  Patient will demonstrate an increase in Strength to good proximal hip and core activation to allow for proper functional mobility as indicated by patients Functional Deficits. [x] Progressing: [] Met: [] Not Met: [] Adjusted  4. Patient will return to [facility] for functional activities without increased symptoms or restriction. [x] Progressing: [] Met: [] Not Met: [] Adjusted  5. ADLs/ headaches with min to no limitations (patient specific functional goal)    [x] Progressing: [] Met: [] Not Met: [] Adjusted         Overall Progression Towards Functional goals/ Treatment Progress Update:  [] Patient is progressing as expected towards functional goals listed. [] Progression is slowed due to complexities/Impairments listed. [] Progression has been slowed due to co-morbidities.   [x] Plan just implemented, too soon to assess goals progression <30days   [] Goals require adjustment due to lack of progress  [] Patient is not progressing as expected and requires additional follow up with physician  [] Other    Prognosis for POC: [x] Good [] Fair  [] Poor      Patient requires continued skilled intervention: [x] Yes  [] No    Treatment/Activity Tolerance:  [x] Patient able to complete treatment  [] Patient limited by fatigue  [] Patient limited by pain    [] Patient limited by other medical complications  [] Other:     Return to Play: (if applicable)   []  Stage 1: Intro to Strength   []  Stage 2: Return to Run and Strength   []  Stage 3: Return to Jump and Strength   []  Stage 4: Dynamic Strength and Agility   []  Stage 5: Sport Specific Training     []  Ready to Return to Play, Meets All Above Stages   []  Not Ready for Return to Sports   Comments:                           PLAN:   [x] Continue per plan of care [] Alter current plan (see comments above)  [] Plan of care initiated [] Hold pending MD visit [] Discharge    Electronically signed by:  Maria Antonia Oakley, MONA, MHI, ATC    Note: If patient does not return for scheduled/ recommended follow up visits, this note will serve as a discharge from care along with most recent update on progress.

## 2022-10-06 ENCOUNTER — HOSPITAL ENCOUNTER (OUTPATIENT)
Dept: PHYSICAL THERAPY | Age: 47
Setting detail: THERAPIES SERIES
Discharge: HOME | End: 2022-10-06
Payer: COMMERCIAL

## 2022-10-06 RX ORDER — LANCETS 30 GAUGE
1 EACH MISCELLANEOUS 2 TIMES DAILY
Qty: 300 EACH | Refills: 1 | Status: SHIPPED | OUTPATIENT
Start: 2022-10-06

## 2022-10-06 RX ORDER — BLOOD-GLUCOSE METER
EACH MISCELLANEOUS
Qty: 1 EACH | Refills: 0 | Status: SHIPPED | OUTPATIENT
Start: 2022-10-06

## 2022-10-06 RX ORDER — CALCIUM CITRATE/VITAMIN D3 200MG-6.25
1 TABLET ORAL 4 TIMES DAILY
Qty: 200 EACH | Refills: 3 | Status: SHIPPED | OUTPATIENT
Start: 2022-10-06

## 2022-10-06 NOTE — FLOWSHEET NOTE
[facility] [facility], [address] [place] [address], [address] [place] [address] [address]  Phone: [phone]   Fax:     [phone]    Physical Therapy  Cancellation/No-show Note  Patient Name:  Erick Heredia  :  1975   Date:  10/6/2022    Cancelled visits to date: 3  No-shows to date: 0    For today's appointment patient:  [x]  Cancelled  []  Rescheduled appointment  []  No-show     Reason given by patient:  []  Patient ill  []  Conflicting appointment  []  No transportation    []  Conflict with work  [x]  No reason given  []  Other:     Comments:      Phone call information:   [x]  Phone call made today to patient at am/pm at the number provided:      []  Patient answered, conversation as follows:    []  Patient did not answer, message left as follows:  [x]  Phone call not needed - pt contacted us to cancel and provided reason for cancellation.      Electronically signed by:  Talib Snowden, PT, PT

## 2022-10-06 NOTE — TELEPHONE ENCOUNTER
[phone] (home) [phone] (work)  Called pt and let her know we were sending her true matrix test strips over to [facility]

## 2022-10-07 ENCOUNTER — TELEPHONE (OUTPATIENT)
Dept: ADMINISTRATIVE | Age: 47
End: 2022-10-07

## 2022-10-07 ENCOUNTER — CARE COORDINATION (OUTPATIENT)
Dept: CARE COORDINATION | Age: 47
End: 2022-10-07

## 2022-10-07 NOTE — TELEPHONE ENCOUNTER
I just spoke  with the patients Pharmacy before writing you  The patient has been approved and picked up a limited supply  I have scanned a letter into the media just today    The doctor is needing a list of the next formulary Rx's that will be approved if your office can gather this information     Thanks so much

## 2022-10-07 NOTE — TELEPHONE ENCOUNTER
The PA for Bannerte was denied. I do not see any approvals . The denial letter is attached to the encounter for 08/12/2022 and in Media.

## 2022-10-07 NOTE — CARE COORDINATION
Contacted Erick Heredia and left voicemail regarding Dietitian follow up. Left call back number and will follow up as appropriate.        Puja Anderson, [address] [place] [address],   [phone]

## 2022-10-08 DIAGNOSIS — M17.0 OSTEOARTHRITIS OF BOTH KNEES, UNSPECIFIED OSTEOARTHRITIS TYPE: ICD-10-CM

## 2022-10-08 DIAGNOSIS — L30.4 INTERTRIGO: ICD-10-CM

## 2022-10-10 RX ORDER — NYSTATIN 100000 [USP'U]/G
POWDER TOPICAL
Qty: 60 G | Refills: 0 | Status: SHIPPED | OUTPATIENT
Start: 2022-10-10

## 2022-10-10 NOTE — TELEPHONE ENCOUNTER
Denial letter says the patient must have tried at least 3 controller migraine medications. Beta-blockers, anticonvulsants,tricyclic antidepressants, and or serotonin-norepinephrine reuptake inhibitors.  Also a 30 day trial of one step therapy with preferred medications Aimovig or Ajovy

## 2022-10-10 NOTE — TELEPHONE ENCOUNTER
Refill Request       Last Seen: Last Seen Department: 8/11/2022  Last Seen by PCP: 8/11/2022    Last Written: 8/11    Next Appointment:   Future Appointments   Date Time Provider Israel Kera   10/11/2022  2:30 PM Fabiola Martínez, PT [facility] PT [place] [facility]   10/13/2022  1:00 PM Fabiola Martínez, PT [facility] EG PT [place] [facility]   11/9/2022  1:15 PM Dov Tan MD AND PULM [facility]   11/14/2022  1:30 PM Wiley Conte DO [facility] AND RES [facility]   11/23/2022 10:00 AM MD Raul Devi [facility]   12/27/2022  8:30 PM SCHEDULE, [facility] SLEEP ROOM 3 [facility] SLEEP [facility]             Requested Prescriptions     Pending Prescriptions Disp Refills    nystatin (MYCOSTATIN) 795858 UNIT/GM powder [Pharmacy Med Name: Nystatin External Powder 667536 UNIT/GM] 60 g 0     Sig: APPLY TOPICALLY 3 TIMES DAILY AS NEEDED TO MANAGE RASH AND MOISTURE (GENERIC FOR MYCOSTATIN)    diclofenac sodium (VOLTAREN) 1 % GEL [Pharmacy Med Name: Diclofenac Sodium External Gel 1 %] 100 g 0     Sig: APPLY 4 GRAMS TOPICALLY FOUR TIMES DAILY AS NEEDED FOR PAIN

## 2022-10-11 ENCOUNTER — HOSPITAL ENCOUNTER (OUTPATIENT)
Dept: PHYSICAL THERAPY | Age: 47
Setting detail: THERAPIES SERIES
Discharge: HOME | End: 2022-10-11
Payer: COMMERCIAL

## 2022-10-11 PROCEDURE — 97112 NEUROMUSCULAR REEDUCATION: CPT | Performed by: SPECIALIST

## 2022-10-11 PROCEDURE — 97110 THERAPEUTIC EXERCISES: CPT | Performed by: SPECIALIST

## 2022-10-11 PROCEDURE — 97012 MECHANICAL TRACTION THERAPY: CPT | Performed by: SPECIALIST

## 2022-10-11 PROCEDURE — 97140 MANUAL THERAPY 1/> REGIONS: CPT | Performed by: SPECIALIST

## 2022-10-11 NOTE — PROGRESS NOTES
NMR re-education (20224)   CUES NEEDED    bridges  10x  x   LTR  10x  x   Pelvic tilt 10\" 10x     Pelvic tilt c marching   10x     Pelvic tilt arms oh  10x     Pelvic tilt opp arm/leg  10x                          Therapeutic Activity (74024)                     C traction 17/9 10 min                   [employer] access code: [email or web address]           Therapeutic Exercise and NMR EXR  [x] (82309) Provided verbal/tactile cueing for activities related to strengthening, flexibility, endurance, ROM  for improvements in proximal hip and core control with self care, mobility, lifting and ambulation.  [] (70379) Provided verbal/tactile cueing for activities related to improving balance, coordination, kinesthetic sense, posture, motor skill, proprioception  to assist with core control in self care, mobility, lifting, and ambulation.      Therapeutic Activities:    [x] (83005 or 26629) Provided verbal/tactile cueing for activities related to improving balance, coordination, kinesthetic sense, posture, motor skill, proprioception and motor activation to allow for proper function  with self care and ADLs  [] (08704) Provided training and instruction to the patient for proper core and proximal hip recruitment and positioning with ambulation re-education     Home Exercise Program:    [x] (72063) Reviewed/Progressed HEP activities related to strengthening, flexibility, endurance, ROM of core, proximal hip and LE for functional self-care, mobility, lifting and ambulation   [] (06333) Reviewed/Progressed HEP activities related to improving balance, coordination, kinesthetic sense, posture, motor skill, proprioception of core, proximal hip and LE for self care, mobility, lifting, and ambulation      Manual Treatments:  PROM / STM / Oscillations-Mobs:  G-I, II, III, IV (PA's, Inf., Post.)  [x] (61469) Provided manual therapy to mobilize proximal hip and LS spine soft tissue/joints for the purpose of modulating pain, promoting relaxation, increasing ROM, reducing/eliminating soft tissue swelling/inflammation/restriction, improving soft tissue extensibility and allowing for proper ROM for normal function with self care, mobility, lifting and ambulation. Modalities:     [] GAME READY (VASO)- for significant edema, swelling, pain control. Charges:  Timed Code Treatment Minutes: 40   Total Treatment Minutes:  50   BWC:  TE TIME:  NMR TIME:  MANUAL TIME:  UNTIMED MINUTES:  Medicare Total:         [] EVAL (LOW) 73415 (typically 20 minutes face-to-face)  [] EVAL (MOD) 58022 (typically 30 minutes face-to-face)  [] EVAL (HIGH) 29682 (typically 45 minutes face-to-face)  [] RE-EVAL     [x] KA(94331) x   1  [] IONTO  [x] NMR (61116) x    1 [] VASO  [x] Manual (04225) x    1 [] Other:  [] TA x      [x] Mech Traction (91399)  [] ES(attended) (42366)      [] ES (un) (55903):       ASSESSMENT:  Good tolerance with Reyes . Smaller Dowagers hump      GOALS:     Patient stated goal: ADLs headaches    Therapist goals for Patient:   Short Term Goals: To be achieved in: 2 weeks  1. Independent in HEP and progression per patient tolerance, in order to prevent re-injury. [x] Progressing: [] Met: [] Not Met: [] Adjusted  2. Patient will have a decrease in pain to facilitate improvement in movement, function, and ADLs as indicated by Functional Deficits. [x] Progressing: [] Met: [] Not Met: [] Adjusted    Long Term Goals: To be achieved in: 6-8 weeks  1. FOTO score will match or exceed predicted score to assist with reaching prior level of function. [x] Progressing: [] Met: [] Not Met: [] Adjusted  2. Patient will demonstrate increased AROM to WNL, good LS mobility, good hip ROM to allow for proper joint functioning as indicated by patients Functional Deficits. [x] Progressing: [] Met: [] Not Met: [] Adjusted  3.  Patient will demonstrate an increase in Strength to good proximal hip and core activation to allow for proper functional mobility as indicated by patients Functional Deficits. [x] Progressing: [] Met: [] Not Met: [] Adjusted  4. Patient will return to [facility] for functional activities without increased symptoms or restriction. [x] Progressing: [] Met: [] Not Met: [] Adjusted  5. ADLs/ headaches with min to no limitations (patient specific functional goal)    [x] Progressing: [] Met: [] Not Met: [] Adjusted         Overall Progression Towards Functional goals/ Treatment Progress Update:  [x] Patient is progressing as expected towards functional goals listed. [] Progression is slowed due to complexities/Impairments listed. [] Progression has been slowed due to co-morbidities. [] Plan just implemented, too soon to assess goals progression <30days   [] Goals require adjustment due to lack of progress  [] Patient is not progressing as expected and requires additional follow up with physician  [] Other    Prognosis for POC: [x] Good [] Fair  [] Poor      Patient requires continued skilled intervention: [x] Yes  [] No    Treatment/Activity Tolerance:  [x] Patient able to complete treatment  [] Patient limited by fatigue  [] Patient limited by pain    [] Patient limited by other medical complications  [] Other:     Return to Play: (if applicable)   []  Stage 1: Intro to Strength   []  Stage 2: Return to Run and Strength   []  Stage 3: Return to Jump and Strength   []  Stage 4: Dynamic Strength and Agility   []  Stage 5: Sport Specific Training     []  Ready to Return to Play, Meets All Above Stages   []  Not Ready for Return to Sports   Comments:                           PLAN:   [x] Continue per plan of care [] Alter current plan (see comments above)  [] Plan of care initiated [] Hold pending MD visit [] Discharge    Electronically signed by:  Ana Queen PT,     Note: If patient does not return for scheduled/ recommended follow up visits, this note will serve as a discharge from care along with most recent update on progress.

## 2022-10-13 ENCOUNTER — HOSPITAL ENCOUNTER (OUTPATIENT)
Dept: PHYSICAL THERAPY | Age: 47
Setting detail: THERAPIES SERIES
Discharge: HOME | End: 2022-10-13
Payer: COMMERCIAL

## 2022-10-13 ENCOUNTER — CARE COORDINATION (OUTPATIENT)
Dept: CARE COORDINATION | Age: 47
End: 2022-10-13

## 2022-10-13 NOTE — CARE COORDINATION
Rashawn Code  10/13/2022    Registered Dietitian Progress Note for Care Coordination    Assessment: Nita Boudreaux is a 41 y.o. female. RD referred for desired weight loss. RD spoke with patient for initial nutrition assessment on 6/29/22. RD called to follow up with patient today, 10/13/22. RD discussed previous goals with patient. Patient states she is doing okay. Patient reports that she has been eating four to six small meals per day. Patient has been trying to build balanced meals, but admits that fruit and vegetable intake is a \"hit or miss\" depending on what is available at the food pantry. Patient has been trying to limit portions by pre-portioning snacks in snack-sized bags. Patient's physical activity consists of going to Physical Therapy 2x/week. Patient notes that she has a membership to [employer], but has not yet gone. Patient has not checked her weight yet, so is unsure of weight change. Patient states she spoke with her  with insurance company who states that patient is, indeed, covered for oral nutrition supplements. Patient reports that she is having difficulty with finding DME company to supply ONS. Patient has no further nutrition-related questions or concerns at this time. RD answered all questions to patient's satisfaction. Patient and RD mutually agreed to complete nutrition counseling. RD encouraged patient to reach out should she ever have any nutrition-related questions or concerns. Patient verbalized understanding and thanked RD.     Nutrition Monitoring and Evaluation  Indicator/Goal Criteria Progress   #1 Follow MyPlate guidelines #1 I will build balanced meal using the MyPlate reference: 1/2 plate fruits and/or vegetables, 1/4 plate protein, and 1/4 plate starchy carbohydrates with 8 oz glass of low fat milk if desired #1 Patient has been working toward building balanced meals    #2  Eat consistently #2 I will eat three meals per day or four to six small meals per day  #2 Patient has been eating four to six small meals per day    #3  Limit portion sizes  #3 I will adhere to suggested portion sizes  #3 Patient has been eating small portions       Plan of Care:  RD encouraged patient to keep working toward goals set. Follow Up:    None.     Ariel Wen, [address] [place] [address],    [phone]

## 2022-10-13 NOTE — FLOWSHEET NOTE
[address] [facility], [address] [place] [address], [address] [place] [address] [address]  Phone: [phone]   Fax:     [phone]    Physical Therapy  Cancellation/No-show Note  Patient Name:  Domo Peres  :  1975   Date:  10/13/2022    Cancelled visits to date: 4  No-shows to date: 0    For today's appointment patient:  [x]  Cancelled  []  Rescheduled appointment  []  No-show     Reason given by patient:  [x]  Patient ill  []  Conflicting appointment  []  No transportation    []  Conflict with work  []  No reason given  []  Other:     Comments:      Phone call information:   [x]  Phone call made today to patient at am/pm at the number provided:      []  Patient answered, conversation as follows:    []  Patient did not answer, message left as follows:  []  Phone call not needed - pt contacted us to cancel and provided reason for cancellation.      Electronically signed by:  Laurent Salvador PT, PT

## 2022-10-14 ENCOUNTER — CARE COORDINATION (OUTPATIENT)
Dept: CARE COORDINATION | Age: 47
End: 2022-10-14

## 2022-10-14 NOTE — CARE COORDINATION
Ambulatory Care Coordination Note  10/14/2022    ACC: Jose Carlos Lennon, RN    Pt was in agreement to dc from [facility] when dietician is compete. Dc'd from [facility] at this time. Offered patient enrollment in the Remote Patient Monitoring (RPM) program for in-home monitoring: NA. Lab Results       None            Care Coordination Interventions    Referral from Primary Care Provider: No  Suggested Interventions and Community Resources  Diabetes Education: Completed  Fall Risk Prevention: Completed  Disease Association: Completed (Comment: COPD)  Registered Dietician: Completed (Comment: 6/3/22-referral made)  [employer] Management Tools: Completed (Comment: 6/8/22-reviewed)          Goals Addressed    None         Prior to Admission medications    Medication Sig Start Date End Date Taking? Authorizing Provider   lisinopril (PRINIVIL;ZESTRIL) 5 MG tablet TAKE 1 TABLET BY MOUTH EVERY DAY 10/3/22   Jamesene K May, DO   montelukast (SINGULAIR) 10 MG tablet TAKE 1 TABLET BY MOUTH EVERY DAY AT NIGHT 9/2/22   Jamesene K May, DO   nystatin (MYCOSTATIN) 704142 UNIT/GM powder APPLY TOPICALLY 3 TIMES DAILY AS NEEDED TO MANAGE RASH AND MOISTURE (GENERIC FOR MYCOSTATIN) 10/10/22   delene K May, DO   diclofenac sodium (VOLTAREN) 1 % GEL APPLY 4 GRAMS TOPICALLY FOUR TIMES DAILY AS NEEDED FOR PAIN 10/10/22   Magdelene K May, DO   Blood Glucose Monitoring Suppl (TRUE METRIX METER) NGHIA Dispense meter to test blood sugar 4 times daily for Type 2 DM 10/6/22   Magdelene K May, DO   blood glucose test strips (TRUE METRIX BLOOD GLUCOSE TEST) strip 1 each by In Vitro route 4 times daily As needed.  10/6/22   Magdelene K May, DO   Lancets MISC 1 each by Does not apply route 2 times daily 10/6/22   Magdelene K May, DO   polyethylene glycol (GLYCOLAX) 17 GM/SCOOP powder MIX 17 GRAMS IN LIQUID AND DRINK BY MOUTH IN THE MORNING 9/29/22   Jamesene K May, DO   propranolol (INDERAL LA) 80 MG extended release capsule TAKE 1 CAPSULE BY MOUTH IN THE MORNING AND AT BEDTIME 9/29/22 10/29/22  ethan BARFIELD May, DO   busPIRone (BUSPAR) 15 MG tablet TAKE 1 TABLET BY MOUTH IN THE MORNING, NOON, AND BEFORE BEDTIME 9/29/22   ethan BARFIELD May, DO   oxybutynin (DITROPAN-XL) 5 MG extended release tablet TAKE 1 TABLET BY MOUTH EVERY DAY 9/29/22   ethan BARFIELD May, DO   azelastine (OPTIVAR) 0.05 % ophthalmic solution INSTILL 1 DROP IN [facility] EYE TWO TIMES A DAY (GENERIC FOR optivar) 9/29/22   ethan BARFIELD May, DO   amLODIPine (NORVASC) 5 MG tablet TAKE 1 TABLET BY MOUTH EVERY DAY 9/29/22   ethan BARFIELD May, DO   fluticasone (FLONASE) 50 MCG/ACT nasal spray INHALE 2 SPRAYS BY NASAL ROUTE TWO TIMES A DAY 9/29/22   ethan BARFIELD May, DO   PROAIR  (90 Base) MCG/ACT inhaler INHALE 2 PUFFS BY MOUTH EVERY SIX HOURS AS NEEDED FOR WHEEZING 9/29/22   ethan BARFIELD May, DO   vitamin D (ERGOCALCIFEROL) 1.25 MG (76981 UT) CAPS capsule Take 1 capsule by mouth once a week 8/29/22   ethan BARFIELD May, DO   SPIRIVA HANDIHALER 18 MCG inhalation capsule INHALE CONTENTS 1 CAPSULE BY MOUTH EVERY DAY 8/22/22   ethan BARFIELD May, DO   levothyroxine (SYNTHROID) 300 MCG tablet Take 1 tablet by mouth Daily 8/22/22 11/20/22  Digna Ruff MD   liothyronine (CYTOMEL) 25 MCG tablet Take 1 tablet by mouth 2 times daily for 180 doses 8/22/22 11/20/22  Digna Ruff MD   fluconazole (DIFLUCAN) 150 MG tablet TAKE 1 TABLET BY MOUTH ONCE FOR 1 DOSE 8/11/22   Historical Provider, MD   EPINEPHrine (EPIPEN 2-SHELBIE) 0.3 MG/0.3ML SOAJ injection Inject 0.3 mLs into the muscle once for 1 dose Use as directed for allergic reaction 8/11/22 8/11/22  Jamesmadi BARFIELD May, DO   DULoxetine (CYMBALTA) 30 MG extended release capsule Take 1 capsule by mouth in the morning and 1 capsule before bedtime.  8/11/22 11/9/22  Wiley BARFIELD May, DO   NURTEC 75 MG TBDP Take 75 mg by mouth every other day 8/11/22   Wiley BARFIELD May, DO   EPINEPHrine (EPIPEN 2-SHELBIE) 0.3 MG/0.3ML SOAJ injection Inject 0.3 mLs into the muscle as needed (In the case of anaphylaxis) Use as directed for allergic reaction 8/11/22   Jamesene LICO May, DO   cetirizine (ZYRTEC) 10 MG tablet Take 1 tablet by mouth in the morning. 8/8/22   florecitaene K May, DO   rizatriptan (MAXALT) 10 MG tablet take 1 tablet by mouth once as needed for migraine may repeat in 2 hours if needed 8/4/22 8/4/22  Ramya Elias MD   SYMBICORT 160-4.5 MCG/ACT AERO Inhale 2 puffs into the lungs 2 times daily Use for asthma exacerbations. 1-2 puffs q4h. Max 12 puffs/ day 5/25/22   florecitamadi LICO May, DO   atorvastatin (LIPITOR) 40 MG tablet Take 1 tablet by mouth daily 3/18/22 7/27/22  Magethan BARFIELD May, DO   omeprazole (PRILOSEC) 40 MG delayed release capsule Take 1 capsule by mouth in the morning and at bedtime 3/11/22 3/6/23  ethan BARFIELD May, DO   ARIPiprazole (ABILIFY) 10 MG tablet Take 1 tablet by mouth daily 12/7/21   [facility] May, DO   Cholecalciferol 25 MCG (1000 UT) CHEW Take 2 tablets by mouth daily 11/23/21   ethan BARFIELD May, DO   furosemide (LASIX) 40 MG tablet Take 1 tablet by mouth daily 11/8/21   ethan BARFIELD May, DO   azelastine (ASTELIN) 0.1 % nasal spray 1 spray by Nasal route 2 times daily Use in each nostril as directed 11/8/21   ethan BARFIELD May, DO   Continuous Blood Gluc  (DEXCOM G6 ) NGHIA 2 times daily 3/26/21   Historical Provider, MD   acetaminophen (TYLENOL) 500 MG tablet Take 500 mg by mouth every 6 hours as needed for Pain    Historical Provider, MD   Nutritional Supplements (GLUCOSE MANAGEMENT) TABS Take by mouth    Historical Provider, MD   calcium carbonate (CALCIUM 600) 600 MG TABS tablet Take 1 tablet by mouth daily 8/11/16   SHLOMO Samson MD   Adhesive Tape (PAPER TAPE 1\"[id number]) TAPE Apply to affected areas.  4/25/16   Gordo Ortiz MD   Multiple Vitamins-Minerals (THERAPEUTIC MULTIVITAMIN-MINERALS) tablet Take 1 tablet by mouth daily    Historical Provider, MD   artificial tears (ARTIFICIAL TEARS) OINT as needed    Historical Provider, MD dextromethorphan-guaiFENesin (MUCINEX DM)  MG per SR tablet Take 1 tablet by mouth every 12 hours as needed     Historical Provider, MD       Future Appointments   Date Time Provider Israel Cote   10/20/2022  2:00 PM DO Elena Thompson [address] [facility]   10/26/2022  2:30 PM Tyrone Silva PTA [facility] PT [place] [facility]   11/9/2022  1:15 PM Carli Osorio MD AND PULM [facility]   11/14/2022  1:30 PM Wiley Conte DO [facility] AND [facility]   11/23/2022 10:00 AM MD Raul Johnson [facility]   12/27/2022  8:30 PM SCHEDULE, [facility] SLEEP ROOM [place] [address]

## 2022-10-17 ENCOUNTER — TELEPHONE (OUTPATIENT)
Dept: ADMINISTRATIVE | Age: 47
End: 2022-10-17

## 2022-10-17 RX ORDER — RIMEGEPANT SULFATE 75 MG/75MG
75 TABLET, ORALLY DISINTEGRATING ORAL EVERY OTHER DAY
Qty: 16 TABLET | Refills: 2 | Status: SHIPPED | OUTPATIENT
Start: 2022-10-17 | End: 2022-11-17

## 2022-10-17 NOTE — TELEPHONE ENCOUNTER
Insurance called asking if the dispense quantity could be 16 instead of 18     Per MA informed them that it can be 16. Insurance said that the provider will have to send a new Rx to the pharmacy.

## 2022-10-19 LAB — DIABETIC RETINOPATHY: NEGATIVE

## 2022-10-20 ENCOUNTER — OFFICE VISIT (OUTPATIENT)
Dept: PRIMARY CARE CLINIC | Age: 47
End: 2022-10-20
Payer: COMMERCIAL

## 2022-10-20 VITALS
TEMPERATURE: 98.1 F | HEIGHT: 60 IN | BODY MASS INDEX: 57.52 KG/M2 | HEART RATE: 77 BPM | OXYGEN SATURATION: 98 % | WEIGHT: 293 LBS

## 2022-10-20 DIAGNOSIS — G43.901 MIGRAINE WITH STATUS MIGRAINOSUS, NOT INTRACTABLE, UNSPECIFIED MIGRAINE TYPE: ICD-10-CM

## 2022-10-20 DIAGNOSIS — E66.01 CLASS 3 SEVERE OBESITY DUE TO EXCESS CALORIES WITH SERIOUS COMORBIDITY AND BODY MASS INDEX (BMI) OF 60.0 TO 69.9 IN ADULT (HCC): ICD-10-CM

## 2022-10-20 DIAGNOSIS — I89.0 LYMPHEDEMA OF BOTH LOWER EXTREMITIES: ICD-10-CM

## 2022-10-20 DIAGNOSIS — Z12.11 COLON CANCER SCREENING: Primary | ICD-10-CM

## 2022-10-20 PROCEDURE — G0008 ADMIN INFLUENZA VIRUS VAC: HCPCS | Performed by: FAMILY MEDICINE

## 2022-10-20 PROCEDURE — G8417 CALC BMI ABV UP PARAM F/U: HCPCS | Performed by: STUDENT IN AN ORGANIZED HEALTH CARE EDUCATION/TRAINING PROGRAM

## 2022-10-20 PROCEDURE — G8427 DOCREV CUR MEDS BY ELIG CLIN: HCPCS | Performed by: STUDENT IN AN ORGANIZED HEALTH CARE EDUCATION/TRAINING PROGRAM

## 2022-10-20 PROCEDURE — G8482 FLU IMMUNIZE ORDER/ADMIN: HCPCS | Performed by: STUDENT IN AN ORGANIZED HEALTH CARE EDUCATION/TRAINING PROGRAM

## 2022-10-20 PROCEDURE — 99214 OFFICE O/P EST MOD 30 MIN: CPT | Performed by: STUDENT IN AN ORGANIZED HEALTH CARE EDUCATION/TRAINING PROGRAM

## 2022-10-20 PROCEDURE — 90674 CCIIV4 VAC NO PRSV 0.5 ML IM: CPT | Performed by: FAMILY MEDICINE

## 2022-10-20 PROCEDURE — 1036F TOBACCO NON-USER: CPT | Performed by: STUDENT IN AN ORGANIZED HEALTH CARE EDUCATION/TRAINING PROGRAM

## 2022-10-20 ASSESSMENT — ENCOUNTER SYMPTOMS
DIARRHEA: 1
RHINORRHEA: 1
VOMITING: 0
CONSTIPATION: 1
WHEEZING: 0
SHORTNESS OF BREATH: 1
NAUSEA: 1

## 2022-10-20 ASSESSMENT — PATIENT HEALTH QUESTIONNAIRE - PHQ9
4. FEELING TIRED OR HAVING LITTLE ENERGY: 2
SUM OF ALL RESPONSES TO PHQ QUESTIONS 1-9: 12
3. TROUBLE FALLING OR STAYING ASLEEP: 2
6. FEELING BAD ABOUT YOURSELF - OR THAT YOU ARE A FAILURE OR HAVE LET YOURSELF OR YOUR FAMILY DOWN: 1
7. TROUBLE CONCENTRATING ON THINGS, SUCH AS READING THE NEWSPAPER OR WATCHING TELEVISION: 2
10. IF YOU CHECKED OFF ANY PROBLEMS, HOW DIFFICULT HAVE THESE PROBLEMS MADE IT FOR YOU TO DO YOUR WORK, TAKE CARE OF THINGS AT HOME, OR GET ALONG WITH OTHER PEOPLE: 1
SUM OF ALL RESPONSES TO PHQ QUESTIONS 1-9: 12
8. MOVING OR SPEAKING SO SLOWLY THAT OTHER PEOPLE COULD HAVE NOTICED. OR THE OPPOSITE, BEING SO FIGETY OR RESTLESS THAT YOU HAVE BEEN MOVING AROUND A LOT MORE THAN USUAL: 1
2. FEELING DOWN, DEPRESSED OR HOPELESS: 1
SUM OF ALL RESPONSES TO PHQ9 QUESTIONS 1 & 2: 3
5. POOR APPETITE OR OVEREATING: 1
SUM OF ALL RESPONSES TO PHQ QUESTIONS 1-9: 12
1. LITTLE INTEREST OR PLEASURE IN DOING THINGS: 2
SUM OF ALL RESPONSES TO PHQ QUESTIONS 1-9: 12
9. THOUGHTS THAT YOU WOULD BE BETTER OFF DEAD, OR OF HURTING YOURSELF: 0

## 2022-10-20 ASSESSMENT — ANXIETY QUESTIONNAIRES
1. FEELING NERVOUS, ANXIOUS, OR ON EDGE: 2
GAD7 TOTAL SCORE: 12
6. BECOMING EASILY ANNOYED OR IRRITABLE: 2
7. FEELING AFRAID AS IF SOMETHING AWFUL MIGHT HAPPEN: 1
4. TROUBLE RELAXING: 2
2. NOT BEING ABLE TO STOP OR CONTROL WORRYING: 2
IF YOU CHECKED OFF ANY PROBLEMS ON THIS QUESTIONNAIRE, HOW DIFFICULT HAVE THESE PROBLEMS MADE IT FOR YOU TO DO YOUR WORK, TAKE CARE OF THINGS AT HOME, OR GET ALONG WITH OTHER PEOPLE: VERY DIFFICULT
3. WORRYING TOO MUCH ABOUT DIFFERENT THINGS: 1
5. BEING SO RESTLESS THAT IT IS HARD TO SIT STILL: 2

## 2022-10-20 NOTE — PROGRESS NOTES
23-25, discussed possibility of glaucoma, following up in six months, per Dr. Angela Lozano, ophthalmologist at [facility] AND SURGICAL [facility]. -Reports having had previous head imaging that was unremarkable. Obesity  Reports history of lymphedema, swelling in her legs. Reports not having been able to walk prior due to ankle inversion and high fall risk. She got new ankle braces at the [facility] for prosthetic orthotics to help her walk and thinks this will help her walk more and lose weight. Talked to dieting and following endo for celiac. Tried ozempic in the past but caused excessive nausea. Leg swelling  Not currently taking furosemide due to only one kidney. She does elevate her legs. She does not wear compression stockings because they cut into her. She has a pump and circaids. She says her swelling has worsened in the last month. She says her she has increased urgency.     Health maintenance  -colonoscopy indicated; patient wants colonoscopy  -patient wants flu vaccination  -hep B titers indicated    Patient Active Problem List   Diagnosis    RLS (restless legs syndrome)    Asthma    Psychophysiological insomnia    Hypersomnia    Osteoarthrosis    HTN (hypertension)    Hypothyroidism    Pure hypercholesterolemia    Lymphedema of right lower extremity    Abdominal wall abscess    Morbid obesity (HCC)    Allergic rhinitis    B12 deficiency    Depression    GERD (gastroesophageal reflux disease)    Hyperthyroidism with Hashimoto disease    IgA deficiency (HCC)    Intertrigo    Optic nerve atrophy    PCOS (polycystic ovarian syndrome)    Poor balance    Abnormal nuclear stress test    COPD (chronic obstructive pulmonary disease) (HCC)    Degenerative disc disease, cervical    Dyslipidemia    Kidney, aplastic    Multiple joint pain    Neuropathy    Paradoxical vocal cord motion    S/P laparoscopic sleeve gastrectomy    Type 2 diabetes mellitus (HCC)    Vitamin D deficiency    Vitiligo    Anxiety         Past Medical History:    Past Medical History:   Diagnosis Date    Acquired hypothyroidism 05/26/2016    ADHD (attention deficit hyperactivity disorder) Not sure    Allergic rhinitis 08/02/2013    Anemia     Ankle swelling 10/05/2017    Anxiety     Arthritis     Asthma     Bipolar disorder ([facility] [address]     Cellulitis 12/19/2017    Chest pain due to myocardial ischemia 05/01/2018    Chronic bronchitis (HCC)     Chronic kidney disease     COPD (chronic obstructive pulmonary disease) (HCC)     Depression     GERD (gastroesophageal reflux disease)     Head injury 10/19/2014    Headache     Hypertension     Hyperthyroidism with Hashimoto disease 03/13/2020    Incarcerated hernia 06/12/2021    Formatting of this note might be different from the original. Added automatically from request for surgery [id number]    Infection of deep incisional surgical site after procedure     Intertrigo     Irritable bowel syndrome     Left foot pain 05/26/2016    Lymphedema of right lower extremity 05/26/2016    Obesity     Optic nerve atrophy 09/19/2021    PARVIZ 02/25/2016    Osteoarthritis of left knee 05/26/2016    Panniculitis 03/13/2020    PCOS (polycystic ovarian syndrome) 10/05/2017    Prediabetes 10/05/2017    Psychophysiological insomnia 02/25/2016    Restless legs syndrome     RLS (restless legs syndrome) 02/25/2016    S/P laparoscopic sleeve gastrectomy 08/09/2019    S/P repair of ventral hernia 06/14/2021    Sleep apnea     Type 2 diabetes mellitus ([facility] [address] 06/13/2021    Urinary incontinence     Vitiligo        Past Surgical History:  Past Surgical History:   Procedure Laterality Date    APPENDECTOMY  07/01/2002    CHOLECYSTECTOMY  01/01/2001    HAND SURGERY Left     CTR    HERNIA REPAIR  June 2021    MOUTH SURGERY      5 teeth removed    RECTAL SURGERY N/A 07/31/2021    ABDOMINAL WALL INCISION AND DRAINAGE performed by Keisha Hutchinson MD at [address] [facility] GASTROPLASTY  [address]  06/2022    UPPER GASTROINTESTINAL ENDOSCOPY      VENTRAL HERNIA REPAIR  07/2021       Home Meds:  Prior to Visit Medications    Medication Sig Taking? Authorizing Provider   Rimegepant Sulfate (NURTEC) 75 MG TBDP Take 75 mg by mouth every other day for 16 doses Yes Wiley BARFIELD May, DO   nystatin (MYCOSTATIN) 318790 UNIT/GM powder APPLY TOPICALLY 3 TIMES DAILY AS NEEDED TO MANAGE RASH AND MOISTURE (GENERIC FOR MYCOSTATIN) Yes Wiley BARFIELD May, DO   diclofenac sodium (VOLTAREN) 1 % GEL APPLY 4 GRAMS TOPICALLY FOUR TIMES DAILY AS NEEDED FOR PAIN Yes Wiley BARFIELD May, DO   Blood Glucose Monitoring Suppl (TRUE METRIX METER) NGHIA Dispense meter to test blood sugar 4 times daily for Type 2 DM Yes Jamesene LICO May, DO   blood glucose test strips (TRUE METRIX BLOOD GLUCOSE TEST) strip 1 each by In Vitro route 4 times daily As needed.  Yes Jamesene LICO May, DO   Lancets MISC 1 each by Does not apply route 2 times daily Yes Wiley BARFIELD May, DO   lisinopril (PRINIVIL;ZESTRIL) 5 MG tablet TAKE 1 TABLET BY MOUTH EVERY DAY Yes Jamesene LICO May, DO   polyethylene glycol (GLYCOLAX) 17 GM/SCOOP powder MIX 17 GRAMS IN LIQUID AND DRINK BY MOUTH IN THE MORNING Yes Jamesene LIOC May, DO   propranolol (INDERAL LA) 80 MG extended release capsule TAKE 1 CAPSULE BY MOUTH IN THE MORNING AND AT BEDTIME Yes Wiley BARFIELD May, DO   busPIRone (BUSPAR) 15 MG tablet TAKE 1 TABLET BY MOUTH IN THE MORNING, NOON, AND BEFORE BEDTIME Yes Wiley BARFIELD May, DO   oxybutynin (DITROPAN-XL) 5 MG extended release tablet TAKE 1 TABLET BY MOUTH EVERY DAY Yes Wiley BARFIELD May, DO   azelastine (OPTIVAR) 0.05 % ophthalmic solution INSTILL 1 DROP IN [facility] EYE TWO TIMES A DAY (GENERIC FOR optivar) Yes Wiley BARFIELD May, DO   amLODIPine (NORVASC) 5 MG tablet TAKE 1 TABLET BY MOUTH EVERY DAY Yes Wiley BARFIELD May, DO   fluticasone (FLONASE) 50 MCG/ACT nasal spray INHALE 2 SPRAYS BY NASAL ROUTE TWO TIMES A DAY Yes Wiley BARFIELD May, DO   PROAIR  (90 Base) MCG/ACT inhaler INHALE 2 PUFFS BY MOUTH EVERY SIX HOURS AS NEEDED FOR WHEEZING Yes Wiley BARFIELD May, DO   montelukast (SINGULAIR) 10 MG tablet TAKE 1 TABLET BY MOUTH EVERY DAY AT NIGHT Yes Wiley BARFIELD May, DO   vitamin D (ERGOCALCIFEROL) 1.25 MG (32868 UT) CAPS capsule Take 1 capsule by mouth once a week Yes Wiley BARFIELD May, DO   SPIRIVA HANDIHALER 18 MCG inhalation capsule INHALE CONTENTS 1 CAPSULE BY MOUTH EVERY DAY Yes Wiley BARFIELD May, DO   levothyroxine (SYNTHROID) 300 MCG tablet Take 1 tablet by mouth Daily Yes Kavita Kingsley MD   liothyronine (CYTOMEL) 25 MCG tablet Take 1 tablet by mouth 2 times daily for 180 doses Yes Kavita Kingsley MD   fluconazole (DIFLUCAN) 150 MG tablet TAKE 1 TABLET BY MOUTH ONCE FOR 1 DOSE Yes Tomer Foy MD   DULoxetine (CYMBALTA) 30 MG extended release capsule Take 1 capsule by mouth in the morning and 1 capsule before bedtime. Yes Wiley BARFIELD May, DO   EPINEPHrine (EPIPEN 2-SHELBIE) 0.3 MG/0.3ML SOAJ injection Inject 0.3 mLs into the muscle as needed (In the case of anaphylaxis) Use as directed for allergic reaction Yes Wiley BARFIELD May, DO   cetirizine (ZYRTEC) 10 MG tablet Take 1 tablet by mouth in the morning. Yes Wiley BARFIELD May, DO   SYMBICORT 160-4.5 MCG/ACT AERO Inhale 2 puffs into the lungs 2 times daily Use for asthma exacerbations. 1-2 puffs q4h.  Max 12 puffs/ day Yes Wiley BARFIELD May, DO   atorvastatin (LIPITOR) 40 MG tablet Take 1 tablet by mouth daily Yes Wiley BARFIELD May, DO   omeprazole (PRILOSEC) 40 MG delayed release capsule Take 1 capsule by mouth in the morning and at bedtime Yes Wiley BARFIELD May, DO   ARIPiprazole (ABILIFY) 10 MG tablet Take 1 tablet by mouth daily Yes Wiley BARFIELD May, DO   Cholecalciferol 25 MCG (1000 UT) CHEW Take 2 tablets by mouth daily Yes Wiley BARFIELD May, DO   furosemide (LASIX) 40 MG tablet Take 1 tablet by mouth daily Yes Wiley BARFIELD May, DO   azelastine (ASTELIN) 0.1 % nasal spray 1 spray by Nasal route 2 times daily Use in each nostril as directed Yes Wiley BARFIELD May, DO   Continuous Blood Gluc  ([address]) NGHIA 2 times daily Yes Historical Provider, MD   acetaminophen (TYLENOL) 500 MG tablet Take 500 mg by mouth every 6 hours as needed for Pain Yes Historical Provider, MD   Nutritional Supplements (GLUCOSE MANAGEMENT) TABS Take by mouth Yes Historical Provider, MD   calcium carbonate (CALCIUM 600) 600 MG TABS tablet Take 1 tablet by mouth daily Yes SHLOMO Cristina MD   Adhesive Tape (PAPER TAPE 1\"[id number]) TAPE Apply to affected areas.  Yes Joleen Valdes MD   Multiple Vitamins-Minerals (THERAPEUTIC MULTIVITAMIN-MINERALS) tablet Take 1 tablet by mouth daily Yes Historical Provider, MD   artificial tears (ARTIFICIAL TEARS) OINT as needed Yes Historical Provider, MD   dextromethorphan-guaiFENesin (MUCINEX DM)  MG per SR tablet Take 1 tablet by mouth every 12 hours as needed  Yes Historical Provider, MD   EPINEPHrine (EPIPEN 2-SHELBIE) 0.3 MG/0.3ML SOAJ injection Inject 0.3 mLs into the muscle once for 1 dose Use as directed for allergic reaction  Wiley Conte DO   rizatriptan (MAXALT) 10 MG tablet take 1 tablet by mouth once as needed for migraine may repeat in 2 hours if needed  Srinath Navarrete MD       Allergies:    Bee venom, Black pepper-turmeric, Dust mite extract, Ibuprofen, Nsaids, and Other    Family History:       Problem Relation Age of Onset    Hypertension Mother     Hypothyroidism Mother     Depression Mother     Diabetes Mother     Heart Disease Mother     High Blood Pressure Mother     High Cholesterol Mother     Kidney Disease Mother         Had kidney failure    Mental Illness Mother     Miscarriages / Kathryn Cassie Mother     Obesity Mother     Stroke Mother     Asthma Father     Diabetes Father     Emphysema Father     Hypertension Father     Depression Father     Early Death Father     Heart Attack Father     Heart Disease Father     High Blood Pressure Father     High Cholesterol Father     Obesity Father     Substance Abuse Father     Hypertension Sister problems with pregnancy.     Asthma Sister     Depression Sister     Hearing Loss Sister     Heart Disease Sister     High Blood Pressure Sister     High Cholesterol Sister     Learning Disabilities Sister     Mental Illness Sister     Obesity Sister     Substance Abuse Sister     Diabetes Paternal Grandmother     Heart Attack Paternal Grandmother     Stroke Paternal Grandmother     Cervical Cancer Paternal Aunt     Breast Cancer Paternal Aunt     Cancer Paternal Aunt     Depression Paternal Aunt     Mental Illness Paternal Aunt     Miscarriages / Stillbirths Paternal Aunt     Breast Cancer Maternal Aunt     Cancer Maternal Aunt     Heart Disease Maternal Aunt     High Blood Pressure Maternal Aunt     Immune Disorder Maternal Aunt     Obesity Maternal Aunt     Breast Cancer Maternal Aunt     Cancer Maternal Aunt     Heart Disease Maternal Aunt     High Blood Pressure Maternal Aunt     Immune Disorder Maternal Jesika Pack  had thyroid remove    Obesity Maternal Aunt     Ovarian Cancer Paternal Aunt     Heart Attack Maternal Grandfather     High Blood Pressure Maternal Grandfather     Obesity Maternal Grandfather     Stroke Maternal Grandfather     Heart Attack Maternal Grandmother     High Blood Pressure Maternal Grandmother     Obesity Maternal Grandmother     Stroke Maternal Grandmother     Vision Loss Maternal Grandmother     Hearing Loss Maternal Uncle     Heart Disease Maternal Uncle     High Blood Pressure Maternal Uncle     Obesity Maternal Uncle     Heart Disease Maternal Uncle     High Blood Pressure Maternal Uncle     Obesity Maternal Uncle     Heart Disease Maternal Uncle     High Blood Pressure Maternal Uncle     Obesity Maternal Uncle     Heart Disease Paternal Uncle     Obesity Paternal Uncle     Heart Disease Paternal Uncle     Obesity Paternal Uncle     Heart Disease Maternal Aunt     High Blood Pressure Maternal Aunt     Immune Disorder Maternal Aunt     Obesity Maternal Aunt     Immune Disorder Maternal Arvind Langley had thyroid removed    Immune Disorder Maternal Cousin         Hypothyroidism had thyroid removed         Health Maintenance Completed:  Health Maintenance   Topic Date Due    Diabetic retinal exam  Never done    Hepatitis B vaccine (1 of 3 - Risk 3-dose series) Never done    Colorectal Cancer Screen  Never done    Diabetic foot exam  11/08/2022    COVID-19 Vaccine (1) 01/04/2024 (Originally 2/23/1976)    Diabetic microalbuminuria test  03/11/2023    Lipids  03/11/2023    Depression Monitoring  08/11/2023    A1C test (Diabetic or Prediabetic)  08/22/2023    Cervical cancer screen  07/22/2027    DTaP/Tdap/Td vaccine (3 - Td or Tdap) 11/08/2031    Flu vaccine  Completed    Pneumococcal 0-64 years Vaccine  Completed    Hepatitis C screen  Completed    HIV screen  Completed    Hepatitis A vaccine  Aged Out    Hib vaccine  Aged Out    Meningococcal (ACWY) vaccine  Aged Out          Immunization History   Administered Date(s) Administered    Influenza, FLUCELVAX, (age 10 mo+), MDCK, MDV, 0.5mL 11/08/2021    Influenza, FLUCELVAX, (age 10 mo+), MDCK, PF, 0.5mL 12/30/2019, 10/21/2020, 10/20/2022    Influenza, MDCK, Preservative free 08/21/2017    Pneumococcal Polysaccharide (Egadvclst32) 12/04/2017, 11/08/2021    Tdap (Boostrix, Adacel) 03/31/2016, 11/08/2021         Review of Systems:  Review of Systems   Constitutional: Negative. Negative for activity change, fever and unexpected weight change. HENT:  Positive for congestion (2/2 allergies) and rhinorrhea. Respiratory:  Positive for shortness of breath. Negative for wheezing. Cardiovascular:  Positive for leg swelling. Negative for chest pain. Gastrointestinal:  Positive for constipation, diarrhea and nausea (hx of gastric sleeve). Negative for vomiting. Genitourinary:  Positive for urgency. Negative for dysuria, frequency and hematuria.       Physical Exam:   Vitals:    10/20/22 1336   Pulse: 77   Temp: 98.1 °F (36.7 °C)   SpO2: 98%   Weight: (!) 352 lb (159.7 kg)   Height: 5' (1.524 m)     Body mass index is 68.75 kg/m². Wt Readings from Last 3 Encounters:   10/20/22 (!) 352 lb (159.7 kg)   22 (!) 329 lb (149.2 kg)   22 (!) 329 lb (149.2 kg)       BP Readings from Last 3 Encounters:   22 124/78   22 128/72   22 (!) 139/90       Physical Exam  Constitutional:       Appearance: She is obese. HENT:      Head: Normocephalic and atraumatic. Eyes:      Extraocular Movements: Extraocular movements intact. Conjunctiva/sclera: Conjunctivae normal.      Pupils: Pupils are equal, round, and reactive to light. Cardiovascular:      Rate and Rhythm: Regular rhythm. Pulmonary:      Effort: Pulmonary effort is normal.      Breath sounds: Normal breath sounds. Abdominal:      Tenderness: There is no abdominal tenderness. There is no guarding. Musculoskeletal:         General: Swelling present. Normal range of motion. Cervical back: Normal range of motion. Right lower le+ Edema present. Left lower le+ Edema present. Neurological:      General: No focal deficit present. Mental Status: She is alert and oriented to person, place, and time. Psychiatric:         Mood and Affect: Mood normal.         Behavior: Behavior normal.         Thought Content: Thought content normal.         Judgment: Judgment normal.            Lab Review: pertinent labs reviewed       Assessment/Plan:  Migraine  -not at goal  She currently is taking rizatriptan (Maxalt) 10 mg as needed, Nurtec (rimegepant) 75mg which is currently once every 4 days (8 tablets per month)  She is also taking propranolol 80 mg bid.  She takes tylenol prn less than 1-2 times/week.  -continue following up with ophthalmologist to r/o other possible differential diagnoses    Obesity  -not at goal  -recommended exercise with braces  -will not start medications as patient is past difficulties with GLP-1 agonists and concern for history of hypoglycemia. -defer to endocrinology whether medication of GLP-1 agonist would be appropriate or other medications for weight loss. Leg swelling  -not at goal  -etio 2/2 lymphedema and venous stasis, swelling is firm, nonpitting  -discussed leg elevation, compression stockings, patient cannot use compression stockings due to cutting into her legs. -furosemide not indicated, no hx of CHF, and could strain kidney given history of one kidney. -recommended ace wrap    Health maintenance  -colonoscopy indicated; patient wants colonoscopy  -patient wants flu vaccination  -hep B titers in follow up when doing other bloodwork. Health Maintenance Due:  Health Maintenance Due   Topic Date Due    Diabetic retinal exam  Never done    Hepatitis B vaccine (1 of 3 - Risk 3-dose series) Never done    Colorectal Cancer Screen  Never done    Diabetic foot exam  11/08/2022          Health care decision maker:  <72years old      RTC:  F/u in 1 month chronic care unless anything comes up and needs to be seen sooner. RESIDENT/ATTENDING ATTESTATION:    After medical student evaluation and exam, I independently gathered patients history, independently did a physical, and agree with A/P as written in medical student's note (other than clarified below). Please see below for additional information documented by the resident/attending including the A/P. Assessment/Plan:  Kim Tian 41 y.o. female has RLS (restless legs syndrome); Asthma; Psychophysiological insomnia; Hypersomnia; Osteoarthrosis; HTN (hypertension); Hypothyroidism; Pure hypercholesterolemia; Lymphedema of both lower extremities; Abdominal wall abscess; Class 3 severe obesity in adult [facility]); Allergic rhinitis; B12 deficiency; Depression; GERD (gastroesophageal reflux disease); Hyperthyroidism with Hashimoto disease; IgA deficiency ([facility] [address] Intertrigo; Optic nerve atrophy; PCOS (polycystic ovarian syndrome); Poor balance;  Abnormal nuclear stress test; COPD (chronic obstructive pulmonary disease) ([facility] [address] Degenerative disc disease, cervical; Dyslipidemia; Kidney, aplastic; Multiple joint pain; Neuropathy; Paradoxical vocal cord motion; S/P laparoscopic sleeve gastrectomy; Type 2 diabetes mellitus ([facility] [address] Vitamin D deficiency; Vitiligo; Anxiety; and Migraine with status migrainosus, not intractable on their problem list.         Nervous and Auditory    Migraine with status migrainosus, not intractable      Borderline controlled, continue current medications   - Nurtec PA pending for 16 tabs/ mo, increased from 8 tabs/ mo  - f/u 1 mo previously scheduled appointment         Relevant Medications    acetaminophen (TYLENOL) 500 MG tablet    DULoxetine (CYMBALTA) 30 MG extended release capsule    propranolol (INDERAL LA) 80 MG extended release capsule    busPIRone (BUSPAR) 15 MG tablet    amLODIPine (NORVASC) 5 MG tablet    Rimegepant Sulfate (NURTEC) 75 MG TBDP       Other    Lymphedema of both lower extremities      Borderline controlled, lifestyle modifications recommended   - Lasix not indicated for lymphedema  - Recommend compression and elevation, different compression modalities discussed                 Class 3 severe obesity in adult [facility])      Uncontrolled, continue current medications and lifestyle modifications recommended   - Patient has follow up with endo scheduled for hypothyroidism recommend discussing weight loss medications at next appointment. Patient has hx of hypoglycemia, without being on any diabetes medications. Hesitant to start medication such as semiglutide. Medications may be cost prohibitive w/o diabetes diagnosis/ well controlled A1C.             1. Colon cancer screening  - [facility] - Glenn Black MD, Gastroenterology, [facility]    2.  Need for vaccination  - influenza, flucelvax        EMR Dragon/transcription disclaimer:  Much of this encounter note is electronic transcription/translation of spoken language to printed texts.  The electronic translation of spoken language may be erroneous, or at times, nonsensical words or phrases may be inadvertently transcribed.   Although I have reviewed the note for such errors, some may still exist.

## 2022-10-21 NOTE — ASSESSMENT & PLAN NOTE
Uncontrolled, continue current medications and lifestyle modifications recommended   - Patient has follow up with endo scheduled for hypothyroidism recommend discussing weight loss medications at next appointment. Patient has hx of hypoglycemia, without being on any diabetes medications. Hesitant to start medication such as semiglutide. Medications may be cost prohibitive w/o diabetes diagnosis/ well controlled A1C.

## 2022-10-21 NOTE — ASSESSMENT & PLAN NOTE
Borderline controlled, lifestyle modifications recommended   - Lasix not indicated for lymphedema  - Recommend compression and elevation, different compression modalities discussed

## 2022-10-21 NOTE — ASSESSMENT & PLAN NOTE
Borderline controlled, continue current medications   - Nurtec PA pending for 16 tabs/ mo, increased from 8 tabs/ mo  - f/u 1 mo previously scheduled appointment

## 2022-10-24 RX ORDER — BUDESONIDE AND FORMOTEROL FUMARATE DIHYDRATE 160; 4.5 UG/1; UG/1
AEROSOL RESPIRATORY (INHALATION)
Qty: 10.2 G | Refills: 0 | Status: SHIPPED | OUTPATIENT
Start: 2022-10-24 | End: 2022-11-14 | Stop reason: SDUPTHER

## 2022-10-24 NOTE — TELEPHONE ENCOUNTER
----- Message from Monishamaricarmen Healy sent at 10/24/2022  1:29 PM EDT -----  Subject: Message to Provider    QUESTIONS  Information for Provider? Patient is calling about Nurtec approved by   insur and will  tomorrow. FYI to her pcp about this. It has been   fixed. ---------------------------------------------------------------------------  --------------  Mary RUSH  [phone]; OK to leave message on voicemail  ---------------------------------------------------------------------------  --------------  SCRIPT ANSWERS  Relationship to Patient?  Self

## 2022-10-24 NOTE — TELEPHONE ENCOUNTER
Refill Request       Last Seen: Last Seen Department: 10/20/2022  Last Seen by PCP: 10/20/2022    Last Written: 5/25    Next Appointment:   Future Appointments   Date Time Provider Israel Fontanezi   10/26/2022  2:30 PM Allegra Young PTA [facility] EG PT [facility]   11/9/2022  1:15 PM Yahir Simmons MD AND PULM [facility]   11/14/2022  1:30 PM Wiley Conte DO [facility] AND RES [facility]   11/23/2022 10:00 AM MD Raul Fritz [facility]   12/27/2022  8:30 PM SCHEDULE, [facility] SLEEP ROOM 3 [facility]       Future appointment scheduled      Requested Prescriptions     Pending Prescriptions Disp Refills    SYMBICORT 160-4.5 MCG/ACT AERO [Pharmacy Med Name: Symbicort Inhalation Aerosol 160-4.5 MCG/ACT] 10.2 g 0     Sig: INHALE 2 PUFFS BY MOUTH TWO TIMES A DAY THEN 1-2 PUFFS EVERY 4 HOURS MAX 12 PUFFS PER DAY FOR ASTHMA EXACERBATIONS

## 2022-10-25 DIAGNOSIS — G43.111 INTRACTABLE MIGRAINE WITH AURA WITH STATUS MIGRAINOSUS: ICD-10-CM

## 2022-10-25 RX ORDER — PROPRANOLOL HYDROCHLORIDE 80 MG/1
80 CAPSULE, EXTENDED RELEASE ORAL 2 TIMES DAILY
Qty: 60 CAPSULE | Refills: 0 | Status: SHIPPED | OUTPATIENT
Start: 2022-10-25 | End: 2022-11-14 | Stop reason: SDUPTHER

## 2022-10-25 NOTE — TELEPHONE ENCOUNTER
Refill Request       Last Seen: Last Seen Department: 10/20/2022  Last Seen by PCP: 10/20/2022    Last Written: 9/29    Next Appointment:   Future Appointments   Date Time Provider Israel Cote   10/26/2022  2:30 PM Brad Franco PTA [facility] PT [place] [facility]   11/9/2022  1:15 PM Juanita Mendieta MD AND PULM [facility]   11/14/2022  1:30 PM Wiley Conte DO [facility] AND RES [facility]   11/23/2022 10:00 AM MD Raul Kearns [facility]   12/27/2022  8:30 PM SCHEDULE, [facility] SLEEP ROOM 3 [facility] SLEEP Shane Burch       Future appointment scheduled      Requested Prescriptions     Pending Prescriptions Disp Refills    propranolol (INDERAL LA) 80 MG extended release capsule [Pharmacy Med Name: Propranolol HCl ER Oral Capsule Extended Release 24 Hour 80 MG] 60 capsule 0     Sig: TAKE 1 CAPSULE BY MOUTH IN THE MORNING AND AT BEDTIME

## 2022-10-26 ENCOUNTER — HOSPITAL ENCOUNTER (OUTPATIENT)
Dept: PHYSICAL THERAPY | Age: 47
Setting detail: THERAPIES SERIES
Discharge: HOME | End: 2022-10-26
Payer: COMMERCIAL

## 2022-10-26 PROCEDURE — 97140 MANUAL THERAPY 1/> REGIONS: CPT | Performed by: SPECIALIST/TECHNOLOGIST

## 2022-10-26 PROCEDURE — 97110 THERAPEUTIC EXERCISES: CPT | Performed by: SPECIALIST/TECHNOLOGIST

## 2022-10-26 PROCEDURE — 97112 NEUROMUSCULAR REEDUCATION: CPT | Performed by: SPECIALIST/TECHNOLOGIST

## 2022-10-26 PROCEDURE — 97016 VASOPNEUMATIC DEVICE THERAPY: CPT | Performed by: SPECIALIST/TECHNOLOGIST

## 2022-10-26 NOTE — PROGRESS NOTES
[address] [facility], [address] [place] [address], [address] [place] [address] [address]  Phone: [phone]   Fax:     [phone]        Date:  10/26/2022    Patient Name:  Rashawn Hughes    :  1975  MRN: [id number]  Restrictions/Precautions:    Medical/Treatment Diagnosis Information:   M17.0 (ICD-10-CM) - Primary osteoarthritis of both knees     Insurance/Certification information:   [place] Pimple medicaid  Physician Information:   Gabino Cho PA-C  Has the plan of care been signed (Y/N):        []  Yes  [x]  No     Date of Patient follow up with Physician: NS    Is this a Progress Report:     []  Yes  [x]  No      If Yes:  Date Range for reporting period:  Beginnin22 ------------ Endin22    Progress report will be due (10 Rx or 30 days whichever is less): 98     Recertification will be due (POC Duration  / 90 days whichever is less): 22      Visit # Insurance Allowable Auth Required   In Person 6 knee  7 back BMN []  Yes     []  No    Tele Health 0  []  Yes     []  No    Total 13       FOTO Score:      Date assessed:  22      Latex Allergy:  [x]NO      []YES  Preferred Language for Healthcare:   [x]English       []other:    Pain level:  7/10     SUBJECTIVE:  Pt notes that she is feeling pretty good overall. She has had some scheduling issues.      OBJECTIVE:   Observation:   Test measurements:      RESTRICTIONS/PRECAUTIONS: obese/ breathing    Exercises/Interventions:   Therapeutic Ex (33081) Sets/sec Reps Notes/CUES HEP    x   Heel slides  10x  x    x               Hamstring stretch 30\" 3x               bike  6 min     [facility] abd/ ext  20# 10x     Knee flex/ ext machine 10# 20x     Manual Intervention (34047)           MFR stick R IT band buttock/ L IT band  8 min                                 NMR re-education (61879)   CUES NEEDED    / slant board  1 min x2  x   HR/TR on slant board  15x 2  x bridges  15x  x   Supine PB clamshells BL 10x  x                        Therapeutic Activity (26956)                            Vaso L knee/ CP R  10 min              Medbridge access code:  [facility]           Therapeutic Exercise and NMR EXR  [x] (23968) Provided verbal/tactile cueing for activities related to strengthening, flexibility, endurance, ROM for improvements in LE, proximal hip, and core control with self care, mobility, lifting, ambulation. [x] (65919) Provided verbal/tactile cueing for activities related to improving balance, coordination, kinesthetic sense, posture, motor skill, proprioception to assist with LE, proximal hip, and core control in self-care, mobility, lifting, ambulation and eccentric single leg control.      NMR and Therapeutic Activities:    [x] (53237 or 58155) Provided verbal/tactile cueing for activities related to improving balance, coordination, kinesthetic sense, posture, motor skill, proprioception and motor activation to allow for proper function of core, proximal hip and LE with self-care and ADLs and functional mobility.   [] (10385) Gait Re-education- Provided training and instruction to the patient for proper LE, core and proximal hip recruitment and positioning and eccentric body weight control with ambulation re-education including up and down stairs     Home Exercise Program:    [x] (58334) Reviewed/Progressed HEP activities related to strengthening, flexibility, endurance, ROM of core, proximal hip and LE for functional self-care, mobility, lifting and ambulation/stair navigation   [] (25157) Reviewed/Progressed HEP activities related to improving balance, coordination, kinesthetic sense, posture, motor skill, proprioception of core, proximal hip and LE for self-care, mobility, lifting, and ambulation/stair navigation      Manual Treatments:  PROM / STM / Oscillations-Mobs:  G-I, II, III, IV (PA's, Inf., Post.)  [x] (09886) Provided manual therapy to mobilize LE, proximal hip and/or LS spine soft tissue/joints for the purpose of modulating pain, promoting relaxation, increasing ROM, reducing/eliminating soft tissue swelling/inflammation/restriction, improving soft tissue extensibility and allowing for proper ROM for normal function with self-care, mobility, lifting and ambulation. Modalities:     [x] GAME READY (VASO)- for significant edema, swelling, pain control. Charges:  Timed Code Treatment Minutes: 40   Total Treatment Minutes:  50   BWC:  TE TIME:  NMR TIME:  MANUAL TIME:  UNTIMED MINUTES:  Medicare Total:         [] EVAL (LOW) 38980 (typically 20 minutes face-to-face)  [] EVAL (MOD) 15236 (typically 30 minutes face-to-face)  [] EVAL (HIGH) 67070 (typically 45 minutes face-to-face)  [] RE-EVAL     [x] UB(91786) x  1   [] IONTO  [x] NMR (93570) x    1 [x] VASO  [x] Manual (74679) x  1   [] Other:  [] TA x      [] Mech Traction (66903)  [] ES(attended) (21929)      [] ES (un) (32794):    ASSESSMENT:  Improved tolerance with Shantelle today. Soreness with MFR    GOALS:      Patient stated goal: walk/ stairs     Therapist goals for Patient:   Short Term Goals: To be achieved in: 2 weeks  1. Independent in HEP and progression per patient tolerance, in order to prevent re-injury. [x] Progressing: [] Met: [] Not Met: [] Adjusted     2. Patient will have a decrease in pain to facilitate improvement in movement, function, and ADLs as indicated by Functional Deficits. [x] Progressing: [] Met: [] Not Met: [] Adjusted     Long Term Goals: To be achieved in: 6-8 weeks  1. FOTO score will match or exceed predicted score to assist with reaching prior level of function. [x] Progressing: [] Met: [] Not Met: [] Adjusted     2. Patient will demonstrate increased AROM to 135-0 to allow for proper joint functioning as indicated by patients Functional Deficits. [x] Progressing: [] Met: [] Not Met: [] Adjusted     3.  Patient will demonstrate an increase in Strength to good proximal hip strength and control, within 5lb HHD in LE to allow for proper functional mobility as indicated by patients Functional Deficits. [x] Progressing: [] Met: [] Not Met: [] Adjusted     4. Patient will return to [facility] for  functional activities without increased symptoms or restriction. [x] Progressing: [] Met: [] Not Met: [] Adjusted     5. Walk/ stand/ stairs with min to no limitations (patient specific functional goal)    [x] Progressing: [] Met: [] Not Met: [] Adjusted          Overall Progression Towards Functional goals/ Treatment Progress Update:  [x] Patient is progressing as expected towards functional goals listed. [] Progression is slowed due to complexities/Impairments listed. [] Progression has been slowed due to co-morbidities.   [] Plan just implemented, too soon to assess goals progression <30days   [] Goals require adjustment due to lack of progress  [] Patient is not progressing as expected and requires additional follow up with physician  [] Other    Prognosis for POC: [x] Good [] Fair  [] Poor      Patient requires continued skilled intervention: [x] Yes  [] No    Treatment/Activity Tolerance:  [x] Patient able to complete treatment  [] Patient limited by fatigue  [] Patient limited by pain    [] Patient limited by other medical complications  [] Other:     Return to Play: (if applicable)   []  Stage 1: Intro to Strength   []  Stage 2: Return to Run and Strength   []  Stage 3: Return to Jump and Strength   []  Stage 4: Dynamic Strength and Agility   []  Stage 5: Sport Specific Training     []  Ready to Return to Play, Meets All Above Stages   [x]  Not Ready for Return to Sports   Comments:                          PLAN:   [x] Continue per plan of care [] Alter current plan (see comments above)  [] Plan of care initiated [] Hold pending MD visit [] Discharge    Electronically signed by:  Rodolfo Austin, MONA, MHI, ATC    Note: If patient does not return for scheduled/ recommended follow up visits, this note will serve as a discharge from care along with most recent update on progress.

## 2022-11-04 RX ORDER — RIZATRIPTAN BENZOATE 10 MG/1
10 TABLET ORAL
COMMUNITY

## 2022-11-04 RX ORDER — ATORVASTATIN CALCIUM 40 MG/1
40 TABLET, FILM COATED ORAL DAILY
COMMUNITY
End: 2022-11-14

## 2022-11-04 NOTE — PROGRESS NOTES

## 2022-11-08 ENCOUNTER — HOSPITAL ENCOUNTER (OUTPATIENT)
Dept: PHYSICAL THERAPY | Age: 47
Setting detail: THERAPIES SERIES
Discharge: HOME | End: 2022-11-08

## 2022-11-08 NOTE — TELEPHONE ENCOUNTER
Refill Request       Last Seen: Last Seen Department: 10/20/2022  Last Seen by PCP: 10/20/2022    Last Written: 09/29/2022    Next Appointment:   Future Appointments   Date Time Provider Israel Kera   11/14/2022  1:30 PM DO Elena Thompson [address] [facility]   11/15/2022  3:15 PM Inocente Stout, PT [facility] EG PT [facility]   11/17/2022  3:15 PM [facility]   11/23/2022 10:00 AM MD Raul Reid [facility]   12/27/2022  8:30 PM SCHEDULE, [facility] SLEEP ROOM 3 [facility]             Requested Prescriptions     Pending Prescriptions Disp Refills    polyethylene glycol (MIRALAX) 17 GM/SCOOP POWD powder [Pharmacy Med Name: PEG 3350 Oral Powder 17 GM/SCOOP] 238 g 0     Sig: MIX 17 GRAMS IN 4 TO 8OZ LIQUID AND DRINK BY MOUTH IN THE MORNING

## 2022-11-08 NOTE — PROGRESS NOTES
[facility], [address] [place] [address], [address] [place] [address] [address]  Phone: [phone]   Fax:     [phone]    Physical Therapy  Cancellation/No-show Note  Patient Name:  Taniya Osman  :  1975   Date:  2022    Cancelled visits to date: 3  No-shows to date: 0    For today's appointment patient:  [x]  Cancelled  [x]  Rescheduled appointment  []  No-show     Reason given by patient:  []  Patient ill  [x]  Conflicting appointment  []  No transportation    []  Conflict with work  []  No reason given  []  Other:     Comments:  stomach issues    Phone call information:   []  Phone call made today to patient at am/pm at the number provided:      []  Patient answered, conversation as follows:    []  Patient did not answer, message left as follows:  [x]  Phone call not needed - pt contacted us to cancel and provided reason for cancellation.      Electronically signed by:  Tyrone Silva PTA, MHI, ATC

## 2022-11-09 ENCOUNTER — HOSPITAL ENCOUNTER (OUTPATIENT)
Age: 47
Discharge: HOME | End: 2022-11-09
Payer: COMMERCIAL

## 2022-11-09 ENCOUNTER — ANESTHESIA EVENT (OUTPATIENT)
Dept: ENDOSCOPY | Age: 47
End: 2022-11-09
Payer: COMMERCIAL

## 2022-11-09 DIAGNOSIS — E11.40 TYPE 2 DIABETES MELLITUS WITH DIABETIC NEUROPATHY, UNSPECIFIED WHETHER LONG TERM INSULIN USE (HCC): ICD-10-CM

## 2022-11-09 DIAGNOSIS — E03.9 ACQUIRED HYPOTHYROIDISM: ICD-10-CM

## 2022-11-09 LAB
ANTI-THYROGLOB ABS: 67 IU/ML
IGA: <10 MG/DL (ref 70–400)
T3 FREE: 1.9 PG/ML (ref 2.3–4.2)
T4 FREE: 1 NG/DL (ref 0.9–1.8)
THYROID PEROXIDASE (TPO) ABS: 127 IU/ML
TSH REFLEX: 88.05 UIU/ML (ref 0.27–4.2)

## 2022-11-09 PROCEDURE — 36415 COLL VENOUS BLD VENIPUNCTURE: CPT

## 2022-11-09 PROCEDURE — 86376 MICROSOMAL ANTIBODY EACH: CPT

## 2022-11-09 PROCEDURE — 84443 ASSAY THYROID STIM HORMONE: CPT

## 2022-11-09 PROCEDURE — 82784 ASSAY IGA/IGD/IGG/IGM EACH: CPT

## 2022-11-09 PROCEDURE — 84481 FREE ASSAY (FT-3): CPT

## 2022-11-09 PROCEDURE — 86800 THYROGLOBULIN ANTIBODY: CPT

## 2022-11-09 PROCEDURE — 83520 IMMUNOASSAY QUANT NOS NONAB: CPT

## 2022-11-09 PROCEDURE — 84439 ASSAY OF FREE THYROXINE: CPT

## 2022-11-09 PROCEDURE — 83516 IMMUNOASSAY NONANTIBODY: CPT

## 2022-11-09 RX ORDER — POLYETHYLENE GLYCOL 3350 17 G/17G
POWDER ORAL
Qty: 238 G | Refills: 0 | Status: SHIPPED | OUTPATIENT
Start: 2022-11-09 | End: 2022-11-14 | Stop reason: SDUPTHER

## 2022-11-10 ENCOUNTER — ANESTHESIA (OUTPATIENT)
Dept: ENDOSCOPY | Age: 47
End: 2022-11-10
Payer: COMMERCIAL

## 2022-11-10 ENCOUNTER — HOSPITAL ENCOUNTER (OUTPATIENT)
Age: 47
Setting detail: OUTPATIENT SURGERY
Discharge: HOME | End: 2022-11-10
Attending: INTERNAL MEDICINE | Admitting: INTERNAL MEDICINE
Payer: COMMERCIAL

## 2022-11-10 VITALS
WEIGHT: 293 LBS | OXYGEN SATURATION: 98 % | SYSTOLIC BLOOD PRESSURE: 113 MMHG | HEART RATE: 57 BPM | TEMPERATURE: 97.5 F | BODY MASS INDEX: 57.52 KG/M2 | DIASTOLIC BLOOD PRESSURE: 73 MMHG | HEIGHT: 60 IN | RESPIRATION RATE: 16 BRPM

## 2022-11-10 DIAGNOSIS — Z12.11 ENCOUNTER FOR SCREENING COLONOSCOPY: ICD-10-CM

## 2022-11-10 LAB
DGP IGG AB: 4.1 U/ML (ref 0–14)
GLUCOSE BLD-MCNC: 94 MG/DL (ref 70–99)
PERFORMED ON: NORMAL

## 2022-11-10 PROCEDURE — 2500000003 HC RX 250 WO HCPCS: Performed by: NURSE ANESTHETIST, CERTIFIED REGISTERED

## 2022-11-10 PROCEDURE — 3700000000 HC ANESTHESIA ATTENDED CARE: Performed by: INTERNAL MEDICINE

## 2022-11-10 PROCEDURE — 6360000002 HC RX W HCPCS: Performed by: NURSE ANESTHETIST, CERTIFIED REGISTERED

## 2022-11-10 PROCEDURE — 2580000003 HC RX 258: Performed by: INTERNAL MEDICINE

## 2022-11-10 PROCEDURE — 6360000002 HC RX W HCPCS

## 2022-11-10 PROCEDURE — 7100000010 HC PHASE II RECOVERY - FIRST 15 MIN: Performed by: INTERNAL MEDICINE

## 2022-11-10 PROCEDURE — 3609010600 HC COLONOSCOPY POLYPECTOMY SNARE/COLD BIOPSY: Performed by: INTERNAL MEDICINE

## 2022-11-10 PROCEDURE — 88305 TISSUE EXAM BY PATHOLOGIST: CPT

## 2022-11-10 PROCEDURE — 7100000011 HC PHASE II RECOVERY - ADDTL 15 MIN: Performed by: INTERNAL MEDICINE

## 2022-11-10 PROCEDURE — 3700000001 HC ADD 15 MINUTES (ANESTHESIA): Performed by: INTERNAL MEDICINE

## 2022-11-10 PROCEDURE — 2709999900 HC NON-CHARGEABLE SUPPLY: Performed by: INTERNAL MEDICINE

## 2022-11-10 RX ORDER — OXYCODONE HYDROCHLORIDE 5 MG/1
10 TABLET ORAL PRN
Status: DISCONTINUED | OUTPATIENT
Start: 2022-11-10 | End: 2022-11-10 | Stop reason: HOSPADM

## 2022-11-10 RX ORDER — ONDANSETRON 2 MG/ML
4 INJECTION INTRAMUSCULAR; INTRAVENOUS
Status: DISCONTINUED | OUTPATIENT
Start: 2022-11-10 | End: 2022-11-10 | Stop reason: HOSPADM

## 2022-11-10 RX ORDER — PROPOFOL 10 MG/ML
INJECTION, EMULSION INTRAVENOUS PRN
Status: DISCONTINUED | OUTPATIENT
Start: 2022-11-10 | End: 2022-11-10 | Stop reason: SDUPTHER

## 2022-11-10 RX ORDER — DIPHENHYDRAMINE HYDROCHLORIDE 50 MG/ML
12.5 INJECTION INTRAMUSCULAR; INTRAVENOUS
Status: DISCONTINUED | OUTPATIENT
Start: 2022-11-10 | End: 2022-11-10 | Stop reason: HOSPADM

## 2022-11-10 RX ORDER — SODIUM CHLORIDE 0.9 % (FLUSH) 0.9 %
5-40 SYRINGE (ML) INJECTION PRN
Status: DISCONTINUED | OUTPATIENT
Start: 2022-11-10 | End: 2022-11-10 | Stop reason: HOSPADM

## 2022-11-10 RX ORDER — LIDOCAINE HYDROCHLORIDE 10 MG/ML
0.1 INJECTION, SOLUTION EPIDURAL; INFILTRATION; INTRACAUDAL; PERINEURAL
Status: DISCONTINUED | OUTPATIENT
Start: 2022-11-10 | End: 2022-11-10 | Stop reason: HOSPADM

## 2022-11-10 RX ORDER — SODIUM CHLORIDE 0.9 % (FLUSH) 0.9 %
5-40 SYRINGE (ML) INJECTION EVERY 12 HOURS SCHEDULED
Status: DISCONTINUED | OUTPATIENT
Start: 2022-11-10 | End: 2022-11-10 | Stop reason: HOSPADM

## 2022-11-10 RX ORDER — OXYCODONE HYDROCHLORIDE 5 MG/1
5 TABLET ORAL PRN
Status: DISCONTINUED | OUTPATIENT
Start: 2022-11-10 | End: 2022-11-10 | Stop reason: HOSPADM

## 2022-11-10 RX ORDER — ONDANSETRON 2 MG/ML
INJECTION INTRAMUSCULAR; INTRAVENOUS
Status: COMPLETED
Start: 2022-11-10 | End: 2022-11-10

## 2022-11-10 RX ORDER — LABETALOL HYDROCHLORIDE 5 MG/ML
5 INJECTION, SOLUTION INTRAVENOUS EVERY 10 MIN PRN
Status: DISCONTINUED | OUTPATIENT
Start: 2022-11-10 | End: 2022-11-10 | Stop reason: HOSPADM

## 2022-11-10 RX ORDER — SODIUM CHLORIDE, SODIUM LACTATE, POTASSIUM CHLORIDE, CALCIUM CHLORIDE 600; 310; 30; 20 MG/100ML; MG/100ML; MG/100ML; MG/100ML
INJECTION, SOLUTION INTRAVENOUS ONCE
Status: COMPLETED | OUTPATIENT
Start: 2022-11-10 | End: 2022-11-10

## 2022-11-10 RX ORDER — LIDOCAINE HYDROCHLORIDE 20 MG/ML
INJECTION, SOLUTION INFILTRATION; PERINEURAL PRN
Status: DISCONTINUED | OUTPATIENT
Start: 2022-11-10 | End: 2022-11-10 | Stop reason: SDUPTHER

## 2022-11-10 RX ORDER — SODIUM CHLORIDE 9 MG/ML
INJECTION, SOLUTION INTRAVENOUS PRN
Status: DISCONTINUED | OUTPATIENT
Start: 2022-11-10 | End: 2022-11-10 | Stop reason: HOSPADM

## 2022-11-10 RX ORDER — SODIUM CHLORIDE, SODIUM LACTATE, POTASSIUM CHLORIDE, CALCIUM CHLORIDE 600; 310; 30; 20 MG/100ML; MG/100ML; MG/100ML; MG/100ML
INJECTION, SOLUTION INTRAVENOUS CONTINUOUS
Status: DISCONTINUED | OUTPATIENT
Start: 2022-11-10 | End: 2022-11-10 | Stop reason: HOSPADM

## 2022-11-10 RX ORDER — MEPERIDINE HYDROCHLORIDE 50 MG/ML
12.5 INJECTION INTRAMUSCULAR; INTRAVENOUS; SUBCUTANEOUS EVERY 5 MIN PRN
Status: DISCONTINUED | OUTPATIENT
Start: 2022-11-10 | End: 2022-11-10 | Stop reason: HOSPADM

## 2022-11-10 RX ADMIN — PROPOFOL 50 MG: 10 INJECTION, EMULSION INTRAVENOUS at 09:53

## 2022-11-10 RX ADMIN — PROPOFOL 50 MG: 10 INJECTION, EMULSION INTRAVENOUS at 09:45

## 2022-11-10 RX ADMIN — SODIUM CHLORIDE, SODIUM LACTATE, POTASSIUM CHLORIDE, AND CALCIUM CHLORIDE: .6; .31; .03; .02 INJECTION, SOLUTION INTRAVENOUS at 09:40

## 2022-11-10 RX ADMIN — PROPOFOL 50 MG: 10 INJECTION, EMULSION INTRAVENOUS at 09:46

## 2022-11-10 RX ADMIN — PROPOFOL 50 MG: 10 INJECTION, EMULSION INTRAVENOUS at 09:47

## 2022-11-10 RX ADMIN — PROPOFOL 50 MG: 10 INJECTION, EMULSION INTRAVENOUS at 09:51

## 2022-11-10 RX ADMIN — ONDANSETRON 4 MG: 2 INJECTION INTRAMUSCULAR; INTRAVENOUS at 10:22

## 2022-11-10 RX ADMIN — PROPOFOL 50 MG: 10 INJECTION, EMULSION INTRAVENOUS at 09:50

## 2022-11-10 RX ADMIN — PROPOFOL 25 MG: 10 INJECTION, EMULSION INTRAVENOUS at 10:04

## 2022-11-10 RX ADMIN — LIDOCAINE HYDROCHLORIDE 100 MG: 20 INJECTION, SOLUTION INFILTRATION; PERINEURAL at 09:44

## 2022-11-10 RX ADMIN — PROPOFOL 50 MG: 10 INJECTION, EMULSION INTRAVENOUS at 09:58

## 2022-11-10 ASSESSMENT — PAIN SCALES - GENERAL
PAINLEVEL_OUTOF10: 0
PAINLEVEL_OUTOF10: 7
PAINLEVEL_OUTOF10: 0
PAINLEVEL_OUTOF10: 0

## 2022-11-10 NOTE — ANESTHESIA POSTPROCEDURE EVALUATION
Department of Anesthesiology  Postprocedure Note    Patient: Elsa Junior  MRN: [id number]  YOB: 1975  Date of evaluation: 11/10/2022      Procedure Summary     Date: 11/10/22 Room / Location: [facility] [address] [facility] [address] / [facility]    Anesthesia Start: Brenda Zaira Anesthesia Stop: 1007    Procedure: COLONOSCOPY POLYPECTOMY SNARE/COLD BIOPSY Diagnosis:       Encounter for screening colonoscopy      (Encounter for screening colonoscopy [Z12.11])    Surgeons: Yang Holbrook MD Responsible Provider: Leticia Andrews MD    Anesthesia Type: MAC ASA Status: 4          Anesthesia Type: No value filed.     Madeleine Phase I: Madeleine Score: 10    Madeleine Phase II: Madeleine Score: 10      Anesthesia Post Evaluation    Comments: Postoperative Anesthesia Note    Name:    Elsa Junior  MRN:      [id number]    Patient Vitals in the past 12 hrs:  11/10/22 1037, BP:113/73, Pulse:57, Resp:16, SpO2:98 %  11/10/22 1022, BP:111/70, Pulse:58, Resp:16, SpO2:100 %  11/10/22 1008, BP:118/78, Pulse:70, Resp:16, SpO2:98 %  11/10/22 0840, BP:(!) 123/42, Temp:97.5 °F (36.4 °C), Temp src:Temporal, Pulse:66, Resp:16, SpO2:96 %, Height:5' (1.524 m), Weight:(!) 340 lb (154.2 kg)     LABS:    CBC  Lab Results       Component                Value               Date/Time                  WBC                      9.7                 11/08/2021 03:46 PM        HGB                      14.3                11/08/2021 03:46 PM        HCT                      43.4                11/08/2021 03:46 PM        PLT                      271                 11/08/2021 03:46 PM   RENAL  Lab Results       Component                Value               Date/Time                  NA                       138                 11/08/2021 03:46 PM        K                        4.1                 11/08/2021 03:46 PM        K                        3.6                 08/02/2021 06:04 AM        CL                       102                 11/08/2021 03:46 PM CO2                      19 (L)              11/08/2021 03:46 PM        BUN                      11                  11/08/2021 03:46 PM        CREATININE               1.0                 11/08/2021 03:46 PM        GLUCOSE                  110 (H)             11/08/2021 03:46 PM   COAGS  Lab Results       Component                Value               Date/Time                  PROTIME                  13.0 (H)            07/31/2021 08:01 AM        INR                      1.14 (H)            07/31/2021 08:01 AM     Intake & Output:  @89ONEB@    Nausea & Vomiting:  No    Level of Consciousness:  Awake    Pain Assessment:  Adequate analgesia    Anesthesia Complications:  No apparent anesthetic complications    SUMMARY      Vital signs stable  OK to discharge from Stage I post anesthesia care.   Care transferred from Anesthesiology department on discharge from perioperative area

## 2022-11-10 NOTE — ANESTHESIA PREPROCEDURE EVALUATION
Anesthesia Evaluation     Patient summary reviewed and Nursing notes reviewed   history of anesthetic complications: PONV  NPO Solid Status: > 8 hours  NPO Liquid Status: > 8 hours           Airway   Mallampati: III  TM distance: >3 FB  Neck ROM: full  Dental    (+) poor dentition        Pulmonary - normal exam   (+) COPD, asthma,sleep apnea (Has not worn BiPAP in 2 years. ),   Cardiovascular - normal exam    (+) hypertension, hyperlipidemia,       Neuro/Psych  (+) headaches, numbness, psychiatric history ADHD, Depression and Anxiety,     GI/Hepatic/Renal/Endo    (+) morbid obesity, hiatal hernia, GERD,  renal disease, diabetes mellitus type 2, thyroid problem hyperthyroidism    Musculoskeletal     Abdominal    Substance History      OB/GYN          Other   arthritis,                    Anesthesia Plan    ASA 4 - emergent     general   (Patient understands anesthesia not responsible for dental damage.)  intravenous induction     Anesthetic plan, all risks, benefits, and alternatives have been provided, discussed and informed consent has been obtained with: patient.  Use of blood products discussed with patient .   Plan discussed with CRNA.       4 = No assist / stand by assistance

## 2022-11-10 NOTE — ANESTHESIA PRE PROCEDURE
Department of Anesthesiology  Preprocedure Note       Name:  Timmie Cranker   Age:  41 y.o.  :  1975                                          MRN:  [id number]         Date:  11/10/2022      Surgeon: Taty Corrigan):  Maggie Reed MD    Procedure: Procedure(s):  COLONOSCOPY    Medications prior to admission:   Prior to Admission medications    Medication Sig Start Date End Date Taking?  Authorizing Provider   atorvastatin (LIPITOR) 40 MG tablet Take 40 mg by mouth daily   Yes Historical Provider, MD   rizatriptan (MAXALT) 10 MG tablet Take 10 mg by mouth once as needed for Migraine May repeat in 2 hours if needed   Yes Historical Provider, MD   Calcium-Vitamin D-Vitamin K (CALCIUM SOFT CHEWS PO) Take by mouth in the morning, at noon, and at bedtime   Yes Historical Provider, MD   polyethylene glycol (MIRALAX) 17 GM/SCOOP POWD powder MIX 17 GRAMS IN 4 TO 8OZ LIQUID AND DRINK BY MOUTH IN THE MORNING 22   Magdelene K May, DO   propranolol (INDERAL LA) 80 MG extended release capsule TAKE 1 CAPSULE BY MOUTH IN THE MORNING AND AT BEDTIME 10/25/22 11/24/22  Magdelene K May, DO   SYMBICORT 160-4.5 MCG/ACT AERO INHALE 2 PUFFS BY MOUTH TWO TIMES A DAY THEN 1-2 PUFFS EVERY 4 HOURS MAX 12 PUFFS PER DAY FOR ASTHMA EXACERBATIONS 10/24/22   Magdelene K May, DO   Rimegepant Sulfate (NURTEC) 75 MG TBDP Take 75 mg by mouth every other day for 16 doses 10/17/22 11/17/22  Magdelene K May, DO   nystatin (MYCOSTATIN) 533516 UNIT/GM powder APPLY TOPICALLY 3 TIMES DAILY AS NEEDED TO MANAGE RASH AND MOISTURE (GENERIC FOR MYCOSTATIN) 10/10/22   Magdelene K May, DO   diclofenac sodium (VOLTAREN) 1 % GEL APPLY 4 GRAMS TOPICALLY FOUR TIMES DAILY AS NEEDED FOR PAIN 10/10/22   Magdelene K May, DO   Blood Glucose Monitoring Suppl (TRUE METRIX METER) NGHIA Dispense meter to test blood sugar 4 times daily for Type 2 DM 10/6/22   Magdelene K May, DO   blood glucose test strips (TRUE METRIX BLOOD GLUCOSE TEST) strip 1 each by In Vitro route 4 times daily As needed. 10/6/22   Wiley LICO May, DO   Lancets MISC 1 each by Does not apply route 2 times daily 10/6/22   Wiley BARFIELD May, DO   lisinopril (PRINIVIL;ZESTRIL) 5 MG tablet TAKE 1 TABLET BY MOUTH EVERY DAY 10/3/22   Wiley BARFIELD May, DO   busPIRone (BUSPAR) 15 MG tablet TAKE 1 TABLET BY MOUTH IN THE MORNING, NOON, AND BEFORE BEDTIME 9/29/22   Wiley LICO May, DO   oxybutynin (DITROPAN-XL) 5 MG extended release tablet TAKE 1 TABLET BY MOUTH EVERY DAY 9/29/22   Jamesmadi BARFIELD May, DO   azelastine (OPTIVAR) 0.05 % ophthalmic solution INSTILL 1 DROP IN [facility] EYE TWO TIMES A DAY (GENERIC FOR optivar) 9/29/22   Wiley LICO May, DO   amLODIPine (NORVASC) 5 MG tablet TAKE 1 TABLET BY MOUTH EVERY DAY  Patient taking differently: at bedtime 9/29/22   Wiley LICO May, DO   fluticasone (FLONASE) 50 MCG/ACT nasal spray INHALE 2 SPRAYS BY NASAL ROUTE TWO TIMES A DAY 9/29/22   ethan BARFIELD May, DO   PROAIR  (90 Base) MCG/ACT inhaler INHALE 2 PUFFS BY MOUTH EVERY SIX HOURS AS NEEDED FOR WHEEZING 9/29/22   Wiley LICO May, DO   montelukast (SINGULAIR) 10 MG tablet TAKE 1 TABLET BY MOUTH EVERY DAY AT NIGHT 9/2/22   Jamesmadi BARFIELD May, DO   vitamin D (ERGOCALCIFEROL) 1.25 MG (94574 UT) CAPS capsule Take 1 capsule by mouth once a week 8/29/22   Wiley LICO May, DO   SPIRIVA HANDIHALER 18 MCG inhalation capsule INHALE CONTENTS 1 CAPSULE BY MOUTH EVERY DAY 8/22/22   Wiley BARFIELD May, DO   levothyroxine (SYNTHROID) 300 MCG tablet Take 1 tablet by mouth Daily 8/22/22 11/20/22  Gabe Matamoros MD   liothyronine (CYTOMEL) 25 MCG tablet Take 1 tablet by mouth 2 times daily for 180 doses 8/22/22 11/20/22  Gabe Matamoros MD   fluconazole (DIFLUCAN) 150 MG tablet as needed Takes when on antibiotics 8/11/22   Historical Provider, MD   DULoxetine (CYMBALTA) 30 MG extended release capsule Take 1 capsule by mouth in the morning and 1 capsule before bedtime.  8/11/22 11/9/22  Wiley BARFIELD May, DO   EPINEPHrine (EPIPEN 2-SHELBIE) 0.3 MG/0.3ML SOAJ injection Inject 0.3 mLs into the muscle as needed (In the case of anaphylaxis) Use as directed for allergic reaction 8/11/22   Jamesene K May, DO   cetirizine (ZYRTEC) 10 MG tablet Take 1 tablet by mouth in the morning. 8/8/22   Magdelene K May, DO   omeprazole (PRILOSEC) 40 MG delayed release capsule Take 1 capsule by mouth in the morning and at bedtime 3/11/22 3/6/23  Jamesene K May, DO   ARIPiprazole (ABILIFY) 10 MG tablet Take 1 tablet by mouth daily 12/7/21   delene K May, DO   azelastine (ASTELIN) 0.1 % nasal spray 1 spray by Nasal route 2 times daily Use in each nostril as directed 11/8/21   Wiley K May, DO   Continuous Blood Gluc  (DEXCOM G6 ) NGHIA 2 times daily 3/26/21   Historical Provider, MD   acetaminophen (TYLENOL) 500 MG tablet Take 500 mg by mouth every 6 hours as needed for Pain    Historical Provider, MD   Nutritional Supplements (GLUCOSE MANAGEMENT) TABS Take by mouth daily    Historical Provider, MD   Adhesive Tape (PAPER TAPE 1\"[id number]) TAPE Apply to affected areas.  4/25/16   Gin Ortez MD   Multiple Vitamins-Minerals (THERAPEUTIC MULTIVITAMIN-MINERALS) tablet Take 1 tablet by mouth daily    Historical Provider, MD   artificial tears (ARTIFICIAL TEARS) OINT as needed    Historical Provider, MD   dextromethorphan-guaiFENesin (MUCINEX DM)  MG per SR tablet Take 1 tablet by mouth every 12 hours as needed     Historical Provider, MD       Current medications:    Current Facility-Administered Medications   Medication Dose Route Frequency Provider Last Rate Last Admin    lactated ringers infusion   IntraVENous Once Eveline Perez MD        lidocaine PF 1 % injection 0.1 mL  0.1 mL IntraDERmal Once PRN Eveline Perez MD        lactated ringers infusion   IntraVENous Continuous Danielle Mattson MD        sodium chloride flush 0.9 % injection 5-40 mL  5-40 mL IntraVENous 2 times per day Danielle Mattson MD        sodium chloride flush 0.9 % injection 5-40 mL  5-40 mL IntraVENous PRN Brittany Cardona MD        0.9 % sodium chloride infusion   IntraVENous PRN Brittany Cardona MD           Allergies: Allergies   Allergen Reactions    Bee Venom     Black Pepper-Turmeric      Allergic to all peppers    Dust Mite Extract      Other reaction(s): Unknown    Ibuprofen     Nsaids      Pt only has one kidney       Problem List:    Patient Active Problem List   Diagnosis Code    RLS (restless legs syndrome) G25.81    Asthma J45.909    Psychophysiological insomnia F51.04    Hypersomnia G47.10    Osteoarthrosis M19.90    HTN (hypertension) I10    Hypothyroidism E03.9    Pure hypercholesterolemia E78.00    Lymphedema of both lower extremities I89.0    Abdominal wall abscess L02. 211    Class 3 severe obesity in adult ([facility]) E66.01    Allergic rhinitis J30.9    B12 deficiency E53.8    Depression F32. 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    PCOS (polycystic ovarian syndrome) E28.2    Poor balance R26.89    Abnormal nuclear stress test R94.39    COPD (chronic obstructive pulmonary disease) ([facility]) J44.9    Degenerative disc disease, cervical M50.30    Dyslipidemia E78.5    Kidney, aplastic Q60.2    Multiple joint pain M25.50    Neuropathy G62.9    Paradoxical vocal cord motion J38.3    S/P laparoscopic sleeve gastrectomy Z98.84    Type 2 diabetes mellitus ([facility]) E11.9    Vitamin D deficiency E55.9    Vitiligo L80    Anxiety F41.9    Migraine with status migrainosus, not intractable G43.901       Past Medical History:        Diagnosis Date    Acquired hypothyroidism 05/26/2016    ADHD (attention deficit hyperactivity disorder) Not sure    Allergic rhinitis 08/02/2013    Anemia     Ankle swelling 10/05/2017    Anxiety     Arthritis     Asthma     Bipolar disorder [facility] [address]     Cellulitis 12/19/2017    Chest pain due to myocardial ischemia 05/01/2018    Chronic bronchitis (HCC)     Chronic kidney disease     only one functioning kidney    COPD (chronic obstructive pulmonary disease) (HCC)     Depression     GERD (gastroesophageal reflux disease)     Head injury 10/19/2014    Headache     Hyperlipidemia     Hypertension     Hyperthyroidism with Hashimoto disease 03/13/2020    IgA deficiency ([facility] [address]     Incarcerated hernia 06/12/2021    Formatting of this note might be different from the original. Added automatically from request for surgery [id number]    Infection of deep incisional surgical site after procedure     Intertrigo     Irritable bowel syndrome     Left foot pain 05/26/2016    Lymphedema of right lower extremity 05/26/2016    Murmur     Obesity     Optic nerve atrophy 09/19/2021    PARVIZ 02/25/2016    Osteoarthritis of left knee 05/26/2016    Panniculitis 03/13/2020    PCOS (polycystic ovarian syndrome) 10/05/2017    Psychophysiological insomnia 02/25/2016    Restless legs syndrome     S/P laparoscopic sleeve gastrectomy 08/09/2019    S/P repair of ventral hernia 06/14/2021    Sleep apnea     bipap broke-needs new sleep study    Type 2 diabetes mellitus ([facility] [address] 06/13/2021    Urinary incontinence     Vitiligo        Past Surgical History:        Procedure Laterality Date    APPENDECTOMY  07/01/2002    CHOLECYSTECTOMY  01/01/2001    COLONOSCOPY      DENTAL SURGERY      teeth extracted    HAND SURGERY Left     CTR    HERNIA REPAIR  6/21/2021    had to be redone    RECTAL SURGERY N/A 07/31/2021    ABDOMINAL WALL INCISION AND DRAINAGE performed by Constantino Parson MD at [facility]  [address] [place] [address] Road  6/12/2021    UPPER GASTROINTESTINAL ENDOSCOPY         Social History:    Social History     Tobacco Use    Smoking status: Never     Passive exposure:  Yes    Smokeless tobacco: Never   Substance Use Topics    Alcohol use: Not Currently                                Counseling given: Not Answered      Vital Signs (Current):   Vitals:    11/04/22 1035 11/10/22 0840   BP:  (!) 123/42   Pulse:  66   Resp:  16   Temp:  97.5 °F (36.4 °C)   TempSrc:  Temporal   SpO2:  96%   Weight: (!) 340 lb (154.2 kg) (!) 340 lb (154.2 kg)   Height: 5' (1.524 m) 5' (1.524 m)                                              BP Readings from Last 3 Encounters:   11/10/22 (!) 123/42   08/22/22 124/78   08/11/22 128/72       NPO Status: Time of last liquid consumption: 0800                        Time of last solid consumption: 0000                        Date of last liquid consumption: 11/10/22                        Date of last solid food consumption: 11/08/22    BMI:   Wt Readings from Last 3 Encounters:   11/10/22 (!) 340 lb (154.2 kg)   10/20/22 (!) 352 lb (159.7 kg)   08/26/22 (!) 329 lb (149.2 kg)     Body mass index is 66.4 kg/m².     CBC:   Lab Results   Component Value Date/Time    WBC 9.7 11/08/2021 03:46 PM    RBC 5.34 11/08/2021 03:46 PM    HGB 14.3 11/08/2021 03:46 PM    HCT 43.4 11/08/2021 03:46 PM    MCV 81.3 11/08/2021 03:46 PM    RDW 18.8 11/08/2021 03:46 PM     11/08/2021 03:46 PM       CMP:   Lab Results   Component Value Date/Time     11/08/2021 03:46 PM    K 4.1 11/08/2021 03:46 PM    K 3.6 08/02/2021 06:04 AM     11/08/2021 03:46 PM    CO2 19 11/08/2021 03:46 PM    BUN 11 11/08/2021 03:46 PM    CREATININE 1.0 11/08/2021 03:46 PM    GFRAA >60 11/08/2021 03:46 PM    AGRATIO 1.5 11/08/2021 03:46 PM    LABGLOM >60 11/08/2021 03:46 PM    GLUCOSE 110 11/08/2021 03:46 PM    PROT 8.0 11/08/2021 03:46 PM    CALCIUM 9.7 11/08/2021 03:46 PM    BILITOT <0.2 11/08/2021 03:46 PM    ALKPHOS 83 11/08/2021 03:46 PM    AST 18 11/08/2021 03:46 PM    ALT 16 11/08/2021 03:46 PM       POC Tests:   Recent Labs     11/10/22  0846   POCGLU 94       Coags:   Lab Results   Component Value Date/Time    PROTIME 13.0 07/31/2021 08:01 AM INR 1.14 07/31/2021 08:01 AM       HCG (If Applicable):   Lab Results   Component Value Date    PREGTESTUR Negative 07/31/2021        ABGs: No results found for: PHART, PO2ART, IGE9UQZ, TIA3DZB, BEART, F6MEGAQQ     Type & Screen (If Applicable):  No results found for: LABABO, LABRH    Drug/Infectious Status (If Applicable):  No results found for: HIV, HEPCAB    COVID-19 Screening (If Applicable):   Lab Results   Component Value Date/Time    COVID19 Not Detected 02/11/2022 03:13 PM           Anesthesia Evaluation  Patient summary reviewed no history of anesthetic complications:   Airway: Mallampati: II  TM distance: >3 FB   Neck ROM: full  Mouth opening: > = 3 FB   Dental: normal exam         Pulmonary:normal exam  breath sounds clear to auscultation  (+) COPD:  sleep apnea:  asthma:                            Cardiovascular:  Exercise tolerance: good (>4 METS),   (+) hypertension:, valvular problems/murmurs:,     (-) CAD,  angina and  PATEL      Rhythm: regular  Rate: normal                    Neuro/Psych:   (+) headaches:, psychiatric history:   (-) seizures and TIA           GI/Hepatic/Renal:   (+) GERD:, morbid obesity     (-) liver disease and no renal disease       Endo/Other:    (+) Diabetes, hypothyroidism, hyperthyroidism::., .                 Abdominal:             Vascular: negative vascular ROS. Other Findings:           Anesthesia Plan      MAC     ASA 4     (I discussed with the patient the risks and benefits of PIV, anesthesia, IV Narcotics, PACU. All questions were answered the patient agrees with the plan and wishes to proceed)  Induction: intravenous.                             Demetrio Charles MD   11/10/2022

## 2022-11-10 NOTE — DISCHARGE INSTRUCTIONS
PATIENT INSTRUCTIONS  POST-SEDATION    Poorn[facility] Cruz          IMMEDIATELY FOLLOWING PROCEDURE:    Do not drive or operate machinery for the first twenty four hours after surgery. Do not make any important decisions for twenty four hours after surgery or while taking narcotic pain medications or sedatives. You should NOT BE LEFT UNATTENDED OR ALONE. A responsible adult should be with you for the rest of the day of your procedure and also during the night for your protection and safety. You may experience some light headedness. Rest at home with activity as tolerated. You may not need to go to bed, but it is important to rest for the next 24 hours. You should not engage in athletic sports such as basketball, volleyball, jogging, skating, or activities requiring refined motor skills for 24 hours. If you develop intractable nausea and vomiting or a severe headache please notify your doctor immediately. You are not expected to have any fever, but if you feel warm, take your temperature. If you have a fever 101 degrees or higher, call your doctor. If you have had an Endoscopy:   *Eat lightly for your first meal and gradually resume your normal / prescribed diet. *If you have had a colonoscopy, do not expect a normal bowel movement for approximately three days due to the cleansing of the large intestine prior to colonoscopy. ONCE YOU ARE HOME, IF YOU SHOULD HAVE:  Difficulty in breathing, persistent nausea or vomiting, bleeding you feel is excessive, or pain that is unusual, increased abdominal bloating, or any swelling, fever / chills, call your physician. If you cannot contact your physician, but feel that your signs and symptoms need a physician's attention, go to the Emergency Department. FOLLOW-UP:    Please follow up with @[email or web address]@ as scheduled or needed. Dr. Ligia Velásquez MD will call you with the biopsy findings. Call Dr. Ligia Velásquez MD if there are any GI concerns. [phone]    Repeat Colonoscopy ***    You may be receiving a follow up phone call to ask about your care. Colonoscopy: What to Expect at [facility]  After you have a colonoscopy, you will stay at the clinic for 1 to 2 hours until the medicines wear off. Then you can go home. But you will need to arrange for a ride. Your doctor will tell you when you can eat and do your other usual activities. Your doctor will talk to you about when you will need your next colonoscopy. Your doctor can help you decide how often you need to be checked. This will depend on the results of your test and your risk for colorectal cancer. After the test, you may be bloated or have gas pains. You may need to pass gas. If a biopsy was done or a polyp was removed, you may have streaks of blood in your stool (feces) for a few days. Problems such as heavy rectal bleeding may not occur until several weeks after the test. This isn't common. But it can happen after polyps are removed. This care sheet gives you a general idea about how long it will take for you to recover. But each person recovers at a different pace. Follow the steps below to get better as quickly as possible. How can you care for yourself at home? Activity    Rest when you feel tired. You can do your normal activities when it feels okay to do so. Diet    Follow your doctor's directions for eating. Unless your doctor has told you not to, drink plenty of fluids. This helps to replace the fluids that were lost during the colon prep. Do not drink alcohol. Medicines    Your doctor will tell you if and when you can restart your medicines. He or she will also give you instructions about taking any new medicines. If you take blood thinners, such as warfarin (Coumadin), clopidogrel (Plavix), or aspirin, be sure to talk to your doctor. He or she will tell you if and when to start taking those medicines again.  Make sure that you understand exactly what your doctor wants you to do. If polyps were removed or a biopsy was done during the test, your doctor may tell you not to take aspirin or other anti-inflammatory medicines for a few days. These include ibuprofen (Advil, Motrin) and naproxen (Aleve). Other instructions    For your safety, do not drive or operate machinery until the medicine wears off and you can think clearly. Your doctor may tell you not to drive or operate machinery until the day after your test.     Do not sign legal documents or make major decisions until the medicine wears off and you can think clearly. The anesthesia can make it hard for you to fully understand what you are agreeing to. Follow-up care is a key part of your treatment and safety. Be sure to make and go to all appointments, and call your doctor if you are having problems. It's also a good idea to know your test results and keep a list of the medicines you take. When should you call for help? Call 911 anytime you think you may need emergency care. For example, call if:    You passed out (lost consciousness). You pass maroon or bloody stools. You have trouble breathing. Call your doctor now or seek immediate medical care if:    You have pain that does not get better after you take pain medicine. You are sick to your stomach or cannot drink fluids. You have new or worse belly pain. You have blood in your stools. You have a fever. You cannot pass stools or gas. Watch closely for changes in your health, and be sure to contact your doctor if you have any problems. Where can you learn more? Go to https://[employer]Viepage. org and sign in to your [employer] account. Enter E264 in the SPARQCode box to learn more about \"Colonoscopy: What to Expect at Home. \"     If you do not have an account, please click on the \"Sign Up Now\" link. Current as of:  May 12, 2017  Content Version: 11.6  © 9272-4015 [employer], Incorporated. Care instructions adapted under license by [facility] ([facility]). If you have questions about a medical condition or this instruction, always ask your healthcare professional. [address] any warranty or liability for your use of this information.

## 2022-11-10 NOTE — PROGRESS NOTES
Sitting up on cart. States her nause has subsided - drinking water. Has passes some flatus. Discharge instructions reviewed with patient/responsible adult and understanding verbalized. Discharge instructions signed and copies given. Patient discharged home with belongings.

## 2022-11-10 NOTE — PROCEDURES
Colonoscopy Procedure  Note          Patient: Bard Camara  : 1975  CSN:     Procedure: Colonoscopy with polypectomy (cold snare)    Date:  11/10/2022    Surgeon:  Iván Ibrahim MD, MD    Referring Provider:  Dr. Conte    Preoperative Diagnosis:   Screening for colon cancer    Postoperative Diagnosis:  Colon polyps removed    Anesthesia:  Propofol    EBL: <5 mL    Indications: This is a 41y.o. year old female who presents today with screening for colon cancer. Procedure: An informed consent was obtained from the patient after explanation of indications, benefits, possible risks and complications of the procedure. The patient was then taken to the endoscopy suite, placed in the left lateral decubitus position, and the above IV anesthesia was administered. With the patient in left lateral decubitus position and a digital rectal examination was performed, no abnormalities noted. The scope was advanced all the way to the cecum, the mucosa appeared normal.  Appendix was identified. The terminal ileum was briefly intubated, the mucosa appeared normal.  The mucosa in the ascending, transverse, descending, sigmoid and rectum appeared normal.  In the descending colon a 4mm polyp was cold snared and a 2mm polyp was removed with a biopsy forceps. On retroflexion no abnormalities were noted. The scope was straightened, the colon was decompressed and the scope was withdrawn from the patient. The patient tolerated the procedure well and was taken to the PACU in good condition. Impression:  Colon polyps removed    Recommendations:  Await pathology.     Iván Ibrahim MD, MD   [address]   11/10/2022

## 2022-11-10 NOTE — H&P
Gastroenterology Note             Pre-operative History and Physical    Patient: Arden Kurtz  : 1975  CSN:     History Obtained From:  patient and/or guardian. HISTORY OF PRESENT ILLNESS:    The patient is a 41 y.o. female  here for colonoscopy.      Past Medical History:    Past Medical History:   Diagnosis Date    Acquired hypothyroidism 2016    ADHD (attention deficit hyperactivity disorder) Not sure    Allergic rhinitis 2013    Anemia     Ankle swelling 10/05/2017    Anxiety     Arthritis     Asthma     Bipolar disorder ([address]     Cellulitis 2017    Chest pain due to myocardial ischemia 2018    Chronic bronchitis (HCC)     Chronic kidney disease     only one functioning kidney    COPD (chronic obstructive pulmonary disease) (HCC)     Depression     GERD (gastroesophageal reflux disease)     Head injury 10/19/2014    Headache     Hyperlipidemia     Hypertension     Hyperthyroidism with Hashimoto disease 2020    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 Added automatically from request for surgery [id number]    Infection of deep incisional surgical site after procedure     Intertrigo     Irritable bowel syndrome     Left foot pain 2016    Lymphedema of right lower extremity 2016    Murmur     Obesity     Optic nerve atrophy 2021    PARVIZ 2016    Osteoarthritis of left knee 2016    Panniculitis 2020    PCOS (polycystic ovarian syndrome) 10/05/2017    Psychophysiological insomnia 2016    Restless legs syndrome     S/P laparoscopic sleeve gastrectomy 2019    S/P repair of ventral hernia 2021    Sleep apnea     bipap broke-needs new sleep study    Type 2 diabetes mellitus ([facility] [address] 2021    Urinary incontinence     Vitiligo      Past Surgical History:    Past Surgical History:   Procedure Laterality Date    APPENDECTOMY  2002 CHOLECYSTECTOMY  01/01/2001    COLONOSCOPY      DENTAL SURGERY      teeth extracted    HAND SURGERY Left     CTR    HERNIA REPAIR  6/21/2021    had to be redone    RECTAL SURGERY N/A 07/31/2021    ABDOMINAL WALL INCISION AND DRAINAGE performed by Austyn Puri MD at [address] [facility] GASTROPLASTY  [id number]  6/12/2021    UPPER GASTROINTESTINAL ENDOSCOPY       Medications Prior to Admission:   No current facility-administered medications on file prior to encounter. Current Outpatient Medications on File Prior to Encounter   Medication Sig Dispense Refill    atorvastatin (LIPITOR) 40 MG tablet Take 40 mg by mouth daily      rizatriptan (MAXALT) 10 MG tablet Take 10 mg by mouth once as needed for Migraine May repeat in 2 hours if needed      Calcium-Vitamin D-Vitamin K (CALCIUM SOFT CHEWS PO) Take by mouth in the morning, at noon, and at bedtime      propranolol (INDERAL LA) 80 MG extended release capsule TAKE 1 CAPSULE BY MOUTH IN THE MORNING AND AT BEDTIME 60 capsule 0    SYMBICORT 160-4.5 MCG/ACT AERO INHALE 2 PUFFS BY MOUTH TWO TIMES A DAY THEN 1-2 PUFFS EVERY 4 HOURS MAX 12 PUFFS PER DAY FOR ASTHMA EXACERBATIONS 10.2 g 0    Rimegepant Sulfate (NURTEC) 75 MG TBDP Take 75 mg by mouth every other day for 16 doses 16 tablet 2    nystatin (MYCOSTATIN) 962824 UNIT/GM powder APPLY TOPICALLY 3 TIMES DAILY AS NEEDED TO MANAGE RASH AND MOISTURE (GENERIC FOR MYCOSTATIN) 60 g 0    diclofenac sodium (VOLTAREN) 1 % GEL APPLY 4 GRAMS TOPICALLY FOUR TIMES DAILY AS NEEDED FOR PAIN 100 g 0    Blood Glucose Monitoring Suppl (TRUE METRIX METER) [employer] Dispense meter to test blood sugar 4 times daily for Type 2 DM 1 each 0    blood glucose test strips (TRUE METRIX BLOOD GLUCOSE TEST) strip 1 each by In Vitro route 4 times daily As needed.  200 each 3    Lancets MISC 1 each by Does not apply route 2 times daily 300 each 1    lisinopril (PRINIVIL;ZESTRIL) 5 MG tablet TAKE 1 TABLET BY MOUTH EVERY DAY 90 tablet 0    busPIRone (BUSPAR) 15 MG tablet TAKE 1 TABLET BY MOUTH IN THE MORNING, NOON, AND BEFORE BEDTIME 90 tablet 0    oxybutynin (DITROPAN-XL) 5 MG extended release tablet TAKE 1 TABLET BY MOUTH EVERY DAY 30 tablet 0    azelastine (OPTIVAR) 0.05 % ophthalmic solution INSTILL 1 DROP IN EACH EYE TWO TIMES A DAY (GENERIC FOR optivar) 6 mL 0    amLODIPine (NORVASC) 5 MG tablet TAKE 1 TABLET BY MOUTH EVERY DAY (Patient taking differently: at bedtime) 30 tablet 0    fluticasone (FLONASE) 50 MCG/ACT nasal spray INHALE 2 SPRAYS BY NASAL ROUTE TWO TIMES A DAY 16 g 0    PROAIR  (90 Base) MCG/ACT inhaler INHALE 2 PUFFS BY MOUTH EVERY SIX HOURS AS NEEDED FOR WHEEZING 8.5 g 0    montelukast (SINGULAIR) 10 MG tablet TAKE 1 TABLET BY MOUTH EVERY DAY AT NIGHT 30 tablet 5    vitamin D (ERGOCALCIFEROL) 1.25 MG (55814 UT) CAPS capsule Take 1 capsule by mouth once a week 12 capsule 0    SPIRIVA HANDIHALER 18 MCG inhalation capsule INHALE CONTENTS 1 CAPSULE BY MOUTH EVERY DAY 90 capsule 0    levothyroxine (SYNTHROID) 300 MCG tablet Take 1 tablet by mouth Daily 90 tablet 3    liothyronine (CYTOMEL) 25 MCG tablet Take 1 tablet by mouth 2 times daily for 180 doses 180 tablet 3    fluconazole (DIFLUCAN) 150 MG tablet as needed Takes when on antibiotics      DULoxetine (CYMBALTA) 30 MG extended release capsule Take 1 capsule by mouth in the morning and 1 capsule before bedtime. 180 capsule 3    EPINEPHrine (EPIPEN 2-SHELBIE) 0.3 MG/0.3ML SOAJ injection Inject 0.3 mLs into the muscle as needed (In the case of anaphylaxis) Use as directed for allergic reaction 1 each 2    cetirizine (ZYRTEC) 10 MG tablet Take 1 tablet by mouth in the morning.  30 tablet 1    omeprazole (PRILOSEC) 40 MG delayed release capsule Take 1 capsule by mouth in the morning and at bedtime 180 capsule 3    ARIPiprazole (ABILIFY) 10 MG tablet Take 1 tablet by mouth daily 90 tablet 3    azelastine (ASTELIN) 0.1 % nasal spray 1 spray by Nasal route 2 times daily Use in each nostril as directed 60 mL 1    Continuous Blood Gluc  (DEXCOM G6 ) [employer] 2 times daily      acetaminophen (TYLENOL) 500 MG tablet Take 500 mg by mouth every 6 hours as needed for Pain      Nutritional Supplements (GLUCOSE MANAGEMENT) TABS Take by mouth daily      Adhesive Tape (PAPER TAPE 1\"[id number]) TAPE Apply to affected areas. 2 each 5    Multiple Vitamins-Minerals (THERAPEUTIC MULTIVITAMIN-MINERALS) tablet Take 1 tablet by mouth daily      artificial tears (ARTIFICIAL TEARS) OINT as needed      dextromethorphan-guaiFENesin (MUCINEX DM)  MG per SR tablet Take 1 tablet by mouth every 12 hours as needed           Allergies:  Bee venom, Black pepper-turmeric, Dust mite extract, Ibuprofen, and Nsaids      Social History:   Social History     Tobacco Use    Smoking status: Never     Passive exposure: Yes    Smokeless tobacco: Never   Substance Use Topics    Alcohol use: Not Currently     Family History:   Family History   Problem Relation Age of Onset    Hypertension Mother     Hypothyroidism Mother     Depression Mother     Diabetes Mother     Heart Disease Mother     High Blood Pressure Mother     High Cholesterol Mother     Kidney Disease Mother         Had kidney failure    Mental Illness Mother     Miscarriages / [place] Mother     Obesity Mother     Stroke Mother     Asthma Father     Diabetes Father     Emphysema Father     Hypertension Father     Depression Father     Early Death Father     Heart Attack Father     Heart Disease Father     High Blood Pressure Father     High Cholesterol Father     Obesity Father     Substance Abuse Father     Hypertension Sister         problems with pregnancy.     Asthma Sister     Depression Sister     Hearing Loss Sister     Heart Disease Sister     High Blood Pressure Sister     High Cholesterol Sister     Learning Disabilities Sister     Mental Illness Sister     Obesity Sister     Substance Abuse Sister     Diabetes Paternal Grandmother Heart Attack Paternal Grandmother     Stroke Paternal Grandmother     Cervical Cancer Paternal Aunt     Breast Cancer Paternal Aunt     Cancer Paternal Aunt     Depression Paternal Aunt     Mental Illness Paternal Aunt     Miscarriages / Stillbirths Paternal Aunt     Breast Cancer Maternal Aunt     Cancer Maternal Aunt     Heart Disease Maternal Aunt     High Blood Pressure Maternal Aunt     Immune Disorder Maternal Aunt     Obesity Maternal Aunt     Breast Cancer Maternal Aunt     Cancer Maternal Aunt     Heart Disease Maternal Aunt     High Blood Pressure Maternal Aunt     Immune Disorder Maternal Suzzanne Friends  had thyroid remove    Obesity Maternal Aunt     Ovarian Cancer Paternal Aunt     Heart Attack Maternal Grandfather     High Blood Pressure Maternal Grandfather     Obesity Maternal Grandfather     Stroke Maternal Grandfather     Heart Attack Maternal Grandmother     High Blood Pressure Maternal Grandmother     Obesity Maternal Grandmother     Stroke Maternal Grandmother     Vision Loss Maternal Grandmother     Hearing Loss Maternal Uncle     Heart Disease Maternal Uncle     High Blood Pressure Maternal Uncle     Obesity Maternal Uncle     Heart Disease Maternal Uncle     High Blood Pressure Maternal Uncle     Obesity Maternal Uncle     Heart Disease Maternal Uncle     High Blood Pressure Maternal Uncle     Obesity Maternal Uncle     Heart Disease Paternal Uncle     Obesity Paternal Uncle     Heart Disease Paternal Uncle     Obesity Paternal Uncle     Heart Disease Maternal Aunt     High Blood Pressure Maternal Aunt     Immune Disorder Maternal Aunt     Obesity Maternal Aunt     Immune Disorder Maternal Cousin         Ave Manners had thyroid removed    Immune Disorder Maternal Cousin         Hypothyroidism had thyroid removed       PHYSICAL EXAM:      BP (!) 123/42   Pulse 66   Temp 97.5 °F (36.4 °C) (Temporal)   Resp 16   Ht 5' (1.524 m)   Wt (!) 340 lb (154.2 kg)   LMP 05/03/2016   SpO2 96% BMI 66.40 kg/m²  I        Heart:   RRR, normal s1s2    Lungs:  CTA bilat,  Normal effort    Abdomen:   NT, ND      ASA Grade:  ASA 4 - Patient with severe systemic disease that is a constant threat to life    Mallampati Class: 2          ASSESSMENT AND PLAN:    1. Patient is a 41 y.o. female here for Colonoscopy with MAC.   2.  Procedure options, risks and benefits reviewed with patient. Patient expresses understanding.     Ashwin Simeon MD,   [facility]  11/10/2022

## 2022-11-14 ENCOUNTER — HOSPITAL ENCOUNTER (OUTPATIENT)
Age: 47
Discharge: HOME | End: 2022-11-14
Payer: COMMERCIAL

## 2022-11-14 ENCOUNTER — OFFICE VISIT (OUTPATIENT)
Dept: PRIMARY CARE CLINIC | Age: 47
End: 2022-11-14
Payer: COMMERCIAL

## 2022-11-14 VITALS
WEIGHT: 293 LBS | BODY MASS INDEX: 57.52 KG/M2 | HEART RATE: 61 BPM | TEMPERATURE: 97.1 F | OXYGEN SATURATION: 97 % | RESPIRATION RATE: 18 BRPM | DIASTOLIC BLOOD PRESSURE: 70 MMHG | SYSTOLIC BLOOD PRESSURE: 122 MMHG | HEIGHT: 60 IN

## 2022-11-14 DIAGNOSIS — I10 BENIGN ESSENTIAL HTN: ICD-10-CM

## 2022-11-14 DIAGNOSIS — M15.9 PRIMARY OSTEOARTHRITIS INVOLVING MULTIPLE JOINTS: ICD-10-CM

## 2022-11-14 DIAGNOSIS — G43.901 MIGRAINE WITH STATUS MIGRAINOSUS, NOT INTRACTABLE, UNSPECIFIED MIGRAINE TYPE: ICD-10-CM

## 2022-11-14 DIAGNOSIS — E78.2 MIXED HYPERLIPIDEMIA: ICD-10-CM

## 2022-11-14 DIAGNOSIS — E66.01 CLASS 3 SEVERE OBESITY DUE TO EXCESS CALORIES WITH SERIOUS COMORBIDITY AND BODY MASS INDEX (BMI) OF 60.0 TO 69.9 IN ADULT (HCC): ICD-10-CM

## 2022-11-14 DIAGNOSIS — Z78.9 NOT IMMUNE TO HEPATITIS B VIRUS: ICD-10-CM

## 2022-11-14 DIAGNOSIS — L29.9 ITCHING OF EAR: ICD-10-CM

## 2022-11-14 DIAGNOSIS — E03.8 HYPOTHYROIDISM DUE TO HASHIMOTO'S THYROIDITIS: ICD-10-CM

## 2022-11-14 DIAGNOSIS — J30.1 ALLERGIC RHINITIS DUE TO POLLEN, UNSPECIFIED SEASONALITY: Primary | ICD-10-CM

## 2022-11-14 DIAGNOSIS — R19.8 ALTERNATING CONSTIPATION AND DIARRHEA: ICD-10-CM

## 2022-11-14 DIAGNOSIS — E53.8 B12 DEFICIENCY: ICD-10-CM

## 2022-11-14 DIAGNOSIS — Z87.892 HX OF ANAPHYLAXIS: ICD-10-CM

## 2022-11-14 DIAGNOSIS — E06.3 HYPOTHYROIDISM DUE TO HASHIMOTO'S THYROIDITIS: ICD-10-CM

## 2022-11-14 DIAGNOSIS — J45.909 UNCOMPLICATED ASTHMA, UNSPECIFIED ASTHMA SEVERITY, UNSPECIFIED WHETHER PERSISTENT: ICD-10-CM

## 2022-11-14 DIAGNOSIS — I10 PRIMARY HYPERTENSION: ICD-10-CM

## 2022-11-14 DIAGNOSIS — F51.04 PSYCHOPHYSIOLOGICAL INSOMNIA: ICD-10-CM

## 2022-11-14 DIAGNOSIS — E11.40 TYPE 2 DIABETES MELLITUS WITH DIABETIC NEUROPATHY, UNSPECIFIED WHETHER LONG TERM INSULIN USE (HCC): ICD-10-CM

## 2022-11-14 DIAGNOSIS — N39.3 STRESS INCONTINENCE: ICD-10-CM

## 2022-11-14 DIAGNOSIS — M17.0 OSTEOARTHRITIS OF BOTH KNEES, UNSPECIFIED OSTEOARTHRITIS TYPE: ICD-10-CM

## 2022-11-14 PROBLEM — J44.89 ASTHMA-COPD OVERLAP SYNDROME (HCC): Status: ACTIVE | Noted: 2017-10-05

## 2022-11-14 PROBLEM — G43.111 INTRACTABLE MIGRAINE WITH AURA WITH STATUS MIGRAINOSUS: Status: ACTIVE | Noted: 2022-11-14

## 2022-11-14 PROBLEM — R94.39 ABNORMAL NUCLEAR STRESS TEST: Status: RESOLVED | Noted: 2018-05-01 | Resolved: 2022-11-14

## 2022-11-14 PROBLEM — E05.80 HYPERTHYROIDISM WITH HASHIMOTO DISEASE: Status: RESOLVED | Noted: 2020-03-13 | Resolved: 2022-11-14

## 2022-11-14 PROBLEM — L02.211 ABDOMINAL WALL ABSCESS: Status: RESOLVED | Noted: 2021-07-30 | Resolved: 2022-11-14

## 2022-11-14 PROBLEM — E78.5 DYSLIPIDEMIA: Status: RESOLVED | Noted: 2017-10-05 | Resolved: 2022-11-14

## 2022-11-14 PROBLEM — F33.1 MODERATE EPISODE OF RECURRENT MAJOR DEPRESSIVE DISORDER (HCC): Status: ACTIVE | Noted: 2022-11-14

## 2022-11-14 LAB
CHOLESTEROL, TOTAL: 196 MG/DL (ref 0–199)
HDLC SERPL-MCNC: 36 MG/DL (ref 40–60)
LDL CHOLESTEROL CALCULATED: 132 MG/DL
TRIGL SERPL-MCNC: 141 MG/DL (ref 0–150)
VITAMIN B-12: 403 PG/ML (ref 211–911)
VLDLC SERPL CALC-MCNC: 28 MG/DL

## 2022-11-14 PROCEDURE — 36415 COLL VENOUS BLD VENIPUNCTURE: CPT

## 2022-11-14 PROCEDURE — 2022F DILAT RTA XM EVC RTNOPTHY: CPT | Performed by: STUDENT IN AN ORGANIZED HEALTH CARE EDUCATION/TRAINING PROGRAM

## 2022-11-14 PROCEDURE — 80061 LIPID PANEL: CPT

## 2022-11-14 PROCEDURE — 3074F SYST BP LT 130 MM HG: CPT | Performed by: STUDENT IN AN ORGANIZED HEALTH CARE EDUCATION/TRAINING PROGRAM

## 2022-11-14 PROCEDURE — G8427 DOCREV CUR MEDS BY ELIG CLIN: HCPCS | Performed by: STUDENT IN AN ORGANIZED HEALTH CARE EDUCATION/TRAINING PROGRAM

## 2022-11-14 PROCEDURE — G0010 ADMIN HEPATITIS B VACCINE: HCPCS | Performed by: FAMILY MEDICINE

## 2022-11-14 PROCEDURE — G8417 CALC BMI ABV UP PARAM F/U: HCPCS | Performed by: STUDENT IN AN ORGANIZED HEALTH CARE EDUCATION/TRAINING PROGRAM

## 2022-11-14 PROCEDURE — 3078F DIAST BP <80 MM HG: CPT | Performed by: STUDENT IN AN ORGANIZED HEALTH CARE EDUCATION/TRAINING PROGRAM

## 2022-11-14 PROCEDURE — 3044F HG A1C LEVEL LT 7.0%: CPT | Performed by: STUDENT IN AN ORGANIZED HEALTH CARE EDUCATION/TRAINING PROGRAM

## 2022-11-14 PROCEDURE — 1036F TOBACCO NON-USER: CPT | Performed by: STUDENT IN AN ORGANIZED HEALTH CARE EDUCATION/TRAINING PROGRAM

## 2022-11-14 PROCEDURE — 99214 OFFICE O/P EST MOD 30 MIN: CPT | Performed by: STUDENT IN AN ORGANIZED HEALTH CARE EDUCATION/TRAINING PROGRAM

## 2022-11-14 PROCEDURE — G8482 FLU IMMUNIZE ORDER/ADMIN: HCPCS | Performed by: STUDENT IN AN ORGANIZED HEALTH CARE EDUCATION/TRAINING PROGRAM

## 2022-11-14 PROCEDURE — 82607 VITAMIN B-12: CPT

## 2022-11-14 PROCEDURE — 90746 HEPB VACCINE 3 DOSE ADULT IM: CPT | Performed by: FAMILY MEDICINE

## 2022-11-14 RX ORDER — POLYETHYLENE GLYCOL 3350, SODIUM SULFATE, SODIUM CHLORIDE, POTASSIUM CHLORIDE, ASCORBIC ACID, SODIUM ASCORBATE 140-9-5.2G
KIT ORAL
COMMUNITY
Start: 2022-11-05

## 2022-11-14 RX ORDER — ATORVASTATIN CALCIUM 40 MG/1
40 TABLET, FILM COATED ORAL DAILY
Qty: 90 TABLET | Refills: 1 | Status: SHIPPED | OUTPATIENT
Start: 2022-11-14 | End: 2022-11-15

## 2022-11-14 RX ORDER — EPINEPHRINE 0.3 MG/.3ML
0.3 INJECTION SUBCUTANEOUS PRN
Qty: 1 EACH | Refills: 2
Start: 2022-11-14

## 2022-11-14 RX ORDER — AMLODIPINE BESYLATE 5 MG/1
TABLET ORAL
Qty: 30 TABLET | Refills: 0 | Status: SHIPPED | OUTPATIENT
Start: 2022-11-14

## 2022-11-14 RX ORDER — OMEPRAZOLE 40 MG/1
40 CAPSULE, DELAYED RELEASE ORAL 2 TIMES DAILY
Qty: 180 CAPSULE | Refills: 3 | Status: SHIPPED | OUTPATIENT
Start: 2022-11-14 | End: 2023-11-09

## 2022-11-14 RX ORDER — NYSTATIN 100000 [USP'U]/G
POWDER TOPICAL
Qty: 60 G | Refills: 0 | Status: SHIPPED | OUTPATIENT
Start: 2022-11-14

## 2022-11-14 RX ORDER — BUSPIRONE HYDROCHLORIDE 15 MG/1
TABLET ORAL
Qty: 90 TABLET | Refills: 0 | Status: SHIPPED | OUTPATIENT
Start: 2022-11-14

## 2022-11-14 RX ORDER — CETIRIZINE HYDROCHLORIDE 10 MG/1
10 TABLET ORAL DAILY
Qty: 30 TABLET | Refills: 1 | Status: SHIPPED | OUTPATIENT
Start: 2022-11-14

## 2022-11-14 RX ORDER — TIOTROPIUM BROMIDE 18 UG/1
CAPSULE ORAL; RESPIRATORY (INHALATION)
Qty: 90 CAPSULE | Refills: 0 | Status: SHIPPED | OUTPATIENT
Start: 2022-11-14

## 2022-11-14 RX ORDER — LISINOPRIL 5 MG/1
TABLET ORAL
Qty: 90 TABLET | Refills: 3 | Status: SHIPPED | OUTPATIENT
Start: 2022-11-14

## 2022-11-14 RX ORDER — DULOXETIN HYDROCHLORIDE 30 MG/1
30 CAPSULE, DELAYED RELEASE ORAL 2 TIMES DAILY
Qty: 180 CAPSULE | Refills: 3 | Status: SHIPPED | OUTPATIENT
Start: 2022-11-14 | End: 2023-02-12

## 2022-11-14 RX ORDER — BUDESONIDE AND FORMOTEROL FUMARATE DIHYDRATE 160; 4.5 UG/1; UG/1
AEROSOL RESPIRATORY (INHALATION)
Qty: 10.2 G | Refills: 0 | Status: SHIPPED | OUTPATIENT
Start: 2022-11-14

## 2022-11-14 RX ORDER — ARIPIPRAZOLE 10 MG/1
10 TABLET ORAL DAILY
Qty: 90 TABLET | Refills: 3 | Status: SHIPPED | OUTPATIENT
Start: 2022-11-14

## 2022-11-14 RX ORDER — POLYETHYLENE GLYCOL 3350 17 G/17G
POWDER ORAL
Qty: 238 G | Refills: 0 | Status: SHIPPED | OUTPATIENT
Start: 2022-11-14

## 2022-11-14 RX ORDER — LANCETS 30 GAUGE
1 EACH MISCELLANEOUS 2 TIMES DAILY
Qty: 300 EACH | Refills: 1 | Status: SHIPPED | OUTPATIENT
Start: 2022-11-14

## 2022-11-14 RX ORDER — AZELASTINE 1 MG/ML
1 SPRAY, METERED NASAL 2 TIMES DAILY
Qty: 60 ML | Refills: 1 | Status: SHIPPED | OUTPATIENT
Start: 2022-11-14

## 2022-11-14 RX ORDER — PROPRANOLOL HYDROCHLORIDE 80 MG/1
80 CAPSULE, EXTENDED RELEASE ORAL 2 TIMES DAILY
Qty: 60 CAPSULE | Refills: 0 | Status: SHIPPED | OUTPATIENT
Start: 2022-11-14 | End: 2022-12-14

## 2022-11-14 RX ORDER — FLUTICASONE PROPIONATE 50 MCG
SPRAY, SUSPENSION (ML) NASAL
Qty: 16 G | Refills: 0 | Status: SHIPPED | OUTPATIENT
Start: 2022-11-14

## 2022-11-14 RX ORDER — ALBUTEROL SULFATE 90 UG/1
AEROSOL, METERED RESPIRATORY (INHALATION)
Qty: 8.5 G | Refills: 0 | Status: SHIPPED | OUTPATIENT
Start: 2022-11-14

## 2022-11-14 RX ORDER — OXYBUTYNIN CHLORIDE 5 MG/1
TABLET, EXTENDED RELEASE ORAL
Qty: 30 TABLET | Refills: 0 | Status: SHIPPED | OUTPATIENT
Start: 2022-11-14

## 2022-11-14 ASSESSMENT — ANXIETY QUESTIONNAIRES
GAD7 TOTAL SCORE: 11
6. BECOMING EASILY ANNOYED OR IRRITABLE: 1
5. BEING SO RESTLESS THAT IT IS HARD TO SIT STILL: 2
1. FEELING NERVOUS, ANXIOUS, OR ON EDGE: 2
7. FEELING AFRAID AS IF SOMETHING AWFUL MIGHT HAPPEN: 1
4. TROUBLE RELAXING: 2
3. WORRYING TOO MUCH ABOUT DIFFERENT THINGS: 2
2. NOT BEING ABLE TO STOP OR CONTROL WORRYING: 1
IF YOU CHECKED OFF ANY PROBLEMS ON THIS QUESTIONNAIRE, HOW DIFFICULT HAVE THESE PROBLEMS MADE IT FOR YOU TO DO YOUR WORK, TAKE CARE OF THINGS AT HOME, OR GET ALONG WITH OTHER PEOPLE: VERY DIFFICULT

## 2022-11-14 ASSESSMENT — PATIENT HEALTH QUESTIONNAIRE - PHQ9
10. IF YOU CHECKED OFF ANY PROBLEMS, HOW DIFFICULT HAVE THESE PROBLEMS MADE IT FOR YOU TO DO YOUR WORK, TAKE CARE OF THINGS AT HOME, OR GET ALONG WITH OTHER PEOPLE: 1
2. FEELING DOWN, DEPRESSED OR HOPELESS: 1
SUM OF ALL RESPONSES TO PHQ QUESTIONS 1-9: 13
3. TROUBLE FALLING OR STAYING ASLEEP: 2
9. THOUGHTS THAT YOU WOULD BE BETTER OFF DEAD, OR OF HURTING YOURSELF: 1
5. POOR APPETITE OR OVEREATING: 3
1. LITTLE INTEREST OR PLEASURE IN DOING THINGS: 2
SUM OF ALL RESPONSES TO PHQ9 QUESTIONS 1 & 2: 3
SUM OF ALL RESPONSES TO PHQ QUESTIONS 1-9: 14
6. FEELING BAD ABOUT YOURSELF - OR THAT YOU ARE A FAILURE OR HAVE LET YOURSELF OR YOUR FAMILY DOWN: 1
7. TROUBLE CONCENTRATING ON THINGS, SUCH AS READING THE NEWSPAPER OR WATCHING TELEVISION: 1
8. MOVING OR SPEAKING SO SLOWLY THAT OTHER PEOPLE COULD HAVE NOTICED. OR THE OPPOSITE, BEING SO FIGETY OR RESTLESS THAT YOU HAVE BEEN MOVING AROUND A LOT MORE THAN USUAL: 1
SUM OF ALL RESPONSES TO PHQ QUESTIONS 1-9: 14
4. FEELING TIRED OR HAVING LITTLE ENERGY: 2
SUM OF ALL RESPONSES TO PHQ QUESTIONS 1-9: 14

## 2022-11-14 ASSESSMENT — ENCOUNTER SYMPTOMS
SORE THROAT: 1
CONSTIPATION: 1
COUGH: 1
DIARRHEA: 1
RHINORRHEA: 1
EYE REDNESS: 1
VOMITING: 0
EYE ITCHING: 1

## 2022-11-14 ASSESSMENT — COLUMBIA-SUICIDE SEVERITY RATING SCALE - C-SSRS
6. HAVE YOU EVER DONE ANYTHING, STARTED TO DO ANYTHING, OR PREPARED TO DO ANYTHING TO END YOUR LIFE?: NO
2. HAVE YOU ACTUALLY HAD ANY THOUGHTS OF KILLING YOURSELF?: NO
1. WITHIN THE PAST MONTH, HAVE YOU WISHED YOU WERE DEAD OR WISHED YOU COULD GO TO SLEEP AND NOT WAKE UP?: NO

## 2022-11-14 NOTE — PROGRESS NOTES
[address],  [place], [facility] [address]        Phone: [phone]    The following is written by a medical student, please see below for resident/attending attestation and plan. Name:  Crystal Davenport  :    1975    Consultants:   Patient Care Team:  Karen Conte DO as PCP - General (Family Medicine)  Ольга George MD as PCP - [facility] Empaneled Provider    Chief Complaint:     Crystal Davenport is a 41 y.o. female  who presents today for an established patient care visit with Personalized Prevention Plan Services as noted below. HPI:     Crystal Davenport is a 41 y.o. female with a past medical history including migraines with aura, HTN, HLD, thyroid disease, vitamin D deficiency, obesity,  GERD, type II diabetes, neuropathy, COPD, and a gastric sleeve in 2018 who presents today for follow up. Ear pain  For the past month, she has had itchy and painful ears, sore throat, sneezing, rhinorrhea. Denies fever, sputum, or lymphadenopathy. She has tried debrox without relief. She's had similar ear problems in the past with changing weather that resolve with irrigation. She has a history of allergies for which she uses Cetirizine 10mg, azelastine eye drops for sand feeling in eyes, flonase and astelin, singulair 10mg. HLD  Currently taking Atorvastatin 40 mg daily, dose increased 3/11/2022. Patient tolerating medication(s) well without adverse effects. Lab Results   Component Value Date    CHOL 196 2022    TRIG 141 2022    HDL 36 (L) 2022    LDLCALC 132 (H) 2022    LABVLDL 28 2022       T2DM  Patient reports her diabetes has been overall controlled, but she has not been able to get a new dexcom due to trouble with the medical supply company.  She had a low blood sugar last week due to her colonoscopy prep. Otherwise, she has blood sugars around 60 about every 1-2 months. She saw the eye doctor in August.     Migraine  She currently is taking rizatriptan (Maxalt) 10 mg as needed (2-3x a week), Nurtec (rimegepant) 75mg which is currently once every other day (8 tablets per month) She is also taking propranolol 80 mg bid. She takes tylenol prn less than 1-2 times/week. Improving with nurtec. Taking naps relieves symptoms    Obesity and joint pain  She is going to PT twice weekly and working on her knees, spine, and back. It is going well. She got new ankle braces at the [facility] for prosthetic orthotics    Health maintenance  She had a colonoscopy on Thursday, 11/10, that showed tubular adenoma negative for dysplasia. Hyothyroid  She sees her endocrinologist next Wednesday for lab follow up with workup of celiac disease. They didn't change any medications at her last appointment. Levothyroxine 300 mcg and liothyronine 25 mcg. She had her TSH checked this week and it is abnormal. Will discuss with endocrinologist.   Lab Results   Component Value Date    TSH 2.70 08/11/2022    TSHREFLEX 88.05 (H) 11/09/2022       Insomnia  She had her Hep B vaccines in 2003, but her Hep B titers showed no immunity. No sleep study yet - the medical transport did not come to get her. Her sleep study was rescheduled for December 27th.        Patient Active Problem List   Diagnosis    RLS (restless legs syndrome)    Psychophysiological insomnia    Hypersomnia    Primary osteoarthritis involving multiple joints    Benign essential HTN    Hypothyroidism    Lymphedema of both lower extremities    Class 3 severe obesity in adult [facility])    Allergic rhinitis    B12 deficiency    GERD (gastroesophageal reflux disease)    IgA deficiency (HCC)    Intertrigo    Optic nerve atrophy    PCOS (polycystic ovarian syndrome)    Poor balance    Asthma-COPD overlap syndrome (HCC)    Degenerative disc disease, cervical    Kidney, aplastic    Multiple joint pain    Neuropathy    Paradoxical vocal cord motion    S/P laparoscopic sleeve gastrectomy    Type 2 diabetes mellitus (HCC)    Vitamin D deficiency    Vitiligo    Anxiety    Migraine with status migrainosus, not intractable    Alternating constipation and diarrhea    Mixed hyperlipidemia    Moderate episode of recurrent major depressive disorder ([address]         Past Medical History:    Past Medical History:   Diagnosis Date    Abdominal wall abscess 7/30/2021    Acquired hypothyroidism 05/26/2016    ADHD (attention deficit hyperactivity disorder) Not sure    Allergic rhinitis 08/02/2013    Anemia     Ankle swelling 10/05/2017    Anxiety     Arthritis     Asthma     Bipolar disorder ([address]     Cellulitis 12/19/2017    Chest pain due to myocardial ischemia 05/01/2018    Chronic bronchitis (HCC)     Chronic kidney disease     only one functioning kidney    COPD (chronic obstructive pulmonary disease) ([facility])     Depression     GERD (gastroesophageal reflux disease)     Head injury 10/19/2014    Headache     Hyperlipidemia     Hypertension     Hyperthyroidism with Hashimoto disease 03/13/2020    Hyperthyroidism with Hashimoto disease 3/13/2020    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 Added automatically from request for surgery [id number]    Infection of deep incisional surgical site after procedure     Intertrigo     Irritable bowel syndrome     Left foot pain 05/26/2016    Lymphedema of right lower extremity 05/26/2016    Murmur     Obesity     Optic nerve atrophy 09/19/2021    PARVIZ 02/25/2016    Osteoarthritis of left knee 05/26/2016    Panniculitis 03/13/2020    PCOS (polycystic ovarian syndrome) 10/05/2017    Psychophysiological insomnia 02/25/2016    Restless legs syndrome     S/P laparoscopic sleeve gastrectomy 08/09/2019    S/P repair of ventral hernia 06/14/2021    Sleep apnea     bipap broke-needs new sleep study    Type 2 diabetes mellitus ([facility] 06/13/2021    Urinary incontinence     Vitiligo        Past Surgical History:  Past Surgical History:   Procedure Laterality Date    APPENDECTOMY  07/01/2002    CHOLECYSTECTOMY  01/01/2001    COLONOSCOPY      COLONOSCOPY N/A 11/10/2022    COLONOSCOPY POLYPECTOMY SNARE/COLD BIOPSY performed by Ruma Darby MD at [address]      teeth extracted    HAND SURGERY Left     CTR    HERNIA REPAIR  6/21/2021    had to be redone    RECTAL SURGERY N/A 07/31/2021    ABDOMINAL WALL INCISION AND DRAINAGE performed by Tram Wallace MD at [address] [facility] GASTROPLASTY  [id number]  6/12/2021    UPPER GASTROINTESTINAL ENDOSCOPY         Home Meds:  Prior to Visit Medications    Medication Sig Taking?  Authorizing Provider   Blood Glucose Monitoring Suppl (TRUE METRIX METER) w/Device KIT USE AS DIRECTED TO TEST BLOOD SUGAR 4 TIMES DAILY Yes Historical Provider, MD   lisinopril (PRINIVIL;ZESTRIL) 5 MG tablet TAKE 1 TABLET BY MOUTH EVERY DAY Yes Wiley BARFEILD May, DO   DULoxetine (CYMBALTA) 30 MG extended release capsule Take 1 capsule by mouth 2 times daily Yes Wiley BARFIELD May, DO   propranolol (INDERAL LA) 80 MG extended release capsule Take 1 capsule by mouth in the morning and at bedtime Yes Wiley BARFIELD May, DO   atorvastatin (LIPITOR) 40 MG tablet Take 1 tablet by mouth daily Yes Wiley BARFIELD May, DO   polyethylene glycol (MIRALAX) 17 GM/SCOOP POWD powder MIX 17 GRAMS IN 4 TO 8OZ LIQUID AND DRINK BY MOUTH IN THE MORNING Yes Wiley BRAFIELD May, DO   Lancets MISC 1 each by Does not apply route 2 times daily Yes Wiley BARFIELD May, DO   ARIPiprazole (ABILIFY) 10 MG tablet Take 1 tablet by mouth daily Yes Wiley BARFIELD May, DO   cetirizine (ZYRTEC) 10 MG tablet Take 1 tablet by mouth daily Yes Wiley BARFIELD May, DO   diclofenac sodium (VOLTAREN) 1 % GEL APPLY 4 GRAMS TOPICALLY FOUR TIMES DAILY AS NEEDED FOR PAIN Yes Wiley BARFIELD May, DO   nystatin (MYCOSTATIN) 768587 UNIT/GM powder APPLY TOPICALLY 3 TIMES DAILY AS NEEDED TO MANAGE RASH AND MOISTURE (GENERIC FOR MYCOSTATIN) Yes Jamesene K May, DO   omeprazole (PRILOSEC) 40 MG delayed release capsule Take 1 capsule by mouth in the morning and at bedtime Yes delene K May, DO   azelastine (ASTELIN) 0.1 % nasal spray 1 spray by Nasal route 2 times daily Use in each nostril as directed Yes Jamesene K May, DO   oxybutynin (DITROPAN-XL) 5 MG extended release tablet TAKE 1 TABLET BY MOUTH EVERY DAY Yes Jamesene K May, DO   albuterol sulfate HFA (PROAIR HFA) 108 (90 Base) MCG/ACT inhaler INHALE 2 PUFFS BY MOUTH EVERY SIX HOURS AS NEEDED FOR WHEEZING Yes Jamesene K May, DO   busPIRone (BUSPAR) 15 MG tablet TAKE 1 TABLET BY MOUTH IN THE MORNING, NOON, AND BEFORE BEDTIME Yes Jamesene K May, DO   fluticasone (FLONASE) 50 MCG/ACT nasal spray INHALE 2 SPRAYS BY NASAL ROUTE TWO TIMES A DAY Yes delene K May, DO   amLODIPine (NORVASC) 5 MG tablet TAKE 1 TABLET BY MOUTH EVERY DAY Yes Jamesene K May, DO   tiotropium (SPIRIVA HANDIHALER) 18 MCG inhalation capsule INHALE CONTENTS 1 CAPSULE BY MOUTH EVERY DAY Yes Jamesene K May, DO   SYMBICORT 160-4.5 MCG/ACT AERO INHALE 2 PUFFS BY MOUTH TWO TIMES A DAY THEN 1-2 PUFFS EVERY 4 HOURS MAX 12 PUFFS PER DAY FOR ASTHMA EXACERBATIONS Yes Jamesene K May, DO   EPINEPHrine (EPIPEN 2-SHELBIE) 0.3 MG/0.3ML SOAJ injection Inject 0.3 mLs into the muscle as needed (In the case of anaphylaxis) Use as directed for allergic reaction Yes Jamesene K May, DO   rizatriptan (MAXALT) 10 MG tablet Take 10 mg by mouth once as needed for Migraine May repeat in 2 hours if needed Yes Historical Provider, MD   Calcium-Vitamin D-Vitamin K (CALCIUM SOFT CHEWS PO) Take by mouth in the morning, at noon, and at bedtime Yes Historical Provider, MD   Rimegepant Sulfate (NURTEC) 75 MG TBDP Take 75 mg by mouth every other day for 16 doses Yes Wiley BARFIELD May, DO   Blood Glucose Monitoring Suppl (TRUE METRIX METER) [place] Dispense meter to test blood sugar 4 times daily for Type 2 DM Yes Magdelene K May, DO   blood glucose test strips (TRUE METRIX BLOOD GLUCOSE TEST) strip 1 each by In Vitro route 4 times daily As needed. Yes Magdelene K May, DO   azelastine (OPTIVAR) 0.05 % ophthalmic solution INSTILL 1 DROP IN [facility] TWO TIMES A DAY (GENERIC FOR optivar) Yes Magdelene K May, DO   montelukast (SINGULAIR) 10 MG tablet TAKE 1 TABLET BY MOUTH EVERY DAY AT NIGHT Yes Magdelene K May, DO   vitamin D (ERGOCALCIFEROL) 1.25 MG (62752 UT) CAPS capsule Take 1 capsule by mouth once a week Yes Magdelene K May, DO   levothyroxine (SYNTHROID) 300 MCG tablet Take 1 tablet by mouth Daily Yes Nehal Villegas MD   liothyronine (CYTOMEL) 25 MCG tablet Take 1 tablet by mouth 2 times daily for 180 doses Yes Nehal Villegas MD   fluconazole (DIFLUCAN) 150 MG tablet as needed Takes when on antibiotics Yes Historical Provider, MD   Continuous Blood Gluc  ([address]) [address] 2 times daily Yes Historical Provider, MD   acetaminophen (TYLENOL) 500 MG tablet Take 500 mg by mouth every 6 hours as needed for Pain Yes Historical Provider, MD   Nutritional Supplements (GLUCOSE MANAGEMENT) TABS Take by mouth daily Yes Historical Provider, MD   Adhesive Tape (PAPER TAPE 1\"[id number]) TAPE Apply to affected areas.  Yes Delmar Galicia MD   Multiple Vitamins-Minerals (THERAPEUTIC MULTIVITAMIN-MINERALS) tablet Take 1 tablet by mouth daily Yes Historical Provider, MD   dextromethorphan-guaiFENesin (MUCINEX DM)  MG per SR tablet Take 1 tablet by mouth every 12 hours as needed  Yes Historical Provider, MD   PLENVU 140 g SOLR   Historical Provider, MD   artificial tears (ARTIFICIAL TEARS) Marc Cooper as needed  Patient not taking: Reported on 11/14/2022  Historical Provider, MD       Allergies:    Bee venom, Black pepper-turmeric, Dust mite extract, Ibuprofen, and Nsaids    Family History:       Problem Relation Age of Onset    Hypertension Mother Hypothyroidism Mother     Depression Mother     Diabetes Mother     Heart Disease Mother     High Blood Pressure Mother     High Cholesterol Mother     Kidney Disease Mother         Had kidney failure    Mental Illness Mother     Miscarriages / [place] Mother     Obesity Mother     Stroke Mother     Asthma Father     Diabetes Father     Emphysema Father     Hypertension Father     Depression Father     Early Death Father     Heart Attack Father     Heart Disease Father     High Blood Pressure Father     High Cholesterol Father     Obesity Father     Substance Abuse Father     Hypertension Sister         problems with pregnancy.     Asthma Sister     Depression Sister     Hearing Loss Sister     Heart Disease Sister     High Blood Pressure Sister     High Cholesterol Sister     Learning Disabilities Sister     Mental Illness Sister     Obesity Sister     Substance Abuse Sister     Diabetes Paternal Grandmother     Heart Attack Paternal Grandmother     Stroke Paternal Grandmother     Cervical Cancer Paternal Aunt     Breast Cancer Paternal Aunt     Cancer Paternal Aunt     Depression Paternal Aunt     Mental Illness Paternal Aunt     Miscarriages / Stillbirths Paternal Aunt     Breast Cancer Maternal Aunt     Cancer Maternal Aunt     Heart Disease Maternal Aunt     High Blood Pressure Maternal Aunt     Immune Disorder Maternal Aunt     Obesity Maternal Aunt     Breast Cancer Maternal Aunt     Cancer Maternal Aunt     Heart Disease Maternal Aunt     High Blood Pressure Maternal Aunt     Immune Disorder Maternal Adam Lan  had thyroid remove    Obesity Maternal Aunt     Ovarian Cancer Paternal Aunt     Heart Attack Maternal Grandfather     High Blood Pressure Maternal Grandfather     Obesity Maternal Grandfather     Stroke Maternal Grandfather     Heart Attack Maternal Grandmother     High Blood Pressure Maternal Grandmother     Obesity Maternal Grandmother     Stroke Maternal Grandmother     Vision Loss Maternal Grandmother     Hearing Loss Maternal Uncle     Heart Disease Maternal Uncle     High Blood Pressure Maternal Uncle     Obesity Maternal Uncle     Heart Disease Maternal Uncle     High Blood Pressure Maternal Uncle     Obesity Maternal Uncle     Heart Disease Maternal Uncle     High Blood Pressure Maternal Uncle     Obesity Maternal Uncle     Heart Disease Paternal Uncle     Obesity Paternal Uncle     Heart Disease Paternal Uncle     Obesity Paternal Uncle     Heart Disease Maternal Aunt     High Blood Pressure Maternal Aunt     Immune Disorder Maternal Aunt     Obesity Maternal Aunt     Immune Disorder Maternal Cousin         Chinmay Baez had thyroid removed    Immune Disorder Maternal Cousin         Hypothyroidism had thyroid removed         Health Maintenance Completed:  Health Maintenance   Topic Date Due    Diabetic foot exam  11/08/2022    COVID-19 Vaccine (1) 01/04/2024 (Originally 2/23/1976)    Hepatitis B vaccine (2 of 3 - Risk 3-dose series) 12/12/2022    Diabetic microalbuminuria test  03/11/2023    A1C test (Diabetic or Prediabetic)  08/22/2023    Depression Monitoring  10/20/2023    Lipids  11/14/2023    Diabetic retinal exam  10/19/2024    Colorectal Cancer Screen  11/10/2025    Cervical cancer screen  07/22/2027    DTaP/Tdap/Td vaccine (3 - Td or Tdap) 11/08/2031    Flu vaccine  Completed    Pneumococcal 0-64 years Vaccine  Completed    Hepatitis C screen  Completed    HIV screen  Completed    Hepatitis A vaccine  Aged Out    Hib vaccine  Aged Out    Meningococcal (ACWY) vaccine  Aged Out          Immunization History   Administered Date(s) Administered    Hepatitis B Adult (Engerix-B) 11/14/2022    Influenza, FLUCELVAX, (age 10 mo+), MDCK, MDV, 0.5mL 11/08/2021    Influenza, FLUCELVAX, (age 10 mo+), MDCK, PF, 0.5mL 12/30/2019, 10/21/2020, 10/20/2022    Influenza, MDCK, Preservative free 08/21/2017    Pneumococcal Polysaccharide ([id number]) 12/04/2017, 11/08/2021    Tdap (Boostrix, Adacel) 03/31/2016, 11/08/2021         Review of Systems:  Review of Systems   Constitutional:  Negative for fatigue and fever. HENT:  Positive for rhinorrhea, sneezing and sore throat. Eyes:  Positive for redness and itching. Respiratory:  Positive for cough. Gastrointestinal:  Positive for constipation and diarrhea. Negative for vomiting. Allergic/Immunologic: Positive for environmental allergies. Neurological:  Positive for headaches. Physical Exam:   Vitals:    11/14/22 1359   BP: 122/70   Site: Left Upper Arm   Position: Sitting   Cuff Size: Large Adult   Pulse: 61   Resp: 18   Temp: 97.1 °F (36.2 °C)   TempSrc: Temporal   SpO2: 97%   Weight: (!) 350 lb 6.4 oz (158.9 kg)   Height: 5' (1.524 m)     Body mass index is 68.43 kg/m². Wt Readings from Last 3 Encounters:   11/14/22 (!) 350 lb 6.4 oz (158.9 kg)   11/10/22 (!) 340 lb (154.2 kg)   10/20/22 (!) 352 lb (159.7 kg)       BP Readings from Last 3 Encounters:   11/14/22 122/70   11/10/22 113/73   08/22/22 124/78       Physical Exam  Constitutional:       Appearance: Normal appearance. She is obese. HENT:      Head: Normocephalic. Right Ear: Tympanic membrane and ear canal normal. There is no impacted cerumen. Left Ear: Tympanic membrane and ear canal normal. There is no impacted cerumen. Nose: No congestion or rhinorrhea. Mouth/Throat:      Pharynx: No oropharyngeal exudate or posterior oropharyngeal erythema. Eyes:      Conjunctiva/sclera: Conjunctivae normal.   Cardiovascular:      Rate and Rhythm: Normal rate and regular rhythm. Heart sounds: No murmur heard. No friction rub. No gallop. Pulmonary:      Effort: Pulmonary effort is normal.      Breath sounds: No wheezing, rhonchi or rales. Abdominal:      General: Bowel sounds are normal.      Tenderness: There is no abdominal tenderness. Musculoskeletal:      Cervical back: Neck supple. No tenderness.       Comments: Bilateral ankle orthotics present Lymphadenopathy:      Cervical: No cervical adenopathy. Neurological:      Mental Status: She is alert. Lab Review:   Admission on 11/10/2022, Discharged on 11/10/2022   Component Date Value    POC Glucose 11/10/2022 94     Performed on 11/10/2022 [facility] Outpatient Visit on 11/09/2022   Component Date Value    TSH 11/09/2022 88.05 (A)     T4 Free 11/09/2022 1.0     T3, Free 11/09/2022 1.9 (A)     Thyroid Peroxidase (TPO)* 11/09/2022 127 (A)     Anti-Thyroglob Abs 11/09/2022 67     IgA 11/09/2022 <10.0 (A)     DGP IGG AB 11/09/2022 4.1    Abstract on 10/26/2022   Component Date Value    Diabetic Retinopathy 10/19/2022 Negative           Assessment/Plan:  Almaz July was seen today for follow-up.     Diagnoses and all orders for this visit:    Debra Mcclelland is a 41year old female with PMH of HTN, HLD, T2DM, migraine, hypothyroidism, B12 deficiency, asthma, allergic rhinitis, and depression who presents today for follow up    Bilateral ear pain  - likely secondary to allergic rhinitis  - currently on maximum allergy meds including cetirizine 10mg, singulair 10mg, flonase, and astelin  - recommend environmental modifications including HEPA filter, humidifier, avoiding exposure to tobacco smoke  - RTC if symptoms worsen or fail to improve in 4-6 weeks    Diarrhea/constipation  - follows with GI  - work up for celiac disease negative  - colonoscopy 11/10/22 with tubular adenoma, benign path  - recommend diet and lifestyle modifications including high fiber diet, avoiding processed foods, regular exercise    Hypothyroid  - continue levothyroxine 300mcg and cytomel 25mcg daily    T2DM  - not currently on medication  - encouraged diet and lifestyle modifications    HTN  - controlled, /70 today  - continue norvasc 5mg, propanolol 80mg    Hyperlipidemia  - uncontrolled  - continue atorvastatin 40mg daily  - repeat lipid panel today  - if still not at goal, may switch to rosuvastatin    Migraine  -not at goal  She currently is taking rizatriptan (Maxalt) 10 mg as needed, Nurtec (rimegepant) 75mg which is currently once every other day  She is also taking propranolol 80 mg bid. She takes tylenol prn less than 1-2 times/week.  -continue following up with ophthalmologist to r/o other possible differential diagnoses     Obesity  -not at goal  -recommended exercise with braces  -will not start medications as patient is past difficulties with GLP-1 agonists and concern for history of hypoglycemia. -defer to endocrinology whether medication of GLP-1 agonist would be appropriate or other medications for weight loss. Vitamin B12 deficiency  - repeat today      Health maintenance  - Hep B vaccine given today       Health Maintenance Due:  Health Maintenance Due   Topic Date Due    Diabetic foot exam  11/08/2022        RTC:  Return in about 3 months (around 2/14/2023). RESIDENT/ATTENDING ATTESTATION:    After medical student evaluation and exam, I independently gathered patients history, independently did a physical, and agree with A/P as written in medical student's note (other than clarified below). Please see below for additional information documented by the resident/attending including the A/P. Assessment/Plan:  Bard Camara 41 y.o. female has RLS (restless legs syndrome); Psychophysiological insomnia; Hypersomnia; Primary osteoarthritis involving multiple joints; Benign essential HTN; Hypothyroidism; Lymphedema of both lower extremities; Class 3 severe obesity in [facility]); Allergic rhinitis; B12 deficiency; GERD (gastroesophageal reflux disease); IgA deficiency ([address] Intertrigo; Optic nerve atrophy; PCOS (polycystic ovarian syndrome); Poor balance; Asthma-COPD overlap syndrome ([address] Degenerative disc disease, cervical; Kidney, aplastic; Multiple joint pain; Neuropathy; Paradoxical vocal cord motion; S/P laparoscopic sleeve gastrectomy; Type 2 diabetes mellitus ([address]  Vitamin D deficiency; Vitiligo; Anxiety; Migraine with status migrainosus, not intractable; Alternating constipation and diarrhea; Mixed hyperlipidemia; and Moderate episode of recurrent major depressive disorder (HCC) on their problem list.   Problem List          Circulatory    Benign essential HTN      Well-controlled, continue current medications   Lab Results   Component Value Date    LABMICR <1.20 03/11/2022   - microalbumin/ cr annually  - f/u 3 mo           Relevant Medications    lisinopril (PRINIVIL;ZESTRIL) 5 MG tablet    amLODIPine (NORVASC) 5 MG tablet       Endocrine    Hypothyroidism      Uncontrolled, continue current medications and managed by endocrinologist   - Anticipate dose adjustment at endo appointment tomorrow  - continue f/u endo         Relevant Medications    levothyroxine (SYNTHROID) 300 MCG tablet    liothyronine (CYTOMEL) 25 MCG tablet    Type 2 diabetes mellitus ([address]      Unclear control, recommend discussing with endocrinologist   - Not on any medications but continues to have episodes of hypoglycemia   Hemoglobin A1C   Date Value Ref Range Status   08/22/2022 5.0 % Final   - f/u endo              Nervous and Auditory    Migraine with status migrainosus, not intractable    Relevant Medications    acetaminophen (TYLENOL) 500 MG tablet    Rimegepant Sulfate (NURTEC) 75 MG TBDP    rizatriptan (MAXALT) 10 MG tablet    DULoxetine (CYMBALTA) 30 MG extended release capsule    propranolol (INDERAL LA) 80 MG extended release capsule       Musculoskeletal and Integument    Primary osteoarthritis involving multiple joints      Borderline controlled, continue current medications   - Continue PT  - Hesitant to perform cortisone injections due to labile blood sugar  - f/u 3 mo         Relevant Medications    acetaminophen (TYLENOL) 500 MG tablet    diclofenac sodium (VOLTAREN) 1 % GEL       Respiratory    Bilateral inner ear itch w/ hx allergic rhinitis - Primary  Ear sensation likely due to allergies.  Allergies are borderline controlled. - no further medication changes as patient is on multiple  - f/u 3 mo to reassess      Relevant Medications    montelukast (SINGULAIR) 10 MG tablet    azelastine (OPTIVAR) 0.05 % ophthalmic solution    cetirizine (ZYRTEC) 10 MG tablet    azelastine (ASTELIN) 0.1 % nasal spray    fluticasone (FLONASE) 50 MCG/ACT nasal spray       Other    Alternating constipation and diarrhea      Unclear control, continue current medications   - Continue to follow with GI and endocrinology         Mixed hyperlipidemia      Well-controlled, continue current medications   - f/u 3 mo         Relevant Medications    atorvastatin (LIPITOR) 40 MG tablet    Other Relevant Orders    Lipid Panel (Completed)    Psychophysiological insomnia     Unclear control, sleep study scheduled but not yet complete  - f/u 3 mo to review sleep study and recommendations          Class 3 severe obesity in adult [facility])      Uncontrolled, continue current medications and lifestyle modifications recommended   - Patient has lost over 100 lbs following gastric sleeve  - Metabolic syndrome vs autoimmune component may be playing a role  - f/u 3 mo         B12 deficiency      Unclear control, pending further work up   - f/u 3 mo to review lab         Relevant Orders    Vitamin B12 (Completed)           Not immune to hepatitis B virus  - Hep B, ENGERIX-B, (age 21 yrs+), IM, 1mL, 3-dose  - next dose in 1 month. 0,1,6 plus 1 yr booster for maximum immune response    EMR Dragon/transcription disclaimer:  Much of this encounter note is electronic transcription/translation of spoken language to printed texts. The electronic translation of spoken language may be erroneous, or at times, nonsensical words or phrases may be inadvertently transcribed.   Although I have reviewed the note for such errors, some may still exist.

## 2022-11-15 ENCOUNTER — HOSPITAL ENCOUNTER (OUTPATIENT)
Dept: PHYSICAL THERAPY | Age: 47
Setting detail: THERAPIES SERIES
Discharge: HOME | End: 2022-11-15

## 2022-11-15 DIAGNOSIS — E78.2 MIXED HYPERLIPIDEMIA: Primary | ICD-10-CM

## 2022-11-15 RX ORDER — ROSUVASTATIN CALCIUM 40 MG/1
40 TABLET, COATED ORAL DAILY
Qty: 90 TABLET | Refills: 1 | Status: SHIPPED | OUTPATIENT
Start: 2022-11-15

## 2022-11-15 NOTE — ASSESSMENT & PLAN NOTE
Unclear control, sleep study scheduled but not yet complete  - f/u 3 mo to review sleep study and recommendations

## 2022-11-15 NOTE — ASSESSMENT & PLAN NOTE
Uncontrolled, continue current medications and managed by endocrinologist   - Anticipate dose adjustment at endo appointment tomorrow  - continue f/u endo

## 2022-11-15 NOTE — ASSESSMENT & PLAN NOTE
Well-controlled, continue current medications   Lab Results   Component Value Date    LABMICR <1.20 03/11/2022   - microalbumin/ cr annually  - f/u 3 mo

## 2022-11-15 NOTE — ASSESSMENT & PLAN NOTE
Unclear control, recommend discussing with endocrinologist   - Not on any medications but continues to have episodes of hypoglycemia   Hemoglobin A1C   Date Value Ref Range Status   08/22/2022 5.0 % Final   - f/u endo

## 2022-11-15 NOTE — ASSESSMENT & PLAN NOTE
Borderline controlled, continue current medications   - Continue PT  - Hesitant to perform cortisone injections due to labile blood sugar  - f/u 3 mo

## 2022-11-15 NOTE — FLOWSHEET NOTE
[address] [facility], [address] [place] [address], [address] [place] [address] [address]  Phone: [phone]   Fax:     [phone]    Physical Therapy  Cancellation/No-show Note  Patient Name:  Nandini Vaca  :  1975   Date:  11/15/2022    Cancelled visits to date: 4  No-shows to date: 0    For today's appointment patient:  [x]  Cancelled  []  Rescheduled appointment  []  No-show     Reason given by patient:  []  Patient ill  []  Conflicting appointment  []  No transportation    []  Conflict with work  []  No reason given  [x]  Other:     Comments:  too tired    Phone call information:   [x]  Phone call made today to patient at am/pm at the number provided:      []  Patient answered, conversation as follows:    []  Patient did not answer, message left as follows:  [x]  Phone call not needed - pt contacted us to cancel and provided reason for cancellation.      Electronically signed by:  Harsh Lopez, PT, PT

## 2022-11-15 NOTE — ASSESSMENT & PLAN NOTE
Uncontrolled, continue current medications and lifestyle modifications recommended   - Patient has lost over 100 lbs following gastric sleeve  - Metabolic syndrome vs autoimmune component may be playing a role  - f/u 3 mo <-- Click to add NO significant Past Surgical History

## 2022-11-17 ENCOUNTER — HOSPITAL ENCOUNTER (OUTPATIENT)
Dept: PHYSICAL THERAPY | Age: 47
Setting detail: THERAPIES SERIES
Discharge: HOME | End: 2022-11-17

## 2022-11-17 NOTE — PROGRESS NOTES
[facility], [address] [place] [address], [address] [place] [address] [address]  Phone: [phone]   Fax:     [phone]    Physical Therapy  Cancellation/No-show Note  Patient Name:  Rashawn Hughes  :  1975   Date:  2022    Cancelled visits to date: 4  No-shows to date: 0    For today's appointment patient:  [x]  Cancelled  [x]  Rescheduled appointment  []  No-show     Reason given by patient:  [x]  Patient ill  []  Conflicting appointment  []  No transportation    []  Conflict with work  []  No reason given  []  Other:     Comments:      Phone call information:   []  Phone call made today to patient at am/pm at the number provided:      []  Patient answered, conversation as follows:    []  Patient did not answer, message left as follows:  [x]  Phone call not needed - pt contacted us to cancel and provided reason for cancellation.      Electronically signed by:  Shasta Villafana, MONA, MHI, ATC

## 2022-11-19 LAB — MISCELLANEOUS LAB TEST ORDER: NORMAL

## 2022-11-21 RX ORDER — ERGOCALCIFEROL 1.25 MG/1
CAPSULE ORAL
Qty: 12 CAPSULE | Refills: 0 | Status: SHIPPED | OUTPATIENT
Start: 2022-11-21

## 2022-12-02 NOTE — CONSULTS
Department of General Surgery Consult    PATIENT NAME: Tony Gill   YOB: 1975    ADMISSION DATE: 7/29/2021  3:37 PM      TODAY'S DATE: 7/30/2021    Reason for Consult:  Abdominal wall fluid collection    Chief Complaint: pain    Requesting Physician:  Ann Guardado    HISTORY OF PRESENT ILLNESS:              The patient is a 39 y.o. female who presents with pain and swelling of abdomen. Underwent emergent hernia operation without mesh at a hospital in [place] and then reports being transferred to [facility] a week later where she underwent open hernia repair with mesh. This was done in over a month ago. Has since moved to [place] and hasn't been able to follow up in [place]. Reports progressive pain and hardness in the area. Noticed some intermittent drainage from umbilicus the past week. No fevers or chills. No nausea. .    Past Medical History:        Diagnosis Date    Anemia     Arthritis     Asthma     Chronic bronchitis (HCC)     Chronic kidney disease     COPD (chronic obstructive pulmonary disease) ([address]     Depression     Hypertension     Pneumonia     Sleep apnea     Thyroid disease     Vitiligo        Past Surgical History:        Procedure Laterality Date    APPENDECTOMY  july 2002    CHOLECYSTECTOMY  2001    HAND SURGERY Left     CTR    MOUTH SURGERY      5 teeth removed       Current Medications:   Current Facility-Administered Medications: cefepime (MAXIPIME) 2000 mg IVPB minibag, 2,000 mg, Intravenous, Q12H  Prior to Admission medications    Medication Sig Start Date End Date Taking?  Authorizing Provider   Fluticasone furoate-vilanterol (BREO ELLIPTA) 200-25 MCG/INH AEPB inhaler Inhale into the lungs daily   Yes Historical Provider, MD   levothyroxine (SYNTHROID) 200 MCG tablet Take 200 mcg by mouth Daily   Yes Historical Provider, MD   famotidine (PEPCID) 40 MG tablet Take 40 mg by mouth 2 times daily   Yes Historical Provider, MD   ferrous sulfate (FE TABS 325) 325 (65 Fe) MG EC tablet Take 65 mg by mouth 3 times daily (with meals)   Yes Historical Provider, MD   liothyronine (CYTOMEL) 25 MCG tablet Take 25 mcg by mouth daily   Yes Historical Provider, MD   DULoxetine (CYMBALTA) 30 MG extended release capsule Take 30 mg by mouth daily   Yes Historical Provider, MD   Silicone-Vitamin E (REXASIL PATCH & VITAMIN E LIQ EX) Apply topically   Yes Historical Provider, MD   fluticasone-salmeterol (ADVAIR) 100-50 MCG/DOSE diskus inhaler Inhale 1 puff into the lungs every 12 hours   Yes Historical Provider, MD   acetaminophen (TYLENOL) 500 MG tablet Take 500 mg by mouth every 6 hours as needed for Pain   Yes Historical Provider, MD   Diclofen-raNITIdine-Capsaicin -0.025 MG-MG-% THPK by Combination route   Yes Historical Provider, MD   Azelastine HCl 137 MCG/SPRAY SOLN by Nasal route   Yes Historical Provider, MD   calcium-vitamin D (OSCAL-500) 500-200 MG-UNIT per tablet Take 1 tablet by mouth daily   Yes Historical Provider, MD   Nutritional Supplements (GLUCOSE MANAGEMENT) TABS Take by mouth   Yes Historical Provider, MD   azelastine (OPTIVAR) 0.05 % ophthalmic solution 1 drop 2 times daily   Yes Historical Provider, MD   oxybutynin (DITROPAN-XL) 5 MG extended release tablet Take 5 mg by mouth daily   Yes Historical Provider, MD   rizatriptan (MAXALT) 10 MG tablet Take 10 mg by mouth once as needed for Migraine May repeat in 2 hours if needed   Yes Historical Provider, MD   sodium hyaluronate (EUFLEXXA) 20 MG/2ML SOSY injection Inject 20 mg into the articular space once a week   Yes Historical Provider, MD   vitamin D (ERGOCALCIFEROL) 04124 UNITS CAPS capsule Take 1 capsule by mouth once a week 8/11/16  Yes Celine Elizondo MD   ARIPiprazole (ABILIFY) 5 MG tablet Take 1 tablet by mouth daily 8/11/16  Yes Celine Elizondo MD   busPIRone (BUSPAR) 10 MG tablet Take 1 tablet by mouth 3 times daily  Patient taking differently: Take 15 mg by mouth 3 times daily  8/11/16  Yes SHLOMO Jahaira Meier MD   amLODIPine (NORVASC) 5 MG tablet Take 1 tablet by mouth daily 8/11/16  Yes Lily Don MD   furosemide (LASIX) 40 MG tablet Take 1 tablet by mouth daily 8/11/16  Yes Lily Don MD   potassium chloride (KLOR-CON) 20 MEQ packet Take 20 mEq by mouth daily 8/11/16  Yes Lily Don MD   atorvastatin (LIPITOR) 10 MG tablet Take 1 tablet by mouth daily 8/11/16  Yes Lily Don MD   calcium carbonate (CALCIUM 600) 600 MG TABS tablet Take 1 tablet by mouth daily 8/11/16  Yes Lily Don MD   vitamin B-12 (CYANOCOBALAMIN) 1000 MCG tablet Take 1 tablet by mouth daily 8/11/16  Yes Lily Don MD   Adhesive Tape (PAPER TAPE 1\"[id number]) TAPE Apply to affected areas. 4/25/16  Yes Betty Jiménez MD   Wound Dressings (ADAPTIC NON-ADHERING DRESSING) PADS Apply to affected areas of skin. 4/25/16  Yes Betty Jiménez MD   neomycin-bacitracin-polymyxin (NEOSPORIN) 5-400-5000 ointment Apply topically 4 times daily Apply topically 4 times daily.    Yes Historical Provider, MD   Nystatin POWD by Does not apply route   Yes Historical Provider, MD   Multiple Vitamins-Minerals (THERAPEUTIC MULTIVITAMIN-MINERALS) tablet Take 1 tablet by mouth daily   Yes Historical Provider, MD   Polyethylene Glycol 3350 (MIRALAX PO) Take by mouth 2 times daily   Yes Historical Provider, MD   artificial tears (ARTIFICIAL TEARS) OINT as needed   Yes Historical Provider, MD   glucose monitoring kit (FREESTYLE) monitoring kit 1 kit by Does not apply route daily as needed 6/18/15  Yes Cely Cain MD   FREESTYLE LANCETS MISC 1 each by Does not apply route daily 6/18/15  Yes Cely Cain MD   fluticasone [facility] ALLERGY RELIEF) 50 MCG/ACT nasal spray 2 sprays by Nasal route 2 times daily    Yes Historical Provider, MD   albuterol (PROVENTIL HFA;VENTOLIN HFA) 108 (90 BASE) MCG/ACT inhaler Inhale 2 puffs into the lungs every 6 hours as needed for Wheezing   Yes Historical Provider, MD   EPINEPHrine 0.1 MG/ML injection Inject 0.3 mg into the muscle as needed   Yes Historical Provider, MD   dextromethorphan-guaiFENesin (MUCINEX DM)  MG per SR tablet Take 1 tablet by mouth every 12 hours as needed. Yes Historical Provider, MD   montelukast (SINGULAIR) 10 MG tablet Take 10 mg by mouth nightly. Yes Historical Provider, MD   levothyroxine (SYNTHROID) 125 MCG tablet Take 2 tablets by mouth Daily 8/11/16    Seven Welch MD   sertraline (ZOLOFT) 100 MG tablet Take 1 tablet by mouth 2 times daily 8/11/16    Seven Welch MD   escitalopram (LEXAPRO) 10 MG tablet Take 1 tablet by mouth daily 8/11/16    Seven Welch MD   escitalopram (LEXAPRO) 20 MG tablet Take 1 tablet by mouth daily 8/11/16    Seven Welch MD   gabapentin (NEURONTIN) 600 MG tablet Take 1 tablet by mouth daily 8/11/16    Seven Welch MD   lisinopril (PRINIVIL;ZESTRIL) 20 MG tablet Take 1 tablet by mouth daily 8/11/16    Seven Welch MD   hydrochlorothiazide (HYDRODIURIL) 25 MG tablet Take 1 tablet by mouth daily 8/11/16    Seven Welch MD   Emollient (EUCERIN) lotion Apply topically as needed.  8/11/16   C Seven Welch MD   omeprazole (PRILOSEC) 20 MG capsule Take 1 capsule by mouth 2 times daily 8/11/16   C Seven Welch MD   vitamin E 400 UNIT capsule Take 1 capsule by mouth 2 times daily 8/11/16    Seven Welch MD   [facility] FIBER THERAPY 500 MG TABS Take 500 mg by mouth 2 times daily 8/11/16   C Seven Welch MD   glucosamine-chondroitin 500-400 MG tablet Take 1 tablet by mouth 3 times daily 8/11/16   C Seven Welch MD   rOPINIRole (REQUIP) 0.5 MG tablet Take 2 tablets by mouth nightly 8/11/16   AWA Swain - CNP   Petrolatum (PETROLEUM JELLY) OINT Apply to affected areas bid. 8/11/16   Yeni Payton MD   amitriptyline (ELAVIL) 75 MG tablet  4/21/16   Historical Provider, MD   nystatin-triamcinolone [place] December) 440083-5.3 UNIT/GM-% ointment  5/4/16   Historical Provider, MD   Sertraline HCl (ZOLOFT PO) Take 200 mg by mouth Indications: at hs Historical Provider, MD   amitriptyline (ELAVIL) 10 MG tablet Take 75 mg by mouth nightly    Historical Provider, MD   promethazine (PHENERGAN) 25 MG tablet Take 25 mg by mouth every 6 hours as needed for Nausea    Historical Provider, MD   cetirizine (ZYRTEC) 10 MG tablet Take 10 mg by mouth daily. Historical Provider, MD        Allergies:  Bee venom, Ibuprofen, and Nsaids    Social History:   TOBACCO: no  ETOH:  no    Family History:        Problem Relation Age of Onset    Hypertension Mother     Asthma Father     Diabetes Father     Emphysema Father     Hypertension Father     Diabetes Paternal Aunt     Diabetes Paternal Grandmother     Other Sister         problems with pregnancy. REVIEW OF SYSTEMS:  CONSTITUTIONAL:  negative  HEENT:  negative  RESPIRATORY:  negative  CARDIOVASCULAR:  negative  GASTROINTESTINAL:  negative except for abdominal pain  GENITOURINARY:  negative  HEMATOLOGIC/LYMPHATIC:  negative  NEUROLOGICAL:  Negative  * All other ROS reviewed and negative. PHYSICAL EXAM:  VITALS:  /66   Pulse 102   Temp 98.9 °F (37.2 °C) (Oral)   Resp 18   Ht 5' (1.524 m)   Wt (!) 350 lb (158.8 kg)   SpO2 100%   BMI 68.35 kg/m²   24HR INTAKE/OUTPUT:    No intake/output data recorded. No intake/output data recorded.       CONSTITUTIONAL:  alert, no apparent distress and morbidly obese  EYES:  PERRL, sclera clear  ENT:  Normocephalic,atraumatic, without obvious abnormality  NECK:  supple, symmetrical, trachea midline  LUNGS: Resp effort easy and unlabored, no crackles or wheezing  CARDIOVASCULAR:  NO JVD, tachycardic  ABDOMEN:  Midline scar, minimal erythema at umbilicus with some serosanguinous drainage on dressing, normal bowel sounds, soft, non-distended,  mild tender  MUSCULOSKELETAL: No clubbing or cyanosis, 0+ pitting edema lower extremities  NEUROLOGIC:  Mental Status Exam:  Level of Alertness:   awake  PSYCHIATRIC:   person, place, time  SKIN:  As above    DATA:    CBC: Recent Labs     07/29/21  1612   WBC 7.7   HGB 11.9*   HCT 36.4        BMP:    Recent Labs     07/29/21  1612   *   K 3.3*   CL 99   CO2 22   BUN 4*   CREATININE 0.8   GLUCOSE 84     Hepatic:   Recent Labs     07/29/21  1612   AST 10*   ALT 8*   BILITOT 0.5   ALKPHOS 60     Mag:      Recent Labs     07/29/21  1612   MG 2.00      Phos:   No results for input(s): PHOS in the last 72 hours. INR: No results for input(s): INR in the last 72 hours. Radiology Review: Images personally reviewed by me. CT - abscess vs seroma in abdominal wall      IMPRESSION/RECOMMENDATIONS:    38 yo with abdominal wall vs seroma/old hematoma abdominal wall  1. Would prefer perc drain to manage drainage over I*D and subsequent wound care. 2.  Though no leukocytosis or significant erythema, with some drainage from incision and fever would continue with abx. 3.  NPO at MN for possible procedure tomorrow. 4.  Discussed with patient that if perc drain does not work then I*D would be needed.     Electronically signed by Aye Love, [address] [place] [address]  [id number] normal...

## 2022-12-05 ENCOUNTER — TELEPHONE (OUTPATIENT)
Dept: PRIMARY CARE CLINIC | Age: 47
End: 2022-12-05

## 2022-12-05 DIAGNOSIS — U07.1 COVID: Primary | ICD-10-CM

## 2022-12-05 NOTE — TELEPHONE ENCOUNTER
Tested positive for covid today with a home test.    Symptoms started last night: Cough, congestion, sore throat      Pt would like Paxloivd. Pt would like something to help with her cough & congestion. Pt does have a nebulizer to use if needed.       [address], [address] [email or web address]   [address], [address] [address]   Phone:  [phone]  Fax:  [phone]

## 2022-12-07 ENCOUNTER — TELEPHONE (OUTPATIENT)
Dept: PULMONOLOGY | Age: 47
End: 2022-12-07

## 2022-12-07 NOTE — TELEPHONE ENCOUNTER
Patient calling to let Dr. Alma Gibbons know she tested positive for Covid on 12.5. 22. Primary provider called in paxlovid for her. Her sleep study is scheduled for 12.27.22. She wants to know if Dr. Alma Gibbons wants her to use a nebulizer and/or inhaler. Please call patient and advise.

## 2022-12-09 ENCOUNTER — TELEMEDICINE (OUTPATIENT)
Dept: ENDOCRINOLOGY | Age: 47
End: 2022-12-09
Payer: COMMERCIAL

## 2022-12-09 DIAGNOSIS — E16.2 HYPOGLYCEMIA: ICD-10-CM

## 2022-12-09 DIAGNOSIS — E03.9 ACQUIRED HYPOTHYROIDISM: Primary | ICD-10-CM

## 2022-12-09 DIAGNOSIS — E11.9 CONTROLLED TYPE 2 DIABETES MELLITUS WITHOUT COMPLICATION, WITHOUT LONG-TERM CURRENT USE OF INSULIN (HCC): ICD-10-CM

## 2022-12-09 PROCEDURE — 99214 OFFICE O/P EST MOD 30 MIN: CPT | Performed by: INTERNAL MEDICINE

## 2022-12-09 PROCEDURE — 2022F DILAT RTA XM EVC RTNOPTHY: CPT | Performed by: INTERNAL MEDICINE

## 2022-12-09 PROCEDURE — 3044F HG A1C LEVEL LT 7.0%: CPT | Performed by: INTERNAL MEDICINE

## 2022-12-09 PROCEDURE — G8427 DOCREV CUR MEDS BY ELIG CLIN: HCPCS | Performed by: INTERNAL MEDICINE

## 2022-12-09 RX ORDER — RIMEGEPANT SULFATE 75 MG/75MG
TABLET, ORALLY DISINTEGRATING ORAL
COMMUNITY
Start: 2022-11-22

## 2022-12-09 RX ORDER — BLOOD-GLUCOSE TRANSMITTER
EACH MISCELLANEOUS
Qty: 1 EACH | Refills: 2 | Status: SHIPPED | OUTPATIENT
Start: 2022-12-09

## 2022-12-09 RX ORDER — LEVOTHYROXINE SODIUM 100 UG/ML
SOLUTION ORAL
Qty: 90 ML | Refills: 3 | Status: SHIPPED | OUTPATIENT
Start: 2022-12-09

## 2022-12-09 RX ORDER — BLOOD-GLUCOSE SENSOR
EACH MISCELLANEOUS
Qty: 9 EACH | Refills: 2 | Status: SHIPPED | OUTPATIENT
Start: 2022-12-09

## 2022-12-09 NOTE — PROGRESS NOTES
Subjective:        Patient was evaluated through a synchronous (real-time) audio-video  encounter. The patient (or guardian if applicable) is aware that this is a billable service, which includes applicable co-pays. This Virtual Visit was  conducted with patient's (and/or legal guardian's) consent. The visit was conducted pursuant to the emergency declaration under the [facility] authority and the [place] Act. Patient identification was verified,  and a caregiver was present when appropriate. The patient was located in a state where the provider was licensed to provide care. Patient: home  Provider : Home    41 y/o WF who is here for thyroid evaluation. Interim:  She has Covid-19  Not taking levothyroxine for a week, states nausea  She has tirosint 200mcg    She has moved from [address], seeing endocrinologist there    She has hypothyroidism for years, Dx at age 27    Was diagnosed with Hashimoto's    She is on levothyroxine 300mcg  and liothyronine 25mcg BID    States was on tirosint liquid in the past, absorbing it well. When she was at [facility], placed back on oral medication    8/22 TSH 2.7  5/22   3/22   11/21  FT4 0.2 FT3 1.2  9/20 TSH 0.04 Ft4 2.2  Reports variable results in the past since Dx    11/22 TSH 88.05 FT4 1.0 FT3 1.9 TPO Ab 127  HAMA negative    Current complaints: see HPI    History of radiation to patient's neck: no  Family history includes Aunt Hashimoto's , mom Hyperthyroid  Family history of thyroid cancer: no    Moderate, uncontrolled, now improved    She reports was on 800mcg of levothyroxine     H/o gastric sleeve in 2018    States [address]nia surgery at [address] in [address]  Saw Endocrinologist at the hospital admission  States they advised also to see endocrinology and wanted to discuss furthur work up.  Advised needs information of Endocrinologist at [facility]    She has a h/o T2DM Dx in 2021    Mild, controlled    5/21 A1c 5.7%  11/21 A1c  5.4%---> 5.5%  Glucose 110    Not on medication    She was placed on ozempic, did not feel well on it    She reports issues with hypoglycemia, glucose < 90 drops lows quickly        Is on Dexcom to prevent hypoglycemia  Reports 30-40 in the past    She reports hypoglycemia in the past, even  in 35s    Was told has reactive hypoglycemia    Dexcom  Average 129    She was on Metformin the past in 2018    Lost 169 lb in the past, gained 50 lbs    M/C  3/22    Reports was advised glucose control boost by dietician     Past Medical History:   Diagnosis Date    Abdominal wall abscess 7/30/2021    Acquired hypothyroidism 05/26/2016    ADHD (attention deficit hyperactivity disorder) Not sure    Allergic rhinitis 08/02/2013    Anemia     Ankle swelling 10/05/2017    Anxiety     Arthritis     Asthma     Bipolar disorder ([address]     Cellulitis 12/19/2017    Chest pain due to myocardial ischemia 05/01/2018    Chronic bronchitis (HCC)     Chronic kidney disease     only one functioning kidney    COPD (chronic obstructive pulmonary disease) ([address]     Depression     GERD (gastroesophageal reflux disease)     Head injury 10/19/2014    Headache     Hyperlipidemia     Hypertension     Hyperthyroidism with Hashimoto disease 03/13/2020    Hyperthyroidism with Hashimoto disease 3/13/2020    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    PARVIZ 02/25/2016    Osteoarthritis of left knee 05/26/2016    Panniculitis 03/13/2020    PCOS (polycystic ovarian syndrome) 10/05/2017 Psychophysiological insomnia 02/25/2016    Restless legs syndrome     S/P laparoscopic sleeve gastrectomy 08/09/2019    S/P repair of ventral hernia 06/14/2021    Sleep apnea     bipap broke-[facility] sleep study    Type 2 diabetes mellitus ([facility] 06/13/2021    Urinary incontinence     Vitiligo      Past Surgical History:   Procedure Laterality Date    APPENDECTOMY  07/01/2002    CHOLECYSTECTOMY  01/01/2001    COLONOSCOPY      COLONOSCOPY N/A 11/10/2022    COLONOSCOPY POLYPECTOMY SNARE/COLD BIOPSY performed by Sergei Hinds MD at [address]      teeth extracted    [facility] [address]  [facility]    had to be redone    RECTAL SURGERY N/A 07/31/2021    ABDOMINAL WALL INCISION AND DRAINAGE performed by Pat Mercado MD at [address] [facility] GASTROPLASTY  [address]  6/12/2021    UPPER GASTROINTESTINAL ENDOSCOPY       Current Outpatient Medications   Medication Sig Dispense Refill    NURTEC 75 MG TBDP DISSOLVE 1 TABLET IN MOUTH EVERY OTHER DAY      nirmatrelvir/ritonavir (PAXLOVID) 20 x 150 MG & 10 x 100MG TBPK Take 3 tablets (two 150 mg nirmatrelvir and one 100 mg ritonavir tablets) by mouth every 12 hours for 5 days.  30 tablet 0    vitamin D (ERGOCALCIFEROL) 1.25 MG (27077 UT) CAPS capsule TAKE 1 CAPSULE BY MOUTH ONE TIME A WEEK 12 capsule 0    rosuvastatin (CRESTOR) 40 MG tablet Take 1 tablet by mouth daily 90 tablet 1    PLENVU 140 g SOLR       Blood Glucose Monitoring Suppl (TRUE METRIX METER) w/Device KIT USE AS DIRECTED TO TEST BLOOD SUGAR 4 TIMES DAILY      lisinopril (PRINIVIL;ZESTRIL) 5 MG tablet TAKE 1 TABLET BY MOUTH EVERY DAY 90 tablet 3    DULoxetine (CYMBALTA) 30 MG extended release capsule Take 1 capsule by mouth 2 times daily 180 capsule 3    propranolol (INDERAL LA) 80 MG extended release capsule Take 1 capsule by mouth in the morning and at bedtime 60 capsule 0    polyethylene glycol (MIRALAX) 17 GM/SCOOP POWD powder MIX 17 GRAMS IN 4 TO 8OZ LIQUID AND DRINK BY MOUTH IN THE MORNING 238 g 0    Lancets MISC 1 each by Does not apply route 2 times daily 300 each 1    ARIPiprazole (ABILIFY) 10 MG tablet Take 1 tablet by mouth daily 90 tablet 3    cetirizine (ZYRTEC) 10 MG tablet Take 1 tablet by mouth daily 30 tablet 1    diclofenac sodium (VOLTAREN) 1 % GEL APPLY 4 GRAMS TOPICALLY FOUR TIMES DAILY AS NEEDED FOR PAIN 100 g 0    nystatin (MYCOSTATIN) 318561 UNIT/GM powder APPLY TOPICALLY 3 TIMES DAILY AS NEEDED TO MANAGE RASH AND MOISTURE (GENERIC FOR MYCOSTATIN) 60 g 0    omeprazole (PRILOSEC) 40 MG delayed release capsule Take 1 capsule by mouth in the morning and at bedtime 180 capsule 3    azelastine (ASTELIN) 0.1 % nasal spray 1 spray by Nasal route 2 times daily Use in each nostril as directed 60 mL 1    oxybutynin (DITROPAN-XL) 5 MG extended release tablet TAKE 1 TABLET BY MOUTH EVERY DAY 30 tablet 0    albuterol sulfate HFA (PROAIR HFA) 108 (90 Base) MCG/ACT inhaler INHALE 2 PUFFS BY MOUTH EVERY SIX HOURS AS NEEDED FOR WHEEZING 8.5 g 0    busPIRone (BUSPAR) 15 MG tablet TAKE 1 TABLET BY MOUTH IN THE MORNING, NOON, AND BEFORE BEDTIME 90 tablet 0    fluticasone (FLONASE) 50 MCG/ACT nasal spray INHALE 2 SPRAYS BY NASAL ROUTE TWO TIMES A DAY 16 g 0    amLODIPine (NORVASC) 5 MG tablet TAKE 1 TABLET BY MOUTH EVERY DAY 30 tablet 0    tiotropium (SPIRIVA HANDIHALER) 18 MCG inhalation capsule INHALE CONTENTS 1 CAPSULE BY MOUTH EVERY DAY 90 capsule 0    SYMBICORT 160-4.5 MCG/ACT AERO INHALE 2 PUFFS BY MOUTH TWO TIMES A DAY THEN 1-2 PUFFS EVERY 4 HOURS MAX 12 PUFFS PER DAY FOR ASTHMA EXACERBATIONS 10.2 g 0    EPINEPHrine (EPIPEN 2-SHELBIE) 0.3 MG/0.3ML SOAJ injection Inject 0.3 mLs into the muscle as needed (In the case of anaphylaxis) Use as directed for allergic reaction 1 each 2    rizatriptan (MAXALT) 10 MG tablet Take 10 mg by mouth once as needed for Migraine May repeat in 2 hours if needed      Calcium-Vitamin D-Vitamin K (CALCIUM SOFT CHEWS PO) Take by mouth in the morning, at noon, and at bedtime      Blood Glucose Monitoring Suppl (TRUE METRIX METER) [place] Dispense meter to test blood sugar 4 times daily for Type 2 DM 1 each 0    blood glucose test strips (TRUE METRIX BLOOD GLUCOSE TEST) strip 1 each by In Vitro route 4 times daily As needed. 200 each 3    azelastine (OPTIVAR) 0.05 % ophthalmic solution INSTILL 1 DROP IN [facility] EYE TWO TIMES A DAY (GENERIC FOR optivar) 6 mL 0    montelukast (SINGULAIR) 10 MG tablet TAKE 1 TABLET BY MOUTH EVERY DAY AT NIGHT 30 tablet 5    liothyronine (CYTOMEL) 25 MCG tablet Take 1 tablet by mouth 2 times daily for 180 doses 180 tablet 3    fluconazole (DIFLUCAN) 150 MG tablet as needed Takes when on antibiotics      Continuous Blood Gluc  (DEXCOM G6 ) [place] 2 times daily      acetaminophen (TYLENOL) 500 MG tablet Take 500 mg by mouth every 6 hours as needed for Pain      Nutritional Supplements (GLUCOSE MANAGEMENT) TABS Take by mouth daily      Adhesive Tape (PAPER TAPE 1\"[id number]) TAPE Apply to affected areas. 2 each 5    Multiple Vitamins-Minerals (THERAPEUTIC MULTIVITAMIN-MINERALS) tablet Take 1 tablet by mouth daily      artificial tears (ARTIFICIAL TEARS) OINT as needed      dextromethorphan-guaiFENesin (MUCINEX DM)  MG per SR tablet Take 1 tablet by mouth every 12 hours as needed       levothyroxine (SYNTHROID) 300 MCG tablet Take 1 tablet by mouth Daily (Patient not taking: Reported on 12/9/2022) 90 tablet 3     No current facility-administered medications for this visit. Review of Systems  Constitutional: Negative for weight loss and malaise/fatigue. Negative for fever and chills. HENT: Negative for hearing loss, ear pain, nosebleeds, neck pain and tinnitus. Eyes: Negative for blurred vision. Negative for double vision, photophobia and pain. Respiratory: Negative for cough and sputum production. +SOB  Cardiovascular: Negative for chest pain, palpitations and leg swelling. Gastrointestinal: Negative for nausea, vomiting and abdominal pain. Genitourinary: Negative for dysuria, urgency and + frequency. Musculoskeletal: Negative for back pain. No joint pain  Skin: Negative for itching and rash. Neurological:+ for dizziness. Negative for tingling, tremors, focal weakness and +headaches. Endo/Heme/Allergies: see HPI  Psychiatric/Behavioral: + for depression and substance abuse. PHYSICAL EXAMINATION:  [ INSTRUCTIONS:  \"[x]\" Indicates a positive item  \"[]\" Indicates a negative item  -- DELETE ALL ITEMS NOT EXAMINED]  Vital Signs: (As obtained by patient/caregiver or practitioner observation)    Blood pressure-  Heart rate-    Respiratory rate-    Temperature-  Pulse oximetry-     Constitutional Appears well-developed and well-nourished No apparent distress        Mental status  Alert and awake  Oriented to person/place/time  Able to follow commands      Eyes:  EOM    [x]  Normal    Sclera  [x]  Normal           Discharge [x]  None visible      HENT:   [x] Normocephalic, atraumatic.     [x] Mouth/Throat: Mucous membranes are moist.     External Ears [x] Normal  no discharge    Neck: [x] No visualized mass  no swelling    Pulmonary/Chest: [x] Respiratory effort normal.  [x] No visualized signs of difficulty breathing or respiratory distress             Musculoskeletal:   [x] Normal gait with no signs of ataxia         [x] Normal range of motion of neck          Head and neck stable, appears normal ROM, strength good    Neurological:        [x] No Facial Asymmetry (Cranial nerve 7 motor function) (limited exam to video visit)          [x] No gaze palsy                Skin:        [x] No significant exanthematous lesions or discoloration noted on facial skin                 Psychiatric:       [x] Normal Affect [x] No Hallucinations            Other pertinent observable physical exam findings-     Due to this being a TeleHealth encounter, evaluation of the following organ systems is limited: Vitals/Constitutional/EENT/Resp/CV/GI//MS/Neuro/Skin/Heme-Lymph-Imm. Services were provided through a video synchronous discussion virtually to substitute for in-person clinic visit. Lab Review  Lab Results   Component Value Date/Time    TSH 2.70 08/11/2022 02:21 PM     No results found for: FREET4      Assessment: 1. Hypothyroidism: For years, has had variable levels. She reports has been on high dose in the past. TSH very high, advise to switch back to tirosint. 2.T2DM: Diet controlled, she has had issue with hypoglycemia, on Dexcom, prevents lows, She has reactive hypoglycemia per history. Request Dexcom sensor  3. Obesity: 4. Low Ig A: See PCP, advised may need to see Hematology  5. Inquires about boost and glucerna, advised to consult GI    Plan: 1. Levothyroxine 300mcg---> Tirosint 300mcg  liothyronine 25mcg BID  2. Take tirosint 200mcg dose , 2ml until gets new prescription

## 2022-12-10 ENCOUNTER — PATIENT MESSAGE (OUTPATIENT)
Dept: PRIMARY CARE CLINIC | Age: 47
End: 2022-12-10

## 2022-12-10 DIAGNOSIS — E66.01 CLASS 3 SEVERE OBESITY DUE TO EXCESS CALORIES WITH SERIOUS COMORBIDITY AND BODY MASS INDEX (BMI) OF 60.0 TO 69.9 IN ADULT (HCC): ICD-10-CM

## 2022-12-10 DIAGNOSIS — E06.3 HYPOTHYROIDISM DUE TO HASHIMOTO'S THYROIDITIS: ICD-10-CM

## 2022-12-10 DIAGNOSIS — L80 VITILIGO: ICD-10-CM

## 2022-12-10 DIAGNOSIS — E03.8 HYPOTHYROIDISM DUE TO HASHIMOTO'S THYROIDITIS: ICD-10-CM

## 2022-12-10 DIAGNOSIS — E78.2 MIXED HYPERLIPIDEMIA: ICD-10-CM

## 2022-12-10 DIAGNOSIS — D80.2 IGA DEFICIENCY (HCC): ICD-10-CM

## 2022-12-10 DIAGNOSIS — E11.40 TYPE 2 DIABETES MELLITUS WITH DIABETIC NEUROPATHY, UNSPECIFIED WHETHER LONG TERM INSULIN USE (HCC): Primary | ICD-10-CM

## 2022-12-10 DIAGNOSIS — M15.9 PRIMARY OSTEOARTHRITIS INVOLVING MULTIPLE JOINTS: ICD-10-CM

## 2022-12-12 ENCOUNTER — TELEPHONE (OUTPATIENT)
Dept: ADMINISTRATIVE | Age: 47
End: 2022-12-12

## 2022-12-12 NOTE — TELEPHONE ENCOUNTER
PA submitted VIA [facility] for  Dexcom G6 Transmitter (Sravan Nair) - [id number] PENDING    PA DENIED

## 2022-12-12 NOTE — TELEPHONE ENCOUNTER
Pt called to give us an update. Pt finished her course of paxlovid and retested for covid and was negative. Pt states that she still has a really bad cough and that it is starting to make her back behind her lungs hurt from coughing so much.

## 2022-12-15 DIAGNOSIS — N39.3 STRESS INCONTINENCE: ICD-10-CM

## 2022-12-15 DIAGNOSIS — I10 BENIGN ESSENTIAL HTN: ICD-10-CM

## 2022-12-15 DIAGNOSIS — M17.0 OSTEOARTHRITIS OF BOTH KNEES, UNSPECIFIED OSTEOARTHRITIS TYPE: ICD-10-CM

## 2022-12-16 NOTE — TELEPHONE ENCOUNTER
Refill Request       Last Seen: Last Seen Department: 11/14/2022  Last Seen by PCP: 11/14/2022    Last Written: 11/14    Next Appointment:   Future Appointments   Date Time Provider Israel Cote   12/27/2022  8:30 PM SCHEDULE, [facility] SLEEP ROOM 3 [facility] SLEEP Mae Garcia   2/16/2023 12:30 PM Wiley BARFIELD May, DO [facility] AND RES MMA       Future appointment scheduled      Requested Prescriptions     Pending Prescriptions Disp Refills    albuterol sulfate HFA (PROVENTIL;VENTOLIN;PROAIR) 108 (90 Base) MCG/ACT inhaler [Pharmacy Med Name: Albuterol Sulfate HFA Inhalation Aerosol Solution 108 (90 Base) MCG/ACT] 8.5 g 0     Sig: INHALE 2 PUFFS BY MOUTH EVERY SIX HOURS AS NEEDED FOR WHEEZING    fluticasone (FLONASE) 50 MCG/ACT nasal spray [Pharmacy Med Name: Fluticasone Propionate Nasal Suspension 50 MCG/ACT] 16 g 0     Sig: INHALE 2 SPRAYS NASALLY TWO TIMES A DAY    nystatin (MYCOSTATIN) 659758 UNIT/GM powder [Pharmacy Med Name: Nystatin External Powder 822423 UNIT/GM] 60 g 0     Sig: APPLY TOPICALLY 3 TIMES DAILY AS NEEDED TO MANAGE RASH AND MOISTURE (GENERIC FOR MYCOSTATIN)    oxybutynin (DITROPAN-XL) 5 MG extended release tablet [Pharmacy Med Name: Oxybutynin Chloride ER Oral Tablet Extended Release 24 Hour 5 MG] 30 tablet 0     Sig: TAKE 1 TABLET BY MOUTH EVERY DAY    amLODIPine (NORVASC) 5 MG tablet [Pharmacy Med Name: amLODIPine Besylate Oral Tablet 5 MG] 30 tablet 0     Sig: TAKE 1 TABLET BY MOUTH EVERY DAY    diclofenac sodium (VOLTAREN) 1 % GEL [Pharmacy Med Name: Diclofenac Sodium External Gel 1 %] 100 g 0     Sig: APPLY 4 GRAMS TOPICALLY FOUR TIMES DAILY AS NEEDED FOR PAIN    SYMBICORT 160-4.5 MCG/ACT AERO [Pharmacy Med Name: Symbicort Inhalation Aerosol 160-4.5 MCG/ACT] 10.2 g 0     Sig: INHALE 2 PUFFS BY MOUTH 2 TIMES A DAY THEN 1 to 2 PUFFS EVERY 4 HOURS MAX 12 PUFFS PER DAY FOR ASTHMA EXACERBATIONS    propranolol (INDERAL LA) 80 MG extended release capsule [Pharmacy Med Name: Propranolol HCl ER Oral Capsule Extended Release 24 Hour 80 MG] 60 capsule 0     Sig: TAKE 1 CAPSULE BY MOUTH TWO TIMES A DAY.  IN THE MORNING AND AT BEDTIME

## 2022-12-17 RX ORDER — AMLODIPINE BESYLATE 5 MG/1
TABLET ORAL
Qty: 30 TABLET | Refills: 0 | Status: SHIPPED | OUTPATIENT
Start: 2022-12-17

## 2022-12-17 RX ORDER — OXYBUTYNIN CHLORIDE 5 MG/1
TABLET, EXTENDED RELEASE ORAL
Qty: 30 TABLET | Refills: 0 | Status: SHIPPED | OUTPATIENT
Start: 2022-12-17

## 2022-12-17 RX ORDER — BUDESONIDE AND FORMOTEROL FUMARATE DIHYDRATE 160; 4.5 UG/1; UG/1
AEROSOL RESPIRATORY (INHALATION)
Qty: 10.2 G | Refills: 0 | Status: SHIPPED | OUTPATIENT
Start: 2022-12-17

## 2022-12-17 RX ORDER — FLUTICASONE PROPIONATE 50 MCG
SPRAY, SUSPENSION (ML) NASAL
Qty: 16 G | Refills: 0 | Status: SHIPPED | OUTPATIENT
Start: 2022-12-17

## 2022-12-17 RX ORDER — PROPRANOLOL HYDROCHLORIDE 80 MG/1
CAPSULE, EXTENDED RELEASE ORAL
Qty: 60 CAPSULE | Refills: 0 | Status: SHIPPED | OUTPATIENT
Start: 2022-12-17

## 2022-12-17 RX ORDER — NYSTATIN 100000 [USP'U]/G
POWDER TOPICAL
Qty: 60 G | Refills: 0 | Status: SHIPPED | OUTPATIENT
Start: 2022-12-17

## 2022-12-17 RX ORDER — ALBUTEROL SULFATE 90 UG/1
AEROSOL, METERED RESPIRATORY (INHALATION)
Qty: 8.5 G | Refills: 0 | Status: SHIPPED | OUTPATIENT
Start: 2022-12-17

## 2022-12-19 ENCOUNTER — TELEPHONE (OUTPATIENT)
Dept: ADMINISTRATIVE | Age: 47
End: 2022-12-19

## 2022-12-19 NOTE — TELEPHONE ENCOUNTER
PA submitted VIA [facility] for  Diclofenac Sodium 1% gel (Key: Mitch Prado) - [id number] PENDING    PA has been approved

## 2022-12-19 NOTE — TELEPHONE ENCOUNTER
PA submitted VIA [facility] for  Nurtec 75MG dispersible tablets   Key: [id number] PENDING    PA has been approved

## 2022-12-20 ENCOUNTER — TELEPHONE (OUTPATIENT)
Dept: ENDOCRINOLOGY | Age: 47
End: 2022-12-20

## 2022-12-20 NOTE — TELEPHONE ENCOUNTER
The patient is advising that [facility] in [facility] is telling her that her Tirosint Sol 100 mcg/ml soln needs a PA.   She is checking on PA     She is recovering from Covid and she scheduled a follow up for 3/27/2023 at 2:00     Her call back [phone]

## 2022-12-22 ENCOUNTER — TELEPHONE (OUTPATIENT)
Dept: PULMONOLOGY | Age: 47
End: 2022-12-22

## 2022-12-22 NOTE — TELEPHONE ENCOUNTER
Contacted insurance per patient. Spoke with rep who stated alternatives including levoxyl and unithroid need to be tried first or reason provided as to why they are not effective.   Please advise       [phone]

## 2022-12-22 NOTE — TELEPHONE ENCOUNTER
Patient states her insurance is not going to cover spirivia any more and she wants to know what to do. Please advise. She states she has enough to get through the holiday.

## 2022-12-22 NOTE — TELEPHONE ENCOUNTER
LM informing pt that she needs to call her insurance company to see what they will cover in place of the Spiriva.   Once she has that medication, call our office back and I will send message to MD

## 2022-12-27 ENCOUNTER — HOSPITAL ENCOUNTER (OUTPATIENT)
Dept: SLEEP CENTER | Age: 47
Discharge: HOME | End: 2022-12-27

## 2022-12-27 RX ORDER — LEVOTHYROXINE SODIUM 300 UG/1
300 TABLET ORAL DAILY
Qty: 30 TABLET | Refills: 3 | Status: SHIPPED | OUTPATIENT
Start: 2022-12-27

## 2022-12-27 NOTE — TELEPHONE ENCOUNTER
LMOM for patient to call the office to be informed that Tirosint PA denied, Melchor preferred and sent to pharmacy.

## 2022-12-27 NOTE — TELEPHONE ENCOUNTER
Pt called back, gave pt verbatim message.     Pt stated that she received a message from the pharmacy stating that her insurance will not cover Rx Unithroid     Please advise   CB# [phone]

## 2022-12-27 NOTE — TELEPHONE ENCOUNTER
Please advise patient the insurance will not cover tirosint unless she has been on unithroid and levoxyl.   I sent a prescription for unithroid.

## 2022-12-29 ENCOUNTER — TELEPHONE (OUTPATIENT)
Dept: PRIMARY CARE CLINIC | Age: 47
End: 2022-12-29

## 2022-12-29 DIAGNOSIS — E06.3 HYPOTHYROIDISM DUE TO HASHIMOTO'S THYROIDITIS: ICD-10-CM

## 2022-12-29 DIAGNOSIS — M15.9 PRIMARY OSTEOARTHRITIS INVOLVING MULTIPLE JOINTS: ICD-10-CM

## 2022-12-29 DIAGNOSIS — D80.2 IGA DEFICIENCY (HCC): ICD-10-CM

## 2022-12-29 DIAGNOSIS — E11.40 TYPE 2 DIABETES MELLITUS WITH DIABETIC NEUROPATHY, UNSPECIFIED WHETHER LONG TERM INSULIN USE (HCC): Primary | ICD-10-CM

## 2022-12-29 DIAGNOSIS — E03.8 HYPOTHYROIDISM DUE TO HASHIMOTO'S THYROIDITIS: ICD-10-CM

## 2022-12-29 DIAGNOSIS — E66.01 CLASS 3 SEVERE OBESITY DUE TO EXCESS CALORIES WITH SERIOUS COMORBIDITY AND BODY MASS INDEX (BMI) OF 60.0 TO 69.9 IN ADULT (HCC): ICD-10-CM

## 2022-12-29 DIAGNOSIS — L80 VITILIGO: ICD-10-CM

## 2022-12-29 DIAGNOSIS — E78.2 MIXED HYPERLIPIDEMIA: ICD-10-CM

## 2022-12-29 NOTE — TELEPHONE ENCOUNTER
Patient is calling [facility] is closing Dr Jaquez  office and they will no longer have a rheumatology office and her insurance will cover Dr Staci Velazquez and need a new referral to be faxed to [phone]      Please advise   Dolly Mensah [phone] (home)

## 2023-01-03 NOTE — TELEPHONE ENCOUNTER
Dr. Conte I pended the referral for you to sign off on .   We will need to fax this over to [address] [place] [address] office

## 2023-01-04 NOTE — TELEPHONE ENCOUNTER
I pended the referral here for you to sign. Please sign and route back to me and I'll fax it to Dr. Dov Moulton office.

## 2023-01-06 NOTE — TELEPHONE ENCOUNTER
I called pt and informed that fax for Dr. Evangelina Varghese was completed and she can call and schedule.

## 2023-01-10 DIAGNOSIS — N39.3 STRESS INCONTINENCE: ICD-10-CM

## 2023-01-10 DIAGNOSIS — I10 BENIGN ESSENTIAL HTN: ICD-10-CM

## 2023-01-10 RX ORDER — OXYBUTYNIN CHLORIDE 5 MG/1
TABLET, EXTENDED RELEASE ORAL
Qty: 90 TABLET | Refills: 3 | Status: SHIPPED | OUTPATIENT
Start: 2023-01-10

## 2023-01-10 RX ORDER — AMLODIPINE BESYLATE 5 MG/1
TABLET ORAL
Qty: 90 TABLET | Refills: 3 | Status: SHIPPED | OUTPATIENT
Start: 2023-01-10

## 2023-01-10 NOTE — TELEPHONE ENCOUNTER
Called insurance. Rep stated PA processed on CMM was never processed through them. Had to complete PA by phone. No PA required. Medication is on formulary with plan. Patient can fill on 1/23/23.

## 2023-01-10 NOTE — TELEPHONE ENCOUNTER
Refill Request       Last Seen: Last Seen Department: 11/14/2022  Last Seen by PCP: 11/14/2022    Last Written: Oxybutynin 5mg-12/17/2022 #30 with 3 refills  Amlodipine 5mg-12/17/2022 #30 with 0 refills  Next Appointment:   Future Appointments   Date Time Provider Israel Cote   2/7/2023  8:30 PM SCHEDULE, [facility]   2/16/2023 12:30 PM Wiley Conte DO [facility] AND RES MMA   3/27/2023  2:00 PM MD Grady Hodges [facility]             Requested Prescriptions      No prescriptions requested or ordered in this encounter

## 2023-01-17 ENCOUNTER — TELEPHONE (OUTPATIENT)
Dept: PRIMARY CARE CLINIC | Age: 48
End: 2023-01-17

## 2023-01-17 NOTE — TELEPHONE ENCOUNTER
Dr Suki Perez office called they will not take the [facility] referral for Rheumatology. [facility] is only accepting referrals if the patient has a [facility] PCP will need a new referral

## 2023-01-24 ENCOUNTER — TELEPHONE (OUTPATIENT)
Dept: PRIMARY CARE CLINIC | Age: 48
End: 2023-01-24

## 2023-01-24 DIAGNOSIS — G43.901 MIGRAINE WITH STATUS MIGRAINOSUS, NOT INTRACTABLE, UNSPECIFIED MIGRAINE TYPE: ICD-10-CM

## 2023-01-24 RX ORDER — BUDESONIDE AND FORMOTEROL FUMARATE DIHYDRATE 160; 4.5 UG/1; UG/1
AEROSOL RESPIRATORY (INHALATION)
Qty: 10.2 G | Refills: 1 | Status: SHIPPED | OUTPATIENT
Start: 2023-01-24

## 2023-01-24 RX ORDER — BUSPIRONE HYDROCHLORIDE 15 MG/1
TABLET ORAL
Qty: 90 TABLET | Refills: 0 | Status: SHIPPED | OUTPATIENT
Start: 2023-01-24

## 2023-01-24 RX ORDER — PROPRANOLOL HCL 60 MG
CAPSULE, EXTENDED RELEASE 24HR ORAL
Qty: 60 CAPSULE | Refills: 1 | Status: SHIPPED | OUTPATIENT
Start: 2023-01-24

## 2023-01-24 RX ORDER — FLUTICASONE PROPIONATE 50 MCG
SPRAY, SUSPENSION (ML) NASAL
Qty: 16 G | Refills: 1 | Status: SHIPPED | OUTPATIENT
Start: 2023-01-24

## 2023-01-24 RX ORDER — ALBUTEROL SULFATE 90 UG/1
AEROSOL, METERED RESPIRATORY (INHALATION)
Qty: 8.5 G | Refills: 1 | Status: SHIPPED | OUTPATIENT
Start: 2023-01-24

## 2023-01-24 RX ORDER — NYSTATIN 100000 [USP'U]/G
POWDER TOPICAL
Qty: 60 G | Refills: 1 | Status: SHIPPED | OUTPATIENT
Start: 2023-01-24

## 2023-01-24 NOTE — TELEPHONE ENCOUNTER
Patient called questioning if she should take her blood pressure medication. Patient took her blood pressure and stated it read 81/32. She repeated it and it read 125/88. I informed patient her first reading was very likely an error and she was fine to take her medication. I reviewed patients medications and one of her blood pressure medications she does take at night as well. Patient asked if she could wait a 1/2 hour to take. I informed patient that was ok. Patient verbalized understanding.

## 2023-01-24 NOTE — TELEPHONE ENCOUNTER
Refill Request       Last Seen: Last Seen Department: 11/14/2022  Last Seen by PCP: 11/14/2022    Last Written: 11/14/22 #90 with no     Next Appointment:   Future Appointments   Date Time Provider Israel Cote   1/26/2023  1:30 PM Tod Kramer, PT [facility] EG PT Kelli WALSH   2/7/2023  8:30 PM SCHEDULE, [facility] SLEEP ROOM 3 [facility] SLEEP Wilbert Chandlerbach   2/16/2023 12:30 PM Wiley Conte DO [facility] AND [facility]   3/27/2023  2:00 PM MD Nyasia Cervantes [facility]             Requested Prescriptions     Pending Prescriptions Disp Refills    busPIRone (BUSPAR) 15 MG tablet [Pharmacy Med Name: busPIRone HCl Oral Tablet 15 MG] 90 tablet 0     Sig: TAKE 1 TABLET BY MOUTH IN THE MORNING, NOON, AND BEFORE BEDTIME

## 2023-01-24 NOTE — TELEPHONE ENCOUNTER
Refill Request       Last Seen: Last Seen Department: 11/14/2022  Last Seen by PCP: 11/14/2022    Last Written:   Symbicort 160-4.5 -12/17/22 10.2g no refills   Propranolol 60 - 12/17/2022 #60 no refill   nystatin [id number] - 12/17/22 #60g no refills   albuterol 108 - 12/17/2022 8.5g no refills  flonase 50 - 16g no refills       Next Appointment:   Future Appointments   Date Time Provider Israel Cote   1/26/2023  1:30 PM Connie Anderson, PT [facility] EG PT Kelli WALSH   2/7/2023  8:30 PM SCHEDULE, [facility] SLEEP ROOM 3 [facility] SLEEP [facility]   2/16/2023 12:30 PM Wiley Conte DO [facility] AND RES MMA   3/27/2023  2:00 PM MD Symone Patricia             Requested Prescriptions     Pending Prescriptions Disp Refills    SYMBICORT 160-4.5 MCG/ACT AERO [Pharmacy Med Name: Symbicort Inhalation Aerosol 160-4.5 MCG/ACT] 10.2 g 0     Sig: INHALE 2 PUFFS BY MOUTH 2 TIMES A DAY THEN 1 to 2 PUFFS EVERY 4 HOURS MAX 12 PUFFS PER DAY FOR ASTHMA EXACERBATIONS    propranolol (INDERAL LA) 60 MG extended release capsule [Pharmacy Med Name: Propranolol HCl ER Oral Capsule Extended Release 24 Hour 60 MG] 60 capsule 0     Sig: TAKE 1 CAPSULE BY MOUTH TWO TIMES A DAY IN THE MORNING AND AT BEDTIME    nystatin (MYCOSTATIN) 568030 UNIT/GM powder [Pharmacy Med Name: Nystatin External Powder 504380 UNIT/GM] 60 g 0     Sig: APPLY TOPICALLY 3 TIMES DAILY AS NEEDED TO MANAGE RASH AND MOISTURE (GENERIC FOR MYCOSTATIN)    albuterol sulfate HFA (PROVENTIL;VENTOLIN;PROAIR) 108 (90 Base) MCG/ACT inhaler [Pharmacy Med Name: Albuterol Sulfate HFA Inhalation Aerosol Solution 108 (90 Base) MCG/ACT] 8.5 g 0     Sig: INHALE 2 PUFFS BY MOUTH EVERY SIX HOURS AS NEEDED FOR WHEEZING    fluticasone (FLONASE) 50 MCG/ACT nasal spray [Pharmacy Med Name: Fluticasone Propionate Nasal Suspension 50 MCG/ACT] 16 g 0     Sig: INHALE 2 SPRAYS INTO EACH NOSTRIL TWO TIMES DAILY

## 2023-01-26 ENCOUNTER — HOSPITAL ENCOUNTER (OUTPATIENT)
Dept: PHYSICAL THERAPY | Age: 48
Setting detail: THERAPIES SERIES
Discharge: HOME | End: 2023-01-26

## 2023-01-26 ENCOUNTER — TELEPHONE (OUTPATIENT)
Dept: PRIMARY CARE CLINIC | Age: 48
End: 2023-01-26

## 2023-01-26 NOTE — TELEPHONE ENCOUNTER
LMOM for pt to call back       Dr. Rodolfo Rosario will not see pt d/t not being a [facility] health pcp. Will inform pcp we need a new referral.      Pt called back while I was typing this message. Pt was informed of the above re[facility]    Pt was informed to call her insurance to get names of covered providers.       *FYI*

## 2023-01-26 NOTE — FLOWSHEET NOTE
[address] [facility], [address] [place] [address], [address] [place] [address] [address]  Phone: [phone]   Fax:     [phone]    Physical Therapy  Cancellation/No-show Note  Patient Name:  Lubna Horton  :  1975   Date:  2023    Cancelled visits to date: 5  No-shows to date: 0    For today's appointment patient:  [x]  Cancelled  []  Rescheduled appointment  []  No-show     Reason given by patient:  []  Patient ill  []  Conflicting appointment  []  No transportation    []  Conflict with work  []  No reason given  [x]  Other:     Comments:  needs to F/U with MD for new script    Phone call information:   [x]  Phone call made today to patient at am/pm at the number provided:      []  Patient answered, conversation as follows:    []  Patient did not answer, message left as follows:  []  Phone call not needed - pt contacted us to cancel and provided reason for cancellation.      Electronically signed by:  Mane Babin, PT, PT

## 2023-01-26 NOTE — TELEPHONE ENCOUNTER
----- Message from Chilango Shin sent at 1/26/2023 10:15 AM EST -----  Subject: Message to Provider    QUESTIONS  Information for Provider? We sent referral to Dr. Jodi Tillman but the   office provided us with the wrong fax number. Pt requesting we send the   referral to the new fax number. The new # is [phone]  ---------------------------------------------------------------------------  --------------  Chevy CHENG  [phone]; OK to leave message on voicemail  ---------------------------------------------------------------------------  --------------  SCRIPT ANSWERS  Relationship to Patient?  Self

## 2023-01-27 NOTE — TELEPHONE ENCOUNTER
Pt called her insurance. Most of the rheumatologists are with [employer]. When Pt called [employer] they told her that they are excepting outside referrals & gave her this Fax #:[phone]      Pt said that there are 2-3 Dr.'s there that are covered by her insurance. Only other one Dr. Marlin Clark that is on [address] she has not been able to get a hold of them yet.

## 2023-01-30 DIAGNOSIS — E03.9 HYPOTHYROIDISM, UNSPECIFIED TYPE: ICD-10-CM

## 2023-01-30 DIAGNOSIS — E11.9 CONTROLLED TYPE 2 DIABETES MELLITUS WITHOUT COMPLICATION, WITHOUT LONG-TERM CURRENT USE OF INSULIN (HCC): Primary | ICD-10-CM

## 2023-01-30 RX ORDER — BLOOD-GLUCOSE,RECEIVER,CONT
EACH MISCELLANEOUS
Qty: 1 EACH | Refills: 0 | Status: SHIPPED | OUTPATIENT
Start: 2023-01-30

## 2023-01-30 RX ORDER — LIOTHYRONINE SODIUM 25 UG/1
TABLET ORAL
Qty: 180 TABLET | Refills: 1 | Status: SHIPPED | OUTPATIENT
Start: 2023-01-30

## 2023-01-30 RX ORDER — BLOOD-GLUCOSE TRANSMITTER
EACH MISCELLANEOUS
Qty: 1 EACH | Refills: 1 | Status: SHIPPED | OUTPATIENT
Start: 2023-01-30

## 2023-01-30 RX ORDER — BLOOD-GLUCOSE SENSOR
EACH MISCELLANEOUS
Qty: 9 EACH | Refills: 1 | Status: SHIPPED | OUTPATIENT
Start: 2023-01-30

## 2023-01-30 RX ORDER — LEVOTHYROXINE SODIUM 300 UG/1
300 TABLET ORAL DAILY
Qty: 30 TABLET | Refills: 3 | Status: SHIPPED | OUTPATIENT
Start: 2023-01-30

## 2023-01-30 NOTE — TELEPHONE ENCOUNTER
Fax from [address] requesting refill on thyroid prescriptions and order for [facility].      Medication:   Requested Prescriptions     Pending Prescriptions Disp Refills    UNITHROID 300 MCG tablet 30 tablet 3     Sig: Take 1 tablet by mouth Daily    liothyronine (CYTOMEL) 25 MCG tablet 180 tablet 1     Sig: Take one tablet twice a day    Continuous Blood Gluc Transmit (DEXCOM G6 TRANSMITTER) MISC 1 each 1     Sig: Change every 90 days    Continuous Blood Gluc Sensor (DEXCOM G6 SENSOR) MISC 9 each 1     Sig: Change every 10 days    Continuous Blood Gluc  (DEXCOM G6 ) NGHIA 1 each 0     Sig: Use as directed       Last Filled:      Patient Phone Number: [phone] (home)     Last appt: 12/9/2022   Next appt: 3/27/2023    Last Labs DM:   Lab Results   Component Value Date/Time    LABA1C 5.0 08/22/2022 09:22 AM

## 2023-02-01 NOTE — TELEPHONE ENCOUNTER
Refill Request       Last Seen: Last Seen Department: 11/14/2022  Last Seen by PCP: 11/14/2022    Last Written: 11/14/2022    Next Appointment:   Future Appointments   Date Time Provider Israel Cote   2/1/2023  2:45 PM Tammie Murphy PA-C [facility] ORTHO [facility]   2/3/2023 11:30 AM Albin Leigh MD [facility]   2/7/2023  8:30 PM SCHEDULE, [facility] SLEEP ROOM [address] [place] [address]   2/16/2023 12:30 PM Wiley Conte DO [facility] AND RES [facility]   3/27/2023  2:00 PM MD Delia Salinas [facility]             Requested Prescriptions     Pending Prescriptions Disp Refills    Blood Glucose Monitoring Suppl (TRUE METRIX METER) w/Device KIT [Pharmacy Med Name: TRUE METRIX GLUCOSE METER EACH]  10     Sig: USE TO TEST TWICE DAILY

## 2023-02-02 RX ORDER — UMECLIDINIUM 62.5 UG/1
1 AEROSOL, POWDER ORAL DAILY
Qty: 1 EACH | Refills: 5 | Status: SHIPPED | OUTPATIENT
Start: 2023-02-02 | End: 2023-03-04

## 2023-02-02 NOTE — TELEPHONE ENCOUNTER
Patient is using a new pharmacy Requesting a refill be sent to her new pharmacy I have pended the Rx for you

## 2023-02-02 NOTE — TELEPHONE ENCOUNTER
Pharmacy called and left a voicemail stating they needed a new order for patients inhalers. The number they left was [phone] to call if there was any questions.

## 2023-02-03 ENCOUNTER — OFFICE VISIT (OUTPATIENT)
Dept: ORTHOPEDIC SURGERY | Age: 48
End: 2023-02-03

## 2023-02-03 VITALS — BODY MASS INDEX: 57.52 KG/M2 | WEIGHT: 293 LBS | HEIGHT: 60 IN

## 2023-02-03 DIAGNOSIS — M25.561 PAIN IN BOTH KNEES, UNSPECIFIED CHRONICITY: ICD-10-CM

## 2023-02-03 DIAGNOSIS — M17.0 PRIMARY OSTEOARTHRITIS OF BOTH KNEES: Primary | ICD-10-CM

## 2023-02-03 DIAGNOSIS — M25.562 PAIN IN BOTH KNEES, UNSPECIFIED CHRONICITY: ICD-10-CM

## 2023-02-03 RX ORDER — LIDOCAINE 50 MG/G
1 PATCH TOPICAL DAILY
Qty: 30 PATCH | Refills: 0 | Status: SHIPPED | OUTPATIENT
Start: 2023-02-03 | End: 2023-03-05

## 2023-02-03 NOTE — PROGRESS NOTES
ORTHOPAEDIC SURGERY FOLLOWUP VISIT    CHIEF COMPLAINT: Bilateral knee pain    DATE OF INJURY: N/A  DIAGNOSIS: Bilateral knee osteoarthritis  DATE OF SURGERY: N/A    HISTORY OF PRESENT ILLNESS:  22-year-old female presents for repeat evaluation of chronic bilateral knee pain left greater than right. This been ongoing for many years. She was previously seen in August 2022. She continues to have some mechanical symptoms. She is extremely limited in her mobility as a result of knee pain and morbid obesity. She indicates that social factors have gotten in the way of her going to physical therapy. She requests a new referral for this. She rates her pain between a 6 and an 8 out of 10 on a day-to-day basis. Previous discussions centered on her candidacy for any type of surgical treatment versus injections. She is holding off on injections at this time. She has managed this with lidocaine patches, Voltaren gel, home exercise program in the past.    PHYSICAL EXAM:  Focused examination of the right lower extremity: No cuts, open wounds, or abrasions to the knee. No obvious effusion. No swelling. No bruising or ecchymosis. No obvious deformity. There is tenderness to palpation globally about the knee including medial and lateral joint lines. There is pain with patellar ballottement. Patellar tracking is appropriate. There is minor subpatellar crepitus during range of motion. No reproducible mechanical symptoms are present. There is negative anterior and posterior drawer test.  Negative Lachman. Pain is present with Rosana. Sensation is intact to light touch in deep peroneal, superficial peroneal, tibial, sural, and saphenous nerve distributions. Motor function is intact to EHL, FHL, tibialis anterior, and gastroc. There is brisk capillary refill to the toes and a strong palpable dorsalis pedis pulse. Compartments are soft and compressible.   There is no calf tenderness and a negative Lord Skipper' sign.    Focused examination of the [facility] lower extremity: No cuts, open wounds, or abrasions to the knee. No obvious effusion. No swelling. No bruising or ecchymosis. No obvious deformity. There is tenderness to palpation globally about the knee including medial and lateral joint lines. There is pain with patellar ballottement. Patellar tracking is appropriate. There is minor subpatellar crepitus during range of motion. No reproducible mechanical symptoms are present. There is negative anterior and posterior drawer test.  Negative Lachman. Pain is present with Rosana. Sensation is intact to light touch in deep peroneal, superficial peroneal, tibial, sural, and saphenous nerve distributions. Motor function is intact to EHL, FHL, tibialis anterior, and gastroc. There is brisk capillary refill to the toes and a strong palpable dorsalis pedis pulse. Compartments are soft and compressible. There is no calf tenderness and a negative Homans' sign. RADIOGRAPHIC EXAM:  4 views of each knee including weightbearing AP, tunnel, lateral, and sunrise projections demonstrate osteoarthritic change of both knees. Osteoarthritic change is symmetric. There is  bony erosion involving the lateral compartment primarily of the right knee. There is no apparent malalignment. There is osteophyte about medial and lateral joint lines. There is lateral facet osteoarthrosis. Patellar tracking is appropriate without lateral subluxation or tilt. No obvious effusion. ASSESSMENT AND PLAN:  Bilateral knee osteoarthritis  Morbid obesity, BMI 64  IgA deficiency  Lower extremity lymphedema  Diabetes, type II     I discussed the conservative managements of osteoarthritis of the knee. She indicates she is unable to take oral anti-inflammatories. I advised her to use topical modalities and treatments. Prescriptions for Voltaren gel and lidocaine patches were provided.   She was also given a referral for therapy to build strength in her knees for better mobility. I advised her that intra-articular injections would be an option, however they would provide temporary relief. She will hold off on this. She has had prior rounds of Euflexxa injections without significant relief. I advised her that she was a long way from being an appropriate surgical candidate. I discussed that the complications of total knee arthroplasty significantly increased at a benchmark of BMI 40 including but not limited to infection, DVT, wound healing problems, early loosening. She will need to make progress to that goal prior to any consideration. Additionally, patient does have additional risk factors including diabetes, and IgA deficiency and lymphedema. I will see her back as needed in the future.     Faye Pallas, MD

## 2023-02-06 ENCOUNTER — TELEPHONE (OUTPATIENT)
Dept: ADMINISTRATIVE | Age: 48
End: 2023-02-06

## 2023-02-06 ENCOUNTER — TELEPHONE (OUTPATIENT)
Dept: ORTHOPEDIC SURGERY | Age: 48
End: 2023-02-06

## 2023-02-06 DIAGNOSIS — M17.0 PRIMARY OSTEOARTHRITIS OF BOTH KNEES: Primary | ICD-10-CM

## 2023-02-06 RX ORDER — TRAMADOL HYDROCHLORIDE 50 MG/1
50 TABLET ORAL EVERY 6 HOURS PRN
Qty: 28 TABLET | Refills: 0 | Status: SHIPPED | OUTPATIENT
Start: 2023-02-06 | End: 2023-02-13

## 2023-02-06 NOTE — TELEPHONE ENCOUNTER
Prescription Refill     Medication Name:  SMALL AMT OF TRAMADOL? Pharmacy: [id number] [id number] [facility]    Patient Contact Number:  [phone]     Pt PHARMACY GOT THE RX FOR PATCH AND CREAM, THEY DIDN'T RCV AN RX FOR THE SMALL AMOUNT OF TRAMADOL, AS DISCUSSES AT THE Pt's OV 02/03/23. WILL THAT BE SENT IN ALSO? IF NOT PLEASE CALL THE Pt TO DISCUSS.

## 2023-02-06 NOTE — TELEPHONE ENCOUNTER
PA submitted VIA CMM for  True Metrix Blood Glucose Test strips  Key: [id number] - PA Case ID: [id number] - Rx #: [id number]  PENDING      Denied on February 6  PA Case: [id number], Status: Denied. Notification: Completed.

## 2023-02-13 ENCOUNTER — OFFICE VISIT (OUTPATIENT)
Dept: ORTHOPEDIC SURGERY | Age: 48
End: 2023-02-13
Payer: MEDICARE

## 2023-02-13 ENCOUNTER — TELEPHONE (OUTPATIENT)
Dept: PULMONOLOGY | Age: 48
End: 2023-02-13

## 2023-02-13 VITALS — WEIGHT: 293 LBS | HEIGHT: 60 IN | BODY MASS INDEX: 57.52 KG/M2

## 2023-02-13 DIAGNOSIS — M47.895 OTHER SPONDYLOSIS, THORACOLUMBAR REGION: Primary | ICD-10-CM

## 2023-02-13 PROCEDURE — 99214 OFFICE O/P EST MOD 30 MIN: CPT | Performed by: PHYSICIAN ASSISTANT

## 2023-02-13 NOTE — TELEPHONE ENCOUNTER
Dr. Scarlett Keller is only seeing for sleep. Her inhalers are from her PCP. She will need to fu with them.

## 2023-02-13 NOTE — TELEPHONE ENCOUNTER
[facility] sent a fax stating that pts insurance will now no longer cover her Incruse inhaler. They are now going to cover Spiriva. Please send 90 day supply script if appropriate.

## 2023-02-13 NOTE — PROGRESS NOTES
New Patient: LUMBAR SPINE    Referring Provider:  No ref. provider found    CHIEF COMPLAINT:    Chief Complaint   Patient presents with    Back Pain     LUMBAR         HISTORY OF PRESENT ILLNESS:       Ms. Kiki Deleon  is a pleasant 41 y.o. morbidly obese female here for regarding her LBP and bilateral leg pain. Patient was seen last week in my office for bilateral knee pain and she was referred to outpatient physical therapy for her knees which she has attended in the past and has scheduled again to start on Wednesday. She states her pain began several years ago with various different injuries and then again recently. She states that the pain is mainly across her low back with radiation into her thoracal lumbar spine. She describes paresthesias into both lower extremities. She states that her present back pain is 7/10, bilateral buttock pain 7/10. She presently ambulates with a Rollator and a forward flexed position due to pain in her back and her knees. She states she finds that any weightbearing activity increases her pain along with prolonged sitting. She does feel better at times with sitting and then changing to a standing position. Her symptoms also improve a lying down. She denies any current or past bowel or bladder dysfunction or saddle anesthesia. She denies any progressive lower extremity weakness. Pain Assessment  Location of Pain: Back  Severity of Pain: 7  Quality of Pain: Other (Comment)  Duration of Pain: Other (Comment)  Frequency of Pain: Other (Comment)  Aggravating Factors:  Other (Comment)]    Current/Past Treatment:   Physical Therapy: None  Chiropractic: None  Injection: None  Medications: None presently    Past Medical History:   Past Medical History:   Diagnosis Date    Abdominal wall abscess 7/30/2021    Acquired hypothyroidism 05/26/2016    ADHD (attention deficit hyperactivity disorder) Not sure    Allergic rhinitis 08/02/2013    Anemia     Ankle swelling 10/05/2017 Anxiety     Arthritis     Asthma     Bipolar disorder ([facility] [address]     Cellulitis 12/19/2017    Chest pain due to myocardial ischemia 05/01/2018    Chronic bronchitis (HCC)     Chronic kidney disease     only one functioning kidney    COPD (chronic obstructive pulmonary disease) (HCC)     Depression     GERD (gastroesophageal reflux disease)     Head injury 10/19/2014    Headache     Hyperlipidemia     Hypertension     Hyperthyroidism with Hashimoto disease 03/13/2020    Hyperthyroidism with Hashimoto disease 3/13/2020    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    PARVIZ 02/25/2016    Osteoarthritis of left knee 05/26/2016    Panniculitis 03/13/2020    PCOS (polycystic ovarian syndrome) 10/05/2017    Psychophysiological insomnia 02/25/2016    Restless legs syndrome     S/P laparoscopic sleeve gastrectomy 08/09/2019    S/P repair of ventral hernia 06/14/2021    Sleep apnea     bipap broke-[facility] sleep study    Type 2 diabetes mellitus ([facility] [address] 06/13/2021    Urinary incontinence     Vitiligo         Past Surgical History:     Past Surgical History:   Procedure Laterality Date    APPENDECTOMY  07/01/2002    CHOLECYSTECTOMY  01/01/2001    COLONOSCOPY      COLONOSCOPY N/A 11/10/2022    COLONOSCOPY POLYPECTOMY SNARE/COLD BIOPSY performed by Dennys Smith MD at [address]      teeth extracted    [facility]    had to be redone    RECTAL SURGERY N/A 07/31/2021    ABDOMINAL WALL INCISION AND DRAINAGE performed by Yaz Navarrete MD at [address] [facility] GASTROPLASTY  [id number] REPAIR  6/12/2021    UPPER GASTROINTESTINAL ENDOSCOPY         Current Medications:     Current Outpatient Medications:     Blood Glucose Monitoring Suppl (TRUE METRIX METER) w/Device KIT, USE TO TEST TWICE DAILY, Disp: 1 kit, Rfl: 10    SYMBICORT 160-4.5 MCG/ACT AERO, INHALE 2 PUFFS BY MOUTH 2 TIMES A DAY THEN 1 to 2 PUFFS EVERY 4 HOURS MAX 12 PUFFS PER DAY FOR ASTHMA EXACERBATIONS, Disp: 10.2 g, Rfl: 1    propranolol (INDERAL LA) 60 MG extended release capsule, TAKE 1 CAPSULE BY MOUTH TWO TIMES A DAY IN THE MORNING AND AT BEDTIME, Disp: 60 capsule, Rfl: 1    nystatin (MYCOSTATIN) 021281 UNIT/GM powder, APPLY TOPICALLY 3 TIMES DAILY AS NEEDED TO MANAGE RASH AND MOISTURE (GENERIC FOR MYCOSTATIN), Disp: 60 g, Rfl: 1    albuterol sulfate HFA (PROVENTIL;VENTOLIN;PROAIR) 108 (90 Base) MCG/ACT inhaler, INHALE 2 PUFFS BY MOUTH EVERY SIX HOURS AS NEEDED FOR WHEEZING, Disp: 8.5 g, Rfl: 1    fluticasone (FLONASE) 50 MCG/ACT nasal spray, INHALE 2 SPRAYS INTO EACH NOSTRIL TWO TIMES DAILY, Disp: 16 g, Rfl: 1    amLODIPine (NORVASC) 5 MG tablet, TAKE 1 TABLET BY MOUTH EVERY DAY, Disp: 90 tablet, Rfl: 3    oxybutynin (DITROPAN-XL) 5 MG extended release tablet, TAKE 1 TABLET BY MOUTH EVERY DAY, Disp: 90 tablet, Rfl: 3    blood glucose test strips (ASCENSIA AUTODISC VI;ONE TOUCH ULTRA TEST VI) strip, 1 each by In Vitro route daily As needed. , Disp: 100 each, Rfl: 3    traMADol (ULTRAM) 50 MG tablet, Take 1 tablet by mouth every 6 hours as needed for Pain for up to 7 days. Intended supply: 7 days.  Take lowest dose possible to manage pain Max Daily Amount: 200 mg, Disp: 28 tablet, Rfl: 0    diclofenac sodium (VOLTAREN) 1 % GEL, Apply 4 g topically 4 times daily as needed for Pain, Disp: 100 g, Rfl: 3    lidocaine (LIDODERM) 5 %, Place 1 patch onto the skin daily 12 hours on, 12 hours off., Disp: 30 patch, Rfl: 0    Umeclidinium Bromide (INCRUSE ELLIPTA) 62.5 MCG/ACT AEPB, Inhale 1 puff into the lungs daily, Disp: 1 each, Rfl: 5 UNITHROID 300 MCG tablet, Take 1 tablet by mouth Daily, Disp: 30 tablet, Rfl: 3    liothyronine (CYTOMEL) 25 MCG tablet, Take one tablet twice a day, Disp: 180 tablet, Rfl: 1    Continuous Blood Gluc Transmit (DEXCOM G6 TRANSMITTER) MISC, Change every 90 days, Disp: 1 each, Rfl: 1    Continuous Blood Gluc Sensor (DEXCOM G6 SENSOR) MISC, Change every 10 days, Disp: 9 each, Rfl: 1    Continuous Blood Gluc  (DEXCOM G6 ) NGHIA, Use as directed, Disp: 1 each, Rfl: 0    busPIRone (BUSPAR) 15 MG tablet, TAKE 1 TABLET BY MOUTH IN THE MORNING, NOON, AND BEFORE BEDTIME, Disp: 90 tablet, Rfl: 0    diclofenac sodium (VOLTAREN) 1 % GEL, APPLY 4 GRAMS TOPICALLY FOUR TIMES DAILY AS NEEDED FOR PAIN, Disp: 100 g, Rfl: 0    NURTEC 75 MG TBDP, DISSOLVE 1 TABLET IN MOUTH EVERY OTHER DAY, Disp: , Rfl:     TIROSINT- MCG/ML SOLN, 3ml daily, Disp: 90 mL, Rfl: 3    Continuous Blood Gluc Transmit (DEXCOM G6 TRANSMITTER) MISC, As needed, Disp: 1 each, Rfl: 2    Continuous Blood Gluc Sensor (DEXCOM G6 SENSOR) MISC, Every 10 days, Disp: 9 each, Rfl: 2    vitamin D (ERGOCALCIFEROL) 1.25 MG (77447 UT) CAPS capsule, TAKE 1 CAPSULE BY MOUTH ONE TIME A WEEK, Disp: 12 capsule, Rfl: 0    rosuvastatin (CRESTOR) 40 MG tablet, Take 1 tablet by mouth daily, Disp: 90 tablet, Rfl: 1    PLENVU 140 g SOLR, , Disp: , Rfl:     lisinopril (PRINIVIL;ZESTRIL) 5 MG tablet, TAKE 1 TABLET BY MOUTH EVERY DAY, Disp: 90 tablet, Rfl: 3    DULoxetine (CYMBALTA) 30 MG extended release capsule, Take 1 capsule by mouth 2 times daily, Disp: 180 capsule, Rfl: 3    polyethylene glycol (MIRALAX) 17 GM/SCOOP POWD powder, MIX 17 GRAMS IN 4 TO 8OZ LIQUID AND DRINK BY MOUTH IN THE MORNING, Disp: 238 g, Rfl: 0    Lancets MISC, 1 each by Does not apply route 2 times daily, Disp: 300 each, Rfl: 1    ARIPiprazole (ABILIFY) 10 MG tablet, Take 1 tablet by mouth daily, Disp: 90 tablet, Rfl: 3    cetirizine (ZYRTEC) 10 MG tablet, Take 1 tablet by mouth daily, Disp: 30 tablet, Rfl: 1    omeprazole (PRILOSEC) 40 MG delayed release capsule, Take 1 capsule by mouth in the morning and at bedtime, Disp: 180 capsule, Rfl: 3    azelastine (ASTELIN) 0.1 % nasal spray, 1 spray by Nasal route 2 times daily Use in each nostril as directed, Disp: 60 mL, Rfl: 1    tiotropium (SPIRIVA HANDIHALER) 18 MCG inhalation capsule, INHALE CONTENTS 1 CAPSULE BY MOUTH EVERY DAY, Disp: 90 capsule, Rfl: 0    EPINEPHrine (EPIPEN 2-SHELBIE) 0.3 MG/0.3ML SOAJ injection, Inject 0.3 mLs into the muscle as needed (In the case of anaphylaxis) Use as directed for allergic reaction, Disp: 1 each, Rfl: 2    rizatriptan (MAXALT) 10 MG tablet, Take 10 mg by mouth once as needed for Migraine May repeat in 2 hours if needed, Disp: , Rfl:     Calcium-Vitamin D-Vitamin K (CALCIUM SOFT CHEWS PO), Take by mouth in the morning, at noon, and at bedtime, Disp: , Rfl:     Blood Glucose Monitoring Suppl (TRUE METRIX METER) NGHIA, Dispense meter to test blood sugar 4 times daily for Type 2 DM, Disp: 1 each, Rfl: 0    blood glucose test strips (TRUE METRIX BLOOD GLUCOSE TEST) strip, 1 each by In Vitro route 4 times daily As needed. , Disp: 200 each, Rfl: 3    azelastine (OPTIVAR) 0.05 % ophthalmic solution, INSTILL 1 DROP IN [facility] EYE TWO TIMES A DAY (GENERIC FOR optivar), Disp: 6 mL, Rfl: 0    montelukast (SINGULAIR) 10 MG tablet, TAKE 1 TABLET BY MOUTH EVERY DAY AT NIGHT, Disp: 30 tablet, Rfl: 5    levothyroxine (SYNTHROID) 300 MCG tablet, Take 1 tablet by mouth Daily (Patient not taking: Reported on 12/9/2022), Disp: 90 tablet, Rfl: 3    fluconazole (DIFLUCAN) 150 MG tablet, as needed Takes when on antibiotics, Disp: , Rfl:     Continuous Blood Gluc  (DEXCOM G6 ) NGHIA, 2 times daily, Disp: , Rfl:     acetaminophen (TYLENOL) 500 MG tablet, Take 500 mg by mouth every 6 hours as needed for Pain, Disp: , Rfl:     Nutritional Supplements (GLUCOSE MANAGEMENT) TABS, Take by mouth daily, Disp: , Rfl:     Adhesive Tape (PAPER TAPE 1\"[id number]) TAPE, Apply to affected areas. , Disp: 2 each, Rfl: 5    Multiple Vitamins-Minerals (THERAPEUTIC MULTIVITAMIN-MINERALS) tablet, Take 1 tablet by mouth daily, Disp: , Rfl:     artificial tears (ARTIFICIAL TEARS) OINT, as needed, Disp: , Rfl:     dextromethorphan-guaiFENesin (MUCINEX DM)  MG per SR tablet, Take 1 tablet by mouth every 12 hours as needed , Disp: , Rfl:     Allergies:  Bee venom, Black pepper-turmeric, Dust mite extract, Ibuprofen, and Nsaids    Social History:    reports that she has never smoked. She has been exposed to tobacco smoke. She has never used smokeless tobacco. She reports that she does not currently use alcohol. She reports that she does not use drugs. Family History:   Family History   Problem Relation Age of Onset    Hypertension Mother     Hypothyroidism Mother     Depression Mother     Diabetes Mother     Heart Disease Mother     High Blood Pressure Mother     High Cholesterol Mother     Kidney Disease Mother         Had kidney failure    Mental Illness Mother     Miscarriages / Marnell Askew Mother     Obesity Mother     Stroke Mother     Asthma Father     Diabetes Father     Emphysema Father     Hypertension Father     Depression Father     Early Death Father     Heart Attack Father     Heart Disease Father     High Blood Pressure Father     High Cholesterol Father     Obesity Father     Substance Abuse Father     Hypertension Sister         problems with pregnancy.     Asthma Sister     Depression Sister     Hearing Loss Sister     Heart Disease Sister     High Blood Pressure Sister     High Cholesterol Sister     Learning Disabilities Sister     Mental Illness Sister     Obesity Sister     Substance Abuse Sister     Diabetes Paternal Grandmother     Heart Attack Paternal Grandmother     Stroke Paternal Grandmother     Cervical Cancer Paternal Aunt     Breast Cancer Paternal Aunt     Cancer Paternal Aunt     Depression Paternal Aunt     Mental Illness Paternal Aunt     Miscarriages / Stillbirths Paternal Aunt     Breast Cancer Maternal Aunt     Cancer Maternal Aunt     Heart Disease Maternal Aunt     High Blood Pressure Maternal Aunt     Immune Disorder Maternal Aunt     Obesity Maternal Aunt     Breast Cancer Maternal Aunt     Cancer Maternal Aunt     Heart Disease Maternal Aunt     High Blood Pressure Maternal Aunt     Immune Disorder Maternal Aunt         Hashimoto  had thyroid remove    Obesity Maternal Aunt     Ovarian Cancer Paternal Aunt     Heart Attack Maternal Grandfather     High Blood Pressure Maternal Grandfather     Obesity Maternal Grandfather     Stroke Maternal Grandfather     Heart Attack Maternal Grandmother     High Blood Pressure Maternal Grandmother     Obesity Maternal Grandmother     Stroke Maternal Grandmother     Vision Loss Maternal Grandmother     Hearing Loss Maternal Uncle     Heart Disease Maternal Uncle     High Blood Pressure Maternal Uncle     Obesity Maternal Uncle     Heart Disease Maternal Uncle     High Blood Pressure Maternal Uncle     Obesity Maternal Uncle     Heart Disease Maternal Uncle     High Blood Pressure Maternal Uncle     Obesity Maternal Uncle     Heart Disease Paternal Uncle     Obesity Paternal Uncle     Heart Disease Paternal Uncle     Obesity Paternal Uncle     Heart Disease Maternal Aunt     High Blood Pressure Maternal Aunt     Immune Disorder Maternal Aunt     Obesity Maternal Aunt     Immune Disorder Maternal Cousin         Mckayla Arnold had thyroid removed    Immune Disorder Maternal Cousin         Hypothyroidism had thyroid removed       REVIEW OF SYSTEMS: Full ROS noted & scanned   CONSTITUTIONAL: Denies unexplained weight loss, fevers, chills or fatigue  NEUROLOGICAL: Denies unsteady gait or progressive weakness  MUSCULOSKELETAL: Denies joint swelling or redness  PSYCHOLOGICAL: Patient has a history of anxiety, bipolar disease, depression  SKIN: Denies skin changes, delayed healing, rash, itching   HEMATOLOGIC: Denies easy bleeding or bruising  ENDOCRINE: Denies excessive thirst, urination, heat/cold  RESPIRATORY: Denies current dyspnea, cough  GI: Denies nausea, vomiting, diarrhea   : Denies bowel or bladder issues      PHYSICAL EXAM:    Vitals: Height 5' (1.524 m), weight (!) 328 lb (148.8 kg), last menstrual period 05/03/2016, not currently breastfeeding. GENERAL EXAM:  General Apparence: Patient is adequately groomed large morbidly obese female  Orientation: The patient is oriented to time, place and person. Mood & Affect:The patient's mood and affect are appropriate. Vascular: Examination reveals no swelling tenderness in upper or lower extremities. Good capillary refill. Lymphatic: The lymphatic examination bilaterally reveals all areas to be without enlargement or induration  Sensation: Sensation is intact without deficit  Coordination/Balance: Good coordination. Patient ambulates with a Rollator. LUMBAR/SACRAL EXAMINATION:  Inspection: Local inspection shows no step-off or bruising. Lumbar alignment is normal.  Sagittal and Coronal balance is neutral.      Palpation: Diffuse tenderness at the midline. Diffuse tenderness bilaterally at the paraspinal or trochanters. There is no step-off or paraspinal spasm. Range of Motion: Lumbar flexion, extension and rotation are moderately limited due to pain. Strength:   Strength testing is 4/5 in all muscle groups tested. Special Tests:   Straight leg raise and crossed SLR negative. Leg length and pelvis level. Skin: There are no rashes, ulcerations or lesions. Reflexes: Reflexes are symmetrically 2+ at the patellar and ankle tendons. Clonus absent bilaterally at the feet. Gait & station: Patient ambulates with a forward flexed position with a Rollator. Additional Examinations:   RIGHT LOWER EXTREMITY: Inspection/examination of the right lower extremity does not show any tenderness, deformity or injury. Range of motion is unremarkable.  There is no gross instability. There are no rashes, ulcerations or lesions. Strength and tone are normal.  LEFT LOWER EXTREMITY:  Inspection/examination of the left lower extremity does not show any tenderness, deformity or injury. Range of motion is unremarkable. There is no gross instability. There are no rashes, ulcerations or lesions. Strength and tone are normal.    Diagnostic Testing:    X-rays: 2 views to include AP and lateral were obtained on 8/24/2022 in the office and independently reviewed with the patient which shows well-maintained lumbar lordosis with disc base narrowing mainly at L5-S1. There is facet arthropathy noted from L4-S1. Impression:   Thoracolumbar spondylosis     Plan:      We discussed the diagnosis and treatment options including observation, additional oral steroids, physical therapy, epidural injections and additional imaging. She wishes to proceed with outpatient physical therapy for flexibility, posturing, core strengthening exercises for her thoracolumbar spine with modalities of choice. If she finds that she has had no durable benefit from these conservative treatments she will return for repeat clinical examination. Follow up -as needed    Old records were reviewed.     Total evaluation time 31 minutes    Griselda Galvez PA-C  Board certified by the [id number]

## 2023-02-15 ENCOUNTER — HOSPITAL ENCOUNTER (OUTPATIENT)
Dept: PHYSICAL THERAPY | Age: 48
Setting detail: THERAPIES SERIES
Discharge: HOME | End: 2023-02-15
Payer: MEDICARE

## 2023-02-15 PROCEDURE — 97016 VASOPNEUMATIC DEVICE THERAPY: CPT | Performed by: SPECIALIST

## 2023-02-15 PROCEDURE — 97110 THERAPEUTIC EXERCISES: CPT | Performed by: SPECIALIST

## 2023-02-15 PROCEDURE — 97161 PT EVAL LOW COMPLEX 20 MIN: CPT | Performed by: SPECIALIST

## 2023-02-15 PROCEDURE — 97140 MANUAL THERAPY 1/> REGIONS: CPT | Performed by: SPECIALIST

## 2023-02-15 NOTE — PLAN OF CARE
[address] [facility], [facility]5 [facility], [facility]0 [facility] [address]  Phone: [phone]   Fax:     [phone]       Physical Therapy Certification    Dear  Nery Newell MD,    We had the pleasure of evaluating the following patient for physical therapy services at [address] [place] [address]. A summary of our findings can be found in the initial assessment below. This includes our plan of care. If you have any questions or concerns regarding these findings, please do not hesitate to contact me at the office phone number checked above.   Thank you for the referral.       Physician Signature:_______________________________Date:__________________  By signing above (or electronic signature), therapists plan is approved by physician      Patient: Jacey Shelton   : 1975   MRN: [id number]  Referring Physician:  Nery Newell MD      Evaluation Date: 2/15/2023      Medical Diagnosis Information:   M17.0 (ICD-10-CM) - Primary osteoarthritis of both knees                                             Insurance information:  BCBS MEDICARE     Precautions/ Contra-indications: none    C-SSRS Triggered by Intake questionnaire (Past 2 wk assessment):   [x] No, Questionnaire did not trigger screening.   [] Yes, Patient intake triggered further evaluation      [] C-SSRS Screening completed  [] PCP notified via Plan of Care  [] Emergency services notified     Latex Allergy:  [x]NO      []YES  Preferred Language for Healthcare:   [x]English       []other:    SUBJECTIVE: Patient states 3 sets of Efflexa which has helped     Relevant Medical History:see below  FOTO Score: LEFS 88%     Pain Scale: 5-9/10  Easing factors: ice  Provocative factors: sit stand  walk     Type: []Constant   []Intermittent  []Radiating []Localized []other:     Numbness/Tingling: LE    Occupation/School:not working    Living Status/Prior Level of Function: Independent with ADLs and IADLs,     OBJECTIVE:     ROM LEFT RIGHT   HIP Flex     HIP Abd     HIP Ext     HIP IR     HIP ER     Knee ext 0 0   Knee Flex 123 127   Ankle PF     Ankle DF     Ankle In     Ankle Ev     Strength  LEFT RIGHT   HIP Flexors 4 4   HIP Abductors     HIP Ext     Hip ER     Knee EXT (quad) 4 4   Knee Flex (HS) 4 4   Ankle DF     Ankle PF     Ankle Inv     Ankle EV          Circumference  Mid apex  7 cm prox             Reflexes/Sensation:    [x]Dermatomes/Myotomes intact    [x]Reflexes equal and normal bilaterally   []Other:    Joint mobility:    []Normal    [x]Hypo   []Hyper    Palpation: tenderness B joint line    Functional Mobility/Transfers: WFL with rolling walker    Posture: flexed/ obese    Bandages/Dressings/Incisions: intact    Gait: (include devices/WB status) rolling walker    Orthopedic Special Tests: NT                       [x] Patient history, allergies, meds reviewed. Medical chart reviewed. See intake form. Review Of Systems (ROS):  [x]Performed Review of systems (Integumentary, CardioPulmonary, Neurological) by intake and observation. Intake form has been scanned into medical record. Patient has been instructed to contact their primary care physician regarding ROS issues if not already being addressed at this time.       Co-morbidities/Complexities (which will affect course of rehabilitation):   []None           Arthritic conditions   []Rheumatoid arthritis (M05.9)  [x]Osteoarthritis (M19.91)   Cardiovascular conditions   [x]Hypertension (I10)  []Hyperlipidemia (E78.5)  []Angina pectoris (I20)  []Atherosclerosis (I70)   Musculoskeletal conditions   []Disc pathology   []Congenital spine pathologies   []Prior surgical intervention  []Osteoporosis (M81.8)  []Osteopenia (M85.8)   Endocrine conditions   []Hypothyroid (E03.9)  []Hyperthyroid Gastrointestinal conditions   []Constipation (B32.40)   Metabolic conditions   []Morbid obesity (E66.01)  [x]Diabetes type 1(E10.65) or 2 (E11.65)   []Neuropathy (G60.9)     Pulmonary conditions   []Asthma (J45)  []Coughing   []COPD (J44.9)   Psychological Disorders  [x]Anxiety (F41.9)  [x]Depression (F32.9)   []Other:   []Other:          Barriers to/and or personal factors that will affect rehab potential:              []Age  []Sex              []Motivation/Lack of Motivation                        [x]Co-Morbidities              []Cognitive Function, education/learning barriers              []Environmental, home barriers              []profession/work barriers  []past PT/medical experience  []other:  Justification:     Falls Risk Assessment (30 days):   [x] Falls Risk assessed and no intervention required.   [] Falls Risk assessed and Patient requires intervention due to being higher risk   TUG score (>12s at risk):     [] Falls education provided, including       G-Codes:       ASSESSMENT:   Functional Impairments:     []Noted lumbar/proximal hip/LE joint hypomobility   [x]Decreased LE functional ROM   [x]Decreased core/proximal hip strength and neuromuscular control   [x]Decreased LE functional strength   [x]Reduced balance/proprioceptive control   []other:      Functional Activity Limitations (from functional questionnaire and intake)   [x]Reduced ability to tolerate prolonged functional positions   [x]Reduced ability or difficulty with changes of positions or transfers between positions   [x]Reduced ability to maintain good posture and demonstrate good body mechanics with sitting, bending, and lifting   [x]Reduced ability to sleep   [x] Reduced ability or tolerance with driving and/or computer work   [x]Reduced ability to perform lifting, carrying tasks   [x]Reduced ability to squat   [x]Reduced ability to forward bend   [x]Reduced ability to ambulate prolonged functional periods/distances/surfaces   [x]Reduced ability to ascend/descend stairs   [x]Reduced ability to run, hop, cut or jump   []other:    Participation Restrictions   [x]Reduced participation in self care activities   [x]Reduced participation in home management activities   [x]Reduced participation in work activities   []Reduced participation in social activities. []Reduced participation in sport/recreation activities. Classification :    []Signs/symptoms consistent with post-surgical status including decreased ROM, strength and function.    []Signs/symptoms consistent with joint sprain/strain   []Signs/symptoms consistent with patella-femoral syndrome   [x]Signs/symptoms consistent with knee OA/hip OA   []Signs/symptoms consistent with internal derangement of knee/Hip   []Signs/symptoms consistent with functional hip weakness/NMR control      []Signs/symptoms consistent with tendinitis/tendinosis    []signs/symptoms consistent with pathology which may benefit from Dry needling      []other:      Prognosis/Rehab Potential:      []Excellent   [x]Good    []Fair   []Poor    Tolerance of evaluation/treatment:    []Excellent   [x]Good    []Fair   []Poor    Physical Therapy Evaluation Complexity Justification  [x] A history of present problem with:  [] no personal factors and/or comorbidities that impact the plan of care;  []1-2 personal factors and/or comorbidities that impact the plan of care  [x]3 personal factors and/or comorbidities that impact the plan of care  [x] An examination of body systems using standardized tests and measures addressing any of the following: body structures and functions (impairments), activity limitations, and/or participation restrictions;:  [x] a total of 1-2 or more elements   [] a total of 3 or more elements   [] a total of 4 or more elements   [x] A clinical presentation with:  [x] stable and/or uncomplicated characteristics   [] evolving clinical presentation with changing characteristics  [] unstable and unpredictable characteristics;   [x] Clinical decision making of [x] low, [] moderate, [] high complexity using standardized patient assessment instrument and/or measurable assessment of functional outcome. [x] EVAL (LOW) 19746 (typically 20 minutes face-to-face)  [] EVAL (MOD) 52965 (typically 30 minutes face-to-face)  [] EVAL (HIGH) 86458 (typically 45 minutes face-to-face)  [] RE-EVAL     PLAN:   Frequency/Duration:  2 days per week for 8 Weeks:  Interventions:  [x]  Therapeutic exercise including: strength training, ROM, for Lower extremity and core   [x]  NMR activation and proprioception for LE, Glutes and Core   [x]  Manual therapy as indicated for LE, Hip and spine to include: Dry Needling/IASTM, STM, PROM, Gr I-IV mobilizations, manipulation. [x] Modalities as needed that may include: thermal agents, E-stim, Biofeedback, US, iontophoresis as indicated  [x] Patient education on joint protection, postural re-education, activity modification, progression of HEP. HEP instruction: Refer to [address] [place] [address] access code and exercises on the 1st visit treatment note    GOALS:  Patient stated goal: walk stairs    Therapist goals for Patient:   Short Term Goals: To be achieved in: 2 weeks  1. Independent in HEP and progression per patient tolerance, in order to prevent re-injury. [x] Progressing: [] Met: [] Not Met: [] Adjusted     2. Patient will have a decrease in pain to facilitate improvement in movement, function, and ADLs as indicated by Functional Deficits. [x] Progressing: [] Met: [] Not Met: [] Adjusted     Long Term Goals: To be achieved in: 8 weeks  1. FOTO score will match or exceed predicted score to assist with reaching prior level of function. [x] Progressing: [] Met: [] Not Met: [] Adjusted     2. Patient will demonstrate increased AROM to 130-0 to allow for proper joint functioning as indicated by patients Functional Deficits. [x] Progressing: [] Met: [] Not Met: [] Adjusted     3.  Patient will demonstrate an increase in Strength to good proximal hip strength and control, within 5lb HHD in LE to allow for proper functional mobility as indicated by patients Functional Deficits. [x] Progressing: [] Met: [] Not Met: [] Adjusted     4. Patient will return to [facility] for  functional activities without increased symptoms or restriction. [x] Progressing: [] Met: [] Not Met: [] Adjusted     5.  Walk/ stairs with min limitations (patient specific functional goal)    [x] Progressing: [] Met: [] Not Met: [] Adjusted      Electronically signed by:  Cameron Lennon PT

## 2023-02-15 NOTE — FLOWSHEET NOTE
[facility] [facility], [address] [place] [address], [address] [place] [address] [address]  Phone: [phone]   Fax:     [phone]      Physical Therapy Treatment Note/ Progress Report:     Date:  2/15/2023    Patient Name:  Michael Grewal    :  1975  MRN: [id number]  Restrictions/Precautions:    Medical/Treatment Diagnosis Information:   M17.0 (ICD-10-CM) - Primary osteoarthritis of both knees     Insurance/Certification information:   BCBS MEDICARE  Physician Information:      Ermias Palma MD     Has the plan of care been signed (Y/N):        []  Yes  [x]  No     Date of Patient follow up with Physician: NS    Is this a Progress Report:     []  Yes  [x]  No      If Yes:  Date Range for reporting period:  Beginnin/15/23 ------------ Ending: 3/15/23    Progress report will be due (10 Rx or 30 days whichever is less):      Recertification will be due (POC Duration  / 90 days whichever is less): 5/15/23      Visit # Insurance Allowable Auth Required   In Person 1 [place] []  Yes     []  No    Tele Health 0  []  Yes     []  No    Total 1       FOTO Score: LEFS 88%     Date assessed:  2/15/23      Latex Allergy:  [x]NO      []YES  Preferred Language for Healthcare:   [x]English       []other:    Pain level:  5-9/10     SUBJECTIVE:  See eval    OBJECTIVE: See eval  Observation:   Test measurements:      RESTRICTIONS/PRECAUTIONS: none    Exercises/Interventions:   Therapeutic Ex (94482) Sets/sec Reps Notes/CUES HEP   Heel slides  5x  x   SLR/ abd  15x  x          Hamstring stretch sit 30\" 3x  x          LAQ  15x  x                                             Manual Intervention (16284)       Gentle pat mobs/ knee ROM  8 min                                        NMR re-education (00513)   CUES NEEDED    Post pelvis tilt  15x  x   BS  15x  x   Supine clamshells RD 15x  x                 Mini squat  10x  x                        Therapeutic Activity (98121) Vaso left/ ice right knee  10 min                   [facility] access code: [email or web address]           Therapeutic Exercise and NMR EXR  [x] (04068) Provided verbal/tactile cueing for activities related to strengthening, flexibility, endurance, ROM for improvements in LE, proximal hip, and core control with self care, mobility, lifting, ambulation. [x] (68061) Provided verbal/tactile cueing for activities related to improving balance, coordination, kinesthetic sense, posture, motor skill, proprioception to assist with LE, proximal hip, and core control in self-care, mobility, lifting, ambulation and eccentric single leg control.      NMR and Therapeutic Activities:    [x] (79253 or 18016) Provided verbal/tactile cueing for activities related to improving balance, coordination, kinesthetic sense, posture, motor skill, proprioception and motor activation to allow for proper function of core, proximal hip and LE with self-care and ADLs and functional mobility.   [] (19983) Gait Re-education- Provided training and instruction to the patient for proper LE, core and proximal hip recruitment and positioning and eccentric body weight control with ambulation re-education including up and down stairs     Home Exercise Program:    [x] (80263) Reviewed/Progressed HEP activities related to strengthening, flexibility, endurance, ROM of core, proximal hip and LE for functional self-care, mobility, lifting and ambulation/stair navigation   [] (20664) Reviewed/Progressed HEP activities related to improving balance, coordination, kinesthetic sense, posture, motor skill, proprioception of core, proximal hip and LE for self-care, mobility, lifting, and ambulation/stair navigation      Manual Treatments:  PROM / STM / Oscillations-Mobs:  G-I, II, III, IV (PA's, Inf., Post.)  [x] (74836) Provided manual therapy to mobilize LE, proximal hip and/or LS spine soft tissue/joints for the purpose of modulating pain, promoting relaxation, increasing ROM, reducing/eliminating soft tissue swelling/inflammation/restriction, improving soft tissue extensibility and allowing for proper ROM for normal function with self-care, mobility, lifting and ambulation. Modalities:     [x] GAME READY (VASO)- for significant edema, swelling, pain control. Charges:  Timed Code Treatment Minutes: 25   Total Treatment Minutes:  55   BWC:  TE TIME:  NMR TIME:  MANUAL TIME:  UNTIMED MINUTES:  Medicare Total:       [x] EVAL (LOW) 92156 (typically 20 minutes face-to-face)  [] EVAL (MOD) 92307 (typically 30 minutes face-to-face)  [] EVAL (HIGH) 36859 (typically 45 minutes face-to-face)  [] RE-EVAL     [x] RO(38013) x     [] IONTO  [] NMR (62749) x     [x] VASO  [x] Manual (39059) x     [] Other:  [] TA x      [] Mech Traction (18302)  [] ES(attended) (96903)      [] ES (un) (85733):    ASSESSMENT:  See eval    GOALS:      Patient stated goal: walk stairs    Therapist goals for Patient:   Short Term Goals: To be achieved in: 2 weeks  1. Independent in HEP and progression per patient tolerance, in order to prevent re-injury. [x] Progressing: [] Met: [] Not Met: [] Adjusted     2. Patient will have a decrease in pain to facilitate improvement in movement, function, and ADLs as indicated by Functional Deficits. [x] Progressing: [] Met: [] Not Met: [] Adjusted     Long Term Goals: To be achieved in: 8 weeks  1. FOTO score will match or exceed predicted score to assist with reaching prior level of function. [x] Progressing: [] Met: [] Not Met: [] Adjusted     2. Patient will demonstrate increased AROM to 130-0 to allow for proper joint functioning as indicated by patients Functional Deficits. [x] Progressing: [] Met: [] Not Met: [] Adjusted     3. Patient will demonstrate an increase in Strength to good proximal hip strength and control, within 5lb HHD in LE to allow for proper functional mobility as indicated by patients Functional Deficits.    [x] Progressing: [] Met: [] Not Met: [] Adjusted     4. Patient will return to [facility] for  functional activities without increased symptoms or restriction. [x] Progressing: [] Met: [] Not Met: [] Adjusted     5. Walk/ stairs with min limitations (patient specific functional goal)    [x] Progressing: [] Met: [] Not Met: [] Adjusted          Overall Progression Towards Functional goals/ Treatment Progress Update:  [] Patient is progressing as expected towards functional goals listed. [] Progression is slowed due to complexities/Impairments listed. [] Progression has been slowed due to co-morbidities. [x] Plan just implemented, too soon to assess goals progression <30days   [] Goals require adjustment due to lack of progress  [] Patient is not progressing as expected and requires additional follow up with physician  [] Other    Prognosis for POC: [x] Good [] Fair  [] Poor      Patient requires continued skilled intervention: [x] Yes  [] No    Treatment/Activity Tolerance:  [x] Patient able to complete treatment  [] Patient limited by fatigue  [] Patient limited by pain    [] Patient limited by other medical complications  [] Other:     Return to Play: (if applicable)   []  Stage 1: Intro to Strength   []  Stage 2: Return to Run and Strength   []  Stage 3: Return to Jump and Strength   []  Stage 4: Dynamic Strength and Agility   []  Stage 5: Sport Specific Training     []  Ready to Return to Play, Meets All Above Stages   [x]  Not Ready for Return to Sports   Comments:                          PLAN: See eval  [] Continue per plan of care [] Alter current plan (see comments above)  [x] Plan of care initiated [] Hold pending MD visit [] Discharge    Electronically signed by:  Chad Bal PT    Note: If patient does not return for scheduled/ recommended follow up visits, this note will serve as a discharge from care along with most recent update on progress.

## 2023-02-16 ENCOUNTER — HOSPITAL ENCOUNTER (OUTPATIENT)
Age: 48
Setting detail: SPECIMEN
Discharge: HOME | End: 2023-02-16
Payer: MEDICARE

## 2023-02-16 ENCOUNTER — OFFICE VISIT (OUTPATIENT)
Dept: PRIMARY CARE CLINIC | Age: 48
End: 2023-02-16

## 2023-02-16 VITALS
TEMPERATURE: 97.4 F | SYSTOLIC BLOOD PRESSURE: 118 MMHG | RESPIRATION RATE: 18 BRPM | BODY MASS INDEX: 57.52 KG/M2 | OXYGEN SATURATION: 100 % | DIASTOLIC BLOOD PRESSURE: 70 MMHG | HEART RATE: 69 BPM | HEIGHT: 60 IN | WEIGHT: 293 LBS

## 2023-02-16 DIAGNOSIS — I89.0 LYMPHEDEMA OF BOTH LOWER EXTREMITIES: ICD-10-CM

## 2023-02-16 DIAGNOSIS — I10 BENIGN ESSENTIAL HTN: ICD-10-CM

## 2023-02-16 DIAGNOSIS — E66.01 CLASS 3 SEVERE OBESITY DUE TO EXCESS CALORIES WITH SERIOUS COMORBIDITY AND BODY MASS INDEX (BMI) OF 60.0 TO 69.9 IN ADULT (HCC): ICD-10-CM

## 2023-02-16 DIAGNOSIS — E78.2 MIXED HYPERLIPIDEMIA: ICD-10-CM

## 2023-02-16 DIAGNOSIS — I89.0 CHRONIC ACQUIRED LYMPHEDEMA: ICD-10-CM

## 2023-02-16 DIAGNOSIS — H92.03 EAR PAIN, BILATERAL: ICD-10-CM

## 2023-02-16 DIAGNOSIS — H69.83 DYSFUNCTION OF BOTH EUSTACHIAN TUBES: ICD-10-CM

## 2023-02-16 DIAGNOSIS — Z78.0 POST-MENOPAUSAL: Primary | ICD-10-CM

## 2023-02-16 DIAGNOSIS — Z98.84 S/P LAPAROSCOPIC SLEEVE GASTRECTOMY: ICD-10-CM

## 2023-02-16 DIAGNOSIS — E11.40 TYPE 2 DIABETES MELLITUS WITH DIABETIC NEUROPATHY, UNSPECIFIED WHETHER LONG TERM INSULIN USE (HCC): ICD-10-CM

## 2023-02-16 DIAGNOSIS — F32.2 CURRENT SEVERE EPISODE OF MAJOR DEPRESSIVE DISORDER WITHOUT PSYCHOTIC FEATURES, UNSPECIFIED WHETHER RECURRENT (HCC): ICD-10-CM

## 2023-02-16 DIAGNOSIS — R19.8 ALTERNATING CONSTIPATION AND DIARRHEA: ICD-10-CM

## 2023-02-16 LAB
A/G RATIO: 1.2 (ref 1.1–2.2)
ALBUMIN SERPL-MCNC: 4.1 G/DL (ref 3.4–5)
ALP BLD-CCNC: 86 U/L (ref 40–129)
ALT SERPL-CCNC: 17 U/L (ref 10–40)
ANION GAP SERPL CALCULATED.3IONS-SCNC: 15 MMOL/L (ref 3–16)
AST SERPL-CCNC: 13 U/L (ref 15–37)
BILIRUB SERPL-MCNC: 0.3 MG/DL (ref 0–1)
BUN BLDV-MCNC: 14 MG/DL (ref 7–20)
CALCIUM SERPL-MCNC: 9.6 MG/DL (ref 8.3–10.6)
CHLORIDE BLD-SCNC: 102 MMOL/L (ref 99–110)
CHOLESTEROL, TOTAL: 112 MG/DL (ref 0–199)
CO2: 21 MMOL/L (ref 21–32)
CREAT SERPL-MCNC: 0.8 MG/DL (ref 0.6–1.1)
GFR SERPL CREATININE-BSD FRML MDRD: >60 ML/MIN/{1.73_M2}
GLUCOSE BLD-MCNC: 95 MG/DL (ref 70–99)
HDLC SERPL-MCNC: 40 MG/DL (ref 40–60)
LDL CHOLESTEROL CALCULATED: 54 MG/DL
POTASSIUM SERPL-SCNC: 4.4 MMOL/L (ref 3.5–5.1)
SODIUM BLD-SCNC: 138 MMOL/L (ref 136–145)
TOTAL PROTEIN: 7.6 G/DL (ref 6.4–8.2)
TRIGL SERPL-MCNC: 90 MG/DL (ref 0–150)
VLDLC SERPL CALC-MCNC: 18 MG/DL

## 2023-02-16 PROCEDURE — 80061 LIPID PANEL: CPT

## 2023-02-16 PROCEDURE — 36415 COLL VENOUS BLD VENIPUNCTURE: CPT

## 2023-02-16 PROCEDURE — 80053 COMPREHEN METABOLIC PANEL: CPT

## 2023-02-16 RX ORDER — ISOPROPYL ALCOHOL 70 ML/100ML
SWAB TOPICAL
COMMUNITY
Start: 2023-01-31

## 2023-02-16 RX ORDER — BIOTIN 1 MG
1 TABLET ORAL DAILY
Qty: 1 EACH | Refills: 3 | Status: SHIPPED | OUTPATIENT
Start: 2023-02-16

## 2023-02-16 SDOH — ECONOMIC STABILITY: HOUSING INSECURITY
IN THE LAST 12 MONTHS, WAS THERE A TIME WHEN YOU DID NOT HAVE A STEADY PLACE TO SLEEP OR SLEPT IN A SHELTER (INCLUDING NOW)?: NO

## 2023-02-16 SDOH — ECONOMIC STABILITY: FOOD INSECURITY: WITHIN THE PAST 12 MONTHS, THE FOOD YOU BOUGHT JUST DIDN'T LAST AND YOU DIDN'T HAVE MONEY TO GET MORE.: NEVER TRUE

## 2023-02-16 SDOH — ECONOMIC STABILITY: FOOD INSECURITY: WITHIN THE PAST 12 MONTHS, YOU WORRIED THAT YOUR FOOD WOULD RUN OUT BEFORE YOU GOT MONEY TO BUY MORE.: NEVER TRUE

## 2023-02-16 SDOH — ECONOMIC STABILITY: INCOME INSECURITY: HOW HARD IS IT FOR YOU TO PAY FOR THE VERY BASICS LIKE FOOD, HOUSING, MEDICAL CARE, AND HEATING?: NOT HARD AT ALL

## 2023-02-16 ASSESSMENT — PATIENT HEALTH QUESTIONNAIRE - PHQ9
5. POOR APPETITE OR OVEREATING: 1
SUM OF ALL RESPONSES TO PHQ QUESTIONS 1-9: 12
8. MOVING OR SPEAKING SO SLOWLY THAT OTHER PEOPLE COULD HAVE NOTICED. OR THE OPPOSITE, BEING SO FIGETY OR RESTLESS THAT YOU HAVE BEEN MOVING AROUND A LOT MORE THAN USUAL: 1
3. TROUBLE FALLING OR STAYING ASLEEP: 3
9. THOUGHTS THAT YOU WOULD BE BETTER OFF DEAD, OR OF HURTING YOURSELF: 0
SUM OF ALL RESPONSES TO PHQ QUESTIONS 1-9: 12
10. IF YOU CHECKED OFF ANY PROBLEMS, HOW DIFFICULT HAVE THESE PROBLEMS MADE IT FOR YOU TO DO YOUR WORK, TAKE CARE OF THINGS AT HOME, OR GET ALONG WITH OTHER PEOPLE: 2
1. LITTLE INTEREST OR PLEASURE IN DOING THINGS: 1
7. TROUBLE CONCENTRATING ON THINGS, SUCH AS READING THE NEWSPAPER OR WATCHING TELEVISION: 2
6. FEELING BAD ABOUT YOURSELF - OR THAT YOU ARE A FAILURE OR HAVE LET YOURSELF OR YOUR FAMILY DOWN: 1
SUM OF ALL RESPONSES TO PHQ QUESTIONS 1-9: 12
4. FEELING TIRED OR HAVING LITTLE ENERGY: 2
2. FEELING DOWN, DEPRESSED OR HOPELESS: 1
SUM OF ALL RESPONSES TO PHQ QUESTIONS 1-9: 12
SUM OF ALL RESPONSES TO PHQ9 QUESTIONS 1 & 2: 2

## 2023-02-16 ASSESSMENT — ENCOUNTER SYMPTOMS
RHINORRHEA: 0
SHORTNESS OF BREATH: 0
VOMITING: 0
NAUSEA: 0
EYE DISCHARGE: 0
SORE THROAT: 0
COUGH: 0
ABDOMINAL PAIN: 0
EYE PAIN: 0

## 2023-02-16 ASSESSMENT — ANXIETY QUESTIONNAIRES
4. TROUBLE RELAXING: 2
3. WORRYING TOO MUCH ABOUT DIFFERENT THINGS: 1
2. NOT BEING ABLE TO STOP OR CONTROL WORRYING: 2
IF YOU CHECKED OFF ANY PROBLEMS ON THIS QUESTIONNAIRE, HOW DIFFICULT HAVE THESE PROBLEMS MADE IT FOR YOU TO DO YOUR WORK, TAKE CARE OF THINGS AT HOME, OR GET ALONG WITH OTHER PEOPLE: VERY DIFFICULT
6. BECOMING EASILY ANNOYED OR IRRITABLE: 1
5. BEING SO RESTLESS THAT IT IS HARD TO SIT STILL: 3
GAD7 TOTAL SCORE: 11
1. FEELING NERVOUS, ANXIOUS, OR ON EDGE: 1
7. FEELING AFRAID AS IF SOMETHING AWFUL MIGHT HAPPEN: 1

## 2023-02-16 NOTE — ASSESSMENT & PLAN NOTE
Well-controlled, lifestyle modifications recommended   - Unclear why patient has episodes of hypoglycemia despite not being on any medications for glycemic control  - Appreciate input from endocrinology, next appointment in March  - f/u 3 mo

## 2023-02-16 NOTE — ASSESSMENT & PLAN NOTE
Unclear control, overdue for follow up   - Gastric sleeve performed in 2018 and has not had follow up for this in years  - Referred to Bariatric Surgery

## 2023-02-16 NOTE — ASSESSMENT & PLAN NOTE
Uncontrolled, continue current medications   - Concern for metabolic syndrome  - Consider adding ezetimibe if continued to be uncontrolled  - f/u 3 mo

## 2023-02-16 NOTE — ASSESSMENT & PLAN NOTE
Uncontrolled, recommend compression, no wounds  - Due to habitus patient requires custom compression device  - Refer to wound center in the hopes that they will be able to assist patient with fitting and ordering compression device  - If this service is not available to wound center will need to consider alternative

## 2023-02-16 NOTE — PROGRESS NOTES
[address] and [facility]                                             [address] [facility], [address] [place] [address]        Phone: [phone]      Name:  Evi Rosas  :    1975    Consultants:   Patient Care Team:  Triny Todd May, DO as PCP - General (Family Medicine)  Estephanie Silveira MD as PCP - Empaneled Provider    Chief Complaint:     Evi Rosas is a 41 y.o. female  who presents today for an established patient care visit with Personalized Prevention Plan Services as noted below. HPI:     Evi Rosas 41 y.o. female has RLS (restless legs syndrome); Psychophysiological insomnia; Hypersomnia; Primary osteoarthritis involving multiple joints; Benign essential HTN; Hypothyroidism; Lymphedema of both lower extremities; Class 3 severe obesity in [facility]); Allergic rhinitis; B12 deficiency; GERD (gastroesophageal reflux disease); IgA deficiency ([address] Intertrigo; Optic nerve atrophy; PCOS (polycystic ovarian syndrome); Poor balance; Asthma-COPD overlap syndrome ([address] Degenerative disc disease, cervical; Kidney, aplastic; Multiple joint pain; Neuropathy; Paradoxical vocal cord motion; S/P laparoscopic sleeve gastrectomy; Vitamin D deficiency; Vitiligo; Anxiety; Migraine with status migrainosus, not intractable; Alternating constipation and diarrhea; Mixed hyperlipidemia; Moderate episode of recurrent major depressive disorder ([address] Type 2 diabetes mellitus with diabetic neuropathy, unspecified whether long term insulin use ([address] and Ear pain, bilateral on their problem list.      Ear Pain  Pain intermittent. She tinnitus bilaterally. Decreased hearing bilaterally. She states that the hearing change has been for many years. No itchy watery eyes or sinus congestion. Currently taking Flonase, cetrizine, montelukast, azelastine nasal spray and eye drops.  She states that she has had issues with her ears since she was a child. Diarrhea and constipation  Continues to have alternating symptoms of diarrhea and constipation. No melena. No hematochezia. She has cramping abdominal pain relieved by BM. She has not seen GI. Referral placed 10/20/2022. She has been trying to control symptoms with diet. She has a previous diagnosis of irritable bowel. Last colonoscopy 11/2022. Polyps removed. Tubular adenoma, 5 year follow up. Prior celiac work up negative although patient IgA deficient. HTN  Lisinopril 5, propranolol 8mg,  amlodipine 5 mg. Patient tolerating medication(s) well without adverse effects. Home -140/ 80-90. Patient denies any exertional chest pain, dyspnea, palpitations, syncope, orthopnea, edema or paroxysmal nocturnal dyspnea. HLD  Switched from atorvastatin 40 mg to rosuvastatin 40 mg 11/14/22. Last lipid panel 11/14/2022,     DMTII  Labile blood sugar and hypoglycemia despite not being on any medications for glycemic control. Prescription for continuous blood glucose monitoring ordered. This is in the process of being approved. BS typically . Lowest BS 33. She has lows after being more active or skipping meals. This has not been addressed by endocrinology. Lisinopril 5 mg daily  Hemoglobin A1C   Date Value Ref Range Status   08/22/2022 5.0 % Final       Hypothyroidism  Last seen by Dr. Enrique Wright 12/9/2022  Last TSH 88.05, 11/9/22  Transitioned from levothyroxine 300 mcg to Tirosint 300 mcg (liquid). Continued on liothyronine 25 mcg BID. Next appointment in March. She has had issues with having her insurance cover the liquid formulation. Currently participating in PT for low back pain   Last seen by ortho spine 12/13/2023. Her aunt was diagnosed with osteopenia in hr mid 46s. Patient is post-menopausal. She has not had a period in the last 4 years. She is concerned that she may have low bone density.      Obesity  She had been working on incorporating diet changes that were discussed with nutritionist but has not been able to lose weight but has gained weight. Difficulty with exercise due to knee pain. Wt Readings from Last 3 Encounters:   02/16/23 (!) 338 lb 3.2 oz (153.4 kg)   02/13/23 (!) 328 lb (148.8 kg)   02/03/23 (!) 328 lb (148.8 kg)      Lymphedema  She no longer has compression stockings/ device as they have worn out and she needs custom fitted. No wounds. No significant change in leg swelling but sensation of heaviness also makes activity difficult.          Patient Active Problem List   Diagnosis    RLS (restless legs syndrome)    Psychophysiological insomnia    Hypersomnia    Primary osteoarthritis involving multiple joints    Benign essential HTN    Hypothyroidism    Lymphedema of both lower extremities    Class 3 severe obesity in adult [facility])    Allergic rhinitis    B12 deficiency    GERD (gastroesophageal reflux disease)    IgA deficiency ([facility])    Intertrigo    Optic nerve atrophy    PCOS (polycystic ovarian syndrome)    Poor balance    Asthma-COPD overlap syndrome ([facility])    Degenerative disc disease, cervical    Kidney, aplastic    Multiple joint pain    Neuropathy    Paradoxical vocal cord motion    S/P laparoscopic sleeve gastrectomy    Vitamin D deficiency    Vitiligo    Anxiety    Migraine with status migrainosus, not intractable    Alternating constipation and diarrhea    Mixed hyperlipidemia    Moderate episode of recurrent major depressive disorder ([address]    Type 2 diabetes mellitus with diabetic neuropathy, unspecified whether long term insulin use ([facility])    Ear pain, bilateral         Past Medical History:    Past Medical History:   Diagnosis Date    Abdominal wall abscess 7/30/2021    Acquired hypothyroidism 05/26/2016    ADHD (attention deficit hyperactivity disorder) Not sure    Allergic rhinitis 08/02/2013    Anemia     Ankle swelling 10/05/2017    Anxiety     Arthritis     Asthma     Bipolar disorder ([facility])     Cellulitis 12/19/2017    Chest pain due to myocardial ischemia 05/01/2018    Chronic bronchitis (HCC)     Chronic kidney disease     only one functioning kidney    COPD (chronic obstructive pulmonary disease) (HCC)     Depression     GERD (gastroesophageal reflux disease)     Head injury 10/19/2014    Headache     Hyperlipidemia     Hypertension     Hyperthyroidism with Hashimoto disease 03/13/2020    Hyperthyroidism with Hashimoto disease 3/13/2020    IgA deficiency ([facility]     Incarcerated hernia 06/12/2021    Formatting of this note might be different from the original. Added automatically from request for surgery [id number]    Infection of deep incisional surgical site after procedure     Intertrigo     Irritable bowel syndrome     Left foot pain 05/26/2016    Lymphedema of right lower extremity 05/26/2016    Murmur     Obesity     Optic nerve atrophy 09/19/2021    PARVIZ 02/25/2016    Osteoarthritis of left knee 05/26/2016    Panniculitis 03/13/2020    PCOS (polycystic ovarian syndrome) 10/05/2017    Psychophysiological insomnia 02/25/2016    Restless legs syndrome     S/P laparoscopic sleeve gastrectomy 08/09/2019    S/P repair of ventral hernia 06/14/2021    Sleep apnea     bipap broke-[facility] sleep study    Type 2 diabetes mellitus ([facility] 06/13/2021    Urinary incontinence     Vitiligo        Past Surgical History:  Past Surgical History:   Procedure Laterality Date    APPENDECTOMY  07/01/2002    CHOLECYSTECTOMY  01/01/2001    COLONOSCOPY      COLONOSCOPY N/A 11/10/2022    COLONOSCOPY POLYPECTOMY SNARE/COLD BIOPSY performed by Srinath Hoskins MD at [address]      teeth extracted    HAND SURGERY Left     [facility] [address]  6/21/2021    had to be redone    RECTAL SURGERY N/A 07/31/2021    ABDOMINAL WALL INCISION AND DRAINAGE performed by Maritza Mancera MD at [address] [facility] GASTROPLASTY  [address]  6/12/2021    UPPER GASTROINTESTINAL ENDOSCOPY         Home Meds:  Prior to Visit Medications    Medication Sig Taking? Authorizing Provider   Alcohol Swabs (EASY TOUCH ALCOHOL PREP MEDIUM) 70 % PADS USE 1 PAD 5 TIMES DAILY TO CLEAN AREA Yes Historical Provider, MD   Elastic Bandages & Supports ([address] [facility]) [address] 1 each by Does not apply route daily as needed (lymphadema) Yes Magdelene K May, DO   Fiber, Corn Dextrin, POWD Take 1 Scoop by mouth daily Yes Magdelene K May, DO   blood glucose test strips (ASCENSIA AUTODISC VI;ONE TOUCH ULTRA TEST VI) strip 1 each by In Vitro route daily As needed. Yes Magdelene K May, DO   diclofenac sodium (VOLTAREN) 1 % GEL Apply 4 g topically 4 times daily as needed for Pain Yes Reji Ferrari MD   lidocaine (LIDODERM) 5 % Place 1 patch onto the skin daily 12 hours on, 12 hours off.  Yes Reji Ferrari MD   Blood Glucose Monitoring Suppl (TRUE METRIX METER) w/Device KIT USE TO TEST TWICE DAILY Yes delene K May, DO   Umeclidinium Bromide (INCRUSE ELLIPTA) 62.5 MCG/ACT AEPB Inhale 1 puff into the lungs daily Yes Dee Dee Coombs MD   UNITHROID 300 MCG tablet Take 1 tablet by mouth Daily Yes Vita Jones MD   Continuous Blood Gluc Transmit (DEXCOM G6 TRANSMITTER) MISC Change every 90 days Yes Vita Jones MD   Continuous Blood Gluc Sensor (DEXCOM G6 SENSOR) MISC Change every 10 days Yes Vita Jones MD   Continuous Blood Gluc  (DEXCOM G6 ) NGHIA Use as directed Yes Vita Jones MD   SYMBICORT 160-4.5 MCG/ACT AERO INHALE 2 PUFFS BY MOUTH 2 TIMES A DAY THEN 1 to 2 PUFFS EVERY 4 HOURS MAX 12 PUFFS PER DAY FOR ASTHMA EXACERBATIONS Yes Magdelene K May, DO   propranolol (INDERAL LA) 60 MG extended release capsule TAKE 1 CAPSULE BY MOUTH TWO TIMES A DAY IN THE MORNING AND AT BEDTIME Yes Magdelene K May, DO   nystatin (MYCOSTATIN) 649617 UNIT/GM powder APPLY TOPICALLY 3 TIMES DAILY AS NEEDED TO MANAGE RASH AND MOISTURE (GENERIC FOR MYCOSTATIN) Yes Magdelene K May, DO   albuterol sulfate HFA (PROVENTIL;VENTOLIN;PROAIR) 108 (90 Base) MCG/ACT inhaler INHALE 2 PUFFS BY MOUTH EVERY SIX HOURS AS NEEDED FOR WHEEZING Yes Wiley BARFIELD May, DO   fluticasone (FLONASE) 50 MCG/ACT nasal spray INHALE 2 SPRAYS INTO EACH NOSTRIL TWO TIMES DAILY Yes Wiley BARFIELD May, DO   busPIRone (BUSPAR) 15 MG tablet TAKE 1 TABLET BY MOUTH IN THE MORNING, NOON, AND BEFORE BEDTIME Yes Wiley BARFIELD May, DO   amLODIPine (NORVASC) 5 MG tablet TAKE 1 TABLET BY MOUTH EVERY DAY Yes Wiley BARFIELD May, DO   oxybutynin (DITROPAN-XL) 5 MG extended release tablet TAKE 1 TABLET BY MOUTH EVERY DAY Yes Wiley BARFIELD May, DO   diclofenac sodium (VOLTAREN) 1 % GEL APPLY 4 GRAMS TOPICALLY FOUR TIMES DAILY AS NEEDED FOR PAIN Yes Wiley BARFIELD May, DO   NURTEC 75 MG TBDP DISSOLVE 1 TABLET IN MOUTH EVERY OTHER DAY Yes Historical Provider, MD   TIROSINT- MCG/ML SOLN 3ml daily Yes Dakota Emerson MD   Continuous Blood Gluc Transmit (DEXCOM G6 TRANSMITTER) MISC As needed Yes Dakota Emerson MD   Continuous Blood Gluc Sensor (DEXCOM G6 SENSOR) MISC Every 10 days Yes Dakota Emerson MD   vitamin D (ERGOCALCIFEROL) 1.25 MG (12899 UT) CAPS capsule TAKE 1 CAPSULE BY MOUTH ONE TIME A WEEK Yes Wiley BARFIELD May, DO   rosuvastatin (CRESTOR) 40 MG tablet Take 1 tablet by mouth daily Yes Wiley BARFIELD May, DO   PLENVU 140 g SOLR  Yes Historical Provider, MD   lisinopril (PRINIVIL;ZESTRIL) 5 MG tablet TAKE 1 TABLET BY MOUTH EVERY DAY Yes Wiley BARFIELD May, DO   polyethylene glycol (MIRALAX) 17 GM/SCOOP POWD powder MIX 17 GRAMS IN 4 TO 8OZ LIQUID AND DRINK BY MOUTH IN THE MORNING Yes Wiley BARFIELD May, DO   Lancets MISC 1 each by Does not apply route 2 times daily Yes Wiley BARFIELD May, DO   ARIPiprazole (ABILIFY) 10 MG tablet Take 1 tablet by mouth daily Yes Wiley BARFIELD May, DO   cetirizine (ZYRTEC) 10 MG tablet Take 1 tablet by mouth daily Yes Wiley Conte, DO   omeprazole (PRILOSEC) 40 MG delayed release capsule Take 1 capsule by mouth in the morning and at bedtime Yes Magdelene K May, DO   azelastine (ASTELIN) 0.1 % nasal spray 1 spray by Nasal route 2 times daily Use in each nostril as directed Yes Magdelene K May, DO   tiotropium (SPIRIVA HANDIHALER) 18 MCG inhalation capsule INHALE CONTENTS 1 CAPSULE BY MOUTH EVERY DAY Yes Magdelene K May, DO   EPINEPHrine (EPIPEN 2-SHELBIE) 0.3 MG/0.3ML SOAJ injection Inject 0.3 mLs into the muscle as needed (In the case of anaphylaxis) Use as directed for allergic reaction Yes Magdelene K May, DO   rizatriptan (MAXALT) 10 MG tablet Take 10 mg by mouth once as needed for Migraine May repeat in 2 hours if needed Yes Historical Provider, MD   Calcium-Vitamin D-Vitamin K (CALCIUM SOFT CHEWS PO) Take by mouth in the morning, at noon, and at bedtime Yes Historical Provider, MD   Blood Glucose Monitoring Suppl (TRUE METRIX METER) NGHIA Dispense meter to test blood sugar 4 times daily for Type 2 DM Yes delene K May, DO   blood glucose test strips (TRUE METRIX BLOOD GLUCOSE TEST) strip 1 each by In Vitro route 4 times daily As needed. Yes Magdelene K May, DO   azelastine (OPTIVAR) 0.05 % ophthalmic solution INSTILL 1 DROP IN [facility] EYE TWO TIMES A DAY (GENERIC FOR optivar) Yes Magdelene K May, DO   montelukast (SINGULAIR) 10 MG tablet TAKE 1 TABLET BY MOUTH EVERY DAY AT NIGHT Yes Magdelene K May, DO   fluconazole (DIFLUCAN) 150 MG tablet as needed Takes when on antibiotics Yes Historical Provider, MD   Continuous Blood Gluc  (Ricky Cartagena) [address] 2 times daily Yes Historical Provider, MD   acetaminophen (TYLENOL) 500 MG tablet Take 500 mg by mouth every 6 hours as needed for Pain Yes Historical Provider, MD   Nutritional Supplements (GLUCOSE MANAGEMENT) TABS Take by mouth daily Yes Historical Provider, MD   Adhesive Tape (PAPER TAPE 1\"[id number]) TAPE Apply to affected areas.  Yes Lorena Jacobsen MD   Multiple Vitamins-Minerals (THERAPEUTIC MULTIVITAMIN-MINERALS) tablet Take 1 tablet by mouth daily Yes Historical Provider, MD   artificial tears (ARTIFICIAL TEARS) OINT as needed Yes Historical Provider, MD   dextromethorphan-guaiFENesin (MUCINEX DM)  MG per SR tablet Take 1 tablet by mouth every 12 hours as needed  Yes Historical Provider, MD   liothyronine (CYTOMEL) 25 MCG tablet Take one tablet twice a day  Patient not taking: Reported on 2/16/2023  Nidia Marrero MD   DULoxetine (CYMBALTA) 30 MG extended release capsule Take 1 capsule by mouth 2 times daily  Wiley BARFIELD May, DO   levothyroxine (SYNTHROID) 300 MCG tablet Take 1 tablet by mouth Daily  Patient not taking: Reported on 12/9/2022  Nidia Marrero MD       Allergies:    Bee venom, Black pepper-turmeric, Dust mite extract, Ibuprofen, and Nsaids    Family History:       Problem Relation Age of Onset    Hypertension Mother     Hypothyroidism Mother     Depression Mother     Diabetes Mother     Heart Disease Mother     High Blood Pressure Mother     High Cholesterol Mother     Kidney Disease Mother         Had kidney failure    Mental Illness Mother     Miscarriages / [place] Mother     Obesity Mother     Stroke Mother     Asthma Father     Diabetes Father     Emphysema Father     Hypertension Father     Depression Father     Early Death Father     Heart Attack Father     Heart Disease Father     High Blood Pressure Father     High Cholesterol Father     Obesity Father     Substance Abuse Father     Hypertension Sister         problems with pregnancy.     Asthma Sister     Depression Sister     Hearing Loss Sister     Heart Disease Sister     High Blood Pressure Sister     High Cholesterol Sister     Learning Disabilities Sister     Mental Illness Sister     Obesity Sister     Substance Abuse Sister     Diabetes Paternal Grandmother     Heart Attack Paternal Grandmother     Stroke Paternal Grandmother     Cervical Cancer Paternal Aunt     Breast Cancer Paternal Aunt     Cancer Paternal Aunt     Depression Paternal Aunt     Mental Illness Paternal Aunt     Mera Or / [place] Paternal Aunt     Breast Cancer Maternal Aunt     Cancer Maternal Aunt     Heart Disease Maternal Aunt     High Blood Pressure Maternal Aunt     Immune Disorder Maternal Aunt     Obesity Maternal Aunt     Breast Cancer Maternal Aunt     Cancer Maternal Aunt     Heart Disease Maternal Aunt     High Blood Pressure Maternal Aunt     Immune Disorder Maternal Aunt         Katiana Navarrete  had thyroid remove    Obesity Maternal Aunt     Ovarian Cancer Paternal Aunt     Heart Attack Maternal Grandfather     High Blood Pressure Maternal Grandfather     Obesity Maternal Grandfather     Stroke Maternal Grandfather     Heart Attack Maternal Grandmother     High Blood Pressure Maternal Grandmother     Obesity Maternal Grandmother     Stroke Maternal Grandmother     Vision Loss Maternal Grandmother     Hearing Loss Maternal Uncle     Heart Disease Maternal Uncle     High Blood Pressure Maternal Uncle     Obesity Maternal Uncle     Heart Disease Maternal Uncle     High Blood Pressure Maternal Uncle     Obesity Maternal Uncle     Heart Disease Maternal Uncle     High Blood Pressure Maternal Uncle     Obesity Maternal Uncle     Heart Disease Paternal Uncle     Obesity Paternal Uncle     Heart Disease Paternal Uncle     Obesity Paternal Uncle     Heart Disease Maternal Aunt     High Blood Pressure Maternal Aunt     Immune Disorder Maternal Aunt     Obesity Maternal Aunt     Immune Disorder Maternal Cousin         Katiana Navarrete had thyroid removed    Immune Disorder Maternal Cousin         Hypothyroidism had thyroid removed         Health Maintenance Completed:  Health Maintenance   Topic Date Due    Shingles vaccine (1 of 2) Never done    Diabetic foot exam  11/08/2022    GFR test (Diabetes, CKD 3-4, OR last GFR 15-59)  11/08/2022    Hepatitis B vaccine (2 of 3 - Risk 3-dose series) 12/12/2022    Diabetic Alb to Cr ratio (uACR) test  03/11/2023    COVID-19 Vaccine (1) 01/04/2024 (Originally 2/23/1976)    A1C test (Diabetic or Prediabetic) 08/22/2023    Lipids  11/14/2023    Depression Monitoring  11/14/2023    Diabetic retinal exam  10/19/2024    Colorectal Cancer Screen  11/10/2025    Cervical cancer screen  07/22/2027    DTaP/Tdap/Td vaccine (3 - Td or Tdap) 11/08/2031    Flu vaccine  Completed    Pneumococcal 0-64 years Vaccine  Completed    Hepatitis C screen  Completed    HIV screen  Completed    Hepatitis A vaccine  Aged Out    Hib vaccine  Aged Out    Meningococcal (ACWY) vaccine  Aged Out          Immunization History   Administered Date(s) Administered    Hepatitis B Adult (Engerix-B) 11/14/2022    Influenza, FLUCELVAX, (age 10 mo+), MDCK, MDV, 0.5mL 11/08/2021    Influenza, FLUCELVAX, (age 10 mo+), MDCK, PF, 0.5mL 12/30/2019, 10/21/2020, 10/20/2022    Influenza, MDCK, Preservative free 08/21/2017    Pneumococcal Polysaccharide ([id number]) 12/04/2017, 11/08/2021    Tdap (Boostrix, Adacel) 03/31/2016, 11/08/2021         Review of Systems:  Review of Systems   Constitutional:  Positive for fatigue. Negative for activity change, chills and fever. HENT:  Positive for ear pain and hearing loss. Negative for congestion, rhinorrhea and sore throat. Eyes:  Negative for pain and discharge. Respiratory:  Negative for cough and shortness of breath. Cardiovascular:  Negative for chest pain and palpitations. Gastrointestinal:  Negative for abdominal pain, nausea and vomiting. Genitourinary:  Negative for difficulty urinating and dysuria. Musculoskeletal:  Negative for arthralgias and myalgias. Skin:  Negative for rash and wound. Neurological:  Negative for dizziness and syncope. Psychiatric/Behavioral:  Negative for agitation and behavioral problems.        Physical Exam:   Vitals:    02/16/23 1249   BP: 118/70   Site: Right Upper Arm   Position: Sitting   Cuff Size: Large Adult   Pulse: 69   Resp: 18   Temp: 97.4 °F (36.3 °C)   TempSrc: Temporal   SpO2: 100%   Weight: (!) 338 lb 3.2 oz (153.4 kg)   Height: 5' (1.524 m)     Body mass index is 66.05 kg/m². Wt Readings from Last 3 Encounters:   02/16/23 (!) 338 lb 3.2 oz (153.4 kg)   02/13/23 (!) 328 lb (148.8 kg)   02/03/23 (!) 328 lb (148.8 kg)       BP Readings from Last 3 Encounters:   02/16/23 118/70   11/14/22 122/70   11/10/22 113/73       Physical Exam  Constitutional:       Appearance: Normal appearance. HENT:      Head: Normocephalic and atraumatic. Right Ear: External ear normal.      Left Ear: External ear normal.      Ears:      Comments: Serous effusion bilateral ears. Bilateral tympanosclerosis. Nose: Nose normal. No congestion or rhinorrhea. Eyes:      Extraocular Movements: Extraocular movements intact. Conjunctiva/sclera: Conjunctivae normal.   Cardiovascular:      Rate and Rhythm: Normal rate and regular rhythm. Pulses: Normal pulses. Heart sounds: Normal heart sounds. Pulmonary:      Effort: Pulmonary effort is normal.      Breath sounds: Normal breath sounds. Musculoskeletal:         General: Normal range of motion. Skin:     General: Skin is warm and dry. Neurological:      General: No focal deficit present. Mental Status: She is alert and oriented to person, place, and time. Psychiatric:         Mood and Affect: Mood normal.         Behavior: Behavior normal.            Lab Review: pertinent labs reviewed       Assessment/Plan:  Michael Grewal 41 y.o. female has RLS (restless legs syndrome); Psychophysiological insomnia; Hypersomnia; Primary osteoarthritis involving multiple joints; Benign essential HTN; Hypothyroidism; Lymphedema of both lower extremities; Class 3 severe obesity in [facility]); Allergic rhinitis; B12 deficiency; GERD (gastroesophageal reflux disease); IgA deficiency ([address] Intertrigo; Optic nerve atrophy; PCOS (polycystic ovarian syndrome); Poor balance; Asthma-COPD overlap syndrome ([address]  Degenerative disc disease, cervical; Kidney, aplastic; Multiple joint pain; Neuropathy; Paradoxical vocal cord motion; S/P laparoscopic sleeve gastrectomy; Vitamin D deficiency; Vitiligo; Anxiety; Migraine with status migrainosus, not intractable; Alternating constipation and diarrhea; Mixed hyperlipidemia; Moderate episode of recurrent major depressive disorder ([address] Type 2 diabetes mellitus with diabetic neuropathy, unspecified whether long term insulin use ([address] and Ear pain, bilateral on their problem list.   Problem List       Alternating constipation and diarrhea      Uncontrolled, recommend fiber supplement   - Recommend follow up with GI if symptoms persist  - Avoid medications that cause diarrhea as this may perpetuate the alternating cycle  - f/u 3 mo         Benign essential HTN      Well-controlled, continue current medications   - f/u 3 mo         Relevant Medications    lisinopril (PRINIVIL;ZESTRIL) 5 MG tablet    amLODIPine (NORVASC) 5 MG tablet    Other Relevant Orders    Comprehensive Metabolic Panel    Class 3 severe obesity in adult ([address]      Uncontrolled, lifestyle modifications recommended   - Recent weight gain may be related uncontrolled hypothyroidism  - Concern for metabolic dysfunction  - f/u 3 mo           Relevant Orders    [facility], Nutrition Services, [facility] (Bariatric Surgery), Anthony    Ear pain, bilateral      Uncontrolled, continue current medications   - Serous effusion bilaterally with tympanosclerosis  - Likely exacerbated by seasonal allergies but already taking flonase, cetrizine, montelukast, azelastine nasal spray and eye drops without improvement.    - Refer to ENT and audiology         Lymphedema of both lower extremities      Uncontrolled, recommend compression, no wounds  - Due to habitus patient requires custom compression device  - Refer to wound center in the hopes that they will be able to assist patient with fitting and ordering compression device  - If this service is not available to wound center will need to consider alternative         Mixed hyperlipidemia      Uncontrolled, continue current medications   - Concern for metabolic syndrome  - Consider adding ezetimibe if continued to be uncontrolled  - f/u 3 mo         Relevant Medications    rosuvastatin (CRESTOR) 40 MG tablet    S/P laparoscopic sleeve gastrectomy      Unclear control, overdue for follow up   - Gastric sleeve performed in 2018 and has not had follow up for this in years  - Referred to Bariatric Surgery          Relevant Orders    [facility] (Bariatric Surgery), Luther Hutton    Type 2 diabetes mellitus with diabetic neuropathy, unspecified whether long term insulin use ([facility] [address]      Well-controlled, lifestyle modifications recommended   - Unclear why patient has episodes of hypoglycemia despite not being on any medications for glycemic control  - Appreciate input from endocrinology, next appointment in March  - f/u 3 mo         Relevant Orders    [facility] [place], Nutrition Services, [employer] [facility] (Bariatric Surgery), [employer] Maintenance Due:  Health Maintenance Due   Topic Date Due    Shingles vaccine (1 of 2) Never done    Diabetic foot exam  11/08/2022    GFR test (Diabetes, CKD 3-4, OR last GFR 15-59)  11/08/2022    Hepatitis B vaccine (2 of 3 - Risk 3-dose series) 12/12/2022    Diabetic Alb to Cr ratio (uACR) test  03/11/2023            RTC:  Return in about 3 months (around 5/16/2023) for CC. EMR Dragon/transcription disclaimer:  Much of this encounter note is electronic transcription/translation of spoken language to printed texts. The electronic translation of spoken language may be erroneous, or at times, nonsensical words or phrases may be inadvertently transcribed.   Although I have reviewed the note for such errors, some may still exist.

## 2023-02-16 NOTE — ASSESSMENT & PLAN NOTE
Uncontrolled, continue current medications   - Serous effusion bilaterally with tympanosclerosis  - Likely exacerbated by seasonal allergies but already taking flonase, cetrizine, montelukast, azelastine nasal spray and eye drops without improvement.    - Refer to ENT and audiology

## 2023-02-16 NOTE — ASSESSMENT & PLAN NOTE
Uncontrolled, recommend fiber supplement   - Recommend follow up with GI if symptoms persist  - Avoid medications that cause diarrhea as this may perpetuate the alternating cycle  - f/u 3 mo

## 2023-02-16 NOTE — ASSESSMENT & PLAN NOTE
Uncontrolled, lifestyle modifications recommended   - Recent weight gain may be related uncontrolled hypothyroidism  - Concern for metabolic dysfunction  - f/u 3 mo

## 2023-02-17 ENCOUNTER — TELEPHONE (OUTPATIENT)
Dept: PRIMARY CARE CLINIC | Age: 48
End: 2023-02-17

## 2023-02-17 NOTE — TELEPHONE ENCOUNTER
Wound care called the Pt they are unable to fit her for compression socks since she does not have an open wound. They told the Pt that Antonina Lopez in [place] should be able to fit her for compression socks.

## 2023-02-22 ENCOUNTER — HOSPITAL ENCOUNTER (OUTPATIENT)
Dept: PHYSICAL THERAPY | Age: 48
Setting detail: THERAPIES SERIES
End: 2023-02-22
Payer: MEDICARE

## 2023-03-05 DIAGNOSIS — M17.0 PRIMARY OSTEOARTHRITIS OF BOTH KNEES: ICD-10-CM

## 2023-03-06 RX ORDER — LIDOCAINE 50 MG/G
PATCH TOPICAL
Qty: 30 PATCH | Refills: 0 | Status: SHIPPED | OUTPATIENT
Start: 2023-03-06

## 2023-03-20 ENCOUNTER — TELEPHONE (OUTPATIENT)
Dept: PRIMARY CARE CLINIC | Age: 48
End: 2023-03-20

## 2023-03-20 ENCOUNTER — TELEPHONE (OUTPATIENT)
Dept: ENDOCRINOLOGY | Age: 48
End: 2023-03-20

## 2023-03-20 DIAGNOSIS — E03.9 ACQUIRED HYPOTHYROIDISM: Primary | ICD-10-CM

## 2023-03-20 NOTE — TELEPHONE ENCOUNTER
Spoke with :  diclofenac sodium (VOLTAREN) 1 % GEL-3 refills    SYMBICORT 160-4.5 MCG/ACT AERO  Quantity 1-2 refills 3    rizatriptan (MAXALT) 10 MG tablet  Quantity 18 a month refill 3    Called & informed Pharmacy

## 2023-03-20 NOTE — TELEPHONE ENCOUNTER
[facility] is calling because they received paperwork from our office with Dr. Cisco De Paz signature & need some quantities for the Rx's. Paperwork was scanned into media 3/15/2023. Looking at the Pt's chart we have been sending her Rx's to [facility]. Informed [facility] that we would call them back after speaking to both the Pt & PCP. Called & spoke with Pt. [facility]s & [facility] for her local when she needs the meds sooner. [facility] will need quantities & # of refills. See Pg 2 of the scanned in paperwork.

## 2023-03-21 NOTE — TELEPHONE ENCOUNTER
Communicated the below information verbally with Melanie Wheeler. Agree with what is documented below.

## 2023-04-20 ENCOUNTER — TELEPHONE (OUTPATIENT)
Dept: ADMINISTRATIVE | Age: 48
End: 2023-04-20

## 2023-04-20 DIAGNOSIS — E11.9 CONTROLLED TYPE 2 DIABETES MELLITUS WITHOUT COMPLICATION, WITHOUT LONG-TERM CURRENT USE OF INSULIN (HCC): ICD-10-CM

## 2023-04-20 DIAGNOSIS — M17.0 PRIMARY OSTEOARTHRITIS OF BOTH KNEES: ICD-10-CM

## 2023-04-21 RX ORDER — LIDOCAINE 50 MG/G
PATCH TOPICAL
Qty: 30 PATCH | Refills: 10 | Status: SHIPPED | OUTPATIENT
Start: 2023-04-21

## 2023-04-21 RX ORDER — PROCHLORPERAZINE 25 MG/1
SUPPOSITORY RECTAL
Qty: 1 EACH | Refills: 1 | Status: SHIPPED | OUTPATIENT
Start: 2023-04-21

## 2023-04-21 NOTE — TELEPHONE ENCOUNTER
Medication:   Requested Prescriptions     Pending Prescriptions Disp Refills    Continuous Blood Gluc Transmit (DEXCOM G6 TRANSMITTER) MISC [Pharmacy Med Name: Joe Mckinley 1 each 1     Sig: USE AS DIRECTED, CHANGE EVERY 90 DAYS       Last Filled:      Patient Phone Number: [phone] (home)     Last appt: 12/9/2022   Next appt: 7/10/2023    Last Labs DM:   Lab Results   Component Value Date/Time    LABA1C 5.0 08/22/2022 09:22 AM

## 2023-04-23 DIAGNOSIS — M17.0 PRIMARY OSTEOARTHRITIS OF BOTH KNEES: ICD-10-CM

## 2023-04-24 RX ORDER — LIDOCAINE 50 MG/G
PATCH TOPICAL
Qty: 30 PATCH | Refills: 0 | Status: SHIPPED | OUTPATIENT
Start: 2023-04-24

## 2023-04-26 DIAGNOSIS — E03.9 HYPOTHYROIDISM, UNSPECIFIED TYPE: ICD-10-CM

## 2023-04-26 NOTE — TELEPHONE ENCOUNTER
----- Message from Tru Rivera May, DO sent at 3/15/2022  3:31 PM EDT -----  Please call patient and let them know that [employer] sent message that their Caty Barrientos will only be covered for 30 days. I have prescribed Symbicort. This medication may have a quantity limit for coverage as well but if this is the case I will call the insurance company and have a peer- to- peer so that it may be approved long term. After further review of patient's symptoms I believe that Symbicort will be a better option as it can be used for COPD maintenance, asthma maintenance, and asthma exacerbations. Patient's description of shortness of breath seems to be more likely caused by asthma. There are new asthma treatment guidelines that recommend Single- Inhaler Maintenance and Reliever tx (SMART). I sent the prescription for Symbicort to the pharmacy now so that hopefully if I need to call the insurance company to get it approved it will be before the 30-day coverage of Advair expires. Patient can finish the dispensed Advair prior to starting Symbicort. Once patients starts Symbicort, stop use of albuterol inhaler. Continue Spiriva inhaler.
troponin 54.57

## 2023-04-27 RX ORDER — LEVOTHYROXINE SODIUM 300 UG/1
300 TABLET ORAL DAILY
Qty: 30 TABLET | Refills: 10 | Status: SHIPPED | OUTPATIENT
Start: 2023-04-27

## 2023-04-27 NOTE — TELEPHONE ENCOUNTER
Medication:   Requested Prescriptions     Pending Prescriptions Disp Refills    UNITHROID 300 MCG tablet [Pharmacy Med Name: UNITHROID 300 MCG TABS 300 Tablet] 30 tablet 10     Sig: TAKE 1 TABLET BY MOUTH DAILY       Last Filled:      Patient Phone Number: [phone] (home)     Last appt: 12/9/2022   Next appt: 7/10/2023    Last Thyroid:   Lab Results   Component Value Date/Time    TSH 2.70 08/11/2022 02:21 PM    FT3 1.9 11/09/2022 01:32 PM    T4FREE 1.0 11/09/2022 01:32 PM    H5ICXOO 1.2 11/08/2021 03:46 PM

## 2023-05-03 NOTE — TELEPHONE ENCOUNTER
Refill Request       Last Seen: Last Seen Department: 2/16/2023  Last Seen by PCP: 2/16/2023    Last Written: polyethylene glycol (MIRALAX) 17 GM/SCOOP POWD powder-11/14/2022 238g 0 refills     Next Appointment:   Future Appointments   Date Time Provider Israel Cote   7/10/2023  2:40 PM MD Ankita Hernandez [facility]             Requested Prescriptions     Pending Prescriptions Disp Refills    polyethylene glycol (GLYCOLAX) 17 GM/SCOOP powder [Pharmacy Med Name: POLYETHYLENE GLYCOL 510GM Powder] 510 g 10     Sig: MIX 17 GM (1 CAPFUL) IN 4-8 OUNCES LIQUID AND DRINK BY MOUTH ONCE DAILY IN THE MORNING

## 2023-05-04 RX ORDER — POLYETHYLENE GLYCOL 3350 17 G/17G
POWDER, FOR SOLUTION ORAL
Qty: 510 G | Refills: 10 | Status: SHIPPED | OUTPATIENT
Start: 2023-05-04

## 2023-06-02 ENCOUNTER — TELEPHONE (OUTPATIENT)
Dept: ENDOCRINOLOGY | Age: 48
End: 2023-06-02

## 2023-06-02 NOTE — TELEPHONE ENCOUNTER
Per Lisa @ [address] [place] [address] advised that the insurance will pay for the generic better than the brand. Lisa is asking for the script to be changed to generic.     Please advise    Generic is Levothyroxine    Lisa [phone]         UNITHROID 300 MCG tablet

## 2023-06-19 DIAGNOSIS — E11.9 CONTROLLED TYPE 2 DIABETES MELLITUS WITHOUT COMPLICATION, WITHOUT LONG-TERM CURRENT USE OF INSULIN (HCC): ICD-10-CM

## 2023-06-19 DIAGNOSIS — E03.9 HYPOTHYROIDISM, UNSPECIFIED TYPE: ICD-10-CM

## 2023-06-20 RX ORDER — PROCHLORPERAZINE 25 MG/1
SUPPOSITORY RECTAL
Qty: 3 EACH | Refills: 10 | Status: SHIPPED | OUTPATIENT
Start: 2023-06-20

## 2023-06-20 RX ORDER — LIOTHYRONINE SODIUM 25 UG/1
TABLET ORAL
Qty: 60 TABLET | Refills: 10 | Status: SHIPPED | OUTPATIENT
Start: 2023-06-20

## 2023-06-20 NOTE — TELEPHONE ENCOUNTER
Medication:   Requested Prescriptions     Pending Prescriptions Disp Refills    liothyronine (CYTOMEL) 25 MCG tablet [Pharmacy Med Name: LIOTHYRONINE 25MCG TAB 25 Tablet] 60 tablet 10     Sig: TAKE ONE (1) TABLET BY MOUTH TWICE DAILY    Continuous Blood Gluc Sensor ([address]) [address] [facility] [Pharmacy Med Name: Farida Rivers SENSOR 3CT Miscellaneous]  10     Sig: USE AS DIRECTED, CHANGE EVERY 10 DAYS       Last Filled:      Patient Phone Number: [phone] (home)     Last appt: 12/9/2022   Next appt: 7/10/2023    Last Labs DM:   Lab Results   Component Value Date/Time    LABA1C 5.0 08/22/2022 09:22 AM     Last Lipid:   Lab Results   Component Value Date/Time    CHOL 112 02/16/2023 02:27 PM    TRIG 90 02/16/2023 02:27 PM    HDL 40 02/16/2023 02:27 PM    [address] 54 02/16/2023 02:27 PM     Last PSA: No results found for: PSA  Last Thyroid:   Lab Results   Component Value Date/Time    TSH 2.70 08/11/2022 02:21 PM    FT3 1.9 11/09/2022 01:32 PM    T4FREE 1.0 11/09/2022 01:32 PM    N6XKIEV 1.2 11/08/2021 03:46 PM

## 2023-06-22 ENCOUNTER — TELEPHONE (OUTPATIENT)
Dept: PRIMARY CARE CLINIC | Age: 48
End: 2023-06-22

## 2023-06-22 NOTE — TELEPHONE ENCOUNTER
Fax received from [employer]. Pt uses [employer], requesting order be change to [employer] meter, test strips and lancets.

## 2023-06-27 RX ORDER — LANCETS
1 EACH MISCELLANEOUS DAILY
Qty: 100 EACH | Refills: 5 | Status: SHIPPED | OUTPATIENT
Start: 2023-06-27

## 2023-06-27 RX ORDER — BLOOD SUGAR DIAGNOSTIC
1 STRIP MISCELLANEOUS 2 TIMES DAILY
Qty: 100 EACH | Refills: 5 | Status: SHIPPED | OUTPATIENT
Start: 2023-06-27

## 2023-06-27 RX ORDER — BLOOD-GLUCOSE METER
1 EACH MISCELLANEOUS DAILY
Qty: 1 KIT | Refills: 0 | Status: SHIPPED | OUTPATIENT
Start: 2023-06-27 | End: 2023-06-29

## 2023-06-29 RX ORDER — BLOOD-GLUCOSE METER
EACH MISCELLANEOUS
Qty: 1 KIT | Refills: 10 | Status: SHIPPED | OUTPATIENT
Start: 2023-06-29

## 2023-07-18 NOTE — TELEPHONE ENCOUNTER
FYI    See pt message re: insurance
Has there been an update on this?
Pt came in for her appt today. She just received the paperwork from them this past weekend. She states that she is filling it out and will send it back to them shortly.
Pt is trying to get some specialty medicaid. She spoke to them today and went over her med list but they told her that they would be reaching out to us for info. Pt just wanted to let us now that someone would be contacting us.
They called to get some of the Pt's diagnosis codes to see if she qualifies for the special medicaid. They said that they would send us paperwork and request after receiving the paperwork back from the Pt.
We have not received any paperwork yet. Pt comes in 7/22 for a appt we can try to get an update from her.
Additional Notes: Patient advised to come back to clinic if lesions are not resolving after 6 weeks
Detail Level: Simple
Render Risk Assessment In Note?: no

## 2023-07-23 NOTE — ED NOTES
[facility] She stated that d/t pt's insurance, they are not able to find pt transportation to [facility]. Case management needs to see pt and try to change insurance or update it  6503 - called case management and left   1032 - Case management RN Jacqueline Muñoz called back and stated that she cannot change pt's insurance or anything. Requested RN Jordan Harris to communicate with the access center and they will call back after conversation. Dr Chico Betts notified. [facility] Jordan Harris called back and stated that pt may need a [place]-based ambulance company for transfer  46 - Dr Chico Betts speaking with Dr Roger Goncalves at this time regarding pt.  [id number] - called general surgery per Dr Mikaela Miranda. Dr Mikaela Miranda would like surgery to see the pt in the ED  Re: abd wall abscess  1648 - Dr Jcarlos Morris at pt's bedside  65 - Dr Jcarlos Morris and Dr Mikaela Miranda speaking in ED.  Dr Mikaela Miranda will put in admission orders     Rowan Cain  07/30/21 3413
2300 Nolan Ramachandran called back from [facility]. She stated that d/t pt's insurance, they are not able to find pt transportation to [facility].  Case management needs to see pt and try to change insurance or update it  1894 - called case management and left NADJA Salcedo  07/30/21 4675
Bed: 10  Expected date:   Expected time:   Means of arrival:   Comments:  [facility] [employer]  RN  07/29/21 8376
Dinner tray ordered from dietary tray line at this time.        Bobbi Cabrera, RN  07/30/21 7643
Dr. Randall Reek in to evaluate patient. Spoke with access center who states she cannot continue work our  was working on to get approval for transport. Case management tomorrow will have to continue the approval process for transport.       Clayton Knutson RN  07/30/21 4298
I called CT to have them copy CT results. I spoke to [employer] he will try to fax to [facility] for pt.      Cayla Murrell, [place]  51/90/97 6120
I called pt  gave him a quick update on pt status/made him aware that the pt has a fever we gave her some Tylenol and more IV fluids to bring the fever down/and reduce her pain.      Morgan Gómez LPN  39/32/34 1955
I called pt  made him aware that the pt will not be transferred to [facility] until sometime in the morning/I don't know the exact time she will be leaving\". Pt  states \"tell her I will call her and talk to her tonight or she can call me\".      Jie Rivera LPN  35/88/36 5809
M/S 623 @ 9450
Nurse report given to [facility].      Colonel Joon LPN  72/53/04 1171
Pt able to ambulate to/from restroom no assistance.      Michele Drummond LPN  27/43/91 9915
Pt dried yellowish foul smelling drainage from umbilicus area cleansed with Hibiclens/Normal Saline. Pt tolerate well.      Alise Moser LPN  42/35/36 8838
Pt gown changed/soiled d/t sweating fever reduced pt linens changed in the bed/d/t fever reduced/sweaty.      Noble Ny, [place]  63/79/29 5768
Received phone call from [address] [place] [address] Ave @   - told by RN that it would be around 800 am on 7/30/21 before they will hear any response from [address] [place] [address] regarding transport for this patient. ED JUAN Nur made aware.       Dariusz Oviedo  07/29/21 8762
Received phone call from [address] [place] [address] [place] @ [phone] - told by RN that they are still working on transport for this patient and it would probably not be until the morning until they would have approved transportation. Made ED RN Kenia and ED PA Dru aware of situation.       Ashleigh Zavaleta  07/29/21 4351
[address] @ 0400   Re: post op   Dr. Baylee Callaway @ [address] [place] [address]  07/29/21 5278
none

## 2023-09-08 NOTE — CARE COORDINATION
Jacey Shelton  9/9/2022    Registered Dietitian Progress Note for Care Coordination    Assessment: Mook Ramos is a 41 y.o. female. RD referred for desired weight loss. RD spoke with patient for initial nutrition assessment on 6/29/22. RD called to follow up with patient today, 9/8/22. RD discussed previous goals with patient. Patient states she received the nutrition handouts re: Celiac Disease, did not have any questions. Patient continues to eat one to two small meals with snack in between meals or at bedtime. Patient reports that her portions have been small. Has been trying to build balanced meals, but does not always. Patient has not yet started to exercise in a gym, but plans to start on Monday next week. Has been doing Physical Therapy twice weekly. Patient reports that her weight \"goes up a little and goes down a little. \" Denies overall weight loss at this point. Patient c/o increased swelling in bilateral lower extremities and plans to discuss this with PCP at appointment on 9/13/22. Nutrition Monitoring and Evaluation  Indicator/Goal Criteria Progress   #1 Follow MyPlate guidelines #1 I will build balanced meal using the MyPlate reference: 1/2 plate fruits and/or vegetables, 1/4 plate protein, and 1/4 plate starchy carbohydrates with 8 oz glass of low fat milk if desired #1 Patient has been working toward building balanced meals    #2  Eat consistently #2 I will eat three meals per day or four to six small meals per day  #2 Patient has been eating one to two small meals +  snack per day, on most days    #3  Limit portion sizes  #3 I will adhere to suggested portion sizes  #3 Patient has been eating small portions       Plan of Care:  RD encouraged patient to keep working toward goals set. RD will follow up with patient to discuss any questions patient has and check the progress toward goals.      Follow Up:    RD will call patient in four weeks to follow up and answer any nutrition related questions at Nutrition Assessment   Reason for Consult/Assessment: Follow up      Diagnosis and Hx: Reviewed    Pertinent Nutrition History: COPD, with recent discharge from hospital. Admit for COPD exacerbation.    Pertinent Nutrition Information: Per chart review, pt continues to have good appetite. No new weights recorded since admission.                               Diet Order: Regular                Diet tolerance: Tolerating with good appetite/intakes recorded   Food Allergies: None known    Demographic/Anthropometrics Information  Gender: male  Patient Age: 64 year old  Height:   Ht Readings from Last 1 Encounters:   08/31/23 6' (1.829 m)      Weight:   Wt Readings from Last 1 Encounters:   08/31/23 52.5 kg      BMI:   BMI Readings from Last 1 Encounters:   08/31/23 15.70 kg/m²       Usual Weight: 48.7 kg (on 4/8/23)              NFPE  Nutrition Focused Physical Exam  Physical Exam Completed: Yes  Body Fat  Overall Body Fat: Severe  Muscle Mass  Overall Muscle Mass: Severe                TREATMENT PLAN: Monitoring & Interventions   Intervention: Meals and snacks, Nutrition education, Nutrition supplement therapy            Meals & snacks: Encouraged po intake of 3 meals per day. Will allow double portions.                                                              Nutrition supplement therapy: Ensure Plus HP TID w/ meals. Strawberry flavor per pt preference. Encourage compliance.     Nutrition education: Encouraged pt to eat protein foods with meals including high calorie foods. Information added to AVS.    Goal: Increase oral intake to >/equal 75% of meals and supplements   Intervention goal status: Some progress toward goal  Time frame to achieve goal: 3-5 days     Dietitian will monitor: Anthropometric Measurements, Food, beverage, and nutrient intake, Biochemical data, medical tests, procedures            Nutrition Diagnosis / PES  Nutrition Diagnosis: Malnutrition  Malnutrition in the context of chronic illness:  Severe  Related to: Increased nutritional demands for healing   As evidenced by: Loss of fat mass, Loss of muscle mass   Malnutrition chronic; severe: Severe depletion of body fat, Severe depletion of muscle mass  Primary Nutrition Diagnosis status: Active nutrition diagnosis                 Iqra Shaw RD  Clinical Dietitian   that time.      Desiree Dasilva, [address] [place] [address],    [phone]

## 2023-10-21 DIAGNOSIS — E11.9 CONTROLLED TYPE 2 DIABETES MELLITUS WITHOUT COMPLICATION, WITHOUT LONG-TERM CURRENT USE OF INSULIN (HCC): ICD-10-CM

## 2023-10-23 RX ORDER — PROCHLORPERAZINE 25 MG/1
SUPPOSITORY RECTAL
Qty: 1 EACH | Refills: 2 | OUTPATIENT
Start: 2023-10-23

## 2023-11-07 ENCOUNTER — TELEPHONE (OUTPATIENT)
Dept: PRIMARY CARE CLINIC | Age: 48
End: 2023-11-07

## 2023-11-07 NOTE — TELEPHONE ENCOUNTER
Pharmacy is calling requesting a refill of SYMBICORT 160-4.5 MCG/ACT AERO  [facility]

## 2023-11-07 NOTE — TELEPHONE ENCOUNTER
Refill Request       Last Seen: Last Seen Department: 2/16/2023  Last Seen by PCP: 2/16/2023    Last Written: 1/24/2023 10.2g 1 refill     Next Appointment:   No future appointments.           Requested Prescriptions     Pending Prescriptions Disp Refills    SYMBICORT 160-4.5 MCG/ACT AERO 10.2 g 1     Sig: INHALE 2 PUFFS BY MOUTH 2 TIMES A DAY THEN 1 to 2 PUFFS EVERY 4 HOURS MAX 12 PUFFS PER DAY FOR ASTHMA EXACERBATIONS

## 2023-11-08 RX ORDER — BUDESONIDE AND FORMOTEROL FUMARATE DIHYDRATE 160; 4.5 UG/1; UG/1
AEROSOL RESPIRATORY (INHALATION)
Qty: 10.2 G | Refills: 1 | Status: SHIPPED | OUTPATIENT
Start: 2023-11-08

## 2023-12-07 RX ORDER — RIZATRIPTAN BENZOATE 10 MG/1
10 TABLET ORAL
Qty: 30 TABLET | Refills: 3 | Status: SHIPPED | OUTPATIENT
Start: 2023-12-07 | End: 2023-12-07

## 2023-12-07 NOTE — TELEPHONE ENCOUNTER
[facility], [address] [place] [address] [[id number]] is calling requesting a refill of   rizatriptan (MAXALT) 10 MG tablet   Please send refill to pharmacy

## 2023-12-07 NOTE — TELEPHONE ENCOUNTER
Refill Request       Last Seen: Last Seen Department: 2/16/2023  Last Seen by PCP: 2/16/2023    Last Written: rizatriptan (Raf Chavira) 10 MG tablet-11/4/2022 historical provider     Next Appointment:   No future appointments.           Requested Prescriptions     Pending Prescriptions Disp Refills    rizatriptan (MAXALT) 10 MG tablet 30 tablet      Sig: Take 1 tablet by mouth once as needed for Migraine May repeat in 2 hours if needed

## 2024-01-17 RX ORDER — ALBUTEROL SULFATE 90 UG/1
AEROSOL, METERED RESPIRATORY (INHALATION)
Qty: 8.5 G | Refills: 10 | Status: SHIPPED | OUTPATIENT
Start: 2024-01-17

## 2024-01-17 RX ORDER — CETIRIZINE HYDROCHLORIDE 10 MG/1
10 TABLET ORAL DAILY
Qty: 30 TABLET | Refills: 10 | Status: SHIPPED | OUTPATIENT
Start: 2024-01-17

## 2024-01-17 RX ORDER — ISOPROPYL ALCOHOL 70 ML/100ML
SWAB TOPICAL
Qty: 100 EACH | Refills: 10 | Status: SHIPPED | OUTPATIENT
Start: 2024-01-17

## 2024-01-17 RX ORDER — RIMEGEPANT SULFATE 75 MG/75MG
TABLET, ORALLY DISINTEGRATING ORAL
Qty: 15 TABLET | Refills: 10 | Status: SHIPPED | OUTPATIENT
Start: 2024-01-17

## 2024-01-17 NOTE — TELEPHONE ENCOUNTER
Refill Request       Last Seen: Last Seen Department: 2/16/2023  Last Seen by PCP: 2/16/2023    Last Written: Zrytec 12/18/23 qty 30 w/ 10, albuterol 12/18/23 qty 8.5 g w/ 10, ETOH swab 01/31/23, Nurtec 11/22/22    Next Appointment:   No future appointments.    Message to  to schedule appointment.         Requested Prescriptions     Pending Prescriptions Disp Refills    cetirizine (ZYRTEC) 10 MG tablet [Pharmacy Med Name: CETIRIZINE 10 MG TABLET 10 MG Tablet] 30 tablet 10     Sig: TAKE 1 TABLET BY MOUTH EVERY DAY    Alcohol Swabs (EASY TOUCH ALCOHOL PREP MEDIUM) 70 % PADS [Pharmacy Med Name: ALCOHOL PREP PADS 100CT PAD]  10     Sig: USE 1 PAD 5 TIMES DAILY TO CLEAN AREA    albuterol sulfate HFA (PROVENTIL;VENTOLIN;PROAIR) 108 (90 Base) MCG/ACT inhaler [Pharmacy Med Name: ALBUTEROL HFA *PROA* 90MCG 108 (90 BAS Aerosol] 8.5 g 10     Sig: INHALE TWO (2) PUFFS BY MOUTH EVERY 4-6 HOURS AS NEEDED    NURTEC 75 MG TBDP [Pharmacy Med Name: NURTEC 75MG ODT 75 ODT] 15 tablet 10     Sig: DISSOLVE 1 TABLET IN MOUTH EVERY OTHER DAY TO HELP PREVENT MIGRAINES

## 2024-01-18 RX ORDER — NYSTATIN 100000 [USP'U]/G
POWDER TOPICAL
Qty: 60 G | Refills: 10 | Status: SHIPPED | OUTPATIENT
Start: 2024-01-18

## 2024-01-18 NOTE — TELEPHONE ENCOUNTER
Refill Request   Return in about 3 months (around 5/16/2023) for CC.     Last Seen: Last Seen Department: 2/16/2023  Last Seen by PCP: 2/16/2023    Last Written: 1/24/23 60g with 1    Next Appointment:   No future appointments.        Requested Prescriptions     Pending Prescriptions Disp Refills    nystatin (MYCOSTATIN) [id number] UNIT/GM powder [Pharmacy Med Name: NYSTATIN PWD 60GM 100,000U [id number] Powder] 60 g 10     Sig: APPLY 4 TIMES DAILY TO AFFECTED AREA

## 2024-01-19 RX ORDER — BUDESONIDE AND FORMOTEROL FUMARATE DIHYDRATE 160; 4.5 UG/1; UG/1
AEROSOL RESPIRATORY (INHALATION)
Qty: 10.2 G | Refills: 10 | Status: SHIPPED | OUTPATIENT
Start: 2024-01-19

## 2024-01-19 NOTE — TELEPHONE ENCOUNTER
Refill Request       Last Seen: Last Seen Department: 2/16/2023  Last Seen by PCP: 2/16/2023    Last Written: 11/8/2-23    Next Appointment:   No future appointments.    Message to  to schedule appointment.         Requested Prescriptions     Pending Prescriptions Disp Refills    SYMBICORT 160-4.5 MCG/ACT AERO [Pharmacy Med Name: SYMBICORT 160-4.5MCG 10.2GM 160-4.5 Aerosol] 10.2 g 10     Sig: INHALE TWO (2) PUFFS BY MOUTH TWICE DAILY THEN 1-2 PUFFS EVERY 4 HOURS *MAX 12 PUFFS PER DAY FOR ASTHMA EXACERBATIONS*

## 2024-01-24 RX ORDER — NYSTATIN 100000 [USP'U]/G
POWDER TOPICAL
Qty: 60 G | Refills: 10 | Status: SHIPPED | OUTPATIENT
Start: 2024-01-24

## 2024-01-24 NOTE — TELEPHONE ENCOUNTER
Refill Request  nystatin (MYCOSTATIN) [id number] UNIT/GM powder   **This is a duplicate request      Last Seen: Last Seen Department: 2/16/2023  Last Seen by PCP: 2/16/2023    Last Written: 01/18/24    Next Appointment:   No future appointments.        Requested Prescriptions     Pending Prescriptions Disp Refills    nystatin (MYCOSTATIN) [id number] UNIT/GM powder [Pharmacy Med Name: NYSTATIN PWD 60GM 100,000U [id number] Powder] 60 g 10     Sig: APPLY 4 TIMES DAILY TO AFFECTED AREA        nystatin (MYCOSTATIN) [id number] UNIT/GM powder [[id number]]    Order Details  Dose, Route, Frequency: As Directed   Dispense Quantity: 60 g Refills: 10    Note to Pharmacy: CAN NO LONGER ORDER ANY SIZE BOTTLE. PLEASE SEND BRAND NEW RX FOR ALTERNATIVE MEDICATION.         Sig: APPLY 4 TIMES DAILY TO AFFECTED AREA         Start Date: 01/18/24 End Date: --   Written Date: 01/18/24 Expiration Date: 01/17/25   Original Order: nystatin (MYCOSTATIN) [id number] UNIT/GM powder [[id number]]     [facility] [phone] -  [phone]

## 2024-01-26 NOTE — TELEPHONE ENCOUNTER
Per pharmacy comment, this medication is on back-order    Refill Request       Last Seen: Last Seen Department: 2/16/2023  Last Seen by PCP: 2/16/2023    Last Written: 01/24/24 qty 60 g    Next Appointment:   No future appointments.    Message to  to schedule appointment      Requested Prescriptions     Pending Prescriptions Disp Refills    nystatin (MYCOSTATIN) [id number] UNIT/GM powder [Pharmacy Med Name: NYSTATIN PWD 60GM 100,000U [id number] Powder] 60 g 10     Sig: APPLY 4 TIMES DAILY TO AFFECTED AREA

## 2024-02-01 RX ORDER — NYSTATIN 100000 [USP'U]/G
POWDER TOPICAL
Qty: 60 G | Refills: 10 | OUTPATIENT
Start: 2024-02-01

## 2024-02-10 NOTE — CONSULTS
1u PRBC obtained from blood bank and verified with RN and PHI for administration.   IP General Consult (Use specialty-specific consult if known)  Consult performed by: Vinay Cabrera MD  Consult ordered by: Daniel Cade DO  Reason for consult: ventral hernia         [facility]   HISTORY AND PHYSICAL    Patient Name: Kristin Aldrich  : 1975  MRN: [id number]  Primary Care Physician:  Kaylin Gaines APRN  Date of admission: 2021    Subjective   Subjective     Chief Complaint: Abdominal pain    Reason for Consult:  Incarcerated ventral hernia    HPI:    Kristin Aldrich is a 45 y.o. female who presented to the emergency room today for right sided abdominal pain that started about 2 days ago.  She has not had any nausea or vomiting but she is only had some mild diarrhea no solid bowel movement for the past 2 days.  CT and pelvis was done and showed a ventral hernia with herniated colon.  The emergent physician was not able to reduce the hernia.  I came to the emergency room to see the patient and I also was not able to reduce the hernia.  No fevers.  Patient's abdominal surgeries include laparoscopic appendectomy and laparoscopic gastric sleeve.  Gastric sleeve was performed in 2018.  Patient does not smoke cigarettes.  Patient has multiple medical problems and takes multiple different home medications.  She is going to be admitted to the hospital by the hospitalist service and I will take her to surgery urgently for repair of the hernia.  I reviewed the CT scan images myself.      Personal History     Allergies:  Allergies   Allergen Reactions   • Bee Venom Swelling     At  site of sting   • Wasp Venom Swelling     At sting site   • Ibuprofen Other (See Comments)     Was told not to take   • Nsaids Other (See Comments)     Pt only has one kidney, was told not to take   • Other Other (See Comments)     All pepper, irritation of throat       Home Medications:  Prior to Admission medications    Medication Sig Start Date End Date Taking? Authorizing Provider   acetaminophen  (TYLENOL) 500 MG tablet Take 500 mg by mouth Every 6 (Six) Hours As Needed for Mild Pain .   Yes Mera Arellano MD   albuterol (PROVENTIL HFA;VENTOLIN HFA) 108 (90 Base) MCG/ACT inhaler Inhale 2 puffs Every 4 (Four) Hours As Needed for Wheezing.   Yes Mera Arellano MD   amLODIPine (NORVASC) 5 MG tablet Take 5 mg by mouth Daily.   Yes Mera Arellano MD   ARIPiprazole (ABILIFY) 5 MG tablet Take 5 mg by mouth Daily.   Yes Mera Arellano MD   ARTIFICIAL TEAR OP Apply 1 drop to eye(s) as directed by provider.   Yes Mera Arellano MD   atorvastatin (LIPITOR) 10 MG tablet Take 10 mg by mouth Daily.   Yes Mera Arellano MD   azelastine (ASTELIN) 0.1 % nasal spray 2 sprays into each nostril 2 (Two) Times a Day. Use in each nostril as directed   Yes Mera Arellano MD   azelastine (OPTIVAR) 0.05 % ophthalmic solution 1 drop 2 (Two) Times a Day.   Yes Mera Arellano MD   Blood Glucose Monitoring Suppl (FREESTYLE LITE) device USE AS DIRECTED TO TEST BLOOD GLUCOSE 1/10/20  Yes Mera Arellano MD   busPIRone (BUSPAR) 15 MG tablet Take 15 mg by mouth 3 (Three) Times a Day.   Yes Mera Arellano MD   cetirizine (zyrTEC) 10 MG tablet Take 10 mg by mouth Daily.   Yes Mera Arellano MD   chlorhexidine (PERIDEX) 0.12 % solution Apply 15 mL to the mouth or throat 2 (Two) Times a Day.   Yes Mera Arellano MD   Cholecalciferol ([employer] Vitamin D3) 25 MCG (1000 UT) chewable tablet Chew 2 tablets Daily.   Yes Mera Arellano MD   Continuous Blood Gluc  (Dexcom G6 ) device USE TO CHECK BLOOD SUGAR TWICE DAILY AS NEEDED 3/26/21  Yes Mera Arellano MD   diclofenac (VOLTAREN) 1 % gel gel Apply 4 g topically to the appropriate area as directed 4 (Four) Times a Day As Needed.   Yes Mera Arellano MD   DULoxetine (CYMBALTA) 30 MG capsule Take 30 mg by mouth 2 (Two) Times a Day.   Yes Mera Arellano MD   EPINEPHrine (EpiPen  2-Logan) 0.3 MG/0.3ML solution auto-injector injection    Yes Mera Arellano MD   famotidine (Pepcid) 40 MG tablet Take 1 tablet by mouth 2 (Two) Times a Day. 5/4/21  Yes Inga Moore APRN   ferrous sulfate 325 (65 FE) MG tablet Take 325 mg by mouth 3 (Three) Times a Day With Meals.   Yes Mera Arellano MD   fluticasone (FLONASE) 50 MCG/ACT nasal spray 1 spray into the nostril(s) as directed by provider Daily.   Yes Mera Arellano MD   Fluticasone Furoate-Vilanterol (BREO ELLIPTA) 200-25 MCG/INH aerosol powder  Inhale 1 puff Daily.   Yes Mera Arellano MD   furosemide (LASIX) 40 MG tablet Take 40 mg by mouth Daily.   Yes Mera Arellano MD   ipratropium-albuterol (DUO-NEB) 0.5-2.5 mg/3 ml nebulizer Inhale 3 mL 4 (Four) Times a Day As Needed.   Yes Mera Arellano MD   Levothyroxine Sodium (TIROSINT-SOL PO) 3 mL. Patient states 300 mcg per ml, patient takes 3 ml. 2/23/21  Yes Mera Arellano MD   Linzess 145 MCG capsule capsule Take 1 capsule by mouth Daily. 6/18/20  Yes Mera Arellano MD   liothyronine (CYTOMEL) 25 MCG tablet Take 25 mcg by mouth 2 (two) times a day. 2/19/21  Yes Mera Arellano MD   montelukast (SINGULAIR) 10 MG tablet Take 10 mg by mouth Every Night.   Yes ProviderMera MD   NON FORMULARY Vitamin patch   Yes Provider, MD Mera   Nutritional Supplements (GLUCOSE MANAGEMENT) tablet Take  by mouth.   Yes ProviderMera MD   nystatin (nystatin) 085972 UNIT/GM powder Apply  topically to the appropriate area as directed 4 (Four) Times a Day.   Yes Mera Arellano MD   onabotulinumtoxina (Botox) 100 units reconstituted solution injection Inject 1 each into the appropriate area of the skin as directed by provider Every 3 (Three) Months. 5/28/21  Yes Mera Arellano MD   oxybutynin XL (DITROPAN-XL) 5 MG 24 hr tablet Take 5 mg by mouth Daily.   Yes ProviderMera MD   potassium chloride (MICRO-K) 10 MEQ CR  capsule Take 2 capsules by mouth Daily. 6/22/20  Yes Mera Arellano MD   rizatriptan (MAXALT) 10 MG tablet Take 1 tablet by mouth As Needed. 6/26/20  Yes Mera Arellano MD   Semaglutide,0.25 or 0.5MG/DOS, (Ozempic, 0.25 or 0.5 MG/DOSE,) 2 MG/1.5ML solution pen-injector Inject 0.5 mg under the skin into the appropriate area as directed 1 (One) Time Per Week. 0.25X 4 WEEKS AND 0.5X 4 WEEKS   Yes Mera Arellano MD   Aimovig 140 MG/ML prefilled syringe Inject 1 mL under the skin into the appropriate area as directed Every 30 (Thirty) Days. 8/21/20 6/12/21  Mera Arellano MD   calcium carbonate EX (TUMS EX) 750 MG chewable tablet Chew 750 mg Daily.  6/12/21  Mera Arellano MD   cetirizine (zyrTEC) 10 MG tablet Take 10 mg by mouth Daily.  6/12/21  Mera Arellano MD    MG capsule TK ONE C PO BID 2/29/20 6/12/21  Mera Arellano MD   erythromycin (ROMYCIN) 5 MG/GM ophthalmic ointment APPLY 0.5 INCH TO INNER ASPECT OF THE LOWER LID OF AFFECTED EYE EVERY 4 HOURS WHILE AWAKE 1/2/20 6/12/21  Mera Arellano MD   fluticasone (CUTIVATE) 0.005 % ointment Apply  topically to the appropriate area as directed.  6/12/21  Mera Arellano MD   guaiFENesin (MUCINEX) 600 MG 12 hr tablet Take 1,200 mg by mouth 2 (Two) Times a Day.  6/12/21  Mera Arellano MD   lisinopril (PRINIVIL,ZESTRIL) 20 MG tablet Take 20 mg by mouth Daily.  6/12/21  Mera Arellano MD   nadolol (CORGARD) 20 MG tablet Take 20 mg by mouth Daily.  6/12/21  Mera Arellano MD   polyethylene glycol (MIRALAX) packet Take 17 g by mouth Daily.  6/12/21  Mera Arellano MD         Past Medical History:   Diagnosis Date   • Abnormal exam of both ears     STATES EARS DON'T DRAIN ,  TOO MUCH EAR WAX   • ADHD    • Anxiety and depression    • Asthma    • Blind     blind in right eye   • Carpal tunnel syndrome     LT   • CFS (chronic fatigue syndrome)    • Chronic bronchitis (CMS/HCC)    •  COPD (chronic obstructive pulmonary disease) (CMS/HCC)    • Edema     LEGS AND FEET   • GERD (gastroesophageal reflux disease)    • Hashimoto's thyroiditis    • Head injury    • Heart burn    • Hiatal hernia    • History of Clostridium difficile colitis 2017   • History of concussion 10/19/2014     WITH HEAD INJURY   • History of MRSA infection 2017    IN [facility]    • HTN (hypertension)    • Hyperlipidemia    • Hypertriglyceridemia    • Hypoglycemia    • IBS (irritable bowel syndrome)    • IgA deficiency (CMS/HCC)    • Incontinence of urine    • Insomnia    • Insulin resistance    • Joint pain    • Kidney disease     ONLY HAS ONE KIDNEY RIGHT   • Laryngopharyngeal reflux (LPR)    • Lymphedema     LEGS AND FEET   • Migraine    • OA (osteoarthritis)     WEAK ANKLES   • OCD (obsessive compulsive disorder)    • CHONG (obstructive sleep apnea)     has bi-pap   • Paradoxical vocal cord motion disorder    • PCOS (polycystic ovarian syndrome)    • Photophobia of both eyes    • Polydipsia    • Polyuria    • PONV (postoperative nausea and vomiting)    • Prediabetes    • PTSD (post-traumatic stress disorder)    • Restless legs syndrome (RLS)    • Seasonal allergies    • Umbilical hernia    • Vitiligo     HANDS ,  LEGS, GROIN AREA   • Water retention        Past Surgical History:   Procedure Laterality Date   • CARPAL TUNNEL RELEASE Left 2015   • COLONOSCOPY     • ENDOSCOPY     • GASTRIC SLEEVE LAPAROSCOPIC N/A 8/8/2018    Procedure: GASTRIC SLEEVE LAPAROSCOPIC WITH LYSIS OF ADHESIONS AND HIATAL HERNIA REPAIR;  Surgeon: Daniel Neil Jr., MD;  Location: [facility] OR [facility];  Service: Bariatric   • LAPAROSCOPIC APPENDECTOMY  2003   • LAPAROSCOPIC CHOLECYSTECTOMY     • TEETH EXTRACTION  2015   • WISDOM TOOTH EXTRACTION         Social History:  reports that she has never smoked. She has never used smokeless tobacco. She reports that she does not drink alcohol and does not use drugs.    Family History: Noncontributory  to present issue    Review of Systems:  10-point ROS is noncontributory besides as mentioned in above HPI section.       Objective   Objective     Vitals:   Temp:  [98 °F (36.7 °C)-98.3 °F (36.8 °C)] 98 °F (36.7 °C)  Heart Rate:  [62-96] 66  Resp:  [16-18] 18  BP: ()/() 125/80  Physical Exam   Constitutional: morbidly obese, alert, no acute distress, reliable historian  HENT:  NCAT, no visible deformities or lesions  Eyes:  sclerae clear, conjunctivae clear, EOMI  Neck:  normal appearance, no masses, trachea midline  Respiratory:  breathing not labored, respiratory effort appears normal  Cardiovascular:  heart regular rate and rhythm  Abdomen:  soft, obese, nondistended, several small surgical scars, bulge just above and to right of umbilicus that is tender and is not reducible.  Skin overlying bulge is normal appearing.  Skin and subcutaneous tissue:  no visible concerning rashes or lesions, no jaundice  Musculoskeletal: moving all extremities symmetrically and purposefully  Neurologic:  no obvious motor or sensory deficits, normal gait, able to stand without difficulty, cerebellar function without any obvious abnormalities, alert & oriented x 3, speech clear  Psychiatric:  judgment and insight intact, mood normal, affect appropriate, cooperative    Result Review      LABS:  Lab Results (last 72 hours)     Procedure Component Value Units Date/Time    hCG, Quantitative, Pregnancy [[id number]] Collected: 06/12/21 0810    Specimen: Blood Updated: 06/12/21 1004     HCG Quantitative 0.93 mIU/mL             Urinalysis With Microscopic If Indicated (No Culture) - Urine, Clean Catch [[id number]]  (Abnormal) Collected: 06/12/21 0901    Specimen: Urine, Clean Catch Updated: 06/12/21 0926     Color, UA Yellow     Appearance, UA Clear     pH, UA 5.5     Specific Gravity, UA 1.015     Glucose, UA Negative     Ketones, UA Trace     Bilirubin, UA Negative     Blood, UA Negative     Protein, UA Negative     Leuk  Esterase, UA Negative     Nitrite, UA Negative     Urobilinogen, UA 0.2 E.U./dL    Narrative:      Urine microscopic not indicated.    Blood Gas, Venous -With Co-Ox Panel: Yes [[id number]]  (Abnormal) Collected: 06/12/21 0907    Specimen: Venous Blood from Arm, Right Updated: 06/12/21 0916     pH, Venous 7.468 pH Units      pCO2, Venous 27.2 mm Hg      pO2, Venous 67.3 mm Hg      HCO3, Venous 19.3 mmol/L      Base Excess, Venous -2.7 mmol/L      O2 Saturation, Venous 94.5 %      Hemoglobin, Blood Gas 16.0 g/dL      Carboxyhemoglobin 2.1 %      Methemoglobin 0.20 %      Oxyhemoglobin 92.3 %      FHHB 5.4 %      Note --     Site Venous    [place] Draw [[id number]] Collected: 06/12/21 0810    Specimen: Blood Updated: 06/12/21 0916    Narrative:      The following orders were created for panel order [place] Draw.  Procedure                               Abnormality         Status                     ---------                               -----------         ------                     Green Top (Gel)[[id number]]                                  Final result               Lavender Top[[id number]]                                     Final result               Gold Top - SST[[id number]]                                   Final result                 Please view results for these tests on the individual orders.    Lavender Top [[id number]] Collected: 06/12/21 0810    Specimen: Blood Updated: 06/12/21 0916     Extra Tube hold for add-on     Comment: Auto resulted       Comprehensive Metabolic Panel [[id number]] Collected: 06/12/21 0810    Specimen: Blood Updated: 06/12/21 0858     Glucose 90 mg/dL      BUN 8 mg/dL      Creatinine 0.83 mg/dL      Sodium 139 mmol/L      Potassium 3.9 mmol/L      Chloride 103 mmol/L      CO2 22.9 mmol/L      Calcium 9.3 mg/dL      Total Protein 8.4 g/dL      Albumin 4.70 g/dL      ALT (SGPT) 22 U/L      AST (SGOT) 17 U/L      Alkaline Phosphatase 85 U/L      Total Bilirubin 0.5 mg/dL      eGFR Non   Amer 75 mL/min/1.73      Globulin 3.7 gm/dL      A/G Ratio 1.3 g/dL      BUN/Creatinine Ratio 9.6     Anion Gap 13.1 mmol/L     Narrative:      GFR Normal >60  Chronic Kidney Disease <60  Kidney Failure <15      Lipase [[id number]]  (Normal) Collected: 06/12/21 0810    Specimen: Blood Updated: 06/12/21 0858     Lipase 38 U/L     Gold Top - SST [[id number]] Collected: 06/12/21 0810    Specimen: Blood Updated: 06/12/21 0842     Extra Tube Hold for add-ons.     Comment: Auto resulted.       Green Top (Gel) [[id number]] Collected: 06/12/21 0810    Specimen: Blood Updated: 06/12/21 0842     Extra Tube Hold for add-ons.     Comment: Auto resulted.       CBC & Differential [[id number]]  (Abnormal) Collected: 06/12/21 0810    Specimen: Blood Updated: 06/12/21 0839    Narrative:      The following orders were created for panel order CBC & Differential.  Procedure                               Abnormality         Status                     ---------                               -----------         ------                     Scan Slide[[id number]]                                       Final result               CBC Auto Differential[[id number]]        Abnormal            Final result                 Please view results for these tests on the individual orders.    Scan Slide [[id number]] Collected: 06/12/21 0810    Specimen: Blood Updated: 06/12/21 0839     RBC Morphology Normal     WBC Morphology Normal     Platelet Estimate Adequate    CBC Auto Differential [[id number]]  (Abnormal) Collected: 06/12/21 0810    Specimen: Blood Updated: 06/12/21 0839     WBC 7.96 10*3/mm3      RBC 5.82 10*6/mm3      Hemoglobin 15.7 g/dL      Hematocrit 48.8 %      MCV 83.8 fL      MCH 27.0 pg      MCHC 32.2 g/dL      RDW 14.6 %      RDW-SD 44.4 fl      MPV 11.5 fL      Platelets 212 10*3/mm3      Neutrophil % 64.2 %      Lymphocyte % 30.0 %      Monocyte % 5.3 %      Eosinophil % 0.0 %      Basophil % 0.4 %      Immature Grans % 0.1 %       Neutrophils, Absolute 5.11 10*3/mm3      Lymphocytes, Absolute 2.39 10*3/mm3      Monocytes, Absolute 0.42 10*3/mm3      Eosinophils, Absolute 0.00 10*3/mm3      Basophils, Absolute 0.03 10*3/mm3      Immature Grans, Absolute 0.01 10*3/mm3      nRBC 0.0 /100 WBC           IMAGING:  Imaging Results (Last 72 Hours)     Procedure Component Value Units Date/Time    CT Abdomen Pelvis Without Contrast [[id number]] Collected: 06/12/21 1125     Updated: 06/12/21 1135    Narrative:      PROCEDURE: CT ABDOMEN PELVIS WO CONTRAST     COMPARISON: [facility], CT, ABD PEL W/O CONTRAST, 3/11/2017, 1:53.  [facility], CT, ABD PEL W/O CONTRAST, 10/15/2020, 11:02.     INDICATIONS: past hx cholecystectomy and appendendectomy. RLQ pain. Post menopausal.     TECHNIQUE: CT images were created without intravenous contrast.       PROTOCOL:   Standard imaging protocol performed      RADIATION:       Automated exposure control was utilized to minimize radiation dose.      FINDINGS:   The heart size is normal.  There is no pericardial effusion.  The lung bases are clear.     There is mild hepatomegaly.  There is decreased density of the hepatic parenchyma likely related to   underlying hepatic steatosis.  There is no focal liver lesion identified within the limitations of   noncontrast technique.  The gallbladder is surgically absent.  There is no intrahepatic or   extrahepatic biliary ductal dilatation.     The spleen is normal in size.  There are multiple adjacent hypertrophied splenules.  The adrenal   glands and pancreas appear within normal limits for noncontrast technique.     The left kidney is normal in size.  There is no hydronephrosis.  The urinary bladder is   underdistended which limits evaluation.  The uterus is present and anteverted.  The left ovary is   visualized and normal.  The right ovary is not well seen.     There are postsurgical changes of prior gastric sleeve procedure.  There are no  abnormally dilated   loops of small bowel to suggest small bowel obstruction or small bowel inflammation.  The terminal   ileum appears normal.  There are post appendectomy changes within the right lower quadrant.     There is a left paramidline ventral hernia containing a short segment of transverse colon.  There   is some nonspecific localized colonic wall thickening with stool present within the short segment.    There is a small amount of surrounding mesenteric edema within the hernia sac which appears new   from the prior examination.  There is a transition between the hernia sac and the remainder of the   distal transverse colon with relative distal collapse.  There is no free fluid or free air.  The   aorta is normal in caliber without evidence of aneurysm formation.  There is no lymphadenopathy   within the abdomen or pelvis.  There are no acute osseous findings.     CONCLUSION:   1. Left paramidline ventral abdominal hernia containing a short segment of mid transverse colon.    There is some mild colonic wall thickening with surrounding mild inflammatory stranding and   relative caliber change between the stool filled loop of transverse colon within the hernia sac and   the collapsed distal transverse colon.  Differential considerations would include focal colitis   involving the segment of colon within the hernia sac, early strangulation involving the hernia or   an element of partial obstruction related to the hernia.  Correlate clinically for reducibility.  2. Mild hepatomegaly with hepatic steatosis.  3. Postsurgical changes of prior gastric sleeve procedure.              SOCRATES STEPHEN MD         Electronically Signed and Approved By: SOCRATES STEPHEN MD on 6/12/2021 at 11:31                           WBC   Date Value Ref Range Status   06/12/2021 7.96 3.40 - 10.80 10*3/mm3 Final     RBC   Date Value Ref Range Status   06/12/2021 5.82 (H) 3.77 - 5.28 10*6/mm3 Final     Hemoglobin   Date Value Ref  Range Status   06/12/2021 15.7 12.0 - 15.9 g/dL Final     Hematocrit   Date Value Ref Range Status   06/12/2021 48.8 (H) 34.0 - 46.6 % Final     MCV   Date Value Ref Range Status   06/12/2021 83.8 79.0 - 97.0 fL Final     MCH   Date Value Ref Range Status   06/12/2021 27.0 26.6 - 33.0 pg Final     MCHC   Date Value Ref Range Status   06/12/2021 32.2 31.5 - 35.7 g/dL Final     RDW   Date Value Ref Range Status   06/12/2021 14.6 12.3 - 15.4 % Final     RDW-SD   Date Value Ref Range Status   06/12/2021 44.4 37.0 - 54.0 fl Final     MPV   Date Value Ref Range Status   06/12/2021 11.5 6.0 - 12.0 fL Final     Platelets   Date Value Ref Range Status   06/12/2021 212 140 - 450 10*3/mm3 Final     Neutrophil %   Date Value Ref Range Status   06/12/2021 64.2 42.7 - 76.0 % Final     Lymphocyte %   Date Value Ref Range Status   06/12/2021 30.0 19.6 - 45.3 % Final     Monocyte %   Date Value Ref Range Status   06/12/2021 5.3 5.0 - 12.0 % Final     Eosinophil %   Date Value Ref Range Status   06/12/2021 0.0 (L) 0.3 - 6.2 % Final     Basophil %   Date Value Ref Range Status   06/12/2021 0.4 0.0 - 1.5 % Final     Immature Grans %   Date Value Ref Range Status   06/12/2021 0.1 0.0 - 0.5 % Final     Neutrophils, Absolute   Date Value Ref Range Status   06/12/2021 5.11 1.70 - 7.00 10*3/mm3 Final     Lymphocytes, Absolute   Date Value Ref Range Status   06/12/2021 2.39 0.70 - 3.10 10*3/mm3 Final     Monocytes, Absolute   Date Value Ref Range Status   06/12/2021 0.42 0.10 - 0.90 10*3/mm3 Final     Eosinophils, Absolute   Date Value Ref Range Status   06/12/2021 0.00 0.00 - 0.40 10*3/mm3 Final     Basophils, Absolute   Date Value Ref Range Status   06/12/2021 0.03 0.00 - 0.20 10*3/mm3 Final     Immature Grans, Absolute   Date Value Ref Range Status   06/12/2021 0.01 0.00 - 0.05 10*3/mm3 Final     nRBC   Date Value Ref Range Status   06/12/2021 0.0 0.0 - 0.2 /100 WBC Final      Basic Metabolic Panel    Sodium Sodium   Date Value Ref Range  Status   06/12/2021 139 136 - 145 mmol/L Final      Potassium Potassium   Date Value Ref Range Status   06/12/2021 3.9 3.5 - 5.2 mmol/L Final      Chloride Chloride   Date Value Ref Range Status   06/12/2021 103 98 - 107 mmol/L Final      Bicarbonate No results found for: PLASMABICARB   BUN BUN   Date Value Ref Range Status   06/12/2021 8 6 - 20 mg/dL Final      Creatinine Creatinine   Date Value Ref Range Status   06/12/2021 0.83 0.57 - 1.00 mg/dL Final      Calcium Calcium   Date Value Ref Range Status   06/12/2021 9.3 8.6 - 10.5 mg/dL Final      Glucose      No components found for: GLUCOSE.*          Assessment/Plan   Assessment / Plan     Assessment:  Incarcerated ventral incisional hernia  Multiple pre-existing medical problems    Plan:  Incarcerated ventral incision hernia repair  Admit to hospital by Hospitialist Service for management of multiple pre-existing medical problems    Discussion:  Indications, options, risk, benefits, and expected outcomes of planned surgery were discussed with the patient and she agrees to proceed.    Electronically signed by Vinay Cabrera MD, 06/12/21, 1:55 PM EDT.

## 2024-04-04 ENCOUNTER — TELEPHONE (OUTPATIENT)
Dept: PRIMARY CARE CLINIC | Age: 49
End: 2024-04-04

## 2024-04-04 NOTE — TELEPHONE ENCOUNTER
LVM for the patient to call the office ref a recall notification rec'd at our office on: fluticasone (FLONASE) 50 MCG/ACT nasal spray     I let her know it could be also be viewed in her MyChart.

## 2024-04-09 ENCOUNTER — TELEPHONE (OUTPATIENT)
Dept: ADMINISTRATIVE | Age: 49
End: 2024-04-09

## 2024-04-09 NOTE — TELEPHONE ENCOUNTER
Submitted PA for Nurtec 75MG dispersible tablets   Via CM (Key: [id number])  STATUS: PENDING.    Spoke with [employer].  Ran  a test claim and it does not require a PA. Available without authorization. The member is able to fill the requested drug at the pharmacy.     Follow up done daily; if no decision with in three days we will refax.  If another three days goes by with no decision will call the insurance for status.

## 2024-05-14 DIAGNOSIS — E11.9 CONTROLLED TYPE 2 DIABETES MELLITUS WITHOUT COMPLICATION, WITHOUT LONG-TERM CURRENT USE OF INSULIN (HCC): ICD-10-CM

## 2024-05-15 RX ORDER — BLOOD SUGAR DIAGNOSTIC
STRIP MISCELLANEOUS
Qty: 100 EACH | Refills: 10 | Status: SHIPPED | OUTPATIENT
Start: 2024-05-15

## 2024-05-15 RX ORDER — PROCHLORPERAZINE 25 MG/1
SUPPOSITORY RECTAL
Qty: 3 EACH | Refills: 10 | OUTPATIENT
Start: 2024-05-15

## 2024-05-15 RX ORDER — LANCETS 30 GAUGE
EACH MISCELLANEOUS
Qty: 100 EACH | Refills: 10 | Status: SHIPPED | OUTPATIENT
Start: 2024-05-15

## 2024-05-15 NOTE — TELEPHONE ENCOUNTER
Refill Request       Last Seen: Last Seen Department: 2/16/2023  Last Seen by PCP: 2/16/2023    Last Written:   ONETOUCH ULTRA TEST strip 6/27/23 100 with 5    Lancets (ONETOUCH DELICA PLUS [id number]) MISC 6/27/23 100 with 5    Next Appointment:   No future appointments.    Requested Prescriptions     Pending Prescriptions Disp Refills    ONETOUCH ULTRA TEST strip [Pharmacy Med Name: ONE TOUCH ULT 50CT TEST ST IP]  10     Sig: USE TO TEST BLOOD SUGAR TWICE DAILY    Lancets (ONETOUCH DELICA PLUS [id number]) MISC [Pharmacy Med Name: ONETOUCH DELICA+30G CHEMA 100 30G Miscellaneous]  10     Sig: USE TO TEST BLOOD SUGAR DAILY

## 2025-01-15 NOTE — TELEPHONE ENCOUNTER
Goal Outcome Evaluation:              Outcome Evaluation: VSS. Fall precautions maintained. Monitoring blood sugar. Up with assist and walker. KENNETH.                              Please advise.

## 2025-05-05 NOTE — TELEPHONE ENCOUNTER
Attempted to reach out to patient to schedule an appointment.   If patient calls the office back, please route the call to Sandra    Thank You     Refill Request       Last Seen: Last Seen Department: 11/14/2022  Last Seen by PCP: 11/14/2022    Last Written: 08/29/2022    Next Appointment:   Future Appointments   Date Time Provider Israel Cote   11/23/2022 10:00 AM MD Raul Lima   12/27/2022  8:30 PM SCHEDULE, [facility] SLEEP ROOM 3 [facility] SLEEP [facility]   2/14/2023 12:30 PM Wiley Conte DO [facility]             Requested Prescriptions     Pending Prescriptions Disp Refills    vitamin D (ERGOCALCIFEROL) 1.25 MG (13334 UT) CAPS capsule [Pharmacy Med Name: Vitamin D (Ergocalciferol) Oral Capsule 1.25 MG (42489 UT)] 12 capsule 0     Sig: TAKE 1 CAPSULE BY MOUTH ONE TIME A WEEK

## (undated) DEVICE — SLV SCD LEG COMFORT KENDALLSCD MD REPROC

## (undated) DEVICE — INTENDED FOR TISSUE SEPARATION, AND OTHER PROCEDURES THAT REQUIRE A SHARP SURGICAL BLADE TO PUNCTURE OR CUT.: Brand: BARD-PARKER ® CARBON RIB-BACK BLADES

## (undated) DEVICE — SUT MERSILENE POLYSTR CT1 BR 0 75CM GRN

## (undated) DEVICE — TRAP SPEC POLYPR SGL CHMBR FN MESH SCRN

## (undated) DEVICE — GOWN,REINF,POLY,AURORA,XLNG/XXL,STRL: Brand: MEDLINE

## (undated) DEVICE — ANTIBACTERIAL VIOLET BRAIDED (POLYGLACTIN 910), SYNTHETIC ABSORBABLE SUTURE: Brand: COATED VICRYL

## (undated) DEVICE — ENDOSCOPIC KIT 2 12 FT OP4 DE2 GWN SYR

## (undated) DEVICE — GLV SURG SENSICARE MICRO PF LF 8.5 STRL

## (undated) DEVICE — BARRIER, ABSORBABLE, ADHESION: Brand: SEPRAFILM®

## (undated) DEVICE — BANDAGE,GAUZE,4.5"X4.1YD,STERILE,LF: Brand: MEDLINE

## (undated) DEVICE — SUT SILK 2/0 TIES 18IN A185H

## (undated) DEVICE — ENSEAL LAPAROSCOPIC TISSUE SEALER G2 ARTICULATING CURVED JAW FOR USE WITH G2 GENERATOR 5MM DIAMETER 45CM SHAFT LENGTH: Brand: ENSEAL

## (undated) DEVICE — DRSNG WND GZ CURAD OIL EMULSION 3X8IN LF STRL 1PK

## (undated) DEVICE — PK OSC LAP SLV 40

## (undated) DEVICE — GOWN,REINFORCE,POLY,SIRUS,BREATH SLV,XLG: Brand: MEDLINE

## (undated) DEVICE — GLV SURG SENSICARE POLYISPRN W/ALOE PF LF 6 GRN STRL

## (undated) DEVICE — GLV SURG SENSICARE PI PF LF 8.5 GRN STRL

## (undated) DEVICE — SNARE ENDOSCP L240CM SHTH DIA24MM LOOP W10MM POLYP RND REINF

## (undated) DEVICE — GAUZE,SPONGE,4"X4",16PLY,STRL,LF,10/TRAY: Brand: MEDLINE

## (undated) DEVICE — VISIGI 3D®  CALIBRATION SYSTEM  SIZE 36FR STD W/ BULB: Brand: BOEHRINGER® VISIGI 3D™ SLEEVE GASTRECTOMY CALIBRATION SYSTEM, SIZE 36FR W/BULB

## (undated) DEVICE — PROXIMATE RH ROTATING HEAD SKIN STAPLERS (35 WIDE) CONTAINS 35 STAINLESS STEEL STAPLES: Brand: PROXIMATE

## (undated) DEVICE — MAJOR-LF: Brand: MEDLINE INDUSTRIES, INC.

## (undated) DEVICE — ANTIBACTERIAL UNDYED BRAIDED (POLYGLACTIN 910), SYNTHETIC ABSORBABLE SUTURE: Brand: COATED VICRYL

## (undated) DEVICE — Device

## (undated) DEVICE — TRAY PREP DRY W/ PREM GLV 2 APPL 6 SPNG 2 UNDPD 1 OVERWRAP

## (undated) DEVICE — ABDOMINAL BINDER, ILIO BYPASS: Brand: DEROYAL

## (undated) DEVICE — GLV SURG SENSICARE MICRO PF LF 6 STRL

## (undated) DEVICE — PENCL E/S SMOKEEVAC W/TELESCP CANN

## (undated) DEVICE — GLOVE SURG SZ 85 L12IN THK104MIL CRM LTX SMOOTH PWD ST

## (undated) DEVICE — CANNULA NSL AD TBNG L7FT PVC STR NONFLARED PRNG O2 DEL W STD

## (undated) DEVICE — GLV SURG BIOGEL LTX PF 7 1/2

## (undated) DEVICE — ELECTRODE,ECG,STRESS,FOAM,3PK: Brand: MEDLINE

## (undated) DEVICE — APL DUPLOSPRAYER MIS 40CM